# Patient Record
Sex: MALE | Race: BLACK OR AFRICAN AMERICAN | NOT HISPANIC OR LATINO | Employment: OTHER | URBAN - METROPOLITAN AREA
[De-identification: names, ages, dates, MRNs, and addresses within clinical notes are randomized per-mention and may not be internally consistent; named-entity substitution may affect disease eponyms.]

---

## 2017-06-07 RX ORDER — AMLODIPINE BESYLATE 10 MG/1
TABLET ORAL
Qty: 90 TABLET | Refills: 1 | Status: SHIPPED | OUTPATIENT
Start: 2017-06-07 | End: 2017-10-30 | Stop reason: SDUPTHER

## 2017-06-15 ENCOUNTER — OFFICE VISIT (OUTPATIENT)
Dept: INTERNAL MEDICINE | Facility: CLINIC | Age: 72
End: 2017-06-15
Payer: COMMERCIAL

## 2017-06-15 ENCOUNTER — LAB VISIT (OUTPATIENT)
Dept: LAB | Facility: OTHER | Age: 72
End: 2017-06-15
Attending: INTERNAL MEDICINE
Payer: COMMERCIAL

## 2017-06-15 VITALS
WEIGHT: 211.88 LBS | OXYGEN SATURATION: 97 % | HEART RATE: 76 BPM | BODY MASS INDEX: 28.08 KG/M2 | DIASTOLIC BLOOD PRESSURE: 82 MMHG | SYSTOLIC BLOOD PRESSURE: 154 MMHG | HEIGHT: 73 IN

## 2017-06-15 DIAGNOSIS — I10 ESSENTIAL HYPERTENSION: ICD-10-CM

## 2017-06-15 DIAGNOSIS — E11.40 CONTROLLED TYPE 2 DIABETES MELLITUS WITH DIABETIC NEUROPATHY, WITHOUT LONG-TERM CURRENT USE OF INSULIN: ICD-10-CM

## 2017-06-15 DIAGNOSIS — E11.40 CONTROLLED TYPE 2 DIABETES MELLITUS WITH DIABETIC NEUROPATHY, WITHOUT LONG-TERM CURRENT USE OF INSULIN: Primary | ICD-10-CM

## 2017-06-15 PROCEDURE — 99214 OFFICE O/P EST MOD 30 MIN: CPT | Mod: S$GLB,,, | Performed by: INTERNAL MEDICINE

## 2017-06-15 PROCEDURE — 3044F HG A1C LEVEL LT 7.0%: CPT | Mod: S$GLB,,, | Performed by: INTERNAL MEDICINE

## 2017-06-15 PROCEDURE — 99999 PR PBB SHADOW E&M-EST. PATIENT-LVL III: CPT | Mod: PBBFAC,,, | Performed by: INTERNAL MEDICINE

## 2017-06-15 PROCEDURE — 4010F ACE/ARB THERAPY RXD/TAKEN: CPT | Mod: S$GLB,,, | Performed by: INTERNAL MEDICINE

## 2017-06-15 PROCEDURE — 1126F AMNT PAIN NOTED NONE PRSNT: CPT | Mod: S$GLB,,, | Performed by: INTERNAL MEDICINE

## 2017-06-15 PROCEDURE — 1159F MED LIST DOCD IN RCRD: CPT | Mod: S$GLB,,, | Performed by: INTERNAL MEDICINE

## 2017-06-15 PROCEDURE — 36415 COLL VENOUS BLD VENIPUNCTURE: CPT

## 2017-06-15 PROCEDURE — 83036 HEMOGLOBIN GLYCOSYLATED A1C: CPT

## 2017-06-15 RX ORDER — LISINOPRIL 20 MG/1
20 TABLET ORAL DAILY
Qty: 90 TABLET | Refills: 1 | Status: SHIPPED | OUTPATIENT
Start: 2017-06-15 | End: 2017-06-29 | Stop reason: SDUPTHER

## 2017-06-15 RX ORDER — METFORMIN HYDROCHLORIDE 1000 MG/1
TABLET ORAL
Qty: 180 TABLET | Refills: 3 | Status: SHIPPED | OUTPATIENT
Start: 2017-06-15 | End: 2017-10-30 | Stop reason: SDUPTHER

## 2017-06-15 NOTE — PROGRESS NOTES
"Subjective:       Patient ID: Misha Eldridge Jr. is a 72 y.o. male.    Chief Complaint: Hypertension      Pt here for f/u HTN and other issues.    Feeling well.  No c/o.     He reports that he stopped taking gabapentin on his own several months ago b/c he felt it wasn't helping.  He also was feeling faint one day around that time and thought the gabapentin might be the cause.           Review of Systems   Constitutional: Negative.    HENT: Negative.    Eyes: Negative.    Respiratory: Negative.        Objective:       Vitals:    06/15/17 1142   BP: (!) 154/82   Pulse: 76   SpO2: 97%   Weight: 96.1 kg (211 lb 13.8 oz)   Height: 6' 1" (1.854 m)     Physical Exam   Constitutional: He is oriented to person, place, and time. He appears well-developed and well-nourished. No distress.   HENT:   Head: Normocephalic and atraumatic.   Right Ear: Tympanic membrane, external ear and ear canal normal.   Left Ear: Tympanic membrane, external ear and ear canal normal.   Mouth/Throat: Oropharynx is clear and moist and mucous membranes are normal. No oropharyngeal exudate or posterior oropharyngeal erythema.   Eyes: Conjunctivae and EOM are normal. Pupils are equal, round, and reactive to light.   Neck: Normal range of motion. Neck supple. No thyromegaly present.   Cardiovascular: Normal rate, regular rhythm and normal heart sounds.  Exam reveals no gallop and no friction rub.    No murmur heard.  Pulmonary/Chest: Effort normal and breath sounds normal. No respiratory distress. He has no wheezes. He has no rhonchi. He has no rales.   Lymphadenopathy:     He has no cervical adenopathy.   Neurological: He is alert and oriented to person, place, and time.   Skin: No rash noted. He is not diaphoretic.   Psychiatric: He has a normal mood and affect. His behavior is normal. Thought content normal.       Assessment:       1. Controlled type 2 diabetes mellitus with diabetic neuropathy, without long-term current use of insulin    2. Essential " hypertension        Plan:           DM2 - Has been controlled.  Cont current tx and check new A1c.    HTN - Uncontrolled.  Pt never started lisinopril.  Will start this now.  Cont amlodipine, carvedilol.  RTC 2-3 weeks for nurse BP check.

## 2017-06-16 LAB
ESTIMATED AVG GLUCOSE: 137 MG/DL
HBA1C MFR BLD HPLC: 6.4 %

## 2017-06-29 ENCOUNTER — CLINICAL SUPPORT (OUTPATIENT)
Dept: INTERNAL MEDICINE | Facility: CLINIC | Age: 72
End: 2017-06-29
Payer: COMMERCIAL

## 2017-06-29 ENCOUNTER — TELEPHONE (OUTPATIENT)
Dept: INTERNAL MEDICINE | Facility: CLINIC | Age: 72
End: 2017-06-29

## 2017-06-29 VITALS — DIASTOLIC BLOOD PRESSURE: 98 MMHG | HEART RATE: 78 BPM | OXYGEN SATURATION: 98 % | SYSTOLIC BLOOD PRESSURE: 150 MMHG

## 2017-06-29 PROCEDURE — 99999 PR PBB SHADOW E&M-EST. PATIENT-LVL III: CPT | Mod: PBBFAC,,,

## 2017-06-29 RX ORDER — LISINOPRIL 40 MG/1
40 TABLET ORAL DAILY
Qty: 90 TABLET | Refills: 1 | Status: SHIPPED | OUTPATIENT
Start: 2017-06-29 | End: 2017-10-30 | Stop reason: SDUPTHER

## 2017-06-29 NOTE — PROGRESS NOTES
Misha Eldridge Jr. 72 y.o. male is here today for Blood Pressure check.   History of HTN yes.    Review of patient's allergies indicates:   Allergen Reactions    Hay fever and allergy relief      Creatinine   Date Value Ref Range Status   12/14/2016 1.2 0.5 - 1.4 mg/dL Final     Sodium   Date Value Ref Range Status   12/14/2016 141 136 - 145 mmol/L Final     Potassium   Date Value Ref Range Status   12/14/2016 4.7 3.5 - 5.1 mmol/L Final   ]  Patient verifies taking blood pressure medications on a regular bases at the same time of the day.     Current Outpatient Prescriptions:     amlodipine (NORVASC) 10 MG tablet, take 1 tablet by mouth once daily, Disp: 90 tablet, Rfl: 1    atorvastatin (LIPITOR) 20 MG tablet, Take 1 tablet (20 mg total) by mouth once daily., Disp: 90 tablet, Rfl: 1    blood sugar diagnostic Strp, 50 strips by Misc.(Non-Drug; Combo Route) route 2 (two) times daily., Disp: 50 strip, Rfl: 2    carvedilol (COREG) 3.125 MG tablet, Take 1 tablet (3.125 mg total) by mouth 2 (two) times daily with meals., Disp: 180 tablet, Rfl: 1    gabapentin (NEURONTIN) 300 MG capsule, Take 1 capsule (300 mg total) by mouth 3 (three) times daily., Disp: 90 capsule, Rfl: 1    glipiZIDE (GLUCOTROL) 5 MG tablet, Take 1 tablet (5 mg total) by mouth 2 (two) times daily before meals., Disp: 180 tablet, Rfl: 1    ketorolac 0.5% (ACULAR) 0.5 % Drop, , Disp: , Rfl: 0    lisinopril (PRINIVIL,ZESTRIL) 20 MG tablet, Take 1 tablet (20 mg total) by mouth once daily., Disp: 90 tablet, Rfl: 1    metformin (GLUCOPHAGE) 1000 MG tablet, take 1 tablet by mouth twice a day with meals, Disp: 180 tablet, Rfl: 3    prednisoLONE acetate (PRED FORTE) 1 % DrpS, , Disp: , Rfl: 0    PREVNAR 13, PF, 0.5 mL Syrg, , Disp: , Rfl:     RESTASIS 0.05 % ophthalmic emulsion, , Disp: , Rfl: 0    VIGAMOX 0.5 % ophthalmic solution, , Disp: , Rfl: 0    ZOSTAVAX, PF, 19,400 unit/0.65 mL injection, , Disp: , Rfl:   Does patient have record of home  blood pressure readings no. Readings have been averaging n/a.   Last dose of blood pressure medication was taken at 9:30am 6/29.  Patient is asymptomatic.   No complaints.    BP: (!) 160/100 , Pulse: 73.    Blood pressure reading after 15 minutes was 150/98, Pulse 78.  Dr. Treviño notified.       Dr. Treviño has doubled pt's lisinopril from 20mg to 40mg. Pt advised to also take daily readings of bp and to call us in one week to report readings. Pt demonstrated verbal understanding of information and had no further questions or concerns at this time.

## 2017-06-29 NOTE — TELEPHONE ENCOUNTER
Pt came in for bp check today. Pt denied any abnormal symptoms such as chest pain, SOB, or dizziness. Pt verifies taking medication same time every day. Pt first bp reading was 160/100, pulse 73, O2% 98. Pt second reading after 15 min 150/98, pulse 78, O2% 98. Per Dr. Treviño pt rx for lisinopril has been increased from 20mg to 40mg daily. Pt also advised to take daily bp readings and to either give us the results either electronically or by phone to see if this change in medication improves bp. Pt verbalized understating and stated he would take at home bp and call results in in one week.

## 2017-06-30 RX ORDER — CARVEDILOL 3.12 MG/1
TABLET ORAL
Qty: 180 TABLET | Refills: 0 | Status: SHIPPED | OUTPATIENT
Start: 2017-06-30 | End: 2017-10-30 | Stop reason: SDUPTHER

## 2017-08-01 ENCOUNTER — OFFICE VISIT (OUTPATIENT)
Dept: INTERNAL MEDICINE | Facility: CLINIC | Age: 72
End: 2017-08-01
Payer: COMMERCIAL

## 2017-08-01 VITALS
WEIGHT: 208.75 LBS | DIASTOLIC BLOOD PRESSURE: 78 MMHG | HEART RATE: 65 BPM | BODY MASS INDEX: 27.67 KG/M2 | SYSTOLIC BLOOD PRESSURE: 148 MMHG | HEIGHT: 73 IN

## 2017-08-01 DIAGNOSIS — R53.83 FATIGUE, UNSPECIFIED TYPE: Primary | ICD-10-CM

## 2017-08-01 DIAGNOSIS — H53.8 BLURRY VISION: ICD-10-CM

## 2017-08-01 PROCEDURE — 99214 OFFICE O/P EST MOD 30 MIN: CPT | Mod: S$GLB,,, | Performed by: INTERNAL MEDICINE

## 2017-08-01 PROCEDURE — 99999 PR PBB SHADOW E&M-EST. PATIENT-LVL III: CPT | Mod: PBBFAC,,, | Performed by: INTERNAL MEDICINE

## 2017-08-01 PROCEDURE — 1159F MED LIST DOCD IN RCRD: CPT | Mod: S$GLB,,, | Performed by: INTERNAL MEDICINE

## 2017-08-01 PROCEDURE — 1126F AMNT PAIN NOTED NONE PRSNT: CPT | Mod: S$GLB,,, | Performed by: INTERNAL MEDICINE

## 2017-08-01 RX ORDER — IBUPROFEN 800 MG/1
TABLET ORAL
COMMUNITY
Start: 2017-07-12 | End: 2018-01-17 | Stop reason: SDUPTHER

## 2017-08-01 RX ORDER — HYDROCHLOROTHIAZIDE 12.5 MG/1
12.5 TABLET ORAL DAILY
Qty: 90 TABLET | Refills: 0 | Status: SHIPPED | OUTPATIENT
Start: 2017-08-01 | End: 2017-10-30 | Stop reason: SDUPTHER

## 2017-08-01 RX ORDER — HYDROCODONE BITARTRATE AND ACETAMINOPHEN 5; 325 MG/1; MG/1
TABLET ORAL
COMMUNITY
Start: 2017-07-12 | End: 2017-12-05

## 2017-08-01 NOTE — PROGRESS NOTES
"Subjective:       Patient ID: Misha Eldridge Jr. is a 72 y.o. male.    Chief Complaint: Blurred Vision; Medication Refill; and Fatigue      Pt c/o blurry peripheral vision for 1 week.  No change in acuity.  No eye pain or redness.  He says his eyes feel dry and are tearing some.      Also c/o fatigue over the past 2 weeks.  He notes he often doesn't sleep enough at night, often 4-6 hours.     No recent low glucoses.           Review of Systems   Constitutional: Negative.    HENT: Negative.    Eyes: Negative.    Respiratory: Negative.        Objective:       Vitals:    08/01/17 1522   BP: (!) 148/78   Pulse: 65   Weight: 94.7 kg (208 lb 12.4 oz)   Height: 6' 1" (1.854 m)     Physical Exam   Constitutional: He is oriented to person, place, and time. He appears well-developed and well-nourished. No distress.   HENT:   Head: Normocephalic and atraumatic.   Right Ear: Tympanic membrane, external ear and ear canal normal.   Left Ear: Tympanic membrane, external ear and ear canal normal.   Mouth/Throat: Oropharynx is clear and moist and mucous membranes are normal. No oropharyngeal exudate or posterior oropharyngeal erythema.   Eyes: Conjunctivae and EOM are normal. Pupils are equal, round, and reactive to light.   Neck: Normal range of motion. Neck supple. No thyromegaly present.   Cardiovascular: Normal rate, regular rhythm and normal heart sounds.  Exam reveals no gallop and no friction rub.    No murmur heard.  Pulmonary/Chest: Effort normal and breath sounds normal. No respiratory distress. He has no wheezes. He has no rhonchi. He has no rales.   Lymphadenopathy:     He has no cervical adenopathy.   Neurological: He is alert and oriented to person, place, and time.   Skin: He is not diaphoretic.       Assessment:       1. Fatigue, unspecified type    2. Blurry vision        Plan:           Fatigue - Seems to be 2/2 insufficient sleep.  Pt advised re sleep hygiene.     Blurry peripheral vision - Unclear etiology, " possibly dry eyes.  Pt advised to try lubricating eye drops and also to see his eye doctor.

## 2017-08-07 NOTE — TELEPHONE ENCOUNTER
----- Message from Mendel Rubi sent at 8/7/2017 11:55 AM CDT -----  Contact: Misha Eldridge  X_  1st Request  _  2nd Request  _  3rd Request        Who: Misha Eldridge    Why: Patient states that his diabetic testing strips and his machine have not been sent in. Please call back the patient.    What Number to Call Back: [phone]    When to Expect a call back: (Before the end of the day)   -- if the call is after 12:00, the call back will be tomorrow.

## 2017-08-11 ENCOUNTER — TELEPHONE (OUTPATIENT)
Dept: INTERNAL MEDICINE | Facility: CLINIC | Age: 72
End: 2017-08-11

## 2017-08-11 NOTE — TELEPHONE ENCOUNTER
----- Message from Magy Pfeiffer sent at 8/11/2017  2:51 PM CDT -----  Contact: Brady CAZARES_  1st Request  _  2nd Request  _  3rd Request    Who:Brady with [facility]    Why: Brady with [facility] states she would like to speak with the staff in regards to getting a prescription for a diabetic machine and lancets e-scribed ...... Please contact to further discuss and advise     What Number to Call Back: [phone]    When to Expect a call back: (Before the end of the day)   -- if call after 3:00 call back will be tomorrow.

## 2017-08-15 RX ORDER — INSULIN PUMP SYRINGE, 3 ML
EACH MISCELLANEOUS
Qty: 1 EACH | Refills: 0 | Status: SHIPPED | OUTPATIENT
Start: 2017-08-15 | End: 2019-03-04 | Stop reason: SDUPTHER

## 2017-08-15 NOTE — TELEPHONE ENCOUNTER
----- Message from Elicia Renner sent at 8/15/2017  2:38 PM CDT -----  Contact: MUSA SEGURA JR. [[id number]]  _x  1st Request  _  2nd Request  _  3rd Request        Who: MUSA SEGURA JR. [[id number]]    Why: patient states he received the testing strips but now he needs a new diabetic machine sent to [address]. Patient would like a call back to confirm.     What Number to Call Back: [phone]    When to Expect a call back: (Before the end of the day)   -- if call after 3:00 call back will be tomorrow.

## 2017-09-04 RX ORDER — ATORVASTATIN CALCIUM 20 MG/1
TABLET, FILM COATED ORAL
Qty: 90 TABLET | Refills: 1 | Status: SHIPPED | OUTPATIENT
Start: 2017-09-04 | End: 2017-10-30 | Stop reason: SDUPTHER

## 2017-09-13 ENCOUNTER — TELEPHONE (OUTPATIENT)
Dept: INTERNAL MEDICINE | Facility: CLINIC | Age: 72
End: 2017-09-13

## 2017-09-13 NOTE — TELEPHONE ENCOUNTER
Pt states he has a bootle of med but can not tell what it is due to bottle being old an name of med is not able to be seen. Pt states he thinks it is his Glipizide but he does not know and will go to the Pharmacy and see if they can help and call us back if he has any questions.

## 2017-09-13 NOTE — TELEPHONE ENCOUNTER
----- Message from Malka Maldonado sent at 9/13/2017 11:01 AM CDT -----  Contact: pt  _  1st Request  _  2nd Request  _  3rd Request        Who: pt    Why: pt needs to get his medications straight. He is requesting a list of medications he is supposed to take. Please call pt    What Number to Call Back:[phone]    When to Expect a call back: (With in 24 hours)

## 2017-10-27 ENCOUNTER — TELEPHONE (OUTPATIENT)
Dept: INTERNAL MEDICINE | Facility: CLINIC | Age: 72
End: 2017-10-27

## 2017-10-27 NOTE — TELEPHONE ENCOUNTER
Call pt and inform him that I did check around for a sooner NP appt but there was nothing sooner and we bunny keep him on wait list.Pt agrees and verbalize and has no further questions.

## 2017-10-27 NOTE — TELEPHONE ENCOUNTER
----- Message from Malka Maldonado sent at 10/27/2017 11:31 AM CDT -----  Contact: pt  _  1st Request  _  2nd Request  _  3rd Request        Who: pt    Why: Requesting a call back in regards to pt is former [facility] pt. Made appt with shana for him in December. Put him on wait list but he needs a much sooner appt than that. Please call pt     What Number to Call Back:[phone]    When to Expect a call back: (Within 24 hours)    Please return the call at earliest convenience. Thanks!

## 2017-10-30 RX ORDER — GABAPENTIN 300 MG/1
300 CAPSULE ORAL 3 TIMES DAILY
Qty: 270 CAPSULE | Refills: 0 | Status: SHIPPED | OUTPATIENT
Start: 2017-10-30 | End: 2018-03-02 | Stop reason: SDUPTHER

## 2017-10-30 RX ORDER — AMLODIPINE BESYLATE 10 MG/1
10 TABLET ORAL DAILY
Qty: 90 TABLET | Refills: 0 | Status: SHIPPED | OUTPATIENT
Start: 2017-10-30 | End: 2018-02-20 | Stop reason: SDUPTHER

## 2017-10-30 RX ORDER — GLIPIZIDE 5 MG/1
5 TABLET ORAL
Qty: 180 TABLET | Refills: 0 | Status: SHIPPED | OUTPATIENT
Start: 2017-10-30 | End: 2017-12-04 | Stop reason: SDUPTHER

## 2017-10-30 RX ORDER — METFORMIN HYDROCHLORIDE 1000 MG/1
1000 TABLET ORAL 2 TIMES DAILY WITH MEALS
Qty: 180 TABLET | Refills: 0 | Status: SHIPPED | OUTPATIENT
Start: 2017-10-30 | End: 2018-02-26 | Stop reason: SDUPTHER

## 2017-10-30 RX ORDER — ATORVASTATIN CALCIUM 20 MG/1
20 TABLET, FILM COATED ORAL DAILY
Qty: 90 TABLET | Refills: 0 | Status: SHIPPED | OUTPATIENT
Start: 2017-10-30 | End: 2018-02-20 | Stop reason: SDUPTHER

## 2017-10-30 RX ORDER — LISINOPRIL 40 MG/1
40 TABLET ORAL DAILY
Qty: 90 TABLET | Refills: 0 | Status: SHIPPED | OUTPATIENT
Start: 2017-10-30 | End: 2018-02-26 | Stop reason: SDUPTHER

## 2017-10-30 RX ORDER — CARVEDILOL 3.12 MG/1
3.12 TABLET ORAL 2 TIMES DAILY WITH MEALS
Qty: 180 TABLET | Refills: 0 | Status: SHIPPED | OUTPATIENT
Start: 2017-10-30 | End: 2018-02-23 | Stop reason: SDUPTHER

## 2017-10-30 RX ORDER — HYDROCHLOROTHIAZIDE 12.5 MG/1
12.5 TABLET ORAL DAILY
Qty: 90 TABLET | Refills: 0 | Status: SHIPPED | OUTPATIENT
Start: 2017-10-30 | End: 2018-03-02 | Stop reason: SDUPTHER

## 2017-11-21 ENCOUNTER — TELEPHONE (OUTPATIENT)
Dept: INTERNAL MEDICINE | Facility: CLINIC | Age: 72
End: 2017-11-21

## 2017-11-21 ENCOUNTER — PATIENT OUTREACH (OUTPATIENT)
Dept: INTERNAL MEDICINE | Facility: CLINIC | Age: 72
End: 2017-11-21

## 2017-11-21 DIAGNOSIS — E11.40 CONTROLLED TYPE 2 DIABETES MELLITUS WITH DIABETIC NEUROPATHY, WITHOUT LONG-TERM CURRENT USE OF INSULIN: Primary | ICD-10-CM

## 2017-11-21 NOTE — PROGRESS NOTES
[facility] is committed to your overall health.  To help you get the most out of each of your visits, we will review your information to make sure you are up to date on all of your recommended tests and/or procedures.       Your PCP   found that you may be due for:     Health Maintenance Due   Topic Date Due    TETANUS VACCINE  03/31/1963    Influenza Vaccine  08/01/2017    Eye Exam  10/11/2017    Foot Exam  12/14/2017     You will be scheduled for a eye cam (eye exam) retina scan on the same day of your scheduled visit with Dr. PATSY Treviño MD..  NO eye drop dilation will take place. You can drive after the scan.  This will take no longer than ten minutes. This will take place in the same office area as your visit.             If you have had any of the above done at another facility, please bring the records or information with you so that your record at [facility] will be complete.  If you would like to schedule any of these, please contact me.     If you are currently taking medication, please bring it with you to your appointment for review.     Also, if you have any type of Advanced Directives, please bring them with you to your office visit so we may scan them into your chart.     Thank you for Choosing [facility] for your healthcare needs.      Additional Information  If you have questions, you can email [email or web address] or call [phone]  to talk to our [facility] staff. Remember, [employer] is NOT to be used for urgent needs. For medical emergencies, dial 911.

## 2017-12-04 RX ORDER — GLIPIZIDE 5 MG/1
TABLET ORAL
Qty: 180 TABLET | Refills: 0 | Status: SHIPPED | OUTPATIENT
Start: 2017-12-04 | End: 2018-02-22 | Stop reason: SDUPTHER

## 2017-12-05 ENCOUNTER — LAB VISIT (OUTPATIENT)
Dept: LAB | Facility: OTHER | Age: 72
End: 2017-12-05
Attending: INTERNAL MEDICINE
Payer: COMMERCIAL

## 2017-12-05 ENCOUNTER — OFFICE VISIT (OUTPATIENT)
Dept: INTERNAL MEDICINE | Facility: CLINIC | Age: 72
End: 2017-12-05
Attending: INTERNAL MEDICINE
Payer: COMMERCIAL

## 2017-12-05 ENCOUNTER — DOCUMENTATION ONLY (OUTPATIENT)
Dept: INTERNAL MEDICINE | Facility: CLINIC | Age: 72
End: 2017-12-05

## 2017-12-05 VITALS
HEART RATE: 63 BPM | WEIGHT: 210.13 LBS | DIASTOLIC BLOOD PRESSURE: 74 MMHG | HEIGHT: 73 IN | SYSTOLIC BLOOD PRESSURE: 136 MMHG | BODY MASS INDEX: 27.85 KG/M2

## 2017-12-05 DIAGNOSIS — Z85.038 HISTORY OF COLON CANCER: ICD-10-CM

## 2017-12-05 DIAGNOSIS — E11.40 CONTROLLED TYPE 2 DIABETES MELLITUS WITH DIABETIC NEUROPATHY, WITHOUT LONG-TERM CURRENT USE OF INSULIN: ICD-10-CM

## 2017-12-05 DIAGNOSIS — Z85.46 HISTORY OF PROSTATE CANCER: ICD-10-CM

## 2017-12-05 DIAGNOSIS — M54.50 CHRONIC BILATERAL LOW BACK PAIN WITHOUT SCIATICA: Primary | Chronic | ICD-10-CM

## 2017-12-05 DIAGNOSIS — G89.29 CHRONIC BILATERAL LOW BACK PAIN WITHOUT SCIATICA: Primary | Chronic | ICD-10-CM

## 2017-12-05 DIAGNOSIS — I10 ESSENTIAL HYPERTENSION: ICD-10-CM

## 2017-12-05 LAB
ALBUMIN SERPL BCP-MCNC: 4.1 G/DL
ALP SERPL-CCNC: 59 U/L
ALT SERPL W/O P-5'-P-CCNC: 20 U/L
ANION GAP SERPL CALC-SCNC: 11 MMOL/L
AST SERPL-CCNC: 18 U/L
BILIRUB SERPL-MCNC: 0.4 MG/DL
BUN SERPL-MCNC: 11 MG/DL
CALCIUM SERPL-MCNC: 9.8 MG/DL
CHLORIDE SERPL-SCNC: 103 MMOL/L
CHOLEST SERPL-MCNC: 155 MG/DL
CHOLEST/HDLC SERPL: 3.1 {RATIO}
CO2 SERPL-SCNC: 25 MMOL/L
CREAT SERPL-MCNC: 1.2 MG/DL
EST. GFR  (AFRICAN AMERICAN): >60 ML/MIN/1.73 M^2
EST. GFR  (NON AFRICAN AMERICAN): >60 ML/MIN/1.73 M^2
ESTIMATED AVG GLUCOSE: 126 MG/DL
GLUCOSE SERPL-MCNC: 114 MG/DL
HBA1C MFR BLD HPLC: 6 %
HDLC SERPL-MCNC: 50 MG/DL
HDLC SERPL: 32.3 %
LDLC SERPL CALC-MCNC: 79.2 MG/DL
NONHDLC SERPL-MCNC: 105 MG/DL
POTASSIUM SERPL-SCNC: 4.3 MMOL/L
PROT SERPL-MCNC: 7.7 G/DL
SODIUM SERPL-SCNC: 139 MMOL/L
TRIGL SERPL-MCNC: 129 MG/DL
TSH SERPL DL<=0.005 MIU/L-ACNC: 1.71 UIU/ML

## 2017-12-05 PROCEDURE — 80061 LIPID PANEL: CPT

## 2017-12-05 PROCEDURE — 80053 COMPREHEN METABOLIC PANEL: CPT

## 2017-12-05 PROCEDURE — 99397 PER PM REEVAL EST PAT 65+ YR: CPT | Mod: S$GLB,,, | Performed by: INTERNAL MEDICINE

## 2017-12-05 PROCEDURE — 99999 PR PBB SHADOW E&M-EST. PATIENT-LVL IV: CPT | Mod: PBBFAC,,, | Performed by: INTERNAL MEDICINE

## 2017-12-05 PROCEDURE — 84153 ASSAY OF PSA TOTAL: CPT

## 2017-12-05 PROCEDURE — 36415 COLL VENOUS BLD VENIPUNCTURE: CPT

## 2017-12-05 PROCEDURE — 84443 ASSAY THYROID STIM HORMONE: CPT

## 2017-12-05 PROCEDURE — 83036 HEMOGLOBIN GLYCOSYLATED A1C: CPT

## 2017-12-05 NOTE — PROGRESS NOTES
Subjective:       Patient ID: Misha Eldridge Jr. is a 72 y.o. male.    Chief Complaint: Establish Care    Here to establish care    Was going to eye MD in [place] still having some trouble with vision. He denies a Hx of glaucoma or infection. Only diagnosis he is aware of is cataract. He is not taking any eye gt. pred fort on MAR    PMHx  DM-+ neuropathy of hands and feet on gabapentin 300mg TID.  Prostate CA-Dx approx 2010 at [facility] s/p resection. No chemo or radiation. He denies urinary frequency, urinary urgency, decreased force of stream, incomplete emptying of bladder, post void dribble, nocturia, or gross hematuria.   Colon CA- Dx approx 2011/2012. S/p colectomy and reversal. Overdue for colonoscopy.   Chronic low back pain-daily. Rare radiation down legs. Has never had any treatment. He does not take anything on regular basis for symptoms. Pain is non debilitating.           Review of Systems   Constitutional: Negative for appetite change, chills, fever and unexpected weight change.   HENT: Negative for hearing loss, sore throat and trouble swallowing.    Eyes: Negative for visual disturbance.   Respiratory: Negative for cough, chest tightness and shortness of breath.    Cardiovascular: Negative for chest pain and leg swelling.   Gastrointestinal: Negative for abdominal pain, blood in stool, constipation, diarrhea, nausea and vomiting.   Endocrine: Negative for polydipsia and polyuria.   Genitourinary: Negative for decreased urine volume, difficulty urinating, dysuria, frequency and urgency.   Musculoskeletal: Positive for back pain. Negative for gait problem.   Skin: Negative for rash.   Neurological: Negative for dizziness and numbness.   Psychiatric/Behavioral: The patient is not nervous/anxious.        Past Medical History:   Diagnosis Date    Colostomy in place     Diabetes mellitus type II     Hemorrhoid     History of colon cancer     History of prostate cancer     History of pulmonary embolism   "   Hypertension      Past Surgical History:   Procedure Laterality Date    HERNIA REPAIR      PROSTATECTOMY      SUBTOTAL COLECTOMY       Family History   Problem Relation Age of Onset    Arthritis Mother     Hypertension Mother     Alcohol abuse Father      Social History     Social History    Marital status: Single     Spouse name: N/A    Number of children: N/A    Years of education: N/A     Occupational History          mother lives w/pt     Social History Main Topics    Smoking status: Never Smoker    Smokeless tobacco: Never Used    Alcohol use No    Drug use: No    Sexual activity: Not on file     Other Topics Concern    Not on file     Social History Narrative    No narrative on file         Objective:      Vitals:    12/05/17 1141   BP: 136/74   Pulse: 63   Weight: 95.3 kg (210 lb 1.6 oz)   Height: 6' 1" (1.854 m)      Physical Exam   Constitutional: He is oriented to person, place, and time. He appears well-developed and well-nourished. No distress.   HENT:   Head: Normocephalic and atraumatic.   Mouth/Throat: Oropharynx is clear and moist. No oropharyngeal exudate.   Eyes: Conjunctivae and EOM are normal. Pupils are equal, round, and reactive to light. No scleral icterus.   Neck: No thyromegaly present.   Cardiovascular: Normal rate, regular rhythm and normal heart sounds.    No murmur heard.  Pulmonary/Chest: Effort normal and breath sounds normal. He has no wheezes. He has no rales.   Abdominal: Soft. He exhibits no distension. There is no tenderness.   Musculoskeletal: He exhibits no edema or tenderness.   Lymphadenopathy:     He has no cervical adenopathy.   Neurological: He is alert and oriented to person, place, and time.   Skin: Skin is warm and dry.   Psychiatric: He has a normal mood and affect. His behavior is normal.       Assessment:       1. Chronic bilateral low back pain without sciatica    2. Controlled type 2 diabetes mellitus with diabetic neuropathy, without long-term " current use of insulin    3. History of prostate cancer    4. Essential hypertension    5. History of colon cancer        Plan:       Misha was seen today for establish care.    Diagnoses and all orders for this visit:    Chronic bilateral low back pain without sciatica    Controlled type 2 diabetes mellitus with diabetic neuropathy, without long-term current use of insulin  -     Ambulatory referral to Optometry  -     Hemoglobin A1c; Future  -     Lipid panel; Future  -     TSH; Future  -     Comprehensive metabolic panel; Future    History of prostate cancer  -     PSA, Screening; Future    Essential hypertension  -discussed goal of 130/80 with Hx of DM. Low salt diet. Continue to monitor and if remains elevated will titrate as warranted.    History of colon cancer  -     Case request GI: COLONOSCOPY    RTC in 6 months or sooner prn             Notification of Lab Results: Letter  Side effects of medication(s) were discussed in detail and patient voiced understanding.  Patient will call back for any issues or complications.

## 2017-12-05 NOTE — PATIENT INSTRUCTIONS
Treatment of nasal congestion  1)Antihistamines(Allegra, Claritin, Xzyal, Zyrtec)  2)Nasal Steroids (Nasocort, Rhinocort, Flonase)  3)Distilled salt water sinus rinses via neti pots or products such as Brett Med Sinus Rinse or Sinugator. Must wash container or device and use bottled water to avoid introducing infection.   You can can use (as directed) any combination of these three things every day of your life if needed in order to treat or control your symptoms. Brand name use of medications is not necessary        Low-Salt Diet  This diet removes foods that are high in salt. It also limits the amount of salt you use when cooking. It is most often used for people with high blood pressure, edema (fluid retention), and kidney, liver, or heart disease.  Table salt contains the mineral sodium. Your body needs sodium to work normally. But too much sodium can make your health problems worse. Your healthcare provider is recommending a low-salt (also called low-sodium) diet for you. Your total daily allowance of salt is 1,500 to 2,300 milligrams (mg). It is less than 1 teaspoon of table salt. This means you can have only about 500 to 700 mg of sodium at each meal. People with certain health problems should limit salt intake to the lower end of the recommended range.    When you cook, dont add much salt. If you can cook without using salt, even better. Dont add salt to your food at the table.  When shopping, read food labels. Salt is often called sodium on the label. Choose foods that are salt-free, low salt, or very low salt. Note that foods with reduced salt may not lower your salt intake enough.    Beans, potatoes, and pasta  Ok: Dry beans, split peas, lentils, potatoes, rice, macaroni, pasta, spaghetti without added salt  Avoid: Potato chips, tortilla chips, and similar products  Breads and cereals  Ok: Low-sodium breads, rolls, cereals, and cakes; low-salt crackers, matzo crackers  Avoid: Salted crackers, pretzels,  popcorn, Indonesian toast, pancakes, muffins  Dairy  Ok: Milk, chocolate milk, hot chocolate mix, low-salt cheeses, and yogurt  Avoid: Processed cheese and cheese spreads; Roquefort, Camembert, and cottage cheese; buttermilk, instant breakfast drink  Desserts  Ok: Ice cream, frozen yogurt, juice bars, gelatin, cookies and pies, sugar, honey, jelly, hard candy  Avoid: Most pies, cakes and cookies prepared or processed with salt; instant pudding  Drinks  Ok: Tea, coffee, fizzy (carbonated) drinks, juices  Avoid: Flavored coffees, electrolyte replacement drinks, sports drinks  Meats  Ok: All fresh meat, fish, poultry, low-salt tuna, eggs, egg substitute  Avoid: Smoked, pickled, brine-cured, or salted meats and fish. This includes rothman, chipped beef, corned beef, hot dogs, deli meats, ham, kosher meats, salt pork, sausage, canned tuna, salted codfish, smoked salmon, herring, sardines, or anchovies.  Seasonings and spices  Ok: Most seasonings are okay. Good substitutes for salt include: fresh herb blends, hot sauce, lemon, garlic, cunningham, vinegar, dry mustard, parsley, cilantro, horseradish, tomato paste, regular margarine, mayonnaise, unsalted butter, cream cheese, vegetable oil, cream, low-salt salad dressing and gravy.  Avoid: Regular ketchup, relishes, pickles, soy sauce, teriyaki sauce, Worcestershire sauce, BBQ sauce, tartar sauce, meat tenderizer, chili sauce, regular gravy, regular salad dressing, salted butter  Soups  Ok: Low-salt soups and broths made with allowed foods  Avoid: Bouillon cubes, soups with smoked or salted meats, regular soup and broth  Vegetables  Ok: Most vegetables are okay; also low-salt tomato and vegetable juices  Avoid: Sauerkraut and other brine-soaked vegetables; pickles and other pickled vegetables; tomato juice, olives  Date Last Reviewed: 8/1/2016  © 5260-7388 [employer]. [address] [place] [address], [place] [address]. All rights reserved. This information is not intended as a  substitute for professional medical care. Always follow your healthcare professional's instructions.

## 2017-12-06 ENCOUNTER — OFFICE VISIT (OUTPATIENT)
Dept: OPTOMETRY | Facility: CLINIC | Age: 72
End: 2017-12-06
Attending: INTERNAL MEDICINE
Payer: COMMERCIAL

## 2017-12-06 DIAGNOSIS — Z96.1 PSEUDOPHAKIA OF BOTH EYES: ICD-10-CM

## 2017-12-06 DIAGNOSIS — E11.9 TYPE 2 DIABETES MELLITUS WITHOUT OPHTHALMIC MANIFESTATIONS: ICD-10-CM

## 2017-12-06 DIAGNOSIS — H26.493 AFTER-CATARACT OBSCURING VISION, BILATERAL: Primary | ICD-10-CM

## 2017-12-06 DIAGNOSIS — H35.30 MACULAR DEGENERATION OF BOTH EYES: ICD-10-CM

## 2017-12-06 LAB — COMPLEXED PSA SERPL-MCNC: 0.72 NG/ML

## 2017-12-06 PROCEDURE — 99999 PR PBB SHADOW E&M-EST. PATIENT-LVL II: CPT | Mod: PBBFAC,,, | Performed by: OPTOMETRIST

## 2017-12-06 PROCEDURE — 92004 COMPRE OPH EXAM NEW PT 1/>: CPT | Mod: S$GLB,,, | Performed by: OPTOMETRIST

## 2017-12-06 NOTE — PROGRESS NOTES
"HPI     Patient's last dilated exam was: a couple months ago  Pt here for diabetic eye exam. Seeing well for long distance, trouble   distinguishing facial features at intermediate distance. Feels his vision   gets blurry after looking at something for apprx 2 minutes."Vision comes   in and out".  Has been experiencing tunnel vision for the last couple   months. Patient denies flashes, floaters, pain and double vision. Not   using any gtts, stopped Restasis a couple months ago. C/o eyes tearing   excessively. Says his eyes feel heavy. Blood sugar was 117 yesterday     Started 4-5 a cloud in front of both eyes.  Denies pain, flashes, or   floaters.  Feels like looking through a tunnel.      Hemoglobin A1C       Date                     Value               Ref Range             Status                12/05/2017               6.0 (H)             4.0 - 5.6 %           Final                 06/15/2017               6.4 (H)             4.0 - 5.6 %           Final                 12/12/2016               6.5 (H)             4.5 - 6.2 %           Final                  Last edited by Hoa Shoemaker, OD on 12/6/2017  3:06 PM. (History)            Assessment /Plan     For exam results, see Encounter Report.    After-cataract obscuring vision, bilateral    Macular degeneration of both eyes    Type 2 diabetes mellitus without ophthalmic manifestations    Pseudophakia of both eyes            1.  L>R  Educated pt on PCO.  Refer for YAG OU.  2.  Drusen OU--early changes.  Monitor.  3.  No retinopathy--monitor yearly.  BS control.  4.  No rx given.                   "

## 2017-12-11 ENCOUNTER — OFFICE VISIT (OUTPATIENT)
Dept: OPHTHALMOLOGY | Facility: CLINIC | Age: 72
End: 2017-12-11
Payer: COMMERCIAL

## 2017-12-11 DIAGNOSIS — H35.3190 MACULAR DEGENERATION, DRY: ICD-10-CM

## 2017-12-11 DIAGNOSIS — H26.491 POSTERIOR CAPSULAR OPACIFICATION VISUALLY SIGNIFICANT, RIGHT EYE: Primary | ICD-10-CM

## 2017-12-11 DIAGNOSIS — H26.492 POSTERIOR CAPSULAR OPACIFICATION VISUALLY SIGNIFICANT, LEFT EYE: ICD-10-CM

## 2017-12-11 PROCEDURE — 66821 AFTER CATARACT LASER SURGERY: CPT | Mod: 50,S$GLB,, | Performed by: OPHTHALMOLOGY

## 2017-12-11 PROCEDURE — 99999 PR PBB SHADOW E&M-EST. PATIENT-LVL III: CPT | Mod: PBBFAC,,, | Performed by: OPHTHALMOLOGY

## 2017-12-11 PROCEDURE — 92014 COMPRE OPH EXAM EST PT 1/>: CPT | Mod: 57,S$GLB,, | Performed by: OPHTHALMOLOGY

## 2017-12-11 RX ORDER — PREDNISOLONE ACETATE 10 MG/ML
1 SUSPENSION/ DROPS OPHTHALMIC 4 TIMES DAILY
Qty: 5 ML | Refills: 0 | Status: SHIPPED | OUTPATIENT
Start: 2017-12-11 | End: 2017-12-15

## 2017-12-11 RX ORDER — TETANUS TOXOID, REDUCED DIPHTHERIA TOXOID AND ACELLULAR PERTUSSIS VACCINE, ADSORBED 5; 2.5; 8; 8; 2.5 [IU]/.5ML; [IU]/.5ML; UG/.5ML; UG/.5ML; UG/.5ML
SUSPENSION INTRAMUSCULAR
COMMUNITY
Start: 2017-12-05 | End: 2023-02-06

## 2017-12-11 NOTE — PROGRESS NOTES
HPI     Hoa Shoemaker, OD    Gtts: None    Patient reports film over OU for the last month. Pt denies any pain.   Denies any f/f.    Last edited by Emeka Guidry MA on 12/11/2017 10:11 AM. (History)            Assessment /Plan     For exam results, see Encounter Report.    Posterior capsular opacification visually significant, right eye  -     Yag Capsulotomy - OS - Left Eye  -     Yag Capsulotomy - OD - Right Eye    Posterior capsular opacification visually significant, left eye  -     Yag Capsulotomy - OS - Left Eye  -     Yag Capsulotomy - OD - Right Eye    Macular degeneration, dry    Other orders  -     prednisoLONE acetate (PRED FORTE) 1 % DrpS; Place 1 drop into both eyes 4 (four) times daily. 1 drop left eye four times a day for four days then stop.  Dispense: 5 mL; Refill: 0      Visually significant posterior capsular opacity present.  OU  - discussed risks, benefits, and alternatives to laser surgery - pt wishes to proceed with yag laser  - Informed consent obtained and correct eye(s) verified with patient.  - Intraocular Pressure to be taken 10- 30 minutes post procedure.   - PF QID x 4 days then d/c  - f/up as scheduled with dr. Shoemaker    DIAGNOSIS: Visually significant posterior capsular opacity    PROCEDURE: YAG Laser Capsulotomy     COMPLICATIONS: none     DESCRIPTION OF PROCEDURE IN DETAIL:  1 drop of topical Proparacaine and Iopidine instilled, and eye(s) dilated with 1% Tropicamide 2.5% Phenylephrine. YAG laser applied to posterior capsule in cruciate pattern.      DISPOSITION:  Patient tolerated procedure well.

## 2017-12-20 ENCOUNTER — TELEPHONE (OUTPATIENT)
Dept: INTERNAL MEDICINE | Facility: CLINIC | Age: 72
End: 2017-12-20

## 2017-12-20 DIAGNOSIS — Z12.11 SCREENING FOR MALIGNANT NEOPLASM OF COLON: Primary | ICD-10-CM

## 2017-12-21 NOTE — TELEPHONE ENCOUNTER
Pt inform that his right FitKit was orderd and he can pick it up at the office at any time.Pt agrees and verbalize.

## 2017-12-22 ENCOUNTER — DOCUMENTATION ONLY (OUTPATIENT)
Dept: INTERNAL MEDICINE | Facility: CLINIC | Age: 72
End: 2017-12-22

## 2017-12-26 ENCOUNTER — LAB VISIT (OUTPATIENT)
Dept: LAB | Facility: HOSPITAL | Age: 72
End: 2017-12-26
Attending: FAMILY MEDICINE
Payer: COMMERCIAL

## 2017-12-26 DIAGNOSIS — Z12.11 SCREENING FOR MALIGNANT NEOPLASM OF COLON: ICD-10-CM

## 2017-12-26 PROCEDURE — 82274 ASSAY TEST FOR BLOOD FECAL: CPT

## 2018-01-12 LAB — HEMOCCULT STL QL IA: NEGATIVE

## 2018-01-13 ENCOUNTER — TELEPHONE (OUTPATIENT)
Dept: INTERNAL MEDICINE | Facility: CLINIC | Age: 73
End: 2018-01-13

## 2018-01-17 ENCOUNTER — OFFICE VISIT (OUTPATIENT)
Dept: INTERNAL MEDICINE | Facility: CLINIC | Age: 73
End: 2018-01-17
Attending: INTERNAL MEDICINE
Payer: COMMERCIAL

## 2018-01-17 VITALS
HEIGHT: 73 IN | WEIGHT: 212.94 LBS | HEART RATE: 73 BPM | OXYGEN SATURATION: 98 % | DIASTOLIC BLOOD PRESSURE: 80 MMHG | BODY MASS INDEX: 28.22 KG/M2 | SYSTOLIC BLOOD PRESSURE: 150 MMHG

## 2018-01-17 DIAGNOSIS — T14.8XXA MUSCULOSKELETAL STRAIN: Primary | ICD-10-CM

## 2018-01-17 PROCEDURE — 99213 OFFICE O/P EST LOW 20 MIN: CPT | Mod: S$GLB,,, | Performed by: INTERNAL MEDICINE

## 2018-01-17 PROCEDURE — 99999 PR PBB SHADOW E&M-EST. PATIENT-LVL III: CPT | Mod: PBBFAC,,, | Performed by: INTERNAL MEDICINE

## 2018-01-17 RX ORDER — CYCLOBENZAPRINE HCL 5 MG
5 TABLET ORAL 3 TIMES DAILY PRN
Qty: 30 TABLET | Refills: 0 | Status: SHIPPED | OUTPATIENT
Start: 2018-01-17 | End: 2018-01-27

## 2018-01-17 RX ORDER — IBUPROFEN 800 MG/1
800 TABLET ORAL 3 TIMES DAILY
Qty: 45 TABLET | Refills: 0 | Status: SHIPPED | OUTPATIENT
Start: 2018-01-17 | End: 2018-04-09 | Stop reason: SDUPTHER

## 2018-01-17 NOTE — PROGRESS NOTES
"Subjective:       Patient ID: Misha Eldridge Jr. is a 72 y.o. male.    Chief Complaint: Neck Pain (right sic=de x 1 week) and Shoulder Pain    Here for urgent visit    1 week hx of right neck/shoulder pain that occurs with coughing and lifting right shoulder above horizontal plane. He denies preceding or current URI symptoms, SOB, CP at rest without provocation, F/C, dizziness, distal weakness, numbness/tingling of ext.         Review of Systems    Objective:      Vitals:    01/17/18 1106   BP: (!) 150/80   BP Location: Right arm   Patient Position: Sitting   BP Method: Large (Manual)   Pulse: 73   SpO2: 98%   Weight: 96.6 kg (212 lb 15.4 oz)   Height: 6' 1" (1.854 m)      Physical Exam   Constitutional: He is oriented to person, place, and time. He appears well-developed and well-nourished. He does not have a sickly appearance. No distress.   HENT:   Head: Normocephalic and atraumatic.   Eyes: Conjunctivae and EOM are normal. Right eye exhibits no discharge. Left eye exhibits no discharge. No scleral icterus.   Pulmonary/Chest: Effort normal. No respiratory distress.   Abdominal: Normal appearance. He exhibits no distension.   Musculoskeletal:        Right shoulder: He exhibits normal range of motion, no tenderness, no deformity, normal pulse and normal strength.        Back:    Neurological: He is alert and oriented to person, place, and time.   Skin: Skin is warm and dry. He is not diaphoretic.   Psychiatric: He has a normal mood and affect. His speech is normal.       Assessment:       1. Musculoskeletal strain        Plan:       Misha was seen today for neck pain and shoulder pain.    Diagnoses and all orders for this visit:    Musculoskeletal strain  No red flags. Exam reassuring. -heat and rest.  -     cyclobenzaprine (FLEXERIL) 5 MG tablet; Take 1 tablet (5 mg total) by mouth 3 (three) times daily as needed for Muscle spasms.  -     ibuprofen (ADVIL,MOTRIN) 800 MG tablet; Take 1 tablet (800 mg total) by mouth " 3 (three) times daily.       BP elevated today. f/u in 2-3 weeks for nurse visit for BP check          Side effects of medication(s) were discussed in detail and patient voiced understanding.  Patient will call back for any issues or complications.

## 2018-02-20 DIAGNOSIS — E78.9 ABNORMAL CHOLESTEROL TEST: ICD-10-CM

## 2018-02-20 DIAGNOSIS — I10 HYPERTENSION, UNSPECIFIED TYPE: Primary | ICD-10-CM

## 2018-02-20 RX ORDER — ATORVASTATIN CALCIUM 20 MG/1
20 TABLET, FILM COATED ORAL DAILY
Qty: 90 TABLET | Refills: 2 | Status: SHIPPED | OUTPATIENT
Start: 2018-02-20 | End: 2018-09-26 | Stop reason: SDUPTHER

## 2018-02-20 RX ORDER — AMLODIPINE BESYLATE 10 MG/1
10 TABLET ORAL DAILY
Qty: 90 TABLET | Refills: 2 | Status: SHIPPED | OUTPATIENT
Start: 2018-02-20 | End: 2018-09-26 | Stop reason: SDUPTHER

## 2018-02-20 NOTE — TELEPHONE ENCOUNTER
----- Message from Corina Huff sent at 2/20/2018 10:16 AM CST -----  Contact: pt   x 1st Request  _  2nd Request  _  3rd Request      Please refill the medication(s) listed below. Please call the patient when the prescription(s) is ready for  at the phone number [phone].    Medication #1amLODIPine (NORVASC) 10 MG tablet 90 tablet     Medication #2atorvastatin (LIPITOR) 20 MG tablet      Preferred Pharmacy:  [facility]

## 2018-02-22 DIAGNOSIS — E13.9 DIABETES 1.5, MANAGED AS TYPE 2: Primary | ICD-10-CM

## 2018-02-22 RX ORDER — GLIPIZIDE 5 MG/1
5 TABLET ORAL
Qty: 180 TABLET | Refills: 2 | Status: SHIPPED | OUTPATIENT
Start: 2018-02-22 | End: 2018-02-26 | Stop reason: SDUPTHER

## 2018-02-22 NOTE — TELEPHONE ENCOUNTER
----- Message from Elicia Renner sent at 2/22/2018  9:24 AM CST -----  Contact: Gisela with [facility]   x_  1st Request  _  2nd Request  _  3rd Request      Who: Gisela with [facility]     Why: She is calling to ask if the patient still takes Rx glipiZIDE (GLUCOTROL) 5 MG, and if so, the patient will need a [facility]ill [facility] [address]    What Number to Call Back: she states there is no need for a call back but just in case [phone]    When to Expect a call back: (Before the end of the day)   -- if call after 3:00 call back will be tomorrow.

## 2018-02-23 RX ORDER — CARVEDILOL 3.12 MG/1
TABLET ORAL
Qty: 180 TABLET | Refills: 2 | Status: SHIPPED | OUTPATIENT
Start: 2018-02-23 | End: 2018-02-26 | Stop reason: SDUPTHER

## 2018-02-26 DIAGNOSIS — Q24.9 HEART ABNORMALITY: Primary | ICD-10-CM

## 2018-02-26 DIAGNOSIS — E13.9 DIABETES 1.5, MANAGED AS TYPE 2: ICD-10-CM

## 2018-02-26 DIAGNOSIS — I10 HYPERTENSION, UNSPECIFIED TYPE: ICD-10-CM

## 2018-02-26 RX ORDER — METFORMIN HYDROCHLORIDE 1000 MG/1
1000 TABLET ORAL 2 TIMES DAILY WITH MEALS
Qty: 180 TABLET | Refills: 2 | Status: SHIPPED | OUTPATIENT
Start: 2018-02-26 | End: 2018-09-26 | Stop reason: SDUPTHER

## 2018-02-26 RX ORDER — CARVEDILOL 3.12 MG/1
3.12 TABLET ORAL 2 TIMES DAILY WITH MEALS
Qty: 180 TABLET | Refills: 2 | Status: SHIPPED | OUTPATIENT
Start: 2018-02-26 | End: 2019-01-03 | Stop reason: SDUPTHER

## 2018-02-26 RX ORDER — LISINOPRIL 40 MG/1
40 TABLET ORAL DAILY
Qty: 90 TABLET | Refills: 2 | Status: SHIPPED | OUTPATIENT
Start: 2018-02-26 | End: 2018-09-26 | Stop reason: SDUPTHER

## 2018-02-26 RX ORDER — GLIPIZIDE 5 MG/1
5 TABLET ORAL
Qty: 180 TABLET | Refills: 2 | Status: SHIPPED | OUTPATIENT
Start: 2018-02-26 | End: 2018-09-26 | Stop reason: SDUPTHER

## 2018-02-26 NOTE — TELEPHONE ENCOUNTER
----- Message from Chaparritakavon Alva sent at 2/26/2018 10:21 AM CST -----  Contact: pt  _  1st Request  _  2nd Request  x_  3rd Request    Please refill the medication listed below. Please call the patient when the prescription is ready for  at this phone number:     [phone]    Please call to further discuss pt states he needs other medications refilled but he does not know the names of them.    Medication #1-carvedilol (COREG) 3.125 MG tablet  Medication #2-lisinopril (PRINIVIL,ZESTRIL) 40 MG tablet  Medication #3-glipiZIDE (GLUCOTROL) 5 MG tablet  Medication #4-metFORMIN (GLUCOPHAGE) 1000 MG tablet    [facility] - [address]

## 2018-03-02 RX ORDER — GABAPENTIN 300 MG/1
CAPSULE ORAL
Qty: 270 CAPSULE | Refills: 3 | Status: SHIPPED | OUTPATIENT
Start: 2018-03-02 | End: 2019-01-03 | Stop reason: SDUPTHER

## 2018-03-02 RX ORDER — HYDROCHLOROTHIAZIDE 12.5 MG/1
TABLET ORAL
Qty: 90 TABLET | Refills: 3 | Status: SHIPPED | OUTPATIENT
Start: 2018-03-02 | End: 2018-07-11 | Stop reason: SDUPTHER

## 2018-04-09 DIAGNOSIS — R52 PAIN: Primary | ICD-10-CM

## 2018-04-09 RX ORDER — IBUPROFEN 800 MG/1
800 TABLET ORAL 3 TIMES DAILY
Qty: 45 TABLET | Refills: 0 | Status: SHIPPED | OUTPATIENT
Start: 2018-04-09 | End: 2018-05-07 | Stop reason: SDUPTHER

## 2018-04-09 NOTE — TELEPHONE ENCOUNTER
----- Message from Sienna Gatica sent at 4/9/2018 10:41 AM CDT -----  Contact: pt  Can the clinic reply in [facility]: no    Please refill the medication(s) listed below. Please call the patient when the prescription(s) is ready for  at this phone number     [phone]     Medication #1 ibuprofen (ADVIL,MOTRIN) 800 MG tablet  Medication #2    [facility]

## 2018-05-07 DIAGNOSIS — R52 PAIN: ICD-10-CM

## 2018-05-07 RX ORDER — IBUPROFEN 800 MG/1
800 TABLET ORAL 3 TIMES DAILY
Qty: 45 TABLET | Refills: 0 | Status: SHIPPED | OUTPATIENT
Start: 2018-05-07 | End: 2018-06-04 | Stop reason: SDUPTHER

## 2018-05-07 NOTE — TELEPHONE ENCOUNTER
----- Message from Norah Ballard sent at 5/7/2018 10:58 AM CDT -----  Contact: Patient himself      Can the clinic reply in [facility]: No      Please refill the medication(s) listed below. Please call the patient when the prescription(s) is ready for  at this phone number   [phone]        Medication #1    ibuprofen (ADVIL,MOTRIN) 800 MG tablet      Preferred Pharmacy: [address]     [phone] (Phone)  [phone] (Fax

## 2018-05-08 ENCOUNTER — TELEPHONE (OUTPATIENT)
Dept: INTERNAL MEDICINE | Facility: CLINIC | Age: 73
End: 2018-05-08

## 2018-05-08 NOTE — TELEPHONE ENCOUNTER
Spoke with pt regarding the note below . Pt ws advised that medication ws approved around 5pm yesterday. Pt stated that he will retrieve the medication today .

## 2018-05-08 NOTE — TELEPHONE ENCOUNTER
----- Message from Malka Maldonado sent at 5/8/2018 12:41 PM CDT -----  Contact: nikki            Name of Who is Calling: pt      What is the request in detail: pt is requesting pain medication and doesn't know the name of it. Please call pt      Can the clinic reply by [facility]: no      What Number to Call Back if not in [facility]: [phone]

## 2018-06-04 DIAGNOSIS — R52 PAIN: ICD-10-CM

## 2018-06-04 RX ORDER — IBUPROFEN 800 MG/1
800 TABLET ORAL 3 TIMES DAILY
Qty: 45 TABLET | Refills: 0 | Status: SHIPPED | OUTPATIENT
Start: 2018-06-04 | End: 2018-07-05 | Stop reason: SDUPTHER

## 2018-06-04 NOTE — TELEPHONE ENCOUNTER
----- Message from Tory Julian sent at 6/4/2018  3:15 PM CDT -----  Contact: MUSA SEGURA JR. [[id number]]  Can the clinic reply in [facility]: No      Please refill the medication(s) listed below. The patient can be reached at this phone number (_[phone] once it is called into the pharmacy.      Medication #1ibuprofen (ADVIL,MOTRIN) 800 MG tablet    Preferred Pharmacy: [address]

## 2018-07-05 DIAGNOSIS — R52 PAIN: ICD-10-CM

## 2018-07-05 RX ORDER — IBUPROFEN 800 MG/1
800 TABLET ORAL 3 TIMES DAILY
Qty: 45 TABLET | Refills: 0 | Status: SHIPPED | OUTPATIENT
Start: 2018-07-05 | End: 2018-08-13 | Stop reason: SDUPTHER

## 2018-07-05 NOTE — TELEPHONE ENCOUNTER
Pt has scheduled appt. Pt demonstrated verbal understanding of information and had no further questions or concerns at this time.

## 2018-07-05 NOTE — TELEPHONE ENCOUNTER
----- Message from Ambreen Baires sent at 7/5/2018 11:23 AM CDT -----  Contact: Misha Carter the clinic reply in [facility]: No      Please refill the medication(s) listed below. Please call the patient when the prescription(s) is ready for  at this phone number   [phone]      Medication #1 ibuprofen (ADVIL,MOTRIN) 800 MG tablet    Medication #2       Preferred Pharmacy: [address]

## 2018-07-06 DIAGNOSIS — E11.9 TYPE 2 DIABETES MELLITUS WITHOUT COMPLICATION: ICD-10-CM

## 2018-07-10 ENCOUNTER — PATIENT OUTREACH (OUTPATIENT)
Dept: ADMINISTRATIVE | Facility: HOSPITAL | Age: 73
End: 2018-07-10

## 2018-07-10 NOTE — PROGRESS NOTES
CONTACTED PATIENT AND INFORMED HIM THAT HIS A1C IS DUE AND HE HAS BEEN SCHEDULE AN APPOINTMENT TO HAVE IT DRAWN ON TOMORROW AFTER HIS SCHEDULE APPOINTMENT WITH DR LAMBERT UNDERSTANDING VERBALIZED, DID NOT HAVE ANY QUESTIONS AT THAT TIME.

## 2018-07-11 ENCOUNTER — OFFICE VISIT (OUTPATIENT)
Dept: INTERNAL MEDICINE | Facility: CLINIC | Age: 73
End: 2018-07-11
Attending: INTERNAL MEDICINE
Payer: COMMERCIAL

## 2018-07-11 VITALS
WEIGHT: 213.19 LBS | HEART RATE: 63 BPM | HEIGHT: 73 IN | DIASTOLIC BLOOD PRESSURE: 80 MMHG | BODY MASS INDEX: 28.25 KG/M2 | SYSTOLIC BLOOD PRESSURE: 142 MMHG

## 2018-07-11 DIAGNOSIS — Z85.038 HISTORY OF COLON CANCER: ICD-10-CM

## 2018-07-11 DIAGNOSIS — Z85.46 HISTORY OF PROSTATE CANCER: ICD-10-CM

## 2018-07-11 DIAGNOSIS — I10 ESSENTIAL HYPERTENSION: ICD-10-CM

## 2018-07-11 DIAGNOSIS — Z12.11 SCREENING FOR COLON CANCER: ICD-10-CM

## 2018-07-11 DIAGNOSIS — E11.9 ENCOUNTER FOR DIABETIC FOOT EXAM: Primary | ICD-10-CM

## 2018-07-11 DIAGNOSIS — E11.40 CONTROLLED TYPE 2 DIABETES MELLITUS WITH DIABETIC NEUROPATHY, WITHOUT LONG-TERM CURRENT USE OF INSULIN: ICD-10-CM

## 2018-07-11 PROCEDURE — 99214 OFFICE O/P EST MOD 30 MIN: CPT | Mod: S$GLB,,, | Performed by: INTERNAL MEDICINE

## 2018-07-11 PROCEDURE — 99999 PR PBB SHADOW E&M-EST. PATIENT-LVL IV: CPT | Mod: PBBFAC,,, | Performed by: INTERNAL MEDICINE

## 2018-07-11 RX ORDER — HYDROCHLOROTHIAZIDE 25 MG/1
12.5 TABLET ORAL DAILY
Qty: 90 TABLET | Refills: 2 | Status: SHIPPED | OUTPATIENT
Start: 2018-07-11 | End: 2018-09-26 | Stop reason: SDUPTHER

## 2018-07-11 NOTE — PROGRESS NOTES
Subjective:       Patient ID: Misha Eldridge Jr. is a 73 y.o. male.    Chief Complaint: Follow-up    Here for routine f/u    No concerns or complaints. He comically states the only thing he needs is more money. He is doing well.         PMHx  DM-+ neuropathy of hands and feet on gabapentin 300mg TID. States feet are slightly worsening but overall stable and controlled. A1c controlled on metformin 1g BID and glipizide 5 BID.  -Prostate CA-Dx approx 2010 at [facility] s/p partial resection. No XRT. Not sure is his chemo was for colon or prostate. He denies urinary frequency, urinary urgency, decreased force of stream, incomplete emptying of bladder, post void dribble, nocturia, or gross hematuria. Has not been back to see his urologist.    -Colon CA- Dx approx 2011/2012. S/p colectomy and reversal. Overdue for colonoscopy. Had FIT done b/c he was unable to get this done, which was negative as of 12/2017.  He is now amenable to getting the appropriate colonscopy  -Chronic low back pain-daily. Rare radiation down legs. Has never had any treatment. He does not take anything on regular basis for symptoms. Pain is non debilitating.           Review of Systems   Constitutional: Negative for appetite change, chills, fever and unexpected weight change.   HENT: Negative for hearing loss, sore throat and trouble swallowing.    Eyes: Negative for visual disturbance.   Respiratory: Negative for cough, chest tightness and shortness of breath.    Cardiovascular: Negative for chest pain and leg swelling.   Gastrointestinal: Negative for abdominal pain, blood in stool, constipation, diarrhea, nausea and vomiting.   Endocrine: Negative for polydipsia and polyuria.   Genitourinary: Negative for decreased urine volume, difficulty urinating, dysuria, frequency and urgency.   Musculoskeletal: Positive for back pain. Negative for gait problem.   Skin: Negative for rash.   Neurological: Negative for dizziness and numbness.  "  Psychiatric/Behavioral: The patient is not nervous/anxious.        Past Medical History:   Diagnosis Date    Cataract     Colostomy in place     Diabetes mellitus type II     Hemorrhoid     History of colon cancer     History of prostate cancer     History of pulmonary embolism     Hypertension      Past Surgical History:   Procedure Laterality Date    CATARACT EXTRACTION Bilateral     HERNIA REPAIR      PROSTATECTOMY      SUBTOTAL COLECTOMY       Family History   Problem Relation Age of Onset    Arthritis Mother     Hypertension Mother     Alcohol abuse Father      Social History     Social History    Marital status: Single     Spouse name: N/A    Number of children: N/A    Years of education: N/A     Occupational History          mother lives w/pt     Social History Main Topics    Smoking status: Never Smoker    Smokeless tobacco: Never Used    Alcohol use No    Drug use: No    Sexual activity: Not on file     Other Topics Concern    Not on file     Social History Narrative    No narrative on file         Objective:      Vitals:    07/11/18 1325   BP: (!) 142/80   Pulse: 63   Weight: 96.7 kg (213 lb 3 oz)   Height: 6' 1" (1.854 m)      Physical Exam   Constitutional: He is oriented to person, place, and time. He appears well-developed and well-nourished. No distress.   HENT:   Head: Normocephalic and atraumatic.   Mouth/Throat: Oropharynx is clear and moist. No oropharyngeal exudate.   Eyes: Conjunctivae and EOM are normal. Pupils are equal, round, and reactive to light. No scleral icterus.   Neck: No thyromegaly present.   Cardiovascular: Normal rate, regular rhythm and normal heart sounds.    No murmur heard.  Pulmonary/Chest: Effort normal and breath sounds normal. He has no wheezes. He has no rales.   Abdominal: Soft. He exhibits no distension. There is no tenderness.   Musculoskeletal: He exhibits no edema or tenderness.   Lymphadenopathy:     He has no cervical adenopathy. "   Neurological: He is alert and oriented to person, place, and time.   Skin: Skin is warm and dry.   Psychiatric: He has a normal mood and affect. His behavior is normal.     Protective Sensation (w/ 10 gram monofilament):  Right: Decreased  Left: Decreased    Visual Inspection:  Normal -  Bilateral    Pedal Pulses:   Right: Present  Left: Present          Assessment:       1. Encounter for diabetic foot exam    2. History of colon cancer    3. Screening for colon cancer    4. Controlled type 2 diabetes mellitus with diabetic neuropathy, without long-term current use of insulin    5. History of prostate cancer    6. Essential hypertension        Plan:       Misha was seen today for establish care.    Diagnoses and all orders for this visit:  Misha was seen today for follow-up.    Diagnoses and all orders for this visit:    Encounter for diabetic foot exam    History of colon cancer  -     Ambulatory referral to Gastroenterology    Screening for colon cancer  -     Ambulatory referral to Gastroenterology    Controlled type 2 diabetes mellitus with diabetic neuropathy, without long-term current use of insulin  -     Hemoglobin A1c; Future  -     Comprehensive metabolic panel; Future    History of prostate cancer  -     Ambulatory Referral to Urology    Essential hypertension  -     START hydroCHLOROthiazide (HYDRODIURIL) 25 MG tablet; Take 0.5 tablets (12.5 mg total) by mouth once daily.  f/u in 3-4 weeks for nurse visit for BP check              RTC in 6 months or sooner prn             Notification of Lab Results: Letter  Side effects of medication(s) were discussed in detail and patient voiced understanding.  Patient will call back for any issues or complications.

## 2018-08-13 DIAGNOSIS — R52 PAIN: ICD-10-CM

## 2018-08-13 RX ORDER — IBUPROFEN 800 MG/1
800 TABLET ORAL 3 TIMES DAILY
Qty: 45 TABLET | Refills: 0 | Status: SHIPPED | OUTPATIENT
Start: 2018-08-13 | End: 2018-09-10 | Stop reason: SDUPTHER

## 2018-08-13 NOTE — TELEPHONE ENCOUNTER
----- Message from Magy Pfeiffer sent at 8/13/2018 10:41 AM CDT -----  Contact: Pt  Name of Who is Calling: MUSA SEGURA JR. [[id number]]      What is the request in detail: Patient states she would like to get a prescription for 800 IB profen called into [facility] [address]Please contact to further discuss and advise     Can the clinic reply by [facility]: No      What Number to Call Back if not in [facility]: [phone]

## 2018-08-30 ENCOUNTER — TELEPHONE (OUTPATIENT)
Dept: UROLOGY | Facility: CLINIC | Age: 73
End: 2018-08-30

## 2018-08-30 NOTE — TELEPHONE ENCOUNTER
Spoke to patient regarding appointment on 8/31 with Dr Ravi.  Patient notified that he has [employer] insurance and that his insurance is not accepted by [facility] and he will be asked for some type of payment when he checks in for his appointment.  Patient states that he will not keep appointment.  Informed patient to call Dr Treviño's office and see where he would like to refer the patient out side of [facility].

## 2018-09-10 DIAGNOSIS — R52 PAIN: ICD-10-CM

## 2018-09-10 RX ORDER — IBUPROFEN 800 MG/1
800 TABLET ORAL 3 TIMES DAILY
Qty: 45 TABLET | Refills: 0 | Status: SHIPPED | OUTPATIENT
Start: 2018-09-10 | End: 2018-10-03 | Stop reason: SDUPTHER

## 2018-09-10 NOTE — TELEPHONE ENCOUNTER
----- Message from Tory Julian sent at 9/10/2018 10:35 AM CDT -----  Contact: MUSA SEGURA JR. [[id number]]  Can the clinic reply in [facility]: No      Please refill the medication(s) listed below. The patient can be reached at this phone number (_[phone]_) once it is called into the pharmacy.      Medication #1 ibuprofen (ADVIL,MOTRIN) 800 MG tablet      Preferred Pharmacy: [facility] [address] [place] [address] MAGAZINE  AT [employer] & [address] [place]

## 2018-09-17 ENCOUNTER — PATIENT OUTREACH (OUTPATIENT)
Dept: ADMINISTRATIVE | Facility: HOSPITAL | Age: 73
End: 2018-09-17

## 2018-09-17 NOTE — PROGRESS NOTES
SPOKE TO PATIENT , LAB APPOINTMENT SCHEDULED FOR OVERDUE A1C PATIENT INFORMED OF APPOINTMENT DETAILS, PATIENT INQUIRED ABOUT MEDICATION REFILLS, HE DOES HAVE ENOUGH MEDICATION TO SUSTAIN UNTIL SCHEDULED APPOINTMENT AND WILL SPEAK TO PHYSICIAN ABOUT REFILLS DURING UPCOMING VISIT. UNDERSTANDING VERBALIZED.

## 2018-09-24 ENCOUNTER — LAB VISIT (OUTPATIENT)
Dept: LAB | Facility: OTHER | Age: 73
End: 2018-09-24
Attending: INTERNAL MEDICINE
Payer: COMMERCIAL

## 2018-09-24 ENCOUNTER — OFFICE VISIT (OUTPATIENT)
Dept: INTERNAL MEDICINE | Facility: CLINIC | Age: 73
End: 2018-09-24
Attending: INTERNAL MEDICINE
Payer: COMMERCIAL

## 2018-09-24 DIAGNOSIS — E11.40 TYPE 2 DIABETES MELLITUS WITH DIABETIC NEUROPATHY, WITHOUT LONG-TERM CURRENT USE OF INSULIN: Primary | ICD-10-CM

## 2018-09-24 DIAGNOSIS — E11.9 TYPE 2 DIABETES MELLITUS WITHOUT COMPLICATION: ICD-10-CM

## 2018-09-24 LAB
ESTIMATED AVG GLUCOSE: 134 MG/DL
HBA1C MFR BLD HPLC: 6.3 %

## 2018-09-24 PROCEDURE — 99499 UNLISTED E&M SERVICE: CPT | Mod: S$PBB,,, | Performed by: INTERNAL MEDICINE

## 2018-09-24 PROCEDURE — 36415 COLL VENOUS BLD VENIPUNCTURE: CPT

## 2018-09-24 PROCEDURE — 83036 HEMOGLOBIN GLYCOSYLATED A1C: CPT | Mod: 91

## 2018-09-24 NOTE — PROGRESS NOTES
Pt told to come to clinic. He only needed updated labs. We will arrange this. No los today as no service provided

## 2018-09-25 ENCOUNTER — TELEPHONE (OUTPATIENT)
Dept: INTERNAL MEDICINE | Facility: CLINIC | Age: 73
End: 2018-09-25

## 2018-09-26 DIAGNOSIS — E13.9 DIABETES 1.5, MANAGED AS TYPE 2: ICD-10-CM

## 2018-09-26 DIAGNOSIS — I10 HYPERTENSION, UNSPECIFIED TYPE: ICD-10-CM

## 2018-09-26 DIAGNOSIS — E78.9 ABNORMAL CHOLESTEROL TEST: ICD-10-CM

## 2018-09-26 NOTE — TELEPHONE ENCOUNTER
Pt call states medications were to be sent to pharmacy. Medications pended please advise / authorize.  Milton MENDOZA

## 2018-09-26 NOTE — TELEPHONE ENCOUNTER
----- Message from Aracely Deluna sent at 9/26/2018  1:19 PM CDT -----            Name of Who is Calling: MUSA SEGURA JR. [[id number]]    What is the request in detail: pt was in Monday and states he had 7 RX that were to be called in for him. Please check into this matter thanks      Can the clinic reply by [facility]: no      What Number to Call Back if not [facility]: [phone]    [facility] [employer] [address] [place] [address] [employer] [address] [phone] (Phone)  [phone] (Fax)

## 2018-09-27 RX ORDER — GLIPIZIDE 5 MG/1
5 TABLET ORAL
Qty: 180 TABLET | Refills: 2 | Status: SHIPPED | OUTPATIENT
Start: 2018-09-27 | End: 2019-03-01 | Stop reason: SDUPTHER

## 2018-09-27 RX ORDER — METFORMIN HYDROCHLORIDE 1000 MG/1
1000 TABLET ORAL 2 TIMES DAILY WITH MEALS
Qty: 180 TABLET | Refills: 2 | Status: SHIPPED | OUTPATIENT
Start: 2018-09-27 | End: 2019-03-01 | Stop reason: SDUPTHER

## 2018-09-27 RX ORDER — AMLODIPINE BESYLATE 10 MG/1
10 TABLET ORAL DAILY
Qty: 90 TABLET | Refills: 2 | Status: SHIPPED | OUTPATIENT
Start: 2018-09-27 | End: 2018-12-18 | Stop reason: SDUPTHER

## 2018-09-27 RX ORDER — ATORVASTATIN CALCIUM 20 MG/1
20 TABLET, FILM COATED ORAL DAILY
Qty: 90 TABLET | Refills: 2 | Status: SHIPPED | OUTPATIENT
Start: 2018-09-27 | End: 2019-03-01 | Stop reason: SDUPTHER

## 2018-09-27 RX ORDER — HYDROCHLOROTHIAZIDE 25 MG/1
12.5 TABLET ORAL DAILY
Qty: 90 TABLET | Refills: 2 | Status: SHIPPED | OUTPATIENT
Start: 2018-09-27 | End: 2019-01-03 | Stop reason: SDUPTHER

## 2018-09-27 RX ORDER — LISINOPRIL 40 MG/1
40 TABLET ORAL DAILY
Qty: 90 TABLET | Refills: 2 | Status: SHIPPED | OUTPATIENT
Start: 2018-09-27 | End: 2019-03-01 | Stop reason: SDUPTHER

## 2018-10-03 DIAGNOSIS — R52 PAIN: ICD-10-CM

## 2018-10-03 RX ORDER — IBUPROFEN 800 MG/1
800 TABLET ORAL 3 TIMES DAILY
Qty: 45 TABLET | Refills: 0 | Status: SHIPPED | OUTPATIENT
Start: 2018-10-03 | End: 2018-12-28

## 2018-10-03 NOTE — TELEPHONE ENCOUNTER
----- Message from Elaine Jimenez sent at 10/3/2018 10:50 AM CDT -----  Contact: Pt   Can the clinic reply in [facility]:No       Please refill the medication(s) listed below. Please call the patient when the prescription(s) is ready for  at the phone number [phone]    Medication #1:ibuprofen (ADVIL,MOTRIN) 800 MG tablet    Medication #2:      Preferred Pharmacy: [facility] [address] [place] [address] MAGAZINE  AT [employer] & [facility]

## 2018-10-15 ENCOUNTER — OFFICE VISIT (OUTPATIENT)
Dept: OPHTHALMOLOGY | Facility: CLINIC | Age: 73
End: 2018-10-15
Payer: COMMERCIAL

## 2018-10-15 DIAGNOSIS — H52.13 MYOPIA OF BOTH EYES WITH ASTIGMATISM: ICD-10-CM

## 2018-10-15 DIAGNOSIS — H52.203 MYOPIA OF BOTH EYES WITH ASTIGMATISM: ICD-10-CM

## 2018-10-15 DIAGNOSIS — H35.3131 EARLY DRY STAGE NONEXUDATIVE AGE-RELATED MACULAR DEGENERATION OF BOTH EYES: ICD-10-CM

## 2018-10-15 DIAGNOSIS — H52.7 REFRACTIVE ERRORS: Primary | ICD-10-CM

## 2018-10-15 PROCEDURE — 92014 COMPRE OPH EXAM EST PT 1/>: CPT | Mod: S$PBB,,, | Performed by: OPHTHALMOLOGY

## 2018-10-15 PROCEDURE — 92015 DETERMINE REFRACTIVE STATE: CPT | Mod: ,,, | Performed by: OPHTHALMOLOGY

## 2018-10-15 PROCEDURE — 99999 PR PBB SHADOW E&M-EST. PATIENT-LVL III: CPT | Mod: PBBFAC,,, | Performed by: OPHTHALMOLOGY

## 2018-10-15 PROCEDURE — 99213 OFFICE O/P EST LOW 20 MIN: CPT | Mod: PBBFAC | Performed by: OPHTHALMOLOGY

## 2018-10-15 NOTE — PROGRESS NOTES
HPI     Hoa Shoemaker, OD    pciol ou s/p yag cap    Patient reports blurry vision Ou.denies flashes and floaters.    Last edited by Jennifer Jamil MD on 10/15/2018 11:31 AM. (History)            Assessment /Plan     For exam results, see Encounter Report.    Refractive errors    Myopia of both eyes with astigmatism    Early dry stage nonexudative age-related macular degeneration of both eyes      pciol ou s/p yag cap     RE: Mrx = Rx    AMD  - pepe, royce    F/up dr. Shoemaker 6 mo - DFE, re-establish care

## 2018-10-30 DIAGNOSIS — R52 PAIN: ICD-10-CM

## 2018-10-30 RX ORDER — IBUPROFEN 800 MG/1
800 TABLET ORAL 3 TIMES DAILY
Qty: 45 TABLET | Refills: 0 | Status: CANCELLED | OUTPATIENT
Start: 2018-10-30

## 2018-10-30 RX ORDER — DICLOFENAC SODIUM 75 MG/1
75 TABLET, DELAYED RELEASE ORAL 2 TIMES DAILY
Qty: 180 TABLET | Refills: 1 | Status: SHIPPED | OUTPATIENT
Start: 2018-10-30 | End: 2018-12-27 | Stop reason: SDUPTHER

## 2018-12-07 DIAGNOSIS — E11.9 TYPE 2 DIABETES MELLITUS WITHOUT COMPLICATION: ICD-10-CM

## 2018-12-18 DIAGNOSIS — I10 HYPERTENSION, UNSPECIFIED TYPE: ICD-10-CM

## 2018-12-18 DIAGNOSIS — R52 PAIN: ICD-10-CM

## 2018-12-18 RX ORDER — AMLODIPINE BESYLATE 10 MG/1
10 TABLET ORAL DAILY
Qty: 90 TABLET | Refills: 2 | Status: SHIPPED | OUTPATIENT
Start: 2018-12-18 | End: 2019-03-01 | Stop reason: SDUPTHER

## 2018-12-18 NOTE — TELEPHONE ENCOUNTER
----- Message from Dion Tracy sent at 12/18/2018 10:34 AM CST -----  Contact: MUSA SEGURA JR. [[id number]]      Can the clinic reply in [facility]: no      Please refill the medication(s) listed below. Please call the patient when the prescription(s) is ready for  at this phone number   [phone]      Medication #1 amLODIPine (NORVASC) 10 MG tablet    Preferred Pharmacy: [facility] [address] [place] [address] MAGAZINE  AT MAGAZINE & [address]

## 2018-12-19 RX ORDER — IBUPROFEN 800 MG/1
800 TABLET ORAL 3 TIMES DAILY
Qty: 45 TABLET | Refills: 0 | OUTPATIENT
Start: 2018-12-19

## 2018-12-19 NOTE — TELEPHONE ENCOUNTER
Pt. Notified of amlodipine refill that was sent to pharmacy.  Pt is requesting a refill on ibuprofen 800.  Seth

## 2018-12-27 DIAGNOSIS — R52 PAIN: ICD-10-CM

## 2018-12-27 DIAGNOSIS — M54.50 CHRONIC BILATERAL LOW BACK PAIN WITHOUT SCIATICA: Primary | Chronic | ICD-10-CM

## 2018-12-27 DIAGNOSIS — G89.29 CHRONIC BILATERAL LOW BACK PAIN WITHOUT SCIATICA: Primary | Chronic | ICD-10-CM

## 2018-12-27 RX ORDER — IBUPROFEN 800 MG/1
800 TABLET ORAL 3 TIMES DAILY
Qty: 45 TABLET | Refills: 0 | Status: CANCELLED | OUTPATIENT
Start: 2018-12-27

## 2018-12-27 NOTE — TELEPHONE ENCOUNTER
----- Message from Nyla Gracia sent at 12/27/2018 11:26 AM CST -----  Contact: PT  Can the clinic reply in [facility]: NO      Please refill the medication(s) listed below. The patient can be reached at this phone number  once it is called into the pharmacy.[phone]      Medication #1ibuprofen (ADVIL,MOTRIN) 800 MG tablet 45 tablet       Medication #2      Preferred Pharmacy:[facility] [address] [place] [address] MAGAZINE  AT [address] [employer] & [facility]

## 2018-12-27 NOTE — TELEPHONE ENCOUNTER
Notified patient. He reports he has about 4-5 pills left of diclofenac. Sent refill request for this.

## 2018-12-28 RX ORDER — DICLOFENAC SODIUM 75 MG/1
75 TABLET, DELAYED RELEASE ORAL 2 TIMES DAILY
Qty: 180 TABLET | Refills: 1 | Status: SHIPPED | OUTPATIENT
Start: 2018-12-28 | End: 2019-01-31 | Stop reason: SDUPTHER

## 2019-01-03 ENCOUNTER — OFFICE VISIT (OUTPATIENT)
Dept: INTERNAL MEDICINE | Facility: CLINIC | Age: 74
End: 2019-01-03
Attending: INTERNAL MEDICINE
Payer: MEDICARE

## 2019-01-03 VITALS
SYSTOLIC BLOOD PRESSURE: 142 MMHG | HEART RATE: 67 BPM | BODY MASS INDEX: 27.61 KG/M2 | OXYGEN SATURATION: 99 % | WEIGHT: 208.31 LBS | DIASTOLIC BLOOD PRESSURE: 90 MMHG | HEIGHT: 73 IN

## 2019-01-03 DIAGNOSIS — M54.50 CHRONIC BILATERAL LOW BACK PAIN WITHOUT SCIATICA: Chronic | ICD-10-CM

## 2019-01-03 DIAGNOSIS — G62.9 NEUROPATHY: Primary | ICD-10-CM

## 2019-01-03 DIAGNOSIS — I10 HYPERTENSION, UNSPECIFIED TYPE: ICD-10-CM

## 2019-01-03 DIAGNOSIS — G89.29 CHRONIC BILATERAL LOW BACK PAIN WITHOUT SCIATICA: Chronic | ICD-10-CM

## 2019-01-03 DIAGNOSIS — Q24.9 HEART ABNORMALITY: ICD-10-CM

## 2019-01-03 DIAGNOSIS — I10 ESSENTIAL HYPERTENSION: ICD-10-CM

## 2019-01-03 DIAGNOSIS — E11.40 CONTROLLED TYPE 2 DIABETES MELLITUS WITH DIABETIC NEUROPATHY, WITHOUT LONG-TERM CURRENT USE OF INSULIN: ICD-10-CM

## 2019-01-03 DIAGNOSIS — E08.42 DIABETES MELLITUS DUE TO UNDERLYING CONDITION WITH DIABETIC POLYNEUROPATHY, WITHOUT LONG-TERM CURRENT USE OF INSULIN: ICD-10-CM

## 2019-01-03 DIAGNOSIS — R20.9 DISTURBANCE OF SKIN SENSATION: ICD-10-CM

## 2019-01-03 DIAGNOSIS — Z87.891 FORMER SMOKER: ICD-10-CM

## 2019-01-03 DIAGNOSIS — R20.0 ANESTHESIA OF SKIN: ICD-10-CM

## 2019-01-03 DIAGNOSIS — Z12.11 COLON CANCER SCREENING: ICD-10-CM

## 2019-01-03 PROCEDURE — 3044F HG A1C LEVEL LT 7.0%: CPT | Mod: CPTII,HCNC,S$GLB, | Performed by: INTERNAL MEDICINE

## 2019-01-03 PROCEDURE — 1101F PT FALLS ASSESS-DOCD LE1/YR: CPT | Mod: CPTII,HCNC,S$GLB, | Performed by: INTERNAL MEDICINE

## 2019-01-03 PROCEDURE — 3077F SYST BP >= 140 MM HG: CPT | Mod: CPTII,HCNC,S$GLB, | Performed by: INTERNAL MEDICINE

## 2019-01-03 PROCEDURE — 99214 PR OFFICE/OUTPT VISIT, EST, LEVL IV, 30-39 MIN: ICD-10-PCS | Mod: HCNC,S$GLB,, | Performed by: INTERNAL MEDICINE

## 2019-01-03 PROCEDURE — 99214 OFFICE O/P EST MOD 30 MIN: CPT | Mod: HCNC,S$GLB,, | Performed by: INTERNAL MEDICINE

## 2019-01-03 PROCEDURE — 1101F PR PT FALLS ASSESS DOC 0-1 FALLS W/OUT INJ PAST YR: ICD-10-PCS | Mod: CPTII,HCNC,S$GLB, | Performed by: INTERNAL MEDICINE

## 2019-01-03 PROCEDURE — 3044F PR MOST RECENT HEMOGLOBIN A1C LEVEL <7.0%: ICD-10-PCS | Mod: CPTII,HCNC,S$GLB, | Performed by: INTERNAL MEDICINE

## 2019-01-03 PROCEDURE — 3077F PR MOST RECENT SYSTOLIC BLOOD PRESSURE >= 140 MM HG: ICD-10-PCS | Mod: CPTII,HCNC,S$GLB, | Performed by: INTERNAL MEDICINE

## 2019-01-03 PROCEDURE — 99999 PR PBB SHADOW E&M-EST. PATIENT-LVL IV: CPT | Mod: PBBFAC,HCNC,, | Performed by: INTERNAL MEDICINE

## 2019-01-03 PROCEDURE — 3080F DIAST BP >= 90 MM HG: CPT | Mod: CPTII,HCNC,S$GLB, | Performed by: INTERNAL MEDICINE

## 2019-01-03 PROCEDURE — 99999 PR PBB SHADOW E&M-EST. PATIENT-LVL IV: ICD-10-PCS | Mod: PBBFAC,HCNC,, | Performed by: INTERNAL MEDICINE

## 2019-01-03 PROCEDURE — 3080F PR MOST RECENT DIASTOLIC BLOOD PRESSURE >= 90 MM HG: ICD-10-PCS | Mod: CPTII,HCNC,S$GLB, | Performed by: INTERNAL MEDICINE

## 2019-01-03 RX ORDER — GABAPENTIN 300 MG/1
300 CAPSULE ORAL 3 TIMES DAILY
Qty: 270 CAPSULE | Refills: 3 | Status: SHIPPED | OUTPATIENT
Start: 2019-01-03 | End: 2020-02-07

## 2019-01-03 NOTE — PROGRESS NOTES
"Subjective:       Patient ID: Misha Eldridge Jr. is a 73 y.o. male.    Chief Complaint: Pain (feet  hand )    Here for urgent visit    Continued neuropathy pains. Long Hx of neuropathy attributed to DM, previously uncontrolled. He is not certain if he is taking gabapentin.         Review of Systems   Constitutional: Negative for appetite change, chills, fever and unexpected weight change.   HENT: Negative for hearing loss, sore throat and trouble swallowing.    Eyes: Negative for visual disturbance.   Respiratory: Negative for cough, chest tightness and shortness of breath.    Cardiovascular: Negative for chest pain and leg swelling.   Gastrointestinal: Negative for abdominal pain, blood in stool, constipation, diarrhea, nausea and vomiting.   Endocrine: Negative for polydipsia and polyuria.   Genitourinary: Negative for decreased urine volume, difficulty urinating, dysuria, frequency and urgency.   Musculoskeletal: Negative for gait problem.   Skin: Negative for rash.   Neurological: Negative for dizziness and numbness.   Psychiatric/Behavioral: The patient is not nervous/anxious.        Objective:      Vitals:    01/03/19 1139   BP: (!) 142/90   Pulse: 67   SpO2: 99%   Weight: 94.5 kg (208 lb 5.4 oz)   Height: 6' 1" (1.854 m)      Physical Exam   Constitutional: He is oriented to person, place, and time. He appears well-developed and well-nourished. He does not have a sickly appearance. No distress.   HENT:   Head: Normocephalic and atraumatic.   Mouth/Throat: Oropharynx is clear and moist. No oropharyngeal exudate.   Eyes: Conjunctivae and EOM are normal. Pupils are equal, round, and reactive to light. Right eye exhibits no discharge. Left eye exhibits no discharge. No scleral icterus.   Neck: No thyromegaly present.   Cardiovascular: Normal rate, regular rhythm and normal heart sounds.   No murmur heard.  Pulmonary/Chest: Effort normal and breath sounds normal. No respiratory distress. He has no wheezes. He has " no rales.   Abdominal: Soft. Normal appearance. He exhibits no distension. There is no tenderness.   Musculoskeletal: He exhibits no edema or tenderness.   Feet:   Right Foot:   Skin Integrity: Negative for skin breakdown, erythema or warmth.   Left Foot:   Skin Integrity: Negative for skin breakdown, erythema or warmth.   Lymphadenopathy:     He has no cervical adenopathy.   Neurological: He is alert and oriented to person, place, and time.   Skin: Skin is warm and dry. He is not diaphoretic.   Psychiatric: He has a normal mood and affect. His speech is normal and behavior is normal.       Assessment:       1. Neuropathy    2. Diabetes mellitus due to underlying condition with diabetic polyneuropathy, without long-term current use of insulin     3. Anesthesia of skin     4. Disturbance of skin sensation     5. Colon cancer screening    6. Former smoker    7. Essential hypertension    8. Controlled type 2 diabetes mellitus with diabetic neuropathy, without long-term current use of insulin    9. Chronic bilateral low back pain without sciatica        Plan:       Misha was seen today for pain.    Diagnoses and all orders for this visit:    Neuropathy  -     EMG W/ ULTRASOUND AND NERVE CONDUCTION TEST 4 Extremities; Future  -     Vitamin B12; Future  -     RPR; Future  -     Folate; Future  -     Magnesium; Future  -     Comprehensive metabolic panel; Future  -     TSH; Future    Diabetes mellitus due to underlying condition with diabetic polyneuropathy, without long-term current use of insulin   -     EMG W/ ULTRASOUND AND NERVE CONDUCTION TEST 4 Extremities; Future  -     Vitamin B12; Future  -     RPR; Future  -     Folate; Future  -     Magnesium; Future  -     Comprehensive metabolic panel; Future  -     TSH; Future  -     Lipid panel; Future    Anesthesia of skin   -     Vitamin B12; Future    Disturbance of skin sensation   -     Folate; Future    Colon cancer screening  -     Fecal Immunochemical Test (iFOBT);  Future    Former smoker  -     Abdominal Aorta Evaluation (Cupid Only); Future    Essential hypertension    Controlled type 2 diabetes mellitus with diabetic neuropathy, without long-term current use of insulin    Chronic bilateral low back pain without sciatica    Other orders  -     gabapentin (NEURONTIN) 300 MG capsule; Take 1 capsule (300 mg total) by mouth 3 (three) times daily.                 Side effects of medication(s) were discussed in detail and patient voiced understanding.  Patient will call back for any issues or complications.

## 2019-01-03 NOTE — TELEPHONE ENCOUNTER
----- Message from Antonietta Jacobson sent at 1/3/2019  1:49 PM CST -----  Contact: MUSA SEGURA JR. [[id number]]  Can the clinic reply in [facility]: no      Please refill the medication(s) listed below. The patient can be reached at this phone number  once it is called into the pharmacy.      Medication #1  hydroCHLOROthiazide (HYDRODIURIL) 25 MG tablet 90 tablet     Medication #2 carvedilol (COREG) 3.125 MG tablet 180 tablet       Preferred Pharmacy:  On file

## 2019-01-03 NOTE — TELEPHONE ENCOUNTER
LOV today 1/3/18 pt is requesting medication on Hydrochlorothiazide 25mg tab and Carvedilol 3.125mg tab refill please advise and authorize.    Thank you.  Felecia SHERMAN. MA

## 2019-01-04 ENCOUNTER — LAB VISIT (OUTPATIENT)
Dept: LAB | Facility: OTHER | Age: 74
End: 2019-01-04
Attending: INTERNAL MEDICINE
Payer: MEDICARE

## 2019-01-04 DIAGNOSIS — E08.42 DIABETES MELLITUS DUE TO UNDERLYING CONDITION WITH DIABETIC POLYNEUROPATHY, WITHOUT LONG-TERM CURRENT USE OF INSULIN: ICD-10-CM

## 2019-01-04 DIAGNOSIS — R20.0 ANESTHESIA OF SKIN: ICD-10-CM

## 2019-01-04 DIAGNOSIS — R20.9 DISTURBANCE OF SKIN SENSATION: ICD-10-CM

## 2019-01-04 DIAGNOSIS — G62.9 NEUROPATHY: ICD-10-CM

## 2019-01-04 LAB
ALBUMIN SERPL BCP-MCNC: 3.8 G/DL
ALP SERPL-CCNC: 61 U/L
ALT SERPL W/O P-5'-P-CCNC: 22 U/L
ANION GAP SERPL CALC-SCNC: 7 MMOL/L
AST SERPL-CCNC: 15 U/L
BILIRUB SERPL-MCNC: 0.3 MG/DL
BUN SERPL-MCNC: 12 MG/DL
CALCIUM SERPL-MCNC: 8.8 MG/DL
CHLORIDE SERPL-SCNC: 108 MMOL/L
CHOLEST SERPL-MCNC: 100 MG/DL
CHOLEST/HDLC SERPL: 2.2 {RATIO}
CO2 SERPL-SCNC: 26 MMOL/L
CREAT SERPL-MCNC: 1.3 MG/DL
EST. GFR  (AFRICAN AMERICAN): >60 ML/MIN/1.73 M^2
EST. GFR  (NON AFRICAN AMERICAN): 55 ML/MIN/1.73 M^2
FOLATE SERPL-MCNC: 8.2 NG/ML
GLUCOSE SERPL-MCNC: 158 MG/DL
HDLC SERPL-MCNC: 45 MG/DL
HDLC SERPL: 45 %
LDLC SERPL CALC-MCNC: 39.8 MG/DL
MAGNESIUM SERPL-MCNC: 2.1 MG/DL
NONHDLC SERPL-MCNC: 55 MG/DL
POTASSIUM SERPL-SCNC: 4.2 MMOL/L
PROT SERPL-MCNC: 6.6 G/DL
SODIUM SERPL-SCNC: 141 MMOL/L
TRIGL SERPL-MCNC: 76 MG/DL
TSH SERPL DL<=0.005 MIU/L-ACNC: 2.04 UIU/ML
VIT B12 SERPL-MCNC: 396 PG/ML

## 2019-01-04 PROCEDURE — 86592 SYPHILIS TEST NON-TREP QUAL: CPT | Mod: HCNC

## 2019-01-04 PROCEDURE — 36415 COLL VENOUS BLD VENIPUNCTURE: CPT | Mod: HCNC

## 2019-01-04 PROCEDURE — 83735 ASSAY OF MAGNESIUM: CPT | Mod: HCNC

## 2019-01-04 PROCEDURE — 80061 LIPID PANEL: CPT | Mod: HCNC

## 2019-01-04 PROCEDURE — 82746 ASSAY OF FOLIC ACID SERUM: CPT | Mod: HCNC

## 2019-01-04 PROCEDURE — 82607 VITAMIN B-12: CPT | Mod: HCNC

## 2019-01-04 PROCEDURE — 84443 ASSAY THYROID STIM HORMONE: CPT | Mod: HCNC

## 2019-01-04 PROCEDURE — 80053 COMPREHEN METABOLIC PANEL: CPT | Mod: HCNC

## 2019-01-04 RX ORDER — CARVEDILOL 3.12 MG/1
3.12 TABLET ORAL 2 TIMES DAILY WITH MEALS
Qty: 180 TABLET | Refills: 2 | Status: SHIPPED | OUTPATIENT
Start: 2019-01-04 | End: 2019-04-03 | Stop reason: SDUPTHER

## 2019-01-04 RX ORDER — HYDROCHLOROTHIAZIDE 25 MG/1
12.5 TABLET ORAL DAILY
Qty: 90 TABLET | Refills: 2 | Status: SHIPPED | OUTPATIENT
Start: 2019-01-04 | End: 2020-02-07

## 2019-01-07 LAB — RPR SER QL: NORMAL

## 2019-01-08 ENCOUNTER — TELEPHONE (OUTPATIENT)
Dept: INTERNAL MEDICINE | Facility: CLINIC | Age: 74
End: 2019-01-08

## 2019-01-08 ENCOUNTER — CLINICAL SUPPORT (OUTPATIENT)
Dept: CARDIOLOGY | Facility: CLINIC | Age: 74
End: 2019-01-08
Attending: INTERNAL MEDICINE
Payer: MEDICARE

## 2019-01-08 DIAGNOSIS — Z87.891 FORMER SMOKER: ICD-10-CM

## 2019-01-08 LAB
ABDOMINAL IMA AP: 1.53 CM
ABDOMINAL IMA PS VEL: 68 CM/S
ABDOMINAL IMA TRANS: 1.53 CM
ABDOMINAL INFRARENAL AORTA AP: 1.59 CM
ABDOMINAL INFRARENAL AORTA PS VEL: 71 CM/S
ABDOMINAL INFRARENAL AORTA TRANS: 1.53 CM
ABDOMINAL JUXTARENAL AORTA AP: 1.95 CM
ABDOMINAL JUXTARENAL AORTA PS VEL: 77 CM/S
ABDOMINAL JUXTARENAL AORTA TRANS: 1.6 CM
ABDOMINAL LT COM ILIAC AP: 0.73 CM
ABDOMINAL LT COM ILIAC TRANS: 0.71 CM
ABDOMINAL LT COM ILIAC VEL: 112 CM/S
ABDOMINAL RT COM ILIAC AP: 0.9 CM
ABDOMINAL RT COM ILIAC TRANS: 0.88 CM
ABDOMINAL RT COM ILIAC VEL: 109 CM/S
ABDOMINAL SUPRARENAL AORTA AP: 2.18 CM
ABDOMINAL SUPRARENAL AORTA PS VEL: 65 CM/S
ABDOMINAL SUPRARENAL AORTA TRANS: 2.18 CM

## 2019-01-08 PROCEDURE — 93978 VASCULAR STUDY: CPT | Mod: HCNC,S$GLB,, | Performed by: INTERNAL MEDICINE

## 2019-01-08 PROCEDURE — 93978 CV US ABDOMINAL AORTA EVALUATION (CUPID ONLY): ICD-10-PCS | Mod: HCNC,S$GLB,, | Performed by: INTERNAL MEDICINE

## 2019-01-08 NOTE — TELEPHONE ENCOUNTER
Please let patient know that his stool studies do contain microscopic amounts of blood. While there are plenty of benign causes of blood in our stool, such as hemorrhoids or diverticula, there is no way to be certain that this is not due to colon cancer or a precancerous polyp without getting a colonoscopy done. I have placed an order for you to have a colonoscopy done. This will be done at main campus. If there is any problem or concern about this plans, or any other concerns, please let us know.

## 2019-01-08 NOTE — TELEPHONE ENCOUNTER
Spoke to  Chente and informed him that his stool studies contained microscopic amounts of blood.  Instructed patient that an referral will

## 2019-01-31 DIAGNOSIS — M54.50 CHRONIC BILATERAL LOW BACK PAIN WITHOUT SCIATICA: Primary | Chronic | ICD-10-CM

## 2019-01-31 DIAGNOSIS — G89.29 CHRONIC BILATERAL LOW BACK PAIN WITHOUT SCIATICA: Primary | Chronic | ICD-10-CM

## 2019-01-31 RX ORDER — IBUPROFEN 800 MG/1
800 TABLET ORAL EVERY 6 HOURS PRN
Qty: 45 TABLET | Refills: 0 | OUTPATIENT
Start: 2019-01-31

## 2019-01-31 NOTE — TELEPHONE ENCOUNTER
----- Message from Antonietta Jacobson sent at 1/31/2019  3:07 PM CST -----  Contact: MUSA SEGURA JR. [[id number]]  Can the clinic reply in [facility]: no      Please refill the medication(s) listed below. The patient can be reached at this phone number  once it is called into the pharmacy.      Medication #1  Ibuprofen 800       Preferred Pharmacy:  On file

## 2019-02-01 RX ORDER — DICLOFENAC SODIUM 75 MG/1
75 TABLET, DELAYED RELEASE ORAL 2 TIMES DAILY
Qty: 180 TABLET | Refills: 1 | Status: SHIPPED | OUTPATIENT
Start: 2019-02-01 | End: 2019-10-20 | Stop reason: SDUPTHER

## 2019-03-01 DIAGNOSIS — E78.9 ABNORMAL CHOLESTEROL TEST: ICD-10-CM

## 2019-03-01 DIAGNOSIS — E13.9 DIABETES 1.5, MANAGED AS TYPE 2: ICD-10-CM

## 2019-03-01 DIAGNOSIS — I10 HYPERTENSION, UNSPECIFIED TYPE: ICD-10-CM

## 2019-03-01 RX ORDER — ATORVASTATIN CALCIUM 20 MG/1
20 TABLET, FILM COATED ORAL DAILY
Qty: 90 TABLET | Refills: 2 | Status: SHIPPED | OUTPATIENT
Start: 2019-03-01 | End: 2020-02-17 | Stop reason: SDUPTHER

## 2019-03-01 RX ORDER — AMLODIPINE BESYLATE 10 MG/1
10 TABLET ORAL DAILY
Qty: 90 TABLET | Refills: 2 | Status: SHIPPED | OUTPATIENT
Start: 2019-03-01 | End: 2020-04-13 | Stop reason: SDUPTHER

## 2019-03-01 RX ORDER — GLIPIZIDE 5 MG/1
5 TABLET ORAL
Qty: 180 TABLET | Refills: 2 | Status: SHIPPED | OUTPATIENT
Start: 2019-03-01 | End: 2019-04-25 | Stop reason: SDUPTHER

## 2019-03-01 RX ORDER — METFORMIN HYDROCHLORIDE 1000 MG/1
1000 TABLET ORAL 2 TIMES DAILY WITH MEALS
Qty: 180 TABLET | Refills: 2 | Status: SHIPPED | OUTPATIENT
Start: 2019-03-01 | End: 2020-09-15 | Stop reason: SDUPTHER

## 2019-03-01 RX ORDER — LISINOPRIL 40 MG/1
40 TABLET ORAL DAILY
Qty: 90 TABLET | Refills: 2 | Status: SHIPPED | OUTPATIENT
Start: 2019-03-01 | End: 2019-04-03

## 2019-03-01 NOTE — TELEPHONE ENCOUNTER
Pt. Is requesting a refill of all medications. I left a Vm asking pt. To call and confirm exactly which meds. I am pending orders for the medications that I see are currently due for a refill.

## 2019-03-04 RX ORDER — INSULIN PUMP SYRINGE, 3 ML
EACH MISCELLANEOUS
Qty: 1 EACH | Refills: 0 | Status: SHIPPED | OUTPATIENT
Start: 2019-03-04 | End: 2023-05-08 | Stop reason: SDUPTHER

## 2019-03-04 RX ORDER — LANCETS
1 EACH MISCELLANEOUS DAILY
Qty: 100 EACH | Refills: 3 | Status: SHIPPED | OUTPATIENT
Start: 2019-03-04 | End: 2020-04-13

## 2019-03-04 NOTE — TELEPHONE ENCOUNTER
----- Message from Dion Tracy sent at 3/4/2019 12:45 PM CST -----  Contact: Erm[facility] ([facility])  Name of Who is Calling: MUSA SEGURA JR. [[id number]]      What is the request in detail: Needs new prescription with directions for patient to have new diabetic supplies including meter, strips, and lancets. Please send to [id number] - [place] [address] [facility]      Can the clinic reply by [facility]: no      What [facility]: [phone]

## 2019-03-13 ENCOUNTER — PROCEDURE VISIT (OUTPATIENT)
Dept: NEUROLOGY | Facility: CLINIC | Age: 74
End: 2019-03-13
Attending: INTERNAL MEDICINE
Payer: MEDICARE

## 2019-03-13 VITALS
SYSTOLIC BLOOD PRESSURE: 177 MMHG | WEIGHT: 208.13 LBS | DIASTOLIC BLOOD PRESSURE: 82 MMHG | HEART RATE: 70 BPM | BODY MASS INDEX: 27.59 KG/M2 | HEIGHT: 73 IN

## 2019-03-13 DIAGNOSIS — G62.9 NEUROPATHY: ICD-10-CM

## 2019-03-13 DIAGNOSIS — E08.42 DIABETES MELLITUS DUE TO UNDERLYING CONDITION WITH DIABETIC POLYNEUROPATHY, WITHOUT LONG-TERM CURRENT USE OF INSULIN: ICD-10-CM

## 2019-03-13 PROCEDURE — 95886 PR EMG COMPLETE, W/ NERVE CONDUCTION STUDIES, 5+ MUSCLES: ICD-10-PCS | Mod: HCNC,S$GLB,, | Performed by: PSYCHIATRY & NEUROLOGY

## 2019-03-13 PROCEDURE — 95908 PR NERVE CONDUCTION STUDY; 3-4 STUDIES: ICD-10-PCS | Mod: HCNC,S$GLB,, | Performed by: PSYCHIATRY & NEUROLOGY

## 2019-03-13 PROCEDURE — 95886 MUSC TEST DONE W/N TEST COMP: CPT | Mod: HCNC,S$GLB,, | Performed by: PSYCHIATRY & NEUROLOGY

## 2019-03-13 PROCEDURE — 95908 NRV CNDJ TST 3-4 STUDIES: CPT | Mod: HCNC,S$GLB,, | Performed by: PSYCHIATRY & NEUROLOGY

## 2019-03-13 NOTE — PROCEDURES
EMG W/ ULTRASOUND AND NERVE CONDUCTION TEST 4 Extremities  Date/Time: 3/13/2019 1:10 PM  Performed by: Praful Grimes III, MD  Authorized by: Dipak Trevioñ MD     Sedation:  Patient sedated: no    Patient tolerance: Patient tolerated the procedure well with no immediate complications  Comments: Please see PDF in scanned media tab

## 2019-03-20 ENCOUNTER — PATIENT OUTREACH (OUTPATIENT)
Dept: ADMINISTRATIVE | Facility: HOSPITAL | Age: 74
End: 2019-03-20

## 2019-03-20 ENCOUNTER — DOCUMENTATION ONLY (OUTPATIENT)
Dept: ADMINISTRATIVE | Facility: HOSPITAL | Age: 74
End: 2019-03-20

## 2019-03-20 DIAGNOSIS — E11.40 CONTROLLED TYPE 2 DIABETES MELLITUS WITH DIABETIC NEUROPATHY, WITHOUT LONG-TERM CURRENT USE OF INSULIN: Primary | ICD-10-CM

## 2019-04-03 ENCOUNTER — LAB VISIT (OUTPATIENT)
Dept: LAB | Facility: OTHER | Age: 74
End: 2019-04-03
Attending: INTERNAL MEDICINE
Payer: MEDICARE

## 2019-04-03 ENCOUNTER — OFFICE VISIT (OUTPATIENT)
Dept: INTERNAL MEDICINE | Facility: CLINIC | Age: 74
End: 2019-04-03
Attending: INTERNAL MEDICINE
Payer: MEDICARE

## 2019-04-03 VITALS
OXYGEN SATURATION: 99 % | HEIGHT: 73 IN | BODY MASS INDEX: 27.96 KG/M2 | WEIGHT: 211 LBS | SYSTOLIC BLOOD PRESSURE: 138 MMHG | DIASTOLIC BLOOD PRESSURE: 90 MMHG | HEART RATE: 64 BPM

## 2019-04-03 DIAGNOSIS — Z12.11 COLON CANCER SCREENING: ICD-10-CM

## 2019-04-03 DIAGNOSIS — Q24.9 HEART ABNORMALITY: ICD-10-CM

## 2019-04-03 DIAGNOSIS — E11.40 CONTROLLED TYPE 2 DIABETES MELLITUS WITH DIABETIC NEUROPATHY, WITHOUT LONG-TERM CURRENT USE OF INSULIN: ICD-10-CM

## 2019-04-03 DIAGNOSIS — I10 ESSENTIAL HYPERTENSION: ICD-10-CM

## 2019-04-03 DIAGNOSIS — E11.40 CONTROLLED TYPE 2 DIABETES MELLITUS WITH DIABETIC NEUROPATHY, WITHOUT LONG-TERM CURRENT USE OF INSULIN: Primary | ICD-10-CM

## 2019-04-03 DIAGNOSIS — Z85.038 HISTORY OF COLON CANCER: ICD-10-CM

## 2019-04-03 LAB
ESTIMATED AVG GLUCOSE: 243 MG/DL (ref 68–131)
HBA1C MFR BLD HPLC: 10.1 % (ref 4–5.6)

## 2019-04-03 PROCEDURE — 99214 PR OFFICE/OUTPT VISIT, EST, LEVL IV, 30-39 MIN: ICD-10-PCS | Mod: S$GLB,,, | Performed by: INTERNAL MEDICINE

## 2019-04-03 PROCEDURE — 1101F PT FALLS ASSESS-DOCD LE1/YR: CPT | Mod: CPTII,S$GLB,, | Performed by: INTERNAL MEDICINE

## 2019-04-03 PROCEDURE — 3044F HG A1C LEVEL LT 7.0%: CPT | Mod: CPTII,S$GLB,, | Performed by: INTERNAL MEDICINE

## 2019-04-03 PROCEDURE — 99214 OFFICE O/P EST MOD 30 MIN: CPT | Mod: S$GLB,,, | Performed by: INTERNAL MEDICINE

## 2019-04-03 PROCEDURE — 99999 PR PBB SHADOW E&M-EST. PATIENT-LVL IV: CPT | Mod: PBBFAC,HCNC,, | Performed by: INTERNAL MEDICINE

## 2019-04-03 PROCEDURE — 3075F PR MOST RECENT SYSTOLIC BLOOD PRESS GE 130-139MM HG: ICD-10-PCS | Mod: CPTII,S$GLB,, | Performed by: INTERNAL MEDICINE

## 2019-04-03 PROCEDURE — 3075F SYST BP GE 130 - 139MM HG: CPT | Mod: CPTII,S$GLB,, | Performed by: INTERNAL MEDICINE

## 2019-04-03 PROCEDURE — 36415 COLL VENOUS BLD VENIPUNCTURE: CPT

## 2019-04-03 PROCEDURE — 3044F PR MOST RECENT HEMOGLOBIN A1C LEVEL <7.0%: ICD-10-PCS | Mod: CPTII,S$GLB,, | Performed by: INTERNAL MEDICINE

## 2019-04-03 PROCEDURE — 3080F PR MOST RECENT DIASTOLIC BLOOD PRESSURE >= 90 MM HG: ICD-10-PCS | Mod: CPTII,S$GLB,, | Performed by: INTERNAL MEDICINE

## 2019-04-03 PROCEDURE — 83036 HEMOGLOBIN GLYCOSYLATED A1C: CPT

## 2019-04-03 PROCEDURE — 3080F DIAST BP >= 90 MM HG: CPT | Mod: CPTII,S$GLB,, | Performed by: INTERNAL MEDICINE

## 2019-04-03 PROCEDURE — 1101F PR PT FALLS ASSESS DOC 0-1 FALLS W/OUT INJ PAST YR: ICD-10-PCS | Mod: CPTII,S$GLB,, | Performed by: INTERNAL MEDICINE

## 2019-04-03 PROCEDURE — 99999 PR PBB SHADOW E&M-EST. PATIENT-LVL IV: ICD-10-PCS | Mod: PBBFAC,HCNC,, | Performed by: INTERNAL MEDICINE

## 2019-04-03 RX ORDER — CARVEDILOL 3.12 MG/1
3.12 TABLET ORAL 2 TIMES DAILY WITH MEALS
Qty: 180 TABLET | Refills: 2 | Status: SHIPPED | OUTPATIENT
Start: 2019-04-03 | End: 2020-04-13

## 2019-04-03 RX ORDER — VALSARTAN 320 MG/1
320 TABLET ORAL DAILY
Qty: 90 TABLET | Refills: 3 | Status: SHIPPED | OUTPATIENT
Start: 2019-04-03 | End: 2020-02-17 | Stop reason: SDUPTHER

## 2019-04-03 NOTE — PROGRESS NOTES
"Subjective:       Patient ID: Misha Eldridge Jr. is a 74 y.o. male.    Chief Complaint: Follow-up    Here for f/u    DM typically 110s. During holidays "forgot I was a diabetic" and was eating as he pleased and was gettting BG in the 200s. This has since been corrected. Continued neuropathy. Reports trouble with sight at a distance.     BP above goal today. Adherent with meds. Denies frequent or current HA, intermittent blurry vision, dizziness, CP, or SOB. On ACEi. Will change to valsartan.     He has not followed with Gi regarding his + FIt kit. He reports straining and having BRBPR at that time. He has a Hx of colon CA treated at [facility].                  Review of Systems   Constitutional: Negative for appetite change, chills, fever and unexpected weight change.   HENT: Negative for hearing loss, sore throat and trouble swallowing.    Eyes: Negative for visual disturbance.   Respiratory: Negative for cough, chest tightness and shortness of breath.    Cardiovascular: Negative for chest pain and leg swelling.   Gastrointestinal: Positive for blood in stool. Negative for abdominal pain, constipation, diarrhea, nausea and vomiting.   Endocrine: Negative for polydipsia and polyuria.   Genitourinary: Negative for decreased urine volume, difficulty urinating, dysuria, frequency and urgency.   Skin: Negative for rash.   Neurological: Negative for dizziness and numbness.   Psychiatric/Behavioral: The patient is not nervous/anxious.        Objective:      Vitals:    04/03/19 1037   BP: (!) 138/90   Pulse: 64   SpO2: 99%   Weight: 95.7 kg (210 lb 15.7 oz)   Height: 6' 1" (1.854 m)      Physical Exam   Constitutional: He is oriented to person, place, and time. He appears well-developed and well-nourished. No distress.   HENT:   Head: Normocephalic and atraumatic.   Mouth/Throat: Oropharynx is clear and moist. No oropharyngeal exudate.   Eyes: Pupils are equal, round, and reactive to light. Conjunctivae and EOM are normal. No " scleral icterus.   Neck: No thyromegaly present.   Cardiovascular: Normal rate, regular rhythm and normal heart sounds.   No murmur heard.  Pulmonary/Chest: Effort normal and breath sounds normal. He has no wheezes. He has no rales.   Abdominal: Soft. He exhibits no distension. There is no tenderness.   Musculoskeletal: He exhibits no edema or tenderness.   Lymphadenopathy:     He has no cervical adenopathy.   Neurological: He is alert and oriented to person, place, and time.   Skin: Skin is warm and dry.   Psychiatric: He has a normal mood and affect. His behavior is normal.       Assessment:       1. Controlled type 2 diabetes mellitus with diabetic neuropathy, without long-term current use of insulin    2. History of colon cancer    3. Colon cancer screening    4. Heart abnormality    5. Essential hypertension        Plan:       Misha was seen today for follow-up.    Diagnoses and all orders for this visit:    Controlled type 2 diabetes mellitus with diabetic neuropathy, without long-term current use of insulin  -     Hemoglobin A1c; Future    History of colon cancer  -     Ambulatory referral to Gastroenterology    Colon cancer screening  -     Ambulatory referral to Gastroenterology    Essential hypertension  -     START valsartan (DIOVAN) 320 MG tablet; Take 1 tablet (320 mg total) by mouth once daily.   f/u in 3-4 weeks for nurse visit for BP check    Pt given paper Rx to take to our pharmacy down stairs for following vaccinations: Shingrix      RTC in 6 months or sooner prn                  Side effects of medication(s) were discussed in detail and patient voiced understanding.  Patient will call back for any issues or complications.

## 2019-04-04 ENCOUNTER — TELEPHONE (OUTPATIENT)
Dept: INTERNAL MEDICINE | Facility: CLINIC | Age: 74
End: 2019-04-04

## 2019-04-04 NOTE — TELEPHONE ENCOUNTER
Please let pt know his diabetes is very uncontrolled. Please check your sugar every morning, before each meal, and just before going to bed. Please write all these numbers down and bring these numbers with you to your next visit. If not already scheduled please schedule a follow up appointment to review your sugar log in 3-4 weeks.

## 2019-04-10 DIAGNOSIS — R52 PAIN: ICD-10-CM

## 2019-04-11 ENCOUNTER — PATIENT OUTREACH (OUTPATIENT)
Dept: ADMINISTRATIVE | Facility: HOSPITAL | Age: 74
End: 2019-04-11

## 2019-04-15 DIAGNOSIS — E11.40 CONTROLLED TYPE 2 DIABETES MELLITUS WITH DIABETIC NEUROPATHY, WITHOUT LONG-TERM CURRENT USE OF INSULIN: ICD-10-CM

## 2019-04-16 ENCOUNTER — TELEPHONE (OUTPATIENT)
Dept: INTERNAL MEDICINE | Facility: CLINIC | Age: 74
End: 2019-04-16

## 2019-04-16 NOTE — TELEPHONE ENCOUNTER
----- Message from Lucretia Hernandez PharmD sent at 4/16/2019 12:04 PM CDT -----  Good afternoon,    We have Mr Chente here trying to see if we can help him with dietician recommendation for his diabetic. He would like to see if someone from the office can help him in scheduling with a dietician appoitment for future. He would like to receive a phone call at [phone] if possible.    Thank you,  Ana Paula

## 2019-04-24 ENCOUNTER — CLINICAL SUPPORT (OUTPATIENT)
Dept: INTERNAL MEDICINE | Facility: CLINIC | Age: 74
End: 2019-04-24
Payer: MEDICARE

## 2019-04-24 VITALS — HEART RATE: 70 BPM | DIASTOLIC BLOOD PRESSURE: 74 MMHG | SYSTOLIC BLOOD PRESSURE: 132 MMHG

## 2019-04-24 PROCEDURE — 99999 PR PBB SHADOW E&M-EST. PATIENT-LVL II: CPT | Mod: PBBFAC,HCNC,,

## 2019-04-24 PROCEDURE — 99999 PR PBB SHADOW E&M-EST. PATIENT-LVL II: ICD-10-PCS | Mod: PBBFAC,HCNC,,

## 2019-04-24 RX ORDER — IBUPROFEN 800 MG/1
TABLET ORAL
Qty: 45 TABLET | Refills: 0 | OUTPATIENT
Start: 2019-04-24

## 2019-04-24 NOTE — PROGRESS NOTES
Misha Eldridge Jr. 74 y.o. male is here today for Blood Pressure check.   History of HTN yes.    Review of patient's allergies indicates:   Allergen Reactions    Hay fever and allergy relief      Creatinine   Date Value Ref Range Status   01/04/2019 1.3 0.5 - 1.4 mg/dL Final     Sodium   Date Value Ref Range Status   01/04/2019 141 136 - 145 mmol/L Final     Potassium   Date Value Ref Range Status   01/04/2019 4.2 3.5 - 5.1 mmol/L Final   ]  Patient verifies taking blood pressure medications on a regular bases at the same time of the day.     Current Outpatient Medications:     amLODIPine (NORVASC) 10 MG tablet, Take 1 tablet (10 mg total) by mouth once daily., Disp: 90 tablet, Rfl: 2    atorvastatin (LIPITOR) 20 MG tablet, Take 1 tablet (20 mg total) by mouth once daily., Disp: 90 tablet, Rfl: 2    blood sugar diagnostic Strp, 50 strips by Misc.(Non-Drug; Combo Route) route 2 (two) times daily., Disp: 50 strip, Rfl: 2    blood sugar diagnostic Strp, 1 each by Misc.(Non-Drug; Combo Route) route 2 (two) times daily., Disp: 1 each, Rfl: 0    blood sugar diagnostic Strp, 1 each by Misc.(Non-Drug; Combo Route) route once daily., Disp: 100 each, Rfl: 3    blood-glucose meter kit, Use as instructed, Disp: 1 each, Rfl: 0    BOOSTRIX TDAP 2.5-8-5 Lf-mcg-Lf/0.5mL Syrg injection, , Disp: , Rfl:     carvedilol (COREG) 3.125 MG tablet, Take 1 tablet (3.125 mg total) by mouth 2 (two) times daily with meals., Disp: 180 tablet, Rfl: 2    diclofenac (VOLTAREN) 75 MG EC tablet, Take 1 tablet (75 mg total) by mouth 2 (two) times daily., Disp: 180 tablet, Rfl: 1    gabapentin (NEURONTIN) 300 MG capsule, Take 1 capsule (300 mg total) by mouth 3 (three) times daily., Disp: 270 capsule, Rfl: 3    glipiZIDE (GLUCOTROL) 5 MG tablet, Take 1 tablet (5 mg total) by mouth 2 (two) times daily before meals., Disp: 180 tablet, Rfl: 2    hydroCHLOROthiazide (HYDRODIURIL) 25 MG tablet, Take 0.5 tablets (12.5 mg total) by mouth once  daily., Disp: 90 tablet, Rfl: 2    lancets (ACCU-CHEK SOFTCLIX LANCETS) Misc, 1 lancet by Misc.(Non-Drug; Combo Route) route once daily., Disp: 100 each, Rfl: 3    metFORMIN (GLUCOPHAGE) 1000 MG tablet, Take 1 tablet (1,000 mg total) by mouth 2 (two) times daily with meals., Disp: 180 tablet, Rfl: 2    valsartan (DIOVAN) 320 MG tablet, Take 1 tablet (320 mg total) by mouth once daily., Disp: 90 tablet, Rfl: 3  Does patient have record of home blood pressure readings no. Readings have been averaging Unknown.   Last dose of blood pressure medication was taken at 0900 today.  Patient is asymptomatic.       BP: (!) 142/78 , Pulse: 70.    Blood pressure reading after 15 minutes was 132/74, Pulse 70.  Dr. Treviño notified.

## 2019-04-24 NOTE — Clinical Note
Does patient have record of home blood pressure readings no. Readings have been averaging Unknown. Last dose of blood pressure medication was taken at 0900 today.Patient is asymptomatic. BP: (!) 142/78 , Pulse: 70.Blood pressure reading after 15 minutes was 132/74, Pulse 70.

## 2019-04-25 ENCOUNTER — OFFICE VISIT (OUTPATIENT)
Dept: INTERNAL MEDICINE | Facility: CLINIC | Age: 74
End: 2019-04-25
Attending: INTERNAL MEDICINE
Payer: MEDICARE

## 2019-04-25 VITALS
WEIGHT: 205.25 LBS | DIASTOLIC BLOOD PRESSURE: 80 MMHG | BODY MASS INDEX: 27.2 KG/M2 | SYSTOLIC BLOOD PRESSURE: 128 MMHG | OXYGEN SATURATION: 99 % | HEART RATE: 83 BPM | HEIGHT: 73 IN

## 2019-04-25 DIAGNOSIS — E13.9 DIABETES 1.5, MANAGED AS TYPE 2: ICD-10-CM

## 2019-04-25 DIAGNOSIS — I10 ESSENTIAL HYPERTENSION: ICD-10-CM

## 2019-04-25 DIAGNOSIS — Z87.19 HISTORY OF HERNIA REPAIR: Primary | ICD-10-CM

## 2019-04-25 DIAGNOSIS — Z85.038 HISTORY OF COLON CANCER: ICD-10-CM

## 2019-04-25 DIAGNOSIS — Z98.890 HISTORY OF HERNIA REPAIR: Primary | ICD-10-CM

## 2019-04-25 PROCEDURE — 3046F HEMOGLOBIN A1C LEVEL >9.0%: CPT | Mod: HCNC,CPTII,S$GLB, | Performed by: INTERNAL MEDICINE

## 2019-04-25 PROCEDURE — 99214 OFFICE O/P EST MOD 30 MIN: CPT | Mod: HCNC,S$GLB,, | Performed by: INTERNAL MEDICINE

## 2019-04-25 PROCEDURE — 3079F PR MOST RECENT DIASTOLIC BLOOD PRESSURE 80-89 MM HG: ICD-10-PCS | Mod: HCNC,CPTII,S$GLB, | Performed by: INTERNAL MEDICINE

## 2019-04-25 PROCEDURE — 99999 PR PBB SHADOW E&M-EST. PATIENT-LVL IV: ICD-10-PCS | Mod: PBBFAC,HCNC,, | Performed by: INTERNAL MEDICINE

## 2019-04-25 PROCEDURE — 3046F PR MOST RECENT HEMOGLOBIN A1C LEVEL > 9.0%: ICD-10-PCS | Mod: HCNC,CPTII,S$GLB, | Performed by: INTERNAL MEDICINE

## 2019-04-25 PROCEDURE — 3074F PR MOST RECENT SYSTOLIC BLOOD PRESSURE < 130 MM HG: ICD-10-PCS | Mod: HCNC,CPTII,S$GLB, | Performed by: INTERNAL MEDICINE

## 2019-04-25 PROCEDURE — 99999 PR PBB SHADOW E&M-EST. PATIENT-LVL IV: CPT | Mod: PBBFAC,HCNC,, | Performed by: INTERNAL MEDICINE

## 2019-04-25 PROCEDURE — 99214 PR OFFICE/OUTPT VISIT, EST, LEVL IV, 30-39 MIN: ICD-10-PCS | Mod: HCNC,S$GLB,, | Performed by: INTERNAL MEDICINE

## 2019-04-25 PROCEDURE — 3079F DIAST BP 80-89 MM HG: CPT | Mod: HCNC,CPTII,S$GLB, | Performed by: INTERNAL MEDICINE

## 2019-04-25 PROCEDURE — 1101F PT FALLS ASSESS-DOCD LE1/YR: CPT | Mod: HCNC,CPTII,S$GLB, | Performed by: INTERNAL MEDICINE

## 2019-04-25 PROCEDURE — 3074F SYST BP LT 130 MM HG: CPT | Mod: HCNC,CPTII,S$GLB, | Performed by: INTERNAL MEDICINE

## 2019-04-25 PROCEDURE — 1101F PR PT FALLS ASSESS DOC 0-1 FALLS W/OUT INJ PAST YR: ICD-10-PCS | Mod: HCNC,CPTII,S$GLB, | Performed by: INTERNAL MEDICINE

## 2019-04-25 RX ORDER — GLIPIZIDE 10 MG/1
10 TABLET ORAL
Qty: 180 TABLET | Refills: 1 | Status: SHIPPED | OUTPATIENT
Start: 2019-04-25 | End: 2019-10-19 | Stop reason: SDUPTHER

## 2019-04-25 NOTE — PROGRESS NOTES
"Subjective:       Patient ID: Misha Eldridge Jr. is a 74 y.o. male.    Chief Complaint: Follow-up    Here for routine f/u    Pt here for uncontrolled DM. Asked to bring in log so we could see the details of his BG. Did not bring log today. Random numbers of 328, 270. Not below 200. He was well controlled prior to holidays, went off his diet, and states he is having trouble getting back on his diet. Adherent with metformin 1g BID and glipizide 5mg BID.    He report whne he does a side straddle hop to exercise for his DM he will get a burning pain at the site of prior hernia repair. Does not happen daily. He is not sure is there is a bulge present.           Review of Systems   Constitutional: Negative for appetite change, chills, fever and unexpected weight change.   HENT: Negative for hearing loss, sore throat and trouble swallowing.    Eyes: Negative for visual disturbance.   Respiratory: Negative for cough, chest tightness and shortness of breath.    Cardiovascular: Negative for chest pain and leg swelling.   Gastrointestinal: Positive for abdominal pain. Negative for blood in stool, constipation, diarrhea, nausea and vomiting.   Endocrine: Negative for polydipsia and polyuria.   Genitourinary: Negative for decreased urine volume, difficulty urinating, dysuria, frequency and urgency.   Musculoskeletal: Negative for gait problem.   Skin: Negative for rash.   Neurological: Negative for dizziness and numbness.   Psychiatric/Behavioral: The patient is not nervous/anxious.        Objective:      Vitals:    04/25/19 1309   BP: 128/80   Pulse: 83   SpO2: 99%   Weight: 93.1 kg (205 lb 4 oz)   Height: 6' 1" (1.854 m)      Physical Exam   Constitutional: He is oriented to person, place, and time. He appears well-developed and well-nourished. No distress.   HENT:   Head: Normocephalic and atraumatic.   Mouth/Throat: Oropharynx is clear and moist. No oropharyngeal exudate.   Eyes: Pupils are equal, round, and reactive to " light. Conjunctivae and EOM are normal. No scleral icterus.   Neck: No thyromegaly present.   Cardiovascular: Normal rate, regular rhythm and normal heart sounds.   No murmur heard.  Pulmonary/Chest: Effort normal and breath sounds normal. He has no wheezes. He has no rales.   Abdominal: Soft. He exhibits no distension. There is no tenderness.   Musculoskeletal: He exhibits no edema or tenderness.   Lymphadenopathy:     He has no cervical adenopathy.   Neurological: He is alert and oriented to person, place, and time.   Skin: Skin is warm and dry.   Psychiatric: He has a normal mood and affect. His behavior is normal.       Assessment:       1. History of hernia repair    2. Diabetes 1.5, managed as type 2    3. History of colon cancer    4. Essential hypertension        Plan:       Misha was seen today for follow-up.    Diagnoses and all orders for this visit:    History of hernia repair  -     Ambulatory referral to General Surgery    Diabetes 1.5, managed as type 2  -     glipiZIDE (GLUCOTROL) 10 MG tablet; Take 1 tablet (10 mg total) by mouth 2 (two) times daily before meals.    History of colon cancer   + FOBT. He is not sure if he has GI f/u yet. Our staff called [facility] and apparently he has been dropped from their service due to 7 no show appts. Will discuss with pt at f/u again how important it is to f/u on this.     Essential hypertension   controlled. Continue current regimen                 Side effects of medication(s) were discussed in detail and patient voiced understanding.  Patient will call back for any issues or complications.

## 2019-05-02 ENCOUNTER — HOSPITAL ENCOUNTER (OUTPATIENT)
Dept: DIABETES | Facility: OTHER | Age: 74
Discharge: HOME | End: 2019-05-02
Attending: INTERNAL MEDICINE
Payer: MEDICARE

## 2019-05-02 VITALS — BODY MASS INDEX: 27.14 KG/M2 | WEIGHT: 205.69 LBS

## 2019-05-02 DIAGNOSIS — Z79.4 TYPE 2 DIABETES MELLITUS WITH COMPLICATION, WITH LONG-TERM CURRENT USE OF INSULIN: Primary | ICD-10-CM

## 2019-05-02 DIAGNOSIS — E11.8 TYPE 2 DIABETES MELLITUS WITH COMPLICATION, WITH LONG-TERM CURRENT USE OF INSULIN: Primary | ICD-10-CM

## 2019-05-02 DIAGNOSIS — E11.40 CONTROLLED TYPE 2 DIABETES MELLITUS WITH DIABETIC NEUROPATHY, WITHOUT LONG-TERM CURRENT USE OF INSULIN: ICD-10-CM

## 2019-05-02 PROCEDURE — G0108 DIAB MANAGE TRN  PER INDIV: HCPCS | Mod: HCNC

## 2019-05-02 RX ORDER — PEN NEEDLE, DIABETIC 30 GX3/16"
1 NEEDLE, DISPOSABLE MISCELLANEOUS NIGHTLY
Qty: 100 EACH | Refills: 3 | Status: SHIPPED | OUTPATIENT
Start: 2019-05-02 | End: 2020-05-21

## 2019-05-02 RX ORDER — PEN NEEDLE, DIABETIC 30 GX3/16"
1 NEEDLE, DISPOSABLE MISCELLANEOUS NIGHTLY
Qty: 100 EACH | Refills: 3 | Status: CANCELLED | OUTPATIENT
Start: 2019-05-02

## 2019-05-02 RX ORDER — INSULIN GLARGINE 100 [IU]/ML
15 INJECTION, SOLUTION SUBCUTANEOUS NIGHTLY
Qty: 13.5 ML | Refills: 3 | Status: SHIPPED | OUTPATIENT
Start: 2019-05-02 | End: 2019-12-27

## 2019-05-02 RX ORDER — INSULIN GLARGINE 100 [IU]/ML
15 INJECTION, SOLUTION SUBCUTANEOUS NIGHTLY
Qty: 13.5 ML | Refills: 3 | Status: CANCELLED | OUTPATIENT
Start: 2019-05-02 | End: 2020-05-01

## 2019-05-03 DIAGNOSIS — E11.40 CONTROLLED TYPE 2 DIABETES MELLITUS WITH DIABETIC NEUROPATHY, WITHOUT LONG-TERM CURRENT USE OF INSULIN: ICD-10-CM

## 2019-05-03 NOTE — PROGRESS NOTES
Diabetes Education  Author: Amanda Salguero RN  Date: 5/3/2019    Diabetes Care Management Summary  Diabetes Education Record Assessment/Progress: Initial         Diabetes Type  Diabetes Type : Type II    Diabetes History  Diabetes Diagnosis: 5-10 years  Current Treatment: Oral Medication  Reviewed Problem List with Patient: Yes    Health Maintenance was reviewed today with patient. Discussed with patient importance of routine eye exams, foot exams/foot care, blood work (i.e.: A1c, microalbumin, and lipid), dental visits, yearly flu vaccine, and pneumonia vaccine as indicated by PCP. Patient verbalized understanding.     Health Maintenance Topics with due status: Not Due       Topic Last Completion Date    Colonoscopy 07/15/2014    Influenza Vaccine 08/16/2017    TETANUS VACCINE 12/05/2017    Foot Exam 07/11/2018    Eye Exam 10/15/2018    Lipid Panel 01/04/2019    Fecal Occult Blood Test (FOBT)/[employer] 01/04/2019    Hemoglobin A1c 04/03/2019    Low Dose Statin 04/25/2019     There are no preventive care reminders to display for this patient.    Nutrition  Meal Plan 24 Hour Recall - Breakfast: grits, eggs, orange juice, milk   Meal Plan 24 Hour Recall - Lunch: fried chicken - 2 pieces, 1 pepper - water   Meal Plan 24 Hour Recall - Dinner: spaghetti and meatballs - water   Meal Plan 24 Hour Recall - Snack: no snacks     Monitoring   Self Monitoring : Before Breakfast: 287, 247, 276, 274, 417 2 hr PP Dinner: 248, 321, 256, 257, 244, 360  Blood Glucose Logs: Yes  Do you use a personal continuous glucose monitor?: No  In the last month, how often have you had a low blood sugar reaction?: never    Exercise   Frequency: Never    Current Diabetes Treatment   Current Treatment: Oral Medication    Social History  Preferred Learning Method: Face to Face  Primary Support: Family  Educational Level: High School(10th grade)  Occupation: retired  Smoking Status: Ex Smoker(quit 20-30 years ago)  Alcohol Use: Never                                 Barriers to Change  Barriers to Change: None  Learning Challenges : Literacy    Readiness to Learn   Readiness to Learn : Acceptance    Cultural Influences  Cultural Influences: No    Diabetes Education Assessment/Progress  Diabetes Disease Process (diabetes disease process and treatment options): Discussion, Requires Assistance Family/SO, Individual Session, No Knowledge(ed on importance of using medications as needed to help keep BG under better control)  Nutrition (Incorporating nutritional management into one's lifestyle): Discussion, Requires Assistance Family/SO, No Knowledge, Individual Session, Written Materials Provided(instructed to eliminate sweet drinks from diet (juice, lemonade, sweet tea, etc).)  Physical Activity (incorporating physical activity into one's lifestyle): Not Covered/Deferred  Medications (states correct name, dose, onset, peak, duration, side effects & timing of meds): Discussion, Demonstration, Return Demonstration, Requires Assistance Family/SO, Individual Session, Written Materials Provided(pt starting insulin per PCP - ed on insulin injection technique, site rotation, storage, needle disposal and safety considerations)  Monitoring (monitoring blood glucose/other parameters & using results): Discussion, Requires Assistance Family/SO, Individual Session, Written Materials Provided(ed on SMBG schedule, BG goals and importance of bringing logs to all future appointments)  Acute Complications (preventing, detecting, and treating acute complications): Discussion, Requires Assistance Family/SO, Individual Session(ed on s/s of low BG, increased risk d/t initiating insulin and how to prevent/treat)  Chronic Complications (preventing, detecting, and treating chronic complications): Discussion, Requires Assistance Family/SO, Individual Session(ed on current A1C, goal A1C and importance of keeping BG well controlled to prevent complications r/t DM)  Clinical (diabetes, other pertinent  medical history, and relevant comorbidities reviewed during visit): Not Covered/Deferred  Cognitive (knowledge of self-management skills, functional health literacy): Not Covered/Deferred  Psychosocial (emotional response to diabetes): Not Covered/Deferred  Diabetes Distress and Support Systems: Not Covered/Deferred  Behavioral (readiness for change, lifestyle practices, self-care behaviors): Discussion, Requires Assistance Family/SO, Individual Session(pt willing to start insulin and work to get BG under better control)    Goals  Patient has selected/evaluated goals during today's session: Yes, selected  Monitoring: Set(pt will SMBG BID and bring logs to all future appointments)  Start Date: 05/02/19  Medications: Set(pt will begin insulin injections as prescribed)  Start Date: 05/02/19         Diabetes Care Plan/Intervention  Education Plan/Intervention: Individual Follow-Up DSMT    Diabetes Meal Plan  Restrictions: Restricted Carbohydrate  Carbohydrate Per Meal: 30-45g    Today's Self-Management Care Plan was developed with the patient's input and is based on barriers identified during today's assessment.    The long and short-term goals in the care plan were written with the patient/caregiver's input. The patient has agreed to work toward these goals to improve his overall diabetes control.      The patient received a copy of today's self-management plan and verbalized understanding of the care plan, goals, and all of today's instructions.      The patient was encouraged to communicate with his physician and care team regarding his condition(s) and treatment.  I provided the patient with my contact information today and encouraged him to contact me via phone or patient portal as needed.     Education Units of Time   Time Spent: 60 min

## 2019-05-08 ENCOUNTER — OFFICE VISIT (OUTPATIENT)
Dept: OPTOMETRY | Facility: CLINIC | Age: 74
End: 2019-05-08
Payer: MEDICARE

## 2019-05-08 DIAGNOSIS — Z96.1 PSEUDOPHAKIA OF BOTH EYES: ICD-10-CM

## 2019-05-08 DIAGNOSIS — H35.3131 EARLY DRY STAGE NONEXUDATIVE AGE-RELATED MACULAR DEGENERATION OF BOTH EYES: ICD-10-CM

## 2019-05-08 DIAGNOSIS — E11.9 TYPE 2 DIABETES MELLITUS WITHOUT OPHTHALMIC MANIFESTATIONS: Primary | ICD-10-CM

## 2019-05-08 DIAGNOSIS — H02.834 DERMATOCHALASIS OF BOTH UPPER EYELIDS: ICD-10-CM

## 2019-05-08 DIAGNOSIS — H02.831 DERMATOCHALASIS OF BOTH UPPER EYELIDS: ICD-10-CM

## 2019-05-08 PROCEDURE — 92014 COMPRE OPH EXAM EST PT 1/>: CPT | Mod: HCNC,S$GLB,, | Performed by: OPTOMETRIST

## 2019-05-08 PROCEDURE — 99999 PR PBB SHADOW E&M-EST. PATIENT-LVL II: ICD-10-PCS | Mod: PBBFAC,HCNC,, | Performed by: OPTOMETRIST

## 2019-05-08 PROCEDURE — 99999 PR PBB SHADOW E&M-EST. PATIENT-LVL II: CPT | Mod: PBBFAC,HCNC,, | Performed by: OPTOMETRIST

## 2019-05-08 PROCEDURE — 92014 PR EYE EXAM, EST PATIENT,COMPREHESV: ICD-10-PCS | Mod: HCNC,S$GLB,, | Performed by: OPTOMETRIST

## 2019-05-08 NOTE — PROGRESS NOTES
HPI     Re-establish care DFE   DLS-12/06/2017 Dr. Shoemaker 10/15/2018 Dr. Jamil     Pt sts vision seems the same still blurry and feels like he is looking   through tunnel vision.   Has glasses for driving left them in the car today RX was from 6 mo ago   Denies pain     (-)Flashes (+)Floaters black net looking floater does not happen often   (-)Itch, (+)tear, (-)burn, (-)Dryness.  (-) OTC Drops  (-)Photophobia  (-)Glare        Last edited by Shavonne Arango on 5/8/2019  9:39 AM. (History)            Assessment /Plan     For exam results, see Encounter Report.    Type 2 diabetes mellitus without ophthalmic manifestations    Early dry stage nonexudative age-related macular degeneration of both eyes    Dermatochalasis of both upper eyelids    Pseudophakia of both eyes          1.  No retinopathy--monitor yearly.  BS control.  2.  Stable.  Continue with Amsler grid and Vitamin.  3.  Educated pt lids cause of tunnel vision.  Monitor for now.  Will refer to Dr. Lopez if needed.  4.  Educated pt to wear glasses more often.  Will help with blurred vision.  YAG definitely improved vision.

## 2019-05-14 ENCOUNTER — OUTPATIENT CASE MANAGEMENT (OUTPATIENT)
Dept: ADMINISTRATIVE | Facility: OTHER | Age: 74
End: 2019-05-14

## 2019-05-14 NOTE — PATIENT INSTRUCTIONS
Long-Term Complications of Diabetes    Diabetes can cause health problems over time. These are called complications. They are more likely to happen if your blood sugar is often too high. Over time, high blood sugar can damage blood vessels in your body. It is important to keep your blood sugar in your target range. This can help prevent or delay complications from diabetes.  Possible complications  Complications of diabetes include:  · Eye problems, including damage to the blood vessels in the eyes (retinopathy), pressure in the eye (glaucoma), and clouding of the eyes lens (a cataract). Eye problems can eventually lead to irreversible blindness.   · Tooth and gum problems (periodontal disease), causing loss of teeth and bone  · Blood vessel (vascular) disease leading to circulation problems, heart attack or stroke, or a need for amputation of a limb   · Problems with sexual function leading to erectile dysfunction in men and sexual discomfort in women   · Kidney disease (nephropathy) can eventually lead to kidney failure, which may require dialysis or kidney transplant   · Nerve problems (neuropathy), causing pain or loss of feeling in your feet and other parts of your body, potentially leading to an amputation of a limb   · High blood pressure (hypertension), putting strain on your heart and blood vessels  · Serious infections, possibly leading to loss of toes, feet, or limbs  How to avoid complications  The serious consequences of these complications may be avoidable for most people with diabetes by managing your blood glucose, blood pressure, and cholesterol levels. This can help you feel better and stay healthy. You can manage diabetes by tracking your blood sugar. You can also eat healthy and exercise to avoid gaining weight. And you should take medicine if directed by your healthcare provider.  Date Last Reviewed: 5/1/2016  © 5161-8462 [employer], "[employer]. [address] [place] [address], [place] [address].  All rights reserved. This information is not intended as a substitute for professional medical care. Always follow your healthcare professional's instructions.        Managing Diabetes: The A1C Test       Healthy red blood cells have some glucose stuck to them. A high A1C means that unhealthy amounts of glucose are stuck to the cells.   What is the A1C test?  Using your meter helps you track your blood sugar every day. But your glucose meter tells you the value at the time of testing only. You also need to know if your treatment plan is keeping you healthy over time. The hemoglobin A1C (or glycated hemoglobin) test can help. This test measures your average blood sugar level over a few months. A higher A1C result means that you have a higher risk of developing complications.  The A1C test  The A1C is a blood test done by your healthcare provider. You will likely have an A1C test every 3 to 6 months.  Your blood glucose goal  A1C has been shown as a percentage. But it can also be shown as a number representing the estimated Average Glucose (eAG). Unlike the A1C percentage, eAG is a number similar to the numbers listed on your daily glucose monitor. Both A1C and eAG measure the amount of glucose stuck to a protein called hemoglobin in red blood cells. Your healthcare provider will help you figure out what your ideal A1C or eAG should be. Your target number will depend on your age, general health, and other factors. If your current number is too high, your treatment plan may need changes, such as different medicines.  Sample results  Most people aim for an A1c lower than 7%. Thats an eAG less than 154 mg/dL. Or, your healthcare provider may want you to aim for an A1C of 6%. Thats an eAG of 126 mg/dL.     Glucose calculator  Visit [email or web address] for a chart that helps convert your A1C percentages into eAG numbers.   Date Last Reviewed: 6/1/2016  © 2570-6907 [employer],  [employer]. [address] [place] [address]. All rights reserved. This information is not intended as a substitute for professional medical care. Always follow your healthcare professional's instructions.        Diabetes: Inspecting Your Feet    Diabetes increases your chances of developing foot problems. So inspect your feet every day. This helps you find small skin irritations before they become serious ulcers or infections. If you have trouble seeing the bottoms of your feet, use a mirror or ask a family member or friend to help.  How to check your feet  Below are tips to help you look for foot problems. Try to check your feet at the same time each day, such as when you get out of bed in the morning:  · Check the top of each foot. The tops of toes, back of the heel, and outer edge of the foot can get a lot of rubbing from poor-fitting shoes.  · Check the bottom of each foot. Daily wear and tear often leads to problems at pressure spots.  · Check the toes and nails. Fungal infections often occur between toes. Toenail problems can also be a sign of fungal infections or lead to breaks in the skin.  · Check your shoes, too. Loose objects inside a shoe can injure the foot. Use your hand to feel inside your shoes for things like kecia, loose stitching, or rough areas that could irritate your skin.  Warning signs  Look for any color changes in the foot. Redness with streaks can signal a severe infection, which needs immediate medical attention. Tell your healthcare provider right away if you have any of these problems:  · Swelling, sometimes with color changes, may be a sign of poor blood flow or infection. Symptoms include tenderness and an increase in the size of your foot.  · Warm or hot areas on your feet may be signs of infection. A foot that is cold may not be getting enough blood.  · Sensations such as burning, tingling, or pins and needles can be signs of a problem. Also check for areas that may be  numb.  · Hot spots are caused by friction or pressure. Look for hot spots in areas that get a lot of rubbing. Hot spots can turn into blisters, calluses, or sores.  · Cracks and sores are caused by dry or irritated skin. They are a sign that the skin is breaking down, which can lead to infection.  · Toenail problems to watch for include nails growing into the skin (ingrown toenail) and causing redness or pain. Thick, yellow, or discolored nails can signal a fungal infection.  · Drainage and odor can develop from untreated sores and ulcers. Call your healthcare provider right away if you notice white or yellow drainage, bleeding, or unpleasant odor.   Date Last Reviewed: 6/1/2016 © 2000-2017 [employer]. [address] [place] [address]. All rights reserved. This information is not intended as a substitute for professional medical care. Always follow your healthcare professional's instructions.        Diabetes: Keeping Feet Healthy     Have your feet checked every time you see your healthcare provider, and at least once a year.     Diabetes can damage nerves in your feet and cause neuropathy. This condition makes it hard for you to feel injuries or sore spots. Diabetes can also change blood flow, making it harder for small problems, like a blister, to heal properly. In fact, minor injuries can quickly become serious infections that send you to the hospital. Practice self-care to protect your feet and keep them healthy.   Take special care  Here are tips for taking special care of your feet:  · Inspect your feet daily for problems such as redness, blisters, cracks, dry skin, or numbness. Use a mirror to see the bottoms of your feet. Or, ask for help.  · Manage your diabetes. Monitor and control your blood sugar. Take all your medicines as prescribed.  · Avoid walking barefoot, even indoors. Always wear socks inside your shoes.   · Wash your feet with warm water and mild soap. Dry well,  especially between toes.  · Dont treat corns or calluses yourself. Talk to your healthcare provider or podiatrist (a healthcare provider who specializes in foot care) if you need assistance trimming your toenails.  · Use moisturizing cream or lotion if you have dry skin, but dont use it between toes.  · Dont use heating pads on your feet. If you have neuropathy, you could get a burn and not feel it.  · Stop smoking. Smoking restricts blood flow and can make it harder for wounds to heal.  · Don't use sharp blades to trim your nails. Use a nail clipper and file instead.   Have regular checkups  Foot problems can develop quickly. So be sure to follow your healthcare teams schedule for regular checkups. During office visits, take off your shoes and socks as soon as you get in the exam room. Ask your healthcare provider to examine your feet for problems. This will make it easier to find and treat small skin irritations before they get worse. Regular checkups can also help keep track of the blood flow and feeling in your feet. If you have neuropathy, you may need to have checkups more often.  Wear proper footwear  Wearing proper footwear is very important. If areas of your feet have been damaged by too much pressure, your healthcare provider may recommend changing your footwear. In some cases, avoiding high heels or tight work boots may be all thats needed. Or, your healthcare provider may recommend special shoes or custom inserts. These help protect your feet and keep existing irritations from getting worse. If you need special footwear, ask your healthcare provider if you qualify for Medicare's custom-molded and extra-depth diabetic shoe and insert program.   Make sure shoes and socks fit  Any pair of shoes--new or old--should feel comfortable as soon as you put them on. There shouldnt be any rubbing when you walk. Wear the right shoe for any activity. For instance, a running shoe is designed to keep your feet  injury-free while jogging. Buy shoes at the end of the day, when your feet are larger. Make sure they provide support without feeling too loose. Make sure your socks fit, too. Wear soft, seamless, well-padded socks for activity. Cotton or microfiber socks are best to help to absorb sweat. To protect your feet, avoid shoes that are open-toed or open-heeled. If you have questions about what kinds of shoes and socks are best, talk to your healthcare team.  Get regular exercise  Regular exercise improves blood flow in your feet. It also increases foot strength and flexibility. Gentle exercises, like walking or riding a stationary bicycle, are best. You can also do special foot exercises. Just be sure to talk with your healthcare provider before starting any exercise program. Also mention if any exercise causes pain, redness, or other signs of foot problems.     Note: If you have any kind of break in the skin of your foot or ankle, keep the area clean. Then call your healthcare provider--especially if the area doesnt appear to be healing.   Date Last Reviewed: 6/1/2016  © 1600-4221 [employer]. [address] [place] [address]. All rights reserved. This information is not intended as a substitute for professional medical care. Always follow your healthcare professional's instructions.        Step-by-Step  Checking Your Blood Sugar    Date Last Reviewed: 5/1/2016  © 9478-7486 [employer]. [address] [place] [address]. All rights reserved. This information is not intended as a substitute for professional medical care. Always follow your healthcare professional's instructions.        Step-by-Step  Giving Yourself an Insulin Shot    Date Last Reviewed: 5/1/2016  © 2447-5474 [employer]. [address] [place] [address]. All rights reserved. This information is not intended as a substitute for professional medical care. Always follow your healthcare  professional's instructions.        Step-by-Step:  Inspecting Your Feet (Diabetes)    Date Last Reviewed: 10/1/2016  © 9627-5137 [employer]. [address] [place] [address], [place] [address]. All rights reserved. This information is not intended as a substitute for professional medical care. Always follow your healthcare professional's instructions.

## 2019-05-14 NOTE — LETTER
May 14, 2019    Misha Eldridge Jr.  [address]  [facility] [address]             [facility]  [address]  [facility] [address] Welcome to [facility] Diabetes Care Management Program!  My name is Alisa Grullon RN and I will be your diabetes care manager.  As we discussed today, I work in partnership with your primary care provider to support and offer you tools and resources to help you achieve your health goals with confidence.     [facility] has worked with your [employer] Plan to develop a program based on recommendations from the American Diabetes Association and the [employer]'s program called Together 2 Goal.     Some of the services we provide include:   ; Diabetes care tips and resources   ; Personal Diabetes Care Plan created with your primary care team   ; Nutrition counseling including meal planning and preparation tips   ; Personal goal setting and evaluation of goal progress   ; Access to diabetes support groups   ; Referral for diabetes retinal eye exam   ; Referral to a Diabetes Specialty Clinic (if needed)   ; 24/7 assistance from [facility]      All services provided by this program are coordinated and communicated to your primary care team.     You will be receiving more information about these services in your [facility] account, by phone or through the mail. You may also visit our website [email or web address].     If you do not wish to participate or receive information about these services, please contact our office at [phone] or contact me directly through the patient portal or directly at [phone].      I look forward to our partnership!  We will work as a team to build or enhance your skills for managing your diabetes.       Sincerely,    Alisa Grullon RN  Diabetes Care Management Team                
no

## 2019-05-14 NOTE — PROGRESS NOTES
Summary:  Talked to patient to perform initial assessment for OPCM.  Patient is a 73 y/o male with dx of DMII (last A1c from 4/3 was 10.1%), HTN, Hx of PE, Hx of Colon CA, Hx of Prostate CA, Low Back Pain.  Medication reconciliation performed and is taking all medications as prescribed.  PHQ2 performed and score of 2 obtained.  Discussed with patient about importance of receiving his dilated eye exam and patient stated that he did perform his exam in May of 2019.  Patient stated that he is testing his blood glucose twice a day.  Patient reports that within the past week, patient's blood glucose ranged from AM:  170'smg/dl and PM:  114-221mg/dl.  Educated patient about having an A1c goal of <7.0% in order to prevent long term complications from happening.  Educated patient about importance of checking feet daily and providing diabetic foot care daily.  Educated patient about s/s and treatment of having an impending hypoglycemia episode.  Patient stated that he did visit with a diabetic educator on 5/3 and is scheduled to visit with IVANNA Salguero RN, CDE on 5/21 at 1300.  Encouraged patient to call this RN if having any questions/concerns.   I will mail my contact information should any new problems/concerns arise in the future.  I will mail literature about [facility].  I will mail patient educational materials regarding:  Bernard Faustin, Diabetic Management Guide, A1c, Long Term Complications, 2019 [facility] Catalog, Foot Care and Step by Step [employer] materials.  Also reminded patient of appointment with Dr. Treviño on 6/6 at 1340.  Will admit to OPCM.  Will f/u with patient in one week and patient is aware.   Will continue to follow.     Interventions:  Mailed patient educational materials  PHQ2 performed  Med rec completed  Educated patient about s/s and treatment of having an impending hypoglycemia episode.  Verified that patient did receive yearly dilated eye exam  Educated patient about having an A1c goal of  <7.0% in order to prevent long term complications from happening.   Educated patient about importance of checking feet daily and providing diabetic foot care daily.  Reminded patient of all upcoming appointments.    Plan:  Continue to educate about DM. Review signs and symptoms of high and low blood sugar. Encourage patient to follow medication and treatment regimen. Encourage patient to maintain follow up with doctors.   F/U with patient in one week and verify that patient did receive all educational materials.     Todays OPCM Self-Management Care Plan was developed with the patients/caregivers input and was based on identified barriers from todays assessment.  Goals were written today with the patient/caregiver and the patient has agreed to work towards these goals to improve his/her overall well-being. Patient verbalized understanding of the care plan, goals, and all of today's instructions. Encouraged patient/caregiver to communicate with his/her physician and health care team about health conditions and the treatment plan.  Provided my contact information today and encouraged patient/caregiver to call me with any questions as needed.

## 2019-05-21 ENCOUNTER — HOSPITAL ENCOUNTER (OUTPATIENT)
Dept: DIABETES | Facility: OTHER | Age: 74
Discharge: HOME | End: 2019-05-21
Attending: INTERNAL MEDICINE
Payer: MEDICARE

## 2019-05-21 ENCOUNTER — OUTPATIENT CASE MANAGEMENT (OUTPATIENT)
Dept: ADMINISTRATIVE | Facility: OTHER | Age: 74
End: 2019-05-21

## 2019-05-21 VITALS — WEIGHT: 203.69 LBS | BODY MASS INDEX: 26.88 KG/M2

## 2019-05-21 DIAGNOSIS — E11.40 CONTROLLED TYPE 2 DIABETES MELLITUS WITH DIABETIC NEUROPATHY, WITHOUT LONG-TERM CURRENT USE OF INSULIN: Primary | ICD-10-CM

## 2019-05-21 PROCEDURE — G0108 DIAB MANAGE TRN  PER INDIV: HCPCS | Mod: HCNC

## 2019-05-21 NOTE — LETTER
May 28, 2019    Misha Eldridge Jr.  [address]  [place] [address]             [facility]  [address]  [facility] [address] Dear Misha,    I work with [facility] Outpatient Case Management Department. I have been unsuccessful at reaching you to follow-up to see how you have been doing. If you require any future assistance or if any new concerns or problems arise, please do not hesitate to call.     The Outpatient Case Management Department can be reached at [phone] from 8:00AM to 4:30 PM on Monday thru Friday. [facility] also has a program where a nurse is available 24/7 to answer questions or provide medical advice, their number is [phone].    Thanks,      Alisa Grullon, RN  Outpatient Case Management

## 2019-06-06 ENCOUNTER — OFFICE VISIT (OUTPATIENT)
Dept: INTERNAL MEDICINE | Facility: CLINIC | Age: 74
End: 2019-06-06
Attending: INTERNAL MEDICINE
Payer: MEDICARE

## 2019-06-06 VITALS
HEART RATE: 69 BPM | OXYGEN SATURATION: 97 % | SYSTOLIC BLOOD PRESSURE: 140 MMHG | HEIGHT: 73 IN | WEIGHT: 206.56 LBS | BODY MASS INDEX: 27.37 KG/M2 | DIASTOLIC BLOOD PRESSURE: 81 MMHG

## 2019-06-06 DIAGNOSIS — I10 ESSENTIAL HYPERTENSION: ICD-10-CM

## 2019-06-06 DIAGNOSIS — R21 RASH: ICD-10-CM

## 2019-06-06 PROCEDURE — 99999 PR PBB SHADOW E&M-EST. PATIENT-LVL IV: ICD-10-PCS | Mod: PBBFAC,HCNC,, | Performed by: INTERNAL MEDICINE

## 2019-06-06 PROCEDURE — 3079F DIAST BP 80-89 MM HG: CPT | Mod: HCNC,CPTII,S$GLB, | Performed by: INTERNAL MEDICINE

## 2019-06-06 PROCEDURE — 1101F PT FALLS ASSESS-DOCD LE1/YR: CPT | Mod: HCNC,CPTII,S$GLB, | Performed by: INTERNAL MEDICINE

## 2019-06-06 PROCEDURE — 3046F HEMOGLOBIN A1C LEVEL >9.0%: CPT | Mod: HCNC,CPTII,S$GLB, | Performed by: INTERNAL MEDICINE

## 2019-06-06 PROCEDURE — 3077F SYST BP >= 140 MM HG: CPT | Mod: HCNC,CPTII,S$GLB, | Performed by: INTERNAL MEDICINE

## 2019-06-06 PROCEDURE — 3077F PR MOST RECENT SYSTOLIC BLOOD PRESSURE >= 140 MM HG: ICD-10-PCS | Mod: HCNC,CPTII,S$GLB, | Performed by: INTERNAL MEDICINE

## 2019-06-06 PROCEDURE — 3046F PR MOST RECENT HEMOGLOBIN A1C LEVEL > 9.0%: ICD-10-PCS | Mod: HCNC,CPTII,S$GLB, | Performed by: INTERNAL MEDICINE

## 2019-06-06 PROCEDURE — 1101F PR PT FALLS ASSESS DOC 0-1 FALLS W/OUT INJ PAST YR: ICD-10-PCS | Mod: HCNC,CPTII,S$GLB, | Performed by: INTERNAL MEDICINE

## 2019-06-06 PROCEDURE — 99999 PR PBB SHADOW E&M-EST. PATIENT-LVL IV: CPT | Mod: PBBFAC,HCNC,, | Performed by: INTERNAL MEDICINE

## 2019-06-06 PROCEDURE — 99214 PR OFFICE/OUTPT VISIT, EST, LEVL IV, 30-39 MIN: ICD-10-PCS | Mod: HCNC,S$GLB,, | Performed by: INTERNAL MEDICINE

## 2019-06-06 PROCEDURE — 99214 OFFICE O/P EST MOD 30 MIN: CPT | Mod: HCNC,S$GLB,, | Performed by: INTERNAL MEDICINE

## 2019-06-06 PROCEDURE — 3079F PR MOST RECENT DIASTOLIC BLOOD PRESSURE 80-89 MM HG: ICD-10-PCS | Mod: HCNC,CPTII,S$GLB, | Performed by: INTERNAL MEDICINE

## 2019-06-06 RX ORDER — LISINOPRIL 40 MG/1
TABLET ORAL
Refills: 2 | COMMUNITY
Start: 2019-05-22 | End: 2020-02-17

## 2019-06-06 RX ORDER — TRIAMCINOLONE ACETONIDE 1 MG/G
CREAM TOPICAL 2 TIMES DAILY
Qty: 45 G | Refills: 0 | Status: SHIPPED | OUTPATIENT
Start: 2019-06-06

## 2019-06-06 NOTE — PROGRESS NOTES
"Subjective:       Patient ID: Misha Eldridge Jr. is a 74 y.o. male.    Chief Complaint: Recurrent Skin Infections    Here for f/u    Decreased glipizide to 1 tablet and at 23 units opf lantus nightly. Reported FBG and preprandial of 120s. NO longer seeing all BG >200. Denies s/s or readings <80.      -HTN-Denies frequent or current HA, intermittent blurry vision, dizziness, CP, or SOB.        Review of Systems   Constitutional: Negative for appetite change, chills, fever and unexpected weight change.   HENT: Negative for hearing loss, sore throat and trouble swallowing.    Eyes: Negative for visual disturbance.   Respiratory: Negative for cough, chest tightness and shortness of breath.    Cardiovascular: Negative for chest pain and leg swelling.   Gastrointestinal: Negative for abdominal pain, blood in stool, constipation, diarrhea, nausea and vomiting.   Endocrine: Negative for polydipsia and polyuria.   Genitourinary: Negative for decreased urine volume, difficulty urinating, dysuria, frequency and urgency.   Musculoskeletal: Negative for gait problem.   Skin: Positive for rash.   Neurological: Negative for dizziness and numbness.   Psychiatric/Behavioral: The patient is not nervous/anxious.        Objective:      Vitals:    06/06/19 1345   BP: (!) 140/81   Pulse: 69   SpO2: 97%   Weight: 93.7 kg (206 lb 9.1 oz)   Height: 6' 1" (1.854 m)      Physical Exam   Constitutional: He is oriented to person, place, and time. He appears well-developed and well-nourished. No distress.   HENT:   Head: Normocephalic and atraumatic.   Mouth/Throat: Oropharynx is clear and moist. No oropharyngeal exudate.   Eyes: Pupils are equal, round, and reactive to light. Conjunctivae and EOM are normal. No scleral icterus.   Neck: No thyromegaly present.   Cardiovascular: Normal rate, regular rhythm and normal heart sounds.   No murmur heard.  Pulmonary/Chest: Effort normal and breath sounds normal. He has no wheezes. He has no rales. "   Abdominal: Soft. He exhibits no distension. There is no tenderness.   Musculoskeletal: He exhibits no edema or tenderness.   Lymphadenopathy:     He has no cervical adenopathy.   Neurological: He is alert and oriented to person, place, and time.   Skin: Skin is warm and dry.   Psychiatric: He has a normal mood and affect. His behavior is normal.       Assessment:       1. Uncontrolled diabetes mellitus with diabetic neuropathy    2. Essential hypertension    3. Intertrigo        Plan:       Misha was seen today for recurrent skin infections.    Diagnoses and all orders for this visit:    Uncontrolled diabetes mellitus with diabetic neuropathy  -     Hemoglobin A1c; Future    Essential hypertension   Above goal today. Will monitor and adjust meds as warranted. Office and Emergency Department prompts discussed.    Rash  -     triamcinolone acetonide 0.1% (KENALOG) 0.1 % cream; Apply topically 2 (two) times daily.       RTC in 3 months or sooner lety Mcclendon MD  Internal Medicine-[facility]        Side effects of medication(s) were discussed in detail and patient voiced understanding.  Patient will call back for any issues or complications.

## 2019-06-06 NOTE — TELEPHONE ENCOUNTER
----- Message from Klaus Caruso sent at 6/6/2019 11:23 AM CDT -----  Contact: pt   Can the clinic reply in [facility]: No     Please refill the medication(s) listed below. The patient can be reached at this phone number once it is called into the pharmacy.    Medication #1blood sugar diagnostic Strp    Medication #2    Preferred Pharmacy:[facility] [address] [place] [address] MAGAZINE  AT [employer] [address] [employer] [place]

## 2019-06-14 NOTE — PROGRESS NOTES
Summary:  Talked to patient to update plan of care for OPCM.  Patient stated that he is taking all medications as prescribed.  Patient stated that he is testing his blood glucose twice a day.  Patient stated that within the past week, patient's blood glucose ranged from:  AM 80-122mg/dl and -180mg/dl.  Patient stated that he did receive all educational materials via mail and has reviewed all materials.  Discussed with patient about using [employer] in order to get fresh fruits and vegetables.  I will mail patient information regarding [employer].  Patient stated that he is checking his feet daily.  Educated patient importance of performing diabetic foot care daily.  Reminded patient of upcoming appointments:  6/27 at 1300 with DE and on 6/27 at 1415 for non fasting lab work.  Encouraged patient to call this RN if having any questions/concerns.  Will f/u with patient in two weeks and patient aware.  Will continue to follow.     Interventions:  Verified that patient did receive educational materials  Reminded patient of upcoming appointments  I will mail patient information regarding [employer].  Patient stated that he is checking his feet daily.  Educated patient importance of performing diabetic foot care daily.    Plan:  Continue to educate about DM. Review signs and symptoms of high and low blood sugar. Encourage patient to follow medication and treatment regimen. Encourage patient to maintain follow up with doctors.   F/U with patient in two weeks and verify that patient received [employer] information.

## 2019-06-25 ENCOUNTER — OFFICE VISIT (OUTPATIENT)
Dept: INTERNAL MEDICINE | Facility: CLINIC | Age: 74
End: 2019-06-25
Attending: INTERNAL MEDICINE
Payer: MEDICARE

## 2019-06-25 VITALS
WEIGHT: 207.44 LBS | HEIGHT: 73 IN | OXYGEN SATURATION: 97 % | BODY MASS INDEX: 27.49 KG/M2 | DIASTOLIC BLOOD PRESSURE: 70 MMHG | SYSTOLIC BLOOD PRESSURE: 140 MMHG | HEART RATE: 91 BPM

## 2019-06-25 DIAGNOSIS — E11.40 CONTROLLED TYPE 2 DIABETES MELLITUS WITH DIABETIC NEUROPATHY, WITHOUT LONG-TERM CURRENT USE OF INSULIN: ICD-10-CM

## 2019-06-25 DIAGNOSIS — L03.319 CELLULITIS AND ABSCESS OF TRUNK: Primary | ICD-10-CM

## 2019-06-25 DIAGNOSIS — L02.219 CELLULITIS AND ABSCESS OF TRUNK: Primary | ICD-10-CM

## 2019-06-25 PROCEDURE — 3077F PR MOST RECENT SYSTOLIC BLOOD PRESSURE >= 140 MM HG: ICD-10-PCS | Mod: HCNC,CPTII,S$GLB, | Performed by: INTERNAL MEDICINE

## 2019-06-25 PROCEDURE — 99214 OFFICE O/P EST MOD 30 MIN: CPT | Mod: HCNC,S$GLB,, | Performed by: INTERNAL MEDICINE

## 2019-06-25 PROCEDURE — 1100F PTFALLS ASSESS-DOCD GE2>/YR: CPT | Mod: HCNC,CPTII,S$GLB, | Performed by: INTERNAL MEDICINE

## 2019-06-25 PROCEDURE — 3078F DIAST BP <80 MM HG: CPT | Mod: HCNC,CPTII,S$GLB, | Performed by: INTERNAL MEDICINE

## 2019-06-25 PROCEDURE — 3046F HEMOGLOBIN A1C LEVEL >9.0%: CPT | Mod: HCNC,CPTII,S$GLB, | Performed by: INTERNAL MEDICINE

## 2019-06-25 PROCEDURE — 3288F PR FALLS RISK ASSESSMENT DOCUMENTED: ICD-10-PCS | Mod: HCNC,CPTII,S$GLB, | Performed by: INTERNAL MEDICINE

## 2019-06-25 PROCEDURE — 1100F PR PT FALLS ASSESS DOC 2+ FALLS/FALL W/INJURY/YR: ICD-10-PCS | Mod: HCNC,CPTII,S$GLB, | Performed by: INTERNAL MEDICINE

## 2019-06-25 PROCEDURE — 99999 PR PBB SHADOW E&M-EST. PATIENT-LVL III: CPT | Mod: PBBFAC,HCNC,, | Performed by: INTERNAL MEDICINE

## 2019-06-25 PROCEDURE — 3288F FALL RISK ASSESSMENT DOCD: CPT | Mod: HCNC,CPTII,S$GLB, | Performed by: INTERNAL MEDICINE

## 2019-06-25 PROCEDURE — 3078F PR MOST RECENT DIASTOLIC BLOOD PRESSURE < 80 MM HG: ICD-10-PCS | Mod: HCNC,CPTII,S$GLB, | Performed by: INTERNAL MEDICINE

## 2019-06-25 PROCEDURE — 99214 PR OFFICE/OUTPT VISIT, EST, LEVL IV, 30-39 MIN: ICD-10-PCS | Mod: HCNC,S$GLB,, | Performed by: INTERNAL MEDICINE

## 2019-06-25 PROCEDURE — 3046F PR MOST RECENT HEMOGLOBIN A1C LEVEL > 9.0%: ICD-10-PCS | Mod: HCNC,CPTII,S$GLB, | Performed by: INTERNAL MEDICINE

## 2019-06-25 PROCEDURE — 3077F SYST BP >= 140 MM HG: CPT | Mod: HCNC,CPTII,S$GLB, | Performed by: INTERNAL MEDICINE

## 2019-06-25 PROCEDURE — 99999 PR PBB SHADOW E&M-EST. PATIENT-LVL III: ICD-10-PCS | Mod: PBBFAC,HCNC,, | Performed by: INTERNAL MEDICINE

## 2019-06-25 RX ORDER — SULFAMETHOXAZOLE AND TRIMETHOPRIM 800; 160 MG/1; MG/1
1 TABLET ORAL 2 TIMES DAILY
Qty: 14 TABLET | Refills: 0 | Status: SHIPPED | OUTPATIENT
Start: 2019-06-25 | End: 2019-07-02

## 2019-06-25 RX ORDER — HYDROCODONE BITARTRATE AND ACETAMINOPHEN 5; 325 MG/1; MG/1
1 TABLET ORAL EVERY 8 HOURS PRN
Qty: 10 TABLET | Refills: 0 | Status: SHIPPED | OUTPATIENT
Start: 2019-06-25 | End: 2019-06-28 | Stop reason: SDUPTHER

## 2019-06-25 NOTE — PROGRESS NOTES
"NoSubjective:       Patient ID: Misha Eldridge Jr. is a 74 y.o. male.    Chief Complaint: Cyst (upper back)    Here for urgent visit    Pain and redness to back that is worsening over the past 3 days. No F/C, malaise, N/V, abd pain, dizziness. BG unchanged from baseline. No lows.       Review of Systems   Constitutional: Negative for appetite change, chills, fever and unexpected weight change.   HENT: Negative for hearing loss, sore throat and trouble swallowing.    Eyes: Negative for visual disturbance.   Respiratory: Negative for cough, chest tightness and shortness of breath.    Cardiovascular: Negative for chest pain and leg swelling.   Gastrointestinal: Negative for abdominal pain, blood in stool, constipation, diarrhea, nausea and vomiting.   Endocrine: Negative for polydipsia and polyuria.   Genitourinary: Negative for decreased urine volume, difficulty urinating, dysuria, frequency and urgency.   Musculoskeletal: Negative for gait problem.   Skin: Positive for rash.   Neurological: Negative for dizziness and numbness.   Psychiatric/Behavioral: The patient is not nervous/anxious.        Objective:      Vitals:    06/25/19 1124   BP: (!) 140/70   Pulse: 91   SpO2: 97%   Weight: 94.1 kg (207 lb 7.3 oz)   Height: 6' 1" (1.854 m)      Physical Exam   Constitutional: He is oriented to person, place, and time. He appears well-developed and well-nourished. He does not have a sickly appearance. No distress.   HENT:   Head: Normocephalic and atraumatic.   Eyes: Conjunctivae and EOM are normal. Right eye exhibits no discharge. Left eye exhibits no discharge. No scleral icterus.   Pulmonary/Chest: Effort normal. No respiratory distress.   Abdominal: Normal appearance. He exhibits no distension.   Neurological: He is alert and oriented to person, place, and time.   Skin: Skin is warm and dry. He is not diaphoretic.        Psychiatric: He has a normal mood and affect. His speech is normal.       Assessment:       1. " Cellulitis and abscess of trunk    2. Controlled type 2 diabetes mellitus with diabetic neuropathy, without long-term current use of insulin        Plan:       Misha was seen today for cyst.    Diagnoses and all orders for this visit:    Cellulitis and abscess of trunk  -     sulfamethoxazole-trimethoprim 800-160mg (BACTRIM DS) 800-160 mg Tab; Take 1 tablet by mouth 2 (two) times daily. for 7 days  -     HYDROcodone-acetaminophen (NORCO) 5-325 mg per tablet; Take 1 tablet by mouth every 8 (eight) hours as needed for Pain.    Controlled type 2 diabetes mellitus with diabetic neuropathy, without long-term current use of insulin               Dipak Mcclendon MD  Internal Medicine-[facility]        Side effects of medication(s) were discussed in detail and patient voiced understanding.  Patient will call back for any issues or complications.

## 2019-06-26 ENCOUNTER — OUTPATIENT CASE MANAGEMENT (OUTPATIENT)
Dept: ADMINISTRATIVE | Facility: OTHER | Age: 74
End: 2019-06-26

## 2019-06-26 NOTE — PROGRESS NOTES
6/26/19-Please note the following patient has been transferred to Nick Mcnamara RN in Outpatient Complex Care Management.

## 2019-06-27 ENCOUNTER — HOSPITAL ENCOUNTER (OUTPATIENT)
Dept: DIABETES | Facility: OTHER | Age: 74
Discharge: HOME | End: 2019-06-27
Attending: INTERNAL MEDICINE
Payer: MEDICARE

## 2019-06-27 VITALS — HEIGHT: 73 IN | BODY MASS INDEX: 27.41 KG/M2 | WEIGHT: 206.81 LBS

## 2019-06-27 DIAGNOSIS — E11.40 CONTROLLED TYPE 2 DIABETES MELLITUS WITH DIABETIC NEUROPATHY, WITHOUT LONG-TERM CURRENT USE OF INSULIN: Primary | ICD-10-CM

## 2019-06-27 PROCEDURE — G0108 DIAB MANAGE TRN  PER INDIV: HCPCS | Mod: HCNC

## 2019-06-28 ENCOUNTER — OFFICE VISIT (OUTPATIENT)
Dept: INTERNAL MEDICINE | Facility: CLINIC | Age: 74
End: 2019-06-28
Payer: MEDICARE

## 2019-06-28 VITALS
HEART RATE: 62 BPM | RESPIRATION RATE: 18 BRPM | WEIGHT: 205.25 LBS | HEIGHT: 73 IN | DIASTOLIC BLOOD PRESSURE: 68 MMHG | TEMPERATURE: 98 F | BODY MASS INDEX: 27.2 KG/M2 | SYSTOLIC BLOOD PRESSURE: 118 MMHG

## 2019-06-28 DIAGNOSIS — L02.212 ABSCESS OF BACK: Primary | ICD-10-CM

## 2019-06-28 PROCEDURE — 3288F PR FALLS RISK ASSESSMENT DOCUMENTED: ICD-10-PCS | Mod: HCNC,CPTII,S$GLB, | Performed by: INTERNAL MEDICINE

## 2019-06-28 PROCEDURE — 1100F PTFALLS ASSESS-DOCD GE2>/YR: CPT | Mod: HCNC,CPTII,S$GLB, | Performed by: INTERNAL MEDICINE

## 2019-06-28 PROCEDURE — 10061 I&D ABSCESS COMP/MULTIPLE: CPT | Mod: HCNC,S$GLB,, | Performed by: INTERNAL MEDICINE

## 2019-06-28 PROCEDURE — 1100F PR PT FALLS ASSESS DOC 2+ FALLS/FALL W/INJURY/YR: ICD-10-PCS | Mod: HCNC,CPTII,S$GLB, | Performed by: INTERNAL MEDICINE

## 2019-06-28 PROCEDURE — 99999 PR PBB SHADOW E&M-EST. PATIENT-LVL III: ICD-10-PCS | Mod: PBBFAC,HCNC,, | Performed by: INTERNAL MEDICINE

## 2019-06-28 PROCEDURE — 3078F PR MOST RECENT DIASTOLIC BLOOD PRESSURE < 80 MM HG: ICD-10-PCS | Mod: HCNC,CPTII,S$GLB, | Performed by: INTERNAL MEDICINE

## 2019-06-28 PROCEDURE — 99214 OFFICE O/P EST MOD 30 MIN: CPT | Mod: 25,HCNC,S$GLB, | Performed by: INTERNAL MEDICINE

## 2019-06-28 PROCEDURE — 3288F FALL RISK ASSESSMENT DOCD: CPT | Mod: HCNC,CPTII,S$GLB, | Performed by: INTERNAL MEDICINE

## 2019-06-28 PROCEDURE — 10061 PR DRAIN SKIN ABSCESS COMPLIC: ICD-10-PCS | Mod: HCNC,S$GLB,, | Performed by: INTERNAL MEDICINE

## 2019-06-28 PROCEDURE — 99214 PR OFFICE/OUTPT VISIT, EST, LEVL IV, 30-39 MIN: ICD-10-PCS | Mod: 25,HCNC,S$GLB, | Performed by: INTERNAL MEDICINE

## 2019-06-28 PROCEDURE — 3078F DIAST BP <80 MM HG: CPT | Mod: HCNC,CPTII,S$GLB, | Performed by: INTERNAL MEDICINE

## 2019-06-28 PROCEDURE — 99999 PR PBB SHADOW E&M-EST. PATIENT-LVL III: CPT | Mod: PBBFAC,HCNC,, | Performed by: INTERNAL MEDICINE

## 2019-06-28 PROCEDURE — 3074F SYST BP LT 130 MM HG: CPT | Mod: HCNC,CPTII,S$GLB, | Performed by: INTERNAL MEDICINE

## 2019-06-28 PROCEDURE — 3074F PR MOST RECENT SYSTOLIC BLOOD PRESSURE < 130 MM HG: ICD-10-PCS | Mod: HCNC,CPTII,S$GLB, | Performed by: INTERNAL MEDICINE

## 2019-06-28 RX ORDER — HYDROCODONE BITARTRATE AND ACETAMINOPHEN 5; 325 MG/1; MG/1
1 TABLET ORAL EVERY 8 HOURS PRN
Qty: 10 TABLET | Refills: 0 | Status: SHIPPED | OUTPATIENT
Start: 2019-06-28 | End: 2021-03-30

## 2019-06-28 NOTE — PROGRESS NOTES
Subjective:       Patient ID: Misha Eldridge Jr. is a 74 y.o. male.    Chief Complaint: Lump (center back)    HPI   Pt c/o 1 wk of worsening pain with swelling around an upper back abscess. He was seen by his PCP on 6/25/19 who started bactrim which has not helped much. No fevers/chills.   Review of Systems   Constitutional: Negative for activity change, appetite change, chills, diaphoresis, fatigue, fever and unexpected weight change.   HENT: Negative for postnasal drip, rhinorrhea, sinus pressure, sneezing, sore throat, trouble swallowing and voice change.    Respiratory: Negative for cough, shortness of breath and wheezing.    Cardiovascular: Negative for chest pain, palpitations and leg swelling.   Gastrointestinal: Negative for abdominal pain, blood in stool, constipation, diarrhea, nausea and vomiting.   Genitourinary: Negative for dysuria.   Musculoskeletal: Negative for arthralgias and myalgias.   Skin: Negative for rash and wound.   Allergic/Immunologic: Negative for environmental allergies and food allergies.   Hematological: Negative for adenopathy. Does not bruise/bleed easily.       Objective:      Physical Exam   Constitutional: He is oriented to person, place, and time. He appears well-developed and well-nourished. No distress.   HENT:   Head: Normocephalic and atraumatic.   Eyes: Pupils are equal, round, and reactive to light. Conjunctivae and EOM are normal. Right eye exhibits no discharge. Left eye exhibits no discharge. No scleral icterus.   Neck: Normal range of motion. Neck supple. No JVD present.   Cardiovascular: Normal rate, regular rhythm and normal heart sounds.   No murmur heard.  Pulmonary/Chest: Effort normal and breath sounds normal. No respiratory distress. He has no wheezes. He has no rales.   Musculoskeletal: He exhibits no edema.        Arms:  Lymphadenopathy:     He has no cervical adenopathy.   Neurological: He is alert and oriented to person, place, and time.   Skin: Skin is warm  and dry. No rash noted. He is not diaphoretic. No pallor.       Assessment:       1. Abscess of back        Plan:    1. S/p I and D       Complete bactrim as prescribed       Refill Norco 5-325 mg q6 PRN       Procedure note(Abscess):  Pt's upper back area was cleaned with alcohol.  Abscess was anesthetized with 1.5 cc of 1 % lidocaine with epi.  An incision with an 11 blade was made 0.5 cm in length.  Purulent material was expressed from the wound.  Once I was no longer able to express pus, I probed the wound with a hemostat.  A few loculations were found.  Wound was packed.  Instructed patient on wound care.

## 2019-06-28 NOTE — PROGRESS NOTES
Diabetes Education  Author: Amanda Salguero RN  Date: 6/28/2019    Diabetes Care Management Summary  Diabetes Education Record Assessment/Progress: Comprehensive/Group         Diabetes Type  Diabetes Type : Type II    Diabetes History  Current Treatment: Oral Medication, Insulin  Reviewed Problem List with Patient: Yes    Health Maintenance was reviewed today with patient. Discussed with patient importance of routine eye exams, foot exams/foot care, blood work (i.e.: A1c, microalbumin, and lipid), dental visits, yearly flu vaccine, and pneumonia vaccine as indicated by PCP. Patient verbalized understanding.     Health Maintenance Topics with due status: Not Due       Topic Last Completion Date    Colonoscopy 07/15/2014    Influenza Vaccine 08/16/2017    TETANUS VACCINE 12/05/2017    Fecal Occult Blood Test (FOBT)/[employer] 01/04/2019    Eye Exam 05/08/2019    Lipid Panel 06/27/2019    Hemoglobin A1c 06/27/2019    Low Dose Statin 06/28/2019     Health Maintenance Due   Topic Date Due    Foot Exam  07/11/2019       Nutrition  Meal Plan 24 Hour Recall - Breakfast: grits, 2 eggs, 1 indira sausage, 1 banana - coffee w/ milk   Meal Plan 24 Hour Recall - Lunch: hamburger, small portion french fries - water   Meal Plan 24 Hour Recall - Dinner: pork n beans, sausage w/ hamburger indira (no bun) - water   Meal Plan 24 Hour Recall - Snack: 2 slices salami     Monitoring   Self Monitoring : Before Breakfast: 112, 87, 112, 77, 98, 119, 109, 76, 79, 72, 143, 96, 137, 100, 69, 89 Before Dinner: 147, 112, 213, 154, 104, 98, 111, 84, 121, 110, 127, 104, 115, 210, 123  Blood Glucose Logs: Yes  Do you use a personal continuous glucose monitor?: No  In the last month, how often have you had a low blood sugar reaction?: once    Exercise   Frequency: Never    Current Diabetes Treatment   Current Treatment: Oral Medication, Insulin                                                    Diabetes Education Assessment/Progress  Diabetes Disease Process  (diabetes disease process and treatment options): Not Covered/Deferred  Nutrition (Incorporating nutritional management into one's lifestyle): Discussion, Requires Assistance Family/SO, Individual Session(pt continuing to work on portions and increasing veggie intake)  Physical Activity (incorporating physical activity into one's lifestyle): Discussion, Requires Assistance Family/SO, Individual Session(reviewed ADA rec for physical activity and safety considerations)  Medications (states correct name, dose, onset, peak, duration, side effects & timing of meds): Discussion, Comprehends Key Points, Individual Session(pt satisfied w/ routine and doing well w/ insulin)  Monitoring (monitoring blood glucose/other parameters & using results): Discussion, Comprehends Key Points, Individual Session(doing well w/ SMBG, reviewed BG goals)  Acute Complications (preventing, detecting, and treating acute complications): Discussion, Comprehends Key Points, Individual Session(reviewed how to prevent and treat low BG)  Chronic Complications (preventing, detecting, and treating chronic complications): Discussion, Requires Assistance Family/SO, Individual Session(reviewed when next A1C is due)  Clinical (diabetes, other pertinent medical history, and relevant comorbidities reviewed during visit): Not Covered/Deferred  Cognitive (knowledge of self-management skills, functional health literacy): Discussion, Requires Assistance Family/SO, Individual Session  Psychosocial (emotional response to diabetes): Discussion, Requires Assistance Family/SO, Individual Session  Diabetes Distress and Support Systems: Discussion, Requires Assistance Family/SO, Individual Session  Behavioral (readiness for change, lifestyle practices, self-care behaviors): Discussion, Requires Assistance Family/SO, Individual Session(pt doing well with new regime changes - motivated to keep BG better controlled)    Goals  Patient has selected/evaluated goals during  today's session: Yes, evaluated  Monitoring: In Progress  Medications: In Progress         Diabetes Care Plan/Intervention  Education Plan/Intervention: Individual Follow-Up DSMT    Diabetes Meal Plan  Restrictions: Restricted Carbohydrate  Carbohydrate Per Meal: 30-45g    Today's Self-Management Care Plan was developed with the patient's input and is based on barriers identified during today's assessment.    The long and short-term goals in the care plan were written with the patient/caregiver's input. The patient has agreed to work toward these goals to improve his overall diabetes control.      The patient received a copy of today's self-management plan and verbalized understanding of the care plan, goals, and all of today's instructions.      The patient was encouraged to communicate with his physician and care team regarding his condition(s) and treatment.  I provided the patient with my contact information today and encouraged him to contact me via phone or patient portal as needed.     Education Units of Time   Time Spent: 30 min

## 2019-07-02 ENCOUNTER — OUTPATIENT CASE MANAGEMENT (OUTPATIENT)
Dept: ADMINISTRATIVE | Facility: OTHER | Age: 74
End: 2019-07-02

## 2019-07-02 NOTE — PROGRESS NOTES
7/2/19  Summary:  Talked to patient to update plan of care for OPCM.  Patient stated that he is taking all medications as prescribed.  Patient stated that he is testing his blood glucose twice a day. We reviewed his most recent BG readings, which patient reported that his BG has been 90s-110s. Patient stated that he now feels better control of his BG with taking insulin. Patient stated that he is checking his feet daily.  Educated patient importance of performing diabetic foot care daily. We discussed long term complications that may result from uncontrolled diabetes. Patient verbalized understanding. Reminded patient of upcoming appointments, verbalized awareness. Discussed completing F/U call with patient, who is in agreement with call in 2 weeks.      Interventions:  Chart review completed  Reminded patient of upcoming appointments  Patient stated that he is checking his feet daily.  Educated patient importance of performing diabetic foot care daily.  Reviewed long term complications that may result from uncontrolled diabetes  Scheduled follow up call in 2 weeks  Encouraged compliance with medications and MD appts    Plan:  Continue to educate about DM. Review signs and symptoms of high and low blood sugar. Encourage patient to follow medication and treatment regimen. Encourage patient to maintain follow up with doctors.   F/U with patient in two weeks     Todays OPCM Self-Management Care Plan was developed with the patients/caregivers input and was based on identified barriers from todays assessment.  Goals were written today with the patient/caregiver and the patient has agreed to work towards these goals to improve his/her overall well-being. Patient verbalized understanding of the care plan, goals, and all of today's instructions. Encouraged patient/caregiver to communicate with his/her physician and health care team about health conditions and the treatment plan.  Provided my contact information today  and encouraged patient/caregiver to call me with any questions as needed.

## 2019-07-11 ENCOUNTER — OUTPATIENT CASE MANAGEMENT (OUTPATIENT)
Dept: ADMINISTRATIVE | Facility: OTHER | Age: 74
End: 2019-07-11

## 2019-07-11 NOTE — PROGRESS NOTES
[x] Called and reviewed disaster plan with patient/caregiver.  Provided emergency phone number for patient's [place].   [] Attempted to reach patient/caregiver to review disaster plan.  Left a voice message with the phone number for patient's [place] Office of Emergency Preparedness.     Reviewed with Patient/Caregiver:  [x] Patient to have a supply of water, non-perishable food items, flashlights and batteries  [x] Patient to bring all medications if evacuates:  at least a five day supply preferably in the medication bottles, in case displaced for longer than 5 days  [x] Patient to bring any DME needed (walkers, wheelchairs, shower chairs, nebulizer machine, etc.)   [x] If Diabetic, patient to bring glucometer and monitoring supplies.   [] If Hypertension, patient to bring blood pressure cuff  [] If CHF, patient to bring scale  [] If tube feeds, patient to bring tube feedings and feeding supplies.  [] If on oxygen,  patient to bring all oxygen supplies (Cannula, portable tanks, concentrator).  Patient will contact oxygen supply company to find out the nearest location of a supply company near evacuated location. ([employer]: [phone], [facility]: [phone], [facility]:[phone], [employer]: [phone]), [facility] -[phone].  [] If on hemodialysis, patient should already have a plan in place with dialysis center. If patient does not know where to evacuate to in order to be close to a dialysis center, patient/caregiver to reach out to regular dialysis center for further direction. ([facility]  service line: [phone], [facility]  service line [phone])  [] If receiving treatment at an infusion center, patient/caregiver to contact the infusion center to find out which infusion center they have a contract with where patient plans to evacuate.      Office of Emergency Preparedness Phone number:  [] [place]: [phone]  [x] [place]: [phone]  [] .  [facility]: [phone]  [] [place]: [phone]  [] [place]: [phone]  [] [place]: [phone]  [] [facility]: [phone]  [] [facility]: [phone]  [] [place] [facility]: [phone]  [] [place]: [phone]  [] [facility]: [phone]  [] [place]: [phone]  [] [facility]: [phone]    [] [facility]:  [phone]   [] [facility]:  [phone]   [] [facility]:  [phone]   [] [facility]:  [phone]   [] [facility]:  [phone]   [] [facility]: [phone]   [] [facility]:  [phone]   [] [facility]:  [phone]   [] [employer] [facility]:  [phone]   [] [facility]:  [phone]   [] [facility]:  [phone]   [] [facility]:  [phone]   [] [facility]:  [phone]   [] [facility]:  [phone]   [] [facility]:  [phone]   [] [facility]: [phone]   [] [facility]:  [phone]   [] [facility]: [phone]   [] [facility]: [phone]

## 2019-07-22 ENCOUNTER — OUTPATIENT CASE MANAGEMENT (OUTPATIENT)
Dept: ADMINISTRATIVE | Facility: OTHER | Age: 74
End: 2019-07-22

## 2019-07-22 NOTE — PROGRESS NOTES
7/19/19  Summary:  Talked to patient to update plan of care for OPCM.  Patient stated that he is taking all medications as prescribed.  Patient stated that he is testing his blood glucose twice a day. We reviewed his most recent BG readings, which patient reported that his BG has been 80s-110s. Stated that he has not had any hypoglycemic episodes.  Reported that he did have a 154 reading but believes this was related to eating ice cream. Patient had to end call as he was going to the grocery. Chart review completed and patient does not have any upcoming appts scheduled. Patient is due for 3month F/U with PCP in September. Offered to assist patient with scheduling appt, but requested to contact and schedule himself. Discussed completing F/U call with patient, who is in agreement with call in 2 weeks.      Interventions:  Chart review completed  Reviewed the importance of scheduling 3month F/U appt  Review BG readings  Scheduled follow up call in 2 weeks  Encouraged compliance with medications and MD appts    Plan:  Continue to educate about DM. Review signs and symptoms of high and low blood sugar. Encourage patient to follow medication and treatment regimen. Encourage patient to maintain follow up with doctors.   F/U with patient in two weeks     Todays OPCM Self-Management Care Plan was developed with the patients/caregivers input and was based on identified barriers from todays assessment.  Goals were written today with the patient/caregiver and the patient has agreed to work towards these goals to improve his/her overall well-being. Patient verbalized understanding of the care plan, goals, and all of today's instructions. Encouraged patient/caregiver to communicate with his/her physician and health care team about health conditions and the treatment plan.  Provided my contact information today and encouraged patient/caregiver to call me with any questions as needed.

## 2019-07-24 ENCOUNTER — OFFICE VISIT (OUTPATIENT)
Dept: INTERNAL MEDICINE | Facility: CLINIC | Age: 74
End: 2019-07-24
Attending: INTERNAL MEDICINE
Payer: MEDICARE

## 2019-07-24 VITALS
DIASTOLIC BLOOD PRESSURE: 70 MMHG | SYSTOLIC BLOOD PRESSURE: 128 MMHG | HEIGHT: 73 IN | WEIGHT: 207.25 LBS | BODY MASS INDEX: 27.47 KG/M2 | OXYGEN SATURATION: 97 % | HEART RATE: 65 BPM

## 2019-07-24 DIAGNOSIS — L02.91 ABSCESS: ICD-10-CM

## 2019-07-24 DIAGNOSIS — R19.5 POSITIVE FIT (FECAL IMMUNOCHEMICAL TEST): Primary | ICD-10-CM

## 2019-07-24 PROCEDURE — 99214 PR OFFICE/OUTPT VISIT, EST, LEVL IV, 30-39 MIN: ICD-10-PCS | Mod: HCNC,S$GLB,, | Performed by: INTERNAL MEDICINE

## 2019-07-24 PROCEDURE — 3074F SYST BP LT 130 MM HG: CPT | Mod: HCNC,CPTII,S$GLB, | Performed by: INTERNAL MEDICINE

## 2019-07-24 PROCEDURE — 1101F PR PT FALLS ASSESS DOC 0-1 FALLS W/OUT INJ PAST YR: ICD-10-PCS | Mod: HCNC,CPTII,S$GLB, | Performed by: INTERNAL MEDICINE

## 2019-07-24 PROCEDURE — 1101F PT FALLS ASSESS-DOCD LE1/YR: CPT | Mod: HCNC,CPTII,S$GLB, | Performed by: INTERNAL MEDICINE

## 2019-07-24 PROCEDURE — 99214 OFFICE O/P EST MOD 30 MIN: CPT | Mod: HCNC,S$GLB,, | Performed by: INTERNAL MEDICINE

## 2019-07-24 PROCEDURE — 99999 PR PBB SHADOW E&M-EST. PATIENT-LVL IV: ICD-10-PCS | Mod: PBBFAC,HCNC,, | Performed by: INTERNAL MEDICINE

## 2019-07-24 PROCEDURE — 3078F DIAST BP <80 MM HG: CPT | Mod: HCNC,CPTII,S$GLB, | Performed by: INTERNAL MEDICINE

## 2019-07-24 PROCEDURE — 3078F PR MOST RECENT DIASTOLIC BLOOD PRESSURE < 80 MM HG: ICD-10-PCS | Mod: HCNC,CPTII,S$GLB, | Performed by: INTERNAL MEDICINE

## 2019-07-24 PROCEDURE — 3074F PR MOST RECENT SYSTOLIC BLOOD PRESSURE < 130 MM HG: ICD-10-PCS | Mod: HCNC,CPTII,S$GLB, | Performed by: INTERNAL MEDICINE

## 2019-07-24 PROCEDURE — 99999 PR PBB SHADOW E&M-EST. PATIENT-LVL IV: CPT | Mod: PBBFAC,HCNC,, | Performed by: INTERNAL MEDICINE

## 2019-07-24 NOTE — PROGRESS NOTES
"Subjective:       Patient ID: Misha Eldridge Jr. is a 74 y.o. male.    Chief Complaint: Follow-up and Cyst    Here for f/u    Had I&D of abscess on back. This has resolved. No increased pain or swelling or F/C, diarrhea, fatigue, SOB.     + FIT kit. Will get colonoscopy done. Refer to [facility]      Review of Systems   Constitutional: Negative for appetite change, chills, fever and unexpected weight change.   HENT: Negative for hearing loss, sore throat and trouble swallowing.    Eyes: Negative for visual disturbance.   Respiratory: Negative for cough, chest tightness and shortness of breath.    Cardiovascular: Negative for chest pain and leg swelling.   Gastrointestinal: Negative for abdominal pain, blood in stool, constipation, diarrhea, nausea and vomiting.   Endocrine: Negative for polydipsia and polyuria.   Genitourinary: Negative for decreased urine volume, difficulty urinating, dysuria, frequency and urgency.   Musculoskeletal: Negative for gait problem.   Skin: Negative for rash.   Neurological: Negative for dizziness and numbness.   Psychiatric/Behavioral: The patient is not nervous/anxious.        Objective:      Vitals:    07/24/19 1401   BP: 128/70   Pulse: 65   SpO2: 97%   Weight: 94 kg (207 lb 3.7 oz)   Height: 6' 1" (1.854 m)      Physical Exam   Constitutional: He is oriented to person, place, and time. He appears well-developed and well-nourished. He does not have a sickly appearance. No distress.   HENT:   Head: Normocephalic and atraumatic.   Eyes: Conjunctivae and EOM are normal. Right eye exhibits no discharge. Left eye exhibits no discharge. No scleral icterus.   Pulmonary/Chest: Effort normal. No respiratory distress.   Abdominal: Normal appearance. He exhibits no distension.   Neurological: He is alert and oriented to person, place, and time.   Skin: Skin is warm and dry. He is not diaphoretic.        Psychiatric: He has a normal mood and affect. His speech is normal.       Assessment:       1. " Positive FIT (fecal immunochemical test)    2. Abscess        Plan:       Misha was seen today for follow-up and cyst.    Diagnoses and all orders for this visit:    Positive FIT (fecal immunochemical test)  -     Ambulatory referral to Gastroenterology    Abscess   resolved           Dipak Mcclendon MD  Internal Medicine-[facility]        Side effects of medication(s) were discussed in detail and patient voiced understanding.  Patient will call back for any issues or complications.

## 2019-07-26 DIAGNOSIS — L02.212 ABSCESS OF BACK: ICD-10-CM

## 2019-07-26 RX ORDER — HYDROCODONE BITARTRATE AND ACETAMINOPHEN 5; 325 MG/1; MG/1
1 TABLET ORAL EVERY 8 HOURS PRN
Qty: 10 TABLET | Refills: 0 | OUTPATIENT
Start: 2019-07-26

## 2019-08-05 ENCOUNTER — OUTPATIENT CASE MANAGEMENT (OUTPATIENT)
Dept: ADMINISTRATIVE | Facility: OTHER | Age: 74
End: 2019-08-05

## 2019-08-05 NOTE — PROGRESS NOTES
8/5/19  Summary:  Talked to patient to update plan of care for OPCM.  Patient stated that he is taking all medications as prescribed.  Patient continues to test his blood glucose twice a day. We reviewed his most recent BG readings, which patient reported that his BG has been 80s-110s. Stated that he has not had any hypoglycemic episodes.  Reported that he did have a 174 reading but believes this was related to diet. Patient had a HgbA1c drawn at the end of June.  We reviewed his most recent HgbA1C, which was 7.6, down from 10.1 in April. Educated patient on the purpose of the HgbA1C test and recommended HgbA1C of <7.0. Patient verbalized understanding. Patient without any further needs at this time.  We discussed case closure as patient needs have been met.  Patient in agreement. Encouraged patient to contact this RN CM in the event that needs develop. Verbalized understanding.  Will close case at this time.      Interventions:  Chart review completed  Review BG readings  Encouraged compliance with medications and MD appts  Educated on the importance of maintaining HgBA1c of <7.0  Encouraged patient to contact this RN CM with needs  Reviewed patient's most recent HgbA1C  Discussed case Closure    Plan:  Will close case as needs have been met    Todays OPCM Self-Management Care Plan was developed with the patients/caregivers input and was based on identified barriers from todays assessment.  Goals were written today with the patient/caregiver and the patient has agreed to work towards these goals to improve his/her overall well-being. Patient verbalized understanding of the care plan, goals, and all of today's instructions. Encouraged patient/caregiver to communicate with his/her physician and health care team about health conditions and the treatment plan.  Provided my contact information today and encouraged patient/caregiver to call me with any questions as needed.

## 2019-09-30 ENCOUNTER — OFFICE VISIT (OUTPATIENT)
Dept: INTERNAL MEDICINE | Facility: CLINIC | Age: 74
End: 2019-09-30
Attending: INTERNAL MEDICINE
Payer: MEDICARE

## 2019-09-30 VITALS
SYSTOLIC BLOOD PRESSURE: 130 MMHG | BODY MASS INDEX: 28.28 KG/M2 | DIASTOLIC BLOOD PRESSURE: 68 MMHG | OXYGEN SATURATION: 95 % | WEIGHT: 213.38 LBS | HEART RATE: 73 BPM | HEIGHT: 73 IN

## 2019-09-30 DIAGNOSIS — E11.40 CONTROLLED TYPE 2 DIABETES MELLITUS WITH DIABETIC NEUROPATHY, WITHOUT LONG-TERM CURRENT USE OF INSULIN: ICD-10-CM

## 2019-09-30 DIAGNOSIS — R19.5 POSITIVE FIT (FECAL IMMUNOCHEMICAL TEST): Primary | ICD-10-CM

## 2019-09-30 DIAGNOSIS — Z85.038 HISTORY OF COLON CANCER: ICD-10-CM

## 2019-09-30 DIAGNOSIS — R10.9 ABDOMINAL PAIN, UNSPECIFIED ABDOMINAL LOCATION: ICD-10-CM

## 2019-09-30 DIAGNOSIS — Z85.46 HISTORY OF PROSTATE CANCER: ICD-10-CM

## 2019-09-30 PROCEDURE — 3078F PR MOST RECENT DIASTOLIC BLOOD PRESSURE < 80 MM HG: ICD-10-PCS | Mod: HCNC,CPTII,S$GLB, | Performed by: INTERNAL MEDICINE

## 2019-09-30 PROCEDURE — 1101F PR PT FALLS ASSESS DOC 0-1 FALLS W/OUT INJ PAST YR: ICD-10-PCS | Mod: HCNC,CPTII,S$GLB, | Performed by: INTERNAL MEDICINE

## 2019-09-30 PROCEDURE — 3078F DIAST BP <80 MM HG: CPT | Mod: HCNC,CPTII,S$GLB, | Performed by: INTERNAL MEDICINE

## 2019-09-30 PROCEDURE — 99499 RISK ADDL DX/OHS AUDIT: ICD-10-PCS | Mod: HCNC,S$GLB,, | Performed by: INTERNAL MEDICINE

## 2019-09-30 PROCEDURE — 99214 OFFICE O/P EST MOD 30 MIN: CPT | Mod: HCNC,S$GLB,, | Performed by: INTERNAL MEDICINE

## 2019-09-30 PROCEDURE — 99499 UNLISTED E&M SERVICE: CPT | Mod: HCNC,S$GLB,, | Performed by: INTERNAL MEDICINE

## 2019-09-30 PROCEDURE — 99999 PR PBB SHADOW E&M-EST. PATIENT-LVL III: ICD-10-PCS | Mod: PBBFAC,HCNC,, | Performed by: INTERNAL MEDICINE

## 2019-09-30 PROCEDURE — 99999 PR PBB SHADOW E&M-EST. PATIENT-LVL III: CPT | Mod: PBBFAC,HCNC,, | Performed by: INTERNAL MEDICINE

## 2019-09-30 PROCEDURE — 99214 PR OFFICE/OUTPT VISIT, EST, LEVL IV, 30-39 MIN: ICD-10-PCS | Mod: HCNC,S$GLB,, | Performed by: INTERNAL MEDICINE

## 2019-09-30 PROCEDURE — 3075F SYST BP GE 130 - 139MM HG: CPT | Mod: HCNC,CPTII,S$GLB, | Performed by: INTERNAL MEDICINE

## 2019-09-30 PROCEDURE — 3075F PR MOST RECENT SYSTOLIC BLOOD PRESS GE 130-139MM HG: ICD-10-PCS | Mod: HCNC,CPTII,S$GLB, | Performed by: INTERNAL MEDICINE

## 2019-09-30 PROCEDURE — 1101F PT FALLS ASSESS-DOCD LE1/YR: CPT | Mod: HCNC,CPTII,S$GLB, | Performed by: INTERNAL MEDICINE

## 2019-09-30 NOTE — PROGRESS NOTES
"Subjective:       Patient ID: Misha Eldridge Jr. is a 74 y.o. male.    Chief Complaint: Follow-up    Here for f/u    He is concerned about his increasing weight. He has had some sensitivity surrounding his incision site since procedure. No change in this pain.     HTN-Denies frequent or current HA, intermittent blurry vision, dizziness, CP, or SOB.        Review of Systems   Constitutional: Negative for appetite change, chills, fever and unexpected weight change.   HENT: Negative for hearing loss, sore throat and trouble swallowing.    Eyes: Negative for visual disturbance.   Respiratory: Negative for cough, chest tightness and shortness of breath.    Cardiovascular: Negative for chest pain and leg swelling.   Gastrointestinal: Negative for abdominal pain, blood in stool, constipation, diarrhea, nausea and vomiting.   Endocrine: Negative for polydipsia and polyuria.   Genitourinary: Negative for decreased urine volume, difficulty urinating, dysuria, frequency and urgency.   Musculoskeletal: Negative for gait problem.   Skin: Negative for rash.   Neurological: Negative for dizziness and numbness.   Psychiatric/Behavioral: The patient is not nervous/anxious.        Objective:      Vitals:    09/30/19 0915   BP: 130/68   Pulse: 73   SpO2: 95%   Weight: 96.8 kg (213 lb 6.5 oz)   Height: 6' 1" (1.854 m)      Physical Exam   Constitutional: He is oriented to person, place, and time. He appears well-developed and well-nourished. No distress.   HENT:   Head: Normocephalic and atraumatic.   Mouth/Throat: Oropharynx is clear and moist. No oropharyngeal exudate.   Eyes: Pupils are equal, round, and reactive to light. Conjunctivae and EOM are normal. No scleral icterus.   Neck: No thyromegaly present.   Cardiovascular: Normal rate, regular rhythm and normal heart sounds.   No murmur heard.  Pulmonary/Chest: Effort normal and breath sounds normal. He has no wheezes. He has no rales.   Abdominal: Soft. He exhibits no distension. " There is no tenderness.   Musculoskeletal: He exhibits no edema or tenderness.   Lymphadenopathy:     He has no cervical adenopathy.   Neurological: He is alert and oriented to person, place, and time.   Skin: Skin is warm and dry.   Psychiatric: He has a normal mood and affect. His behavior is normal.     Protective Sensation (w/ 10 gram monofilament):  Right: decreased  Left: decreased    Visual Inspection:  Normal -  Bilateral    Pedal Pulses:   Right: Present  Left: Present        Assessment:       1. Positive FIT (fecal immunochemical test)    2. Controlled type 2 diabetes mellitus with diabetic neuropathy, without long-term current use of insulin    3. Abdominal pain, unspecified abdominal location    4. History of prostate cancer    5. History of colon cancer        Plan:       Misha was seen today for follow-up.    Diagnoses and all orders for this visit:    Positive FIT (fecal immunochemical test)    Controlled type 2 diabetes mellitus with diabetic neuropathy, without long-term current use of insulin  -     Comprehensive metabolic panel; Future  -     Hemoglobin A1c; Future  -     Microalbumin/creatinine urine ratio; Future    Abdominal pain, unspecified abdominal location  -     US Abdomen Complete; Future  -     CBC auto differential; Future    History of prostate cancer  Comments:  s/p Robot radical c/ B nerve sparing 04/27/2010    History of colon cancer  Comments:  AdenoCa IIIc LAR/small bowel resection 5/2/12, mod-poor diif with extensive necrosis. FOLFOX 07-11/2012 stopped 2 cycle short due to B PE. high risk reccurence.           Dipak Mcclendon MD  Internal Medicine-[facility]        Side effects of medication(s) were discussed in detail and patient voiced understanding.  Patient will call back for any issues or complications.

## 2019-10-01 ENCOUNTER — IMMUNIZATION (OUTPATIENT)
Dept: PHARMACY | Facility: CLINIC | Age: 74
End: 2019-10-01
Payer: MEDICARE

## 2019-10-01 ENCOUNTER — IMMUNIZATION (OUTPATIENT)
Dept: PHARMACY | Facility: CLINIC | Age: 74
End: 2019-10-01

## 2019-10-19 DIAGNOSIS — E13.9 DIABETES 1.5, MANAGED AS TYPE 2: ICD-10-CM

## 2019-10-20 DIAGNOSIS — G89.29 CHRONIC BILATERAL LOW BACK PAIN WITHOUT SCIATICA: Chronic | ICD-10-CM

## 2019-10-20 DIAGNOSIS — M54.50 CHRONIC BILATERAL LOW BACK PAIN WITHOUT SCIATICA: Chronic | ICD-10-CM

## 2019-10-21 RX ORDER — DICLOFENAC SODIUM 75 MG/1
TABLET, DELAYED RELEASE ORAL
Qty: 180 TABLET | Refills: 1 | Status: SHIPPED | OUTPATIENT
Start: 2019-10-21 | End: 2020-01-28

## 2019-10-21 RX ORDER — GLIPIZIDE 10 MG/1
TABLET ORAL
Qty: 180 TABLET | Refills: 2 | Status: SHIPPED | OUTPATIENT
Start: 2019-10-21 | End: 2020-04-13

## 2019-11-13 NOTE — PROGRESS NOTES
CARLOS for Shagufta Griffith to remind him that labs need to be completed prior to his FU appt w/ Dr Georgie Charles FitKit was given to patient on 12/5/2017 12:25 PM

## 2019-12-06 DIAGNOSIS — E13.9 DIABETES 1.5, MANAGED AS TYPE 2: ICD-10-CM

## 2019-12-06 RX ORDER — METFORMIN HYDROCHLORIDE 1000 MG/1
TABLET ORAL
Qty: 180 TABLET | Refills: 2 | Status: SHIPPED | OUTPATIENT
Start: 2019-12-06 | End: 2021-12-01

## 2019-12-27 DIAGNOSIS — E11.8 TYPE 2 DIABETES MELLITUS WITH COMPLICATION, WITH LONG-TERM CURRENT USE OF INSULIN: ICD-10-CM

## 2019-12-27 DIAGNOSIS — Z79.4 TYPE 2 DIABETES MELLITUS WITH COMPLICATION, WITH LONG-TERM CURRENT USE OF INSULIN: ICD-10-CM

## 2019-12-27 RX ORDER — INSULIN GLARGINE 100 [IU]/ML
INJECTION, SOLUTION SUBCUTANEOUS
Qty: 15 ML | Refills: 0 | Status: SHIPPED | OUTPATIENT
Start: 2019-12-27 | End: 2020-02-21

## 2020-01-27 DIAGNOSIS — M54.50 CHRONIC BILATERAL LOW BACK PAIN WITHOUT SCIATICA: Chronic | ICD-10-CM

## 2020-01-27 DIAGNOSIS — G89.29 CHRONIC BILATERAL LOW BACK PAIN WITHOUT SCIATICA: Chronic | ICD-10-CM

## 2020-01-28 DIAGNOSIS — G89.29 CHRONIC BILATERAL LOW BACK PAIN WITHOUT SCIATICA: Chronic | ICD-10-CM

## 2020-01-28 DIAGNOSIS — M54.50 CHRONIC BILATERAL LOW BACK PAIN WITHOUT SCIATICA: Chronic | ICD-10-CM

## 2020-01-28 RX ORDER — DICLOFENAC SODIUM 75 MG/1
TABLET, DELAYED RELEASE ORAL
Qty: 60 TABLET | Refills: 0 | Status: SHIPPED | OUTPATIENT
Start: 2020-01-28 | End: 2020-01-28

## 2020-01-28 NOTE — TELEPHONE ENCOUNTER
Please  Complete his prescription and specify the refill amount and schedule labs for further refills

## 2020-01-29 RX ORDER — DICLOFENAC SODIUM 75 MG/1
75 TABLET, DELAYED RELEASE ORAL 2 TIMES DAILY
Qty: 180 TABLET | Refills: 0 | Status: SHIPPED | OUTPATIENT
Start: 2020-01-29 | End: 2020-04-13

## 2020-01-30 ENCOUNTER — LAB VISIT (OUTPATIENT)
Dept: LAB | Facility: OTHER | Age: 75
End: 2020-01-30
Attending: INTERNAL MEDICINE
Payer: MEDICARE

## 2020-01-30 DIAGNOSIS — R10.9 ABDOMINAL PAIN, UNSPECIFIED ABDOMINAL LOCATION: ICD-10-CM

## 2020-01-30 DIAGNOSIS — E11.40 CONTROLLED TYPE 2 DIABETES MELLITUS WITH DIABETIC NEUROPATHY, WITHOUT LONG-TERM CURRENT USE OF INSULIN: ICD-10-CM

## 2020-01-30 LAB
ALBUMIN SERPL BCP-MCNC: 3.7 G/DL (ref 3.5–5.2)
ALP SERPL-CCNC: 61 U/L (ref 55–135)
ALT SERPL W/O P-5'-P-CCNC: 13 U/L (ref 10–44)
ANION GAP SERPL CALC-SCNC: 8 MMOL/L (ref 8–16)
AST SERPL-CCNC: 13 U/L (ref 10–40)
BASOPHILS # BLD AUTO: 0.03 K/UL (ref 0–0.2)
BASOPHILS NFR BLD: 0.7 % (ref 0–1.9)
BILIRUB SERPL-MCNC: 0.4 MG/DL (ref 0.1–1)
BUN SERPL-MCNC: 14 MG/DL (ref 8–23)
CALCIUM SERPL-MCNC: 8.6 MG/DL (ref 8.7–10.5)
CHLORIDE SERPL-SCNC: 109 MMOL/L (ref 95–110)
CO2 SERPL-SCNC: 24 MMOL/L (ref 23–29)
CREAT SERPL-MCNC: 1.3 MG/DL (ref 0.5–1.4)
DIFFERENTIAL METHOD: ABNORMAL
EOSINOPHIL # BLD AUTO: 0.3 K/UL (ref 0–0.5)
EOSINOPHIL NFR BLD: 8.1 % (ref 0–8)
ERYTHROCYTE [DISTWIDTH] IN BLOOD BY AUTOMATED COUNT: 14.5 % (ref 11.5–14.5)
EST. GFR  (AFRICAN AMERICAN): >60 ML/MIN/1.73 M^2
EST. GFR  (NON AFRICAN AMERICAN): 54 ML/MIN/1.73 M^2
ESTIMATED AVG GLUCOSE: 148 MG/DL (ref 68–131)
ESTIMATED AVG GLUCOSE: 148 MG/DL (ref 68–131)
GLUCOSE SERPL-MCNC: 180 MG/DL (ref 70–110)
HBA1C MFR BLD HPLC: 6.8 % (ref 4–5.6)
HBA1C MFR BLD HPLC: 6.8 % (ref 4–5.6)
HCT VFR BLD AUTO: 37.9 % (ref 40–54)
HGB BLD-MCNC: 12.3 G/DL (ref 14–18)
IMM GRANULOCYTES # BLD AUTO: 0.01 K/UL (ref 0–0.04)
IMM GRANULOCYTES NFR BLD AUTO: 0.2 % (ref 0–0.5)
LYMPHOCYTES # BLD AUTO: 1.5 K/UL (ref 1–4.8)
LYMPHOCYTES NFR BLD: 37.5 % (ref 18–48)
MCH RBC QN AUTO: 29.1 PG (ref 27–31)
MCHC RBC AUTO-ENTMCNC: 32.5 G/DL (ref 32–36)
MCV RBC AUTO: 90 FL (ref 82–98)
MONOCYTES # BLD AUTO: 0.3 K/UL (ref 0.3–1)
MONOCYTES NFR BLD: 8.3 % (ref 4–15)
NEUTROPHILS # BLD AUTO: 1.8 K/UL (ref 1.8–7.7)
NEUTROPHILS NFR BLD: 45.2 % (ref 38–73)
NRBC BLD-RTO: 0 /100 WBC
PLATELET # BLD AUTO: 170 K/UL (ref 150–350)
PMV BLD AUTO: 11.3 FL (ref 9.2–12.9)
POTASSIUM SERPL-SCNC: 4.4 MMOL/L (ref 3.5–5.1)
PROT SERPL-MCNC: 6.6 G/DL (ref 6–8.4)
RBC # BLD AUTO: 4.22 M/UL (ref 4.6–6.2)
SODIUM SERPL-SCNC: 141 MMOL/L (ref 136–145)
WBC # BLD AUTO: 4.08 K/UL (ref 3.9–12.7)

## 2020-01-30 PROCEDURE — 36415 COLL VENOUS BLD VENIPUNCTURE: CPT | Mod: HCNC

## 2020-01-30 PROCEDURE — 80053 COMPREHEN METABOLIC PANEL: CPT | Mod: HCNC

## 2020-01-30 PROCEDURE — 83036 HEMOGLOBIN GLYCOSYLATED A1C: CPT | Mod: HCNC

## 2020-01-30 PROCEDURE — 85025 COMPLETE CBC W/AUTO DIFF WBC: CPT | Mod: HCNC

## 2020-02-06 DIAGNOSIS — I10 HYPERTENSION, UNSPECIFIED TYPE: ICD-10-CM

## 2020-02-07 RX ORDER — HYDROCHLOROTHIAZIDE 25 MG/1
TABLET ORAL
Qty: 90 TABLET | Refills: 0 | Status: SHIPPED | OUTPATIENT
Start: 2020-02-07 | End: 2020-09-25

## 2020-02-07 RX ORDER — GABAPENTIN 300 MG/1
CAPSULE ORAL
Qty: 270 CAPSULE | Refills: 0 | Status: SHIPPED | OUTPATIENT
Start: 2020-02-07 | End: 2020-04-28

## 2020-02-21 DIAGNOSIS — Z79.4 TYPE 2 DIABETES MELLITUS WITH COMPLICATION, WITH LONG-TERM CURRENT USE OF INSULIN: ICD-10-CM

## 2020-02-21 DIAGNOSIS — E11.8 TYPE 2 DIABETES MELLITUS WITH COMPLICATION, WITH LONG-TERM CURRENT USE OF INSULIN: ICD-10-CM

## 2020-02-21 RX ORDER — INSULIN GLARGINE 100 [IU]/ML
INJECTION, SOLUTION SUBCUTANEOUS
Qty: 15 ML | Refills: 0 | Status: SHIPPED | OUTPATIENT
Start: 2020-02-21 | End: 2020-04-30

## 2020-04-12 DIAGNOSIS — M54.50 CHRONIC BILATERAL LOW BACK PAIN WITHOUT SCIATICA: Chronic | ICD-10-CM

## 2020-04-12 DIAGNOSIS — E13.9 DIABETES 1.5, MANAGED AS TYPE 2: ICD-10-CM

## 2020-04-12 DIAGNOSIS — I10 HYPERTENSION, UNSPECIFIED TYPE: ICD-10-CM

## 2020-04-12 DIAGNOSIS — G89.29 CHRONIC BILATERAL LOW BACK PAIN WITHOUT SCIATICA: Chronic | ICD-10-CM

## 2020-04-12 DIAGNOSIS — I10 ESSENTIAL HYPERTENSION: ICD-10-CM

## 2020-04-12 DIAGNOSIS — Q24.9 HEART ABNORMALITY: ICD-10-CM

## 2020-04-13 RX ORDER — GLIPIZIDE 10 MG/1
TABLET ORAL
Qty: 180 TABLET | Refills: 0 | Status: SHIPPED | OUTPATIENT
Start: 2020-04-13 | End: 2021-03-30 | Stop reason: SDUPTHER

## 2020-04-13 RX ORDER — AMLODIPINE BESYLATE 10 MG/1
10 TABLET ORAL DAILY
Qty: 90 TABLET | Refills: 0 | Status: SHIPPED | OUTPATIENT
Start: 2020-04-13 | End: 2020-07-27

## 2020-04-13 RX ORDER — DICLOFENAC SODIUM 75 MG/1
75 TABLET, DELAYED RELEASE ORAL 2 TIMES DAILY PRN
Qty: 180 TABLET | Refills: 0 | Status: SHIPPED | OUTPATIENT
Start: 2020-04-13 | End: 2020-07-31

## 2020-04-13 RX ORDER — LANCETS
EACH MISCELLANEOUS
Qty: 100 EACH | Refills: 0 | Status: SHIPPED | OUTPATIENT
Start: 2020-04-13

## 2020-04-13 RX ORDER — CARVEDILOL 3.12 MG/1
TABLET ORAL
Qty: 180 TABLET | Refills: 0 | Status: SHIPPED | OUTPATIENT
Start: 2020-04-13 | End: 2020-09-25

## 2020-04-13 RX ORDER — VALSARTAN 320 MG/1
TABLET ORAL
Qty: 90 TABLET | Refills: 0 | Status: SHIPPED | OUTPATIENT
Start: 2020-04-13 | End: 2021-03-31

## 2020-04-24 ENCOUNTER — PATIENT OUTREACH (OUTPATIENT)
Dept: ADMINISTRATIVE | Facility: HOSPITAL | Age: 75
End: 2020-04-24

## 2020-04-29 DIAGNOSIS — Z79.4 TYPE 2 DIABETES MELLITUS WITH COMPLICATION, WITH LONG-TERM CURRENT USE OF INSULIN: ICD-10-CM

## 2020-04-29 DIAGNOSIS — E11.8 TYPE 2 DIABETES MELLITUS WITH COMPLICATION, WITH LONG-TERM CURRENT USE OF INSULIN: ICD-10-CM

## 2020-04-30 RX ORDER — INSULIN GLARGINE 100 [IU]/ML
INJECTION, SOLUTION SUBCUTANEOUS
Qty: 15 ML | Refills: 11 | Status: SHIPPED | OUTPATIENT
Start: 2020-04-30 | End: 2020-08-19

## 2020-05-01 ENCOUNTER — OFFICE VISIT (OUTPATIENT)
Dept: INTERNAL MEDICINE | Facility: CLINIC | Age: 75
End: 2020-05-01
Attending: INTERNAL MEDICINE
Payer: MEDICARE

## 2020-05-01 DIAGNOSIS — E11.40 CONTROLLED TYPE 2 DIABETES MELLITUS WITH DIABETIC NEUROPATHY, WITHOUT LONG-TERM CURRENT USE OF INSULIN: Primary | ICD-10-CM

## 2020-05-01 DIAGNOSIS — I10 ESSENTIAL HYPERTENSION: ICD-10-CM

## 2020-05-01 PROCEDURE — 99441 PR PHYSICIAN TELEPHONE EVALUATION 5-10 MIN: CPT | Mod: 95,,, | Performed by: INTERNAL MEDICINE

## 2020-05-01 PROCEDURE — 99441 PR PHYSICIAN TELEPHONE EVALUATION 5-10 MIN: ICD-10-PCS | Mod: 95,,, | Performed by: INTERNAL MEDICINE

## 2020-05-01 NOTE — PROGRESS NOTES
Established Patient - Audio Only Telehealth Visit     The patient location is: [place], [place]  The chief complaint leading to consultation is: routine f/u  Visit type: Virtual visit with audio only (telephone)     The reason for the audio only service rather than synchronous audio and video virtual visit was related to technical difficulties or patient preference/necessity.     Each patient to whom I provide medical services by telemedicine is:  (1) informed of the relationship between the physician and patient and the respective role of any other health care provider with respect to management of the patient; and (2) notified that they may decline to receive medical services by telemedicine and may withdraw from such care at any time. Patient verbally consented to receive this service via voice-only telephone call.       HPI:   Patient presents today for routine evaluation, physical, and labs. Patient has no major concerns or complaints today.       DM-A1c as of 3 months prior is back to high 6s. This is where his A1c always lives except for 2019 when it jumped to >10 due to stopping medication. Medication adherence is fine. Mood is stable. No acute issues.     HLD-Pt is tolerating statin without frequent muscle cramps or diffuse myalgias or weakness.       Assessment and plan:   DM-controlled. Continue current regimen. Will get up-to-date on routine maintenance once COVID-19 settles  HLD-Tolerating statin. Continue this.                           This service was not originating from a related E/M service provided within the previous 7 days nor will  to an E/M service or procedure within the next 24 hours or my soonest available appointment.  Prevailing standard of care was able to be met in this audio-only visit.

## 2020-05-08 DIAGNOSIS — E78.9 ABNORMAL CHOLESTEROL TEST: ICD-10-CM

## 2020-05-08 RX ORDER — ATORVASTATIN CALCIUM 20 MG/1
TABLET, FILM COATED ORAL
Qty: 90 TABLET | Refills: 0 | Status: SHIPPED | OUTPATIENT
Start: 2020-05-08 | End: 2020-07-31

## 2020-05-15 DIAGNOSIS — E11.9 TYPE 2 DIABETES MELLITUS WITHOUT COMPLICATION: ICD-10-CM

## 2020-05-21 DIAGNOSIS — Z79.4 TYPE 2 DIABETES MELLITUS WITH COMPLICATION, WITH LONG-TERM CURRENT USE OF INSULIN: ICD-10-CM

## 2020-05-21 DIAGNOSIS — E11.8 TYPE 2 DIABETES MELLITUS WITH COMPLICATION, WITH LONG-TERM CURRENT USE OF INSULIN: ICD-10-CM

## 2020-05-21 RX ORDER — PEN NEEDLE, DIABETIC 31 GX5/16"
NEEDLE, DISPOSABLE MISCELLANEOUS
Qty: 200 EACH | Refills: 11 | Status: SHIPPED | OUTPATIENT
Start: 2020-05-21 | End: 2021-06-08 | Stop reason: SDUPTHER

## 2020-07-02 DIAGNOSIS — E11.9 TYPE 2 DIABETES MELLITUS WITHOUT COMPLICATION: ICD-10-CM

## 2020-07-28 DIAGNOSIS — I10 ESSENTIAL HYPERTENSION: Primary | ICD-10-CM

## 2020-07-28 RX ORDER — IRBESARTAN 300 MG/1
300 TABLET ORAL NIGHTLY
Qty: 90 TABLET | Refills: 2 | Status: SHIPPED | OUTPATIENT
Start: 2020-07-28 | End: 2021-06-04

## 2020-08-19 ENCOUNTER — OFFICE VISIT (OUTPATIENT)
Dept: INTERNAL MEDICINE | Facility: CLINIC | Age: 75
End: 2020-08-19
Attending: INTERNAL MEDICINE
Payer: MEDICARE

## 2020-08-19 ENCOUNTER — LAB VISIT (OUTPATIENT)
Dept: LAB | Facility: OTHER | Age: 75
End: 2020-08-19
Attending: INTERNAL MEDICINE
Payer: MEDICARE

## 2020-08-19 VITALS
WEIGHT: 210.75 LBS | HEIGHT: 73 IN | DIASTOLIC BLOOD PRESSURE: 76 MMHG | OXYGEN SATURATION: 98 % | HEART RATE: 66 BPM | SYSTOLIC BLOOD PRESSURE: 144 MMHG | BODY MASS INDEX: 27.93 KG/M2

## 2020-08-19 DIAGNOSIS — Z79.4 TYPE 2 DIABETES MELLITUS WITH COMPLICATION, WITH LONG-TERM CURRENT USE OF INSULIN: ICD-10-CM

## 2020-08-19 DIAGNOSIS — R19.5 POSITIVE FIT (FECAL IMMUNOCHEMICAL TEST): ICD-10-CM

## 2020-08-19 DIAGNOSIS — E11.8 TYPE 2 DIABETES MELLITUS WITH COMPLICATION, WITH LONG-TERM CURRENT USE OF INSULIN: ICD-10-CM

## 2020-08-19 DIAGNOSIS — I10 ESSENTIAL HYPERTENSION: Primary | ICD-10-CM

## 2020-08-19 LAB
ALBUMIN SERPL BCP-MCNC: 3.9 G/DL (ref 3.5–5.2)
ALP SERPL-CCNC: 60 U/L (ref 55–135)
ALT SERPL W/O P-5'-P-CCNC: 16 U/L (ref 10–44)
ANION GAP SERPL CALC-SCNC: 9 MMOL/L (ref 8–16)
AST SERPL-CCNC: 16 U/L (ref 10–40)
BILIRUB SERPL-MCNC: 0.5 MG/DL (ref 0.1–1)
BUN SERPL-MCNC: 14 MG/DL (ref 8–23)
CALCIUM SERPL-MCNC: 8.8 MG/DL (ref 8.7–10.5)
CHLORIDE SERPL-SCNC: 107 MMOL/L (ref 95–110)
CO2 SERPL-SCNC: 24 MMOL/L (ref 23–29)
CREAT SERPL-MCNC: 1.4 MG/DL (ref 0.5–1.4)
EST. GFR  (AFRICAN AMERICAN): 57 ML/MIN/1.73 M^2
EST. GFR  (NON AFRICAN AMERICAN): 49 ML/MIN/1.73 M^2
GLUCOSE SERPL-MCNC: 164 MG/DL (ref 70–110)
POTASSIUM SERPL-SCNC: 4.6 MMOL/L (ref 3.5–5.1)
PROT SERPL-MCNC: 6.8 G/DL (ref 6–8.4)
SODIUM SERPL-SCNC: 140 MMOL/L (ref 136–145)

## 2020-08-19 PROCEDURE — 3077F SYST BP >= 140 MM HG: CPT | Mod: HCNC,CPTII,S$GLB, | Performed by: INTERNAL MEDICINE

## 2020-08-19 PROCEDURE — 80061 LIPID PANEL: CPT | Mod: HCNC

## 2020-08-19 PROCEDURE — 1159F MED LIST DOCD IN RCRD: CPT | Mod: HCNC,S$GLB,, | Performed by: INTERNAL MEDICINE

## 2020-08-19 PROCEDURE — 3078F PR MOST RECENT DIASTOLIC BLOOD PRESSURE < 80 MM HG: ICD-10-PCS | Mod: HCNC,CPTII,S$GLB, | Performed by: INTERNAL MEDICINE

## 2020-08-19 PROCEDURE — 1101F PR PT FALLS ASSESS DOC 0-1 FALLS W/OUT INJ PAST YR: ICD-10-PCS | Mod: HCNC,CPTII,S$GLB, | Performed by: INTERNAL MEDICINE

## 2020-08-19 PROCEDURE — 3044F PR MOST RECENT HEMOGLOBIN A1C LEVEL <7.0%: ICD-10-PCS | Mod: HCNC,CPTII,S$GLB, | Performed by: INTERNAL MEDICINE

## 2020-08-19 PROCEDURE — 83036 HEMOGLOBIN GLYCOSYLATED A1C: CPT | Mod: HCNC

## 2020-08-19 PROCEDURE — 3008F PR BODY MASS INDEX (BMI) DOCUMENTED: ICD-10-PCS | Mod: HCNC,CPTII,S$GLB, | Performed by: INTERNAL MEDICINE

## 2020-08-19 PROCEDURE — 80053 COMPREHEN METABOLIC PANEL: CPT | Mod: HCNC

## 2020-08-19 PROCEDURE — 99214 OFFICE O/P EST MOD 30 MIN: CPT | Mod: HCNC,S$GLB,, | Performed by: INTERNAL MEDICINE

## 2020-08-19 PROCEDURE — 99499 RISK ADDL DX/OHS AUDIT: ICD-10-PCS | Mod: HCNC,S$GLB,, | Performed by: INTERNAL MEDICINE

## 2020-08-19 PROCEDURE — 3078F DIAST BP <80 MM HG: CPT | Mod: HCNC,CPTII,S$GLB, | Performed by: INTERNAL MEDICINE

## 2020-08-19 PROCEDURE — 99214 PR OFFICE/OUTPT VISIT, EST, LEVL IV, 30-39 MIN: ICD-10-PCS | Mod: HCNC,S$GLB,, | Performed by: INTERNAL MEDICINE

## 2020-08-19 PROCEDURE — 99999 PR PBB SHADOW E&M-EST. PATIENT-LVL V: ICD-10-PCS | Mod: PBBFAC,HCNC,, | Performed by: INTERNAL MEDICINE

## 2020-08-19 PROCEDURE — 1126F AMNT PAIN NOTED NONE PRSNT: CPT | Mod: HCNC,S$GLB,, | Performed by: INTERNAL MEDICINE

## 2020-08-19 PROCEDURE — 3077F PR MOST RECENT SYSTOLIC BLOOD PRESSURE >= 140 MM HG: ICD-10-PCS | Mod: HCNC,CPTII,S$GLB, | Performed by: INTERNAL MEDICINE

## 2020-08-19 PROCEDURE — 1159F PR MEDICATION LIST DOCUMENTED IN MEDICAL RECORD: ICD-10-PCS | Mod: HCNC,S$GLB,, | Performed by: INTERNAL MEDICINE

## 2020-08-19 PROCEDURE — 99499 UNLISTED E&M SERVICE: CPT | Mod: HCNC,S$GLB,, | Performed by: INTERNAL MEDICINE

## 2020-08-19 PROCEDURE — 1126F PR PAIN SEVERITY QUANTIFIED, NO PAIN PRESENT: ICD-10-PCS | Mod: HCNC,S$GLB,, | Performed by: INTERNAL MEDICINE

## 2020-08-19 PROCEDURE — 36415 COLL VENOUS BLD VENIPUNCTURE: CPT | Mod: HCNC

## 2020-08-19 PROCEDURE — 1101F PT FALLS ASSESS-DOCD LE1/YR: CPT | Mod: HCNC,CPTII,S$GLB, | Performed by: INTERNAL MEDICINE

## 2020-08-19 PROCEDURE — 99999 PR PBB SHADOW E&M-EST. PATIENT-LVL V: CPT | Mod: PBBFAC,HCNC,, | Performed by: INTERNAL MEDICINE

## 2020-08-19 PROCEDURE — 3008F BODY MASS INDEX DOCD: CPT | Mod: HCNC,CPTII,S$GLB, | Performed by: INTERNAL MEDICINE

## 2020-08-19 PROCEDURE — 3044F HG A1C LEVEL LT 7.0%: CPT | Mod: HCNC,CPTII,S$GLB, | Performed by: INTERNAL MEDICINE

## 2020-08-19 RX ORDER — INSULIN GLARGINE 100 [IU]/ML
22 INJECTION, SOLUTION SUBCUTANEOUS NIGHTLY
Qty: 15 ML | Refills: 11 | Status: SHIPPED | OUTPATIENT
Start: 2020-08-19 | End: 2022-02-14 | Stop reason: SDUPTHER

## 2020-08-19 RX ORDER — DOXAZOSIN 2 MG/1
2 TABLET ORAL NIGHTLY
Qty: 90 TABLET | Refills: 1 | Status: SHIPPED | OUTPATIENT
Start: 2020-08-19 | End: 2020-12-06

## 2020-08-19 NOTE — PROGRESS NOTES
"Subjective:       Patient ID: Misha Eldridge Jr. is a 75 y.o. male.    Chief Complaint: Follow-up    Here for f/u    DM  7,14, and 21 day average respectively from pt [id number], 112, 109. No lows. Tolerating metformin and basal 22 units. Due for eye exam.     BP elevated. Denies frequent or current HA, intermittent blurry vision, dizziness, CP, or SOB.        Review of Systems   Constitutional: Negative for appetite change, chills, fever and unexpected weight change.   HENT: Negative for hearing loss, sore throat and trouble swallowing.    Eyes: Negative for visual disturbance.   Respiratory: Negative for cough, chest tightness and shortness of breath.    Cardiovascular: Negative for chest pain and leg swelling.   Gastrointestinal: Negative for abdominal pain, blood in stool, constipation, diarrhea, nausea and vomiting.   Endocrine: Negative for polydipsia and polyuria.   Genitourinary: Negative for decreased urine volume, difficulty urinating, dysuria, frequency and urgency.   Musculoskeletal: Negative for gait problem.   Skin: Negative for rash.   Neurological: Negative for dizziness and numbness.   Psychiatric/Behavioral: The patient is not nervous/anxious.        Objective:      Vitals:    08/19/20 0955   BP: (!) 144/76   Pulse: 66   SpO2: 98%   Weight: 95.6 kg (210 lb 12.2 oz)   Height: 6' 1" (1.854 m)      Physical Exam  Constitutional:       General: He is not in acute distress.     Appearance: He is well-developed.   HENT:      Head: Normocephalic and atraumatic.      Mouth/Throat:      Pharynx: No oropharyngeal exudate.   Eyes:      General: No scleral icterus.     Conjunctiva/sclera: Conjunctivae normal.      Pupils: Pupils are equal, round, and reactive to light.   Neck:      Thyroid: No thyromegaly.   Cardiovascular:      Rate and Rhythm: Normal rate and regular rhythm.      Heart sounds: Normal heart sounds. No murmur.   Pulmonary:      Effort: Pulmonary effort is normal.      Breath sounds: Normal " breath sounds. No wheezing or rales.   Abdominal:      General: There is no distension.      Palpations: Abdomen is soft.      Tenderness: There is no abdominal tenderness.   Musculoskeletal:         General: No tenderness.   Lymphadenopathy:      Cervical: No cervical adenopathy.   Skin:     General: Skin is warm and dry.   Neurological:      Mental Status: He is alert and oriented to person, place, and time.   Psychiatric:         Behavior: Behavior normal.         Assessment:       1. Essential hypertension    2. Type 2 diabetes mellitus with complication, with long-term current use of insulin    3. Positive FIT (fecal immunochemical test)        Plan:       Misha was seen today for follow-up.    Diagnoses and all orders for this visit:    Essential hypertension   Above goal. f/u in 3-4 weeks for nurse visit for BP check  -    START doxazosin (CARDURA) 2 MG tablet; Take 1 tablet (2 mg total) by mouth every evening.    Type 2 diabetes mellitus with complication, with long-term current use of insulin  -     Hemoglobin A1C; Future  -     Comprehensive metabolic panel; Future  -     Lipid Panel; Future  -     insulin (LANTUS SOLOSTAR U-100 INSULIN) glargine 100 units/mL (3mL) SubQ pen; Inject 22 Units into the skin every evening.  -     Ambulatory referral/consult to Optometry; Future    Positive FIT (fecal immunochemical test)  -     Ambulatory referral/consult to Gastroenterology; Future    RTC in 6 months or sooner lety Mcclendon MD  Internal Medicine-[facility]        Side effects of medication(s) were discussed in detail and patient voiced understanding.  Patient will call back for any issues or complications.

## 2020-08-20 ENCOUNTER — TELEPHONE (OUTPATIENT)
Dept: INTERNAL MEDICINE | Facility: CLINIC | Age: 75
End: 2020-08-20

## 2020-08-20 LAB
CHOLEST SERPL-MCNC: 108 MG/DL (ref 120–199)
CHOLEST/HDLC SERPL: 2.5 {RATIO} (ref 2–5)
ESTIMATED AVG GLUCOSE: 128 MG/DL (ref 68–131)
HBA1C MFR BLD HPLC: 6.1 % (ref 4–5.6)
HDLC SERPL-MCNC: 44 MG/DL (ref 40–75)
HDLC SERPL: 40.7 % (ref 20–50)
LDLC SERPL CALC-MCNC: 46.8 MG/DL (ref 63–159)
NONHDLC SERPL-MCNC: 64 MG/DL
TRIGL SERPL-MCNC: 86 MG/DL (ref 30–150)

## 2020-08-20 NOTE — TELEPHONE ENCOUNTER
Please let patient know overall labs look good.  Diabetes has improved.  Well done keep added.  Kidney functions back down a little bit.  Not by much.  Please make sure to stay well hydrated, consistent daily basis and to avoid NSAIDs

## 2020-08-21 NOTE — TELEPHONE ENCOUNTER
Spoke to Mr. Gordonming and informed him that  overall labs look good.  Diabetes has improved.  Well done keep added.  Kidney functions back down a little bit.  Not by much.  Please make sure to stay well hydrated, consistent daily basis and to avoid NSAIDs.  Patient states understanding with no further questions.

## 2020-08-21 NOTE — TELEPHONE ENCOUNTER
Attempted to call Mr. Eldridge to inform him that  overall labs look good.  Diabetes has improved.  Well done keep added.  Kidney functions back down a little bit.  Not by much.  Please make sure to stay well hydrated, consistent daily basis and to avoid NSAIDs, But no answer.  LVM to call the office.

## 2020-09-15 DIAGNOSIS — E13.9 DIABETES 1.5, MANAGED AS TYPE 2: ICD-10-CM

## 2020-09-15 RX ORDER — METFORMIN HYDROCHLORIDE 1000 MG/1
1000 TABLET ORAL 2 TIMES DAILY WITH MEALS
Qty: 180 TABLET | Refills: 2 | Status: SHIPPED | OUTPATIENT
Start: 2020-09-15 | End: 2021-03-08

## 2020-10-01 ENCOUNTER — PATIENT OUTREACH (OUTPATIENT)
Dept: ADMINISTRATIVE | Facility: OTHER | Age: 75
End: 2020-10-01

## 2020-10-02 NOTE — PROGRESS NOTES
Health Maintenance Due   Topic Date Due    Shingles Vaccine (3 of 3) 11/25/2019    Eye Exam  05/08/2020    Influenza Vaccine (1) 08/01/2020    Foot Exam  09/30/2020     Updates were requested from care everywhere.  Chart was reviewed for overdue Proactive [facility] Encounters ([facility]) topics (CRS, Breast Cancer Screening, Eye exam)  Health Maintenance has been updated.  LINKS immunization registry triggered.  Immunizations were reconciled.

## 2020-10-05 ENCOUNTER — OFFICE VISIT (OUTPATIENT)
Dept: OPTOMETRY | Facility: CLINIC | Age: 75
End: 2020-10-05
Attending: INTERNAL MEDICINE
Payer: COMMERCIAL

## 2020-10-05 DIAGNOSIS — E11.8 TYPE 2 DIABETES MELLITUS WITH COMPLICATION, WITH LONG-TERM CURRENT USE OF INSULIN: ICD-10-CM

## 2020-10-05 DIAGNOSIS — H02.834 DERMATOCHALASIS OF BOTH UPPER EYELIDS: ICD-10-CM

## 2020-10-05 DIAGNOSIS — H53.9 VISUAL DISTURBANCES: ICD-10-CM

## 2020-10-05 DIAGNOSIS — H52.4 MYOPIA WITH ASTIGMATISM AND PRESBYOPIA, BILATERAL: Primary | ICD-10-CM

## 2020-10-05 DIAGNOSIS — H02.831 DERMATOCHALASIS OF BOTH UPPER EYELIDS: ICD-10-CM

## 2020-10-05 DIAGNOSIS — Z79.4 TYPE 2 DIABETES MELLITUS WITH COMPLICATION, WITH LONG-TERM CURRENT USE OF INSULIN: ICD-10-CM

## 2020-10-05 DIAGNOSIS — E11.9 TYPE 2 DIABETES MELLITUS WITHOUT OPHTHALMIC MANIFESTATIONS: ICD-10-CM

## 2020-10-05 DIAGNOSIS — H52.13 MYOPIA WITH ASTIGMATISM AND PRESBYOPIA, BILATERAL: Primary | ICD-10-CM

## 2020-10-05 DIAGNOSIS — H04.123 DRY EYE SYNDROME OF BOTH EYES: ICD-10-CM

## 2020-10-05 DIAGNOSIS — Z96.1 PSEUDOPHAKIA OF BOTH EYES: ICD-10-CM

## 2020-10-05 DIAGNOSIS — H52.203 MYOPIA WITH ASTIGMATISM AND PRESBYOPIA, BILATERAL: Primary | ICD-10-CM

## 2020-10-05 DIAGNOSIS — H35.3131 EARLY DRY STAGE NONEXUDATIVE AGE-RELATED MACULAR DEGENERATION OF BOTH EYES: ICD-10-CM

## 2020-10-05 PROCEDURE — 99999 PR PBB SHADOW E&M-EST. PATIENT-LVL III: ICD-10-PCS | Mod: PBBFAC,,, | Performed by: OPTOMETRIST

## 2020-10-05 PROCEDURE — 99499 UNLISTED E&M SERVICE: CPT | Mod: S$GLB,,, | Performed by: OPTOMETRIST

## 2020-10-05 PROCEDURE — 99499 RISK ADDL DX/OHS AUDIT: ICD-10-PCS | Mod: S$GLB,,, | Performed by: OPTOMETRIST

## 2020-10-05 PROCEDURE — 92015 DETERMINE REFRACTIVE STATE: CPT | Mod: S$GLB,,, | Performed by: OPTOMETRIST

## 2020-10-05 PROCEDURE — 92015 PR REFRACTION: ICD-10-PCS | Mod: S$GLB,,, | Performed by: OPTOMETRIST

## 2020-10-05 PROCEDURE — 99999 PR PBB SHADOW E&M-EST. PATIENT-LVL III: CPT | Mod: PBBFAC,,, | Performed by: OPTOMETRIST

## 2020-10-05 PROCEDURE — 92014 PR EYE EXAM, EST PATIENT,COMPREHESV: ICD-10-PCS | Mod: S$GLB,,, | Performed by: OPTOMETRIST

## 2020-10-05 PROCEDURE — 92014 COMPRE OPH EXAM EST PT 1/>: CPT | Mod: S$GLB,,, | Performed by: OPTOMETRIST

## 2020-10-05 NOTE — PROGRESS NOTES
HPI     JAVIER:05/2019  Glasses? no  Contacts? no  H/o eye surgery, injections or laser: Cataract sx   H/o eye injury: no  Known eye conditions? AMD   Family h/o eye conditions? Cataracts   Eye gtts?no    (-) Flashes (+) Floaters h/o  (-) Mucous   (+) Tearing constant (-) Itching (-) Burning   (-) Headaches (-) Eye Pain/discomfort (-) Irritation   (-) Redness (-) Double vision (+) Blurry vision OU peripheral ca seems   blocked- sometimes it feels as if vision is blocked on the sides.     Diabetic? (+)  A1c?  (Hemoglobin A1C       Date                     Value               Ref Range             Status                08/19/2020               6.1 (H)             4.0 - 5.6 %           Final              Comment:    ADA Screening Guidelines:  5.7-6.4%  Consistent with   prediabetes  >or=6.5%  Consistent with diabetes  High levels of fetal   hemoglobin interfere with the HbA1C  assay. Heterozygous hemoglobin   variants (HbS, HgC, etc)do  not significantly interfere with this assay.     However, presence of multiple variants may affect accuracy.         01/30/2020               6.8 (H)             4.0 - 5.6 %           Final              Comment:    ADA Screening Guidelines:  5.7-6.4%  Consistent with   prediabetes  >or=6.5%  Consistent with diabetes  High levels of fetal   hemoglobin interfere with the HbA1C  assay. Heterozygous hemoglobin   variants (HbS, HgC, etc)do  not significantly interfere with this assay.     However, presence of multiple variants may affect accuracy.         01/30/2020               6.8 (H)             4.0 - 5.6 %           Final              Comment:    ADA Screening Guidelines:  5.7-6.4%  Consistent with   prediabetes  >or=6.5%  Consistent with diabetes  High levels of fetal   hemoglobin interfere with the HbA1C  assay. Heterozygous hemoglobin   variants (HbS, HgC, etc)do  not significantly interfere with this assay.     However, presence of multiple variants may affect  accuracy.    ----------)        Last edited by Theo Alva, OD on 10/5/2020  3:36 PM. (History)            Assessment /Plan     For exam results, see Encounter Report.    Myopia with astigmatism and presbyopia, bilateral    Type 2 diabetes mellitus with complication, with long-term current use of insulin  -     Ambulatory referral/consult to Optometry    Type 2 diabetes mellitus without ophthalmic manifestations    Early dry stage nonexudative age-related macular degeneration of both eyes  -     Posterior Segment OCT Retina-Both eyes; Future    Dermatochalasis of both upper eyelids    Pseudophakia of both eyes    Dry eye syndrome of both eyes    Visual disturbances  -     Seay Visual Field - OU - Extended - Both Eyes; Future      1. SRx released to patient. Patient educated on lens options. Normal ocular health. RTC 1 year for routine exam.   2-3. BS control. No signs of diabetic retinopathy. Monitor with annual exam.  4. Rec home amsler and AREDS.   5. Stable. Monitor.   6. Good result.   7. Recommend Systane Ultra or Refresh Optive BID-TID OU to aid with symptoms of dry eyes.  8. Pt states he feels at time that his side vision is blocked. RTC 1 mo HVF. Will call w/results.

## 2020-10-05 NOTE — LETTER
October 5, 2020      Dipak Treviño MD  [address]  [address]  [facility] [address]           [facility]  [address]  [facility] [address]  Phone: [phone]  Fax: [phone]          Patient: Misha Eldridge Jr.   MR Number: [id number]   YOB: 1945   Date of Visit: 10/5/2020       Dear Dr. Dipak Treviño:    Thank you for referring Misha Eldridge to me for evaluation. Attached you will find relevant portions of my assessment and plan of care.    If you have questions, please do not hesitate to call me. I look forward to following Misha Eldridge along with you.    Sincerely,    Theo Alva, OD    Enclosure  CC:  No Recipients    If you would like to receive this communication electronically, please contact [email or web address] or [phone] to request more information on [employer] access.    For providers and/or their staff who would like to refer a patient to [facility], please contact us through our one-stop-shop provider referral line, [facility], at [phone].    If you feel you have received this communication in error or would no longer like to receive these types of communications, please e-mail [email or web address]

## 2020-10-06 ENCOUNTER — TELEPHONE (OUTPATIENT)
Dept: OPTOMETRY | Facility: CLINIC | Age: 75
End: 2020-10-06

## 2020-10-07 ENCOUNTER — CLINICAL SUPPORT (OUTPATIENT)
Dept: OPHTHALMOLOGY | Facility: CLINIC | Age: 75
End: 2020-10-07
Payer: MEDICARE

## 2020-10-07 DIAGNOSIS — H53.9 VISUAL DISTURBANCES: ICD-10-CM

## 2020-10-07 DIAGNOSIS — H35.3131 EARLY DRY STAGE NONEXUDATIVE AGE-RELATED MACULAR DEGENERATION OF BOTH EYES: ICD-10-CM

## 2020-10-08 ENCOUNTER — TELEPHONE (OUTPATIENT)
Dept: OPTOMETRY | Facility: CLINIC | Age: 75
End: 2020-10-08

## 2020-10-08 DIAGNOSIS — H53.452 OTHER LOCALIZED VISUAL FIELD DEFECT, LEFT EYE: ICD-10-CM

## 2020-10-08 DIAGNOSIS — H53.9 VISUAL DISTURBANCES: Primary | ICD-10-CM

## 2020-10-08 DIAGNOSIS — H53.40 VISUAL FIELD DEFECT OF LEFT EYE: ICD-10-CM

## 2020-10-08 NOTE — TELEPHONE ENCOUNTER
Let pt know the below,. Will call tomorrow to schejesusita pt after speaking with Dr. Alva concerning time frame and type of appt. Pt verbalized understanding by saying ok . ----- Message from Theo Alva, OD sent at 10/8/2020  9:55 AM CDT -----  Please reach out to this pt. I tried and couldn't get in touch with him.   Please let him know that his OCT revealed that him macular degeneration is dry. His visual field revealed that his left eye may have a defect or loss of peripheral vision. Since this may be his first time taking the test I would like to repeat the visual field and do an OCT of the optic nerve to r/o glaucoma or other abnormalities. If the visual field defect is repeatable then we will discuss further.     Schedule him for repeat OCT and HVF

## 2020-10-08 NOTE — PROGRESS NOTES
Assessment /Plan     For exam results, see Encounter Report.    Visual disturbances  -     Posterior Segment OCT Optic Nerve- Both eyes; Future  -     Seay Visual Field - OU - Extended - Both Eyes; Future    Visual field defect of left eye  -     Posterior Segment OCT Optic Nerve- Both eyes; Future  -     Seay Visual Field - OU - Extended - Both Eyes; Future    Other localized visual field defect, left eye   -     Posterior Segment OCT Optic Nerve- Both eyes; Future      Pt reports issues w/feeling like his peripheral vision is blocked. Pt's OS HVF shows ring like defect. Question of 1st time test taker issues vs actual defect vs lens ring for HVF obstructing. Will retest to see if defect is repeatable and run OCT to r/o glaucoma

## 2020-10-12 ENCOUNTER — TELEPHONE (OUTPATIENT)
Dept: OPTOMETRY | Facility: CLINIC | Age: 75
End: 2020-10-12

## 2020-10-15 DIAGNOSIS — G62.9 NEUROPATHY: ICD-10-CM

## 2020-10-15 DIAGNOSIS — E11.8 TYPE 2 DIABETES MELLITUS WITH COMPLICATION, WITH LONG-TERM CURRENT USE OF INSULIN: Primary | ICD-10-CM

## 2020-10-15 DIAGNOSIS — Z79.4 TYPE 2 DIABETES MELLITUS WITH COMPLICATION, WITH LONG-TERM CURRENT USE OF INSULIN: Primary | ICD-10-CM

## 2020-10-15 RX ORDER — LANCETS 26 GAUGE
1 EACH MISCELLANEOUS 2 TIMES DAILY
Qty: 200 EACH | Refills: 11 | Status: SHIPPED | OUTPATIENT
Start: 2020-10-15 | End: 2023-12-27

## 2020-10-16 RX ORDER — GABAPENTIN 300 MG/1
300 CAPSULE ORAL 3 TIMES DAILY
Qty: 270 CAPSULE | Refills: 1 | Status: SHIPPED | OUTPATIENT
Start: 2020-10-16 | End: 2021-03-08

## 2020-11-17 ENCOUNTER — PATIENT OUTREACH (OUTPATIENT)
Dept: ADMINISTRATIVE | Facility: HOSPITAL | Age: 75
End: 2020-11-17

## 2020-11-17 ENCOUNTER — TELEPHONE (OUTPATIENT)
Dept: INTERNAL MEDICINE | Facility: CLINIC | Age: 75
End: 2020-11-17

## 2020-11-30 ENCOUNTER — HOSPITAL ENCOUNTER (EMERGENCY)
Facility: OTHER | Age: 75
Discharge: HOME | End: 2020-11-30
Attending: EMERGENCY MEDICINE
Payer: MEDICARE

## 2020-11-30 VITALS
HEART RATE: 57 BPM | SYSTOLIC BLOOD PRESSURE: 143 MMHG | BODY MASS INDEX: 27.71 KG/M2 | TEMPERATURE: 99 F | OXYGEN SATURATION: 96 % | DIASTOLIC BLOOD PRESSURE: 73 MMHG | RESPIRATION RATE: 17 BRPM | WEIGHT: 210 LBS

## 2020-11-30 DIAGNOSIS — R07.9 CHEST PAIN: ICD-10-CM

## 2020-11-30 DIAGNOSIS — R07.89 OTHER CHEST PAIN: Primary | ICD-10-CM

## 2020-11-30 LAB
ALBUMIN SERPL BCP-MCNC: 4.1 G/DL (ref 3.5–5.2)
ALP SERPL-CCNC: 55 U/L (ref 55–135)
ALT SERPL W/O P-5'-P-CCNC: 18 U/L (ref 10–44)
ANION GAP SERPL CALC-SCNC: 11 MMOL/L (ref 8–16)
AST SERPL-CCNC: 16 U/L (ref 10–40)
BASOPHILS # BLD AUTO: 0.04 K/UL (ref 0–0.2)
BASOPHILS NFR BLD: 1 % (ref 0–1.9)
BILIRUB SERPL-MCNC: 0.4 MG/DL (ref 0.1–1)
BUN SERPL-MCNC: 17 MG/DL (ref 8–23)
CALCIUM SERPL-MCNC: 8.9 MG/DL (ref 8.7–10.5)
CHLORIDE SERPL-SCNC: 103 MMOL/L (ref 95–110)
CO2 SERPL-SCNC: 31 MMOL/L (ref 23–29)
CREAT SERPL-MCNC: 1.5 MG/DL (ref 0.5–1.4)
DIFFERENTIAL METHOD: ABNORMAL
EOSINOPHIL # BLD AUTO: 0.2 K/UL (ref 0–0.5)
EOSINOPHIL NFR BLD: 5.2 % (ref 0–8)
ERYTHROCYTE [DISTWIDTH] IN BLOOD BY AUTOMATED COUNT: 13.5 % (ref 11.5–14.5)
EST. GFR  (AFRICAN AMERICAN): 52 ML/MIN/1.73 M^2
EST. GFR  (NON AFRICAN AMERICAN): 45 ML/MIN/1.73 M^2
GLUCOSE SERPL-MCNC: 124 MG/DL (ref 70–110)
HCT VFR BLD AUTO: 39 % (ref 40–54)
HGB BLD-MCNC: 13.2 G/DL (ref 14–18)
IMM GRANULOCYTES # BLD AUTO: 0.01 K/UL (ref 0–0.04)
IMM GRANULOCYTES NFR BLD AUTO: 0.2 % (ref 0–0.5)
LYMPHOCYTES # BLD AUTO: 1.9 K/UL (ref 1–4.8)
LYMPHOCYTES NFR BLD: 44.2 % (ref 18–48)
MCH RBC QN AUTO: 29.5 PG (ref 27–31)
MCHC RBC AUTO-ENTMCNC: 33.8 G/DL (ref 32–36)
MCV RBC AUTO: 87 FL (ref 82–98)
MONOCYTES # BLD AUTO: 0.4 K/UL (ref 0.3–1)
MONOCYTES NFR BLD: 8.6 % (ref 4–15)
NEUTROPHILS # BLD AUTO: 1.7 K/UL (ref 1.8–7.7)
NEUTROPHILS NFR BLD: 40.8 % (ref 38–73)
NRBC BLD-RTO: 0 /100 WBC
PLATELET # BLD AUTO: 153 K/UL (ref 150–350)
PMV BLD AUTO: 10.5 FL (ref 9.2–12.9)
POTASSIUM SERPL-SCNC: 3.7 MMOL/L (ref 3.5–5.1)
PROT SERPL-MCNC: 6.8 G/DL (ref 6–8.4)
RBC # BLD AUTO: 4.47 M/UL (ref 4.6–6.2)
SODIUM SERPL-SCNC: 145 MMOL/L (ref 136–145)
TROPONIN I SERPL DL<=0.01 NG/ML-MCNC: <0.006 NG/ML (ref 0–0.03)
TROPONIN I SERPL DL<=0.01 NG/ML-MCNC: <0.006 NG/ML (ref 0–0.03)
WBC # BLD AUTO: 4.21 K/UL (ref 3.9–12.7)

## 2020-11-30 PROCEDURE — 84484 ASSAY OF TROPONIN QUANT: CPT | Mod: 91

## 2020-11-30 PROCEDURE — 25000003 PHARM REV CODE 250: Performed by: EMERGENCY MEDICINE

## 2020-11-30 PROCEDURE — 93005 ELECTROCARDIOGRAM TRACING: CPT

## 2020-11-30 PROCEDURE — 93010 ELECTROCARDIOGRAM REPORT: CPT | Mod: ,,, | Performed by: INTERNAL MEDICINE

## 2020-11-30 PROCEDURE — 93010 EKG 12-LEAD: ICD-10-PCS | Mod: ,,, | Performed by: INTERNAL MEDICINE

## 2020-11-30 PROCEDURE — 85025 COMPLETE CBC W/AUTO DIFF WBC: CPT

## 2020-11-30 PROCEDURE — 80053 COMPREHEN METABOLIC PANEL: CPT

## 2020-11-30 PROCEDURE — 99285 EMERGENCY DEPT VISIT HI MDM: CPT | Mod: 25

## 2020-11-30 PROCEDURE — 25000242 PHARM REV CODE 250 ALT 637 W/ HCPCS: Performed by: EMERGENCY MEDICINE

## 2020-11-30 RX ORDER — NITROGLYCERIN 0.4 MG/1
0.4 TABLET SUBLINGUAL
Status: COMPLETED | OUTPATIENT
Start: 2020-11-30 | End: 2020-11-30

## 2020-11-30 RX ORDER — ASPIRIN 325 MG
325 TABLET, DELAYED RELEASE (ENTERIC COATED) ORAL
Status: COMPLETED | OUTPATIENT
Start: 2020-11-30 | End: 2020-11-30

## 2020-11-30 RX ADMIN — ASPIRIN 325 MG: 325 TABLET, COATED ORAL at 07:11

## 2020-11-30 RX ADMIN — NITROGLYCERIN 0.4 MG: 0.4 TABLET, ORALLY DISINTEGRATING SUBLINGUAL at 07:11

## 2020-11-30 NOTE — ED PROVIDER NOTES
Encounter Date: 11/30/2020       History     Chief Complaint   Patient presents with    Chest Pain     left sided CP that started at 630 am today. pt denies hx of MI      76 yo M with htn, PE, history of colon cancer prostate cancer, and IDDM here with complaint of chest pain that began at 6:30 a.m. in the morning.  Patient reports awakening with left-sided sharp pain, no diaphoresis or shortness of breath.  Patient denies history of prior MI.  Denies vomiting, and currently pain has improved without intervention.  Patient denies recent illness, no known COVID exposures, no cough, does have history of remote PE, not currently on anticoagulation.        Review of patient's allergies indicates:   Allergen Reactions    Hay fever and allergy relief      Past Medical History:   Diagnosis Date    Cataract     Colostomy in place     Diabetes mellitus type II     Hemorrhoid     History of colon cancer     History of prostate cancer     History of pulmonary embolism     Hypertension      Past Surgical History:   Procedure Laterality Date    CATARACT EXTRACTION Bilateral     HERNIA REPAIR      PROSTATECTOMY      SUBTOTAL COLECTOMY       Family History   Problem Relation Age of Onset    Arthritis Mother     Hypertension Mother     Alcohol abuse Father      Social History     Tobacco Use    Smoking status: Never Smoker    Smokeless tobacco: Never Used   Substance Use Topics    Alcohol use: No    Drug use: No     Review of Systems   Constitutional: Negative for activity change, appetite change, chills, diaphoresis and fever.   HENT: Negative for congestion, sore throat and trouble swallowing.    Eyes: Negative for photophobia and visual disturbance.   Respiratory: Negative for cough, chest tightness and shortness of breath.    Cardiovascular: Positive for chest pain. Negative for leg swelling.   Gastrointestinal: Negative for abdominal pain, nausea and vomiting.   Endocrine: Negative for polydipsia,  polyphagia and polyuria.   Genitourinary: Negative for difficulty urinating and flank pain.   Musculoskeletal: Negative for back pain and neck pain.   Skin: Negative for pallor.   Neurological: Negative for weakness and headaches.   Psychiatric/Behavioral: Negative for confusion.       Physical Exam     Initial Vitals [11/30/20 0738]   BP Pulse Resp Temp SpO2   (!) 151/72 69 18 98.7 °F (37.1 °C) 97 %      MAP       --         Physical Exam    Nursing note and vitals reviewed.  Constitutional: He appears well-developed and well-nourished. No distress.   HENT:   Head: Normocephalic and atraumatic.   Eyes: Conjunctivae and EOM are normal.   Neck: Normal range of motion.   Cardiovascular: Normal rate.   Pulmonary/Chest: Effort normal. No respiratory distress. He exhibits no tenderness.   Abdominal: Normal appearance. There is no guarding.   Musculoskeletal: Normal range of motion.   Neurological: He is alert. No sensory deficit.   Skin: No rash noted.   Psychiatric: He has a normal mood and affect. His behavior is normal. Thought content normal.         ED Course   Procedures  Labs Reviewed   CBC W/ AUTO DIFFERENTIAL - Abnormal; Notable for the following components:       Result Value    RBC 4.47 (*)     Hemoglobin 13.2 (*)     Hematocrit 39.0 (*)     Gran # (ANC) 1.7 (*)     All other components within normal limits   COMPREHENSIVE METABOLIC PANEL - Abnormal; Notable for the following components:    CO2 31 (*)     Glucose 124 (*)     Creatinine 1.5 (*)     eGFR if  52 (*)     eGFR if non  45 (*)     All other components within normal limits   TROPONIN I   TROPONIN I     EKG Readings: (Independently Interpreted)     EKG normal sinus rhythm with first-degree AV block, heart rate 65, narrow QRS no STEMI, unchanged from 2014          Imaging Results          X-Ray Chest 1 View (Final result)  Result time 11/30/20 08:13:29    Final result by Alberto Forman MD (11/30/20 08:13:29)            "      Impression:      No acute cardiopulmonary finding identified on this single view.      Electronically signed by: Alberto Forman MD  Date:    11/30/2020  Time:    08:13             Narrative:    EXAMINATION:  XR CHEST 1 VIEW    CLINICAL HISTORY:  Provided history is "  Chest pain, unspecified".    TECHNIQUE:  One view of the chest.    COMPARISON:  None.    FINDINGS:  Cardiac wires overlie the chest.  Cardiomediastinal silhouette is not enlarged.  There are coarsened interstitial lung markings but no large focal area of consolidation.  Punctate calcification overlies the right lower lung zone.  No sizable pleural effusion.  No pneumothorax.                              X-Rays:   Independently Interpreted Readings:   Chest X-Ray: Normal heart size.  No infiltrates.  No acute abnormalities.     Medical Decision Making:   Initial Assessment:   76 yo M here after episode of chest pain @ 630 am, now improved and without sob/diaphoresis. Ddx includes not limited to acs, aortic dissection, gerd, pericarditis, pna. Will obtain labs, imaging and reassess.   Pt is not on daily asa.       Differential Diagnosis:   Serial trop neg x 2 and pt without active chest pain at discharge home, w strict follow up.   Clinical Tests:   Lab Tests: Ordered and Reviewed  Radiological Study: Ordered and Reviewed  Medical Tests: Ordered and Reviewed                             Clinical Impression:       ICD-10-CM ICD-9-CM   1. Other chest pain  R07.89 786.59   2. Chest pain  R07.9 786.50                      Disposition:   Disposition: Discharged  Condition: Fair     ED Disposition Condition    Discharge Stable        ED Prescriptions     None        Follow-up Information     Follow up With Specialties Details Why Contact Info    Dipak Treviño MD Internal Medicine Schedule an appointment as soon as possible for a visit   [address]  [address]  [facility] [address]  [phone]                                         Kelly " MD Shay  11/30/20 1037       Kelly Day MD  11/30/20 1654

## 2020-11-30 NOTE — ED TRIAGE NOTES
Pt presents to the ED for c/o L anterior chest pain that radiates straight through to the back. Pt states it is burning in nature. Pt reports ingesting some baking soda to help which they said did. Pt denies NV, lightheadedness. Mild distress noted. AAOx4.

## 2020-12-09 ENCOUNTER — PES CALL (OUTPATIENT)
Dept: ADMINISTRATIVE | Facility: CLINIC | Age: 75
End: 2020-12-09

## 2021-01-19 ENCOUNTER — OFFICE VISIT (OUTPATIENT)
Dept: INTERNAL MEDICINE | Facility: CLINIC | Age: 76
End: 2021-01-19
Payer: MEDICARE

## 2021-01-19 ENCOUNTER — PATIENT OUTREACH (OUTPATIENT)
Dept: ADMINISTRATIVE | Facility: OTHER | Age: 76
End: 2021-01-19

## 2021-01-19 VITALS
WEIGHT: 217.63 LBS | OXYGEN SATURATION: 98 % | BODY MASS INDEX: 29.48 KG/M2 | DIASTOLIC BLOOD PRESSURE: 80 MMHG | HEIGHT: 72 IN | SYSTOLIC BLOOD PRESSURE: 160 MMHG | HEART RATE: 73 BPM

## 2021-01-19 DIAGNOSIS — E11.40 CONTROLLED TYPE 2 DIABETES MELLITUS WITH DIABETIC NEUROPATHY, WITHOUT LONG-TERM CURRENT USE OF INSULIN: ICD-10-CM

## 2021-01-19 DIAGNOSIS — I10 ESSENTIAL HYPERTENSION: ICD-10-CM

## 2021-01-19 DIAGNOSIS — Z86.711 HISTORY OF PULMONARY EMBOLISM: ICD-10-CM

## 2021-01-19 DIAGNOSIS — N18.30 CKD STAGE 3 SECONDARY TO DIABETES: ICD-10-CM

## 2021-01-19 DIAGNOSIS — Z85.038 HISTORY OF COLON CANCER: ICD-10-CM

## 2021-01-19 DIAGNOSIS — H93.13 TINNITUS OF BOTH EARS: Chronic | ICD-10-CM

## 2021-01-19 DIAGNOSIS — Z85.46 HISTORY OF PROSTATE CANCER: ICD-10-CM

## 2021-01-19 DIAGNOSIS — M54.2 CERVICAL PAIN (NECK): Chronic | ICD-10-CM

## 2021-01-19 DIAGNOSIS — G89.29 CHRONIC BILATERAL LOW BACK PAIN WITHOUT SCIATICA: Chronic | ICD-10-CM

## 2021-01-19 DIAGNOSIS — M54.50 CHRONIC BILATERAL LOW BACK PAIN WITHOUT SCIATICA: Chronic | ICD-10-CM

## 2021-01-19 DIAGNOSIS — Z00.00 ENCOUNTER FOR PREVENTIVE HEALTH EXAMINATION: Primary | ICD-10-CM

## 2021-01-19 DIAGNOSIS — H91.90 HEARING LOSS, UNSPECIFIED HEARING LOSS TYPE, UNSPECIFIED LATERALITY: ICD-10-CM

## 2021-01-19 DIAGNOSIS — E11.22 CKD STAGE 3 SECONDARY TO DIABETES: ICD-10-CM

## 2021-01-19 PROCEDURE — 1101F PR PT FALLS ASSESS DOC 0-1 FALLS W/OUT INJ PAST YR: ICD-10-PCS | Mod: CPTII,S$GLB,, | Performed by: NURSE PRACTITIONER

## 2021-01-19 PROCEDURE — 3044F PR MOST RECENT HEMOGLOBIN A1C LEVEL <7.0%: ICD-10-PCS | Mod: CPTII,S$GLB,, | Performed by: NURSE PRACTITIONER

## 2021-01-19 PROCEDURE — G9919 PR SCREENING AND POSITIVE: ICD-10-PCS | Mod: CPTII,S$GLB,, | Performed by: NURSE PRACTITIONER

## 2021-01-19 PROCEDURE — 1125F AMNT PAIN NOTED PAIN PRSNT: CPT | Mod: S$GLB,,, | Performed by: NURSE PRACTITIONER

## 2021-01-19 PROCEDURE — 3008F BODY MASS INDEX DOCD: CPT | Mod: CPTII,S$GLB,, | Performed by: NURSE PRACTITIONER

## 2021-01-19 PROCEDURE — 3072F LOW RISK FOR RETINOPATHY: CPT | Mod: S$GLB,,, | Performed by: NURSE PRACTITIONER

## 2021-01-19 PROCEDURE — 3079F DIAST BP 80-89 MM HG: CPT | Mod: CPTII,S$GLB,, | Performed by: NURSE PRACTITIONER

## 2021-01-19 PROCEDURE — 1125F PR PAIN SEVERITY QUANTIFIED, PAIN PRESENT: ICD-10-PCS | Mod: S$GLB,,, | Performed by: NURSE PRACTITIONER

## 2021-01-19 PROCEDURE — 3008F PR BODY MASS INDEX (BMI) DOCUMENTED: ICD-10-PCS | Mod: CPTII,S$GLB,, | Performed by: NURSE PRACTITIONER

## 2021-01-19 PROCEDURE — 3288F PR FALLS RISK ASSESSMENT DOCUMENTED: ICD-10-PCS | Mod: CPTII,S$GLB,, | Performed by: NURSE PRACTITIONER

## 2021-01-19 PROCEDURE — 3288F FALL RISK ASSESSMENT DOCD: CPT | Mod: CPTII,S$GLB,, | Performed by: NURSE PRACTITIONER

## 2021-01-19 PROCEDURE — 3072F PR LOW RISK FOR RETINOPATHY: ICD-10-PCS | Mod: S$GLB,,, | Performed by: NURSE PRACTITIONER

## 2021-01-19 PROCEDURE — 3044F HG A1C LEVEL LT 7.0%: CPT | Mod: CPTII,S$GLB,, | Performed by: NURSE PRACTITIONER

## 2021-01-19 PROCEDURE — 1101F PT FALLS ASSESS-DOCD LE1/YR: CPT | Mod: CPTII,S$GLB,, | Performed by: NURSE PRACTITIONER

## 2021-01-19 PROCEDURE — G0439 PPPS, SUBSEQ VISIT: HCPCS | Mod: S$GLB,,, | Performed by: NURSE PRACTITIONER

## 2021-01-19 PROCEDURE — 99999 PR PBB SHADOW E&M-EST. PATIENT-LVL IV: CPT | Mod: PBBFAC,,, | Performed by: NURSE PRACTITIONER

## 2021-01-19 PROCEDURE — 1158F ADVNC CARE PLAN TLK DOCD: CPT | Mod: S$GLB,,, | Performed by: NURSE PRACTITIONER

## 2021-01-19 PROCEDURE — 99999 PR PBB SHADOW E&M-EST. PATIENT-LVL IV: ICD-10-PCS | Mod: PBBFAC,,, | Performed by: NURSE PRACTITIONER

## 2021-01-19 PROCEDURE — G0439 PR MEDICARE ANNUAL WELLNESS SUBSEQUENT VISIT: ICD-10-PCS | Mod: S$GLB,,, | Performed by: NURSE PRACTITIONER

## 2021-01-19 PROCEDURE — 1158F PR ADVANCE CARE PLANNING DISCUSS DOCUMENTED IN MEDICAL RECORD: ICD-10-PCS | Mod: S$GLB,,, | Performed by: NURSE PRACTITIONER

## 2021-01-19 PROCEDURE — 3079F PR MOST RECENT DIASTOLIC BLOOD PRESSURE 80-89 MM HG: ICD-10-PCS | Mod: CPTII,S$GLB,, | Performed by: NURSE PRACTITIONER

## 2021-01-19 PROCEDURE — 3077F PR MOST RECENT SYSTOLIC BLOOD PRESSURE >= 140 MM HG: ICD-10-PCS | Mod: CPTII,S$GLB,, | Performed by: NURSE PRACTITIONER

## 2021-01-19 PROCEDURE — G9919 SCRN ND POS ND PROV OF REC: HCPCS | Mod: CPTII,S$GLB,, | Performed by: NURSE PRACTITIONER

## 2021-01-19 PROCEDURE — 3077F SYST BP >= 140 MM HG: CPT | Mod: CPTII,S$GLB,, | Performed by: NURSE PRACTITIONER

## 2021-01-25 ENCOUNTER — OFFICE VISIT (OUTPATIENT)
Dept: PODIATRY | Facility: CLINIC | Age: 76
End: 2021-01-25
Payer: MEDICARE

## 2021-01-25 ENCOUNTER — PATIENT OUTREACH (OUTPATIENT)
Dept: ADMINISTRATIVE | Facility: HOSPITAL | Age: 76
End: 2021-01-25

## 2021-01-25 VITALS
SYSTOLIC BLOOD PRESSURE: 138 MMHG | BODY MASS INDEX: 29.48 KG/M2 | DIASTOLIC BLOOD PRESSURE: 63 MMHG | WEIGHT: 217.63 LBS | HEIGHT: 72 IN | HEART RATE: 62 BPM

## 2021-01-25 DIAGNOSIS — E11.9 ENCOUNTER FOR DIABETIC FOOT EXAM: Primary | ICD-10-CM

## 2021-01-25 DIAGNOSIS — E11.40 CONTROLLED TYPE 2 DIABETES MELLITUS WITH DIABETIC NEUROPATHY, WITHOUT LONG-TERM CURRENT USE OF INSULIN: ICD-10-CM

## 2021-01-25 PROCEDURE — 3044F HG A1C LEVEL LT 7.0%: CPT | Mod: CPTII,S$GLB,, | Performed by: PODIATRIST

## 2021-01-25 PROCEDURE — 3008F BODY MASS INDEX DOCD: CPT | Mod: CPTII,S$GLB,, | Performed by: PODIATRIST

## 2021-01-25 PROCEDURE — 3075F SYST BP GE 130 - 139MM HG: CPT | Mod: CPTII,S$GLB,, | Performed by: PODIATRIST

## 2021-01-25 PROCEDURE — 99999 PR PBB SHADOW E&M-EST. PATIENT-LVL V: ICD-10-PCS | Mod: PBBFAC,,, | Performed by: PODIATRIST

## 2021-01-25 PROCEDURE — 1101F PR PT FALLS ASSESS DOC 0-1 FALLS W/OUT INJ PAST YR: ICD-10-PCS | Mod: CPTII,S$GLB,, | Performed by: PODIATRIST

## 2021-01-25 PROCEDURE — 3288F FALL RISK ASSESSMENT DOCD: CPT | Mod: CPTII,S$GLB,, | Performed by: PODIATRIST

## 2021-01-25 PROCEDURE — 99203 OFFICE O/P NEW LOW 30 MIN: CPT | Mod: S$GLB,,, | Performed by: PODIATRIST

## 2021-01-25 PROCEDURE — 1159F MED LIST DOCD IN RCRD: CPT | Mod: S$GLB,,, | Performed by: PODIATRIST

## 2021-01-25 PROCEDURE — 3044F PR MOST RECENT HEMOGLOBIN A1C LEVEL <7.0%: ICD-10-PCS | Mod: CPTII,S$GLB,, | Performed by: PODIATRIST

## 2021-01-25 PROCEDURE — 3072F PR LOW RISK FOR RETINOPATHY: ICD-10-PCS | Mod: S$GLB,,, | Performed by: PODIATRIST

## 2021-01-25 PROCEDURE — 3075F PR MOST RECENT SYSTOLIC BLOOD PRESS GE 130-139MM HG: ICD-10-PCS | Mod: CPTII,S$GLB,, | Performed by: PODIATRIST

## 2021-01-25 PROCEDURE — 1126F AMNT PAIN NOTED NONE PRSNT: CPT | Mod: S$GLB,,, | Performed by: PODIATRIST

## 2021-01-25 PROCEDURE — 1159F PR MEDICATION LIST DOCUMENTED IN MEDICAL RECORD: ICD-10-PCS | Mod: S$GLB,,, | Performed by: PODIATRIST

## 2021-01-25 PROCEDURE — 1101F PT FALLS ASSESS-DOCD LE1/YR: CPT | Mod: CPTII,S$GLB,, | Performed by: PODIATRIST

## 2021-01-25 PROCEDURE — 3072F LOW RISK FOR RETINOPATHY: CPT | Mod: S$GLB,,, | Performed by: PODIATRIST

## 2021-01-25 PROCEDURE — 99203 PR OFFICE/OUTPT VISIT, NEW, LEVL III, 30-44 MIN: ICD-10-PCS | Mod: S$GLB,,, | Performed by: PODIATRIST

## 2021-01-25 PROCEDURE — 3078F PR MOST RECENT DIASTOLIC BLOOD PRESSURE < 80 MM HG: ICD-10-PCS | Mod: CPTII,S$GLB,, | Performed by: PODIATRIST

## 2021-01-25 PROCEDURE — 3008F PR BODY MASS INDEX (BMI) DOCUMENTED: ICD-10-PCS | Mod: CPTII,S$GLB,, | Performed by: PODIATRIST

## 2021-01-25 PROCEDURE — 3288F PR FALLS RISK ASSESSMENT DOCUMENTED: ICD-10-PCS | Mod: CPTII,S$GLB,, | Performed by: PODIATRIST

## 2021-01-25 PROCEDURE — 3078F DIAST BP <80 MM HG: CPT | Mod: CPTII,S$GLB,, | Performed by: PODIATRIST

## 2021-01-25 PROCEDURE — 1126F PR PAIN SEVERITY QUANTIFIED, NO PAIN PRESENT: ICD-10-PCS | Mod: S$GLB,,, | Performed by: PODIATRIST

## 2021-01-25 PROCEDURE — 99999 PR PBB SHADOW E&M-EST. PATIENT-LVL V: CPT | Mod: PBBFAC,,, | Performed by: PODIATRIST

## 2021-01-27 DIAGNOSIS — I10 HYPERTENSION, UNSPECIFIED TYPE: ICD-10-CM

## 2021-01-27 DIAGNOSIS — E78.9 ABNORMAL CHOLESTEROL TEST: ICD-10-CM

## 2021-01-27 RX ORDER — HYDROCHLOROTHIAZIDE 25 MG/1
TABLET ORAL
Qty: 90 TABLET | Refills: 2 | Status: SHIPPED | OUTPATIENT
Start: 2021-01-27 | End: 2021-12-22 | Stop reason: SDUPTHER

## 2021-01-27 RX ORDER — ATORVASTATIN CALCIUM 20 MG/1
20 TABLET, FILM COATED ORAL DAILY
Qty: 90 TABLET | Refills: 2 | Status: SHIPPED | OUTPATIENT
Start: 2021-01-27 | End: 2021-10-11 | Stop reason: SDUPTHER

## 2021-02-08 ENCOUNTER — CLINICAL SUPPORT (OUTPATIENT)
Dept: OTOLARYNGOLOGY | Facility: CLINIC | Age: 76
End: 2021-02-08
Payer: MEDICARE

## 2021-02-08 ENCOUNTER — OFFICE VISIT (OUTPATIENT)
Dept: OTOLARYNGOLOGY | Facility: CLINIC | Age: 76
End: 2021-02-08
Payer: MEDICARE

## 2021-02-08 VITALS
HEART RATE: 61 BPM | WEIGHT: 216.81 LBS | SYSTOLIC BLOOD PRESSURE: 136 MMHG | DIASTOLIC BLOOD PRESSURE: 71 MMHG | BODY MASS INDEX: 29.36 KG/M2 | TEMPERATURE: 98 F | HEIGHT: 72 IN

## 2021-02-08 DIAGNOSIS — H93.13 BILATERAL TINNITUS: ICD-10-CM

## 2021-02-08 DIAGNOSIS — H93.13 TINNITUS, BILATERAL: ICD-10-CM

## 2021-02-08 DIAGNOSIS — Z77.122 HISTORY OF EXPOSURE TO NOISE: ICD-10-CM

## 2021-02-08 DIAGNOSIS — H90.3 BILATERAL SENSORINEURAL HEARING LOSS: Primary | ICD-10-CM

## 2021-02-08 DIAGNOSIS — H61.23 BILATERAL IMPACTED CERUMEN: ICD-10-CM

## 2021-02-08 DIAGNOSIS — R29.2 ABNORMAL ACOUSTIC REFLEX: ICD-10-CM

## 2021-02-08 DIAGNOSIS — H91.90 HEARING LOSS, UNSPECIFIED HEARING LOSS TYPE, UNSPECIFIED LATERALITY: ICD-10-CM

## 2021-02-08 DIAGNOSIS — H90.3 BILATERAL SENSORINEURAL HEARING LOSS: ICD-10-CM

## 2021-02-08 PROCEDURE — 1159F MED LIST DOCD IN RCRD: CPT | Mod: S$GLB,,, | Performed by: OTOLARYNGOLOGY

## 2021-02-08 PROCEDURE — 99203 OFFICE O/P NEW LOW 30 MIN: CPT | Mod: 25,S$GLB,, | Performed by: OTOLARYNGOLOGY

## 2021-02-08 PROCEDURE — 3072F LOW RISK FOR RETINOPATHY: CPT | Mod: S$GLB,,, | Performed by: OTOLARYNGOLOGY

## 2021-02-08 PROCEDURE — 92550 TYMPANOMETRY & REFLEX THRESH: CPT | Mod: S$GLB,,, | Performed by: AUDIOLOGIST-HEARING AID FITTER

## 2021-02-08 PROCEDURE — 99203 PR OFFICE/OUTPT VISIT, NEW, LEVL III, 30-44 MIN: ICD-10-PCS | Mod: 25,S$GLB,, | Performed by: OTOLARYNGOLOGY

## 2021-02-08 PROCEDURE — 3008F BODY MASS INDEX DOCD: CPT | Mod: CPTII,S$GLB,, | Performed by: OTOLARYNGOLOGY

## 2021-02-08 PROCEDURE — 3008F PR BODY MASS INDEX (BMI) DOCUMENTED: ICD-10-PCS | Mod: CPTII,S$GLB,, | Performed by: OTOLARYNGOLOGY

## 2021-02-08 PROCEDURE — 92557 PR COMPREHENSIVE HEARING TEST: ICD-10-PCS | Mod: S$GLB,,, | Performed by: AUDIOLOGIST-HEARING AID FITTER

## 2021-02-08 PROCEDURE — 3078F PR MOST RECENT DIASTOLIC BLOOD PRESSURE < 80 MM HG: ICD-10-PCS | Mod: CPTII,S$GLB,, | Performed by: OTOLARYNGOLOGY

## 2021-02-08 PROCEDURE — 3072F PR LOW RISK FOR RETINOPATHY: ICD-10-PCS | Mod: S$GLB,,, | Performed by: OTOLARYNGOLOGY

## 2021-02-08 PROCEDURE — 3078F DIAST BP <80 MM HG: CPT | Mod: CPTII,S$GLB,, | Performed by: OTOLARYNGOLOGY

## 2021-02-08 PROCEDURE — 3075F SYST BP GE 130 - 139MM HG: CPT | Mod: CPTII,S$GLB,, | Performed by: OTOLARYNGOLOGY

## 2021-02-08 PROCEDURE — 92550 PR TYMPANOMETRY AND REFLEX THRESHOLD MEASUREMENTS: ICD-10-PCS | Mod: S$GLB,,, | Performed by: AUDIOLOGIST-HEARING AID FITTER

## 2021-02-08 PROCEDURE — 92557 COMPREHENSIVE HEARING TEST: CPT | Mod: S$GLB,,, | Performed by: AUDIOLOGIST-HEARING AID FITTER

## 2021-02-08 PROCEDURE — 3075F PR MOST RECENT SYSTOLIC BLOOD PRESS GE 130-139MM HG: ICD-10-PCS | Mod: CPTII,S$GLB,, | Performed by: OTOLARYNGOLOGY

## 2021-02-08 PROCEDURE — G0268 REMOVAL OF IMPACTED WAX MD: HCPCS | Mod: S$GLB,,, | Performed by: OTOLARYNGOLOGY

## 2021-02-08 PROCEDURE — 1159F PR MEDICATION LIST DOCUMENTED IN MEDICAL RECORD: ICD-10-PCS | Mod: S$GLB,,, | Performed by: OTOLARYNGOLOGY

## 2021-02-08 PROCEDURE — G0268 EAR CERUMEN REMOVAL: ICD-10-PCS | Mod: S$GLB,,, | Performed by: OTOLARYNGOLOGY

## 2021-03-24 ENCOUNTER — OFFICE VISIT (OUTPATIENT)
Dept: INTERNAL MEDICINE | Facility: CLINIC | Age: 76
End: 2021-03-24
Attending: INTERNAL MEDICINE
Payer: MEDICARE

## 2021-03-24 VITALS
HEART RATE: 99 BPM | WEIGHT: 214.94 LBS | OXYGEN SATURATION: 96 % | SYSTOLIC BLOOD PRESSURE: 144 MMHG | BODY MASS INDEX: 28.49 KG/M2 | DIASTOLIC BLOOD PRESSURE: 72 MMHG | HEIGHT: 73 IN

## 2021-03-24 DIAGNOSIS — I10 ESSENTIAL HYPERTENSION: ICD-10-CM

## 2021-03-24 DIAGNOSIS — M79.651 PAIN OF RIGHT THIGH: ICD-10-CM

## 2021-03-24 DIAGNOSIS — E11.22 CKD STAGE 3 SECONDARY TO DIABETES: ICD-10-CM

## 2021-03-24 DIAGNOSIS — E11.40 CONTROLLED TYPE 2 DIABETES MELLITUS WITH DIABETIC NEUROPATHY, WITHOUT LONG-TERM CURRENT USE OF INSULIN: Primary | ICD-10-CM

## 2021-03-24 DIAGNOSIS — Z85.038 HISTORY OF COLON CANCER: ICD-10-CM

## 2021-03-24 DIAGNOSIS — M79.674 PAIN IN TOES OF BOTH FEET: ICD-10-CM

## 2021-03-24 DIAGNOSIS — N18.30 CKD STAGE 3 SECONDARY TO DIABETES: ICD-10-CM

## 2021-03-24 DIAGNOSIS — M79.675 PAIN IN TOES OF BOTH FEET: ICD-10-CM

## 2021-03-24 PROCEDURE — 3078F DIAST BP <80 MM HG: CPT | Mod: CPTII,S$GLB,, | Performed by: INTERNAL MEDICINE

## 2021-03-24 PROCEDURE — 1125F AMNT PAIN NOTED PAIN PRSNT: CPT | Mod: S$GLB,,, | Performed by: INTERNAL MEDICINE

## 2021-03-24 PROCEDURE — 99214 OFFICE O/P EST MOD 30 MIN: CPT | Mod: S$GLB,,, | Performed by: INTERNAL MEDICINE

## 2021-03-24 PROCEDURE — 1159F PR MEDICATION LIST DOCUMENTED IN MEDICAL RECORD: ICD-10-PCS | Mod: S$GLB,,, | Performed by: INTERNAL MEDICINE

## 2021-03-24 PROCEDURE — 3008F PR BODY MASS INDEX (BMI) DOCUMENTED: ICD-10-PCS | Mod: CPTII,S$GLB,, | Performed by: INTERNAL MEDICINE

## 2021-03-24 PROCEDURE — 1125F PR PAIN SEVERITY QUANTIFIED, PAIN PRESENT: ICD-10-PCS | Mod: S$GLB,,, | Performed by: INTERNAL MEDICINE

## 2021-03-24 PROCEDURE — 3077F SYST BP >= 140 MM HG: CPT | Mod: CPTII,S$GLB,, | Performed by: INTERNAL MEDICINE

## 2021-03-24 PROCEDURE — 3044F PR MOST RECENT HEMOGLOBIN A1C LEVEL <7.0%: ICD-10-PCS | Mod: CPTII,S$GLB,, | Performed by: INTERNAL MEDICINE

## 2021-03-24 PROCEDURE — 3072F PR LOW RISK FOR RETINOPATHY: ICD-10-PCS | Mod: S$GLB,,, | Performed by: INTERNAL MEDICINE

## 2021-03-24 PROCEDURE — 3078F PR MOST RECENT DIASTOLIC BLOOD PRESSURE < 80 MM HG: ICD-10-PCS | Mod: CPTII,S$GLB,, | Performed by: INTERNAL MEDICINE

## 2021-03-24 PROCEDURE — 3044F HG A1C LEVEL LT 7.0%: CPT | Mod: CPTII,S$GLB,, | Performed by: INTERNAL MEDICINE

## 2021-03-24 PROCEDURE — 99214 PR OFFICE/OUTPT VISIT, EST, LEVL IV, 30-39 MIN: ICD-10-PCS | Mod: S$GLB,,, | Performed by: INTERNAL MEDICINE

## 2021-03-24 PROCEDURE — 99999 PR PBB SHADOW E&M-EST. PATIENT-LVL IV: CPT | Mod: PBBFAC,,, | Performed by: INTERNAL MEDICINE

## 2021-03-24 PROCEDURE — 3077F PR MOST RECENT SYSTOLIC BLOOD PRESSURE >= 140 MM HG: ICD-10-PCS | Mod: CPTII,S$GLB,, | Performed by: INTERNAL MEDICINE

## 2021-03-24 PROCEDURE — 3008F BODY MASS INDEX DOCD: CPT | Mod: CPTII,S$GLB,, | Performed by: INTERNAL MEDICINE

## 2021-03-24 PROCEDURE — 3072F LOW RISK FOR RETINOPATHY: CPT | Mod: S$GLB,,, | Performed by: INTERNAL MEDICINE

## 2021-03-24 PROCEDURE — 99999 PR PBB SHADOW E&M-EST. PATIENT-LVL IV: ICD-10-PCS | Mod: PBBFAC,,, | Performed by: INTERNAL MEDICINE

## 2021-03-24 PROCEDURE — 1159F MED LIST DOCD IN RCRD: CPT | Mod: S$GLB,,, | Performed by: INTERNAL MEDICINE

## 2021-03-24 RX ORDER — DOXAZOSIN 4 MG/1
4 TABLET ORAL NIGHTLY
Qty: 90 TABLET | Refills: 2 | Status: SHIPPED | OUTPATIENT
Start: 2021-03-24 | End: 2021-12-23

## 2021-03-28 ENCOUNTER — PATIENT OUTREACH (OUTPATIENT)
Dept: ADMINISTRATIVE | Facility: OTHER | Age: 76
End: 2021-03-28

## 2021-03-29 ENCOUNTER — TELEPHONE (OUTPATIENT)
Dept: SPINE | Facility: CLINIC | Age: 76
End: 2021-03-29

## 2021-03-29 ENCOUNTER — OFFICE VISIT (OUTPATIENT)
Dept: PODIATRY | Facility: CLINIC | Age: 76
End: 2021-03-29
Payer: MEDICARE

## 2021-03-29 VITALS
HEIGHT: 73 IN | DIASTOLIC BLOOD PRESSURE: 74 MMHG | SYSTOLIC BLOOD PRESSURE: 164 MMHG | WEIGHT: 214 LBS | BODY MASS INDEX: 28.36 KG/M2 | HEART RATE: 59 BPM

## 2021-03-29 DIAGNOSIS — E11.40 CONTROLLED TYPE 2 DIABETES MELLITUS WITH DIABETIC NEUROPATHY, WITHOUT LONG-TERM CURRENT USE OF INSULIN: Primary | ICD-10-CM

## 2021-03-29 DIAGNOSIS — E11.22 CKD STAGE 3 SECONDARY TO DIABETES: ICD-10-CM

## 2021-03-29 DIAGNOSIS — L60.0 ONYCHOMYCOSIS WITH INGROWN TOENAIL: ICD-10-CM

## 2021-03-29 DIAGNOSIS — N18.30 CKD STAGE 3 SECONDARY TO DIABETES: ICD-10-CM

## 2021-03-29 DIAGNOSIS — B35.1 ONYCHOMYCOSIS WITH INGROWN TOENAIL: ICD-10-CM

## 2021-03-29 PROCEDURE — 11721 DEBRIDE NAIL 6 OR MORE: CPT | Mod: Q9,S$GLB,, | Performed by: PODIATRIST

## 2021-03-29 PROCEDURE — 11721 ROUTINE FOOT CARE: ICD-10-PCS | Mod: Q9,S$GLB,, | Performed by: PODIATRIST

## 2021-03-29 PROCEDURE — 1101F PR PT FALLS ASSESS DOC 0-1 FALLS W/OUT INJ PAST YR: ICD-10-PCS | Mod: CPTII,S$GLB,, | Performed by: PODIATRIST

## 2021-03-29 PROCEDURE — 99499 RISK ADDL DX/OHS AUDIT: ICD-10-PCS | Mod: S$GLB,,, | Performed by: PODIATRIST

## 2021-03-29 PROCEDURE — 3072F PR LOW RISK FOR RETINOPATHY: ICD-10-PCS | Mod: S$GLB,,, | Performed by: PODIATRIST

## 2021-03-29 PROCEDURE — 3072F LOW RISK FOR RETINOPATHY: CPT | Mod: S$GLB,,, | Performed by: PODIATRIST

## 2021-03-29 PROCEDURE — 99999 PR PBB SHADOW E&M-EST. PATIENT-LVL IV: CPT | Mod: PBBFAC,,, | Performed by: PODIATRIST

## 2021-03-29 PROCEDURE — 3288F PR FALLS RISK ASSESSMENT DOCUMENTED: ICD-10-PCS | Mod: CPTII,S$GLB,, | Performed by: PODIATRIST

## 2021-03-29 PROCEDURE — 99499 UNLISTED E&M SERVICE: CPT | Mod: S$GLB,,, | Performed by: PODIATRIST

## 2021-03-29 PROCEDURE — 1101F PT FALLS ASSESS-DOCD LE1/YR: CPT | Mod: CPTII,S$GLB,, | Performed by: PODIATRIST

## 2021-03-29 PROCEDURE — 3008F PR BODY MASS INDEX (BMI) DOCUMENTED: ICD-10-PCS | Mod: CPTII,S$GLB,, | Performed by: PODIATRIST

## 2021-03-29 PROCEDURE — 99999 PR PBB SHADOW E&M-EST. PATIENT-LVL IV: ICD-10-PCS | Mod: PBBFAC,,, | Performed by: PODIATRIST

## 2021-03-29 PROCEDURE — 1125F AMNT PAIN NOTED PAIN PRSNT: CPT | Mod: S$GLB,,, | Performed by: PODIATRIST

## 2021-03-29 PROCEDURE — 1125F PR PAIN SEVERITY QUANTIFIED, PAIN PRESENT: ICD-10-PCS | Mod: S$GLB,,, | Performed by: PODIATRIST

## 2021-03-29 PROCEDURE — 3288F FALL RISK ASSESSMENT DOCD: CPT | Mod: CPTII,S$GLB,, | Performed by: PODIATRIST

## 2021-03-29 PROCEDURE — 3008F BODY MASS INDEX DOCD: CPT | Mod: CPTII,S$GLB,, | Performed by: PODIATRIST

## 2021-03-30 ENCOUNTER — HOSPITAL ENCOUNTER (OUTPATIENT)
Dept: RADIOLOGY | Facility: OTHER | Age: 76
Discharge: HOME | End: 2021-03-30
Attending: NURSE PRACTITIONER
Payer: MEDICARE

## 2021-03-30 ENCOUNTER — OFFICE VISIT (OUTPATIENT)
Dept: SPINE | Facility: CLINIC | Age: 76
End: 2021-03-30
Payer: MEDICARE

## 2021-03-30 VITALS
WEIGHT: 214.06 LBS | BODY MASS INDEX: 28.37 KG/M2 | HEIGHT: 73 IN | HEART RATE: 59 BPM | SYSTOLIC BLOOD PRESSURE: 141 MMHG | DIASTOLIC BLOOD PRESSURE: 78 MMHG

## 2021-03-30 DIAGNOSIS — M47.817 LUMBOSACRAL SPONDYLOSIS WITHOUT MYELOPATHY: Primary | ICD-10-CM

## 2021-03-30 DIAGNOSIS — M54.9 DORSALGIA, UNSPECIFIED: ICD-10-CM

## 2021-03-30 DIAGNOSIS — L02.212 ABSCESS OF BACK: ICD-10-CM

## 2021-03-30 DIAGNOSIS — E13.9 DIABETES 1.5, MANAGED AS TYPE 2: ICD-10-CM

## 2021-03-30 PROCEDURE — 1101F PR PT FALLS ASSESS DOC 0-1 FALLS W/OUT INJ PAST YR: ICD-10-PCS | Mod: CPTII,S$GLB,, | Performed by: NURSE PRACTITIONER

## 2021-03-30 PROCEDURE — 99999 PR PBB SHADOW E&M-EST. PATIENT-LVL V: ICD-10-PCS | Mod: PBBFAC,,, | Performed by: NURSE PRACTITIONER

## 2021-03-30 PROCEDURE — 3078F DIAST BP <80 MM HG: CPT | Mod: CPTII,S$GLB,, | Performed by: NURSE PRACTITIONER

## 2021-03-30 PROCEDURE — 1159F PR MEDICATION LIST DOCUMENTED IN MEDICAL RECORD: ICD-10-PCS | Mod: S$GLB,,, | Performed by: NURSE PRACTITIONER

## 2021-03-30 PROCEDURE — 1125F PR PAIN SEVERITY QUANTIFIED, PAIN PRESENT: ICD-10-PCS | Mod: S$GLB,,, | Performed by: NURSE PRACTITIONER

## 2021-03-30 PROCEDURE — 99204 PR OFFICE/OUTPT VISIT, NEW, LEVL IV, 45-59 MIN: ICD-10-PCS | Mod: S$GLB,,, | Performed by: NURSE PRACTITIONER

## 2021-03-30 PROCEDURE — 72120 X-RAY BEND ONLY L-S SPINE: CPT | Mod: TC,FY

## 2021-03-30 PROCEDURE — 3077F SYST BP >= 140 MM HG: CPT | Mod: CPTII,S$GLB,, | Performed by: NURSE PRACTITIONER

## 2021-03-30 PROCEDURE — 1101F PT FALLS ASSESS-DOCD LE1/YR: CPT | Mod: CPTII,S$GLB,, | Performed by: NURSE PRACTITIONER

## 2021-03-30 PROCEDURE — 1125F AMNT PAIN NOTED PAIN PRSNT: CPT | Mod: S$GLB,,, | Performed by: NURSE PRACTITIONER

## 2021-03-30 PROCEDURE — 1159F MED LIST DOCD IN RCRD: CPT | Mod: S$GLB,,, | Performed by: NURSE PRACTITIONER

## 2021-03-30 PROCEDURE — 3008F BODY MASS INDEX DOCD: CPT | Mod: CPTII,S$GLB,, | Performed by: NURSE PRACTITIONER

## 2021-03-30 PROCEDURE — 3077F PR MOST RECENT SYSTOLIC BLOOD PRESSURE >= 140 MM HG: ICD-10-PCS | Mod: CPTII,S$GLB,, | Performed by: NURSE PRACTITIONER

## 2021-03-30 PROCEDURE — 99999 PR PBB SHADOW E&M-EST. PATIENT-LVL V: CPT | Mod: PBBFAC,,, | Performed by: NURSE PRACTITIONER

## 2021-03-30 PROCEDURE — 3288F FALL RISK ASSESSMENT DOCD: CPT | Mod: CPTII,S$GLB,, | Performed by: NURSE PRACTITIONER

## 2021-03-30 PROCEDURE — 3078F PR MOST RECENT DIASTOLIC BLOOD PRESSURE < 80 MM HG: ICD-10-PCS | Mod: CPTII,S$GLB,, | Performed by: NURSE PRACTITIONER

## 2021-03-30 PROCEDURE — 72120 XR LUMBAR SPINE FLEXION AND EXTENSION ONLY: ICD-10-PCS | Mod: 26,,, | Performed by: RADIOLOGY

## 2021-03-30 PROCEDURE — 99204 OFFICE O/P NEW MOD 45 MIN: CPT | Mod: S$GLB,,, | Performed by: NURSE PRACTITIONER

## 2021-03-30 PROCEDURE — 3008F PR BODY MASS INDEX (BMI) DOCUMENTED: ICD-10-PCS | Mod: CPTII,S$GLB,, | Performed by: NURSE PRACTITIONER

## 2021-03-30 PROCEDURE — 3288F PR FALLS RISK ASSESSMENT DOCUMENTED: ICD-10-PCS | Mod: CPTII,S$GLB,, | Performed by: NURSE PRACTITIONER

## 2021-03-30 PROCEDURE — 72120 X-RAY BEND ONLY L-S SPINE: CPT | Mod: 26,,, | Performed by: RADIOLOGY

## 2021-03-30 PROCEDURE — 3072F LOW RISK FOR RETINOPATHY: CPT | Mod: S$GLB,,, | Performed by: NURSE PRACTITIONER

## 2021-03-30 PROCEDURE — 3072F PR LOW RISK FOR RETINOPATHY: ICD-10-PCS | Mod: S$GLB,,, | Performed by: NURSE PRACTITIONER

## 2021-03-30 RX ORDER — GLIPIZIDE 10 MG/1
10 TABLET ORAL
Qty: 180 TABLET | Refills: 1 | Status: SHIPPED | OUTPATIENT
Start: 2021-03-30 | End: 2021-10-02

## 2021-03-31 ENCOUNTER — TELEPHONE (OUTPATIENT)
Dept: INTERNAL MEDICINE | Facility: CLINIC | Age: 76
End: 2021-03-31

## 2021-03-31 ENCOUNTER — CLINICAL SUPPORT (OUTPATIENT)
Dept: INTERNAL MEDICINE | Facility: CLINIC | Age: 76
End: 2021-03-31
Payer: MEDICARE

## 2021-03-31 VITALS — DIASTOLIC BLOOD PRESSURE: 70 MMHG | OXYGEN SATURATION: 96 % | HEART RATE: 70 BPM | SYSTOLIC BLOOD PRESSURE: 130 MMHG

## 2021-03-31 PROCEDURE — 99999 PR PBB SHADOW E&M-EST. PATIENT-LVL III: ICD-10-PCS | Mod: PBBFAC,,,

## 2021-03-31 PROCEDURE — 99999 PR PBB SHADOW E&M-EST. PATIENT-LVL III: CPT | Mod: PBBFAC,,,

## 2021-05-06 DIAGNOSIS — E11.9 TYPE 2 DIABETES MELLITUS WITHOUT COMPLICATION: ICD-10-CM

## 2021-05-11 ENCOUNTER — OFFICE VISIT (OUTPATIENT)
Dept: SPINE | Facility: CLINIC | Age: 76
End: 2021-05-11
Payer: MEDICARE

## 2021-05-11 ENCOUNTER — LAB VISIT (OUTPATIENT)
Dept: LAB | Facility: OTHER | Age: 76
End: 2021-05-11
Attending: NURSE PRACTITIONER
Payer: MEDICAID

## 2021-05-11 ENCOUNTER — PATIENT OUTREACH (OUTPATIENT)
Dept: ADMINISTRATIVE | Facility: OTHER | Age: 76
End: 2021-05-11

## 2021-05-11 VITALS
WEIGHT: 214.06 LBS | HEIGHT: 73 IN | BODY MASS INDEX: 28.37 KG/M2 | SYSTOLIC BLOOD PRESSURE: 187 MMHG | HEART RATE: 55 BPM | DIASTOLIC BLOOD PRESSURE: 86 MMHG

## 2021-05-11 DIAGNOSIS — M51.36 DDD (DEGENERATIVE DISC DISEASE), LUMBAR: ICD-10-CM

## 2021-05-11 DIAGNOSIS — E11.22 CKD STAGE 3 SECONDARY TO DIABETES: Primary | ICD-10-CM

## 2021-05-11 DIAGNOSIS — N18.30 CKD STAGE 3 SECONDARY TO DIABETES: ICD-10-CM

## 2021-05-11 DIAGNOSIS — N18.30 CKD STAGE 3 SECONDARY TO DIABETES: Primary | ICD-10-CM

## 2021-05-11 DIAGNOSIS — E11.22 CKD STAGE 3 SECONDARY TO DIABETES: ICD-10-CM

## 2021-05-11 DIAGNOSIS — M48.062 SPINAL STENOSIS OF LUMBAR REGION WITH NEUROGENIC CLAUDICATION: ICD-10-CM

## 2021-05-11 LAB
ALBUMIN SERPL BCP-MCNC: 3.6 G/DL (ref 3.5–5.2)
ALP SERPL-CCNC: 55 U/L (ref 55–135)
ALT SERPL W/O P-5'-P-CCNC: 16 U/L (ref 10–44)
ANION GAP SERPL CALC-SCNC: 7 MMOL/L (ref 8–16)
AST SERPL-CCNC: 14 U/L (ref 10–40)
BILIRUB SERPL-MCNC: 0.4 MG/DL (ref 0.1–1)
BUN SERPL-MCNC: 15 MG/DL (ref 8–23)
CALCIUM SERPL-MCNC: 9 MG/DL (ref 8.7–10.5)
CHLORIDE SERPL-SCNC: 109 MMOL/L (ref 95–110)
CO2 SERPL-SCNC: 27 MMOL/L (ref 23–29)
CREAT SERPL-MCNC: 1.3 MG/DL (ref 0.5–1.4)
EST. GFR  (AFRICAN AMERICAN): >60 ML/MIN/1.73 M^2
EST. GFR  (NON AFRICAN AMERICAN): 53 ML/MIN/1.73 M^2
GLUCOSE SERPL-MCNC: 152 MG/DL (ref 70–110)
POTASSIUM SERPL-SCNC: 4.4 MMOL/L (ref 3.5–5.1)
PROT SERPL-MCNC: 6.4 G/DL (ref 6–8.4)
SODIUM SERPL-SCNC: 143 MMOL/L (ref 136–145)

## 2021-05-11 PROCEDURE — 36415 COLL VENOUS BLD VENIPUNCTURE: CPT | Performed by: NURSE PRACTITIONER

## 2021-05-11 PROCEDURE — 3079F DIAST BP 80-89 MM HG: CPT | Mod: CPTII,S$GLB,, | Performed by: NURSE PRACTITIONER

## 2021-05-11 PROCEDURE — 80053 COMPREHEN METABOLIC PANEL: CPT | Performed by: NURSE PRACTITIONER

## 2021-05-11 PROCEDURE — 3072F LOW RISK FOR RETINOPATHY: CPT | Mod: S$GLB,,, | Performed by: NURSE PRACTITIONER

## 2021-05-11 PROCEDURE — 99214 PR OFFICE/OUTPT VISIT, EST, LEVL IV, 30-39 MIN: ICD-10-PCS | Mod: S$GLB,,, | Performed by: NURSE PRACTITIONER

## 2021-05-11 PROCEDURE — 3072F PR LOW RISK FOR RETINOPATHY: ICD-10-PCS | Mod: S$GLB,,, | Performed by: NURSE PRACTITIONER

## 2021-05-11 PROCEDURE — 3288F PR FALLS RISK ASSESSMENT DOCUMENTED: ICD-10-PCS | Mod: CPTII,S$GLB,, | Performed by: NURSE PRACTITIONER

## 2021-05-11 PROCEDURE — 99499 UNLISTED E&M SERVICE: CPT | Mod: S$GLB,,, | Performed by: NURSE PRACTITIONER

## 2021-05-11 PROCEDURE — 99214 OFFICE O/P EST MOD 30 MIN: CPT | Mod: S$GLB,,, | Performed by: NURSE PRACTITIONER

## 2021-05-11 PROCEDURE — 99999 PR PBB SHADOW E&M-EST. PATIENT-LVL IV: ICD-10-PCS | Mod: PBBFAC,,, | Performed by: NURSE PRACTITIONER

## 2021-05-11 PROCEDURE — 3077F SYST BP >= 140 MM HG: CPT | Mod: CPTII,S$GLB,, | Performed by: NURSE PRACTITIONER

## 2021-05-11 PROCEDURE — 99499 RISK ADDL DX/OHS AUDIT: ICD-10-PCS | Mod: S$GLB,,, | Performed by: NURSE PRACTITIONER

## 2021-05-11 PROCEDURE — 1101F PT FALLS ASSESS-DOCD LE1/YR: CPT | Mod: CPTII,S$GLB,, | Performed by: NURSE PRACTITIONER

## 2021-05-11 PROCEDURE — 3079F PR MOST RECENT DIASTOLIC BLOOD PRESSURE 80-89 MM HG: ICD-10-PCS | Mod: CPTII,S$GLB,, | Performed by: NURSE PRACTITIONER

## 2021-05-11 PROCEDURE — 1126F PR PAIN SEVERITY QUANTIFIED, NO PAIN PRESENT: ICD-10-PCS | Mod: S$GLB,,, | Performed by: NURSE PRACTITIONER

## 2021-05-11 PROCEDURE — 1159F PR MEDICATION LIST DOCUMENTED IN MEDICAL RECORD: ICD-10-PCS | Mod: S$GLB,,, | Performed by: NURSE PRACTITIONER

## 2021-05-11 PROCEDURE — 1101F PR PT FALLS ASSESS DOC 0-1 FALLS W/OUT INJ PAST YR: ICD-10-PCS | Mod: CPTII,S$GLB,, | Performed by: NURSE PRACTITIONER

## 2021-05-11 PROCEDURE — 3077F PR MOST RECENT SYSTOLIC BLOOD PRESSURE >= 140 MM HG: ICD-10-PCS | Mod: CPTII,S$GLB,, | Performed by: NURSE PRACTITIONER

## 2021-05-11 PROCEDURE — 99999 PR PBB SHADOW E&M-EST. PATIENT-LVL IV: CPT | Mod: PBBFAC,,, | Performed by: NURSE PRACTITIONER

## 2021-05-11 PROCEDURE — 1126F AMNT PAIN NOTED NONE PRSNT: CPT | Mod: S$GLB,,, | Performed by: NURSE PRACTITIONER

## 2021-05-11 PROCEDURE — 3288F FALL RISK ASSESSMENT DOCD: CPT | Mod: CPTII,S$GLB,, | Performed by: NURSE PRACTITIONER

## 2021-05-11 PROCEDURE — 1159F MED LIST DOCD IN RCRD: CPT | Mod: S$GLB,,, | Performed by: NURSE PRACTITIONER

## 2021-06-08 DIAGNOSIS — Z79.4 TYPE 2 DIABETES MELLITUS WITH COMPLICATION, WITH LONG-TERM CURRENT USE OF INSULIN: ICD-10-CM

## 2021-06-08 DIAGNOSIS — E11.8 TYPE 2 DIABETES MELLITUS WITH COMPLICATION, WITH LONG-TERM CURRENT USE OF INSULIN: ICD-10-CM

## 2021-06-08 RX ORDER — PEN NEEDLE, DIABETIC 30 GX3/16"
NEEDLE, DISPOSABLE MISCELLANEOUS
Qty: 200 EACH | Refills: 11 | Status: SHIPPED | OUTPATIENT
Start: 2021-06-08 | End: 2022-12-09

## 2021-06-14 ENCOUNTER — TELEPHONE (OUTPATIENT)
Dept: INTERNAL MEDICINE | Facility: CLINIC | Age: 76
End: 2021-06-14

## 2021-07-14 DIAGNOSIS — E11.9 TYPE 2 DIABETES MELLITUS WITHOUT COMPLICATION: ICD-10-CM

## 2021-07-18 ENCOUNTER — HOSPITAL ENCOUNTER (EMERGENCY)
Facility: OTHER | Age: 76
Discharge: HOME | End: 2021-07-18
Attending: EMERGENCY MEDICINE
Payer: MEDICARE

## 2021-07-18 VITALS
TEMPERATURE: 98 F | SYSTOLIC BLOOD PRESSURE: 190 MMHG | BODY MASS INDEX: 28.36 KG/M2 | OXYGEN SATURATION: 98 % | RESPIRATION RATE: 18 BRPM | DIASTOLIC BLOOD PRESSURE: 88 MMHG | HEIGHT: 73 IN | HEART RATE: 66 BPM | WEIGHT: 214 LBS

## 2021-07-18 DIAGNOSIS — Z20.822 LAB TEST NEGATIVE FOR COVID-19 VIRUS: ICD-10-CM

## 2021-07-18 DIAGNOSIS — Z20.822 EXPOSURE TO COVID-19 VIRUS: Primary | ICD-10-CM

## 2021-07-18 LAB
CTP QC/QA: YES
SARS-COV-2 RDRP RESP QL NAA+PROBE: NEGATIVE

## 2021-07-18 PROCEDURE — 99282 EMERGENCY DEPT VISIT SF MDM: CPT

## 2021-07-18 PROCEDURE — U0002 COVID-19 LAB TEST NON-CDC: HCPCS | Performed by: EMERGENCY MEDICINE

## 2021-10-04 ENCOUNTER — TELEPHONE (OUTPATIENT)
Dept: INTERNAL MEDICINE | Facility: CLINIC | Age: 76
End: 2021-10-04

## 2021-10-08 ENCOUNTER — LAB VISIT (OUTPATIENT)
Dept: LAB | Facility: OTHER | Age: 76
End: 2021-10-08
Payer: MEDICARE

## 2021-10-08 DIAGNOSIS — E11.9 TYPE 2 DIABETES MELLITUS WITHOUT COMPLICATION: ICD-10-CM

## 2021-10-08 LAB
ESTIMATED AVG GLUCOSE: 163 MG/DL (ref 68–131)
HBA1C MFR BLD: 7.3 % (ref 4–5.6)

## 2021-10-08 PROCEDURE — 36415 COLL VENOUS BLD VENIPUNCTURE: CPT | Mod: HCNC | Performed by: INTERNAL MEDICINE

## 2021-10-08 PROCEDURE — 83036 HEMOGLOBIN GLYCOSYLATED A1C: CPT | Mod: HCNC | Performed by: INTERNAL MEDICINE

## 2021-10-11 ENCOUNTER — OFFICE VISIT (OUTPATIENT)
Dept: INTERNAL MEDICINE | Facility: CLINIC | Age: 76
End: 2021-10-11
Attending: INTERNAL MEDICINE
Payer: MEDICARE

## 2021-10-11 VITALS
BODY MASS INDEX: 28.14 KG/M2 | OXYGEN SATURATION: 98 % | WEIGHT: 212.31 LBS | HEIGHT: 73 IN | HEART RATE: 67 BPM | DIASTOLIC BLOOD PRESSURE: 66 MMHG | SYSTOLIC BLOOD PRESSURE: 130 MMHG

## 2021-10-11 DIAGNOSIS — E11.40 CONTROLLED TYPE 2 DIABETES MELLITUS WITH DIABETIC NEUROPATHY, WITHOUT LONG-TERM CURRENT USE OF INSULIN: Primary | ICD-10-CM

## 2021-10-11 DIAGNOSIS — I10 ESSENTIAL HYPERTENSION: ICD-10-CM

## 2021-10-11 DIAGNOSIS — Z86.711 HISTORY OF PULMONARY EMBOLISM: ICD-10-CM

## 2021-10-11 DIAGNOSIS — Z12.11 COLON CANCER SCREENING: ICD-10-CM

## 2021-10-11 DIAGNOSIS — Z85.46 HISTORY OF PROSTATE CANCER: ICD-10-CM

## 2021-10-11 DIAGNOSIS — N18.30 CKD STAGE 3 SECONDARY TO DIABETES: ICD-10-CM

## 2021-10-11 DIAGNOSIS — E11.22 CKD STAGE 3 SECONDARY TO DIABETES: ICD-10-CM

## 2021-10-11 DIAGNOSIS — E78.9 ABNORMAL CHOLESTEROL TEST: ICD-10-CM

## 2021-10-11 PROCEDURE — 1125F AMNT PAIN NOTED PAIN PRSNT: CPT | Mod: HCNC,CPTII,S$GLB, | Performed by: INTERNAL MEDICINE

## 2021-10-11 PROCEDURE — 1159F PR MEDICATION LIST DOCUMENTED IN MEDICAL RECORD: ICD-10-PCS | Mod: HCNC,CPTII,S$GLB, | Performed by: INTERNAL MEDICINE

## 2021-10-11 PROCEDURE — 1101F PR PT FALLS ASSESS DOC 0-1 FALLS W/OUT INJ PAST YR: ICD-10-PCS | Mod: HCNC,CPTII,S$GLB, | Performed by: INTERNAL MEDICINE

## 2021-10-11 PROCEDURE — 99499 RISK ADDL DX/OHS AUDIT: ICD-10-PCS | Mod: S$GLB,,, | Performed by: INTERNAL MEDICINE

## 2021-10-11 PROCEDURE — 99214 PR OFFICE/OUTPT VISIT, EST, LEVL IV, 30-39 MIN: ICD-10-PCS | Mod: HCNC,S$GLB,, | Performed by: INTERNAL MEDICINE

## 2021-10-11 PROCEDURE — 3072F LOW RISK FOR RETINOPATHY: CPT | Mod: HCNC,CPTII,S$GLB, | Performed by: INTERNAL MEDICINE

## 2021-10-11 PROCEDURE — 3288F FALL RISK ASSESSMENT DOCD: CPT | Mod: HCNC,CPTII,S$GLB, | Performed by: INTERNAL MEDICINE

## 2021-10-11 PROCEDURE — 99499 UNLISTED E&M SERVICE: CPT | Mod: S$GLB,,, | Performed by: INTERNAL MEDICINE

## 2021-10-11 PROCEDURE — 3075F PR MOST RECENT SYSTOLIC BLOOD PRESS GE 130-139MM HG: ICD-10-PCS | Mod: HCNC,CPTII,S$GLB, | Performed by: INTERNAL MEDICINE

## 2021-10-11 PROCEDURE — 4010F PR ACE/ARB THEARPY RXD/TAKEN: ICD-10-PCS | Mod: HCNC,CPTII,S$GLB, | Performed by: INTERNAL MEDICINE

## 2021-10-11 PROCEDURE — 3051F HG A1C>EQUAL 7.0%<8.0%: CPT | Mod: HCNC,CPTII,S$GLB, | Performed by: INTERNAL MEDICINE

## 2021-10-11 PROCEDURE — 3051F PR MOST RECENT HEMOGLOBIN A1C LEVEL 7.0 - < 8.0%: ICD-10-PCS | Mod: HCNC,CPTII,S$GLB, | Performed by: INTERNAL MEDICINE

## 2021-10-11 PROCEDURE — 99214 OFFICE O/P EST MOD 30 MIN: CPT | Mod: HCNC,S$GLB,, | Performed by: INTERNAL MEDICINE

## 2021-10-11 PROCEDURE — 1101F PT FALLS ASSESS-DOCD LE1/YR: CPT | Mod: HCNC,CPTII,S$GLB, | Performed by: INTERNAL MEDICINE

## 2021-10-11 PROCEDURE — 3078F PR MOST RECENT DIASTOLIC BLOOD PRESSURE < 80 MM HG: ICD-10-PCS | Mod: HCNC,CPTII,S$GLB, | Performed by: INTERNAL MEDICINE

## 2021-10-11 PROCEDURE — 4010F ACE/ARB THERAPY RXD/TAKEN: CPT | Mod: HCNC,CPTII,S$GLB, | Performed by: INTERNAL MEDICINE

## 2021-10-11 PROCEDURE — 3078F DIAST BP <80 MM HG: CPT | Mod: HCNC,CPTII,S$GLB, | Performed by: INTERNAL MEDICINE

## 2021-10-11 PROCEDURE — 3288F PR FALLS RISK ASSESSMENT DOCUMENTED: ICD-10-PCS | Mod: HCNC,CPTII,S$GLB, | Performed by: INTERNAL MEDICINE

## 2021-10-11 PROCEDURE — 99999 PR PBB SHADOW E&M-EST. PATIENT-LVL IV: ICD-10-PCS | Mod: PBBFAC,HCNC,, | Performed by: INTERNAL MEDICINE

## 2021-10-11 PROCEDURE — 1125F PR PAIN SEVERITY QUANTIFIED, PAIN PRESENT: ICD-10-PCS | Mod: HCNC,CPTII,S$GLB, | Performed by: INTERNAL MEDICINE

## 2021-10-11 PROCEDURE — 1160F PR REVIEW ALL MEDS BY PRESCRIBER/CLIN PHARMACIST DOCUMENTED: ICD-10-PCS | Mod: HCNC,CPTII,S$GLB, | Performed by: INTERNAL MEDICINE

## 2021-10-11 PROCEDURE — 1159F MED LIST DOCD IN RCRD: CPT | Mod: HCNC,CPTII,S$GLB, | Performed by: INTERNAL MEDICINE

## 2021-10-11 PROCEDURE — 99999 PR PBB SHADOW E&M-EST. PATIENT-LVL IV: CPT | Mod: PBBFAC,HCNC,, | Performed by: INTERNAL MEDICINE

## 2021-10-11 PROCEDURE — 1160F RVW MEDS BY RX/DR IN RCRD: CPT | Mod: HCNC,CPTII,S$GLB, | Performed by: INTERNAL MEDICINE

## 2021-10-11 PROCEDURE — 3075F SYST BP GE 130 - 139MM HG: CPT | Mod: HCNC,CPTII,S$GLB, | Performed by: INTERNAL MEDICINE

## 2021-10-11 PROCEDURE — 3072F PR LOW RISK FOR RETINOPATHY: ICD-10-PCS | Mod: HCNC,CPTII,S$GLB, | Performed by: INTERNAL MEDICINE

## 2021-10-11 RX ORDER — ATORVASTATIN CALCIUM 20 MG/1
20 TABLET, FILM COATED ORAL DAILY
Qty: 90 TABLET | Refills: 2 | Status: SHIPPED | OUTPATIENT
Start: 2021-10-11 | End: 2021-12-22 | Stop reason: SDUPTHER

## 2021-10-15 ENCOUNTER — TELEPHONE (OUTPATIENT)
Dept: OPTOMETRY | Facility: CLINIC | Age: 76
End: 2021-10-15

## 2021-11-10 ENCOUNTER — OFFICE VISIT (OUTPATIENT)
Dept: OPTOMETRY | Facility: CLINIC | Age: 76
End: 2021-11-10
Payer: MEDICARE

## 2021-11-10 DIAGNOSIS — H04.123 DRY EYE SYNDROME OF BOTH EYES: ICD-10-CM

## 2021-11-10 DIAGNOSIS — E11.40 CONTROLLED TYPE 2 DIABETES MELLITUS WITH DIABETIC NEUROPATHY, WITHOUT LONG-TERM CURRENT USE OF INSULIN: Primary | ICD-10-CM

## 2021-11-10 DIAGNOSIS — H02.831 DERMATOCHALASIS OF BOTH UPPER EYELIDS: ICD-10-CM

## 2021-11-10 DIAGNOSIS — E11.9 TYPE 2 DIABETES MELLITUS WITHOUT OPHTHALMIC MANIFESTATIONS: ICD-10-CM

## 2021-11-10 DIAGNOSIS — H52.203 MYOPIA WITH ASTIGMATISM AND PRESBYOPIA, BILATERAL: ICD-10-CM

## 2021-11-10 DIAGNOSIS — H52.13 MYOPIA WITH ASTIGMATISM AND PRESBYOPIA, BILATERAL: ICD-10-CM

## 2021-11-10 DIAGNOSIS — H02.834 DERMATOCHALASIS OF BOTH UPPER EYELIDS: ICD-10-CM

## 2021-11-10 DIAGNOSIS — H52.4 MYOPIA WITH ASTIGMATISM AND PRESBYOPIA, BILATERAL: ICD-10-CM

## 2021-11-10 DIAGNOSIS — H35.3131 EARLY DRY STAGE NONEXUDATIVE AGE-RELATED MACULAR DEGENERATION OF BOTH EYES: ICD-10-CM

## 2021-11-10 PROCEDURE — 92015 PR REFRACTION: ICD-10-PCS | Mod: HCNC,S$GLB,, | Performed by: OPTOMETRIST

## 2021-11-10 PROCEDURE — 92014 PR EYE EXAM, EST PATIENT,COMPREHESV: ICD-10-PCS | Mod: HCNC,S$GLB,, | Performed by: OPTOMETRIST

## 2021-11-10 PROCEDURE — 99999 PR PBB SHADOW E&M-EST. PATIENT-LVL III: ICD-10-PCS | Mod: PBBFAC,HCNC,, | Performed by: OPTOMETRIST

## 2021-11-10 PROCEDURE — 99999 PR PBB SHADOW E&M-EST. PATIENT-LVL III: CPT | Mod: PBBFAC,HCNC,, | Performed by: OPTOMETRIST

## 2021-11-10 PROCEDURE — 92015 DETERMINE REFRACTIVE STATE: CPT | Mod: HCNC,S$GLB,, | Performed by: OPTOMETRIST

## 2021-11-10 PROCEDURE — 92014 COMPRE OPH EXAM EST PT 1/>: CPT | Mod: HCNC,S$GLB,, | Performed by: OPTOMETRIST

## 2021-11-10 PROCEDURE — 99213 OFFICE O/P EST LOW 20 MIN: CPT | Mod: PBBFAC | Performed by: OPTOMETRIST

## 2021-11-24 ENCOUNTER — OFFICE VISIT (OUTPATIENT)
Dept: PODIATRY | Facility: CLINIC | Age: 76
End: 2021-11-24
Payer: MEDICARE

## 2021-11-24 VITALS
HEIGHT: 73 IN | WEIGHT: 212 LBS | DIASTOLIC BLOOD PRESSURE: 67 MMHG | BODY MASS INDEX: 28.1 KG/M2 | SYSTOLIC BLOOD PRESSURE: 152 MMHG | HEART RATE: 67 BPM

## 2021-11-24 DIAGNOSIS — L03.031 PARONYCHIA OF TOE OF RIGHT FOOT: ICD-10-CM

## 2021-11-24 DIAGNOSIS — M79.674 TOE PAIN, RIGHT: ICD-10-CM

## 2021-11-24 DIAGNOSIS — L60.0 INGROWN NAIL: ICD-10-CM

## 2021-11-24 DIAGNOSIS — R23.4 FISSURED SKIN: Primary | ICD-10-CM

## 2021-11-24 PROCEDURE — 99999 PR PBB SHADOW E&M-EST. PATIENT-LVL IV: ICD-10-PCS | Mod: PBBFAC,HCNC,, | Performed by: PODIATRIST

## 2021-11-24 PROCEDURE — 3072F LOW RISK FOR RETINOPATHY: CPT | Mod: HCNC,CPTII,S$GLB, | Performed by: PODIATRIST

## 2021-11-24 PROCEDURE — 99214 PR OFFICE/OUTPT VISIT, EST, LEVL IV, 30-39 MIN: ICD-10-PCS | Mod: HCNC,S$GLB,, | Performed by: PODIATRIST

## 2021-11-24 PROCEDURE — 3072F PR LOW RISK FOR RETINOPATHY: ICD-10-PCS | Mod: HCNC,CPTII,S$GLB, | Performed by: PODIATRIST

## 2021-11-24 PROCEDURE — 4010F PR ACE/ARB THEARPY RXD/TAKEN: ICD-10-PCS | Mod: HCNC,CPTII,S$GLB, | Performed by: PODIATRIST

## 2021-11-24 PROCEDURE — 99214 OFFICE O/P EST MOD 30 MIN: CPT | Mod: HCNC,S$GLB,, | Performed by: PODIATRIST

## 2021-11-24 PROCEDURE — 4010F ACE/ARB THERAPY RXD/TAKEN: CPT | Mod: HCNC,CPTII,S$GLB, | Performed by: PODIATRIST

## 2021-11-24 PROCEDURE — 99999 PR PBB SHADOW E&M-EST. PATIENT-LVL IV: CPT | Mod: PBBFAC,HCNC,, | Performed by: PODIATRIST

## 2021-12-01 ENCOUNTER — PATIENT OUTREACH (OUTPATIENT)
Dept: ADMINISTRATIVE | Facility: OTHER | Age: 76
End: 2021-12-01
Payer: MEDICARE

## 2021-12-07 ENCOUNTER — OFFICE VISIT (OUTPATIENT)
Dept: PODIATRY | Facility: CLINIC | Age: 76
End: 2021-12-07
Payer: MEDICARE

## 2021-12-07 VITALS
BODY MASS INDEX: 28.1 KG/M2 | DIASTOLIC BLOOD PRESSURE: 67 MMHG | WEIGHT: 212 LBS | SYSTOLIC BLOOD PRESSURE: 143 MMHG | HEIGHT: 73 IN | HEART RATE: 66 BPM

## 2021-12-07 DIAGNOSIS — L03.031 PARONYCHIA OF TOE OF RIGHT FOOT: ICD-10-CM

## 2021-12-07 DIAGNOSIS — M79.674 TOE PAIN, RIGHT: ICD-10-CM

## 2021-12-07 DIAGNOSIS — L60.0 INGROWN NAIL: ICD-10-CM

## 2021-12-07 DIAGNOSIS — R23.4 FISSURED SKIN: Primary | ICD-10-CM

## 2021-12-07 PROCEDURE — 4010F ACE/ARB THERAPY RXD/TAKEN: CPT | Mod: HCNC,CPTII,S$GLB, | Performed by: PODIATRIST

## 2021-12-07 PROCEDURE — 3072F LOW RISK FOR RETINOPATHY: CPT | Mod: HCNC,CPTII,S$GLB, | Performed by: PODIATRIST

## 2021-12-07 PROCEDURE — 3072F PR LOW RISK FOR RETINOPATHY: ICD-10-PCS | Mod: HCNC,CPTII,S$GLB, | Performed by: PODIATRIST

## 2021-12-07 PROCEDURE — 4010F PR ACE/ARB THEARPY RXD/TAKEN: ICD-10-PCS | Mod: HCNC,CPTII,S$GLB, | Performed by: PODIATRIST

## 2021-12-07 PROCEDURE — 99999 PR PBB SHADOW E&M-EST. PATIENT-LVL IV: CPT | Mod: PBBFAC,HCNC,, | Performed by: PODIATRIST

## 2021-12-07 PROCEDURE — 99213 PR OFFICE/OUTPT VISIT, EST, LEVL III, 20-29 MIN: ICD-10-PCS | Mod: HCNC,S$GLB,, | Performed by: PODIATRIST

## 2021-12-07 PROCEDURE — 99213 OFFICE O/P EST LOW 20 MIN: CPT | Mod: HCNC,S$GLB,, | Performed by: PODIATRIST

## 2021-12-07 PROCEDURE — 99999 PR PBB SHADOW E&M-EST. PATIENT-LVL IV: ICD-10-PCS | Mod: PBBFAC,HCNC,, | Performed by: PODIATRIST

## 2021-12-07 RX ORDER — TOBRAMYCIN 3 MG/ML
SOLUTION/ DROPS OPHTHALMIC
Qty: 5 ML | Refills: 3 | Status: SHIPPED | OUTPATIENT
Start: 2021-12-07 | End: 2023-02-06

## 2021-12-14 ENCOUNTER — OFFICE VISIT (OUTPATIENT)
Dept: PODIATRY | Facility: CLINIC | Age: 76
End: 2021-12-14
Payer: MEDICARE

## 2021-12-14 VITALS
HEART RATE: 59 BPM | SYSTOLIC BLOOD PRESSURE: 158 MMHG | WEIGHT: 212 LBS | BODY MASS INDEX: 28.1 KG/M2 | HEIGHT: 73 IN | DIASTOLIC BLOOD PRESSURE: 74 MMHG

## 2021-12-14 DIAGNOSIS — L60.0 INGROWN NAIL: ICD-10-CM

## 2021-12-14 DIAGNOSIS — L03.031 PARONYCHIA, TOE, RIGHT: ICD-10-CM

## 2021-12-14 DIAGNOSIS — M79.674 TOE PAIN, RIGHT: ICD-10-CM

## 2021-12-14 DIAGNOSIS — Z01.818 PRE-OP TESTING: ICD-10-CM

## 2021-12-14 DIAGNOSIS — R23.4 FISSURED SKIN: Primary | ICD-10-CM

## 2021-12-14 PROCEDURE — 99999 PR PBB SHADOW E&M-EST. PATIENT-LVL IV: CPT | Mod: PBBFAC,HCNC,, | Performed by: PODIATRIST

## 2021-12-14 PROCEDURE — 3072F LOW RISK FOR RETINOPATHY: CPT | Mod: HCNC,CPTII,S$GLB, | Performed by: PODIATRIST

## 2021-12-14 PROCEDURE — 4010F PR ACE/ARB THEARPY RXD/TAKEN: ICD-10-PCS | Mod: HCNC,CPTII,S$GLB, | Performed by: PODIATRIST

## 2021-12-14 PROCEDURE — 99999 PR PBB SHADOW E&M-EST. PATIENT-LVL IV: ICD-10-PCS | Mod: PBBFAC,HCNC,, | Performed by: PODIATRIST

## 2021-12-14 PROCEDURE — 99213 OFFICE O/P EST LOW 20 MIN: CPT | Mod: HCNC,S$GLB,, | Performed by: PODIATRIST

## 2021-12-14 PROCEDURE — 3072F PR LOW RISK FOR RETINOPATHY: ICD-10-PCS | Mod: HCNC,CPTII,S$GLB, | Performed by: PODIATRIST

## 2021-12-14 PROCEDURE — 4010F ACE/ARB THERAPY RXD/TAKEN: CPT | Mod: HCNC,CPTII,S$GLB, | Performed by: PODIATRIST

## 2021-12-14 PROCEDURE — 99213 PR OFFICE/OUTPT VISIT, EST, LEVL III, 20-29 MIN: ICD-10-PCS | Mod: HCNC,S$GLB,, | Performed by: PODIATRIST

## 2021-12-15 ENCOUNTER — TELEPHONE (OUTPATIENT)
Dept: INTERNAL MEDICINE | Facility: CLINIC | Age: 76
End: 2021-12-15
Payer: MEDICARE

## 2021-12-20 ENCOUNTER — HOSPITAL ENCOUNTER (OUTPATIENT)
Dept: RADIOLOGY | Facility: OTHER | Age: 76
Discharge: HOME | End: 2021-12-20
Attending: PODIATRIST
Payer: MEDICARE

## 2021-12-20 DIAGNOSIS — M79.674 TOE PAIN, RIGHT: ICD-10-CM

## 2021-12-20 DIAGNOSIS — R23.4 FISSURED SKIN: ICD-10-CM

## 2021-12-20 DIAGNOSIS — Z01.818 PRE-OP TESTING: ICD-10-CM

## 2021-12-20 DIAGNOSIS — L60.0 INGROWN NAIL: ICD-10-CM

## 2021-12-20 DIAGNOSIS — L03.031 PARONYCHIA, TOE, RIGHT: ICD-10-CM

## 2021-12-20 PROCEDURE — 93922 UPR/L XTREMITY ART 2 LEVELS: CPT | Mod: 26,HCNC,, | Performed by: RADIOLOGY

## 2021-12-20 PROCEDURE — 93922 US ARTERIAL LOWER EXTREMITY BILAT WITH ABI (XPD): ICD-10-PCS | Mod: 26,HCNC,, | Performed by: RADIOLOGY

## 2021-12-20 PROCEDURE — 93922 UPR/L XTREMITY ART 2 LEVELS: CPT | Mod: TC,HCNC

## 2021-12-20 PROCEDURE — 93925 US ARTERIAL LOWER EXTREMITY BILAT WITH ABI (XPD): ICD-10-PCS | Mod: 26,HCNC,, | Performed by: RADIOLOGY

## 2021-12-20 PROCEDURE — 93925 LOWER EXTREMITY STUDY: CPT | Mod: 26,HCNC,, | Performed by: RADIOLOGY

## 2021-12-22 DIAGNOSIS — Z85.46 HISTORY OF PROSTATE CANCER: Primary | ICD-10-CM

## 2021-12-22 DIAGNOSIS — E78.9 ABNORMAL CHOLESTEROL TEST: ICD-10-CM

## 2021-12-22 DIAGNOSIS — I10 HYPERTENSION, UNSPECIFIED TYPE: ICD-10-CM

## 2021-12-22 DIAGNOSIS — Q24.9 HEART ABNORMALITY: ICD-10-CM

## 2021-12-22 DIAGNOSIS — E13.9 DIABETES 1.5, MANAGED AS TYPE 2: ICD-10-CM

## 2021-12-22 DIAGNOSIS — G62.9 NEUROPATHY: ICD-10-CM

## 2021-12-23 RX ORDER — GLIPIZIDE 10 MG/1
10 TABLET ORAL
Qty: 180 TABLET | Refills: 1 | Status: SHIPPED | OUTPATIENT
Start: 2021-12-23 | End: 2022-07-27

## 2021-12-23 RX ORDER — GABAPENTIN 300 MG/1
300 CAPSULE ORAL 3 TIMES DAILY
Qty: 270 CAPSULE | Refills: 1 | Status: SHIPPED | OUTPATIENT
Start: 2021-12-23 | End: 2022-05-30

## 2021-12-23 RX ORDER — ATORVASTATIN CALCIUM 20 MG/1
20 TABLET, FILM COATED ORAL DAILY
Qty: 90 TABLET | Refills: 1 | Status: SHIPPED | OUTPATIENT
Start: 2021-12-23 | End: 2022-05-20 | Stop reason: SDUPTHER

## 2021-12-23 RX ORDER — HYDROCHLOROTHIAZIDE 25 MG/1
TABLET ORAL
Qty: 90 TABLET | Refills: 1 | Status: SHIPPED | OUTPATIENT
Start: 2021-12-23 | End: 2022-10-10 | Stop reason: SDUPTHER

## 2021-12-23 RX ORDER — DOXAZOSIN 4 MG/1
4 TABLET ORAL NIGHTLY
Qty: 90 TABLET | Refills: 1 | Status: SHIPPED | OUTPATIENT
Start: 2021-12-23 | End: 2022-07-06

## 2021-12-23 RX ORDER — AMLODIPINE BESYLATE 10 MG/1
10 TABLET ORAL DAILY
Qty: 90 TABLET | Refills: 1 | Status: SHIPPED | OUTPATIENT
Start: 2021-12-23 | End: 2022-07-27

## 2021-12-23 RX ORDER — CARVEDILOL 3.12 MG/1
TABLET ORAL
Qty: 180 TABLET | Refills: 1 | Status: SHIPPED | OUTPATIENT
Start: 2021-12-23 | End: 2022-04-27 | Stop reason: SDUPTHER

## 2021-12-23 RX ORDER — METFORMIN HYDROCHLORIDE 1000 MG/1
1000 TABLET ORAL 2 TIMES DAILY WITH MEALS
Qty: 180 TABLET | Refills: 1 | Status: SHIPPED | OUTPATIENT
Start: 2021-12-23 | End: 2022-05-20 | Stop reason: SDUPTHER

## 2021-12-28 ENCOUNTER — OFFICE VISIT (OUTPATIENT)
Dept: PODIATRY | Facility: CLINIC | Age: 76
End: 2021-12-28
Payer: MEDICARE

## 2021-12-28 VITALS
BODY MASS INDEX: 28.1 KG/M2 | SYSTOLIC BLOOD PRESSURE: 145 MMHG | WEIGHT: 212 LBS | DIASTOLIC BLOOD PRESSURE: 67 MMHG | HEIGHT: 73 IN | HEART RATE: 61 BPM

## 2021-12-28 DIAGNOSIS — L60.0 INGROWN NAIL: Primary | ICD-10-CM

## 2021-12-28 DIAGNOSIS — M79.674 TOE PAIN, RIGHT: ICD-10-CM

## 2021-12-28 DIAGNOSIS — L03.031 PARONYCHIA, TOE, RIGHT: ICD-10-CM

## 2021-12-28 PROCEDURE — 99999 PR PBB SHADOW E&M-EST. PATIENT-LVL III: ICD-10-PCS | Mod: PBBFAC,HCNC,, | Performed by: PODIATRIST

## 2021-12-28 PROCEDURE — 11750 EXCISION NAIL&NAIL MATRIX: CPT | Mod: T5,HCNC,S$GLB, | Performed by: PODIATRIST

## 2021-12-28 PROCEDURE — 3078F PR MOST RECENT DIASTOLIC BLOOD PRESSURE < 80 MM HG: ICD-10-PCS | Mod: HCNC,CPTII,S$GLB, | Performed by: PODIATRIST

## 2021-12-28 PROCEDURE — 3072F PR LOW RISK FOR RETINOPATHY: ICD-10-PCS | Mod: HCNC,CPTII,S$GLB, | Performed by: PODIATRIST

## 2021-12-28 PROCEDURE — 1125F PR PAIN SEVERITY QUANTIFIED, PAIN PRESENT: ICD-10-PCS | Mod: HCNC,CPTII,S$GLB, | Performed by: PODIATRIST

## 2021-12-28 PROCEDURE — 1159F PR MEDICATION LIST DOCUMENTED IN MEDICAL RECORD: ICD-10-PCS | Mod: HCNC,CPTII,S$GLB, | Performed by: PODIATRIST

## 2021-12-28 PROCEDURE — 99999 PR PBB SHADOW E&M-EST. PATIENT-LVL III: CPT | Mod: PBBFAC,HCNC,, | Performed by: PODIATRIST

## 2021-12-28 PROCEDURE — 1159F MED LIST DOCD IN RCRD: CPT | Mod: HCNC,CPTII,S$GLB, | Performed by: PODIATRIST

## 2021-12-28 PROCEDURE — 1125F AMNT PAIN NOTED PAIN PRSNT: CPT | Mod: HCNC,CPTII,S$GLB, | Performed by: PODIATRIST

## 2021-12-28 PROCEDURE — 99499 UNLISTED E&M SERVICE: CPT | Mod: HCNC,S$GLB,, | Performed by: PODIATRIST

## 2021-12-28 PROCEDURE — 1101F PR PT FALLS ASSESS DOC 0-1 FALLS W/OUT INJ PAST YR: ICD-10-PCS | Mod: HCNC,CPTII,S$GLB, | Performed by: PODIATRIST

## 2021-12-28 PROCEDURE — 11750 NAIL REMOVAL: ICD-10-PCS | Mod: T5,HCNC,S$GLB, | Performed by: PODIATRIST

## 2021-12-28 PROCEDURE — 3051F PR MOST RECENT HEMOGLOBIN A1C LEVEL 7.0 - < 8.0%: ICD-10-PCS | Mod: HCNC,CPTII,S$GLB, | Performed by: PODIATRIST

## 2021-12-28 PROCEDURE — 4010F PR ACE/ARB THEARPY RXD/TAKEN: ICD-10-PCS | Mod: HCNC,CPTII,S$GLB, | Performed by: PODIATRIST

## 2021-12-28 PROCEDURE — 3288F FALL RISK ASSESSMENT DOCD: CPT | Mod: HCNC,CPTII,S$GLB, | Performed by: PODIATRIST

## 2021-12-28 PROCEDURE — 3077F SYST BP >= 140 MM HG: CPT | Mod: HCNC,CPTII,S$GLB, | Performed by: PODIATRIST

## 2021-12-28 PROCEDURE — 3077F PR MOST RECENT SYSTOLIC BLOOD PRESSURE >= 140 MM HG: ICD-10-PCS | Mod: HCNC,CPTII,S$GLB, | Performed by: PODIATRIST

## 2021-12-28 PROCEDURE — 4010F ACE/ARB THERAPY RXD/TAKEN: CPT | Mod: HCNC,CPTII,S$GLB, | Performed by: PODIATRIST

## 2021-12-28 PROCEDURE — 3072F LOW RISK FOR RETINOPATHY: CPT | Mod: HCNC,CPTII,S$GLB, | Performed by: PODIATRIST

## 2021-12-28 PROCEDURE — 3078F DIAST BP <80 MM HG: CPT | Mod: HCNC,CPTII,S$GLB, | Performed by: PODIATRIST

## 2021-12-28 PROCEDURE — 1101F PT FALLS ASSESS-DOCD LE1/YR: CPT | Mod: HCNC,CPTII,S$GLB, | Performed by: PODIATRIST

## 2021-12-28 PROCEDURE — 3051F HG A1C>EQUAL 7.0%<8.0%: CPT | Mod: HCNC,CPTII,S$GLB, | Performed by: PODIATRIST

## 2021-12-28 PROCEDURE — 3288F PR FALLS RISK ASSESSMENT DOCUMENTED: ICD-10-PCS | Mod: HCNC,CPTII,S$GLB, | Performed by: PODIATRIST

## 2021-12-28 PROCEDURE — 99499 NO LOS: ICD-10-PCS | Mod: HCNC,S$GLB,, | Performed by: PODIATRIST

## 2021-12-28 RX ORDER — TOBRAMYCIN 3 MG/ML
SOLUTION/ DROPS OPHTHALMIC
Qty: 5 ML | Refills: 3 | Status: SHIPPED | OUTPATIENT
Start: 2021-12-28 | End: 2023-02-06

## 2022-01-10 ENCOUNTER — PATIENT OUTREACH (OUTPATIENT)
Dept: ADMINISTRATIVE | Facility: OTHER | Age: 77
End: 2022-01-10
Payer: MEDICARE

## 2022-01-10 DIAGNOSIS — E11.9 TYPE 2 DIABETES MELLITUS WITHOUT COMPLICATION, UNSPECIFIED WHETHER LONG TERM INSULIN USE: Primary | ICD-10-CM

## 2022-02-10 ENCOUNTER — TELEPHONE (OUTPATIENT)
Dept: INTERNAL MEDICINE | Facility: CLINIC | Age: 77
End: 2022-02-10
Payer: MEDICARE

## 2022-02-10 NOTE — TELEPHONE ENCOUNTER
----- Message from Angella Gary MA sent at 2/10/2022 11:59 AM CST -----  Regarding: pt requesting a call back  Name of Who is Calling:            What is the request in detail:  pt requesting a call back needs to refill medication            Can the clinic reply by [facility]: no            What Number [facility] Back [facility]: [phone]

## 2022-02-11 NOTE — TELEPHONE ENCOUNTER
Patient was very rude states that he needs a refill for all medication. Patient was informed that on some of his medications he already has refills. Patient states that he will be at the office tomorrow for this matter at 11:00 and hung up in my face.

## 2022-02-14 DIAGNOSIS — M54.2 CERVICAL PAIN (NECK): Primary | ICD-10-CM

## 2022-02-14 DIAGNOSIS — Z79.4 TYPE 2 DIABETES MELLITUS WITH COMPLICATION, WITH LONG-TERM CURRENT USE OF INSULIN: ICD-10-CM

## 2022-02-14 DIAGNOSIS — E11.8 TYPE 2 DIABETES MELLITUS WITH COMPLICATION, WITH LONG-TERM CURRENT USE OF INSULIN: ICD-10-CM

## 2022-02-14 NOTE — TELEPHONE ENCOUNTER
Care Due:                  Date            Visit Type   Department     Provider  --------------------------------------------------------------------------------                                EP -                              PRIMARY      [facility] INTERNAL  Last Visit: 10-      CARE ([facility])   MEDICINE       Dipak Treviño                              EP -                              PRIMARY      [facility] INTERNAL  Next Visit: 04-      CARE ([facility])   MEDICINE       Dipak Treviño                                                            Last  Test          Frequency    Reason                     Performed    Due Date  --------------------------------------------------------------------------------    CMP.........  12 months..  atorvastatin, glipiZIDE,   05- 05-                             hydroCHLOROthiazide,                             insulin, irbesartan,                             metFORMIN................    HBA1C.......  6 months...  glipiZIDE, insulin,        10-   04-                             metFORMIN................    Lipid Panel.  12 months..  atorvastatin.............  08-   08-    Powered by [employer]. Reference number: [id number].   2/14/2022 12:58:07 PM CST

## 2022-02-14 NOTE — TELEPHONE ENCOUNTER
Patient cam into the office for medication reconciliation. He brought his medication with him. Patient was confused regarding what he needs refills of. Spoke to Tamika at [facility] on [address]. Atorvastatin 20 mg and gabapentin 300 mg were on hold.     He needs a refill of his Lantus, pended this medication for him.      He also has a few pills of Diclofenac 75 mg that is not on his medication list but he has been taking. He would like a refill of this mediation

## 2022-02-15 RX ORDER — DICLOFENAC SODIUM 75 MG/1
75 TABLET, DELAYED RELEASE ORAL 2 TIMES DAILY
Qty: 90 TABLET | Refills: 0 | Status: SHIPPED | OUTPATIENT
Start: 2022-02-15 | End: 2022-05-20 | Stop reason: SDUPTHER

## 2022-02-15 RX ORDER — INSULIN GLARGINE 100 [IU]/ML
22 INJECTION, SOLUTION SUBCUTANEOUS NIGHTLY
Qty: 15 ML | Refills: 11 | Status: SHIPPED | OUTPATIENT
Start: 2022-02-15 | End: 2022-12-28

## 2022-03-25 ENCOUNTER — PATIENT OUTREACH (OUTPATIENT)
Dept: ADMINISTRATIVE | Facility: HOSPITAL | Age: 77
End: 2022-03-25
Payer: MEDICARE

## 2022-03-25 DIAGNOSIS — Z13.220 ENCOUNTER FOR LIPID SCREENING FOR CARDIOVASCULAR DISEASE: Primary | ICD-10-CM

## 2022-03-25 DIAGNOSIS — Z13.6 ENCOUNTER FOR LIPID SCREENING FOR CARDIOVASCULAR DISEASE: Primary | ICD-10-CM

## 2022-03-25 DIAGNOSIS — E11.9 DIABETES MELLITUS WITHOUT COMPLICATION: ICD-10-CM

## 2022-04-12 ENCOUNTER — LAB VISIT (OUTPATIENT)
Dept: LAB | Facility: OTHER | Age: 77
End: 2022-04-12
Attending: INTERNAL MEDICINE
Payer: MEDICARE

## 2022-04-12 ENCOUNTER — OFFICE VISIT (OUTPATIENT)
Dept: INTERNAL MEDICINE | Facility: CLINIC | Age: 77
End: 2022-04-12
Attending: INTERNAL MEDICINE
Payer: MEDICARE

## 2022-04-12 VITALS
SYSTOLIC BLOOD PRESSURE: 150 MMHG | OXYGEN SATURATION: 98 % | DIASTOLIC BLOOD PRESSURE: 76 MMHG | HEIGHT: 73 IN | BODY MASS INDEX: 28.28 KG/M2 | HEART RATE: 59 BPM | WEIGHT: 213.38 LBS

## 2022-04-12 DIAGNOSIS — Z85.46 HISTORY OF PROSTATE CANCER: ICD-10-CM

## 2022-04-12 DIAGNOSIS — E11.40 CONTROLLED TYPE 2 DIABETES MELLITUS WITH DIABETIC NEUROPATHY, WITHOUT LONG-TERM CURRENT USE OF INSULIN: ICD-10-CM

## 2022-04-12 DIAGNOSIS — N18.30 CKD STAGE 3 SECONDARY TO DIABETES: ICD-10-CM

## 2022-04-12 DIAGNOSIS — E11.22 CKD STAGE 3 SECONDARY TO DIABETES: ICD-10-CM

## 2022-04-12 DIAGNOSIS — Z13.220 ENCOUNTER FOR LIPID SCREENING FOR CARDIOVASCULAR DISEASE: ICD-10-CM

## 2022-04-12 DIAGNOSIS — Z13.6 ENCOUNTER FOR LIPID SCREENING FOR CARDIOVASCULAR DISEASE: ICD-10-CM

## 2022-04-12 DIAGNOSIS — Z12.5 ENCOUNTER FOR SCREENING FOR MALIGNANT NEOPLASM OF PROSTATE: ICD-10-CM

## 2022-04-12 DIAGNOSIS — I10 ESSENTIAL HYPERTENSION: ICD-10-CM

## 2022-04-12 DIAGNOSIS — R19.5 POSITIVE FIT (FECAL IMMUNOCHEMICAL TEST): ICD-10-CM

## 2022-04-12 DIAGNOSIS — E11.40 CONTROLLED TYPE 2 DIABETES MELLITUS WITH DIABETIC NEUROPATHY, WITHOUT LONG-TERM CURRENT USE OF INSULIN: Primary | ICD-10-CM

## 2022-04-12 DIAGNOSIS — Z12.11 COLON CANCER SCREENING: ICD-10-CM

## 2022-04-12 DIAGNOSIS — E11.9 TYPE 2 DIABETES MELLITUS WITHOUT COMPLICATION, UNSPECIFIED WHETHER LONG TERM INSULIN USE: ICD-10-CM

## 2022-04-12 LAB
ALBUMIN SERPL BCP-MCNC: 4.2 G/DL (ref 3.5–5.2)
ALP SERPL-CCNC: 46 U/L (ref 55–135)
ALT SERPL W/O P-5'-P-CCNC: 20 U/L (ref 10–44)
ANION GAP SERPL CALC-SCNC: 13 MMOL/L (ref 8–16)
AST SERPL-CCNC: 16 U/L (ref 10–40)
BASOPHILS # BLD AUTO: 0.03 K/UL (ref 0–0.2)
BASOPHILS NFR BLD: 0.8 % (ref 0–1.9)
BILIRUB SERPL-MCNC: 0.4 MG/DL (ref 0.1–1)
BUN SERPL-MCNC: 15 MG/DL (ref 8–23)
CALCIUM SERPL-MCNC: 9.6 MG/DL (ref 8.7–10.5)
CHLORIDE SERPL-SCNC: 104 MMOL/L (ref 95–110)
CHOLEST SERPL-MCNC: 103 MG/DL (ref 120–199)
CHOLEST SERPL-MCNC: 103 MG/DL (ref 120–199)
CHOLEST/HDLC SERPL: 2.5 {RATIO} (ref 2–5)
CHOLEST/HDLC SERPL: 2.5 {RATIO} (ref 2–5)
CO2 SERPL-SCNC: 27 MMOL/L (ref 23–29)
COMPLEXED PSA SERPL-MCNC: 7.1 NG/ML (ref 0–4)
CREAT SERPL-MCNC: 1.3 MG/DL (ref 0.5–1.4)
DIFFERENTIAL METHOD: ABNORMAL
EOSINOPHIL # BLD AUTO: 0.2 K/UL (ref 0–0.5)
EOSINOPHIL NFR BLD: 4.3 % (ref 0–8)
ERYTHROCYTE [DISTWIDTH] IN BLOOD BY AUTOMATED COUNT: 14 % (ref 11.5–14.5)
EST. GFR  (AFRICAN AMERICAN): >60 ML/MIN/1.73 M^2
EST. GFR  (NON AFRICAN AMERICAN): 53 ML/MIN/1.73 M^2
ESTIMATED AVG GLUCOSE: 163 MG/DL (ref 68–131)
ESTIMATED AVG GLUCOSE: 163 MG/DL (ref 68–131)
GLUCOSE SERPL-MCNC: 99 MG/DL (ref 70–110)
HBA1C MFR BLD: 7.3 % (ref 4–5.6)
HBA1C MFR BLD: 7.3 % (ref 4–5.6)
HCT VFR BLD AUTO: 36.6 % (ref 40–54)
HDLC SERPL-MCNC: 42 MG/DL (ref 40–75)
HDLC SERPL-MCNC: 42 MG/DL (ref 40–75)
HDLC SERPL: 40.8 % (ref 20–50)
HDLC SERPL: 40.8 % (ref 20–50)
HGB BLD-MCNC: 13 G/DL (ref 14–18)
IMM GRANULOCYTES # BLD AUTO: 0.01 K/UL (ref 0–0.04)
IMM GRANULOCYTES NFR BLD AUTO: 0.3 % (ref 0–0.5)
LDLC SERPL CALC-MCNC: 24.4 MG/DL (ref 63–159)
LDLC SERPL CALC-MCNC: 24.4 MG/DL (ref 63–159)
LYMPHOCYTES # BLD AUTO: 1.6 K/UL (ref 1–4.8)
LYMPHOCYTES NFR BLD: 39.9 % (ref 18–48)
MCH RBC QN AUTO: 32.1 PG (ref 27–31)
MCHC RBC AUTO-ENTMCNC: 35.5 G/DL (ref 32–36)
MCV RBC AUTO: 90 FL (ref 82–98)
MONOCYTES # BLD AUTO: 0.3 K/UL (ref 0.3–1)
MONOCYTES NFR BLD: 8.7 % (ref 4–15)
NEUTROPHILS # BLD AUTO: 1.8 K/UL (ref 1.8–7.7)
NEUTROPHILS NFR BLD: 46 % (ref 38–73)
NONHDLC SERPL-MCNC: 61 MG/DL
NONHDLC SERPL-MCNC: 61 MG/DL
NRBC BLD-RTO: 0 /100 WBC
PLATELET # BLD AUTO: 142 K/UL (ref 150–450)
PMV BLD AUTO: 10.8 FL (ref 9.2–12.9)
POTASSIUM SERPL-SCNC: 3.8 MMOL/L (ref 3.5–5.1)
PROT SERPL-MCNC: 6.9 G/DL (ref 6–8.4)
RBC # BLD AUTO: 4.05 M/UL (ref 4.6–6.2)
SODIUM SERPL-SCNC: 144 MMOL/L (ref 136–145)
TRIGL SERPL-MCNC: 183 MG/DL (ref 30–150)
TRIGL SERPL-MCNC: 183 MG/DL (ref 30–150)
TSH SERPL DL<=0.005 MIU/L-ACNC: 1.55 UIU/ML (ref 0.4–4)
WBC # BLD AUTO: 3.93 K/UL (ref 3.9–12.7)

## 2022-04-12 PROCEDURE — 99499 RISK ADDL DX/OHS AUDIT: ICD-10-PCS | Mod: S$GLB,,, | Performed by: INTERNAL MEDICINE

## 2022-04-12 PROCEDURE — 1126F PR PAIN SEVERITY QUANTIFIED, NO PAIN PRESENT: ICD-10-PCS | Mod: CPTII,S$GLB,, | Performed by: INTERNAL MEDICINE

## 2022-04-12 PROCEDURE — 85025 COMPLETE CBC W/AUTO DIFF WBC: CPT | Performed by: INTERNAL MEDICINE

## 2022-04-12 PROCEDURE — 99999 PR PBB SHADOW E&M-EST. PATIENT-LVL IV: CPT | Mod: PBBFAC,,, | Performed by: INTERNAL MEDICINE

## 2022-04-12 PROCEDURE — 1160F PR REVIEW ALL MEDS BY PRESCRIBER/CLIN PHARMACIST DOCUMENTED: ICD-10-PCS | Mod: CPTII,S$GLB,, | Performed by: INTERNAL MEDICINE

## 2022-04-12 PROCEDURE — 1126F AMNT PAIN NOTED NONE PRSNT: CPT | Mod: CPTII,S$GLB,, | Performed by: INTERNAL MEDICINE

## 2022-04-12 PROCEDURE — 99499 UNLISTED E&M SERVICE: CPT | Mod: S$GLB,,, | Performed by: INTERNAL MEDICINE

## 2022-04-12 PROCEDURE — 1160F RVW MEDS BY RX/DR IN RCRD: CPT | Mod: CPTII,S$GLB,, | Performed by: INTERNAL MEDICINE

## 2022-04-12 PROCEDURE — 84443 ASSAY THYROID STIM HORMONE: CPT | Performed by: INTERNAL MEDICINE

## 2022-04-12 PROCEDURE — 80061 LIPID PANEL: CPT | Performed by: INTERNAL MEDICINE

## 2022-04-12 PROCEDURE — 1101F PR PT FALLS ASSESS DOC 0-1 FALLS W/OUT INJ PAST YR: ICD-10-PCS | Mod: CPTII,S$GLB,, | Performed by: INTERNAL MEDICINE

## 2022-04-12 PROCEDURE — 1159F MED LIST DOCD IN RCRD: CPT | Mod: CPTII,S$GLB,, | Performed by: INTERNAL MEDICINE

## 2022-04-12 PROCEDURE — 3072F PR LOW RISK FOR RETINOPATHY: ICD-10-PCS | Mod: CPTII,S$GLB,, | Performed by: INTERNAL MEDICINE

## 2022-04-12 PROCEDURE — 83036 HEMOGLOBIN GLYCOSYLATED A1C: CPT | Performed by: INTERNAL MEDICINE

## 2022-04-12 PROCEDURE — 99999 PR PBB SHADOW E&M-EST. PATIENT-LVL IV: ICD-10-PCS | Mod: PBBFAC,,, | Performed by: INTERNAL MEDICINE

## 2022-04-12 PROCEDURE — 3077F SYST BP >= 140 MM HG: CPT | Mod: CPTII,S$GLB,, | Performed by: INTERNAL MEDICINE

## 2022-04-12 PROCEDURE — 3288F FALL RISK ASSESSMENT DOCD: CPT | Mod: CPTII,S$GLB,, | Performed by: INTERNAL MEDICINE

## 2022-04-12 PROCEDURE — 99214 OFFICE O/P EST MOD 30 MIN: CPT | Mod: S$GLB,,, | Performed by: INTERNAL MEDICINE

## 2022-04-12 PROCEDURE — 3078F PR MOST RECENT DIASTOLIC BLOOD PRESSURE < 80 MM HG: ICD-10-PCS | Mod: CPTII,S$GLB,, | Performed by: INTERNAL MEDICINE

## 2022-04-12 PROCEDURE — 36415 COLL VENOUS BLD VENIPUNCTURE: CPT | Performed by: INTERNAL MEDICINE

## 2022-04-12 PROCEDURE — 80053 COMPREHEN METABOLIC PANEL: CPT | Performed by: INTERNAL MEDICINE

## 2022-04-12 PROCEDURE — 3051F HG A1C>EQUAL 7.0%<8.0%: CPT | Mod: CPTII,S$GLB,, | Performed by: INTERNAL MEDICINE

## 2022-04-12 PROCEDURE — 3072F LOW RISK FOR RETINOPATHY: CPT | Mod: CPTII,S$GLB,, | Performed by: INTERNAL MEDICINE

## 2022-04-12 PROCEDURE — 3077F PR MOST RECENT SYSTOLIC BLOOD PRESSURE >= 140 MM HG: ICD-10-PCS | Mod: CPTII,S$GLB,, | Performed by: INTERNAL MEDICINE

## 2022-04-12 PROCEDURE — 99214 PR OFFICE/OUTPT VISIT, EST, LEVL IV, 30-39 MIN: ICD-10-PCS | Mod: S$GLB,,, | Performed by: INTERNAL MEDICINE

## 2022-04-12 PROCEDURE — 1101F PT FALLS ASSESS-DOCD LE1/YR: CPT | Mod: CPTII,S$GLB,, | Performed by: INTERNAL MEDICINE

## 2022-04-12 PROCEDURE — 3288F PR FALLS RISK ASSESSMENT DOCUMENTED: ICD-10-PCS | Mod: CPTII,S$GLB,, | Performed by: INTERNAL MEDICINE

## 2022-04-12 PROCEDURE — 84153 ASSAY OF PSA TOTAL: CPT | Performed by: INTERNAL MEDICINE

## 2022-04-12 PROCEDURE — 3051F PR MOST RECENT HEMOGLOBIN A1C LEVEL 7.0 - < 8.0%: ICD-10-PCS | Mod: CPTII,S$GLB,, | Performed by: INTERNAL MEDICINE

## 2022-04-12 PROCEDURE — 3078F DIAST BP <80 MM HG: CPT | Mod: CPTII,S$GLB,, | Performed by: INTERNAL MEDICINE

## 2022-04-12 PROCEDURE — 1159F PR MEDICATION LIST DOCUMENTED IN MEDICAL RECORD: ICD-10-PCS | Mod: CPTII,S$GLB,, | Performed by: INTERNAL MEDICINE

## 2022-04-12 NOTE — PROGRESS NOTES
Subjective:       Patient ID: Misha Eldridge Jr. is a 77 y.o. male.    Chief Complaint: Hypertension (6mth f/u)    Here for routine f/u    Patient presents today for routine evaluation, physical, and labs. Patient has no major concerns or complaints today.       ----DM----  + neuropathy of hands and feet on gabapentin 300mg TID. States feet are slightly worsening but overall stable and controlled. A1c controlled on metformin 1g BID and glipizide 5 BID.      ### Prostate CA ###  Dx approx 2010 at [facility] s/p partial resection. No XRT. Not sure is his chemo was for colon or prostate. He denies urinary frequency, urinary urgency, decreased force of stream, incomplete emptying of bladder, post void dribble, nocturia, or gross hematuria. Has not been back to see his urologist.    4/12/22 Still has not re-established urology care      ### Colon CA ####   Dx approx 2011/2012. S/p colectomy and reversal. Overdue for colonoscopy. Had FIT done b/c he was unable to get this done, which was negative as of 12/2017.  He is now amenable to getting the appropriate colonscopy      ---Chronic low back pain---  -daily. Rare radiation down legs. Has never had any treatment. He does not take anything on regular basis for symptoms. Pain is non debilitating.   Right groin pain, described as burning and tingling, only occurs when he touches area. Back continues to bother him. This has been occurring for several years.       Review of Systems   Constitutional: Negative for appetite change, chills, fever and unexpected weight change.   HENT: Negative for hearing loss, sore throat and trouble swallowing.    Eyes: Negative for visual disturbance.   Respiratory: Negative for cough, chest tightness and shortness of breath.    Cardiovascular: Negative for chest pain and leg swelling.   Gastrointestinal: Negative for abdominal pain, blood in stool, constipation, diarrhea, nausea and vomiting.   Endocrine: Negative for polydipsia and polyuria.  "  Genitourinary: Negative for decreased urine volume, difficulty urinating, dysuria, frequency and urgency.   Musculoskeletal: Negative for gait problem.   Skin: Negative for rash.   Neurological: Negative for dizziness and numbness.   Psychiatric/Behavioral: The patient is not nervous/anxious.        Objective:      Vitals:    04/12/22 1351   BP: (!) 150/76   Pulse: (!) 59   SpO2: 98%   Weight: 96.8 kg (213 lb 6.5 oz)   Height: 6' 1" (1.854 m)      Physical Exam  Constitutional:       General: He is not in acute distress.     Appearance: He is well-developed.   HENT:      Head: Normocephalic and atraumatic.      Mouth/Throat:      Pharynx: No oropharyngeal exudate.   Eyes:      General: No scleral icterus.     Conjunctiva/sclera: Conjunctivae normal.      Pupils: Pupils are equal, round, and reactive to light.   Neck:      Thyroid: No thyromegaly.   Cardiovascular:      Rate and Rhythm: Normal rate and regular rhythm.      Heart sounds: Normal heart sounds. No murmur heard.  Pulmonary:      Effort: Pulmonary effort is normal.      Breath sounds: Normal breath sounds. No wheezing or rales.   Abdominal:      General: There is no distension.      Palpations: Abdomen is soft.      Tenderness: There is no abdominal tenderness.   Musculoskeletal:         General: No tenderness.   Lymphadenopathy:      Cervical: No cervical adenopathy.   Skin:     General: Skin is warm and dry.   Neurological:      Mental Status: He is alert and oriented to person, place, and time.   Psychiatric:         Behavior: Behavior normal.         Assessment:       1. Controlled type 2 diabetes mellitus with diabetic neuropathy, without long-term current use of insulin    2. CKD stage 3 secondary to diabetes    3. Essential hypertension    4. Positive FIT (fecal immunochemical test)    5. Colon cancer screening    6. History of prostate cancer    7. Encounter for screening for malignant neoplasm of prostate         Plan:       Misha was seen today " for hypertension.    Diagnoses and all orders for this visit:    Controlled type 2 diabetes mellitus with diabetic neuropathy, without long-term current use of insulin  -     Microalbumin/Creatinine Ratio, Urine; Future  -     Comprehensive Metabolic Panel; Future  -     TSH; Future  -     CBC Auto Differential; Future  -     Hemoglobin A1C; Future    CKD stage 3 secondary to diabetes  -     Comprehensive Metabolic Panel; Future    Essential hypertension  Per pharmacy refill data pt is not taking irbesartan and he can not confirm nor dent. Will check when he gets home. f/u in 7-10 days for nurse visit for BP check  -     Comprehensive Metabolic Panel; Future    Positive FIT (fecal immunochemical test)  -     CBC Auto Differential; Future    Colon cancer screening  -     Case Request Endoscopy: COLONOSCOPY    History of prostate cancer  -     PSA, SCREENING; Future  -     Ambulatory referral/consult to Urology; Future    Encounter for screening for malignant neoplasm of prostate   -     PSA, SCREENING; Future       RTC in 6 months or sooner lety Mcclendon MD  Internal Medicine-[facility]        Side effects of medication(s) were discussed in detail and patient voiced understanding.  Patient will call back for any issues or complications.

## 2022-04-14 DIAGNOSIS — Z12.11 SPECIAL SCREENING FOR MALIGNANT NEOPLASMS, COLON: Primary | ICD-10-CM

## 2022-04-14 RX ORDER — POLYETHYLENE GLYCOL 3350, SODIUM SULFATE ANHYDROUS, SODIUM BICARBONATE, SODIUM CHLORIDE, POTASSIUM CHLORIDE 236; 22.74; 6.74; 5.86; 2.97 G/4L; G/4L; G/4L; G/4L; G/4L
4 POWDER, FOR SOLUTION ORAL ONCE
Qty: 4000 ML | Refills: 0 | Status: SHIPPED | OUTPATIENT
Start: 2022-04-14 | End: 2022-04-14

## 2022-04-18 ENCOUNTER — PATIENT OUTREACH (OUTPATIENT)
Dept: ADMINISTRATIVE | Facility: HOSPITAL | Age: 77
End: 2022-04-18
Payer: MEDICARE

## 2022-04-19 ENCOUNTER — TELEPHONE (OUTPATIENT)
Dept: INTERNAL MEDICINE | Facility: CLINIC | Age: 77
End: 2022-04-19

## 2022-04-19 ENCOUNTER — CLINICAL SUPPORT (OUTPATIENT)
Dept: INTERNAL MEDICINE | Facility: CLINIC | Age: 77
End: 2022-04-19
Payer: MEDICARE

## 2022-04-19 VITALS — DIASTOLIC BLOOD PRESSURE: 80 MMHG | HEART RATE: 71 BPM | SYSTOLIC BLOOD PRESSURE: 130 MMHG | OXYGEN SATURATION: 98 %

## 2022-04-19 PROCEDURE — 99999 PR PBB SHADOW E&M-EST. PATIENT-LVL III: CPT | Mod: PBBFAC,,,

## 2022-04-19 PROCEDURE — 99999 PR PBB SHADOW E&M-EST. PATIENT-LVL III: ICD-10-PCS | Mod: PBBFAC,,,

## 2022-04-19 NOTE — Clinical Note
Misha Eldridge . 77 y.o. male is here for Blood Pressure check. in person   Manual Blood pressure reading was  144/80, Pulse 66. (Checked at the end of the visit)  If high, was it repeated after 15 minutes? yes , 130/80, 71 Diagnosed with Hypertension yes.  Patient took blood pressure medication today yes.  Last dose of blood pressure medication was taken at 10:30am. Patient took Amlodipine 10 mg, Carvedilol 3.125 MG, HCTZ 25 MG. Cardura and Irbesartan patient takes at night  All Medications and OTC medication updated yes  Does patient have record of home blood pressure readings / Blood Pressure Log no.   Does the pt have any complaints today in regards to their blood pressure medication? no. Complains of NA. Patient is asymptomatic.   Were you sitting still for 5-10 minutes prior to taking your Blood pressure? yes   Has your blood pressure monitor ever been checked? no When was last time we checked your blood pressure monitor? NA   Updated vitals yes  Follow up date is No.

## 2022-04-19 NOTE — PROGRESS NOTES
Misha Eldridge . 77 y.o. male is here for Blood Pressure check. in person     Manual Blood pressure reading was  144/80, Pulse 66. (Checked at the end of the visit)    If high, was it repeated after 15 minutes? yes , 130/80, 71  Diagnosed with Hypertension yes.    Patient took blood pressure medication today yes.  Last dose of blood pressure medication was taken at 10:30am. Patient took Amlodipine 10 mg, Carvedilol 3.125 MG, HCTZ 25 MG. Cardura and Irbesartan patient takes at night    All Medications and OTC medication updated yes    Does patient have record of home blood pressure readings / Blood Pressure Log no.     Does the pt have any complaints today in regards to their blood pressure medication? no. Complains of NA. Patient is asymptomatic.     Were you sitting still for 5-10 minutes prior to taking your Blood pressure? yes     Has your blood pressure monitor ever been checked? no When was last time we checked your blood pressure monitor? NA     Updated vitals yes    Follow up date is No.     Dr. Treviño notified.     Creatinine   Date Value Ref Range Status   04/12/2022 1.3 0.5 - 1.4 mg/dL Final     Sodium   Date Value Ref Range Status   04/12/2022 144 136 - 145 mmol/L Final     Potassium   Date Value Ref Range Status   04/12/2022 3.8 3.5 - 5.1 mmol/L Final

## 2022-04-27 DIAGNOSIS — Q24.9 HEART ABNORMALITY: ICD-10-CM

## 2022-04-27 RX ORDER — CARVEDILOL 3.12 MG/1
TABLET ORAL
Qty: 180 TABLET | Refills: 1 | Status: SHIPPED | OUTPATIENT
Start: 2022-04-27 | End: 2022-08-30

## 2022-04-27 NOTE — TELEPHONE ENCOUNTER
No new care gaps identified.  Powered by [employer]. Reference number: [id number].   4/27/2022 1:32:46 PM CDT

## 2022-04-27 NOTE — TELEPHONE ENCOUNTER
----- Message from Maggi Mcpherson sent at 4/27/2022 12:27 PM CDT -----  Regarding: refill  Type:  RX Refill Request    Who Called: Misha  Refill or New Rx:refill  RX Name and Strength:carvediloL (COREG) 3.125 MG tablet  How is the patient currently taking it? (ex. 1XDay):  Is this a 30 day or 90 day RX:90  Preferred Pharmacy with phone number:[id number] [place] [address] AT [address] [place]  Local or Mail Order:local  Ordering Provider:  Would the patient rather a call back or a response via [facility]? call  Best Call Back Number:[phone]  Additional Information:

## 2022-05-19 ENCOUNTER — PATIENT OUTREACH (OUTPATIENT)
Dept: ADMINISTRATIVE | Facility: OTHER | Age: 77
End: 2022-05-19
Payer: MEDICARE

## 2022-05-20 ENCOUNTER — OFFICE VISIT (OUTPATIENT)
Dept: UROLOGY | Facility: CLINIC | Age: 77
End: 2022-05-20
Payer: MEDICARE

## 2022-05-20 VITALS
HEIGHT: 73 IN | SYSTOLIC BLOOD PRESSURE: 154 MMHG | WEIGHT: 213 LBS | DIASTOLIC BLOOD PRESSURE: 75 MMHG | BODY MASS INDEX: 28.23 KG/M2 | HEART RATE: 59 BPM

## 2022-05-20 DIAGNOSIS — E78.9 ABNORMAL CHOLESTEROL TEST: ICD-10-CM

## 2022-05-20 DIAGNOSIS — I10 ESSENTIAL HYPERTENSION: ICD-10-CM

## 2022-05-20 DIAGNOSIS — E13.9 DIABETES 1.5, MANAGED AS TYPE 2: ICD-10-CM

## 2022-05-20 DIAGNOSIS — Z85.46 HISTORY OF PROSTATE CANCER: Primary | ICD-10-CM

## 2022-05-20 DIAGNOSIS — M54.2 CERVICAL PAIN (NECK): ICD-10-CM

## 2022-05-20 LAB
BILIRUB UR QL STRIP: NEGATIVE
GLUCOSE UR QL STRIP: NEGATIVE
KETONES UR QL STRIP: NEGATIVE
LEUKOCYTE ESTERASE UR QL STRIP: NEGATIVE
PH, POC UA: 7
POC BLOOD, URINE: NEGATIVE
POC NITRATES, URINE: NEGATIVE
PROT UR QL STRIP: POSITIVE
SP GR UR STRIP: 1 (ref 1–1.03)
UROBILINOGEN UR STRIP-ACNC: 2 (ref 0.3–2.2)

## 2022-05-20 PROCEDURE — 1101F PR PT FALLS ASSESS DOC 0-1 FALLS W/OUT INJ PAST YR: ICD-10-PCS | Mod: CPTII,S$GLB,, | Performed by: UROLOGY

## 2022-05-20 PROCEDURE — 99204 OFFICE O/P NEW MOD 45 MIN: CPT | Mod: S$GLB,,, | Performed by: UROLOGY

## 2022-05-20 PROCEDURE — 3288F FALL RISK ASSESSMENT DOCD: CPT | Mod: CPTII,S$GLB,, | Performed by: UROLOGY

## 2022-05-20 PROCEDURE — 3072F LOW RISK FOR RETINOPATHY: CPT | Mod: CPTII,S$GLB,, | Performed by: UROLOGY

## 2022-05-20 PROCEDURE — 1125F AMNT PAIN NOTED PAIN PRSNT: CPT | Mod: CPTII,S$GLB,, | Performed by: UROLOGY

## 2022-05-20 PROCEDURE — 1159F PR MEDICATION LIST DOCUMENTED IN MEDICAL RECORD: ICD-10-PCS | Mod: CPTII,S$GLB,, | Performed by: UROLOGY

## 2022-05-20 PROCEDURE — 3077F PR MOST RECENT SYSTOLIC BLOOD PRESSURE >= 140 MM HG: ICD-10-PCS | Mod: CPTII,S$GLB,, | Performed by: UROLOGY

## 2022-05-20 PROCEDURE — 3078F DIAST BP <80 MM HG: CPT | Mod: CPTII,S$GLB,, | Performed by: UROLOGY

## 2022-05-20 PROCEDURE — 3078F PR MOST RECENT DIASTOLIC BLOOD PRESSURE < 80 MM HG: ICD-10-PCS | Mod: CPTII,S$GLB,, | Performed by: UROLOGY

## 2022-05-20 PROCEDURE — 3077F SYST BP >= 140 MM HG: CPT | Mod: CPTII,S$GLB,, | Performed by: UROLOGY

## 2022-05-20 PROCEDURE — 1101F PT FALLS ASSESS-DOCD LE1/YR: CPT | Mod: CPTII,S$GLB,, | Performed by: UROLOGY

## 2022-05-20 PROCEDURE — 1125F PR PAIN SEVERITY QUANTIFIED, PAIN PRESENT: ICD-10-PCS | Mod: CPTII,S$GLB,, | Performed by: UROLOGY

## 2022-05-20 PROCEDURE — 3072F PR LOW RISK FOR RETINOPATHY: ICD-10-PCS | Mod: CPTII,S$GLB,, | Performed by: UROLOGY

## 2022-05-20 PROCEDURE — 99204 PR OFFICE/OUTPT VISIT, NEW, LEVL IV, 45-59 MIN: ICD-10-PCS | Mod: S$GLB,,, | Performed by: UROLOGY

## 2022-05-20 PROCEDURE — 3288F PR FALLS RISK ASSESSMENT DOCUMENTED: ICD-10-PCS | Mod: CPTII,S$GLB,, | Performed by: UROLOGY

## 2022-05-20 PROCEDURE — 1159F MED LIST DOCD IN RCRD: CPT | Mod: CPTII,S$GLB,, | Performed by: UROLOGY

## 2022-05-20 RX ORDER — ATORVASTATIN CALCIUM 20 MG/1
20 TABLET, FILM COATED ORAL DAILY
Qty: 90 TABLET | Refills: 1 | Status: SHIPPED | OUTPATIENT
Start: 2022-05-20 | End: 2022-07-27

## 2022-05-20 RX ORDER — METFORMIN HYDROCHLORIDE 1000 MG/1
1000 TABLET ORAL 2 TIMES DAILY WITH MEALS
Qty: 180 TABLET | Refills: 1 | Status: SHIPPED | OUTPATIENT
Start: 2022-05-20 | End: 2022-11-30 | Stop reason: SDUPTHER

## 2022-05-20 RX ORDER — IRBESARTAN 300 MG/1
300 TABLET ORAL NIGHTLY
Qty: 90 TABLET | Refills: 2 | Status: SHIPPED | OUTPATIENT
Start: 2022-05-20 | End: 2022-11-30 | Stop reason: SDUPTHER

## 2022-05-20 RX ORDER — DICLOFENAC SODIUM 75 MG/1
75 TABLET, DELAYED RELEASE ORAL 2 TIMES DAILY
Qty: 90 TABLET | Refills: 0 | Status: SHIPPED | OUTPATIENT
Start: 2022-05-20 | End: 2023-12-21

## 2022-05-20 NOTE — TELEPHONE ENCOUNTER
Refill Routing Note   Medication(s) are not appropriate for processing by [facility] for the following reason(s):      - Outside of protocol  - Medication not previously prescribed by PCP    [facility] action(s):  Defer          Medication reconciliation completed: No     Appointments  past 12m or future 3m with PCP    Date Provider   Last Visit   4/12/2022 Dipak Treviño MD   Next Visit   Visit date not found Dipak Treviño MD   ED visits in past 90 days: 0        Note composed:3:53 PM 05/20/2022

## 2022-05-20 NOTE — TELEPHONE ENCOUNTER
----- Message from Shelliemadelaine ArnettZaira sent at 5/20/2022 10:58 AM CDT -----  Who Called: MUSA SEGURA JR.     Refill or New Rx: Refill    RX Name and Strength: diclofenac (VOLTAREN) 75 MG EC tablet, atorvastatin (LIPITOR) 20 MG tablet, irbesartan (AVAPRO) 300 MG tablet, and metFORMIN (GLUCOPHAGE) 1000 MG tablet    How is the patient currently taking it? (ex. 1XDay): As needed, 1xday    Is this a 30 day or 90 day RX: 90 day    Preferred Pharmacy with phone number: [id number] [address] AT [address]    Local or Mail Order Local    Ordering Provider: Dipak Treviño MD    Best Call Back Number: [phone]    Additional Information:

## 2022-05-20 NOTE — Clinical Note
CBC, CMP, and Axumin PET scan then see me, Dr Michelle and Dr Betancourt.  If medicaid will not cover Axumin PET, we will get bone scan and CT without contrast.  thanks

## 2022-05-20 NOTE — TELEPHONE ENCOUNTER
No new care gaps identified.  [facility] Embedded Care Gaps. Reference number: [id number]. 5/20/2022   11:32:00 AM CDT

## 2022-05-20 NOTE — PROGRESS NOTES
Subjective:      Misha Eldridge Jr. is a 77 y.o. male who presents today regarding his prostate ca follow up  New to me    Referred by Dr Treviño  Per his note:    ### Prostate CA ###  Dx approx 2010 at [facility] s/p partial resection. No XRT. Not sure is his chemo was for colon or prostate. He denies urinary frequency, urinary urgency, decreased force of stream, incomplete emptying of bladder, post void dribble, nocturia, or gross hematuria. Has not been back to see his urologist.    4/12/22 Still has not re-established urology care.    No weight loss  Good appetite    Denies bone pain    The following portions of the patient's history were reviewed and updated as appropriate: allergies, current medications, past family history, past medical history, past social history, past surgical history and problem list.    Review of Systems  Pertinent items are noted in HPI.  A comprehensive multipoint review of systems was negative except as otherwise stated in the HPI.    Past Medical History:   Diagnosis Date    Cataract     Colostomy in place     Diabetes mellitus type II     Hemorrhoid     History of colon cancer     History of prostate cancer     History of pulmonary embolism     Hypertension      Past Surgical History:   Procedure Laterality Date    CATARACT EXTRACTION Bilateral     EYE SURGERY Bilateral     cataract extraction    HERNIA REPAIR      PROSTATECTOMY      SUBTOTAL COLECTOMY       Colon Ca  Stage III sp LAR [facility] 2012  Records in media    Diabetes Medications             glipiZIDE (GLUCOTROL) 10 MG tablet Take 1 tablet (10 mg total) by mouth 2 (two) times daily before meals.    insulin (LANTUS SOLOSTAR U-100 INSULIN) glargine 100 units/mL (3mL) SubQ pen Inject 22 Units into the skin every evening.    metFORMIN (GLUCOPHAGE) 1000 MG tablet Take 1 tablet (1,000 mg total) by mouth 2 (two) times daily with meals.        Review of patient's allergies indicates:   Allergen Reactions    Hay fever and  allergy relief           Objective:   Vitals: There were no vitals taken for this visit.    Physical Exam   General: alert and oriented, no acute distress  Respiratory: Symmetric expansion, non-labored breathing  Cardiovascular: no peripheral edema  Abdomen: soft, non distended  Skin: normal coloration and turgor, no rashes, no suspicious skin lesions noted  Neuro: no gross deficits  Psych: normal judgment and insight, normal mood/affect and non-anxious  No bony tenderness    Physical Exam    Lab Review   Urinalysis demonstrates no specimen    Lab Results   Component Value Date    WBC 3.93 04/12/2022    HGB 13.0 (L) 04/12/2022    HCT 36.6 (L) 04/12/2022    MCV 90 04/12/2022     (L) 04/12/2022     Lab Results   Component Value Date    CREATININE 1.3 04/12/2022    BUN 15 04/12/2022     Lab Results   Component Value Date    PSA 7.1 (H) 04/12/2022    PSA 0.72 12/05/2017    PSA 0.38 12/14/2016         Imaging  -    Assessment and Plan:   History of prostate cancer; biochemical recurrence sp RALP Megan 2010    PSMA scan not available  CT not available due to contrast shortage    Will get Axumin PET  If medicaid will not cover PET, we will get bone scan and CT ab and pelvis without contrast    RTC to see me, rad onc and heme onc at [facility] after above

## 2022-05-26 ENCOUNTER — OFFICE VISIT (OUTPATIENT)
Dept: RADIATION ONCOLOGY | Facility: CLINIC | Age: 77
End: 2022-05-26
Payer: MEDICARE

## 2022-05-26 VITALS
HEIGHT: 73 IN | BODY MASS INDEX: 28.6 KG/M2 | HEART RATE: 60 BPM | SYSTOLIC BLOOD PRESSURE: 160 MMHG | RESPIRATION RATE: 18 BRPM | WEIGHT: 215.81 LBS | DIASTOLIC BLOOD PRESSURE: 76 MMHG

## 2022-05-26 DIAGNOSIS — Z85.46 HISTORY OF PROSTATE CANCER: ICD-10-CM

## 2022-05-26 PROCEDURE — 1101F PR PT FALLS ASSESS DOC 0-1 FALLS W/OUT INJ PAST YR: ICD-10-PCS | Mod: CPTII,S$GLB,, | Performed by: STUDENT IN AN ORGANIZED HEALTH CARE EDUCATION/TRAINING PROGRAM

## 2022-05-26 PROCEDURE — 99205 PR OFFICE/OUTPT VISIT, NEW, LEVL V, 60-74 MIN: ICD-10-PCS | Mod: S$GLB,,, | Performed by: STUDENT IN AN ORGANIZED HEALTH CARE EDUCATION/TRAINING PROGRAM

## 2022-05-26 PROCEDURE — 3077F PR MOST RECENT SYSTOLIC BLOOD PRESSURE >= 140 MM HG: ICD-10-PCS | Mod: CPTII,S$GLB,, | Performed by: STUDENT IN AN ORGANIZED HEALTH CARE EDUCATION/TRAINING PROGRAM

## 2022-05-26 PROCEDURE — 1101F PT FALLS ASSESS-DOCD LE1/YR: CPT | Mod: CPTII,S$GLB,, | Performed by: STUDENT IN AN ORGANIZED HEALTH CARE EDUCATION/TRAINING PROGRAM

## 2022-05-26 PROCEDURE — 3078F DIAST BP <80 MM HG: CPT | Mod: CPTII,S$GLB,, | Performed by: STUDENT IN AN ORGANIZED HEALTH CARE EDUCATION/TRAINING PROGRAM

## 2022-05-26 PROCEDURE — 99999 PR PBB SHADOW E&M-EST. PATIENT-LVL V: ICD-10-PCS | Mod: PBBFAC,,, | Performed by: STUDENT IN AN ORGANIZED HEALTH CARE EDUCATION/TRAINING PROGRAM

## 2022-05-26 PROCEDURE — 99215 OFFICE O/P EST HI 40 MIN: CPT | Mod: PBBFAC | Performed by: STUDENT IN AN ORGANIZED HEALTH CARE EDUCATION/TRAINING PROGRAM

## 2022-05-26 PROCEDURE — 3288F FALL RISK ASSESSMENT DOCD: CPT | Mod: CPTII,S$GLB,, | Performed by: STUDENT IN AN ORGANIZED HEALTH CARE EDUCATION/TRAINING PROGRAM

## 2022-05-26 PROCEDURE — 3078F PR MOST RECENT DIASTOLIC BLOOD PRESSURE < 80 MM HG: ICD-10-PCS | Mod: CPTII,S$GLB,, | Performed by: STUDENT IN AN ORGANIZED HEALTH CARE EDUCATION/TRAINING PROGRAM

## 2022-05-26 PROCEDURE — 3072F LOW RISK FOR RETINOPATHY: CPT | Mod: CPTII,S$GLB,, | Performed by: STUDENT IN AN ORGANIZED HEALTH CARE EDUCATION/TRAINING PROGRAM

## 2022-05-26 PROCEDURE — 1125F AMNT PAIN NOTED PAIN PRSNT: CPT | Mod: CPTII,S$GLB,, | Performed by: STUDENT IN AN ORGANIZED HEALTH CARE EDUCATION/TRAINING PROGRAM

## 2022-05-26 PROCEDURE — 1160F PR REVIEW ALL MEDS BY PRESCRIBER/CLIN PHARMACIST DOCUMENTED: ICD-10-PCS | Mod: CPTII,S$GLB,, | Performed by: STUDENT IN AN ORGANIZED HEALTH CARE EDUCATION/TRAINING PROGRAM

## 2022-05-26 PROCEDURE — 3077F SYST BP >= 140 MM HG: CPT | Mod: CPTII,S$GLB,, | Performed by: STUDENT IN AN ORGANIZED HEALTH CARE EDUCATION/TRAINING PROGRAM

## 2022-05-26 PROCEDURE — 1160F RVW MEDS BY RX/DR IN RCRD: CPT | Mod: CPTII,S$GLB,, | Performed by: STUDENT IN AN ORGANIZED HEALTH CARE EDUCATION/TRAINING PROGRAM

## 2022-05-26 PROCEDURE — 3288F PR FALLS RISK ASSESSMENT DOCUMENTED: ICD-10-PCS | Mod: CPTII,S$GLB,, | Performed by: STUDENT IN AN ORGANIZED HEALTH CARE EDUCATION/TRAINING PROGRAM

## 2022-05-26 PROCEDURE — 1159F PR MEDICATION LIST DOCUMENTED IN MEDICAL RECORD: ICD-10-PCS | Mod: CPTII,S$GLB,, | Performed by: STUDENT IN AN ORGANIZED HEALTH CARE EDUCATION/TRAINING PROGRAM

## 2022-05-26 PROCEDURE — 1159F MED LIST DOCD IN RCRD: CPT | Mod: CPTII,S$GLB,, | Performed by: STUDENT IN AN ORGANIZED HEALTH CARE EDUCATION/TRAINING PROGRAM

## 2022-05-26 PROCEDURE — 99205 OFFICE O/P NEW HI 60 MIN: CPT | Mod: S$GLB,,, | Performed by: STUDENT IN AN ORGANIZED HEALTH CARE EDUCATION/TRAINING PROGRAM

## 2022-05-26 PROCEDURE — 99999 PR PBB SHADOW E&M-EST. PATIENT-LVL V: CPT | Mod: PBBFAC,,, | Performed by: STUDENT IN AN ORGANIZED HEALTH CARE EDUCATION/TRAINING PROGRAM

## 2022-05-26 PROCEDURE — 1125F PR PAIN SEVERITY QUANTIFIED, PAIN PRESENT: ICD-10-PCS | Mod: CPTII,S$GLB,, | Performed by: STUDENT IN AN ORGANIZED HEALTH CARE EDUCATION/TRAINING PROGRAM

## 2022-05-26 PROCEDURE — 3072F PR LOW RISK FOR RETINOPATHY: ICD-10-PCS | Mod: CPTII,S$GLB,, | Performed by: STUDENT IN AN ORGANIZED HEALTH CARE EDUCATION/TRAINING PROGRAM

## 2022-05-26 NOTE — PROGRESS NOTES
Radiation Oncology Consult Note        Date of Service: 05/25/2022    Chief Complaint: prostate cancer     Reason for visit: consideration for radiation to the pelvis     Referring Physician: Dr Ravi (urology)     Implantable devices: denies    Therapy to Date:  No radiation       Diagnosis/Assessment:   Misha Eldridge Jr. is a 77 y.o. man with prostate adenocarcinoma s/p RA-RP with b/l nerve sparing (04/27/2010, at [facility]) with biochemical recurrence, latest PSA 7.1, pending staging with PET axumin    PMH of Stage IIIC2 ([id number], G2-3) colon adenocarcinoma, s/p LAR/small bowel resection/colosctomy 5/2/2012, adjuvant mFOLFOX6 x 10c (7/11/2012-11/14/2012, at [facility])    ECOG 1    Plan   We discussed treatment options per NCCN guidelines v2022:  - salvage EBRT + short term ADT provided PET axumin does not show signs of regional or distant metastases      EBRT would consist of daily radiation treatments M-F, 38-40 fractions to the prostate bed + regional pelvic lymph nodes.      Risks, benefits, and side effects of radiotherapy were discussed.   Side effects include but are not limited to fatigue, erythema, hyperpigmentation, desquamation within the radiation field, more frequent urination, both during the day and at night, urgency with urination, hematuria, dysuria, and a sensation of incomplete emptying.   In addition, the patient will be at risk for increased frequency and/or loose bowel movements.   All of these side effects will go away in the short term.   In the long term, the patient is at risk for radiation cystitis, radiation proctitis, and erectile dysfunction.     The patient indicated preference for RT to treat prostate cancer.         - 6/9 PET axumin  - 6/21 referred to Dr Betancourt  - 6/21 fu Dr Ravi  - [facility]  - patient signed release to get prostate cancer surgery records from [facility]  - no RT consent signed  - Epic- 26 survey not filled      HPI:   Misha  Chente Peralta is a 77 y.o. man with prostate adenocarcinoma s/p RA-RP with b/l nerve sparing and PSA recurrence    Oncologic history  04/27/2010 Prostate adenocarcinoma s/p RA-RP with b/l nerve sparing     Colon adenocarcinoma Stage IIIC2 (G2-G3, +1/17LN, [id number] 2012), s/p LAR/small bowel resection/colosctomy 5/2/2012, adjuvant mFOLFOX6 x 10c (7/11/2012-11/14/2012) (Dr Juvencio Enciso)    12/14/2016 PSA 0.38    12/5/2017 PSA 0.72    4/12/2022 PSA 7.1    5/19/2022 initial visit with urology (Dr Ravi)      Subjective:   In clinic the patient is alone    The patient reports feeling very well.  The patient denies major urinary or bowel function issues. He is not sexually active but has a partner of 25 years  The patient denies other major complaints    AUA score 1 (0,0,0,0,0,0,1) delighted  BIRD score 2 (2,0,0,0,0) severe ED     The patient denies any history of radiation therapy, implantable cardiac devices, or connective tissue disease.    Social history  Lives with mother who is 94.  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 90 tablet 1    atorvastatin (LIPITOR) 20 MG tablet Take 1 tablet (20 mg total) by mouth once daily. 90 tablet 1    blood sugar diagnostic Strp 1 each by Misc.(Non-Drug; Combo Route) route 2 (two) times daily. 1 each 0    blood sugar diagnostic Strp 1 each by Misc.(Non-Drug; Combo Route) route once daily. 100 each 3    blood sugar diagnostic Strp Pt to check up to  "6 times daily 200 strip 11    BOOSTRIX TDAP 2.5-8-5 Lf-mcg-Lf/0.5mL Syrg injection       carvediloL (COREG) 3.125 MG tablet TAKE 1 TABLET(3.125 MG) BY MOUTH TWICE DAILY WITH MEALS 180 tablet 1    diclofenac (VOLTAREN) 75 MG EC tablet Take 1 tablet (75 mg total) by mouth 2 (two) times daily. 90 tablet 0    doxazosin (CARDURA) 4 MG tablet Take 1 tablet (4 mg total) by mouth every evening. 90 tablet 1    gabapentin (NEURONTIN) 300 MG capsule Take 1 capsule (300 mg total) by mouth 3 (three) times daily. 270 capsule 1    glipiZIDE (GLUCOTROL) 10 MG tablet Take 1 tablet (10 mg total) by mouth 2 (two) times daily before meals. 180 tablet 1    hydroCHLOROthiazide (HYDRODIURIL) 25 MG tablet TAKE ONE-HALF TABLET BY MOUTH ONCE DAILY 90 tablet 1    insulin (LANTUS SOLOSTAR U-100 INSULIN) glargine 100 units/mL (3mL) SubQ pen Inject 22 Units into the skin every evening. 15 mL 11    irbesartan (AVAPRO) 300 MG tablet Take 1 tablet (300 mg total) by mouth every evening. 90 tablet 2    metFORMIN (GLUCOPHAGE) 1000 MG tablet Take 1 tablet (1,000 mg total) by mouth 2 (two) times daily with meals. 180 tablet 1    pen needle, diabetic (BD ULTRA-FINE KELECHI PEN NEEDLE) 32 gauge x 5/32" Ndle VIA DEVICE USE EVERY MORNING 200 each 11    tobramycin sulfate 0.3% (TOBREX) 0.3 % ophthalmic solution 1-2 drops topically twice daily to affected toe(s). 5 mL 3    tobramycin sulfate 0.3% (TOBREX) 0.3 % ophthalmic solution 1-2 drops topically twice daily to affected toe(s). 5 mL 3    triamcinolone acetonide 0.1% (KENALOG) 0.1 % cream Apply topically 2 (two) times daily. 45 g 0    blood-glucose meter kit Use as instructed 1 each 0    lancing device with lancets Kit 1 each by Misc.(Non-Drug; Combo Route) route 2 (two) times daily. 200 each 11     No current facility-administered medications on file prior to visit.         [facility]          Past Surgical History:   Procedure Laterality Date    CATARACT EXTRACTION Bilateral     EYE SURGERY " Bilateral     cataract extraction    HERNIA REPAIR      PROSTATECTOMY      SUBTOTAL COLECTOMY         Past Medical History:   Diagnosis Date    Cataract     Colostomy in place     Diabetes mellitus type II     Hemorrhoid     History of colon cancer     History of prostate cancer     History of pulmonary embolism     Hypertension            Review of Systems   Constitutional: Positive for fatigue. Negative for fever and unexpected weight change.   HENT: Positive for sinus pressure/congestion and tinnitus. Negative for nasal congestion.    Eyes: Negative for visual disturbance.   Respiratory: Negative for cough and shortness of breath.    Cardiovascular: Negative for chest pain, palpitations and leg swelling.   Gastrointestinal: Negative for abdominal distention, blood in stool, constipation, diarrhea, nausea, vomiting and reflux.   Genitourinary: Negative for dysuria, frequency, hematuria and urgency.   Musculoskeletal: Positive for back pain and neck pain. Negative for arthralgias.   Integumentary:  Negative for rash.        itching   Neurological: Positive for weakness (focal). Negative for seizures, speech difficulty, light-headedness, numbness, headaches and memory loss.   Psychiatric/Behavioral: Negative for dysphoric mood. The patient is not nervous/anxious.          Objective:      Physical Exam  Vitals reviewed.   Constitutional:       Appearance: Normal appearance.   HENT:      Head: Normocephalic and atraumatic.      Mouth/Throat:      Mouth: Mucous membranes are moist.   Eyes:      Conjunctiva/sclera: Conjunctivae normal.   Cardiovascular:      Comments: extremities well perfused  Pulmonary:      Effort: Pulmonary effort is normal.   Abdominal:      General: There is no distension.   Genitourinary:     Comments: JANAY deferred, s/p RP  Musculoskeletal:         General: Normal range of motion.      Cervical back: Normal range of motion.   Skin:     General: Skin is warm and dry.   Neurological:       General: No focal deficit present.      Mental Status: He is alert and oriented to person, place, and time.   Psychiatric:         Mood and Affect: Mood normal.         Behavior: Behavior normal.             Laboratory: I have personally reviewed the patient's available laboratory values and summarized pertinent findings above in HPI.      I have personally reviewed the patient's available scanned outside clinical documentation which contains PMH of cancers        I spent approximately 60 minutes reviewing the available records and evaluating the patient, out of which over 50% of the time was spent face to face with the patient in counseling and coordinating this patient's care.     Thank you for the opportunity to care for this patient. Please do not hesitate to contact me with any questions.     Kenneth Azevedo MD/PhD

## 2022-05-27 ENCOUNTER — TELEPHONE (OUTPATIENT)
Dept: UROLOGY | Facility: CLINIC | Age: 77
End: 2022-05-27
Payer: MEDICARE

## 2022-05-27 NOTE — TELEPHONE ENCOUNTER
Detailed voicemail left for patient to return call to oncology nurse navigator for review of referrals and upcoming appointments.

## 2022-05-30 DIAGNOSIS — G62.9 NEUROPATHY: ICD-10-CM

## 2022-05-30 RX ORDER — GABAPENTIN 300 MG/1
CAPSULE ORAL
Qty: 270 CAPSULE | Refills: 1 | Status: SHIPPED | OUTPATIENT
Start: 2022-05-30 | End: 2022-10-10 | Stop reason: SDUPTHER

## 2022-05-30 NOTE — TELEPHONE ENCOUNTER
No new care gaps identified.  [facility] Embedded Care Gaps. Reference number: [id number]. 5/30/2022   5:41:21 AM CDT

## 2022-06-02 ENCOUNTER — ANESTHESIA (OUTPATIENT)
Dept: ENDOSCOPY | Facility: HOSPITAL | Age: 77
End: 2022-06-02
Payer: MEDICARE

## 2022-06-02 ENCOUNTER — ANESTHESIA EVENT (OUTPATIENT)
Dept: ENDOSCOPY | Facility: HOSPITAL | Age: 77
End: 2022-06-02
Payer: MEDICARE

## 2022-06-02 ENCOUNTER — HOSPITAL ENCOUNTER (OUTPATIENT)
Facility: HOSPITAL | Age: 77
Discharge: HOME | End: 2022-06-02
Attending: STUDENT IN AN ORGANIZED HEALTH CARE EDUCATION/TRAINING PROGRAM | Admitting: STUDENT IN AN ORGANIZED HEALTH CARE EDUCATION/TRAINING PROGRAM
Payer: MEDICARE

## 2022-06-02 VITALS
HEART RATE: 54 BPM | DIASTOLIC BLOOD PRESSURE: 74 MMHG | BODY MASS INDEX: 28.49 KG/M2 | WEIGHT: 215 LBS | OXYGEN SATURATION: 98 % | TEMPERATURE: 98 F | SYSTOLIC BLOOD PRESSURE: 178 MMHG | RESPIRATION RATE: 18 BRPM | HEIGHT: 73 IN

## 2022-06-02 DIAGNOSIS — Z12.11 ENCOUNTER FOR SCREENING COLONOSCOPY: ICD-10-CM

## 2022-06-02 DIAGNOSIS — Z85.038 HISTORY OF COLON CANCER: Primary | ICD-10-CM

## 2022-06-02 LAB — POCT GLUCOSE: 279 MG/DL (ref 70–110)

## 2022-06-02 PROCEDURE — 63600175 PHARM REV CODE 636 W HCPCS: Performed by: NURSE ANESTHETIST, CERTIFIED REGISTERED

## 2022-06-02 PROCEDURE — G0105 COLORECTAL SCRN; HI RISK IND: ICD-10-PCS | Mod: ,,, | Performed by: STUDENT IN AN ORGANIZED HEALTH CARE EDUCATION/TRAINING PROGRAM

## 2022-06-02 PROCEDURE — G0105 COLORECTAL SCRN; HI RISK IND: HCPCS | Mod: ,,, | Performed by: STUDENT IN AN ORGANIZED HEALTH CARE EDUCATION/TRAINING PROGRAM

## 2022-06-02 PROCEDURE — 37000008 HC ANESTHESIA 1ST 15 MINUTES: Performed by: STUDENT IN AN ORGANIZED HEALTH CARE EDUCATION/TRAINING PROGRAM

## 2022-06-02 PROCEDURE — 37000009 HC ANESTHESIA EA ADD 15 MINS: Performed by: STUDENT IN AN ORGANIZED HEALTH CARE EDUCATION/TRAINING PROGRAM

## 2022-06-02 PROCEDURE — 25000003 PHARM REV CODE 250: Performed by: NURSE ANESTHETIST, CERTIFIED REGISTERED

## 2022-06-02 PROCEDURE — 25000003 PHARM REV CODE 250: Performed by: STUDENT IN AN ORGANIZED HEALTH CARE EDUCATION/TRAINING PROGRAM

## 2022-06-02 PROCEDURE — G0105 COLORECTAL SCRN; HI RISK IND: HCPCS | Performed by: STUDENT IN AN ORGANIZED HEALTH CARE EDUCATION/TRAINING PROGRAM

## 2022-06-02 RX ORDER — PROPOFOL 10 MG/ML
VIAL (ML) INTRAVENOUS
Status: DISCONTINUED | OUTPATIENT
Start: 2022-06-02 | End: 2022-06-02

## 2022-06-02 RX ORDER — SODIUM CHLORIDE 9 MG/ML
INJECTION, SOLUTION INTRAVENOUS CONTINUOUS
Status: DISCONTINUED | OUTPATIENT
Start: 2022-06-02 | End: 2022-06-02 | Stop reason: HOSPADM

## 2022-06-02 RX ORDER — PROPOFOL 10 MG/ML
VIAL (ML) INTRAVENOUS CONTINUOUS PRN
Status: DISCONTINUED | OUTPATIENT
Start: 2022-06-02 | End: 2022-06-02

## 2022-06-02 RX ORDER — LIDOCAINE HYDROCHLORIDE 20 MG/ML
INJECTION INTRAVENOUS
Status: DISCONTINUED | OUTPATIENT
Start: 2022-06-02 | End: 2022-06-02

## 2022-06-02 RX ADMIN — SODIUM CHLORIDE: 0.9 INJECTION, SOLUTION INTRAVENOUS at 10:06

## 2022-06-02 RX ADMIN — LIDOCAINE HYDROCHLORIDE 50 MG: 20 INJECTION, SOLUTION INTRAVENOUS at 12:06

## 2022-06-02 RX ADMIN — PROPOFOL 100 MG: 10 INJECTION, EMULSION INTRAVENOUS at 12:06

## 2022-06-02 RX ADMIN — Medication 150 MCG/KG/MIN: at 12:06

## 2022-06-02 NOTE — PROVATION PATIENT INSTRUCTIONS
Discharge Summary/Instructions after an Endoscopic Procedure  Patient Name: Misha Eldridge  Patient MRN: [id number]  Patient YOB: 1945  Thursday, June 2, 2022  Jose Dangelo MD  Dear patient,  As a result of recent federal legislation (The Federal Cures Act), you may   receive lab or pathology results from your procedure in your [facility]   account before your physician is able to contact you. Your physician or   their representative will relay the results to you with their   recommendations at their soonest availability.  Thank you,  RESTRICTIONS:  During your procedure today, you received medications for sedation.  These   medications may affect your judgment, balance and coordination.  Therefore,   for 24 hours, you have the following restrictions:   - DO NOT drive a car, operate machinery, make legal/financial decisions,   sign important papers or drink alcohol.    ACTIVITY:  Today: no heavy lifting, straining or running due to procedural   sedation/anesthesia.  The following day: return to full activity including work.  DIET:  Eat and drink normally unless instructed otherwise.     TREATMENT FOR COMMON SIDE EFFECTS:  - Mild abdominal pain, nausea, belching, bloating or excessive gas:  rest,   eat lightly and use a heating pad.  - Sore Throat: treat with throat lozenges and/or gargle with warm salt   water.  - Because air was used during the procedure, expelling large amounts of air   from your rectum or belching is normal.  - If a bowel prep was taken, you may not have a bowel movement for 1-3 days.    This is normal.  SYMPTOMS TO WATCH FOR AND REPORT TO YOUR PHYSICIAN:  1. Abdominal pain or bloating, other than gas cramps.  2. Chest pain.  3. Back pain.  4. Signs of infection such as: chills or fever occurring within 24 hours   after the procedure.  5. Rectal bleeding, which would show as bright red, maroon, or black stools.   (A tablespoon of blood from the rectum is not serious, especially  if   hemorrhoids are present.)  6. Vomiting.  7. Weakness or dizziness.  GO DIRECTLY TO THE NEAREST EMERGENCY ROOM IF YOU HAVE ANY OF THE FOLLOWING:      Difficulty breathing              Chills and/or fever over 101 F   Persistent vomiting and/or vomiting blood   Severe abdominal pain   Severe chest pain   Black, tarry stools   Bleeding- more than one tablespoon   Any other symptom or condition that you feel may need urgent attention  Your doctor recommends these additional instructions:  If any biopsies were taken, your doctors clinic will contact you in 1 to 2   weeks with any results.  - Discharge patient to home.   - Resume previous diet.   - Continue present medications.   - Repeat colonoscopy in 1 year because the bowel preparation was suboptimal.     - Return to referring physician as previously scheduled.  For questions, problems or results please call your physician - Jose Dangelo MD at Work:  [phone].  [facility] EMERGENCY ROOM PHONE NUMBER: [phone]  IF A COMPLICATION OR EMERGENCY SITUATION ARISES AND YOU ARE UNABLE TO REACH   YOUR PHYSICIAN - GO DIRECTLY TO THE EMERGENCY ROOM.  MD Jose Dos Santos MD  6/2/2022 1:10:00 PM  This report has been verified and signed electronically.  Dear patient,  As a result of recent federal legislation (The Federal Cures Act), you may   receive lab or pathology results from your procedure in your [facility]   account before your physician is able to contact you. Your physician or   their representative will relay the results to you with their   recommendations at their soonest availability.  Thank you,  PROVATION

## 2022-06-02 NOTE — ANESTHESIA PREPROCEDURE EVALUATION
06/02/2022  Misha Eldridge Jr. is a 77 y.o., male.  Past Medical History:   Diagnosis Date    Cataract     Colostomy in place     Diabetes mellitus type II     Hemorrhoid     History of colon cancer     History of prostate cancer     History of pulmonary embolism     Hypertension      Past Surgical History:   Procedure Laterality Date    CATARACT EXTRACTION Bilateral     EYE SURGERY Bilateral     cataract extraction    HERNIA REPAIR      PROSTATECTOMY      SUBTOTAL COLECTOMY           Pre-op Assessment    I have reviewed the Patient Summary Reports.     I have reviewed the Nursing Notes. I have reviewed the NPO Status.   I have reviewed the Medications.     Review of Systems  Anesthesia Hx:  No problems with previous Anesthesia    Social:  Non-Smoker, No Alcohol Use    Cardiovascular:   Hypertension    Renal/:   Chronic Renal Disease    Endocrine:   Diabetes        Physical Exam  General: Well nourished, Cooperative, Alert and Oriented    Airway:  Mallampati: I   Mouth Opening: Normal  TM Distance: Normal  Tongue: Normal  Neck ROM: Normal ROM    Dental:  Intact        Anesthesia Plan  Type of Anesthesia, risks & benefits discussed:    Anesthesia Type: Gen Natural Airway  Intra-op Monitoring Plan: Standard ASA Monitors  Post Op Pain Control Plan: multimodal analgesia  Induction:  IV  Informed Consent: Informed consent signed with the Patient and all parties understand the risks and agree with anesthesia plan.  All questions answered.   ASA Score: 3  Day of Surgery Review of History & Physical: H&P Update referred to the surgeon/provider.    Ready For Surgery From Anesthesia Perspective.     .

## 2022-06-02 NOTE — TRANSFER OF CARE
"Anesthesia Transfer of Care Note    Patient: Misha Eldridge Jr.    Procedure(s) Performed: Procedure(s) (LRB):  COLONOSCOPY (N/A)    Patient location: PACU    Anesthesia Type: general    Transport from OR: Transported from OR on room air with adequate spontaneous ventilation    Post pain: adequate analgesia    Post assessment: no apparent anesthetic complications    Post vital signs: stable    Level of consciousness: awake    Nausea/Vomiting: no nausea/vomiting    Complications: none    Transfer of care protocol was followed      Last vitals:   Visit Vitals  /78 (BP Location: Left arm, Patient Position: Lying)   Pulse 68   Temp 36.7 °C (98.1 °F) (Skin)   Resp 14   Ht 6' 1" (1.854 m)   Wt 97.5 kg (215 lb)   SpO2 98%   BMI 28.37 kg/m²     "

## 2022-06-02 NOTE — H&P
[facility]  Colon and Rectal Surgery    [address] [place], [place]  Tel: [phone]  Fax: [phone]  [email or web address]   MD aDvid Thompson MD Brian Kann, MD W. Forrest Johnston, MD Matthew Giglia, MD Jennifer Paruch, MD William Kethman, MD Danielle Kay, MD     Patient name: Misha Eldridge Jr.   YOB: 1945   MRN: [id number]  Date of procedure: 06/02/2022    Procedure: Colonoscopy  Indications: Previous colon cancer    The patient was informed of the availability of a certified  without charge. A certified  was not necessary for this visit.    Sedation plan: MAC  ASA: ASA 2 - Patient with mild systemic disease with no functional limitations    Review of Systems  See above    Past Medical History:   Diagnosis Date    Cataract     Colostomy in place     Diabetes mellitus type II     Hemorrhoid     History of colon cancer     History of prostate cancer     History of pulmonary embolism     Hypertension      Past Surgical History:   Procedure Laterality Date    CATARACT EXTRACTION Bilateral     EYE SURGERY Bilateral     cataract extraction    HERNIA REPAIR      PROSTATECTOMY      SUBTOTAL COLECTOMY       Family History   Problem Relation Age of Onset    Arthritis Mother     Hypertension Mother     Cancer Mother     Alcohol abuse Father     No Known Problems Daughter      Social History     Tobacco Use    Smoking status: Never Smoker    Smokeless tobacco: Never Used   Substance Use Topics    Alcohol use: No    Drug use: No     Review of patient's allergies indicates:   Allergen Reactions    Hay fever and allergy relief        Prior to Admission medications    Medication Sig Start Date End Date Taking? Authorizing Provider   amLODIPine (NORVASC) 10 MG tablet Take 1 tablet (10 mg total) by mouth once daily. 12/23/21  Yes Shirley Oliver MD   atorvastatin (LIPITOR) 20 MG tablet Take 1 tablet  (20 mg total) by mouth once daily. 5/20/22 8/18/22 Yes Dipak Treviño MD   carvediloL (COREG) 3.125 MG tablet TAKE 1 TABLET(3.125 MG) BY MOUTH TWICE DAILY WITH MEALS 4/27/22  Yes Dipak Treviño MD   diclofenac (VOLTAREN) 75 MG EC tablet Take 1 tablet (75 mg total) by mouth 2 (two) times daily. 5/20/22  Yes Dipak Treviño MD   doxazosin (CARDURA) 4 MG tablet Take 1 tablet (4 mg total) by mouth every evening. 12/23/21  Yes Shirley Oliver MD   gabapentin (NEURONTIN) 300 MG capsule TAKE 1 CAPSULE(300 MG) BY MOUTH THREE TIMES DAILY 5/30/22  Yes Dipak Treviño MD   glipiZIDE (GLUCOTROL) 10 MG tablet Take 1 tablet (10 mg total) by mouth 2 (two) times daily before meals. 12/23/21  Yes Dipak Treviño MD   hydroCHLOROthiazide (HYDRODIURIL) 25 MG tablet TAKE ONE-HALF TABLET BY MOUTH ONCE DAILY 12/23/21  Yes Shirley Oliver MD   insulin (LANTUS SOLOSTAR U-100 INSULIN) glargine 100 units/mL (3mL) SubQ pen Inject 22 Units into the skin every evening. 2/15/22  Yes Dipak Treviño MD   irbesartan (AVAPRO) 300 MG tablet Take 1 tablet (300 mg total) by mouth every evening. 5/20/22  Yes Dipak Treviño MD   metFORMIN (GLUCOPHAGE) 1000 MG tablet Take 1 tablet (1,000 mg total) by mouth 2 (two) times daily with meals. 5/20/22  Yes Dipak Treviño MD   tobramycin sulfate 0.3% (TOBREX) 0.3 % ophthalmic solution 1-2 drops topically twice daily to affected toe(s). 12/7/21  Yes Mk Doyle DPM   tobramycin sulfate 0.3% (TOBREX) 0.3 % ophthalmic solution 1-2 drops topically twice daily to affected toe(s). 12/28/21  Yes Mk Doyle DPM   ACCU-CHEK SOFTCLIX LANCETS Misc TEST BLOOD SUGAR ONCE A DAY 4/13/20   Zurdo Best MD   blood sugar diagnostic Strp 1 each by Misc.(Non-Drug; Combo Route) route 2 (two) times daily. 10/30/17   Dipak Treviño MD   blood sugar diagnostic Strp 1 each by Misc.(Non-Drug; Combo Route) route once daily. 3/4/19   Kezia Dennis MD   blood sugar diagnostic  "Strp Pt to check up to 6 times daily 6/6/19   Dipak Treviño MD   blood-glucose meter kit Use as instructed 3/4/19 4/12/22  Kezia Dennis MD   BOOSTRIX TDAP 2.5-8-5 Lf-mcg-Lf/0.5mL Syrg injection  12/5/17   Historical Provider   lancing device with lancets Kit 1 each by Misc.(Non-Drug; Combo Route) route 2 (two) times daily. 10/15/20 10/15/21  Dipak Treviño MD   pen needle, diabetic (BD ULTRA-FINE KELECHI PEN NEEDLE) 32 gauge x 5/32" Ndle VIA DEVICE USE EVERY MORNING 6/8/21   Dipak Treviño MD   triamcinolone acetonide 0.1% (KENALOG) 0.1 % cream Apply topically 2 (two) times daily. 6/6/19   Dipak Treviño MD       Physical Examination  BP (!) 187/86 (BP Location: Left arm, Patient Position: Lying)   Pulse 68   Temp 98.1 °F (36.7 °C) (Skin)   Resp 14   Ht 6' 1" (1.854 m)   Wt 97.5 kg (215 lb)   SpO2 98%   BMI 28.37 kg/m²      Constitutional: well developed, no cough, no dyspnea, alert and no acute distress    Head: Normocephalic, no lesions, without obvious abnormality  Eye: Normal external eye, conjunctiva, and lids, PERRL  Cardiovascular: regular rate and regular rhythm  Respiratory: normal air entry  Gastrointestinal: soft, non-tender  Neurologic: alert, oriented, normal speech, no focal findings or movement disorder noted  Psychiatric: appropriate, normal mood    Plan of Care    It was a pleasure meeting Mr. Eldridge today - we will plan to perform a colonoscopy with monitored anesthesia care. The details of the procedure, the possible need for biopsy or polypectomy and the potential risks including bleeding, perforation, missed polyps were discussed in detail and they consented to undergo the procedure.      Jose Dangelo MD - Staff Surgeon  Department of Colon & Rectal Surgery  [facility]    "

## 2022-06-03 NOTE — ANESTHESIA POSTPROCEDURE EVALUATION
Anesthesia Post Evaluation    Patient: Misha Eldridge Jr.    Procedure(s) Performed: Procedure(s) (LRB):  COLONOSCOPY (N/A)    Final Anesthesia Type: general      Patient location during evaluation: GI PACU  Patient participation: Yes- Able to Participate  Level of consciousness: awake  Post-procedure vital signs: reviewed and stable  Pain management: adequate  Airway patency: patent    PONV status at discharge: No PONV  Anesthetic complications: no      Cardiovascular status: blood pressure returned to baseline  Respiratory status: unassisted  Hydration status: euvolemic  Follow-up not needed.          Vitals Value Taken Time   /74 06/02/22 1339   Temp 36.8 °C (98.2 °F) 06/02/22 1314   Pulse 54 06/02/22 1339   Resp 18 06/02/22 1339   SpO2 98 % 06/02/22 1339         No case tracking events are documented in the log.      Pain/Marlon Score: Marlon Score: 10 (6/2/2022  1:15 PM)

## 2022-06-09 ENCOUNTER — HOSPITAL ENCOUNTER (OUTPATIENT)
Dept: RADIOLOGY | Facility: HOSPITAL | Age: 77
Discharge: HOME | End: 2022-06-09
Attending: UROLOGY
Payer: MEDICARE

## 2022-06-09 DIAGNOSIS — Z85.46 HISTORY OF PROSTATE CANCER: ICD-10-CM

## 2022-06-09 PROCEDURE — 78815 PET IMAGE W/CT SKULL-THIGH: CPT | Mod: 26,PS,, | Performed by: RADIOLOGY

## 2022-06-09 PROCEDURE — 78815 NM PET CT FLUCICLOVINE F18(PROSTATE CANCER RECURRENCE): ICD-10-PCS | Mod: 26,PS,, | Performed by: RADIOLOGY

## 2022-06-09 PROCEDURE — 78815 PET IMAGE W/CT SKULL-THIGH: CPT | Mod: TC,PS

## 2022-06-17 ENCOUNTER — TELEPHONE (OUTPATIENT)
Dept: HEMATOLOGY/ONCOLOGY | Facility: CLINIC | Age: 77
End: 2022-06-17
Payer: MEDICARE

## 2022-06-17 NOTE — TELEPHONE ENCOUNTER
Oncology nurse navigator spoke to patient and confirmed all appointments on 6/21 for multi-disciplinary clinic with urology team. Registration link for [employer] signup resent. Patient states he has not received appointment reminders in the mail. Encouraged to sign up for [facility] portal to have access to appointments as it has been quite challenging to get in touch with patient by phone and denies receiving voicemails.

## 2022-06-20 NOTE — PROGRESS NOTES
Multidisciplinary Uro Oncology Clinic        Subjective:      Misha Eldridge Jr. is a 77 y.o. male who returns today regarding his     pca biochemical recurrence    Not symptomatic.    The following portions of the patient's history were reviewed and updated as appropriate: allergies, current medications, past family history, past medical history, past social history, past surgical history and problem list.    Review of Systems  Pertinent items are noted in HPI.  A comprehensive multipoint review of systems was negative except as otherwise stated in the HPI.    Past Medical History:   Diagnosis Date    Cataract     Colostomy in place     Diabetes mellitus type II     Hemorrhoid     History of colon cancer     History of prostate cancer     History of pulmonary embolism     Hypertension      Past Surgical History:   Procedure Laterality Date    CATARACT EXTRACTION Bilateral     COLONOSCOPY N/A 6/2/2022    Procedure: COLONOSCOPY;  Surgeon: Jose Dangelo MD;  Location: [address];  Service: Endoscopy;  Laterality: N/A;  fully vaccinated    EYE SURGERY Bilateral     cataract extraction    HERNIA REPAIR      PROSTATECTOMY      SUBTOTAL COLECTOMY       Diabetes Medications             glipiZIDE (GLUCOTROL) 10 MG tablet Take 1 tablet (10 mg total) by mouth 2 (two) times daily before meals.    insulin (LANTUS SOLOSTAR U-100 INSULIN) glargine 100 units/mL (3mL) SubQ pen Inject 22 Units into the skin every evening.    metFORMIN (GLUCOPHAGE) 1000 MG tablet Take 1 tablet (1,000 mg total) by mouth 2 (two) times daily with meals.        Review of patient's allergies indicates:   Allergen Reactions    Hay fever and allergy relief           Objective:   Vitals: There were no vitals taken for this visit.    Physical Exam   General: alert and oriented, no acute distress  Respiratory: Symmetric expansion, non-labored breathing  Cardiovascular: no peripheral edema  Abdomen: multiple scars and soft, non  distended  Skin: normal coloration and turgor, no rashes, no suspicious skin lesions noted  Neuro: no gross deficits  Psych: normal judgment and insight, normal mood/affect and non-anxious  JANAY NPR      Physical Exam    Lab Review   Urinalysis demonstrates no specimen    Lab Results   Component Value Date    WBC 3.93 04/12/2022    HGB 13.0 (L) 04/12/2022    HCT 36.6 (L) 04/12/2022    MCV 90 04/12/2022     (L) 04/12/2022     Lab Results   Component Value Date    CREATININE 1.3 04/12/2022    BUN 15 04/12/2022     Lab Results   Component Value Date    PSA 7.1 (H) 04/12/2022    PSA 0.72 12/05/2017    PSA 0.38 12/14/2016         Imaging  NM PET CT FLUCICLOVINE F18(PROSTATE CANCER RECURRENCE)     CLINICAL HISTORY:  prostate cancer; psa 7.6 sp prostatectomy and post op XRT;  Personal history of malignant neoplasm of prostate     TECHNIQUE:  Approximately 3-5 minutes following the intravenous injection of 8.73 mCi F-18 Axumin, PET images were acquired from the proximal thighs to the skull base.  CT images were also acquired for attenuation correction and anatomic localization and performed without oral or IV contrast.     COMPARISON:  Lumbar radiographs 03/30/2021     FINDINGS:  Internal reference regions are as follows:     Abdominal aorta SUV (Mean): 1.6     L3 vertebral body SUV (Mean): 2.8     Urinary bladder lumen SUV (Mean): 0.57     In the head and neck, there are no tracer avid lesions suspicious for malignancy.     In the chest, there are no tracer avid lesions suspicious for malignancy.  There are scattered subcentimeter pulmonary nodules too small to characterize on PET (e.g.  Images 90 and 116).  Calcified pleural plaques bilaterally.     In the abdomen, there is no definite evidence of local recurrence at the vesicoureteral anastomosis status post prostatectomy.  There is physiologic uptake in the liver and pancreas, and there are no pathologically enlarged or tracer avid pelvic or retroperitoneal lymph  nodes.     There is nonspecific uptake in the inguinal lymph nodes.     In the bones, there is physiologic uptake in the bone marrow, and there are no tracer avid lesions suspicious for malignancy.  There is focal non tracer avid sclerosis in the left trochanteric femur on image 241.     Additional CT findings: Left lateral chest wall muscular lipoma.     Impression:     No definite evidence of local recurrence or metastatic disease.     Non tracer avid sclerotic focus in the left femur.  Differential considerations include benign bone island versus solitary osseous metastasis.     Subcentimeter pulmonary nodules are too small to characterize by PET and for percutaneous biopsy.  Attention on follow-up.     Other findings as above.     I, Galindo Serrano MD, attest that I reviewed and interpreted the images.     Electronically signed by resident: Anali Burnett  Date:                                            06/09/2022  Time:                                           13:42     Electronically signed by: Galindo Serrano  Date:                                            06/09/2022  Time:                                           16:05    Reviewed with Dr Azevedo  There is local recurrence  Dr Serrano will update report      Assessment and Plan:   History of prostate cancer  Local recurrence sp RALP    Discussed was with Dr Betancourt and Dr Azevedo    See heme onc, Dr Betancourt, and rad onc, Dr Azevedo, to discuss options    Dr Betancourt plans ADT.  Dr Azevedo plans XRT after ADT  RTC prn

## 2022-06-21 ENCOUNTER — OFFICE VISIT (OUTPATIENT)
Dept: HEMATOLOGY/ONCOLOGY | Facility: CLINIC | Age: 77
End: 2022-06-21
Payer: MEDICARE

## 2022-06-21 ENCOUNTER — OFFICE VISIT (OUTPATIENT)
Dept: RADIATION ONCOLOGY | Facility: CLINIC | Age: 77
End: 2022-06-21
Payer: MEDICARE

## 2022-06-21 ENCOUNTER — OFFICE VISIT (OUTPATIENT)
Dept: UROLOGY | Facility: CLINIC | Age: 77
End: 2022-06-21
Payer: MEDICARE

## 2022-06-21 ENCOUNTER — SPECIALTY PHARMACY (OUTPATIENT)
Dept: PHARMACY | Facility: CLINIC | Age: 77
End: 2022-06-21
Payer: MEDICARE

## 2022-06-21 VITALS
SYSTOLIC BLOOD PRESSURE: 158 MMHG | WEIGHT: 214.94 LBS | HEART RATE: 75 BPM | RESPIRATION RATE: 18 BRPM | BODY MASS INDEX: 28.49 KG/M2 | HEIGHT: 73 IN | DIASTOLIC BLOOD PRESSURE: 94 MMHG | HEIGHT: 73 IN | TEMPERATURE: 98 F | DIASTOLIC BLOOD PRESSURE: 94 MMHG | WEIGHT: 214.94 LBS | SYSTOLIC BLOOD PRESSURE: 158 MMHG | HEART RATE: 75 BPM | BODY MASS INDEX: 28.49 KG/M2 | OXYGEN SATURATION: 98 %

## 2022-06-21 VITALS
DIASTOLIC BLOOD PRESSURE: 94 MMHG | HEART RATE: 75 BPM | BODY MASS INDEX: 28.49 KG/M2 | HEIGHT: 73 IN | OXYGEN SATURATION: 97 % | WEIGHT: 214.94 LBS | RESPIRATION RATE: 18 BRPM | SYSTOLIC BLOOD PRESSURE: 158 MMHG | TEMPERATURE: 98 F

## 2022-06-21 DIAGNOSIS — Z85.46 HISTORY OF PROSTATE CANCER: Primary | ICD-10-CM

## 2022-06-21 DIAGNOSIS — Z85.46 HISTORY OF PROSTATE CANCER: ICD-10-CM

## 2022-06-21 DIAGNOSIS — Z85.038 HISTORY OF COLON CANCER: ICD-10-CM

## 2022-06-21 DIAGNOSIS — Z85.038 HISTORY OF COLON CANCER: Primary | ICD-10-CM

## 2022-06-21 PROCEDURE — 1159F PR MEDICATION LIST DOCUMENTED IN MEDICAL RECORD: ICD-10-PCS | Mod: CPTII,S$GLB,, | Performed by: INTERNAL MEDICINE

## 2022-06-21 PROCEDURE — 99999 PR PBB SHADOW E&M-EST. PATIENT-LVL IV: CPT | Mod: PBBFAC,,, | Performed by: UROLOGY

## 2022-06-21 PROCEDURE — 3080F DIAST BP >= 90 MM HG: CPT | Mod: CPTII,S$GLB,, | Performed by: UROLOGY

## 2022-06-21 PROCEDURE — 3072F PR LOW RISK FOR RETINOPATHY: ICD-10-PCS | Mod: CPTII,S$GLB,, | Performed by: INTERNAL MEDICINE

## 2022-06-21 PROCEDURE — 3288F FALL RISK ASSESSMENT DOCD: CPT | Mod: CPTII,S$GLB,, | Performed by: UROLOGY

## 2022-06-21 PROCEDURE — 3080F PR MOST RECENT DIASTOLIC BLOOD PRESSURE >= 90 MM HG: ICD-10-PCS | Mod: CPTII,S$GLB,, | Performed by: STUDENT IN AN ORGANIZED HEALTH CARE EDUCATION/TRAINING PROGRAM

## 2022-06-21 PROCEDURE — 1159F MED LIST DOCD IN RCRD: CPT | Mod: CPTII,S$GLB,, | Performed by: STUDENT IN AN ORGANIZED HEALTH CARE EDUCATION/TRAINING PROGRAM

## 2022-06-21 PROCEDURE — 1101F PR PT FALLS ASSESS DOC 0-1 FALLS W/OUT INJ PAST YR: ICD-10-PCS | Mod: CPTII,S$GLB,, | Performed by: INTERNAL MEDICINE

## 2022-06-21 PROCEDURE — 99214 OFFICE O/P EST MOD 30 MIN: CPT | Mod: S$GLB,,, | Performed by: STUDENT IN AN ORGANIZED HEALTH CARE EDUCATION/TRAINING PROGRAM

## 2022-06-21 PROCEDURE — 3288F PR FALLS RISK ASSESSMENT DOCUMENTED: ICD-10-PCS | Mod: CPTII,S$GLB,, | Performed by: INTERNAL MEDICINE

## 2022-06-21 PROCEDURE — 1126F PR PAIN SEVERITY QUANTIFIED, NO PAIN PRESENT: ICD-10-PCS | Mod: CPTII,S$GLB,, | Performed by: UROLOGY

## 2022-06-21 PROCEDURE — 3077F SYST BP >= 140 MM HG: CPT | Mod: CPTII,S$GLB,, | Performed by: INTERNAL MEDICINE

## 2022-06-21 PROCEDURE — 1159F MED LIST DOCD IN RCRD: CPT | Mod: CPTII,S$GLB,, | Performed by: UROLOGY

## 2022-06-21 PROCEDURE — 1125F AMNT PAIN NOTED PAIN PRSNT: CPT | Mod: CPTII,S$GLB,, | Performed by: INTERNAL MEDICINE

## 2022-06-21 PROCEDURE — 1125F PR PAIN SEVERITY QUANTIFIED, PAIN PRESENT: ICD-10-PCS | Mod: CPTII,S$GLB,, | Performed by: INTERNAL MEDICINE

## 2022-06-21 PROCEDURE — 1160F PR REVIEW ALL MEDS BY PRESCRIBER/CLIN PHARMACIST DOCUMENTED: ICD-10-PCS | Mod: CPTII,S$GLB,, | Performed by: STUDENT IN AN ORGANIZED HEALTH CARE EDUCATION/TRAINING PROGRAM

## 2022-06-21 PROCEDURE — 3072F LOW RISK FOR RETINOPATHY: CPT | Mod: CPTII,S$GLB,, | Performed by: UROLOGY

## 2022-06-21 PROCEDURE — 3288F PR FALLS RISK ASSESSMENT DOCUMENTED: ICD-10-PCS | Mod: CPTII,S$GLB,, | Performed by: UROLOGY

## 2022-06-21 PROCEDURE — 1101F PT FALLS ASSESS-DOCD LE1/YR: CPT | Mod: CPTII,S$GLB,, | Performed by: UROLOGY

## 2022-06-21 PROCEDURE — 1101F PT FALLS ASSESS-DOCD LE1/YR: CPT | Mod: CPTII,S$GLB,, | Performed by: INTERNAL MEDICINE

## 2022-06-21 PROCEDURE — 3080F PR MOST RECENT DIASTOLIC BLOOD PRESSURE >= 90 MM HG: ICD-10-PCS | Mod: CPTII,S$GLB,, | Performed by: INTERNAL MEDICINE

## 2022-06-21 PROCEDURE — 1126F AMNT PAIN NOTED NONE PRSNT: CPT | Mod: CPTII,S$GLB,, | Performed by: STUDENT IN AN ORGANIZED HEALTH CARE EDUCATION/TRAINING PROGRAM

## 2022-06-21 PROCEDURE — 1101F PR PT FALLS ASSESS DOC 0-1 FALLS W/OUT INJ PAST YR: ICD-10-PCS | Mod: CPTII,S$GLB,, | Performed by: UROLOGY

## 2022-06-21 PROCEDURE — 3072F LOW RISK FOR RETINOPATHY: CPT | Mod: CPTII,S$GLB,, | Performed by: STUDENT IN AN ORGANIZED HEALTH CARE EDUCATION/TRAINING PROGRAM

## 2022-06-21 PROCEDURE — 99999 PR PBB SHADOW E&M-EST. PATIENT-LVL V: CPT | Mod: PBBFAC,,, | Performed by: INTERNAL MEDICINE

## 2022-06-21 PROCEDURE — 3077F PR MOST RECENT SYSTOLIC BLOOD PRESSURE >= 140 MM HG: ICD-10-PCS | Mod: CPTII,S$GLB,, | Performed by: UROLOGY

## 2022-06-21 PROCEDURE — 1160F RVW MEDS BY RX/DR IN RCRD: CPT | Mod: CPTII,S$GLB,, | Performed by: STUDENT IN AN ORGANIZED HEALTH CARE EDUCATION/TRAINING PROGRAM

## 2022-06-21 PROCEDURE — 3080F DIAST BP >= 90 MM HG: CPT | Mod: CPTII,S$GLB,, | Performed by: STUDENT IN AN ORGANIZED HEALTH CARE EDUCATION/TRAINING PROGRAM

## 2022-06-21 PROCEDURE — 1159F PR MEDICATION LIST DOCUMENTED IN MEDICAL RECORD: ICD-10-PCS | Mod: CPTII,S$GLB,, | Performed by: STUDENT IN AN ORGANIZED HEALTH CARE EDUCATION/TRAINING PROGRAM

## 2022-06-21 PROCEDURE — 99205 PR OFFICE/OUTPT VISIT, NEW, LEVL V, 60-74 MIN: ICD-10-PCS | Mod: S$GLB,,, | Performed by: INTERNAL MEDICINE

## 2022-06-21 PROCEDURE — 3072F PR LOW RISK FOR RETINOPATHY: ICD-10-PCS | Mod: CPTII,S$GLB,, | Performed by: STUDENT IN AN ORGANIZED HEALTH CARE EDUCATION/TRAINING PROGRAM

## 2022-06-21 PROCEDURE — 1101F PR PT FALLS ASSESS DOC 0-1 FALLS W/OUT INJ PAST YR: ICD-10-PCS | Mod: CPTII,S$GLB,, | Performed by: STUDENT IN AN ORGANIZED HEALTH CARE EDUCATION/TRAINING PROGRAM

## 2022-06-21 PROCEDURE — 99999 PR PBB SHADOW E&M-EST. PATIENT-LVL V: ICD-10-PCS | Mod: PBBFAC,,, | Performed by: INTERNAL MEDICINE

## 2022-06-21 PROCEDURE — 3072F LOW RISK FOR RETINOPATHY: CPT | Mod: CPTII,S$GLB,, | Performed by: INTERNAL MEDICINE

## 2022-06-21 PROCEDURE — 99205 OFFICE O/P NEW HI 60 MIN: CPT | Mod: S$GLB,,, | Performed by: INTERNAL MEDICINE

## 2022-06-21 PROCEDURE — 99214 OFFICE O/P EST MOD 30 MIN: CPT | Mod: S$GLB,,, | Performed by: UROLOGY

## 2022-06-21 PROCEDURE — 1159F MED LIST DOCD IN RCRD: CPT | Mod: CPTII,S$GLB,, | Performed by: INTERNAL MEDICINE

## 2022-06-21 PROCEDURE — 3077F PR MOST RECENT SYSTOLIC BLOOD PRESSURE >= 140 MM HG: ICD-10-PCS | Mod: CPTII,S$GLB,, | Performed by: INTERNAL MEDICINE

## 2022-06-21 PROCEDURE — 3288F FALL RISK ASSESSMENT DOCD: CPT | Mod: CPTII,S$GLB,, | Performed by: INTERNAL MEDICINE

## 2022-06-21 PROCEDURE — 99999 PR PBB SHADOW E&M-EST. PATIENT-LVL V: CPT | Mod: PBBFAC,,, | Performed by: STUDENT IN AN ORGANIZED HEALTH CARE EDUCATION/TRAINING PROGRAM

## 2022-06-21 PROCEDURE — 99999 PR PBB SHADOW E&M-EST. PATIENT-LVL V: ICD-10-PCS | Mod: PBBFAC,,, | Performed by: STUDENT IN AN ORGANIZED HEALTH CARE EDUCATION/TRAINING PROGRAM

## 2022-06-21 PROCEDURE — 3077F SYST BP >= 140 MM HG: CPT | Mod: CPTII,S$GLB,, | Performed by: STUDENT IN AN ORGANIZED HEALTH CARE EDUCATION/TRAINING PROGRAM

## 2022-06-21 PROCEDURE — 99499 RISK ADDL DX/OHS AUDIT: ICD-10-PCS | Mod: S$GLB,,, | Performed by: INTERNAL MEDICINE

## 2022-06-21 PROCEDURE — 99214 PR OFFICE/OUTPT VISIT, EST, LEVL IV, 30-39 MIN: ICD-10-PCS | Mod: S$GLB,,, | Performed by: UROLOGY

## 2022-06-21 PROCEDURE — 99214 PR OFFICE/OUTPT VISIT, EST, LEVL IV, 30-39 MIN: ICD-10-PCS | Mod: S$GLB,,, | Performed by: STUDENT IN AN ORGANIZED HEALTH CARE EDUCATION/TRAINING PROGRAM

## 2022-06-21 PROCEDURE — 1126F PR PAIN SEVERITY QUANTIFIED, NO PAIN PRESENT: ICD-10-PCS | Mod: CPTII,S$GLB,, | Performed by: STUDENT IN AN ORGANIZED HEALTH CARE EDUCATION/TRAINING PROGRAM

## 2022-06-21 PROCEDURE — 1159F PR MEDICATION LIST DOCUMENTED IN MEDICAL RECORD: ICD-10-PCS | Mod: CPTII,S$GLB,, | Performed by: UROLOGY

## 2022-06-21 PROCEDURE — 1160F RVW MEDS BY RX/DR IN RCRD: CPT | Mod: CPTII,S$GLB,, | Performed by: INTERNAL MEDICINE

## 2022-06-21 PROCEDURE — 99215 OFFICE O/P EST HI 40 MIN: CPT | Mod: PBBFAC | Performed by: INTERNAL MEDICINE

## 2022-06-21 PROCEDURE — 3072F PR LOW RISK FOR RETINOPATHY: ICD-10-PCS | Mod: CPTII,S$GLB,, | Performed by: UROLOGY

## 2022-06-21 PROCEDURE — 99499 UNLISTED E&M SERVICE: CPT | Mod: S$GLB,,, | Performed by: INTERNAL MEDICINE

## 2022-06-21 PROCEDURE — 3288F PR FALLS RISK ASSESSMENT DOCUMENTED: ICD-10-PCS | Mod: CPTII,S$GLB,, | Performed by: STUDENT IN AN ORGANIZED HEALTH CARE EDUCATION/TRAINING PROGRAM

## 2022-06-21 PROCEDURE — 3077F SYST BP >= 140 MM HG: CPT | Mod: CPTII,S$GLB,, | Performed by: UROLOGY

## 2022-06-21 PROCEDURE — 99999 PR PBB SHADOW E&M-EST. PATIENT-LVL IV: ICD-10-PCS | Mod: PBBFAC,,, | Performed by: UROLOGY

## 2022-06-21 PROCEDURE — 3077F PR MOST RECENT SYSTOLIC BLOOD PRESSURE >= 140 MM HG: ICD-10-PCS | Mod: CPTII,S$GLB,, | Performed by: STUDENT IN AN ORGANIZED HEALTH CARE EDUCATION/TRAINING PROGRAM

## 2022-06-21 PROCEDURE — 3288F FALL RISK ASSESSMENT DOCD: CPT | Mod: CPTII,S$GLB,, | Performed by: STUDENT IN AN ORGANIZED HEALTH CARE EDUCATION/TRAINING PROGRAM

## 2022-06-21 PROCEDURE — 1101F PT FALLS ASSESS-DOCD LE1/YR: CPT | Mod: CPTII,S$GLB,, | Performed by: STUDENT IN AN ORGANIZED HEALTH CARE EDUCATION/TRAINING PROGRAM

## 2022-06-21 PROCEDURE — 3080F PR MOST RECENT DIASTOLIC BLOOD PRESSURE >= 90 MM HG: ICD-10-PCS | Mod: CPTII,S$GLB,, | Performed by: UROLOGY

## 2022-06-21 PROCEDURE — 1126F AMNT PAIN NOTED NONE PRSNT: CPT | Mod: CPTII,S$GLB,, | Performed by: UROLOGY

## 2022-06-21 PROCEDURE — 1160F PR REVIEW ALL MEDS BY PRESCRIBER/CLIN PHARMACIST DOCUMENTED: ICD-10-PCS | Mod: CPTII,S$GLB,, | Performed by: INTERNAL MEDICINE

## 2022-06-21 PROCEDURE — 3080F DIAST BP >= 90 MM HG: CPT | Mod: CPTII,S$GLB,, | Performed by: INTERNAL MEDICINE

## 2022-06-21 RX ORDER — ABIRATERONE 500 MG/1
1000 TABLET ORAL DAILY
Qty: 60 TABLET | Refills: 1 | OUTPATIENT
Start: 2022-06-21 | End: 2022-06-21

## 2022-06-21 RX ORDER — POLYETHYLENE GLYCOL 3350, SODIUM SULFATE ANHYDROUS, SODIUM BICARBONATE, SODIUM CHLORIDE, POTASSIUM CHLORIDE 236; 22.74; 6.74; 5.86; 2.97 G/4L; G/4L; G/4L; G/4L; G/4L
4000 POWDER, FOR SOLUTION ORAL ONCE
Status: ON HOLD | COMMUNITY
Start: 2022-04-14 | End: 2023-07-02 | Stop reason: HOSPADM

## 2022-06-21 RX ORDER — PREDNISONE 5 MG/1
5 TABLET ORAL 2 TIMES DAILY
Qty: 60 TABLET | Refills: 1 | Status: SHIPPED | OUTPATIENT
Start: 2022-06-21 | End: 2022-08-18 | Stop reason: SDUPTHER

## 2022-06-21 RX ORDER — ABIRATERONE ACETATE 250 MG/1
1000 TABLET ORAL DAILY
Qty: 120 TABLET | Refills: 1 | Status: SHIPPED | OUTPATIENT
Start: 2022-06-21 | End: 2022-08-18 | Stop reason: SDUPTHER

## 2022-06-21 NOTE — TELEPHONE ENCOUNTER
RX received for prednisone and Zytiga 500 mg. Test claim ran for both. Prednisone copay is $1.35. Zytiga 500 mg was not on formulary, but 250 mg was and needed a PA. Send message to [facility] to see about changing the strength. PA submitted as urgent for 250 mg incase of change approved. Key: [id number]. Will continue to follow up.
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
## 2022-06-21 NOTE — PROGRESS NOTES
Radiation Oncology Follow-up Note        Date of Service: 06/21/2022     Chief Complaint: prostate cancer     Reason for visit: consideration for radiation to the pelvis, follow-up x 1     Referring Physician: Dr Ravi (urology)     Implantable devices: denies     Therapy to Date:  No radiation      Diagnosis/Assessment:   Misha Eldridge Jr. is a 77 y.o. man with prostate adenocarcinoma s/p RA-RP with b/l nerve sparing (04/27/2010, at [facility]) with biochemical recurrence, latest PSA 7.1, with gross PET avid recurrence along the anterior rectal wall on PET Axumin, no regional or distant mets.     PMH of Stage IIIC2 ([id number], G2-3) colon adenocarcinoma, s/p LAR/small bowel resection/colosctomy 5/2/2012, adjuvant mFOLFOX6 x 10c (7/11/2012-11/14/2012, at [facility])     ECOG 1    I recommended an MRI of the prostate bed to help delineate the  gross recurrence, but patient is claustrophobic and declined any MRI.  Due to history of colon cancer I also recommended a CEA.    Plan   We discussed treatment options per NCCN guidelines v2022:  - salvage EBRT + short term ADT , with radiation boost to gross recurrence anterior to the anterior rectal wall.        EBRT would consist of daily radiation treatments M-F, 38-40 fractions to the prostate bed + regional pelvic lymph nodes.      Risks, benefits, and side effects of radiotherapy were discussed.   Side effects include but are not limited to fatigue, erythema, hyperpigmentation, desquamation within the radiation field, more frequent urination, both during the day and at night, urgency with urination, hematuria, dysuria, and a sensation of incomplete emptying.   In addition, the patient will be at risk for increased frequency and/or loose bowel movements.   All of these side effects will go away in the short term.   In the long term, the patient is at risk for radiation cystitis, radiation proctitis, and erectile dysfunction.     The patient indicated preference for RT to treat  prostate cancer.     - CT SIM 8/5/2022  - Dr Betancourt to see patient and discuss ADT  - pt declined MRI prostate bed (claustrophobic)  - ordered CEA for today  - RT consent signed  - Epic- 26 survey  filled  - received some prostate cancer surgery records from [facility]    Interval history:     [facility], refer to Dr Betancourt, meet on 6/21 with radonc in [facility] clinic   AUA score 1 (0,0,0,0,0,0,1) delighted   BIRD score 2 (2,0,0,0,0) severe ED    6/9/2022 PET axumin: I contacted Dr Serrano => no kirill or distant recurrence, but avidity anterior to rectum          6/20/2022 received rest of prostate cancer surgery records from [facility]    Surgery 5/2/2012  Dr Lyle, [facility]      Subjective:   In clinic the patient is alone and reports feeling very well.  He denies major urinary or bowel function issues. He is not sexually active but has had a partner for 25 years  He denies other major complaints      He denies any history of radiation therapy, implantable cardiac devices, or connective tissue disease.     Social history  Lives with mother who is 94.  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, he is from [place]  Denies tobacco use or a,cohol use  He drove himself to the clinic     Family history  No cancers    Current Outpatient Medications on File Prior to Visit   Medication Sig Dispense Refill    ACCU-CHEK SOFTCLIX LANCETS Misc TEST BLOOD SUGAR ONCE A  each 0    amLODIPine (NORVASC) 10 MG tablet Take 1 tablet (10 mg total) by mouth once daily. 90 tablet 1    atorvastatin (LIPITOR) 20 MG tablet Take 1 tablet (20 mg total) by mouth once daily. 90 tablet 1    blood sugar diagnostic Strp 1 each by Misc.(Non-Drug; Combo Route) route 2 (two) times daily. 1 each 0    blood sugar diagnostic Strp 1 each by Misc.(Non-Drug; Combo Route) route once daily. 100 each 3    blood sugar diagnostic Strp Pt to check up to 6 times  "daily 200 strip 11    BOOSTRIX TDAP 2.5-8-5 Lf-mcg-Lf/0.5mL Syrg injection       carvediloL (COREG) 3.125 MG tablet TAKE 1 TABLET(3.125 MG) BY MOUTH TWICE DAILY WITH MEALS 180 tablet 1    diclofenac (VOLTAREN) 75 MG EC tablet Take 1 tablet (75 mg total) by mouth 2 (two) times daily. 90 tablet 0    doxazosin (CARDURA) 4 MG tablet Take 1 tablet (4 mg total) by mouth every evening. 90 tablet 1    gabapentin (NEURONTIN) 300 MG capsule TAKE 1 CAPSULE(300 MG) BY MOUTH THREE TIMES DAILY 270 capsule 1    glipiZIDE (GLUCOTROL) 10 MG tablet Take 1 tablet (10 mg total) by mouth 2 (two) times daily before meals. 180 tablet 1    hydroCHLOROthiazide (HYDRODIURIL) 25 MG tablet TAKE ONE-HALF TABLET BY MOUTH ONCE DAILY 90 tablet 1    insulin (LANTUS SOLOSTAR U-100 INSULIN) glargine 100 units/mL (3mL) SubQ pen Inject 22 Units into the skin every evening. 15 mL 11    irbesartan (AVAPRO) 300 MG tablet Take 1 tablet (300 mg total) by mouth every evening. 90 tablet 2    metFORMIN (GLUCOPHAGE) 1000 MG tablet Take 1 tablet (1,000 mg total) by mouth 2 (two) times daily with meals. 180 tablet 1    pen needle, diabetic (BD ULTRA-FINE KELECHI PEN NEEDLE) 32 gauge x 5/32" Ndle VIA DEVICE USE EVERY MORNING 200 each 11    polyethylene glycol (GOLYTELY) 236-22.74-6.74 -5.86 gram suspension Take 4,000 mLs by mouth once.      tobramycin sulfate 0.3% (TOBREX) 0.3 % ophthalmic solution 1-2 drops topically twice daily to affected toe(s). 5 mL 3    tobramycin sulfate 0.3% (TOBREX) 0.3 % ophthalmic solution 1-2 drops topically twice daily to affected toe(s). 5 mL 3    triamcinolone acetonide 0.1% (KENALOG) 0.1 % cream Apply topically 2 (two) times daily. 45 g 0    blood-glucose meter kit Use as instructed 1 each 0    lancing device with lancets Kit 1 each by Misc.(Non-Drug; Combo Route) route 2 (two) times daily. 200 each 11     No current facility-administered medications on file prior to visit.       ED Orders (720h ago, onward)    None "            Review of Systems   Negative unless as stated above    HPI:   Misha Eldridge Jr. is a 77 y.o. man with prostate adenocarcinoma s/p RA-RP with b/l nerve sparing and PSA recurrence     Oncologic history  04/27/2010 Prostate adenocarcinoma s/p RA-RP with b/l nerve sparing           Colon adenocarcinoma Stage IIIC2 (G2-G3, +1/17LN, [id number] 2012), s/p LAR/small bowel resection/colosctomy 5/2/2012, adjuvant mFOLFOX6 x 10c (7/11/2012-11/14/2012) (Dr Juvencio Enciso)     12/14/2016 PSA 0.38     12/5/2017 PSA 0.72     4/12/2022 PSA 7.1     5/19/2022 initial visit with urology (Dr Ravi)           Objective:   Physical Exam  Vitals reviewed.   Constitutional:       Appearance: Normal appearance.   HENT:      Head: Normocephalic and atraumatic.      Mouth/Throat:      Mouth: Mucous membranes are moist.   Eyes:      Conjunctiva/sclera: Conjunctivae normal.   Cardiovascular:      Comments: extremities well perfused  Pulmonary:      Effort: Pulmonary effort is normal.   Abdominal:      General: There is no distension.   Genitourinary:     Comments: JANAY deferred, s/p RP  Musculoskeletal:         General: Normal range of motion.      Cervical back: Normal range of motion.   Skin:     General: Skin is warm and dry.   Neurological:      General: No focal deficit present.      Mental Status: He is alert and oriented to person, place, and time.   Psychiatric:         Mood and Affect: Mood normal.         Behavior: Behavior normal.              Imaging: I have personally reviewed the patient's available images and reports and summarized pertinent findings above in HPI.       I spent approximately 30 minutes reviewing the available records and evaluating the patient, out of which over 50% of the time was spent face to face with the patient in counseling and coordinating this patient's care.     Thank you for the opportunity to care for this patient. Please do not hesitate to contact me with any questions.     Kenneth Azevedo  MD/PhD

## 2022-06-21 NOTE — TELEPHONE ENCOUNTER
PA for 250 mg approved. PA# [id number]. Approved until 12/18/22. Will continue to follow up on if we can change the strength.

## 2022-06-22 NOTE — TELEPHONE ENCOUNTER
BI: COMPLETE     MEDICARE PLAN: [employer]   Estimated copay: $1.35 [zytiga 250 mg]  Benefits Stage: intial [1st fill will put patient into coverage gap]  In Network: yes  PA approval on file: 6/21/22-12/18/22  LIS level: 2    Pending for initial consult.

## 2022-06-24 ENCOUNTER — SPECIALTY PHARMACY (OUTPATIENT)
Dept: PHARMACY | Facility: CLINIC | Age: 77
End: 2022-06-24
Payer: MEDICARE

## 2022-06-24 NOTE — TELEPHONE ENCOUNTER
Specialty Pharmacy - Initial Clinical Assessment    Specialty Medication Orders Linked to Encounter    Flowsheet Row Most Recent Value   Medication #1 predniSONE (DELTASONE) 5 MG tablet (Order#[id number], Rx#[id number])   Medication #2 abiraterone (ZYTIGA) 250 mg Tab (Order#[id number], Rx#[id number])        Patient Diagnosis   Z85.46 - History of prostate cancer    Subjective    Misha Eldridge Jr. is a 77 y.o. male, who is followed by the specialty pharmacy service for management and education.    Recent Encounters     Date Type Provider Description    06/24/2022 Specialty Pharmacy Tammy Staley PharmD Initial Clinical Assessment    06/21/2022 Specialty Pharmacy Tammy Staley PharmD Referral Authorization        Clinical call attempts since last clinical assessment   6/22/2022  2:54 PM - Specialty Pharmacy - Clinical Assessment by Tammy Staley PharmD  6/24/2022  2:20 PM - Specialty Pharmacy - Clinical Assessment by Tammy Staley PharmD     Current Outpatient Medications   Medication Sig    abiraterone (ZYTIGA) 250 mg Tab Take 4 tablets (1,000 mg total) by mouth once daily.    ACCU-CHEK SOFTCLIX LANCETS Misc TEST BLOOD SUGAR ONCE A DAY    amLODIPine (NORVASC) 10 MG tablet Take 1 tablet (10 mg total) by mouth once daily.    atorvastatin (LIPITOR) 20 MG tablet Take 1 tablet (20 mg total) by mouth once daily.    blood sugar diagnostic Strp 1 each by Misc.(Non-Drug; Combo Route) route 2 (two) times daily.    blood sugar diagnostic Strp 1 each by Misc.(Non-Drug; Combo Route) route once daily.    blood sugar diagnostic Strp Pt to check up to 6 times daily    blood-glucose meter kit Use as instructed    BOOSTRIX TDAP 2.5-8-5 Lf-mcg-Lf/0.5mL Syrg injection     carvediloL (COREG) 3.125 MG tablet TAKE 1 TABLET(3.125 MG) BY MOUTH TWICE DAILY WITH MEALS    diclofenac (VOLTAREN) 75 MG EC tablet Take 1 tablet (75 mg total) by mouth 2 (two) times daily.    doxazosin (CARDURA) 4 MG tablet Take 1 tablet (4 mg  "total) by mouth every evening.    gabapentin (NEURONTIN) 300 MG capsule TAKE 1 CAPSULE(300 MG) BY MOUTH THREE TIMES DAILY    glipiZIDE (GLUCOTROL) 10 MG tablet Take 1 tablet (10 mg total) by mouth 2 (two) times daily before meals.    hydroCHLOROthiazide (HYDRODIURIL) 25 MG tablet TAKE ONE-HALF TABLET BY MOUTH ONCE DAILY    insulin (LANTUS SOLOSTAR U-100 INSULIN) glargine 100 units/mL (3mL) SubQ pen Inject 22 Units into the skin every evening.    irbesartan (AVAPRO) 300 MG tablet Take 1 tablet (300 mg total) by mouth every evening.    lancing device with lancets Kit 1 each by Misc.(Non-Drug; Combo Route) route 2 (two) times daily.    metFORMIN (GLUCOPHAGE) 1000 MG tablet Take 1 tablet (1,000 mg total) by mouth 2 (two) times daily with meals.    pen needle, diabetic (BD ULTRA-FINE KELECHI PEN NEEDLE) 32 gauge x 5/32" Ndle VIA DEVICE USE EVERY MORNING    polyethylene glycol (GOLYTELY) 236-22.74-6.74 -5.86 gram suspension Take 4,000 mLs by mouth once.    predniSONE (DELTASONE) 5 MG tablet Take 1 tablet (5 mg total) by mouth 2 (two) times daily.    tobramycin sulfate 0.3% (TOBREX) 0.3 % ophthalmic solution 1-2 drops topically twice daily to affected toe(s).    tobramycin sulfate 0.3% (TOBREX) 0.3 % ophthalmic solution 1-2 drops topically twice daily to affected toe(s).    triamcinolone acetonide 0.1% (KENALOG) 0.1 % cream Apply topically 2 (two) times daily.   Last reviewed on 6/21/2022 11:54 AM by Kenneth Azevedo MD    Review of patient's allergies indicates:   Allergen Reactions    Hay fever and allergy relief    Last reviewed on  6/21/2022 11:54 AM by Kenneth Azevedo    Drug Interactions    Drug interactions evaluated: yes  Clinically relevant drug interactions identified: yes   Interactions list: Carvedilol and Zytiga -- CAT C   Drug management plan: Monitor pts BP    Provided the patient with educational material regarding drug interactions: yes   Educational material comments: Counseled pt to " monitor BP and counseled on signs of high blood pressure.           Adverse Effects    *All other systems reviewed and are negative       Assessment Questions - Documented Responses    Flowsheet Row Most Recent Value   Assessment    Medication Reconciliation completed for patient Yes   During the past 4 weeks, has patient missed any activities due to condition or medication? No   During the past 4 weeks, did patient have any of the following urgent care visits? None   Goals of Therapy Status Discussed (new start)   Status of the patients ability to self-administer: Is Able   All education points have been covered with patient? Yes, supplemental printed education provided   Welcome packet contents reviewed and discussed with patient? Yes   Assesment completed? Yes   Plan Therapy being initiated   Do you need to open a clinical intervention (i-vent)? No   Do you want to schedule first shipment? Yes   Medication #1 Assessment Info    Patient status New medication, New to [facility]   Is this medication appropriate for the patient? Yes   Is this medication effective? Not yet started   Medication #2 Assessment Info    Patient status New medication, New to [facility]   Is this medication appropriate for the patient? Yes   Is this medication effective? Not yet started        Refill Questions - Documented Responses    Flowsheet Row Most Recent Value   Patient Availability and HIPAA Verification    Does patient want to proceed with activity? Yes   HIPAA/medical authority confirmed? Yes   Relationship to patient of person spoken to? Self   Refill Screening Questions    When does the patient need to receive the medication? 06/27/22   Refill Delivery Questions    How will the patient receive the medication? Pickup   When does the patient need to receive the medication? 06/27/22   Shipping Address Home   Address in [facility] confirmed and updated if neccessary? Yes   Expected Copay ($) 2.7   Is the patient able to afford the  "medication copay? Yes   Payment Method at pickup   Days supply of Refill 30   Supplies needed? No supplies needed   Refill activity completed? Yes   Refill activity plan Refill scheduled   Shipment/Pickup Date: 06/27/22          Objective    He has a past medical history of Cataract, Colostomy in place, Diabetes mellitus type II, Hemorrhoid, History of colon cancer, History of prostate cancer, History of pulmonary embolism, and Hypertension.    Tried/failed medications: taking with Lupron    BP Readings from Last 4 Encounters:   06/21/22 (!) 158/94   06/21/22 (!) 158/94   06/21/22 (!) 158/94   06/02/22 (!) 178/74     Ht Readings from Last 4 Encounters:   06/21/22 6' 1" (1.854 m)   06/21/22 6' 1" (1.854 m)   06/21/22 6' 1" (1.854 m)   06/02/22 6' 1" (1.854 m)     Wt Readings from Last 4 Encounters:   06/21/22 97.5 kg (214 lb 15.2 oz)   06/21/22 97.5 kg (214 lb 15.2 oz)   06/21/22 97.5 kg (214 lb 15.2 oz)   06/02/22 97.5 kg (215 lb)     Recent Labs   Lab Result Units 06/21/22  1046 04/12/22  1438   RBC M/uL 4.22 L 4.05 L   Hemoglobin g/dL 13.0 L 13.0 L   Hematocrit % 37.6 L 36.6 L   WBC K/uL 4.32 3.93   Gran # (ANC) K/uL 2.0 1.8   Gran % % 45.2 46.0   Platelets K/uL 180 142 L   Sodium mmol/L 140 144   Potassium mmol/L 4.5 3.8   Chloride mmol/L 107 104   Glucose mg/dL 202 H 99   BUN mg/dL 13 15   Creatinine mg/dL 1.3 1.3   Calcium mg/dL 9.6 9.6   Total Protein g/dL 7.1 6.9   Albumin g/dL 4.0 4.2   Total Bilirubin mg/dL 0.4 0.4   Alkaline Phosphatase U/L 63 46 L   AST U/L 18 16   ALT U/L 23 20     The goals of cancer treatment include:  · Achieving remission of cancer, if possible  · Reducing tumor size and spread of cancer, if remission is not possible  · Minimizing pain and symptoms of the cancer  · Preventing infection and other complications of treatment  · Promoting adequate nutrition  · Encouraging proper hydration  · Improving or maintaining quality of life  · Maintaining optimal therapy adherence  · Minimizing " and managing side effects    Goals of Therapy Status: Discussed (new start)    Assessment/Plan  Patient plans to start therapy on 06/27/22      Indication, dosage, appropriateness, effectiveness, safety and convenience of his specialty medication(s) were reviewed today.     Patient Education   Patient received education on the following:    Expectations and possible outcomes of therapy   Proper use, timely administration, and missed dose management   Duration of therapy   Side effects, including prevention, minimization, and management   Contraindications and safety precautions   New or changed medications, including prescribe and over the counter medications and supplements   Reviews recommended vaccinations, as appropriate   Storage, safe handling, and disposal    PSA on 6/21 was 7.2     Tasks added this encounter   7/20/2022 - Refill Call (Auto Added)  6/28/2022 - Pickup Reminder  12/14/2022 - Clinical - Follow Up Assesement (180 day)   Tasks due within next 3 months   No tasks due.     Tammy Staley, PharmD  [facility]  [address] [place] [id number]  Phone: [phone]  Fax: [phone]

## 2022-07-08 ENCOUNTER — TELEPHONE (OUTPATIENT)
Dept: HEMATOLOGY/ONCOLOGY | Facility: CLINIC | Age: 77
End: 2022-07-08
Payer: MEDICARE

## 2022-07-26 ENCOUNTER — SPECIALTY PHARMACY (OUTPATIENT)
Dept: PHARMACY | Facility: CLINIC | Age: 77
End: 2022-07-26
Payer: MEDICARE

## 2022-07-26 DIAGNOSIS — E78.9 ABNORMAL CHOLESTEROL TEST: ICD-10-CM

## 2022-07-26 DIAGNOSIS — I10 HYPERTENSION, UNSPECIFIED TYPE: ICD-10-CM

## 2022-07-26 NOTE — TELEPHONE ENCOUNTER
Specialty Pharmacy - Refill Coordination    Specialty Medication Orders Linked to Encounter    Flowsheet Row Most Recent Value   Medication #1 predniSONE (DELTASONE) 5 MG tablet (Order#[id number], Rx#[id number])   Medication #2 abiraterone (ZYTIGA) 250 mg Tab (Order#[id number], Rx#[id number])          Refill Questions - Documented Responses    Flowsheet Row Most Recent Value   Patient Availability and HIPAA Verification    Does patient want to proceed with activity? Yes   HIPAA/medical authority confirmed? Yes   Relationship to patient of person spoken to? Self   Refill Screening Questions    Changes to allergies? No   Changes to medications? No   New conditions since last clinic visit? No   Unplanned office visit, urgent care, ED, or hospital admission in the last 4 weeks? No   How does patient/caregiver feel medication is working? Good   Financial problems or insurance changes? No   How many doses of your specialty medications were missed in the last 4 weeks? 0   Would patient like to speak to a pharmacist? No   When does the patient need to receive the medication? 07/26/22   Refill Delivery Questions    How will the patient receive the medication? Pickup   When does the patient need to receive the medication? 07/26/22   Expected Copay ($) 2.7   Is the patient able to afford the medication copay? Yes   Payment Method at pickup   Days supply of Refill 30   Supplies needed? No supplies needed   Refill activity completed? Yes   Refill activity plan Refill scheduled   Shipment/Pickup Date: 07/26/22          Current Outpatient Medications   Medication Sig    abiraterone (ZYTIGA) 250 mg Tab Take 4 tablets (1,000 mg total) by mouth once daily.    ACCU-CHEK SOFTCLIX LANCETS Misc TEST BLOOD SUGAR ONCE A DAY    amLODIPine (NORVASC) 10 MG tablet Take 1 tablet (10 mg total) by mouth once daily.    atorvastatin (LIPITOR) 20 MG tablet Take 1 tablet (20 mg total) by mouth once daily.    blood sugar diagnostic Strp 1 each by  "Misc.(Non-Drug; Combo Route) route 2 (two) times daily.    blood sugar diagnostic Strp 1 each by Misc.(Non-Drug; Combo Route) route once daily.    blood sugar diagnostic Strp Pt to check up to 6 times daily    blood-glucose meter kit Use as instructed    BOOSTRIX TDAP 2.5-8-5 Lf-mcg-Lf/0.5mL Syrg injection     carvediloL (COREG) 3.125 MG tablet TAKE 1 TABLET(3.125 MG) BY MOUTH TWICE DAILY WITH MEALS    diclofenac (VOLTAREN) 75 MG EC tablet Take 1 tablet (75 mg total) by mouth 2 (two) times daily.    doxazosin (CARDURA) 4 MG tablet TAKE 1 TABLET(4 MG) BY MOUTH EVERY EVENING    gabapentin (NEURONTIN) 300 MG capsule TAKE 1 CAPSULE(300 MG) BY MOUTH THREE TIMES DAILY    glipiZIDE (GLUCOTROL) 10 MG tablet Take 1 tablet (10 mg total) by mouth 2 (two) times daily before meals.    hydroCHLOROthiazide (HYDRODIURIL) 25 MG tablet TAKE ONE-HALF TABLET BY MOUTH ONCE DAILY    insulin (LANTUS SOLOSTAR U-100 INSULIN) glargine 100 units/mL (3mL) SubQ pen Inject 22 Units into the skin every evening.    irbesartan (AVAPRO) 300 MG tablet Take 1 tablet (300 mg total) by mouth every evening.    lancing device with lancets Kit 1 each by Misc.(Non-Drug; Combo Route) route 2 (two) times daily.    metFORMIN (GLUCOPHAGE) 1000 MG tablet Take 1 tablet (1,000 mg total) by mouth 2 (two) times daily with meals.    pen needle, diabetic (BD ULTRA-FINE KELECHI PEN NEEDLE) 32 gauge x 5/32" Ndle VIA DEVICE USE EVERY MORNING    polyethylene glycol (GOLYTELY) 236-22.74-6.74 -5.86 gram suspension Take 4,000 mLs by mouth once.    predniSONE (DELTASONE) 5 MG tablet Take 1 tablet (5 mg total) by mouth 2 (two) times daily.    tobramycin sulfate 0.3% (TOBREX) 0.3 % ophthalmic solution 1-2 drops topically twice daily to affected toe(s).    tobramycin sulfate 0.3% (TOBREX) 0.3 % ophthalmic solution 1-2 drops topically twice daily to affected toe(s).    triamcinolone acetonide 0.1% (KENALOG) 0.1 % cream Apply topically 2 (two) times daily.   Last " reviewed on 6/21/2022 11:54 AM by Kenneth Azevedo MD    Review of patient's allergies indicates:   Allergen Reactions    Hay fever and allergy relief     Last reviewed on  6/21/2022 11:54 AM by Kenneth Azevedo      Tasks added this encounter   8/18/2022 - Refill Call (Auto Added)  7/27/2022 - Pickup Reminder   Tasks due within next 3 months   No tasks due.     Adry Daniels, PharmD  [facility]  [address] [place], [place] [address]  [facility] [address]  Phone: [phone]  Fax: [phone]

## 2022-07-26 NOTE — TELEPHONE ENCOUNTER
No new care gaps identified.  [facility] Embedded Care Gaps. Reference number: [id number]. 7/26/2022   12:23:21 PM CDT

## 2022-07-27 RX ORDER — ATORVASTATIN CALCIUM 20 MG/1
TABLET, FILM COATED ORAL
Qty: 90 TABLET | Refills: 3 | Status: SHIPPED | OUTPATIENT
Start: 2022-07-27 | End: 2022-10-10 | Stop reason: SDUPTHER

## 2022-07-27 RX ORDER — AMLODIPINE BESYLATE 10 MG/1
TABLET ORAL
Qty: 90 TABLET | Refills: 3 | Status: SHIPPED | OUTPATIENT
Start: 2022-07-27 | End: 2022-10-10 | Stop reason: SDUPTHER

## 2022-07-27 NOTE — TELEPHONE ENCOUNTER
Refill Routing Note   Medication(s) are not appropriate for processing by [facility] for the following reason(s):      - Required vitals are abnormal    [facility] action(s):  Approve  Defer          Medication reconciliation completed: No     Appointments  past 12m or future 3m with PCP    Date Provider   Last Visit   Visit date not found Shirley Oliver MD   Next Visit   Visit date not found Shirley Oliver MD   ED visits in past 90 days: 0        Note composed:12:19 PM 07/27/2022

## 2022-08-04 ENCOUNTER — IMMUNIZATION (OUTPATIENT)
Dept: PHARMACY | Facility: CLINIC | Age: 77
End: 2022-08-04
Payer: MEDICARE

## 2022-08-04 DIAGNOSIS — Z23 NEED FOR VACCINATION: Primary | ICD-10-CM

## 2022-08-05 ENCOUNTER — HOSPITAL ENCOUNTER (OUTPATIENT)
Dept: RADIATION THERAPY | Facility: HOSPITAL | Age: 77
Discharge: HOME | End: 2022-08-05
Attending: STUDENT IN AN ORGANIZED HEALTH CARE EDUCATION/TRAINING PROGRAM
Payer: MEDICARE

## 2022-08-05 PROCEDURE — 77334 RADIATION TREATMENT AID(S): CPT | Mod: 26,,, | Performed by: STUDENT IN AN ORGANIZED HEALTH CARE EDUCATION/TRAINING PROGRAM

## 2022-08-05 PROCEDURE — 77334 RADIATION TREATMENT AID(S): CPT | Mod: TC | Performed by: STUDENT IN AN ORGANIZED HEALTH CARE EDUCATION/TRAINING PROGRAM

## 2022-08-05 PROCEDURE — 77014 PR  CT GUIDANCE PLACEMENT RAD THERAPY FIELDS: CPT | Mod: 26,,, | Performed by: STUDENT IN AN ORGANIZED HEALTH CARE EDUCATION/TRAINING PROGRAM

## 2022-08-05 PROCEDURE — 77263 PR  RADIATION THERAPY PLAN COMPLEX: ICD-10-PCS | Mod: ,,, | Performed by: STUDENT IN AN ORGANIZED HEALTH CARE EDUCATION/TRAINING PROGRAM

## 2022-08-05 PROCEDURE — 77014 PR  CT GUIDANCE PLACEMENT RAD THERAPY FIELDS: ICD-10-PCS | Mod: 26,,, | Performed by: STUDENT IN AN ORGANIZED HEALTH CARE EDUCATION/TRAINING PROGRAM

## 2022-08-05 PROCEDURE — 77263 THER RADIOLOGY TX PLNG CPLX: CPT | Mod: ,,, | Performed by: STUDENT IN AN ORGANIZED HEALTH CARE EDUCATION/TRAINING PROGRAM

## 2022-08-05 PROCEDURE — 77014 HC CT GUIDANCE RADIATION THERAPY FLDS PLACEMENT: CPT | Mod: TC | Performed by: STUDENT IN AN ORGANIZED HEALTH CARE EDUCATION/TRAINING PROGRAM

## 2022-08-05 PROCEDURE — 77334 PR  RADN TREATMENT AID(S) COMPLX: ICD-10-PCS | Mod: 26,,, | Performed by: STUDENT IN AN ORGANIZED HEALTH CARE EDUCATION/TRAINING PROGRAM

## 2022-08-10 ENCOUNTER — TELEPHONE (OUTPATIENT)
Dept: HEMATOLOGY/ONCOLOGY | Facility: CLINIC | Age: 77
End: 2022-08-10
Payer: MEDICARE

## 2022-08-12 ENCOUNTER — TELEPHONE (OUTPATIENT)
Dept: HEMATOLOGY/ONCOLOGY | Facility: CLINIC | Age: 77
End: 2022-08-12
Payer: MEDICARE

## 2022-08-12 NOTE — TELEPHONE ENCOUNTER
----- Message from Emani Barnes sent at 8/12/2022 11:16 AM CDT -----  Regarding: Pharmacy clarification  Name of caller: Courtney    Pharmacy name and phone number: [facility] [phone]     What do they need to clarify: inquiring about change in dosage on Cytiga

## 2022-08-12 NOTE — TELEPHONE ENCOUNTER
Spoke with Courtney with [facility] to clarify that Zytiga dosage prescribed is 1,000 mg daily.

## 2022-08-16 ENCOUNTER — TELEPHONE (OUTPATIENT)
Dept: RADIATION ONCOLOGY | Facility: CLINIC | Age: 77
End: 2022-08-16
Payer: MEDICARE

## 2022-08-16 ENCOUNTER — OFFICE VISIT (OUTPATIENT)
Dept: HEMATOLOGY/ONCOLOGY | Facility: CLINIC | Age: 77
End: 2022-08-16
Payer: MEDICARE

## 2022-08-16 ENCOUNTER — INFUSION (OUTPATIENT)
Dept: INFUSION THERAPY | Facility: HOSPITAL | Age: 77
End: 2022-08-16
Attending: INTERNAL MEDICINE
Payer: MEDICARE

## 2022-08-16 VITALS
BODY MASS INDEX: 27.29 KG/M2 | HEART RATE: 65 BPM | TEMPERATURE: 98 F | DIASTOLIC BLOOD PRESSURE: 69 MMHG | RESPIRATION RATE: 18 BRPM | SYSTOLIC BLOOD PRESSURE: 130 MMHG | OXYGEN SATURATION: 97 % | HEIGHT: 73 IN | WEIGHT: 205.94 LBS

## 2022-08-16 DIAGNOSIS — I10 PRIMARY HYPERTENSION: ICD-10-CM

## 2022-08-16 DIAGNOSIS — E11.22 CKD STAGE 3 SECONDARY TO DIABETES: ICD-10-CM

## 2022-08-16 DIAGNOSIS — Z85.46 HISTORY OF PROSTATE CANCER: Primary | ICD-10-CM

## 2022-08-16 DIAGNOSIS — E11.40 CONTROLLED TYPE 2 DIABETES MELLITUS WITH DIABETIC NEUROPATHY, WITHOUT LONG-TERM CURRENT USE OF INSULIN: ICD-10-CM

## 2022-08-16 DIAGNOSIS — Z85.038 HISTORY OF COLON CANCER: ICD-10-CM

## 2022-08-16 DIAGNOSIS — N18.30 CKD STAGE 3 SECONDARY TO DIABETES: ICD-10-CM

## 2022-08-16 PROCEDURE — 99999 PR PBB SHADOW E&M-EST. PATIENT-LVL V: CPT | Mod: PBBFAC,,, | Performed by: REGISTERED NURSE

## 2022-08-16 PROCEDURE — 1101F PR PT FALLS ASSESS DOC 0-1 FALLS W/OUT INJ PAST YR: ICD-10-PCS | Mod: CPTII,S$GLB,, | Performed by: REGISTERED NURSE

## 2022-08-16 PROCEDURE — 99999 PR PBB SHADOW E&M-EST. PATIENT-LVL V: ICD-10-PCS | Mod: PBBFAC,,, | Performed by: REGISTERED NURSE

## 2022-08-16 PROCEDURE — 1125F PR PAIN SEVERITY QUANTIFIED, PAIN PRESENT: ICD-10-PCS | Mod: CPTII,S$GLB,, | Performed by: REGISTERED NURSE

## 2022-08-16 PROCEDURE — 1160F RVW MEDS BY RX/DR IN RCRD: CPT | Mod: CPTII,S$GLB,, | Performed by: REGISTERED NURSE

## 2022-08-16 PROCEDURE — 3072F PR LOW RISK FOR RETINOPATHY: ICD-10-PCS | Mod: CPTII,S$GLB,, | Performed by: REGISTERED NURSE

## 2022-08-16 PROCEDURE — 3078F DIAST BP <80 MM HG: CPT | Mod: CPTII,S$GLB,, | Performed by: REGISTERED NURSE

## 2022-08-16 PROCEDURE — 1125F AMNT PAIN NOTED PAIN PRSNT: CPT | Mod: CPTII,S$GLB,, | Performed by: REGISTERED NURSE

## 2022-08-16 PROCEDURE — 3078F PR MOST RECENT DIASTOLIC BLOOD PRESSURE < 80 MM HG: ICD-10-PCS | Mod: CPTII,S$GLB,, | Performed by: REGISTERED NURSE

## 2022-08-16 PROCEDURE — 3075F SYST BP GE 130 - 139MM HG: CPT | Mod: CPTII,S$GLB,, | Performed by: REGISTERED NURSE

## 2022-08-16 PROCEDURE — 99215 OFFICE O/P EST HI 40 MIN: CPT | Mod: S$GLB,,, | Performed by: REGISTERED NURSE

## 2022-08-16 PROCEDURE — 3075F PR MOST RECENT SYSTOLIC BLOOD PRESS GE 130-139MM HG: ICD-10-PCS | Mod: CPTII,S$GLB,, | Performed by: REGISTERED NURSE

## 2022-08-16 PROCEDURE — 1101F PT FALLS ASSESS-DOCD LE1/YR: CPT | Mod: CPTII,S$GLB,, | Performed by: REGISTERED NURSE

## 2022-08-16 PROCEDURE — 1160F PR REVIEW ALL MEDS BY PRESCRIBER/CLIN PHARMACIST DOCUMENTED: ICD-10-PCS | Mod: CPTII,S$GLB,, | Performed by: REGISTERED NURSE

## 2022-08-16 PROCEDURE — 1159F MED LIST DOCD IN RCRD: CPT | Mod: CPTII,S$GLB,, | Performed by: REGISTERED NURSE

## 2022-08-16 PROCEDURE — 63600175 PHARM REV CODE 636 W HCPCS: Mod: JG | Performed by: INTERNAL MEDICINE

## 2022-08-16 PROCEDURE — 3288F PR FALLS RISK ASSESSMENT DOCUMENTED: ICD-10-PCS | Mod: CPTII,S$GLB,, | Performed by: REGISTERED NURSE

## 2022-08-16 PROCEDURE — 99215 PR OFFICE/OUTPT VISIT, EST, LEVL V, 40-54 MIN: ICD-10-PCS | Mod: S$GLB,,, | Performed by: REGISTERED NURSE

## 2022-08-16 PROCEDURE — 1159F PR MEDICATION LIST DOCUMENTED IN MEDICAL RECORD: ICD-10-PCS | Mod: CPTII,S$GLB,, | Performed by: REGISTERED NURSE

## 2022-08-16 PROCEDURE — 96402 CHEMO HORMON ANTINEOPL SQ/IM: CPT

## 2022-08-16 PROCEDURE — 3288F FALL RISK ASSESSMENT DOCD: CPT | Mod: CPTII,S$GLB,, | Performed by: REGISTERED NURSE

## 2022-08-16 PROCEDURE — 3072F LOW RISK FOR RETINOPATHY: CPT | Mod: CPTII,S$GLB,, | Performed by: REGISTERED NURSE

## 2022-08-16 RX ADMIN — LEUPROLIDE ACETATE 45 MG: KIT at 12:08

## 2022-08-16 NOTE — TELEPHONE ENCOUNTER
I called the patient and explained we are having difficulty planning radiation based on PET scan alone which shows recurrence between rectum and bladder caudally.  He is refusing MRI again because he is claustrophobic, despite my new attempts.    I ran the case with Dr Michelle and will ask Dr Ravi if he can characterize the recurrence using rectal U/S and biopsy it.    Thanks  Kenneth Azevedo MD/PhD  Radiation Oncology
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
## 2022-08-18 ENCOUNTER — SPECIALTY PHARMACY (OUTPATIENT)
Dept: PHARMACY | Facility: CLINIC | Age: 77
End: 2022-08-18
Payer: MEDICARE

## 2022-08-18 DIAGNOSIS — Z85.46 HISTORY OF PROSTATE CANCER: ICD-10-CM

## 2022-08-18 RX ORDER — PREDNISONE 5 MG/1
5 TABLET ORAL 2 TIMES DAILY
Qty: 60 TABLET | Refills: 1 | Status: SHIPPED | OUTPATIENT
Start: 2022-08-18 | End: 2022-10-17 | Stop reason: SDUPTHER

## 2022-08-18 RX ORDER — ABIRATERONE ACETATE 250 MG/1
1000 TABLET ORAL DAILY
Qty: 120 TABLET | Refills: 1 | Status: SHIPPED | OUTPATIENT
Start: 2022-08-18 | End: 2022-10-17 | Stop reason: SDUPTHER

## 2022-08-18 NOTE — TELEPHONE ENCOUNTER
Contacted patient. He stated he has 8 days left on hand (Due 8/26). Sent refill request to provider. Will process prescription once received.

## 2022-08-19 NOTE — TELEPHONE ENCOUNTER
Specialty Pharmacy - Refill Coordination    Specialty Medication Orders Linked to Encounter    Flowsheet Row Most Recent Value   Medication #1 abiraterone (ZYTIGA) 250 mg Tab (Order#[id number], Rx#[id number])   Medication #2 predniSONE (DELTASONE) 5 MG tablet (Order#[id number], Rx#[id number])          Refill Questions - Documented Responses    Flowsheet Row Most Recent Value   Patient Availability and HIPAA Verification    Does patient want to proceed with activity? Yes   HIPAA/medical authority confirmed? Yes   Relationship to patient of person spoken to? Self   Refill Screening Questions    Changes to allergies? No   Changes to medications? No   New conditions since last clinic visit? No   Unplanned office visit, urgent care, ED, or hospital admission in the last 4 weeks? No   How does patient/caregiver feel medication is working? Fair   Financial problems or insurance changes? No   How many doses of your specialty medications were missed in the last 4 weeks? 0   Would patient like to speak to a pharmacist? No   When does the patient need to receive the medication? 08/26/22   Refill Delivery Questions    How will the patient receive the medication? Pickup   When does the patient need to receive the medication? 08/26/22   Shipping Address Home   Address in [facility] confirmed and updated if neccessary? Yes   Expected Copay ($) 1.35   Is the patient able to afford the medication copay? Yes   Payment Method at pickup   Days supply of Refill 30   Supplies needed? No supplies needed   Refill activity completed? Yes   Refill activity plan Refill scheduled   Shipment/Pickup Date: 08/19/22          Current Outpatient Medications   Medication Sig    abiraterone (ZYTIGA) 250 mg Tab Take 4 tablets (1,000 mg total) by mouth once daily.    ACCU-CHEK SOFTCLIX LANCETS Misc TEST BLOOD SUGAR ONCE A DAY    amLODIPine (NORVASC) 10 MG tablet TAKE 1 TABLET(10 MG) BY MOUTH EVERY DAY    atorvastatin (LIPITOR) 20 MG tablet  "TAKE 1 TABLET(20 MG) BY MOUTH EVERY DAY    blood sugar diagnostic Strp 1 each by Misc.(Non-Drug; Combo Route) route 2 (two) times daily.    blood sugar diagnostic Strp 1 each by Misc.(Non-Drug; Combo Route) route once daily.    blood sugar diagnostic Strp Pt to check up to 6 times daily    blood-glucose meter kit Use as instructed    BOOSTRIX TDAP 2.5-8-5 Lf-mcg-Lf/0.5mL Syrg injection     carvediloL (COREG) 3.125 MG tablet TAKE 1 TABLET(3.125 MG) BY MOUTH TWICE DAILY WITH MEALS    diclofenac (VOLTAREN) 75 MG EC tablet Take 1 tablet (75 mg total) by mouth 2 (two) times daily.    doxazosin (CARDURA) 4 MG tablet TAKE 1 TABLET(4 MG) BY MOUTH EVERY EVENING    gabapentin (NEURONTIN) 300 MG capsule TAKE 1 CAPSULE(300 MG) BY MOUTH THREE TIMES DAILY    glipiZIDE (GLUCOTROL) 10 MG tablet TAKE 1 TABLET(10 MG) BY MOUTH TWICE DAILY BEFORE MEALS    hydroCHLOROthiazide (HYDRODIURIL) 25 MG tablet TAKE ONE-HALF TABLET BY MOUTH ONCE DAILY    insulin (LANTUS SOLOSTAR U-100 INSULIN) glargine 100 units/mL (3mL) SubQ pen Inject 22 Units into the skin every evening.    irbesartan (AVAPRO) 300 MG tablet Take 1 tablet (300 mg total) by mouth every evening.    lancing device with lancets Kit 1 each by Misc.(Non-Drug; Combo Route) route 2 (two) times daily.    metFORMIN (GLUCOPHAGE) 1000 MG tablet Take 1 tablet (1,000 mg total) by mouth 2 (two) times daily with meals.    pen needle, diabetic (BD ULTRA-FINE KELECHI PEN NEEDLE) 32 gauge x 5/32" Ndle VIA DEVICE USE EVERY MORNING    polyethylene glycol (GOLYTELY) 236-22.74-6.74 -5.86 gram suspension Take 4,000 mLs by mouth once.    predniSONE (DELTASONE) 5 MG tablet Take 1 tablet (5 mg total) by mouth 2 (two) times daily.    tobramycin sulfate 0.3% (TOBREX) 0.3 % ophthalmic solution 1-2 drops topically twice daily to affected toe(s).    tobramycin sulfate 0.3% (TOBREX) 0.3 % ophthalmic solution 1-2 drops topically twice daily to affected toe(s).    triamcinolone acetonide 0.1% " (KENALOG) 0.1 % cream Apply topically 2 (two) times daily.   Last reviewed on 8/16/2022  1:20 PM by Irene Thompson, CNS    Review of patient's allergies indicates:   Allergen Reactions    Hay fever and allergy relief     Last reviewed on  8/16/2022 1:20 PM by Irene Thompson      Tasks added this encounter   9/18/2022 - Refill Call (Auto Added)  8/20/2022 - Pickup Reminder   Tasks due within next 3 months   No tasks due.     Ryder Krishnan, PharmD  [facility]  [address] [facility] [address]  Phone: [phone]  Fax: [phone]

## 2022-08-26 ENCOUNTER — OFFICE VISIT (OUTPATIENT)
Dept: UROLOGY | Facility: CLINIC | Age: 77
End: 2022-08-26
Payer: MEDICARE

## 2022-08-26 VITALS
WEIGHT: 205.94 LBS | HEIGHT: 73 IN | BODY MASS INDEX: 27.29 KG/M2 | HEART RATE: 74 BPM | SYSTOLIC BLOOD PRESSURE: 144 MMHG | DIASTOLIC BLOOD PRESSURE: 62 MMHG

## 2022-08-26 DIAGNOSIS — C61 PROSTATE CANCER: Primary | ICD-10-CM

## 2022-08-26 DIAGNOSIS — R39.15 URINARY URGENCY: ICD-10-CM

## 2022-08-26 PROCEDURE — 1159F MED LIST DOCD IN RCRD: CPT | Mod: CPTII,S$GLB,, | Performed by: NURSE PRACTITIONER

## 2022-08-26 PROCEDURE — 1101F PT FALLS ASSESS-DOCD LE1/YR: CPT | Mod: CPTII,S$GLB,, | Performed by: NURSE PRACTITIONER

## 2022-08-26 PROCEDURE — 3077F PR MOST RECENT SYSTOLIC BLOOD PRESSURE >= 140 MM HG: ICD-10-PCS | Mod: CPTII,S$GLB,, | Performed by: NURSE PRACTITIONER

## 2022-08-26 PROCEDURE — 1101F PR PT FALLS ASSESS DOC 0-1 FALLS W/OUT INJ PAST YR: ICD-10-PCS | Mod: CPTII,S$GLB,, | Performed by: NURSE PRACTITIONER

## 2022-08-26 PROCEDURE — 3072F LOW RISK FOR RETINOPATHY: CPT | Mod: CPTII,S$GLB,, | Performed by: NURSE PRACTITIONER

## 2022-08-26 PROCEDURE — 1126F PR PAIN SEVERITY QUANTIFIED, NO PAIN PRESENT: ICD-10-PCS | Mod: CPTII,S$GLB,, | Performed by: NURSE PRACTITIONER

## 2022-08-26 PROCEDURE — 99214 PR OFFICE/OUTPT VISIT, EST, LEVL IV, 30-39 MIN: ICD-10-PCS | Mod: S$GLB,,, | Performed by: NURSE PRACTITIONER

## 2022-08-26 PROCEDURE — 3288F PR FALLS RISK ASSESSMENT DOCUMENTED: ICD-10-PCS | Mod: CPTII,S$GLB,, | Performed by: NURSE PRACTITIONER

## 2022-08-26 PROCEDURE — 87086 URINE CULTURE/COLONY COUNT: CPT | Performed by: NURSE PRACTITIONER

## 2022-08-26 PROCEDURE — 3288F FALL RISK ASSESSMENT DOCD: CPT | Mod: CPTII,S$GLB,, | Performed by: NURSE PRACTITIONER

## 2022-08-26 PROCEDURE — 1126F AMNT PAIN NOTED NONE PRSNT: CPT | Mod: CPTII,S$GLB,, | Performed by: NURSE PRACTITIONER

## 2022-08-26 PROCEDURE — 1159F PR MEDICATION LIST DOCUMENTED IN MEDICAL RECORD: ICD-10-PCS | Mod: CPTII,S$GLB,, | Performed by: NURSE PRACTITIONER

## 2022-08-26 PROCEDURE — 1160F PR REVIEW ALL MEDS BY PRESCRIBER/CLIN PHARMACIST DOCUMENTED: ICD-10-PCS | Mod: CPTII,S$GLB,, | Performed by: NURSE PRACTITIONER

## 2022-08-26 PROCEDURE — 99214 OFFICE O/P EST MOD 30 MIN: CPT | Mod: S$GLB,,, | Performed by: NURSE PRACTITIONER

## 2022-08-26 PROCEDURE — 1160F RVW MEDS BY RX/DR IN RCRD: CPT | Mod: CPTII,S$GLB,, | Performed by: NURSE PRACTITIONER

## 2022-08-26 PROCEDURE — 3078F PR MOST RECENT DIASTOLIC BLOOD PRESSURE < 80 MM HG: ICD-10-PCS | Mod: CPTII,S$GLB,, | Performed by: NURSE PRACTITIONER

## 2022-08-26 PROCEDURE — 3078F DIAST BP <80 MM HG: CPT | Mod: CPTII,S$GLB,, | Performed by: NURSE PRACTITIONER

## 2022-08-26 PROCEDURE — 3077F SYST BP >= 140 MM HG: CPT | Mod: CPTII,S$GLB,, | Performed by: NURSE PRACTITIONER

## 2022-08-26 PROCEDURE — 3072F PR LOW RISK FOR RETINOPATHY: ICD-10-PCS | Mod: CPTII,S$GLB,, | Performed by: NURSE PRACTITIONER

## 2022-08-26 RX ORDER — CIPROFLOXACIN 500 MG/1
500 TABLET ORAL 2 TIMES DAILY
Qty: 4 TABLET | Refills: 0 | Status: SHIPPED | OUTPATIENT
Start: 2022-08-26 | End: 2022-08-28

## 2022-08-26 NOTE — PROGRESS NOTES
"Subjective:      Misha Eldridge Jr. is a 77 y.o. male who returns today regarding his prostate cancer. He is an established patient of Dr. Ravi' and is new to me today.     Prostate Cancer:  - reports PSA screen was abnormal in 2010   - 4/27/2010 RARP with bilateral nerve sparing - prostate adenocarcinoma- surgery at [facility]  - 4/12/2022 PSA 7.1 ng/ml with concern for biochemical recurrence    He underwent radiation simulation on 8/5/2022. He has been taking his zytiga + prednisone daily. Received eligard 45 mg on 8/16/22.     He was to begin XRT per Dr. Tejada's; however there has been "difficulty planning radiation based on PET scan alone which shows recurrence between rectum and bladder caudally. He is refusing MRI again because he is claustrophobic."     He presents today to discuss/schedule TRUS/bx of prostatic fossa. Denies bothersome urinary symptoms. Denies dysuria and gross hematuria.     The following portions of the patient's history were reviewed and updated as appropriate: allergies, current medications, past family history, past medical history, past social history, past surgical history and problem list.    Review of Systems  Constitutional: no fever or chills  ENT: no nasal congestion or sore throat  Respiratory: no cough or shortness of breath  Cardiovascular: no chest pain or palpitations  Gastrointestinal: no nausea or vomiting, tolerating diet  Genitourinary: as per HPI  Hematologic/Lymphatic: no easy bruising or lymphadenopathy  Musculoskeletal: no arthralgias or myalgias  Neurological: no seizures or tremors  Behavioral/Psych: no auditory or visual hallucinations     Objective:   Vitals: BP (!) 144/62   Pulse 74   Ht 6' 1" (1.854 m)   Wt 93.4 kg (205 lb 14.6 oz)   BMI 27.17 kg/m²     Physical Exam   General: alert and oriented, no acute distress  Head: normocephalic, atraumatic  Neck: supple, normal ROM  Respiratory: Symmetric expansion, non-labored breathing  Cardiovascular: regular rate " and rhythm  Abdomen: soft, non tender, non distended  Genitourinary: deferred  Skin: normal coloration and turgor, no rashes, no suspicious skin lesions noted  Neuro: alert and oriented x3, no gross deficits  Psych: normal judgment and insight, normal mood/affect and non-anxious    Lab Review   Urinalysis demonstrates negative for all components  Lab Results   Component Value Date    WBC 4.47 08/16/2022    HGB 12.4 (L) 08/16/2022    HCT 36.6 (L) 08/16/2022    MCV 92 08/16/2022     (L) 08/16/2022     Lab Results   Component Value Date    CREATININE 1.3 08/16/2022    BUN 21 08/16/2022     Lab Results   Component Value Date    PSA 7.1 (H) 04/12/2022     Imaging   None    Assessment:     1. Prostate cancer    2. Urinary urgency      Plan:   Misha was seen today for follow-up.    Diagnoses and all orders for this visit:    Prostate cancer Local recurrence sp RALP  -     ciprofloxacin HCl (CIPRO) 500 MG tablet; Take 1 tablet (500 mg total) by mouth 2 (two) times daily. for 4 doses  -     Transrectal Ultrasound w/ Biopsy; Future    Urinary urgency  -     Urine culture    Plan: Discussed with Dr. Ravi  --Will proceed with TRUS/bx of prostatic fossa (reviewed bx handout with the pt) with Dr. Ravi asap   --ADT per heme/onc   --Urine culture today

## 2022-08-27 LAB — BACTERIA UR CULT: NO GROWTH

## 2022-09-09 ENCOUNTER — PROCEDURE VISIT (OUTPATIENT)
Dept: UROLOGY | Facility: CLINIC | Age: 77
End: 2022-09-09
Payer: MEDICARE

## 2022-09-09 VITALS
RESPIRATION RATE: 18 BRPM | BODY MASS INDEX: 27.17 KG/M2 | WEIGHT: 205 LBS | SYSTOLIC BLOOD PRESSURE: 144 MMHG | HEIGHT: 73 IN | DIASTOLIC BLOOD PRESSURE: 71 MMHG | HEART RATE: 59 BPM

## 2022-09-09 DIAGNOSIS — C61 PROSTATE CANCER: ICD-10-CM

## 2022-09-09 PROCEDURE — 76942 TRANSRECTAL ULTRASOUND W/ BIOPSY: ICD-10-PCS | Mod: S$GLB,,, | Performed by: UROLOGY

## 2022-09-09 PROCEDURE — 88341 PR IHC OR ICC EACH ADD'L SINGLE ANTIBODY  STAINPR: ICD-10-PCS | Mod: 26,,, | Performed by: PATHOLOGY

## 2022-09-09 PROCEDURE — 88341 IMHCHEM/IMCYTCHM EA ADD ANTB: CPT | Mod: 26,,, | Performed by: PATHOLOGY

## 2022-09-09 PROCEDURE — 88342 IMHCHEM/IMCYTCHM 1ST ANTB: CPT | Mod: 26,,, | Performed by: PATHOLOGY

## 2022-09-09 PROCEDURE — 88342 CHG IMMUNOCYTOCHEMISTRY: ICD-10-PCS | Mod: 26,,, | Performed by: PATHOLOGY

## 2022-09-09 PROCEDURE — 88342 IMHCHEM/IMCYTCHM 1ST ANTB: CPT | Performed by: PATHOLOGY

## 2022-09-09 PROCEDURE — 88305 TISSUE EXAM BY PATHOLOGIST: CPT | Performed by: PATHOLOGY

## 2022-09-09 PROCEDURE — 55700 TRANSRECTAL ULTRASOUND W/ BIOPSY: CPT | Mod: S$GLB,,, | Performed by: UROLOGY

## 2022-09-09 PROCEDURE — 88341 IMHCHEM/IMCYTCHM EA ADD ANTB: CPT | Mod: 59 | Performed by: PATHOLOGY

## 2022-09-09 PROCEDURE — 88305 TISSUE EXAM BY PATHOLOGIST: CPT | Mod: 26,,, | Performed by: PATHOLOGY

## 2022-09-09 PROCEDURE — 88305 TISSUE EXAM BY PATHOLOGIST: ICD-10-PCS | Mod: 26,,, | Performed by: PATHOLOGY

## 2022-09-09 PROCEDURE — 55700 TRANSRECTAL ULTRASOUND W/ BIOPSY: ICD-10-PCS | Mod: S$GLB,,, | Performed by: UROLOGY

## 2022-09-09 PROCEDURE — 76942 ECHO GUIDE FOR BIOPSY: CPT | Mod: S$GLB,,, | Performed by: UROLOGY

## 2022-09-09 RX ORDER — LIDOCAINE HYDROCHLORIDE 20 MG/ML
10 INJECTION, SOLUTION INFILTRATION; PERINEURAL
Status: DISCONTINUED | OUTPATIENT
Start: 2022-09-09 | End: 2023-07-02 | Stop reason: HOSPADM

## 2022-09-09 RX ORDER — LIDOCAINE HYDROCHLORIDE 20 MG/ML
JELLY TOPICAL
Status: COMPLETED | OUTPATIENT
Start: 2022-09-09 | End: 2022-09-09

## 2022-09-09 RX ADMIN — LIDOCAINE HYDROCHLORIDE 1 ML: 20 JELLY TOPICAL at 04:09

## 2022-09-09 NOTE — PROCEDURES
"Transrectal Ultrasound w/ Biopsy    Date/Time: 9/9/2022 3:46 PM  Performed by: Duarte Ravi MD  Authorized by: Cathy Voss NP     Consent Done?:  Yes (Written)  Time out: Immediately prior to procedure a "time out" was called to verify the correct patient, procedure, equipment, support staff and site/side marked as required.    Indications: Prostate Cancer and Elevated PSA    Preparation: Patient was prepped and draped in usual sterile fashion    Position:  Left lateral  Anesthesia:  Lidocaine jelly  Patient sedated: No    Lesions:: Yes    Total Biopsies:  0     1cm hypoechoic lesion just to the right of midline on the anterior rectal wall posterior to the trigone.  Best visualized on transverse images.    2 cores taken from this area    Prostate ca rule out local recurrence    Follow up with Dr Azevedo, rad onc.  Please note pt was already started on ADT which may effect the histology; we made note of this on the path slip to inform the pathologist  " Yes

## 2022-09-19 LAB
FINAL PATHOLOGIC DIAGNOSIS: NORMAL
GROSS: NORMAL
Lab: NORMAL

## 2022-09-20 ENCOUNTER — OFFICE VISIT (OUTPATIENT)
Dept: UROLOGY | Facility: CLINIC | Age: 77
End: 2022-09-20
Payer: MEDICARE

## 2022-09-20 ENCOUNTER — OFFICE VISIT (OUTPATIENT)
Dept: RADIATION ONCOLOGY | Facility: CLINIC | Age: 77
End: 2022-09-20
Payer: MEDICARE

## 2022-09-20 VITALS
HEIGHT: 73 IN | BODY MASS INDEX: 27.34 KG/M2 | DIASTOLIC BLOOD PRESSURE: 88 MMHG | WEIGHT: 206.31 LBS | RESPIRATION RATE: 16 BRPM | RESPIRATION RATE: 16 BRPM | HEART RATE: 81 BPM | SYSTOLIC BLOOD PRESSURE: 142 MMHG | HEIGHT: 73 IN | DIASTOLIC BLOOD PRESSURE: 88 MMHG | WEIGHT: 206.31 LBS | SYSTOLIC BLOOD PRESSURE: 142 MMHG | BODY MASS INDEX: 27.34 KG/M2 | OXYGEN SATURATION: 95 % | TEMPERATURE: 98 F | HEART RATE: 81 BPM | OXYGEN SATURATION: 95 %

## 2022-09-20 DIAGNOSIS — C61 PROSTATE CANCER: Primary | ICD-10-CM

## 2022-09-20 DIAGNOSIS — Z85.46 HISTORY OF PROSTATE CANCER: Primary | ICD-10-CM

## 2022-09-20 DIAGNOSIS — R39.15 URINARY URGENCY: ICD-10-CM

## 2022-09-20 LAB
BACTERIA #/AREA URNS AUTO: ABNORMAL /HPF
BILIRUB UR QL STRIP: NEGATIVE
CLARITY UR REFRACT.AUTO: CLEAR
COLOR UR AUTO: YELLOW
GLUCOSE UR QL STRIP: ABNORMAL
HGB UR QL STRIP: NEGATIVE
HYALINE CASTS UR QL AUTO: 2 /LPF
KETONES UR QL STRIP: NEGATIVE
LEUKOCYTE ESTERASE UR QL STRIP: NEGATIVE
MICROSCOPIC COMMENT: ABNORMAL
NITRITE UR QL STRIP: NEGATIVE
PH UR STRIP: 6 [PH] (ref 5–8)
PROT UR QL STRIP: NEGATIVE
RBC #/AREA URNS AUTO: 0 /HPF (ref 0–4)
SP GR UR STRIP: 1.01 (ref 1–1.03)
URN SPEC COLLECT METH UR: ABNORMAL
WBC #/AREA URNS AUTO: 1 /HPF (ref 0–5)
YEAST UR QL AUTO: ABNORMAL

## 2022-09-20 PROCEDURE — 3288F PR FALLS RISK ASSESSMENT DOCUMENTED: ICD-10-PCS | Mod: CPTII,S$GLB,, | Performed by: UROLOGY

## 2022-09-20 PROCEDURE — 1101F PR PT FALLS ASSESS DOC 0-1 FALLS W/OUT INJ PAST YR: ICD-10-PCS | Mod: CPTII,S$GLB,, | Performed by: UROLOGY

## 2022-09-20 PROCEDURE — 3072F PR LOW RISK FOR RETINOPATHY: ICD-10-PCS | Mod: CPTII,S$GLB,, | Performed by: UROLOGY

## 2022-09-20 PROCEDURE — 3079F PR MOST RECENT DIASTOLIC BLOOD PRESSURE 80-89 MM HG: ICD-10-PCS | Mod: CPTII,S$GLB,, | Performed by: UROLOGY

## 2022-09-20 PROCEDURE — 1126F AMNT PAIN NOTED NONE PRSNT: CPT | Mod: CPTII,S$GLB,, | Performed by: UROLOGY

## 2022-09-20 PROCEDURE — 99999 PR PBB SHADOW E&M-EST. PATIENT-LVL III: CPT | Mod: PBBFAC,,, | Performed by: STUDENT IN AN ORGANIZED HEALTH CARE EDUCATION/TRAINING PROGRAM

## 2022-09-20 PROCEDURE — 99214 PR OFFICE/OUTPT VISIT, EST, LEVL IV, 30-39 MIN: ICD-10-PCS | Mod: S$GLB,,, | Performed by: UROLOGY

## 2022-09-20 PROCEDURE — 1101F PT FALLS ASSESS-DOCD LE1/YR: CPT | Mod: CPTII,S$GLB,, | Performed by: UROLOGY

## 2022-09-20 PROCEDURE — 81001 URINALYSIS AUTO W/SCOPE: CPT | Performed by: UROLOGY

## 2022-09-20 PROCEDURE — 1126F PR PAIN SEVERITY QUANTIFIED, NO PAIN PRESENT: ICD-10-PCS | Mod: CPTII,S$GLB,, | Performed by: STUDENT IN AN ORGANIZED HEALTH CARE EDUCATION/TRAINING PROGRAM

## 2022-09-20 PROCEDURE — 3288F FALL RISK ASSESSMENT DOCD: CPT | Mod: CPTII,S$GLB,, | Performed by: UROLOGY

## 2022-09-20 PROCEDURE — 99999 PR PBB SHADOW E&M-EST. PATIENT-LVL V: CPT | Mod: PBBFAC,,, | Performed by: UROLOGY

## 2022-09-20 PROCEDURE — 1101F PT FALLS ASSESS-DOCD LE1/YR: CPT | Mod: CPTII,S$GLB,, | Performed by: STUDENT IN AN ORGANIZED HEALTH CARE EDUCATION/TRAINING PROGRAM

## 2022-09-20 PROCEDURE — 3077F PR MOST RECENT SYSTOLIC BLOOD PRESSURE >= 140 MM HG: ICD-10-PCS | Mod: CPTII,S$GLB,, | Performed by: STUDENT IN AN ORGANIZED HEALTH CARE EDUCATION/TRAINING PROGRAM

## 2022-09-20 PROCEDURE — 3072F LOW RISK FOR RETINOPATHY: CPT | Mod: CPTII,S$GLB,, | Performed by: STUDENT IN AN ORGANIZED HEALTH CARE EDUCATION/TRAINING PROGRAM

## 2022-09-20 PROCEDURE — 3072F LOW RISK FOR RETINOPATHY: CPT | Mod: CPTII,S$GLB,, | Performed by: UROLOGY

## 2022-09-20 PROCEDURE — 3077F SYST BP >= 140 MM HG: CPT | Mod: CPTII,S$GLB,, | Performed by: STUDENT IN AN ORGANIZED HEALTH CARE EDUCATION/TRAINING PROGRAM

## 2022-09-20 PROCEDURE — 3288F PR FALLS RISK ASSESSMENT DOCUMENTED: ICD-10-PCS | Mod: CPTII,S$GLB,, | Performed by: STUDENT IN AN ORGANIZED HEALTH CARE EDUCATION/TRAINING PROGRAM

## 2022-09-20 PROCEDURE — 99214 OFFICE O/P EST MOD 30 MIN: CPT | Mod: S$GLB,,, | Performed by: STUDENT IN AN ORGANIZED HEALTH CARE EDUCATION/TRAINING PROGRAM

## 2022-09-20 PROCEDURE — 3077F PR MOST RECENT SYSTOLIC BLOOD PRESSURE >= 140 MM HG: ICD-10-PCS | Mod: CPTII,S$GLB,, | Performed by: UROLOGY

## 2022-09-20 PROCEDURE — 99214 OFFICE O/P EST MOD 30 MIN: CPT | Mod: S$GLB,,, | Performed by: UROLOGY

## 2022-09-20 PROCEDURE — 1126F AMNT PAIN NOTED NONE PRSNT: CPT | Mod: CPTII,S$GLB,, | Performed by: STUDENT IN AN ORGANIZED HEALTH CARE EDUCATION/TRAINING PROGRAM

## 2022-09-20 PROCEDURE — 99999 PR PBB SHADOW E&M-EST. PATIENT-LVL V: ICD-10-PCS | Mod: PBBFAC,,, | Performed by: UROLOGY

## 2022-09-20 PROCEDURE — 3072F PR LOW RISK FOR RETINOPATHY: ICD-10-PCS | Mod: CPTII,S$GLB,, | Performed by: STUDENT IN AN ORGANIZED HEALTH CARE EDUCATION/TRAINING PROGRAM

## 2022-09-20 PROCEDURE — 3079F DIAST BP 80-89 MM HG: CPT | Mod: CPTII,S$GLB,, | Performed by: UROLOGY

## 2022-09-20 PROCEDURE — 3288F FALL RISK ASSESSMENT DOCD: CPT | Mod: CPTII,S$GLB,, | Performed by: STUDENT IN AN ORGANIZED HEALTH CARE EDUCATION/TRAINING PROGRAM

## 2022-09-20 PROCEDURE — 1126F PR PAIN SEVERITY QUANTIFIED, NO PAIN PRESENT: ICD-10-PCS | Mod: CPTII,S$GLB,, | Performed by: UROLOGY

## 2022-09-20 PROCEDURE — 1159F MED LIST DOCD IN RCRD: CPT | Mod: CPTII,S$GLB,, | Performed by: UROLOGY

## 2022-09-20 PROCEDURE — 99999 PR PBB SHADOW E&M-EST. PATIENT-LVL III: ICD-10-PCS | Mod: PBBFAC,,, | Performed by: STUDENT IN AN ORGANIZED HEALTH CARE EDUCATION/TRAINING PROGRAM

## 2022-09-20 PROCEDURE — 3077F SYST BP >= 140 MM HG: CPT | Mod: CPTII,S$GLB,, | Performed by: UROLOGY

## 2022-09-20 PROCEDURE — 1101F PR PT FALLS ASSESS DOC 0-1 FALLS W/OUT INJ PAST YR: ICD-10-PCS | Mod: CPTII,S$GLB,, | Performed by: STUDENT IN AN ORGANIZED HEALTH CARE EDUCATION/TRAINING PROGRAM

## 2022-09-20 PROCEDURE — 99214 PR OFFICE/OUTPT VISIT, EST, LEVL IV, 30-39 MIN: ICD-10-PCS | Mod: S$GLB,,, | Performed by: STUDENT IN AN ORGANIZED HEALTH CARE EDUCATION/TRAINING PROGRAM

## 2022-09-20 PROCEDURE — 1159F PR MEDICATION LIST DOCUMENTED IN MEDICAL RECORD: ICD-10-PCS | Mod: CPTII,S$GLB,, | Performed by: UROLOGY

## 2022-09-20 PROCEDURE — 3079F PR MOST RECENT DIASTOLIC BLOOD PRESSURE 80-89 MM HG: ICD-10-PCS | Mod: CPTII,S$GLB,, | Performed by: STUDENT IN AN ORGANIZED HEALTH CARE EDUCATION/TRAINING PROGRAM

## 2022-09-20 PROCEDURE — 3079F DIAST BP 80-89 MM HG: CPT | Mod: CPTII,S$GLB,, | Performed by: STUDENT IN AN ORGANIZED HEALTH CARE EDUCATION/TRAINING PROGRAM

## 2022-09-20 RX ORDER — DIAZEPAM 5 MG/1
5 TABLET ORAL
Qty: 2 TABLET | Refills: 0 | Status: SHIPPED | OUTPATIENT
Start: 2022-09-20 | End: 2023-02-06

## 2022-09-20 NOTE — PROGRESS NOTES
Radiation Oncology Follow-up Note        Date of Service: 09/20/2022     Chief Complaint: prostate cancer     Reason for visit: consideration for radiation to the pelvis, follow-up x 2     Referring Physician: Dr Ravi (urology)     Implantable devices: denies     Therapy to Date:  No radiation      Diagnosis/Assessment:   Misha Eldridge Jr. is a 77 y.o. man with prostate adenocarcinoma s/p RA-RP with b/l nerve sparing (04/27/2010, 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)     ECOG 1     Plan   We discussed treatment options per NCCN guidelines v2022:  - salvage EBRT + short term ADT , with radiation boost to gross recurrence anterior to the anterior rectal wall.        EBRT would consist of daily radiation treatments M-F, 38-40 fractions to the prostate bed + regional pelvic lymph nodes.      Risks, benefits, and side effects of radiotherapy were discussed.   Side effects include but are not limited to fatigue, erythema, hyperpigmentation, desquamation within the radiation field, more frequent urination, both during the day and at night, urgency with urination, hematuria, dysuria, and a sensation of incomplete emptying.   In addition, the patient will be at risk for increased frequency and/or loose bowel movements.   All of these side effects will go away in the short term.   In the long term, the patient is at risk for radiation cystitis, radiation proctitis, and erectile dysfunction.        - after a lot of convincing pt finally agreeing to MRI prostate fossa: 10/18/2022  - I will Rx valium for  claustrophobia  - repeat CT sim after MRI  - Dr Betancourt to continue  ADT  - RT consent signed already  - Epic- 26 survey  filled       Interval history:      [facility] refer to Dr Betancourt, meet on 6/21 with radonc in [facility] clinic              AUA score 1 (0,0,0,0,0,0,1) delighted              BIRD score 2 (2,0,0,0,0) severe ED     6/9/2022 PET axumin: I contacted Dr Serrano => no kirill or distant recurrence, but avidity anterior to rectum           6/20/2022 received rest of prostate cancer surgery records from [facility]    6/21/2022 started zytiga    8/16/2022 PSA 0.33    8/16/2222 Lupron Dr Betancourt    9/9/2022 Prostate nodule fossa biopsy (Dr Ravi):    1cm hypoechoic lesion just to the right of midline on the anterior rectal wall posterior to the trigone.  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  Denies major urinary or bowel function issues. Not sexually active but has had a partner for 25 years.  Sometimes constipated.  Denies other major complaints      Denies any history of radiation therapy, implantable cardiac devices, or connective tissue disease.     Social history  Lives with mother who is 94.  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 120 tablet 1    ACCU-CHEK SOFTCLIX LANCETS Misc TEST BLOOD SUGAR ONCE A  each 0    amLODIPine (NORVASC) 10 MG tablet TAKE 1 TABLET(10 MG) BY MOUTH EVERY DAY 90 tablet 3  "   atorvastatin (LIPITOR) 20 MG tablet TAKE 1 TABLET(20 MG) BY MOUTH EVERY DAY 90 tablet 3    blood sugar diagnostic Strp 1 each by Misc.(Non-Drug; Combo Route) route 2 (two) times daily. 1 each 0    blood sugar diagnostic Strp 1 each by Misc.(Non-Drug; Combo Route) route once daily. 100 each 3    blood sugar diagnostic Strp Pt to check up to 6 times daily 200 strip 11    BOOSTRIX TDAP 2.5-8-5 Lf-mcg-Lf/0.5mL Syrg injection       carvediloL (COREG) 3.125 MG tablet TAKE 1 TABLET(3.125 MG) BY MOUTH TWICE DAILY WITH MEALS 180 tablet 1    diclofenac (VOLTAREN) 75 MG EC tablet Take 1 tablet (75 mg total) by mouth 2 (two) times daily. 90 tablet 0    doxazosin (CARDURA) 4 MG tablet TAKE 1 TABLET(4 MG) BY MOUTH EVERY EVENING 90 tablet 1    gabapentin (NEURONTIN) 300 MG capsule TAKE 1 CAPSULE(300 MG) BY MOUTH THREE TIMES DAILY 270 capsule 1    glipiZIDE (GLUCOTROL) 10 MG tablet TAKE 1 TABLET(10 MG) BY MOUTH TWICE DAILY BEFORE MEALS 180 tablet 0    hydroCHLOROthiazide (HYDRODIURIL) 25 MG tablet TAKE ONE-HALF TABLET BY MOUTH ONCE DAILY 90 tablet 1    insulin (LANTUS SOLOSTAR U-100 INSULIN) glargine 100 units/mL (3mL) SubQ pen Inject 22 Units into the skin every evening. 15 mL 11    irbesartan (AVAPRO) 300 MG tablet Take 1 tablet (300 mg total) by mouth every evening. 90 tablet 2    metFORMIN (GLUCOPHAGE) 1000 MG tablet Take 1 tablet (1,000 mg total) by mouth 2 (two) times daily with meals. 180 tablet 1    pen needle, diabetic (BD ULTRA-FINE KELECHI PEN NEEDLE) 32 gauge x 5/32" Ndle VIA DEVICE USE EVERY MORNING 200 each 11    polyethylene glycol (GOLYTELY) 236-22.74-6.74 -5.86 gram suspension Take 4,000 mLs by mouth once.      predniSONE (DELTASONE) 5 MG tablet Take 1 tablet (5 mg total) by mouth 2 (two) times daily. 60 tablet 1    tobramycin sulfate 0.3% (TOBREX) 0.3 % ophthalmic solution 1-2 drops topically twice daily to affected toe(s). 5 mL 3    tobramycin sulfate 0.3% (TOBREX) 0.3 % ophthalmic solution 1-2 drops topically " twice daily to affected toe(s). 5 mL 3    triamcinolone acetonide 0.1% (KENALOG) 0.1 % cream Apply topically 2 (two) times daily. 45 g 0    blood-glucose meter kit Use as instructed 1 each 0    lancing device with lancets Kit 1 each by Misc.(Non-Drug; Combo Route) route 2 (two) times daily. 200 each 11     Current Facility-Administered Medications on File Prior to Visit   Medication Dose Route Frequency Provider Last Rate Last Admin    LIDOcaine HCL 20 mg/ml (2%) injection 10 mL  10 mL Other 1 time in Clinic/HOD Duarte Ravi MD                 Review of Systems   Negative unless as stated above     HPI:   Misha Eldridge Jr. is a 77 y.o. man with prostate adenocarcinoma s/p RA-RP with b/l nerve sparing and PSA recurrence     Oncologic history  04/27/2010 Prostate adenocarcinoma s/p RA-RP with b/l nerve sparing         Colon adenocarcinoma Stage IIIC2 (G2-G3, +1/17LN, [id number] 2012), s/p LAR/small bowel resection/colosctomy 5/2/2012, adjuvant mFOLFOX6 x 10c (7/11/2012-11/14/2012) (Dr Juvencio Enciso)     12/14/2016 PSA 0.38     12/5/2017 PSA 0.72     4/12/2022 PSA 7.1     5/19/2022 initial visit with urology (Dr Ravi)         Objective:   Physical Exam  Vitals reviewed.   Constitutional:       Appearance: Normal appearance.   HENT:      Head: Normocephalic and atraumatic.      Mouth/Throat:      Mouth: Mucous membranes are moist.   Eyes:      Conjunctiva/sclera: Conjunctivae normal.   Cardiovascular:      Comments: extremities well perfused  Pulmonary:      Effort: Pulmonary effort is normal.   Abdominal:      General: There is no distension.   Genitourinary:     Comments: JANAY deferred, s/p RP  Musculoskeletal:         General: Normal range of motion.      Cervical back: Normal range of motion.   Skin:     General: Skin is warm and dry.   Neurological:      General: No focal deficit present.      Mental Status: He is alert and oriented to person, place, and time.   Psychiatric:         Mood and Affect: Mood normal.          Behavior: Behavior normal.              Imaging: I have personally reviewed the patient's available images and reports and summarized pertinent findings above in HPI.         I spent approximately 30 minutes reviewing the available records and evaluating the patient, out of which over 50% of the time was spent face to face with the patient in counseling and coordinating this patient's care.     Thank you for the opportunity to care for this patient. Please do not hesitate to contact me with any questions.     Kenneth Azevedo MD/PhD

## 2022-09-21 ENCOUNTER — SPECIALTY PHARMACY (OUTPATIENT)
Dept: PHARMACY | Facility: CLINIC | Age: 77
End: 2022-09-21
Payer: MEDICARE

## 2022-09-21 NOTE — TELEPHONE ENCOUNTER
Specialty Pharmacy - Refill Coordination    Specialty Medication Orders Linked to Encounter      Flowsheet Row Most Recent Value   Medication #1 abiraterone (ZYTIGA) 250 mg Tab (Order#[id number], Rx#[id number])   Medication #2 predniSONE (DELTASONE) 5 MG tablet (Order#[id number], Rx#[id number])            Refill Questions - Documented Responses      Flowsheet Row Most Recent Value   Patient Availability and HIPAA Verification    Does patient want to proceed with activity? Yes   HIPAA/medical authority confirmed? Yes   Relationship to patient of person spoken to? Self   Refill Screening Questions    Changes to allergies? No   Changes to medications? No   New conditions since last clinic visit? No   Unplanned office visit, urgent care, ED, or hospital admission in the last 4 weeks? No   How does patient/caregiver feel medication is working? Good   Financial problems or insurance changes? No   How many doses of your specialty medications were missed in the last 4 weeks? 0   Would patient like to speak to a pharmacist? No   When does the patient need to receive the medication? 09/23/22   Refill Delivery Questions    How will the patient receive the medication? Delivery Rianna   When does the patient need to receive the medication? 09/23/22   Shipping Address Home   Address in [facility] confirmed and updated if neccessary? Yes   Expected Copay ($) 0   Is the patient able to afford the medication copay? Yes   Payment Method zero copay   Days supply of Refill 30   Supplies needed? No supplies needed   Refill activity completed? Yes   Refill activity plan Refill scheduled   Shipment/Pickup Date: 09/22/22            Current Outpatient Medications   Medication Sig    abiraterone (ZYTIGA) 250 mg Tab Take 4 tablets (1,000 mg total) by mouth once daily.    ACCU-CHEK SOFTCLIX LANCETS Misc TEST BLOOD SUGAR ONCE A DAY    amLODIPine (NORVASC) 10 MG tablet TAKE 1 TABLET(10 MG) BY MOUTH EVERY DAY    atorvastatin (LIPITOR) 20 MG  "tablet TAKE 1 TABLET(20 MG) BY MOUTH EVERY DAY    blood sugar diagnostic Strp 1 each by Misc.(Non-Drug; Combo Route) route 2 (two) times daily.    blood sugar diagnostic Strp 1 each by Misc.(Non-Drug; Combo Route) route once daily.    blood sugar diagnostic Strp Pt to check up to 6 times daily    blood-glucose meter kit Use as instructed    BOOSTRIX TDAP 2.5-8-5 Lf-mcg-Lf/0.5mL Syrg injection     carvediloL (COREG) 3.125 MG tablet TAKE 1 TABLET(3.125 MG) BY MOUTH TWICE DAILY WITH MEALS    diazePAM (VALIUM) 5 MG tablet Take 1 tablet (5 mg total) by mouth as needed for Anxiety (take 1 tablet 30 minute prior to procedure).    diclofenac (VOLTAREN) 75 MG EC tablet Take 1 tablet (75 mg total) by mouth 2 (two) times daily.    doxazosin (CARDURA) 4 MG tablet TAKE 1 TABLET(4 MG) BY MOUTH EVERY EVENING    gabapentin (NEURONTIN) 300 MG capsule TAKE 1 CAPSULE(300 MG) BY MOUTH THREE TIMES DAILY    glipiZIDE (GLUCOTROL) 10 MG tablet TAKE 1 TABLET(10 MG) BY MOUTH TWICE DAILY BEFORE MEALS    hydroCHLOROthiazide (HYDRODIURIL) 25 MG tablet TAKE ONE-HALF TABLET BY MOUTH ONCE DAILY    insulin (LANTUS SOLOSTAR U-100 INSULIN) glargine 100 units/mL (3mL) SubQ pen Inject 22 Units into the skin every evening.    irbesartan (AVAPRO) 300 MG tablet Take 1 tablet (300 mg total) by mouth every evening.    lancing device with lancets Kit 1 each by Misc.(Non-Drug; Combo Route) route 2 (two) times daily.    metFORMIN (GLUCOPHAGE) 1000 MG tablet Take 1 tablet (1,000 mg total) by mouth 2 (two) times daily with meals.    pen needle, diabetic (BD ULTRA-FINE KELECHI PEN NEEDLE) 32 gauge x 5/32" Ndle VIA DEVICE USE EVERY MORNING    polyethylene glycol (GOLYTELY) 236-22.74-6.74 -5.86 gram suspension Take 4,000 mLs by mouth once.    predniSONE (DELTASONE) 5 MG tablet Take 1 tablet (5 mg total) by mouth 2 (two) times daily.    tobramycin sulfate 0.3% (TOBREX) 0.3 % ophthalmic solution 1-2 drops topically twice daily to affected toe(s).    tobramycin sulfate " 0.3% (TOBREX) 0.3 % ophthalmic solution 1-2 drops topically twice daily to affected toe(s).    triamcinolone acetonide 0.1% (KENALOG) 0.1 % cream Apply topically 2 (two) times daily.   Last reviewed on 9/20/2022 11:06 AM by Elicia José MA    Review of patient's allergies indicates:   Allergen Reactions    Hay fever and allergy relief     Last reviewed on  9/20/2022 11:15 AM by Ciara Valencia      Tasks added this encounter   10/16/2022 - Refill Call (Auto Added)   Tasks due within next 3 months   12/14/2022 - Clinical - Follow Up Assesement (180 day)     Tyra Colbert, PharmD  [facility]  [address] [place] [id number]  Phone: [phone]  Fax: [phone]

## 2022-10-03 ENCOUNTER — HOSPITAL ENCOUNTER (OUTPATIENT)
Dept: RADIATION THERAPY | Facility: HOSPITAL | Age: 77
Discharge: HOME | End: 2022-10-03
Attending: STUDENT IN AN ORGANIZED HEALTH CARE EDUCATION/TRAINING PROGRAM
Payer: MEDICARE

## 2022-10-10 DIAGNOSIS — E13.9 DIABETES 1.5, MANAGED AS TYPE 2: ICD-10-CM

## 2022-10-10 DIAGNOSIS — E78.9 ABNORMAL CHOLESTEROL TEST: ICD-10-CM

## 2022-10-10 DIAGNOSIS — I10 HYPERTENSION, UNSPECIFIED TYPE: ICD-10-CM

## 2022-10-10 DIAGNOSIS — Q24.9 HEART ABNORMALITY: ICD-10-CM

## 2022-10-10 DIAGNOSIS — G62.9 NEUROPATHY: ICD-10-CM

## 2022-10-10 NOTE — TELEPHONE ENCOUNTER
Called pt to clarify some of his refills, pt said he is low on everything he sent in.     LOV 4-12-22  Allergies reviewed

## 2022-10-10 NOTE — TELEPHONE ENCOUNTER
Care Due:                  Date            Visit Type   Department     Provider  --------------------------------------------------------------------------------                                EP -                              PRIMARY      [facility] INTERNAL  Last Visit: 04-      CARE ([facility])   MEDICINE       Dipak Treviño  Next Visit: None Scheduled  None         None Found                                                            Last  Test          Frequency    Reason                     Performed    Due Date  --------------------------------------------------------------------------------    HBA1C.......  6 months...  glipiZIDE, insulin,        04-   10-                             metFORMIN................    [employer] Embedded Care Gaps. Reference number: [id number]. 10/10/2022   1:56:38 PM CDT

## 2022-10-10 NOTE — TELEPHONE ENCOUNTER
----- Message from Elyse Manning sent at 10/10/2022 11:34 AM CDT -----  .Type: RX Refill Request    Who Called: self    Have you contacted your pharmacy:no    Refill or New Rx:refill    RX Name and Strength:  carvediloL (COREG) 3.125 MG tablet  glipiZIDE (GLUCOTROL) 10 MG tablet  gabapentin (NEURONTIN) 300 MG capsule  atorvastatin (LIPITOR) 20 MG tablet  amLODIPine (NORVASC) 10 MG tablet  hydroCHLOROthiazide (HYDRODIURIL) 25 MG tablet      Preferred Pharmacy with phone number:  [id number] [place] [address] [facility] [address] [place] [address] [facility] [address]  Phone: [phone] Fax: [phone]      Local or Mail Order:local    Would the patient rather a call back or a response via [facility]? call    Best Call Back Number:.[phone] (home)

## 2022-10-11 RX ORDER — ATORVASTATIN CALCIUM 20 MG/1
20 TABLET, FILM COATED ORAL DAILY
Qty: 90 TABLET | Refills: 1 | Status: SHIPPED | OUTPATIENT
Start: 2022-10-11 | End: 2023-02-06 | Stop reason: SDUPTHER

## 2022-10-11 RX ORDER — HYDROCHLOROTHIAZIDE 25 MG/1
TABLET ORAL
Qty: 90 TABLET | Refills: 1 | Status: SHIPPED | OUTPATIENT
Start: 2022-10-11 | End: 2023-02-06 | Stop reason: SDUPTHER

## 2022-10-11 RX ORDER — GLIPIZIDE 10 MG/1
10 TABLET ORAL
Qty: 180 TABLET | Refills: 0 | Status: SHIPPED | OUTPATIENT
Start: 2022-10-11 | End: 2023-01-18

## 2022-10-11 RX ORDER — CARVEDILOL 3.12 MG/1
3.12 TABLET ORAL 2 TIMES DAILY
Qty: 180 TABLET | Refills: 1 | Status: SHIPPED | OUTPATIENT
Start: 2022-10-11 | End: 2023-02-06 | Stop reason: SDUPTHER

## 2022-10-11 RX ORDER — AMLODIPINE BESYLATE 10 MG/1
10 TABLET ORAL DAILY
Qty: 90 TABLET | Refills: 1 | Status: SHIPPED | OUTPATIENT
Start: 2022-10-11 | End: 2022-11-30 | Stop reason: SDUPTHER

## 2022-10-11 RX ORDER — GABAPENTIN 300 MG/1
300 CAPSULE ORAL 3 TIMES DAILY
Qty: 270 CAPSULE | Refills: 1 | Status: SHIPPED | OUTPATIENT
Start: 2022-10-11 | End: 2023-04-26 | Stop reason: SDUPTHER

## 2022-10-11 NOTE — TELEPHONE ENCOUNTER
Refill Routing Note   Medication(s) are not appropriate for processing by [facility] for the following reason(s):      - Outside of protocol  - Required laboratory values are outdated  - Required vitals are abnormal    [facility] action(s):  Defer  Route Medication-related problems identified: Requires labs        Medication reconciliation completed: No     Appointments  past 12m or future 3m with PCP    Date Provider   Last Visit   4/12/2022 Dipak Treviño MD   Next Visit   Visit date not found Dipak Treviño MD   ED visits in past 90 days: 0        Note composed:7:50 AM 10/11/2022

## 2022-10-17 DIAGNOSIS — Z85.46 HISTORY OF PROSTATE CANCER: ICD-10-CM

## 2022-10-17 RX ORDER — PREDNISONE 5 MG/1
5 TABLET ORAL 2 TIMES DAILY
Qty: 60 TABLET | Refills: 1 | Status: SHIPPED | OUTPATIENT
Start: 2022-10-17 | End: 2022-11-08 | Stop reason: SDUPTHER

## 2022-10-17 RX ORDER — ABIRATERONE ACETATE 250 MG/1
1000 TABLET ORAL DAILY
Qty: 120 TABLET | Refills: 1 | Status: SHIPPED | OUTPATIENT
Start: 2022-10-17 | End: 2022-12-05 | Stop reason: SDUPTHER

## 2022-10-18 ENCOUNTER — HOSPITAL ENCOUNTER (OUTPATIENT)
Dept: RADIOLOGY | Facility: HOSPITAL | Age: 77
Discharge: HOME | End: 2022-10-18
Attending: STUDENT IN AN ORGANIZED HEALTH CARE EDUCATION/TRAINING PROGRAM
Payer: MEDICARE

## 2022-10-18 ENCOUNTER — SPECIALTY PHARMACY (OUTPATIENT)
Dept: PHARMACY | Facility: CLINIC | Age: 77
End: 2022-10-18
Payer: MEDICARE

## 2022-10-18 DIAGNOSIS — Z85.46 HISTORY OF PROSTATE CANCER: ICD-10-CM

## 2022-10-18 PROCEDURE — 72197 MRI PELVIS W/O & W/DYE: CPT | Mod: 26,,, | Performed by: STUDENT IN AN ORGANIZED HEALTH CARE EDUCATION/TRAINING PROGRAM

## 2022-10-18 PROCEDURE — 72197 MRI PELVIS W/O & W/DYE: CPT | Mod: TC

## 2022-10-18 PROCEDURE — A9585 GADOBUTROL INJECTION: HCPCS | Performed by: STUDENT IN AN ORGANIZED HEALTH CARE EDUCATION/TRAINING PROGRAM

## 2022-10-18 PROCEDURE — 25500020 PHARM REV CODE 255: Performed by: STUDENT IN AN ORGANIZED HEALTH CARE EDUCATION/TRAINING PROGRAM

## 2022-10-18 PROCEDURE — 72197 MRI PROSTATECTOMY W W/O CONTRAST: ICD-10-PCS | Mod: 26,,, | Performed by: STUDENT IN AN ORGANIZED HEALTH CARE EDUCATION/TRAINING PROGRAM

## 2022-10-18 RX ORDER — GADOBUTROL 604.72 MG/ML
10 INJECTION INTRAVENOUS
Status: COMPLETED | OUTPATIENT
Start: 2022-10-18 | End: 2022-10-18

## 2022-10-18 RX ADMIN — GADOBUTROL 10 ML: 604.72 INJECTION INTRAVENOUS at 07:10

## 2022-10-18 NOTE — TELEPHONE ENCOUNTER
Specialty Pharmacy - Refill Coordination    Specialty Medication Orders Linked to Encounter      Flowsheet Row Most Recent Value   Medication #1 abiraterone (ZYTIGA) 250 mg Tab (Order#[id number], Rx#[id number])   Medication #2 predniSONE (DELTASONE) 5 MG tablet (Order#[id number], Rx#[id number])            Refill Questions - Documented Responses      Flowsheet Row Most Recent Value   Patient Availability and HIPAA Verification    Does patient want to proceed with activity? Yes   HIPAA/medical authority confirmed? Yes   Relationship to patient of person spoken to? Self   Refill Screening Questions    Changes to allergies? No   Changes to medications? No   New conditions since last clinic visit? No   Unplanned office visit, urgent care, ED, or hospital admission in the last 4 weeks? No   How does patient/caregiver feel medication is working? Excellent   Financial problems or insurance changes? No   How many doses of your specialty medications were missed in the last 4 weeks? 0   Would patient like to speak to a pharmacist? No   When does the patient need to receive the medication? 10/23/22   Refill Delivery Questions    How will the patient receive the medication? MEDRx   When does the patient need to receive the medication? 10/23/22   Shipping Address Home   Address in [facility] confirmed and updated if neccessary? Yes   Expected Copay ($) 0   Is the patient able to afford the medication copay? Yes   Payment Method zero copay   Days supply of Refill 30   Supplies needed? No supplies needed   Refill activity completed? Yes   Refill activity plan Refill scheduled   Shipment/Pickup Date: 10/20/22            Current Outpatient Medications   Medication Sig    abiraterone (ZYTIGA) 250 mg Tab Take 4 tablets (1,000 mg total) by mouth once daily.    ACCU-CHEK SOFTCLIX LANCETS Misc TEST BLOOD SUGAR ONCE A DAY    amLODIPine (NORVASC) 10 MG tablet Take 1 tablet (10 mg total) by mouth once daily.    atorvastatin (LIPITOR)  "20 MG tablet Take 1 tablet (20 mg total) by mouth once daily.    blood sugar diagnostic Strp 1 each by Misc.(Non-Drug; Combo Route) route 2 (two) times daily.    blood sugar diagnostic Strp 1 each by Misc.(Non-Drug; Combo Route) route once daily.    blood sugar diagnostic Strp Pt to check up to 6 times daily    blood-glucose meter kit Use as instructed    BOOSTRIX TDAP 2.5-8-5 Lf-mcg-Lf/0.5mL Syrg injection     carvediloL (COREG) 3.125 MG tablet Take 1 tablet (3.125 mg total) by mouth 2 (two) times daily.    diazePAM (VALIUM) 5 MG tablet Take 1 tablet (5 mg total) by mouth as needed for Anxiety (take 1 tablet 30 minute prior to procedure).    diclofenac (VOLTAREN) 75 MG EC tablet Take 1 tablet (75 mg total) by mouth 2 (two) times daily.    doxazosin (CARDURA) 4 MG tablet TAKE 1 TABLET(4 MG) BY MOUTH EVERY EVENING    gabapentin (NEURONTIN) 300 MG capsule Take 1 capsule (300 mg total) by mouth 3 (three) times daily.    glipiZIDE (GLUCOTROL) 10 MG tablet Take 1 tablet (10 mg total) by mouth 2 (two) times daily before meals.    hydroCHLOROthiazide (HYDRODIURIL) 25 MG tablet TAKE ONE-HALF TABLET BY MOUTH ONCE DAILY    insulin (LANTUS SOLOSTAR U-100 INSULIN) glargine 100 units/mL (3mL) SubQ pen Inject 22 Units into the skin every evening.    irbesartan (AVAPRO) 300 MG tablet Take 1 tablet (300 mg total) by mouth every evening.    lancing device with lancets Kit 1 each by Misc.(Non-Drug; Combo Route) route 2 (two) times daily.    metFORMIN (GLUCOPHAGE) 1000 MG tablet Take 1 tablet (1,000 mg total) by mouth 2 (two) times daily with meals.    pen needle, diabetic (BD ULTRA-FINE KELECHI PEN NEEDLE) 32 gauge x 5/32" Ndle VIA DEVICE USE EVERY MORNING    polyethylene glycol (GOLYTELY) 236-22.74-6.74 -5.86 gram suspension Take 4,000 mLs by mouth once.    predniSONE (DELTASONE) 5 MG tablet Take 1 tablet (5 mg total) by mouth 2 (two) times daily.    tobramycin sulfate 0.3% (TOBREX) 0.3 % ophthalmic solution 1-2 drops topically twice " daily to affected toe(s).    tobramycin sulfate 0.3% (TOBREX) 0.3 % ophthalmic solution 1-2 drops topically twice daily to affected toe(s).    triamcinolone acetonide 0.1% (KENALOG) 0.1 % cream Apply topically 2 (two) times daily.   Last reviewed on 9/20/2022 11:06 AM by Elicia José MA    Review of patient's allergies indicates:   Allergen Reactions    Hay fever and allergy relief     Last reviewed on  10/18/2022 7:18 AM by Mateus Velasquez      Tasks added this encounter   11/15/2022 - Refill Call (Auto Added)   Tasks due within next 3 months   12/14/2022 - Clinical - Follow Up Assesement (180 day)     Chapin Lyle, PharmD  [facility]  [address] [place] [id number]  Phone: [phone]  Fax: [phone]

## 2022-10-21 PROCEDURE — 77290 THER RAD SIMULAJ FIELD CPLX: CPT | Mod: 26,,, | Performed by: STUDENT IN AN ORGANIZED HEALTH CARE EDUCATION/TRAINING PROGRAM

## 2022-10-21 PROCEDURE — 77014 HC CT GUIDANCE RADIATION THERAPY FLDS PLACEMENT: CPT | Mod: TC | Performed by: STUDENT IN AN ORGANIZED HEALTH CARE EDUCATION/TRAINING PROGRAM

## 2022-10-21 PROCEDURE — 77290 PR  SET RADN THERAPY FIELD COMPLEX: ICD-10-PCS | Mod: 26,,, | Performed by: STUDENT IN AN ORGANIZED HEALTH CARE EDUCATION/TRAINING PROGRAM

## 2022-10-21 PROCEDURE — 77334 RADIATION TREATMENT AID(S): CPT | Mod: 26,,, | Performed by: STUDENT IN AN ORGANIZED HEALTH CARE EDUCATION/TRAINING PROGRAM

## 2022-10-21 PROCEDURE — 77334 PR  RADN TREATMENT AID(S) COMPLX: ICD-10-PCS | Mod: 26,,, | Performed by: STUDENT IN AN ORGANIZED HEALTH CARE EDUCATION/TRAINING PROGRAM

## 2022-10-21 PROCEDURE — 77334 RADIATION TREATMENT AID(S): CPT | Mod: TC | Performed by: STUDENT IN AN ORGANIZED HEALTH CARE EDUCATION/TRAINING PROGRAM

## 2022-10-21 PROCEDURE — 77290 THER RAD SIMULAJ FIELD CPLX: CPT | Mod: TC | Performed by: STUDENT IN AN ORGANIZED HEALTH CARE EDUCATION/TRAINING PROGRAM

## 2022-10-24 ENCOUNTER — LAB VISIT (OUTPATIENT)
Dept: LAB | Facility: HOSPITAL | Age: 77
End: 2022-10-24
Payer: MEDICARE

## 2022-10-24 ENCOUNTER — TELEPHONE (OUTPATIENT)
Dept: HEMATOLOGY/ONCOLOGY | Facility: CLINIC | Age: 77
End: 2022-10-24
Payer: MEDICARE

## 2022-10-24 DIAGNOSIS — Z85.46 HISTORY OF PROSTATE CANCER: ICD-10-CM

## 2022-10-24 LAB
ALBUMIN SERPL BCP-MCNC: 3.6 G/DL (ref 3.5–5.2)
ALP SERPL-CCNC: 52 U/L (ref 55–135)
ALT SERPL W/O P-5'-P-CCNC: 20 U/L (ref 10–44)
ANION GAP SERPL CALC-SCNC: 9 MMOL/L (ref 8–16)
AST SERPL-CCNC: 15 U/L (ref 10–40)
BILIRUB SERPL-MCNC: 0.8 MG/DL (ref 0.1–1)
BUN SERPL-MCNC: 16 MG/DL (ref 8–23)
CALCIUM SERPL-MCNC: 9.3 MG/DL (ref 8.7–10.5)
CHLORIDE SERPL-SCNC: 104 MMOL/L (ref 95–110)
CO2 SERPL-SCNC: 25 MMOL/L (ref 23–29)
COMPLEXED PSA SERPL-MCNC: <0.01 NG/ML (ref 0–4)
CREAT SERPL-MCNC: 1.3 MG/DL (ref 0.5–1.4)
ERYTHROCYTE [DISTWIDTH] IN BLOOD BY AUTOMATED COUNT: 14.2 % (ref 11.5–14.5)
EST. GFR  (NO RACE VARIABLE): 56.9 ML/MIN/1.73 M^2
GLUCOSE SERPL-MCNC: 391 MG/DL (ref 70–110)
HCT VFR BLD AUTO: 37.8 % (ref 40–54)
HGB BLD-MCNC: 12.8 G/DL (ref 14–18)
IMM GRANULOCYTES # BLD AUTO: 0.02 K/UL (ref 0–0.04)
MCH RBC QN AUTO: 32 PG (ref 27–31)
MCHC RBC AUTO-ENTMCNC: 33.9 G/DL (ref 32–36)
MCV RBC AUTO: 95 FL (ref 82–98)
NEUTROPHILS # BLD AUTO: 1.7 K/UL (ref 1.8–7.7)
PLATELET # BLD AUTO: 121 K/UL (ref 150–450)
PMV BLD AUTO: 11.3 FL (ref 9.2–12.9)
POTASSIUM SERPL-SCNC: 4.5 MMOL/L (ref 3.5–5.1)
PROT SERPL-MCNC: 6.2 G/DL (ref 6–8.4)
RBC # BLD AUTO: 4 M/UL (ref 4.6–6.2)
SODIUM SERPL-SCNC: 138 MMOL/L (ref 136–145)
WBC # BLD AUTO: 3.51 K/UL (ref 3.9–12.7)

## 2022-10-24 PROCEDURE — 85027 COMPLETE CBC AUTOMATED: CPT | Performed by: REGISTERED NURSE

## 2022-10-24 PROCEDURE — 84153 ASSAY OF PSA TOTAL: CPT | Performed by: REGISTERED NURSE

## 2022-10-24 PROCEDURE — 80053 COMPREHEN METABOLIC PANEL: CPT | Performed by: REGISTERED NURSE

## 2022-10-24 PROCEDURE — 36415 COLL VENOUS BLD VENIPUNCTURE: CPT | Performed by: REGISTERED NURSE

## 2022-10-25 ENCOUNTER — OFFICE VISIT (OUTPATIENT)
Dept: HEMATOLOGY/ONCOLOGY | Facility: CLINIC | Age: 77
End: 2022-10-25
Payer: MEDICARE

## 2022-10-25 VITALS
SYSTOLIC BLOOD PRESSURE: 148 MMHG | WEIGHT: 205.56 LBS | BODY MASS INDEX: 27.24 KG/M2 | OXYGEN SATURATION: 96 % | HEIGHT: 73 IN | RESPIRATION RATE: 19 BRPM | DIASTOLIC BLOOD PRESSURE: 72 MMHG | HEART RATE: 70 BPM | TEMPERATURE: 98 F

## 2022-10-25 DIAGNOSIS — C61 PROSTATE CANCER: ICD-10-CM

## 2022-10-25 DIAGNOSIS — Z86.711 HISTORY OF PULMONARY EMBOLISM: ICD-10-CM

## 2022-10-25 DIAGNOSIS — E11.40 CONTROLLED TYPE 2 DIABETES MELLITUS WITH DIABETIC NEUROPATHY, WITHOUT LONG-TERM CURRENT USE OF INSULIN: ICD-10-CM

## 2022-10-25 DIAGNOSIS — E11.22 CKD STAGE 3 SECONDARY TO DIABETES: ICD-10-CM

## 2022-10-25 DIAGNOSIS — N18.30 CKD STAGE 3 SECONDARY TO DIABETES: ICD-10-CM

## 2022-10-25 DIAGNOSIS — Z85.038 HISTORY OF COLON CANCER: Primary | ICD-10-CM

## 2022-10-25 DIAGNOSIS — I10 HYPERTENSION, UNSPECIFIED TYPE: ICD-10-CM

## 2022-10-25 PROCEDURE — 1159F MED LIST DOCD IN RCRD: CPT | Mod: CPTII,S$GLB,, | Performed by: INTERNAL MEDICINE

## 2022-10-25 PROCEDURE — 99499 RISK ADDL DX/OHS AUDIT: ICD-10-PCS | Mod: HCNC,S$GLB,, | Performed by: INTERNAL MEDICINE

## 2022-10-25 PROCEDURE — 3078F DIAST BP <80 MM HG: CPT | Mod: CPTII,S$GLB,, | Performed by: INTERNAL MEDICINE

## 2022-10-25 PROCEDURE — 99215 PR OFFICE/OUTPT VISIT, EST, LEVL V, 40-54 MIN: ICD-10-PCS | Mod: S$GLB,,, | Performed by: INTERNAL MEDICINE

## 2022-10-25 PROCEDURE — 1160F PR REVIEW ALL MEDS BY PRESCRIBER/CLIN PHARMACIST DOCUMENTED: ICD-10-PCS | Mod: CPTII,S$GLB,, | Performed by: INTERNAL MEDICINE

## 2022-10-25 PROCEDURE — 1101F PR PT FALLS ASSESS DOC 0-1 FALLS W/OUT INJ PAST YR: ICD-10-PCS | Mod: CPTII,S$GLB,, | Performed by: INTERNAL MEDICINE

## 2022-10-25 PROCEDURE — 1159F PR MEDICATION LIST DOCUMENTED IN MEDICAL RECORD: ICD-10-PCS | Mod: CPTII,S$GLB,, | Performed by: INTERNAL MEDICINE

## 2022-10-25 PROCEDURE — 3072F LOW RISK FOR RETINOPATHY: CPT | Mod: CPTII,S$GLB,, | Performed by: INTERNAL MEDICINE

## 2022-10-25 PROCEDURE — 3288F PR FALLS RISK ASSESSMENT DOCUMENTED: ICD-10-PCS | Mod: CPTII,S$GLB,, | Performed by: INTERNAL MEDICINE

## 2022-10-25 PROCEDURE — 99999 PR PBB SHADOW E&M-EST. PATIENT-LVL V: ICD-10-PCS | Mod: PBBFAC,,, | Performed by: INTERNAL MEDICINE

## 2022-10-25 PROCEDURE — 1160F RVW MEDS BY RX/DR IN RCRD: CPT | Mod: CPTII,S$GLB,, | Performed by: INTERNAL MEDICINE

## 2022-10-25 PROCEDURE — 1126F PR PAIN SEVERITY QUANTIFIED, NO PAIN PRESENT: ICD-10-PCS | Mod: CPTII,S$GLB,, | Performed by: INTERNAL MEDICINE

## 2022-10-25 PROCEDURE — 3077F SYST BP >= 140 MM HG: CPT | Mod: CPTII,S$GLB,, | Performed by: INTERNAL MEDICINE

## 2022-10-25 PROCEDURE — 3072F PR LOW RISK FOR RETINOPATHY: ICD-10-PCS | Mod: CPTII,S$GLB,, | Performed by: INTERNAL MEDICINE

## 2022-10-25 PROCEDURE — 3288F FALL RISK ASSESSMENT DOCD: CPT | Mod: CPTII,S$GLB,, | Performed by: INTERNAL MEDICINE

## 2022-10-25 PROCEDURE — 99215 OFFICE O/P EST HI 40 MIN: CPT | Mod: S$GLB,,, | Performed by: INTERNAL MEDICINE

## 2022-10-25 PROCEDURE — 3078F PR MOST RECENT DIASTOLIC BLOOD PRESSURE < 80 MM HG: ICD-10-PCS | Mod: CPTII,S$GLB,, | Performed by: INTERNAL MEDICINE

## 2022-10-25 PROCEDURE — 99999 PR PBB SHADOW E&M-EST. PATIENT-LVL V: CPT | Mod: PBBFAC,,, | Performed by: INTERNAL MEDICINE

## 2022-10-25 PROCEDURE — 99499 UNLISTED E&M SERVICE: CPT | Mod: HCNC,S$GLB,, | Performed by: INTERNAL MEDICINE

## 2022-10-25 PROCEDURE — 1101F PT FALLS ASSESS-DOCD LE1/YR: CPT | Mod: CPTII,S$GLB,, | Performed by: INTERNAL MEDICINE

## 2022-10-25 PROCEDURE — 1126F AMNT PAIN NOTED NONE PRSNT: CPT | Mod: CPTII,S$GLB,, | Performed by: INTERNAL MEDICINE

## 2022-10-25 PROCEDURE — 3077F PR MOST RECENT SYSTOLIC BLOOD PRESSURE >= 140 MM HG: ICD-10-PCS | Mod: CPTII,S$GLB,, | Performed by: INTERNAL MEDICINE
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
## 2022-10-27 ENCOUNTER — TELEPHONE (OUTPATIENT)
Dept: HEMATOLOGY/ONCOLOGY | Facility: CLINIC | Age: 77
End: 2022-10-27
Payer: MEDICARE

## 2022-11-01 ENCOUNTER — HOSPITAL ENCOUNTER (OUTPATIENT)
Dept: RADIATION THERAPY | Facility: HOSPITAL | Age: 77
Discharge: HOME | End: 2022-11-01
Attending: STUDENT IN AN ORGANIZED HEALTH CARE EDUCATION/TRAINING PROGRAM
Payer: MEDICARE

## 2022-11-08 DIAGNOSIS — Z85.46 HISTORY OF PROSTATE CANCER: ICD-10-CM

## 2022-11-08 RX ORDER — PREDNISONE 5 MG/1
5 TABLET ORAL 2 TIMES DAILY
Qty: 60 TABLET | Refills: 1 | Status: SHIPPED | OUTPATIENT
Start: 2022-11-08 | End: 2022-11-30 | Stop reason: SDUPTHER

## 2022-11-08 RX ORDER — PREDNISONE 5 MG/1
5 TABLET ORAL 2 TIMES DAILY
Qty: 60 TABLET | Refills: 1 | Status: SHIPPED | OUTPATIENT
Start: 2022-11-08 | End: 2022-11-08 | Stop reason: SDUPTHER

## 2022-11-08 NOTE — TELEPHONE ENCOUNTER
----- Message from Elyse Manning sent at 11/8/2022  9:46 AM CST -----  .Type: RX Refill Request    Who Called: self    Have you contacted your pharmacy:    Refill or New Rx:refill    RX Name and Strength:predniSONE (DELTASONE) 5 MG tablet    Preferred Pharmacy with phone number:.  [id number] - [place] [address] [address] [facility] [address] [place] [address] [address] [address]  Phone: [phone] Fax: [phone]          Local or Mail Order:local    Would the patient rather a call back or a response via [facility]? call    Best Call Back Number:.[phone] (home)

## 2022-11-09 ENCOUNTER — OFFICE VISIT (OUTPATIENT)
Dept: INTERNAL MEDICINE | Facility: CLINIC | Age: 77
End: 2022-11-09
Attending: INTERNAL MEDICINE
Payer: MEDICARE

## 2022-11-09 VITALS
BODY MASS INDEX: 27.34 KG/M2 | OXYGEN SATURATION: 98 % | SYSTOLIC BLOOD PRESSURE: 142 MMHG | HEART RATE: 83 BPM | WEIGHT: 207.25 LBS | DIASTOLIC BLOOD PRESSURE: 90 MMHG

## 2022-11-09 DIAGNOSIS — L89.312 PRESSURE INJURY OF RIGHT BUTTOCK, STAGE 2: ICD-10-CM

## 2022-11-09 DIAGNOSIS — I10 ESSENTIAL HYPERTENSION: Primary | ICD-10-CM

## 2022-11-09 PROCEDURE — 1160F RVW MEDS BY RX/DR IN RCRD: CPT | Mod: CPTII,S$GLB,, | Performed by: INTERNAL MEDICINE

## 2022-11-09 PROCEDURE — 1125F AMNT PAIN NOTED PAIN PRSNT: CPT | Mod: CPTII,S$GLB,, | Performed by: INTERNAL MEDICINE

## 2022-11-09 PROCEDURE — 1101F PT FALLS ASSESS-DOCD LE1/YR: CPT | Mod: CPTII,S$GLB,, | Performed by: INTERNAL MEDICINE

## 2022-11-09 PROCEDURE — 3077F PR MOST RECENT SYSTOLIC BLOOD PRESSURE >= 140 MM HG: ICD-10-PCS | Mod: CPTII,S$GLB,, | Performed by: INTERNAL MEDICINE

## 2022-11-09 PROCEDURE — 3077F SYST BP >= 140 MM HG: CPT | Mod: CPTII,S$GLB,, | Performed by: INTERNAL MEDICINE

## 2022-11-09 PROCEDURE — 99499 UNLISTED E&M SERVICE: CPT | Mod: HCNC,S$GLB,, | Performed by: INTERNAL MEDICINE

## 2022-11-09 PROCEDURE — 99999 PR PBB SHADOW E&M-EST. PATIENT-LVL V: CPT | Mod: PBBFAC,,, | Performed by: INTERNAL MEDICINE

## 2022-11-09 PROCEDURE — 1101F PR PT FALLS ASSESS DOC 0-1 FALLS W/OUT INJ PAST YR: ICD-10-PCS | Mod: CPTII,S$GLB,, | Performed by: INTERNAL MEDICINE

## 2022-11-09 PROCEDURE — 1159F PR MEDICATION LIST DOCUMENTED IN MEDICAL RECORD: ICD-10-PCS | Mod: CPTII,S$GLB,, | Performed by: INTERNAL MEDICINE

## 2022-11-09 PROCEDURE — 3080F PR MOST RECENT DIASTOLIC BLOOD PRESSURE >= 90 MM HG: ICD-10-PCS | Mod: CPTII,S$GLB,, | Performed by: INTERNAL MEDICINE

## 2022-11-09 PROCEDURE — 1159F MED LIST DOCD IN RCRD: CPT | Mod: CPTII,S$GLB,, | Performed by: INTERNAL MEDICINE

## 2022-11-09 PROCEDURE — 1125F PR PAIN SEVERITY QUANTIFIED, PAIN PRESENT: ICD-10-PCS | Mod: CPTII,S$GLB,, | Performed by: INTERNAL MEDICINE

## 2022-11-09 PROCEDURE — 3080F DIAST BP >= 90 MM HG: CPT | Mod: CPTII,S$GLB,, | Performed by: INTERNAL MEDICINE

## 2022-11-09 PROCEDURE — 3288F PR FALLS RISK ASSESSMENT DOCUMENTED: ICD-10-PCS | Mod: CPTII,S$GLB,, | Performed by: INTERNAL MEDICINE

## 2022-11-09 PROCEDURE — 3072F LOW RISK FOR RETINOPATHY: CPT | Mod: CPTII,S$GLB,, | Performed by: INTERNAL MEDICINE

## 2022-11-09 PROCEDURE — 99214 PR OFFICE/OUTPT VISIT, EST, LEVL IV, 30-39 MIN: ICD-10-PCS | Mod: S$GLB,,, | Performed by: INTERNAL MEDICINE

## 2022-11-09 PROCEDURE — 1160F PR REVIEW ALL MEDS BY PRESCRIBER/CLIN PHARMACIST DOCUMENTED: ICD-10-PCS | Mod: CPTII,S$GLB,, | Performed by: INTERNAL MEDICINE

## 2022-11-09 PROCEDURE — 3072F PR LOW RISK FOR RETINOPATHY: ICD-10-PCS | Mod: CPTII,S$GLB,, | Performed by: INTERNAL MEDICINE

## 2022-11-09 PROCEDURE — 3288F FALL RISK ASSESSMENT DOCD: CPT | Mod: CPTII,S$GLB,, | Performed by: INTERNAL MEDICINE

## 2022-11-09 PROCEDURE — 99999 PR PBB SHADOW E&M-EST. PATIENT-LVL V: ICD-10-PCS | Mod: PBBFAC,,, | Performed by: INTERNAL MEDICINE

## 2022-11-09 PROCEDURE — 99214 OFFICE O/P EST MOD 30 MIN: CPT | Mod: S$GLB,,, | Performed by: INTERNAL MEDICINE

## 2022-11-09 NOTE — PROGRESS NOTES
Subjective:   Patient ID: Misha Eldridge Jr. is a 77 y.o. male  Chief complaint:   Chief Complaint   Patient presents with    Lesion     On buttock; noticed on Wednesday       HPI  Here for  appt for skin sore on right buttocks     Area at top - right buttocks  No Drainage   Started 1 week ago   No trauma or rash that he recalls   Spends a lot of time sitting/watching TV  No surrounding Redness that he can tell  No Inc warmth   First episode   No fevers or chills     HTN:  Due for meds      Review of Systems    Objective:  Vitals:    11/09/22 1325   BP: (!) 142/90   BP Location: Left arm   Patient Position: Sitting   Pulse: 83   SpO2: 98%   Weight: 94 kg (207 lb 3.7 oz)     Body mass index is 27.34 kg/m².    Physical Exam  Constitutional:       General: He is not in acute distress.     Appearance: He is well-developed. He is not diaphoretic.   Eyes:      General:         Right eye: No discharge.         Left eye: No discharge.      Conjunctiva/sclera: Conjunctivae normal.   Pulmonary:      Effort: Pulmonary effort is normal. No respiratory distress.   Skin:     General: Skin is warm.      Findings: No erythema.      Comments: Clean based ulcer at right superior buttocks with no surrounding erythema or fluctuance or drainage    Neurological:      Mental Status: He is alert and oriented to person, place, and time.   Psychiatric:         Behavior: Behavior normal.         Thought Content: Thought content normal.         Judgment: Judgment normal.       Assessment:  1. Essential hypertension    2. Pressure injury of right buttock, stage 2        Plan:  Misha was seen today for lesion.    Diagnoses and all orders for this visit:    Essential hypertension  Above goal   Due for meds   F/u with pcp in a few weeks for check are skin and bp follow up   Take meds before appt and bring bottles to appt for review     Pressure injury of right buttock, stage 2  Keep area clean and dry   Avoid long periods of sitting   Reviewed  topicals otc   No si/sx of infection - Counseled on si/sx of this and to f/u with me or pcp asap if worsens or occurs     Health Maintenance   Topic Date Due    Hemoglobin A1c  10/12/2022    Eye Exam  11/10/2022    Lipid Panel  04/12/2023    TETANUS VACCINE  12/05/2027    Colonoscopy  06/02/2032    Hepatitis C Screening  Completed

## 2022-11-11 PROCEDURE — 77301 RADIOTHERAPY DOSE PLAN IMRT: CPT | Mod: 26,,, | Performed by: STUDENT IN AN ORGANIZED HEALTH CARE EDUCATION/TRAINING PROGRAM

## 2022-11-11 PROCEDURE — 77301 RADIOTHERAPY DOSE PLAN IMRT: CPT | Mod: TC | Performed by: STUDENT IN AN ORGANIZED HEALTH CARE EDUCATION/TRAINING PROGRAM

## 2022-11-11 PROCEDURE — 77301 PR  INTEN MOD RADIOTHER PLAN W/DOSE VOL HIST: ICD-10-PCS | Mod: 26,,, | Performed by: STUDENT IN AN ORGANIZED HEALTH CARE EDUCATION/TRAINING PROGRAM

## 2022-11-12 PROCEDURE — 77300 RADIATION THERAPY DOSE PLAN: CPT | Mod: TC | Performed by: STUDENT IN AN ORGANIZED HEALTH CARE EDUCATION/TRAINING PROGRAM

## 2022-11-12 PROCEDURE — 77300 RADIATION THERAPY DOSE PLAN: CPT | Mod: 26,,, | Performed by: STUDENT IN AN ORGANIZED HEALTH CARE EDUCATION/TRAINING PROGRAM

## 2022-11-12 PROCEDURE — 77338 DESIGN MLC DEVICE FOR IMRT: CPT | Mod: TC | Performed by: STUDENT IN AN ORGANIZED HEALTH CARE EDUCATION/TRAINING PROGRAM

## 2022-11-12 PROCEDURE — 77300 PR RADIATION THERAPY,DOSIMETRY PLAN: ICD-10-PCS | Mod: 26,,, | Performed by: STUDENT IN AN ORGANIZED HEALTH CARE EDUCATION/TRAINING PROGRAM

## 2022-11-12 PROCEDURE — 77338 PR  MLC IMRT DESIGN & CONSTRUCTION PER IMRT PLAN: ICD-10-PCS | Mod: 26,,, | Performed by: STUDENT IN AN ORGANIZED HEALTH CARE EDUCATION/TRAINING PROGRAM

## 2022-11-12 PROCEDURE — 77338 DESIGN MLC DEVICE FOR IMRT: CPT | Mod: 26,,, | Performed by: STUDENT IN AN ORGANIZED HEALTH CARE EDUCATION/TRAINING PROGRAM

## 2022-11-14 ENCOUNTER — PATIENT OUTREACH (OUTPATIENT)
Dept: ADMINISTRATIVE | Facility: HOSPITAL | Age: 77
End: 2022-11-14
Payer: MEDICARE

## 2022-11-14 DIAGNOSIS — E11.9 DIABETES MELLITUS WITHOUT COMPLICATION: Primary | ICD-10-CM

## 2022-11-16 ENCOUNTER — CLINICAL SUPPORT (OUTPATIENT)
Dept: REHABILITATION | Facility: OTHER | Age: 77
End: 2022-11-16
Attending: UROLOGY
Payer: MEDICARE

## 2022-11-16 DIAGNOSIS — R39.15 URINARY URGENCY: ICD-10-CM

## 2022-11-16 DIAGNOSIS — C61 PROSTATE CANCER: ICD-10-CM

## 2022-11-16 DIAGNOSIS — M62.89 WEAKNESS OF PELVIC FLOOR IN MALE: ICD-10-CM

## 2022-11-16 PROCEDURE — 97112 NEUROMUSCULAR REEDUCATION: CPT

## 2022-11-16 PROCEDURE — 97162 PT EVAL MOD COMPLEX 30 MIN: CPT

## 2022-11-16 NOTE — PATIENT INSTRUCTIONS
Pelvic Floor Muscle Exercise:    Squeeze your pelvic floor muscles like you are lifting the penis or stopping the flow of urine. Relax completely after each squeeze. Do 3 sets of 10/day.  Do the exercises in standing. If you can, for the first few days observe yourself in the mirror and you will be able to see some elevation of the penis if you are doing correctly.

## 2022-11-18 DIAGNOSIS — Z85.46 HISTORY OF PROSTATE CANCER: ICD-10-CM

## 2022-11-18 PROBLEM — M62.89 WEAKNESS OF PELVIC FLOOR IN MALE: Status: ACTIVE | Noted: 2022-11-18

## 2022-11-18 RX ORDER — PREDNISONE 5 MG/1
5 TABLET ORAL 2 TIMES DAILY
Qty: 60 TABLET | Refills: 1 | Status: SHIPPED | OUTPATIENT
Start: 2022-11-18 | End: 2023-02-13 | Stop reason: SDUPTHER

## 2022-11-18 NOTE — PLAN OF CARE
[facility]  Pelvic Health Physical Therapy Initial Evaluation    Date: 11/16/2022   Name: Misha Eldridge Jr.  Clinic Number: [id number]  Therapy Diagnosis: No diagnosis found.  Physician: Duarte Ravi MD    Physician Orders: PT Eval and Treat  Medical Diagnosis from Referral:   C61 (ICD-10-CM) - Prostate cancer   R39.15 (ICD-10-CM) - Urinary urgency   Evaluation Date: 11/16/2022  Authorization Period Expiration: 1 visit  Plan of Care Expiration: 2/14/23  Visit # / Visits authorized: 1/ 1    Time In: 112 (pt arrived late)  Time Out: 200  Total Appointment Time (timed & untimed codes): 48 minutes    Precautions: universal    Subjective     Date of onset: 10 yrs ago, recent exacerbation with return of CA    History of current condition - Misha reports: Has no idea why he is here, was only told it was for rehab. Had prostate removed 10 years ago and it is now recurring. They are doing radiation and sent him here. Has minor leakage, 2x/week, that started since taking the pills for radiation. Urinary frequency is normal, denies nocturia or urgency. Bowel function constipation dominant. Not sexually active.    Surgical History: prostatectomy 10 years ago  Sexually active currently?  No  Sexually active prior to surgery?  No    Bladder/Bowel History:   Frequency of urination:   Daytime: can wait 3-5 hours in between voids           Nighttime: 0  Difficulty initiating urine stream: No  Urine stream: strong  Complete emptying: Yes  Bladder leakage: Yes  Frequency of incidents/Type of incontinence: 2x/week, usually happens when sleeping  Amount leaked (urine): large squirt  Urinary Urgency: No  Frequency of bowel movements: once every 1-2 days  Difficulty initiating BM: Yes    Pain:  Location: all over his body, severe, limits function     Medical History: Misha  has a past medical history of Cataract, Colostomy in place, Diabetes mellitus type II, Hemorrhoid, History of colon cancer, History of prostate  cancer, History of pulmonary embolism, and Hypertension.     Surgical History: Misha Eldridge Jr.  has a past surgical history that includes Subtotal colectomy; Prostatectomy; Hernia repair; Cataract extraction (Bilateral); Eye surgery (Bilateral); and Colonoscopy (N/A, 6/2/2022).    Medications: Misha has a current medication list which includes the following prescription(s): abiraterone, accu-chek softclix lancets, amlodipine, atorvastatin, blood sugar diagnostic, blood sugar diagnostic, blood sugar diagnostic, blood-glucose meter, boostrix tdap, carvedilol, diazepam, diclofenac, doxazosin, gabapentin, glipizide, hydrochlorothiazide, lantus solostar u-100 insulin, irbesartan, lancing device with lancets, metformin, pen needle, diabetic, polyethylene glycol, prednisone, tobramycin sulfate 0.3%, tobramycin sulfate 0.3%, and triamcinolone acetonide 0.1%, and the following Facility-Administered Medications: lidocaine hcl 20 mg/ml (2%).    Allergies:   Review of patient's allergies indicates:   Allergen Reactions    Hay fever and allergy relief       Prior Therapy/Previous treatment included: none  Social History:  lives alone, has 14 stairs  Current exercise:  none  Occupation: retired  Prior Level of Function: fully continent  Current Level of Function: increased incontinence causing risk of skin breakdown    Types of fluid intake: water and coffee  Diet: TBA     Abuse/Neglect: No     Pts goals: decreased leakage    OBJECTIVE     See EMR under MEDIA for written consent provided 11/16/2022  Chaperone: Declined    ORTHO SCREEN  Posture in sitting: slouched   Posture in standing: decreased lordosis  Pelvic alignment: no sign of deviations noted in supine     BREATHING MECHANICS ASSESSMENT   Thorax Assessment During Quiet Respiration: Decreased excursion of abdominal wall  and Decreased excursion bilaterally of lateral ribs   Thorax Assessment During Deep Respiration: Decreased excursion of abdominal wall  and Decreased  "excursion bilaterally of lateral ribs     RECTAL PELVIC FLOOR EXAM    EXTERNAL ASSESSMENT  Performed with TPUS, able to perform contraction at all 3 layers pelvic floor mm with best cue being lift the base of the penis. Also observed contractions in supine to confirm correct contraction.    TREATMENT     Treatment Time In: 145  Treatment Time Out: 200  Total Treatment time (time-based codes) separate from Evaluation: 15 minutes    Neuromuscular Re-education to develop Coordination, Control, and Proprioception for 15 minutes including:   rehabilitative ultrasound imaging to help visualize pelvic floor muscle contraction and relaxation with kegels, transperineal assessment - use of variety of cues to facilitate contraction at 3 layers pelvic floor mm with best cue "lift the penis"    Patient Education provided:   general anatomy/physiology of urinary/ bowel  system and benefits of treatment were discussed with the pt. Additionally, anatomy/physiology of pelvic floor and kegels were reviewed.     Home Exercises provided:  Written Home Exercises provided: Yes  Exercises were reviewed and Misha was able to demonstrate them prior to the end of the session.    Misha demonstrated fair  understanding of the education provided.     See EMR under Patient Instructions for exercises provided 11/16/2022.    Assessment     Misha is a 77 y.o. male referred to outpatient Physical Therapy with a medical diagnosis of urinary urgency, prostate cancer. Pt presents with altered posture, poor knowledge of body mechanics and posture, poor trunk stability, decreased pelvic muscle strength, and decreased endurance of the pelvic muscles. Pelvic exam completed externally with transperineal ultrasound and visual observation. Instructed pt in pelvic floor mm exercise and confirmed correct performance with contraction of bulbospongiosus, CHRISTINA and levator ani. Prescribed home exercise and will follow up in one month to ensure progress and correct " performance. His urinary complaints are minor so believe he will do well with greater independence and decreased need for follow up. Will determine ongoing POC based on progress at next session. He does report a lot of pain, notably back pain, which limit function and mobility. Will discuss referral to orthopedic therapy for back pain with his PCP particularly as this could be impacting his ability to make it to bathroom in time.     Pt prognosis is Excellent  Pt will benefit from skilled outpatient Physical Therapy to address the deficits stated above and in the chart below, provide pt/family education, and to maximize pt's level of independence.     Plan of care discussed with patient: Yes  Pt's spiritual, cultural and educational needs considered and patient is agreeable to the plan of care and goals as stated below:     Anticipated Barriers for therapy: None    Medical Necessity is demonstrated by the following:    History  Co-morbidities and personal factors that may impact the plan of care Co-morbidities   advanced age, chronic constipation, and history of cancer    Personal Factors  no deficits     high   Examination  Body structures and functions, activity limitations and participation restrictions that may impact the plan of care Body Regions/Systems/Functions:  altered posture, poor knowledge of body mechanics and posture, poor trunk stability, decreased pelvic muscle strength, and decreased endurance of the pelvic muscles    Activity Limitations:  incontinence with ADLs    Participation Restrictions:  all ADLs/iADLs uninterrupted by urinary incontinence/urgency/frequency    Activity limitations:   Learning and applying knowledge  No deficits    General Tasks and Commands  No deficits    Communication  No deficits    Mobility  No deficits    Self care  No deficits    Domestic Life  No deficits    Interactions/Relationships  No deficits    Life Areas  No deficits    Community and Social Life  No deficits        high   Clinical Presentation evolving clinical presentation with changing clinical characteristics moderate   Decision Making/ Complexity Score: moderate       Goals:  Short Term Goals: 4 weeks   Pt indep in HEP  Pt able to wait at least 2 hours between trips to the bathroom for improved participation in ADLs  Pt demo pelvic floor mm strength at least 3/5 for improved continence and support of pelvic organs    Long Term Goals: 8 weeks   Pt indep in progressive HEP  Pt reports 0 episodes of leakage in at least 2 weeks for improved ADL participation  Pt able to wait at least 2.5-4 hours between trips to the bathroom   Nocturia no more than 1x/night for improved quality of sleep  Pt demo pelvic floor mm strength at least 4/5 for improved continence and support of pelvic organs      Plan     Plan of care Certification: 11/16/2022 to 2/14/23.    Outpatient Physical Therapy 1 times weekly for 12 weeks to include the following interventions: therapeutic exercises, therapeutic activity, neuromuscular re-education, manual therapy, patient/family education and self care/home management    Geneva Tellez, PT

## 2022-11-21 ENCOUNTER — LAB VISIT (OUTPATIENT)
Dept: LAB | Facility: HOSPITAL | Age: 77
End: 2022-11-21
Attending: INTERNAL MEDICINE
Payer: MEDICARE

## 2022-11-21 ENCOUNTER — TELEPHONE (OUTPATIENT)
Dept: INTERNAL MEDICINE | Facility: CLINIC | Age: 77
End: 2022-11-21
Payer: MEDICARE

## 2022-11-21 DIAGNOSIS — E11.9 DIABETES MELLITUS WITHOUT COMPLICATION: ICD-10-CM

## 2022-11-21 DIAGNOSIS — C61 PROSTATE CANCER: ICD-10-CM

## 2022-11-21 LAB
ALBUMIN SERPL BCP-MCNC: 3.5 G/DL (ref 3.5–5.2)
ALP SERPL-CCNC: 51 U/L (ref 55–135)
ALT SERPL W/O P-5'-P-CCNC: 14 U/L (ref 10–44)
ANION GAP SERPL CALC-SCNC: 7 MMOL/L (ref 8–16)
AST SERPL-CCNC: 11 U/L (ref 10–40)
BASOPHILS # BLD AUTO: 0.02 K/UL (ref 0–0.2)
BASOPHILS NFR BLD: 0.5 % (ref 0–1.9)
BILIRUB SERPL-MCNC: 0.5 MG/DL (ref 0.1–1)
BUN SERPL-MCNC: 16 MG/DL (ref 8–23)
CALCIUM SERPL-MCNC: 9.2 MG/DL (ref 8.7–10.5)
CHLORIDE SERPL-SCNC: 106 MMOL/L (ref 95–110)
CO2 SERPL-SCNC: 26 MMOL/L (ref 23–29)
COMPLEXED PSA SERPL-MCNC: <0.01 NG/ML (ref 0–4)
CREAT SERPL-MCNC: 1.3 MG/DL (ref 0.5–1.4)
DIFFERENTIAL METHOD: ABNORMAL
EOSINOPHIL # BLD AUTO: 0.1 K/UL (ref 0–0.5)
EOSINOPHIL NFR BLD: 1.9 % (ref 0–8)
ERYTHROCYTE [DISTWIDTH] IN BLOOD BY AUTOMATED COUNT: 13.9 % (ref 11.5–14.5)
EST. GFR  (NO RACE VARIABLE): 56.9 ML/MIN/1.73 M^2
ESTIMATED AVG GLUCOSE: 335 MG/DL (ref 68–131)
GLUCOSE SERPL-MCNC: 345 MG/DL (ref 70–110)
HBA1C MFR BLD: 13.3 % (ref 4–5.6)
HCT VFR BLD AUTO: 38.7 % (ref 40–54)
HGB BLD-MCNC: 12.9 G/DL (ref 14–18)
IMM GRANULOCYTES # BLD AUTO: 0.03 K/UL (ref 0–0.04)
IMM GRANULOCYTES NFR BLD AUTO: 0.7 % (ref 0–0.5)
LYMPHOCYTES # BLD AUTO: 1.7 K/UL (ref 1–4.8)
LYMPHOCYTES NFR BLD: 42 % (ref 18–48)
MCH RBC QN AUTO: 32 PG (ref 27–31)
MCHC RBC AUTO-ENTMCNC: 33.3 G/DL (ref 32–36)
MCV RBC AUTO: 96 FL (ref 82–98)
MONOCYTES # BLD AUTO: 0.2 K/UL (ref 0.3–1)
MONOCYTES NFR BLD: 5.3 % (ref 4–15)
NEUTROPHILS # BLD AUTO: 2 K/UL (ref 1.8–7.7)
NEUTROPHILS NFR BLD: 49.6 % (ref 38–73)
NRBC BLD-RTO: 0 /100 WBC
PLATELET # BLD AUTO: 115 K/UL (ref 150–450)
PMV BLD AUTO: 11.8 FL (ref 9.2–12.9)
POTASSIUM SERPL-SCNC: 4.2 MMOL/L (ref 3.5–5.1)
PROT SERPL-MCNC: 6.1 G/DL (ref 6–8.4)
RBC # BLD AUTO: 4.03 M/UL (ref 4.6–6.2)
SODIUM SERPL-SCNC: 139 MMOL/L (ref 136–145)
WBC # BLD AUTO: 4.12 K/UL (ref 3.9–12.7)

## 2022-11-21 PROCEDURE — 84153 ASSAY OF PSA TOTAL: CPT | Performed by: INTERNAL MEDICINE

## 2022-11-21 PROCEDURE — 85025 COMPLETE CBC W/AUTO DIFF WBC: CPT | Performed by: INTERNAL MEDICINE

## 2022-11-21 PROCEDURE — 83036 HEMOGLOBIN GLYCOSYLATED A1C: CPT | Performed by: INTERNAL MEDICINE

## 2022-11-21 PROCEDURE — 80053 COMPREHEN METABOLIC PANEL: CPT | Performed by: INTERNAL MEDICINE

## 2022-11-21 PROCEDURE — 36415 COLL VENOUS BLD VENIPUNCTURE: CPT | Performed by: INTERNAL MEDICINE

## 2022-11-21 NOTE — TELEPHONE ENCOUNTER
----- Message from Stephania Umana sent at 11/21/2022  2:37 PM CST -----  .Type: Patient Call Back    Who called: Self    What is the request in detail: Patient stated that the wound on his buttocks isn't improving    Can the clinic reply by [facility]? No    Would the patient rather a call back or a response via [facility]? Call    Best call back number: .[phone] (home)       Additional Information:

## 2022-11-22 ENCOUNTER — SPECIALTY PHARMACY (OUTPATIENT)
Dept: PHARMACY | Facility: CLINIC | Age: 77
End: 2022-11-22
Payer: MEDICARE

## 2022-11-22 ENCOUNTER — TELEPHONE (OUTPATIENT)
Dept: ADMINISTRATIVE | Facility: OTHER | Age: 77
End: 2022-11-22
Payer: MEDICARE

## 2022-11-22 ENCOUNTER — OFFICE VISIT (OUTPATIENT)
Dept: HEMATOLOGY/ONCOLOGY | Facility: CLINIC | Age: 77
End: 2022-11-22
Payer: MEDICARE

## 2022-11-22 ENCOUNTER — TELEPHONE (OUTPATIENT)
Dept: WOUND CARE | Facility: CLINIC | Age: 77
End: 2022-11-22
Payer: MEDICARE

## 2022-11-22 ENCOUNTER — OFFICE VISIT (OUTPATIENT)
Dept: INTERNAL MEDICINE | Facility: CLINIC | Age: 77
End: 2022-11-22
Payer: MEDICARE

## 2022-11-22 VITALS
HEIGHT: 73 IN | DIASTOLIC BLOOD PRESSURE: 72 MMHG | SYSTOLIC BLOOD PRESSURE: 130 MMHG | BODY MASS INDEX: 27.43 KG/M2 | WEIGHT: 207 LBS | OXYGEN SATURATION: 98 % | HEART RATE: 64 BPM

## 2022-11-22 VITALS
HEIGHT: 73 IN | BODY MASS INDEX: 27.35 KG/M2 | DIASTOLIC BLOOD PRESSURE: 82 MMHG | SYSTOLIC BLOOD PRESSURE: 156 MMHG | OXYGEN SATURATION: 96 % | HEART RATE: 68 BPM | RESPIRATION RATE: 18 BRPM | WEIGHT: 206.38 LBS

## 2022-11-22 DIAGNOSIS — C61 PROSTATE CANCER: Primary | ICD-10-CM

## 2022-11-22 DIAGNOSIS — E11.22 CKD STAGE 3 SECONDARY TO DIABETES: ICD-10-CM

## 2022-11-22 DIAGNOSIS — Z86.711 HISTORY OF PULMONARY EMBOLISM: ICD-10-CM

## 2022-11-22 DIAGNOSIS — Z85.038 HISTORY OF COLON CANCER: ICD-10-CM

## 2022-11-22 DIAGNOSIS — L89.312 PRESSURE INJURY OF RIGHT BUTTOCK, STAGE 2: Primary | ICD-10-CM

## 2022-11-22 DIAGNOSIS — E11.40 CONTROLLED TYPE 2 DIABETES MELLITUS WITH DIABETIC NEUROPATHY, WITHOUT LONG-TERM CURRENT USE OF INSULIN: ICD-10-CM

## 2022-11-22 DIAGNOSIS — N18.30 CKD STAGE 3 SECONDARY TO DIABETES: ICD-10-CM

## 2022-11-22 DIAGNOSIS — I10 PRIMARY HYPERTENSION: ICD-10-CM

## 2022-11-22 PROCEDURE — 1160F PR REVIEW ALL MEDS BY PRESCRIBER/CLIN PHARMACIST DOCUMENTED: ICD-10-PCS | Mod: CPTII,S$GLB,, | Performed by: PHYSICIAN ASSISTANT

## 2022-11-22 PROCEDURE — 1101F PT FALLS ASSESS-DOCD LE1/YR: CPT | Mod: CPTII,S$GLB,, | Performed by: PHYSICIAN ASSISTANT

## 2022-11-22 PROCEDURE — 1101F PR PT FALLS ASSESS DOC 0-1 FALLS W/OUT INJ PAST YR: ICD-10-PCS | Mod: CPTII,S$GLB,, | Performed by: PHYSICIAN ASSISTANT

## 2022-11-22 PROCEDURE — 3288F PR FALLS RISK ASSESSMENT DOCUMENTED: ICD-10-PCS | Mod: CPTII,S$GLB,, | Performed by: REGISTERED NURSE

## 2022-11-22 PROCEDURE — 1160F RVW MEDS BY RX/DR IN RCRD: CPT | Mod: CPTII,S$GLB,, | Performed by: PHYSICIAN ASSISTANT

## 2022-11-22 PROCEDURE — 1159F PR MEDICATION LIST DOCUMENTED IN MEDICAL RECORD: ICD-10-PCS | Mod: CPTII,S$GLB,, | Performed by: REGISTERED NURSE

## 2022-11-22 PROCEDURE — 99499 UNLISTED E&M SERVICE: CPT | Mod: HCNC,S$GLB,, | Performed by: PHYSICIAN ASSISTANT

## 2022-11-22 PROCEDURE — 3288F FALL RISK ASSESSMENT DOCD: CPT | Mod: CPTII,S$GLB,, | Performed by: PHYSICIAN ASSISTANT

## 2022-11-22 PROCEDURE — 99213 PR OFFICE/OUTPT VISIT, EST, LEVL III, 20-29 MIN: ICD-10-PCS | Mod: S$GLB,,, | Performed by: PHYSICIAN ASSISTANT

## 2022-11-22 PROCEDURE — 99999 PR PBB SHADOW E&M-EST. PATIENT-LVL III: CPT | Mod: PBBFAC,,, | Performed by: PHYSICIAN ASSISTANT

## 2022-11-22 PROCEDURE — 99213 OFFICE O/P EST LOW 20 MIN: CPT | Mod: S$GLB,,, | Performed by: PHYSICIAN ASSISTANT

## 2022-11-22 PROCEDURE — 1160F PR REVIEW ALL MEDS BY PRESCRIBER/CLIN PHARMACIST DOCUMENTED: ICD-10-PCS | Mod: CPTII,S$GLB,, | Performed by: REGISTERED NURSE

## 2022-11-22 PROCEDURE — 3072F LOW RISK FOR RETINOPATHY: CPT | Mod: CPTII,S$GLB,, | Performed by: REGISTERED NURSE

## 2022-11-22 PROCEDURE — 3072F LOW RISK FOR RETINOPATHY: CPT | Mod: CPTII,S$GLB,, | Performed by: PHYSICIAN ASSISTANT

## 2022-11-22 PROCEDURE — 99999 PR PBB SHADOW E&M-EST. PATIENT-LVL III: CPT | Mod: PBBFAC,,, | Performed by: REGISTERED NURSE

## 2022-11-22 PROCEDURE — 1125F AMNT PAIN NOTED PAIN PRSNT: CPT | Mod: CPTII,S$GLB,, | Performed by: REGISTERED NURSE

## 2022-11-22 PROCEDURE — 3075F SYST BP GE 130 - 139MM HG: CPT | Mod: CPTII,S$GLB,, | Performed by: PHYSICIAN ASSISTANT

## 2022-11-22 PROCEDURE — 99215 PR OFFICE/OUTPT VISIT, EST, LEVL V, 40-54 MIN: ICD-10-PCS | Mod: S$GLB,,, | Performed by: REGISTERED NURSE

## 2022-11-22 PROCEDURE — 3079F PR MOST RECENT DIASTOLIC BLOOD PRESSURE 80-89 MM HG: ICD-10-PCS | Mod: CPTII,S$GLB,, | Performed by: REGISTERED NURSE

## 2022-11-22 PROCEDURE — 99215 OFFICE O/P EST HI 40 MIN: CPT | Mod: S$GLB,,, | Performed by: REGISTERED NURSE

## 2022-11-22 PROCEDURE — 1101F PT FALLS ASSESS-DOCD LE1/YR: CPT | Mod: CPTII,S$GLB,, | Performed by: REGISTERED NURSE

## 2022-11-22 PROCEDURE — 1125F PR PAIN SEVERITY QUANTIFIED, PAIN PRESENT: ICD-10-PCS | Mod: CPTII,S$GLB,, | Performed by: PHYSICIAN ASSISTANT

## 2022-11-22 PROCEDURE — 3072F PR LOW RISK FOR RETINOPATHY: ICD-10-PCS | Mod: CPTII,S$GLB,, | Performed by: REGISTERED NURSE

## 2022-11-22 PROCEDURE — 3075F PR MOST RECENT SYSTOLIC BLOOD PRESS GE 130-139MM HG: ICD-10-PCS | Mod: CPTII,S$GLB,, | Performed by: PHYSICIAN ASSISTANT

## 2022-11-22 PROCEDURE — 3072F PR LOW RISK FOR RETINOPATHY: ICD-10-PCS | Mod: CPTII,S$GLB,, | Performed by: PHYSICIAN ASSISTANT

## 2022-11-22 PROCEDURE — 1125F AMNT PAIN NOTED PAIN PRSNT: CPT | Mod: CPTII,S$GLB,, | Performed by: PHYSICIAN ASSISTANT

## 2022-11-22 PROCEDURE — 3288F PR FALLS RISK ASSESSMENT DOCUMENTED: ICD-10-PCS | Mod: CPTII,S$GLB,, | Performed by: PHYSICIAN ASSISTANT

## 2022-11-22 PROCEDURE — 3079F DIAST BP 80-89 MM HG: CPT | Mod: CPTII,S$GLB,, | Performed by: REGISTERED NURSE

## 2022-11-22 PROCEDURE — 1159F MED LIST DOCD IN RCRD: CPT | Mod: CPTII,S$GLB,, | Performed by: REGISTERED NURSE

## 2022-11-22 PROCEDURE — 1159F PR MEDICATION LIST DOCUMENTED IN MEDICAL RECORD: ICD-10-PCS | Mod: CPTII,S$GLB,, | Performed by: PHYSICIAN ASSISTANT

## 2022-11-22 PROCEDURE — 3078F DIAST BP <80 MM HG: CPT | Mod: CPTII,S$GLB,, | Performed by: PHYSICIAN ASSISTANT

## 2022-11-22 PROCEDURE — 3077F PR MOST RECENT SYSTOLIC BLOOD PRESSURE >= 140 MM HG: ICD-10-PCS | Mod: CPTII,S$GLB,, | Performed by: REGISTERED NURSE

## 2022-11-22 PROCEDURE — 3078F PR MOST RECENT DIASTOLIC BLOOD PRESSURE < 80 MM HG: ICD-10-PCS | Mod: CPTII,S$GLB,, | Performed by: PHYSICIAN ASSISTANT

## 2022-11-22 PROCEDURE — 3077F SYST BP >= 140 MM HG: CPT | Mod: CPTII,S$GLB,, | Performed by: REGISTERED NURSE

## 2022-11-22 PROCEDURE — 1160F RVW MEDS BY RX/DR IN RCRD: CPT | Mod: CPTII,S$GLB,, | Performed by: REGISTERED NURSE

## 2022-11-22 PROCEDURE — 1159F MED LIST DOCD IN RCRD: CPT | Mod: CPTII,S$GLB,, | Performed by: PHYSICIAN ASSISTANT

## 2022-11-22 PROCEDURE — 99999 PR PBB SHADOW E&M-EST. PATIENT-LVL III: ICD-10-PCS | Mod: PBBFAC,,, | Performed by: PHYSICIAN ASSISTANT

## 2022-11-22 PROCEDURE — 3288F FALL RISK ASSESSMENT DOCD: CPT | Mod: CPTII,S$GLB,, | Performed by: REGISTERED NURSE

## 2022-11-22 PROCEDURE — 1125F PR PAIN SEVERITY QUANTIFIED, PAIN PRESENT: ICD-10-PCS | Mod: CPTII,S$GLB,, | Performed by: REGISTERED NURSE

## 2022-11-22 PROCEDURE — 99999 PR PBB SHADOW E&M-EST. PATIENT-LVL III: ICD-10-PCS | Mod: PBBFAC,,, | Performed by: REGISTERED NURSE

## 2022-11-22 PROCEDURE — 1101F PR PT FALLS ASSESS DOC 0-1 FALLS W/OUT INJ PAST YR: ICD-10-PCS | Mod: CPTII,S$GLB,, | Performed by: REGISTERED NURSE

## 2022-11-22 NOTE — PROGRESS NOTES
Please let pt know his DM is through the roof at 13.3%.       HGBA1C                   13.3 (H)            11/21/2022 09:12 AM        HGBA1C                   7.3 (H)             04/12/2022 02:38 PM   See if pt taking meds outo f any and move appt have pt keep BG log to bring to upcoming appt.

## 2022-11-22 NOTE — PROGRESS NOTES
INTERNAL MEDICINE PROGRESS NOTE    CHIEF COMPLAINT     Chief Complaint   Patient presents with    Rectal Pain     Buttocks wound       HPI     Misha Eldridge Jr. is a 77 y.o. male who presents for a follow-up visit today.    PCP is Dipak Treviño MD, patient is new to me.     Patient presents for wound follow-up. He was seen in this clinic by a colleague for this same wound. He was diagnosed at that time with stage 2 pressure injury. No abx felt warranted at this time -no clinic signs of infection. He was provided barrier cream and instruction to stay off of the area as often as possible.     He reports that he has been compliant with recommendations but he is still having pain and reports that the wound is still present. He denies fever, chills, nausea, vomiting, chills, bleeding or discharge from the wound.     Past Medical History:  Past Medical History:   Diagnosis Date    Cataract     Colostomy in place     Diabetes mellitus type II     Hemorrhoid     History of colon cancer     History of prostate cancer     History of pulmonary embolism     Hypertension        Home Medications:  Prior to Admission medications    Medication Sig Start Date End Date Taking? Authorizing Provider   abiraterone (ZYTIGA) 250 mg Tab Take 4 tablets (1,000 mg total) by mouth once daily. 10/17/22 10/17/23 Yes Lindsey Betancourt MD   ACCU-CHEK SOFTCLIX LANCETS Misc TEST BLOOD SUGAR ONCE A DAY 4/13/20  Yes Zurdo Best MD   amLODIPine (NORVASC) 10 MG tablet Take 1 tablet (10 mg total) by mouth once daily. 10/11/22  Yes Dipak Treviño MD   atorvastatin (LIPITOR) 20 MG tablet Take 1 tablet (20 mg total) by mouth once daily. 10/11/22  Yes Dipak Treviño MD   blood sugar diagnostic Strp 1 each by Misc.(Non-Drug; Combo Route) route 2 (two) times daily. 10/30/17  Yes Dipak Treviño MD   blood sugar diagnostic Strp 1 each by Misc.(Non-Drug; Combo Route) route once daily. 3/4/19  Yes Kezia Dennis MD   blood sugar  "diagnostic Strp Pt to check up to 6 times daily 6/6/19  Yes Dipak Treviño MD   BOOSTRIX TDAP 2.5-8-5 Lf-mcg-Lf/0.5mL Syrg injection  12/5/17  Yes Historical Provider   carvediloL (COREG) 3.125 MG tablet Take 1 tablet (3.125 mg total) by mouth 2 (two) times daily. 10/11/22  Yes Dipak Treviño MD   diazePAM (VALIUM) 5 MG tablet Take 1 tablet (5 mg total) by mouth as needed for Anxiety (take 1 tablet 30 minute prior to procedure). 9/20/22  Yes Kenneth Azevedo MD   diclofenac (VOLTAREN) 75 MG EC tablet Take 1 tablet (75 mg total) by mouth 2 (two) times daily. 5/20/22  Yes Dipak Treviño MD   doxazosin (CARDURA) 4 MG tablet TAKE 1 TABLET(4 MG) BY MOUTH EVERY EVENING 7/6/22  Yes Dipak Treviño MD   gabapentin (NEURONTIN) 300 MG capsule Take 1 capsule (300 mg total) by mouth 3 (three) times daily. 10/11/22  Yes Dipak Treviño MD   glipiZIDE (GLUCOTROL) 10 MG tablet Take 1 tablet (10 mg total) by mouth 2 (two) times daily before meals. 10/11/22  Yes Dipak Treviño MD   hydroCHLOROthiazide (HYDRODIURIL) 25 MG tablet TAKE ONE-HALF TABLET BY MOUTH ONCE DAILY 10/11/22  Yes Dipak Treviño MD   insulin (LANTUS SOLOSTAR U-100 INSULIN) glargine 100 units/mL (3mL) SubQ pen Inject 22 Units into the skin every evening. 2/15/22  Yes Dipak Treviño MD   irbesartan (AVAPRO) 300 MG tablet Take 1 tablet (300 mg total) by mouth every evening. 5/20/22  Yes Dipak Treviño MD   metFORMIN (GLUCOPHAGE) 1000 MG tablet Take 1 tablet (1,000 mg total) by mouth 2 (two) times daily with meals. 5/20/22  Yes Dipak Treviño MD   pen needle, diabetic (BD ULTRA-FINE KELECHI PEN NEEDLE) 32 gauge x 5/32" Ndle VIA DEVICE USE EVERY MORNING 6/8/21  Yes Dipak Treviño MD   polyethylene glycol (GOLYTELY) 236-22.74-6.74 -5.86 gram suspension Take 4,000 mLs by mouth once. 4/14/22  Yes Historical Provider   predniSONE (DELTASONE) 5 MG tablet Take 1 tablet (5 mg total) by mouth 2 (two) times daily. 11/8/22  Yes " Lindsey Betancourt MD   predniSONE (DELTASONE) 5 MG tablet Take 1 tablet (5 mg total) by mouth 2 (two) times daily. 11/18/22  Yes Lindsey Betancourt MD   tobramycin sulfate 0.3% (TOBREX) 0.3 % ophthalmic solution 1-2 drops topically twice daily to affected toe(s). 12/7/21  Yes Mk Doyle DPM   tobramycin sulfate 0.3% (TOBREX) 0.3 % ophthalmic solution 1-2 drops topically twice daily to affected toe(s). 12/28/21  Yes Mk Doyle DPM   triamcinolone acetonide 0.1% (KENALOG) 0.1 % cream Apply topically 2 (two) times daily. 6/6/19  Yes Dipak Treviño MD   blood-glucose meter kit Use as instructed 3/4/19 9/9/22  Kezia Dennis MD   lancing device with lancets Kit 1 each by Misc.(Non-Drug; Combo Route) route 2 (two) times daily. 10/15/20 9/9/22  Dipak Treviño MD       Review of Systems:  Review of Systems   Constitutional:  Negative for chills and fever.   HENT:  Negative for sore throat and trouble swallowing.    Eyes:  Negative for visual disturbance.   Respiratory:  Negative for cough and shortness of breath.    Cardiovascular:  Negative for chest pain.   Gastrointestinal:  Negative for abdominal pain, constipation, diarrhea, nausea and vomiting.   Genitourinary:  Negative for dysuria and flank pain.   Musculoskeletal:  Negative for back pain, neck pain and neck stiffness.   Skin:  Positive for wound. Negative for rash.   Neurological:  Negative for dizziness, syncope, weakness and headaches.   Psychiatric/Behavioral:  Negative for confusion.      Health Maintainence:   Immunizations:  Health Maintenance         Date Due Completion Date    Shingles Vaccine (2 of 2) 11/25/2019 9/30/2019    Influenza Vaccine (1) 09/01/2022 10/1/2020    Override on 12/14/2016: Declined    COVID-19 Vaccine (5 - Booster for Moderna series) 09/29/2022 8/4/2022    Eye Exam 11/10/2022 11/10/2021    Override on 10/11/2016: Done    Override on 8/6/2014: Done    Hemoglobin A1c 02/21/2023 11/21/2022    Diabetes Urine Screening  "04/12/2023 4/12/2022    Lipid Panel 04/12/2023 4/12/2022    TETANUS VACCINE 12/05/2027 12/5/2017    Colonoscopy 06/02/2032 6/2/2022    Override on 7/15/2014: Done             PHYSICAL EXAM     /72 (BP Location: Right arm, Patient Position: Sitting, BP Method: Large (Manual))   Pulse 64   Ht 6' 1" (1.854 m)   Wt 93.9 kg (207 lb 0.2 oz)   SpO2 98%   BMI 27.31 kg/m²     Physical Exam  Vitals and nursing note reviewed.   Constitutional:       Appearance: Normal appearance.      Comments: Elderly male in NAD or apparent pain. He makes good eye contact, speaks in clear full sentences and is cooperative. He ambulates with ease.    HENT:      Head: Normocephalic and atraumatic.      Nose: Nose normal.      Mouth/Throat:      Pharynx: Oropharynx is clear.   Eyes:      Conjunctiva/sclera: Conjunctivae normal.   Cardiovascular:      Rate and Rhythm: Normal rate and regular rhythm.      Pulses: Normal pulses.   Pulmonary:      Effort: No respiratory distress.   Abdominal:      Tenderness: There is no abdominal tenderness.   Genitourinary:     Comments: Visual inspection of wound reveals 0.75 oval shaped wound to the left fold of gluteal cleft. There is brawny erythema with no induration, purulence, bleeding or fluctuance. No other wounds.   Musculoskeletal:         General: Normal range of motion.      Cervical back: No rigidity.   Skin:     General: Skin is warm and dry.      Capillary Refill: Capillary refill takes less than 2 seconds.      Findings: No rash.   Neurological:      General: No focal deficit present.      Mental Status: He is alert.      Gait: Gait normal.   Psychiatric:         Mood and Affect: Mood normal.       LABS     Lab Results   Component Value Date    HGBA1C 13.3 (H) 11/21/2022     CMP  Sodium   Date Value Ref Range Status   11/21/2022 139 136 - 145 mmol/L Final     Potassium   Date Value Ref Range Status   11/21/2022 4.2 3.5 - 5.1 mmol/L Final     Chloride   Date Value Ref Range Status "   11/21/2022 106 95 - 110 mmol/L Final     CO2   Date Value Ref Range Status   11/21/2022 26 23 - 29 mmol/L Final     Glucose   Date Value Ref Range Status   11/21/2022 345 (H) 70 - 110 mg/dL Final     BUN   Date Value Ref Range Status   11/21/2022 16 8 - 23 mg/dL Final     Creatinine   Date Value Ref Range Status   11/21/2022 1.3 0.5 - 1.4 mg/dL Final     Calcium   Date Value Ref Range Status   11/21/2022 9.2 8.7 - 10.5 mg/dL Final     Total Protein   Date Value Ref Range Status   11/21/2022 6.1 6.0 - 8.4 g/dL Final     Albumin   Date Value Ref Range Status   11/21/2022 3.5 3.5 - 5.2 g/dL Final     Total Bilirubin   Date Value Ref Range Status   11/21/2022 0.5 0.1 - 1.0 mg/dL Final     Comment:     For infants and newborns, interpretation of results should be based  on gestational age, weight and in agreement with clinical  observations.    Premature Infant recommended reference ranges:  Up to 24 hours.............<8.0 mg/dL  Up to 48 hours............<12.0 mg/dL  3-5 days..................<15.0 mg/dL  6-29 days.................<15.0 mg/dL       Alkaline Phosphatase   Date Value Ref Range Status   11/21/2022 51 (L) 55 - 135 U/L Final     AST   Date Value Ref Range Status   11/21/2022 11 10 - 40 U/L Final     ALT   Date Value Ref Range Status   11/21/2022 14 10 - 44 U/L Final     Anion Gap   Date Value Ref Range Status   11/21/2022 7 (L) 8 - 16 mmol/L Final     eGFR if    Date Value Ref Range Status   06/21/2022 >60.0 >60 mL/min/1.73 m^2 Final     eGFR if non    Date Value Ref Range Status   06/21/2022 53.0 (A) >60 mL/min/1.73 m^2 Final     Comment:     Calculation used to obtain the estimated glomerular filtration  rate (eGFR) is the CKD-EPI equation.        Lab Results   Component Value Date    WBC 4.12 11/21/2022    HGB 12.9 (L) 11/21/2022    HCT 38.7 (L) 11/21/2022    MCV 96 11/21/2022     (L) 11/21/2022     Lab Results   Component Value Date    CHOL 103 (L) 04/12/2022     CHOL 103 (L) 04/12/2022    CHOL 108 (L) 08/19/2020     Lab Results   Component Value Date    HDL 42 04/12/2022    HDL 42 04/12/2022    HDL 44 08/19/2020     Lab Results   Component Value Date    LDLCALC 24.4 (L) 04/12/2022    LDLCALC 24.4 (L) 04/12/2022    LDLCALC 46.8 (L) 08/19/2020     Lab Results   Component Value Date    TRIG 183 (H) 04/12/2022    TRIG 183 (H) 04/12/2022    TRIG 86 08/19/2020     Lab Results   Component Value Date    CHOLHDL 40.8 04/12/2022    CHOLHDL 40.8 04/12/2022    CHOLHDL 40.7 08/19/2020     Lab Results   Component Value Date    TSH 1.549 04/12/2022       ASSESSMENT/PLAN     Misha Eldridge Jr. is a 77 y.o. male     Misha was seen today for pressure injury of gluteal fold. There is no clinical signs of infection however there does not appear to be much improvement either. Will encourage patient to sit on donut pillow and to follow-up with wound clinic -referral placed. He is aware of and amenable to plan.     Diagnoses and all orders for this visit:    Pressure injury of right buttock, stage 2  -     Ambulatory referral/consult to Wound Clinic; Future        Demi Bear PA-C   Department of Internal Medicine - [facility]   2:48 PM        UPDATE:   A1C is significantly elevated to 13.3, previously it was in much better control at 7.3. His DM regimen was not discussed in the office today during this visit. I called the patient later in the day to discuss his DM medication regimen further, he did not answer. He is supposed to be taking glipizide, metformin and long acting insulin 22 units nightly.

## 2022-11-22 NOTE — TELEPHONE ENCOUNTER
Wound Care Appointment  Received: Today  Magy Pate RN sent to Leilani Palafox MA  Hi, I just tried calling this patient about 5 minutes before receiving this message from you. He did not answer his phone. I was able to leave a voicemail.
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
## 2022-11-22 NOTE — TELEPHONE ENCOUNTER
Left message asking patient to give me a call back at [phone] today by 5 p.m to schedule his appointment for 12/01/2022, if not he can call me back at 8:30 a.m tomorrow morning to set up appointment.   dog bite
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
## 2022-11-23 DIAGNOSIS — E11.40 CONTROLLED TYPE 2 DIABETES MELLITUS WITH DIABETIC NEUROPATHY, WITHOUT LONG-TERM CURRENT USE OF INSULIN: Primary | ICD-10-CM

## 2022-11-23 NOTE — TELEPHONE ENCOUNTER
Called and spoke to Mr. Eldridge. The A1C levels were given to him. States he is not out of any medications, but he has not been exercising lately. Insists on keeping appointment for December 19. Told to bring a log of glucose checks

## 2022-11-23 NOTE — TELEPHONE ENCOUNTER
----- Message from Dipak Treviño MD sent at 11/22/2022  2:47 PM CST -----  Please let pt know his DM is through the roof at 13.3%.       HGBA1C                   13.3 (H)            11/21/2022 09:12 AM        HGBA1C                   7.3 (H)             04/12/2022 02:38 PM   See if pt taking meds outo f any and move appt have pt keep BG log to bring to upcoming appt.

## 2022-11-25 NOTE — TELEPHONE ENCOUNTER
----- Message from Giselle Burr sent at 11/25/2022  1:37 PM CST -----  Who Called: MUSA SEGURA JR. [[id number]]    Can the clinic reply in [facility]: No    Please refill the medication(s) listed below. Please call the patient when the prescription(s) is ready for  at this phone number  [phone]       Medication #1 strips to test blood sugar    Medication #2      Preferred Pharmacy: [facility] #[id number] - [place] [address] AT [address] [facility]

## 2022-11-25 NOTE — TELEPHONE ENCOUNTER
No new care gaps identified.  [facility] Embedded Care Gaps. Reference number: [id number]. 11/25/2022   1:50:15 PM CST

## 2022-11-25 NOTE — TELEPHONE ENCOUNTER
Requested medication has been pended and routed to Dr. Treviño for approval. LOV 11/22/22 upcoming visits 12/19/22 allergies have been updated. Thanks.

## 2022-11-30 DIAGNOSIS — I10 ESSENTIAL HYPERTENSION: ICD-10-CM

## 2022-11-30 DIAGNOSIS — Z85.46 HISTORY OF PROSTATE CANCER: ICD-10-CM

## 2022-11-30 DIAGNOSIS — I10 HYPERTENSION, UNSPECIFIED TYPE: ICD-10-CM

## 2022-11-30 DIAGNOSIS — E13.9 DIABETES 1.5, MANAGED AS TYPE 2: ICD-10-CM

## 2022-11-30 RX ORDER — PREDNISONE 5 MG/1
5 TABLET ORAL 2 TIMES DAILY
Qty: 60 TABLET | Refills: 1 | Status: ON HOLD | OUTPATIENT
Start: 2022-11-30 | End: 2023-07-02 | Stop reason: HOSPADM

## 2022-11-30 NOTE — TELEPHONE ENCOUNTER
----- Message from Giselle Burr sent at 11/30/2022 12:41 PM CST -----  Who Called: MUSA SEGURA JR. [[id number]]    Can the clinic reply in [facility]: No    Please refill the medication(s) listed below. Please call the patient when the prescription(s) is ready for  at this phone number   [phone]      Medication #1 irbesartan (AVAPRO) 300 MG tablet    Medication #2 amLODIPine (NORVASC) 10 MG tablet    Medication #3 metFORMIN (GLUCOPHAGE) 1000 MG tablet    Medication #4 predniSONE (DELTASONE) 5 MG tablet      Preferred Pharmacy: [facility] #[id number] [place] [email or web address]

## 2022-11-30 NOTE — TELEPHONE ENCOUNTER
No new care gaps identified.  [facility] Embedded Care Gaps. Reference number: [id number]. 11/30/2022   1:39:52 PM CST

## 2022-11-30 NOTE — TELEPHONE ENCOUNTER
Requested medication has been pended and routed to Dr. Treviño for approval. LOV 11/22/22 upcoming visits 12/19/22 allergies have been verified. Thanks.

## 2022-11-30 NOTE — TELEPHONE ENCOUNTER
----- Message from Giselle Burr sent at 11/30/2022 12:45 PM CST -----  Who Called: MUSA SEGURA JR. [[id number]]    Can the clinic reply in [facility]: No    Please refill the medication(s) listed below. Please call the patient when the prescription(s) is ready for  at this phone number   [phone]      Medication #1 predniSONE (DELTASONE) 5 MG tablet    Medication #2      Preferred Pharmacy: [facility] #[id number] - [facility] [address] AT [email or web address]

## 2022-12-01 ENCOUNTER — APPOINTMENT (OUTPATIENT)
Dept: RADIATION THERAPY | Facility: OTHER | Age: 77
End: 2022-12-01
Attending: STUDENT IN AN ORGANIZED HEALTH CARE EDUCATION/TRAINING PROGRAM
Payer: MEDICARE

## 2022-12-01 PROCEDURE — 77385 HC IMRT, SIMPLE: CPT | Performed by: STUDENT IN AN ORGANIZED HEALTH CARE EDUCATION/TRAINING PROGRAM

## 2022-12-01 PROCEDURE — 77014 PR  CT GUIDANCE PLACEMENT RAD THERAPY FIELDS: ICD-10-PCS | Mod: 26,,, | Performed by: STUDENT IN AN ORGANIZED HEALTH CARE EDUCATION/TRAINING PROGRAM

## 2022-12-01 PROCEDURE — 77014 HC CT GUIDANCE RADIATION THERAPY FLDS PLACEMENT: CPT | Mod: TC | Performed by: STUDENT IN AN ORGANIZED HEALTH CARE EDUCATION/TRAINING PROGRAM

## 2022-12-01 PROCEDURE — 77014 PR  CT GUIDANCE PLACEMENT RAD THERAPY FIELDS: CPT | Mod: 26,,, | Performed by: STUDENT IN AN ORGANIZED HEALTH CARE EDUCATION/TRAINING PROGRAM

## 2022-12-01 RX ORDER — METFORMIN HYDROCHLORIDE 1000 MG/1
1000 TABLET ORAL 2 TIMES DAILY WITH MEALS
Qty: 180 TABLET | Refills: 1 | Status: SHIPPED | OUTPATIENT
Start: 2022-12-01 | End: 2023-04-26 | Stop reason: SDUPTHER

## 2022-12-01 RX ORDER — AMLODIPINE BESYLATE 10 MG/1
10 TABLET ORAL DAILY
Qty: 90 TABLET | Refills: 1 | Status: SHIPPED | OUTPATIENT
Start: 2022-12-01 | End: 2023-02-06 | Stop reason: SDUPTHER

## 2022-12-01 RX ORDER — IRBESARTAN 300 MG/1
300 TABLET ORAL NIGHTLY
Qty: 90 TABLET | Refills: 2 | Status: SHIPPED | OUTPATIENT
Start: 2022-12-01 | End: 2023-02-06 | Stop reason: SDUPTHER

## 2022-12-01 RX ORDER — PREDNISONE 5 MG/1
5 TABLET ORAL 2 TIMES DAILY
Qty: 60 TABLET | Refills: 1 | OUTPATIENT
Start: 2022-12-01

## 2022-12-02 ENCOUNTER — TELEPHONE (OUTPATIENT)
Dept: INTERNAL MEDICINE | Facility: CLINIC | Age: 77
End: 2022-12-02
Payer: MEDICARE

## 2022-12-02 PROCEDURE — 77385 HC IMRT, SIMPLE: CPT | Performed by: STUDENT IN AN ORGANIZED HEALTH CARE EDUCATION/TRAINING PROGRAM

## 2022-12-02 PROCEDURE — 77014 PR  CT GUIDANCE PLACEMENT RAD THERAPY FIELDS: ICD-10-PCS | Mod: 26,,, | Performed by: STUDENT IN AN ORGANIZED HEALTH CARE EDUCATION/TRAINING PROGRAM

## 2022-12-02 PROCEDURE — 77014 PR  CT GUIDANCE PLACEMENT RAD THERAPY FIELDS: CPT | Mod: 26,,, | Performed by: STUDENT IN AN ORGANIZED HEALTH CARE EDUCATION/TRAINING PROGRAM

## 2022-12-02 PROCEDURE — 77014 HC CT GUIDANCE RADIATION THERAPY FLDS PLACEMENT: CPT | Mod: TC | Performed by: STUDENT IN AN ORGANIZED HEALTH CARE EDUCATION/TRAINING PROGRAM

## 2022-12-02 NOTE — TELEPHONE ENCOUNTER
2 attempts:  LVM to reschedule 12/19/2022 visit with pt by phone as Dr. Treviño being out that day    Appt cancelled by me

## 2022-12-05 ENCOUNTER — SPECIALTY PHARMACY (OUTPATIENT)
Dept: PHARMACY | Facility: CLINIC | Age: 77
End: 2022-12-05
Payer: MEDICARE

## 2022-12-05 DIAGNOSIS — Z85.46 HISTORY OF PROSTATE CANCER: ICD-10-CM

## 2022-12-05 PROCEDURE — 77385 HC IMRT, SIMPLE: CPT | Performed by: STUDENT IN AN ORGANIZED HEALTH CARE EDUCATION/TRAINING PROGRAM

## 2022-12-05 PROCEDURE — 77014 HC CT GUIDANCE RADIATION THERAPY FLDS PLACEMENT: CPT | Mod: TC | Performed by: STUDENT IN AN ORGANIZED HEALTH CARE EDUCATION/TRAINING PROGRAM

## 2022-12-05 PROCEDURE — 77336 RADIATION PHYSICS CONSULT: CPT | Performed by: STUDENT IN AN ORGANIZED HEALTH CARE EDUCATION/TRAINING PROGRAM

## 2022-12-05 PROCEDURE — 77014 PR  CT GUIDANCE PLACEMENT RAD THERAPY FIELDS: CPT | Mod: 26,,, | Performed by: STUDENT IN AN ORGANIZED HEALTH CARE EDUCATION/TRAINING PROGRAM

## 2022-12-05 PROCEDURE — 77014 PR  CT GUIDANCE PLACEMENT RAD THERAPY FIELDS: ICD-10-PCS | Mod: 26,,, | Performed by: STUDENT IN AN ORGANIZED HEALTH CARE EDUCATION/TRAINING PROGRAM

## 2022-12-05 RX ORDER — ABIRATERONE ACETATE 250 MG/1
1000 TABLET ORAL DAILY
Qty: 120 TABLET | Refills: 1 | Status: SHIPPED | OUTPATIENT
Start: 2022-12-05 | End: 2023-01-23 | Stop reason: SDUPTHER

## 2022-12-05 NOTE — TELEPHONE ENCOUNTER
Incoming call from pt stating he has 18 tabs on hand of prednisone. Pended refill call accordingly.

## 2022-12-05 NOTE — TELEPHONE ENCOUNTER
Incoming call from pt returning [facility] phone call for prednisone refill. Pt said he was completley out of Prendnisone. Tried to run through insurance and it rejected RTS, filled 12/3. Pt thinks it may have been filled at [facility]. Will check and call [facility] back.

## 2022-12-06 PROCEDURE — 77014 HC CT GUIDANCE RADIATION THERAPY FLDS PLACEMENT: CPT | Mod: TC | Performed by: STUDENT IN AN ORGANIZED HEALTH CARE EDUCATION/TRAINING PROGRAM

## 2022-12-06 PROCEDURE — 77385 HC IMRT, SIMPLE: CPT | Performed by: STUDENT IN AN ORGANIZED HEALTH CARE EDUCATION/TRAINING PROGRAM

## 2022-12-06 PROCEDURE — 77014 PR  CT GUIDANCE PLACEMENT RAD THERAPY FIELDS: ICD-10-PCS | Mod: 26,,, | Performed by: STUDENT IN AN ORGANIZED HEALTH CARE EDUCATION/TRAINING PROGRAM

## 2022-12-06 PROCEDURE — 77014 PR  CT GUIDANCE PLACEMENT RAD THERAPY FIELDS: CPT | Mod: 26,,, | Performed by: STUDENT IN AN ORGANIZED HEALTH CARE EDUCATION/TRAINING PROGRAM

## 2022-12-07 PROCEDURE — 77014 PR  CT GUIDANCE PLACEMENT RAD THERAPY FIELDS: ICD-10-PCS | Mod: 26,,, | Performed by: STUDENT IN AN ORGANIZED HEALTH CARE EDUCATION/TRAINING PROGRAM

## 2022-12-07 PROCEDURE — 77385 HC IMRT, SIMPLE: CPT | Performed by: STUDENT IN AN ORGANIZED HEALTH CARE EDUCATION/TRAINING PROGRAM

## 2022-12-07 PROCEDURE — 77014 HC CT GUIDANCE RADIATION THERAPY FLDS PLACEMENT: CPT | Mod: TC | Performed by: STUDENT IN AN ORGANIZED HEALTH CARE EDUCATION/TRAINING PROGRAM

## 2022-12-07 PROCEDURE — 77014 PR  CT GUIDANCE PLACEMENT RAD THERAPY FIELDS: CPT | Mod: 26,,, | Performed by: STUDENT IN AN ORGANIZED HEALTH CARE EDUCATION/TRAINING PROGRAM

## 2022-12-08 PROCEDURE — 77385 HC IMRT, SIMPLE: CPT | Performed by: STUDENT IN AN ORGANIZED HEALTH CARE EDUCATION/TRAINING PROGRAM

## 2022-12-08 PROCEDURE — 77014 PR  CT GUIDANCE PLACEMENT RAD THERAPY FIELDS: ICD-10-PCS | Mod: 26,,, | Performed by: STUDENT IN AN ORGANIZED HEALTH CARE EDUCATION/TRAINING PROGRAM

## 2022-12-08 PROCEDURE — 77014 PR  CT GUIDANCE PLACEMENT RAD THERAPY FIELDS: CPT | Mod: 26,,, | Performed by: STUDENT IN AN ORGANIZED HEALTH CARE EDUCATION/TRAINING PROGRAM

## 2022-12-08 PROCEDURE — 77014 HC CT GUIDANCE RADIATION THERAPY FLDS PLACEMENT: CPT | Mod: TC | Performed by: STUDENT IN AN ORGANIZED HEALTH CARE EDUCATION/TRAINING PROGRAM

## 2022-12-09 ENCOUNTER — CLINICAL SUPPORT (OUTPATIENT)
Dept: DIABETES | Facility: CLINIC | Age: 77
End: 2022-12-09
Attending: FAMILY MEDICINE
Payer: MEDICARE

## 2022-12-09 ENCOUNTER — TELEPHONE (OUTPATIENT)
Dept: INTERNAL MEDICINE | Facility: CLINIC | Age: 77
End: 2022-12-09
Payer: MEDICARE

## 2022-12-09 VITALS — BODY MASS INDEX: 26.91 KG/M2 | WEIGHT: 203.06 LBS | HEIGHT: 73 IN

## 2022-12-09 DIAGNOSIS — E11.40 CONTROLLED TYPE 2 DIABETES MELLITUS WITH DIABETIC NEUROPATHY, WITHOUT LONG-TERM CURRENT USE OF INSULIN: Primary | ICD-10-CM

## 2022-12-09 DIAGNOSIS — Z79.4 TYPE 2 DIABETES MELLITUS WITH HYPERGLYCEMIA, WITH LONG-TERM CURRENT USE OF INSULIN: Primary | ICD-10-CM

## 2022-12-09 DIAGNOSIS — E11.65 TYPE 2 DIABETES MELLITUS WITH HYPERGLYCEMIA, WITH LONG-TERM CURRENT USE OF INSULIN: Primary | ICD-10-CM

## 2022-12-09 DIAGNOSIS — Z79.4 TYPE 2 DIABETES MELLITUS WITH HYPERGLYCEMIA, WITH LONG-TERM CURRENT USE OF INSULIN: ICD-10-CM

## 2022-12-09 DIAGNOSIS — E11.65 TYPE 2 DIABETES MELLITUS WITH HYPERGLYCEMIA, WITH LONG-TERM CURRENT USE OF INSULIN: ICD-10-CM

## 2022-12-09 PROCEDURE — 77014 PR  CT GUIDANCE PLACEMENT RAD THERAPY FIELDS: CPT | Mod: 26,,, | Performed by: STUDENT IN AN ORGANIZED HEALTH CARE EDUCATION/TRAINING PROGRAM

## 2022-12-09 PROCEDURE — 77014 PR  CT GUIDANCE PLACEMENT RAD THERAPY FIELDS: ICD-10-PCS | Mod: 26,,, | Performed by: STUDENT IN AN ORGANIZED HEALTH CARE EDUCATION/TRAINING PROGRAM

## 2022-12-09 PROCEDURE — 77014 HC CT GUIDANCE RADIATION THERAPY FLDS PLACEMENT: CPT | Mod: TC | Performed by: STUDENT IN AN ORGANIZED HEALTH CARE EDUCATION/TRAINING PROGRAM

## 2022-12-09 PROCEDURE — 99999 PR PBB SHADOW E&M-EST. PATIENT-LVL II: CPT | Mod: PBBFAC,,, | Performed by: DIETITIAN, REGISTERED

## 2022-12-09 PROCEDURE — 77385 HC IMRT, SIMPLE: CPT | Performed by: STUDENT IN AN ORGANIZED HEALTH CARE EDUCATION/TRAINING PROGRAM

## 2022-12-09 PROCEDURE — 99999 PR PBB SHADOW E&M-EST. PATIENT-LVL II: ICD-10-PCS | Mod: PBBFAC,,, | Performed by: DIETITIAN, REGISTERED

## 2022-12-09 PROCEDURE — G0108 PR DIAB MANAGE TRN  PER INDIV: ICD-10-PCS | Mod: S$GLB,,, | Performed by: DIETITIAN, REGISTERED

## 2022-12-09 PROCEDURE — G0108 DIAB MANAGE TRN  PER INDIV: HCPCS | Mod: S$GLB,,, | Performed by: DIETITIAN, REGISTERED

## 2022-12-09 RX ORDER — INSULIN ASPART 100 [IU]/ML
5 INJECTION, SOLUTION INTRAVENOUS; SUBCUTANEOUS
Qty: 4.5 ML | Refills: 11 | Status: SHIPPED | OUTPATIENT
Start: 2022-12-09 | End: 2022-12-16

## 2022-12-09 NOTE — TELEPHONE ENCOUNTER
----- Message from Magy Herzog sent at 12/9/2022  7:47 AM CST -----  Contact: MUSA SEGURA JR. [[id number]]  Type: Call Back      Who called: MUSA SEGURA JR. [[id number]]      What is the request in detail: Patient is requesting a call back. Pt would like to know speak with the lady that helps with his diabetes. He states that it is high this morning.   Please advise.     Can the clinic reply by [facility]? No      Would the patient rather a call back or a response via [facility]? Call back       Best call back number: [phone] (home)       Additional Information:

## 2022-12-09 NOTE — TELEPHONE ENCOUNTER
9:03 AM discussed with Jossie Vega RD about  pt's BS  - will initiate prandial insulin -Novolog 5 units tid started. MICHAEL Vega will help with educating pt on how to administer and monitor prandial insulin. I will see him in clinic for follow-up in about 3 weeks. -DIANNE KING

## 2022-12-09 NOTE — PROGRESS NOTES
Diabetes Care Specialist Progress Note  Author: Jossie Vega RD  Date: 12/9/2022    Program Intake  Reason for Diabetes Program Visit:: Initial Diabetes Assessment  Current diabetes risk level:: high  In the last 12 months, have you:: been admitted to a hospital  Was the ER or hospital admission related to diabetes?: No    Lab Results   Component Value Date    HGBA1C 13.3 (H) 11/21/2022       Clinical    Problem Review  Reviewed Problem List with Patient: yes  Active comorbidities affecting diabetes self-care.: yes  Comorbidities: Cancer, Other (comment), Hypertension (currently on prednisone with cancer treatment (45 days of radiation))  Reviewed health maintenance: yes    Clinical Assessment  Current Diabetes Treatment: Insulin, Oral Medication  Have you ever experienced hypoglycemia (low blood sugar)?: yes  In the last month, how often have you experienced low blood sugar?: other (see comments) (once years ago - was in 80s)  Are you able to tell when your blood sugar is low?: Yes  What symptoms do you experience?: Shaky, Sweaty, Dizzy/Light-headed  Have you ever been hospitalized because your blood sugar was too low?: no  How do you treat hypoglycemia (low blood sugar)?: 5-6 pieces of hard candy  Have you ever experienced hyperglycemia (high blood sugar)?: yes  In the last month, how often have you experienced high blood sugar?: more than once a day  Are you able to tell when your blood sugar is high?: Yes  What are your symptoms?: blurry vision, thirst, fatigue  Have you ever been hospitalized because your blood sugar was high?: no    Medication Information  How do you obtain your medications?: Patient drives  How many days a week do you miss your medications?: Never  Medication adherence impacting ability to self-manage diabetes?: No    Labs  Do you have regular lab work to monitor your medications?: Yes  Type of Regular Lab Work: A1c, Cholesterol, Microalbumin, CBC, BMP  Where do you get your labs drawn?:  [facility]  Lab Compliance Barriers: No    Nutritional Status  Diet: Regular  Meal Plan 24 Hour Recall: Breakfast, Dinner, Lunch, Snack  Meal Plan 24 Hour Recall - Breakfast: grits and egg, coffee w/milk  Meal Plan 24 Hour Recall - Lunch: salad  Meal Plan 24 Hour Recall - Dinner: varies - beans and rice, sometimes just corn on the cob  Meal Plan 24 Hour Recall - Snack: mashed potatoes  Change in appetite?: No (denies any change in appetite or N/V/D with cancer treatment)    Additional Social History    Cognitive/Behavioral Health  Alert and Oriented: Yes  Difficulty Thinking: No  Requires Prompting: No  Requires assistance for routine expression?: No  Cognitive or behavioral barriers impacting ability to self-manage diabetes?: No    Culture/Rastafari  Culture or Mandaen beliefs that may impact ability to access healthcare: No    Communication  Language preference: English  Hearing Problems: No  Vision Problems: Yes  Vision problem type:: Other (blurry vision)    Health Literacy  Preferred Learning Method: Face to Face, Reading Materials  How often do you need to have someone help you read instructions, pamphlets, or written material from your doctor or pharmacy?: Rarely  Health literacy needs impacting ability to self-manage diabetes?: No      Diabetes Self-Management Skills Assessment    Diabetes Disease Process/Treatment Options  Diabetes Disease Process/Treatment Options: Skills Assessment Completed: No  Deferred due to:: Time    Nutrition/Healthy Eating  Challenges to healthy eating:: portion control, snacking between meals and at night  Method of carbohydrate measurement:: no method  Patient can identify foods that impact blood sugar.: yes  Patient-identified foods:: sweets, soda, starches (bread, pasta, rice, cereal)  Nutrition/Healthy Eating Skills Assessment Completed:: Yes  Assessment indicates:: Knowledge deficit, Instruction Needed  Area of need?: Yes    Physical Activity/Exercise  Physical  "Activity/Exercise Skills Assessment Completed: : No  Deffered due to:: Time    Medications  Patient is able to describe current diabetes management routine.: yes  Diabetes management routine:: insulin, oral medications (Glipizide 10mg BID, Metformin 1000mg BID, Lantus 22units HS)  Patient is able to identify current diabetes medications, dosages, and appropriate timing of medications.: yes  Patient understands the purpose of the medications taken for diabetes.: yes  Patient reports problems or concerns with current medication regimen.: yes  Medication regimen problems/concerns:: does not feel like regimen is working (pt reports BG was okay until starting "new pills" for cancer treatment - pt is taking prednisone 5mg BID)  Medication Skills Assessment Completed:: Yes  Assessment indicates:: Knowledge deficit, Instruction Needed  Area of need?: Yes    Home Blood Glucose Monitoring  Patient states that blood sugar is checked at home daily.: yes  Monitoring Method:: home glucometer  How often do you check your blood sugar?: Once a day  When do you check your blood sugar?: Before breakfast  When you check what is your typical blood sugar range? : 289, 320, 402, reports had gotten it down to 220 with eating salads and walking about 30 blocks  Blood glucose logs:: no, encouraged to keep logs, encouraged to bring logs to provider visits  Blood glucose logs reviewed today?: no  Home Blood Glucose Monitoring Skills Assessment Completed: : Yes  Assessment indicates:: Knowledge deficit, Instruction Needed  Area of need?: Yes    Acute Complications  Patient is able to identify types of acute complications: Yes  Patient Identified:: Hypoglycemia  Patient is able to state the basic meaning of hypoglycemia?: Yes  Able to state the blood sugar range for hypoglycemia?: no (see comments)  Patient can identify general symptoms of hypoglycemia: yes  Patient identified:: shakiness, dizziness, sweating  Able to state proper treatment of " hypoglycemia?: yes  Patient identified:: 5-6 pieces of hard candy  Acute Complications Skills Assessment Completed: : Yes  Assessment indicates:: Adequate understanding  Area of need?: No    Chronic Complications  Chronic Complications Skills Assessment Completed: : No  Deferred due to:: Time    Psychosocial/Coping  Psychosocial/Coping Skills Assessment Completed: : No  Deffered due to:: Time      Assessment Summary and Plan    Based on today's diabetes care assessment, the following areas of need were identified:      Social 12/9/2022   Cognitive/Behavioral Health No   Culture/Congregational No   Health Literacy No        Clinical 12/9/2022   Medication Adherence No   Lab Compliance No        Diabetes Self-Management Skills 12/9/2022   Nutrition/Healthy Eating Yes - pt had some questions about foods he can eat. Provided Planning Healthy Meals guide and briefly discussed plate method of meal planning. Emphasized portion control is key and encouraged limiting starches/carbs to fist size portion at each meal.   Medication Yes - see care planning   Home Blood Glucose Monitoring Yes - reviewed goal BG ranges, keeping log and bringing log to future appts.   Acute Complications No          Today's interventions were provided through individual discussion, instruction, and written materials were provided.      Patient verbalized understanding of instruction and written materials.  Pt was able to return back demonstration of instructions today. Patient understood key points, needs reinforcement and further instruction.     Diabetes Self-Management Care Plan:    Today's Diabetes Self-Management Care Plan was developed with Misha's input. Misha has agreed to work toward the following goal(s) to improve his/her overall diabetes control.      Care Plan: Diabetes Management   Updates made since 11/9/2022 12:00 AM        Problem: Medications         Goal: Pt will start taking Novolog 5 units before each meal.    Start Date: 12/9/2022  "  Expected End Date: 12/16/2022   Priority: High   Barriers: No Barriers Identified   Note:    12/9/22 - Pt initially did not have an appt today but came to clinic c/o BG staying high. Was able to get pt with me. Pt reported has had significantly elevated BG (300s and 400s) since last month when started on 4 new pills for cancer treatment. Pt was started on prednisone to take along with his radiation treatment. Going through 45 days of radiation. States he was able to get it down as far as 210 or 220 a couple weeks ago after eating only salads and doing a lot of walking ("about 30 blocks"). Has been more tired past week and unable to walk much. Explained prednisone is causing high BG, and while activity and controlling carb intake will help, it will not be enough to reach goal BGs. Discussed care with STAR Mendosa (Dr. Treviño is not in office today and pt has seen Demi recently). Pt started on Novolog 5 units before meals. Pt already on long-acting insulin. Educated pt on rapid-acting insulin, only take before meals, must take right before eating and no more than 15 min before, skip shot if skipping meal, and importance of right shot right time. Encouraged SMBG AC/HS, provided logs, and encouraged bringing log of BGs to future appts.        Task: Reviewed with patient all current diabetes medications and provided basic review of the purpose, dosage, frequency, side effects, and storage of both oral and injectable diabetes medications. Completed 12/9/2022        Task: Discussed guidelines for preventing, detecting and treating hypoglycemia and hyperglycemia and reviewed the importance of meal and medication timing with diabetes mediations for prevention of hypoglycemia and maximum drug benefit. Completed 12/9/2022          Follow Up Plan     Follow up in about 1 week (around 12/16/2022) for diabetes education follow up and log review.    Today's care plan and follow up schedule was discussed with patient.  " Misha verbalized understanding of the care plan, goals, and agrees to follow up plan.        The patient was encouraged to communicate with his/her health care provider/physician and care team regarding his/her condition(s) and treatment.  I provided the patient with my contact information today and encouraged to contact me via phone or [facility] Portal as needed.     Length of Visit   Total Time: 30 Minutes

## 2022-12-12 PROCEDURE — 77385 HC IMRT, SIMPLE: CPT | Performed by: STUDENT IN AN ORGANIZED HEALTH CARE EDUCATION/TRAINING PROGRAM

## 2022-12-12 PROCEDURE — 77014 PR  CT GUIDANCE PLACEMENT RAD THERAPY FIELDS: ICD-10-PCS | Mod: 26,,, | Performed by: STUDENT IN AN ORGANIZED HEALTH CARE EDUCATION/TRAINING PROGRAM

## 2022-12-12 PROCEDURE — 77014 PR  CT GUIDANCE PLACEMENT RAD THERAPY FIELDS: CPT | Mod: 26,,, | Performed by: STUDENT IN AN ORGANIZED HEALTH CARE EDUCATION/TRAINING PROGRAM

## 2022-12-12 PROCEDURE — 77014 HC CT GUIDANCE RADIATION THERAPY FLDS PLACEMENT: CPT | Mod: TC | Performed by: STUDENT IN AN ORGANIZED HEALTH CARE EDUCATION/TRAINING PROGRAM

## 2022-12-12 PROCEDURE — 77336 RADIATION PHYSICS CONSULT: CPT | Performed by: STUDENT IN AN ORGANIZED HEALTH CARE EDUCATION/TRAINING PROGRAM

## 2022-12-13 PROCEDURE — 77014 PR  CT GUIDANCE PLACEMENT RAD THERAPY FIELDS: CPT | Mod: 26,,, | Performed by: STUDENT IN AN ORGANIZED HEALTH CARE EDUCATION/TRAINING PROGRAM

## 2022-12-13 PROCEDURE — 77014 PR  CT GUIDANCE PLACEMENT RAD THERAPY FIELDS: ICD-10-PCS | Mod: 26,,, | Performed by: STUDENT IN AN ORGANIZED HEALTH CARE EDUCATION/TRAINING PROGRAM

## 2022-12-13 PROCEDURE — 77385 HC IMRT, SIMPLE: CPT | Performed by: STUDENT IN AN ORGANIZED HEALTH CARE EDUCATION/TRAINING PROGRAM

## 2022-12-13 PROCEDURE — 77014 HC CT GUIDANCE RADIATION THERAPY FLDS PLACEMENT: CPT | Mod: TC | Performed by: STUDENT IN AN ORGANIZED HEALTH CARE EDUCATION/TRAINING PROGRAM

## 2022-12-14 ENCOUNTER — TELEPHONE (OUTPATIENT)
Dept: INTERNAL MEDICINE | Facility: CLINIC | Age: 77
End: 2022-12-14
Payer: MEDICARE

## 2022-12-14 PROCEDURE — 77014 PR  CT GUIDANCE PLACEMENT RAD THERAPY FIELDS: CPT | Mod: 26,,, | Performed by: STUDENT IN AN ORGANIZED HEALTH CARE EDUCATION/TRAINING PROGRAM

## 2022-12-14 PROCEDURE — 77014 PR  CT GUIDANCE PLACEMENT RAD THERAPY FIELDS: ICD-10-PCS | Mod: 26,,, | Performed by: STUDENT IN AN ORGANIZED HEALTH CARE EDUCATION/TRAINING PROGRAM

## 2022-12-14 PROCEDURE — 77385 HC IMRT, SIMPLE: CPT | Performed by: STUDENT IN AN ORGANIZED HEALTH CARE EDUCATION/TRAINING PROGRAM

## 2022-12-14 PROCEDURE — 77014 HC CT GUIDANCE RADIATION THERAPY FLDS PLACEMENT: CPT | Mod: TC | Performed by: STUDENT IN AN ORGANIZED HEALTH CARE EDUCATION/TRAINING PROGRAM

## 2022-12-14 NOTE — TELEPHONE ENCOUNTER
Returned [employer]'s call. After an extended call, questions to verify pt identity (extremely thoroughly), and hold time, Amanda wants to know if a generic (very specific) insulin is ok to switch to. Informed that this needs to be reviewed by provider and to please fax to office. They are faxing clinical questions today.

## 2022-12-14 NOTE — TELEPHONE ENCOUNTER
----- Message from Dion Tracy sent at 12/14/2022  3:13 PM CST -----  Regarding: Pharm Auth  Contact: Tony Viveros)  Type:  Pharmacy Calling to Clarify an RX    Name of Caller:Tony Viveros)    Pharmacy Name: ([employer])    Prescription Name:  insulin aspart U-100 (NOVOLOG U-100 INSULIN ASPART) 100 unit/mL injection    What do they need to clarify?: clinical questions that need completion. Please advise    Best Call Back Number: [phone]   ref [phone]    Additional Information:

## 2022-12-15 ENCOUNTER — DOCUMENTATION ONLY (OUTPATIENT)
Dept: RADIATION ONCOLOGY | Facility: CLINIC | Age: 77
End: 2022-12-15
Payer: MEDICARE

## 2022-12-15 PROCEDURE — 77014 PR  CT GUIDANCE PLACEMENT RAD THERAPY FIELDS: CPT | Mod: 26,,, | Performed by: STUDENT IN AN ORGANIZED HEALTH CARE EDUCATION/TRAINING PROGRAM

## 2022-12-15 PROCEDURE — 77385 HC IMRT, SIMPLE: CPT | Performed by: STUDENT IN AN ORGANIZED HEALTH CARE EDUCATION/TRAINING PROGRAM

## 2022-12-15 PROCEDURE — 77014 HC CT GUIDANCE RADIATION THERAPY FLDS PLACEMENT: CPT | Mod: TC | Performed by: STUDENT IN AN ORGANIZED HEALTH CARE EDUCATION/TRAINING PROGRAM

## 2022-12-15 PROCEDURE — 77014 PR  CT GUIDANCE PLACEMENT RAD THERAPY FIELDS: ICD-10-PCS | Mod: 26,,, | Performed by: STUDENT IN AN ORGANIZED HEALTH CARE EDUCATION/TRAINING PROGRAM

## 2022-12-15 NOTE — TELEPHONE ENCOUNTER
Specialty Pharmacy - Refill Coordination    Specialty Medication Orders Linked to Encounter      Flowsheet Row Most Recent Value   Medication #1 abiraterone (ZYTIGA) 250 mg Tab (Order#[id number], Rx#[id number])            Refill Questions - Documented Responses      Flowsheet Row Most Recent Value   Patient Availability and HIPAA Verification    Does patient want to proceed with activity? Yes   HIPAA/medical authority confirmed? Yes   Relationship to patient of person spoken to? Self   Refill Screening Questions    Changes to allergies? No   Changes to medications? No   New conditions since last clinic visit? No   Unplanned office visit, urgent care, ED, or hospital admission in the last 4 weeks? No   How does patient/caregiver feel medication is working? Good   Financial problems or insurance changes? No   How many doses of your specialty medications were missed in the last 4 weeks? 0   Would patient like to speak to a pharmacist? No   When does the patient need to receive the medication? 12/22/22   Refill Delivery Questions    How will the patient receive the medication? MEDRx   When does the patient need to receive the medication? 12/22/22   Shipping Address Home   Address in [facility] confirmed and updated if neccessary? Yes   Expected Copay ($) 0   Is the patient able to afford the medication copay? Yes   Payment Method zero copay   Days supply of Refill 30   Supplies needed? No supplies needed   Refill activity completed? Yes   Refill activity plan Refill scheduled   Shipment/Pickup Date: 12/20/22            Current Outpatient Medications   Medication Sig    abiraterone (ZYTIGA) 250 mg Tab Take 4 tablets (1,000 mg total) by mouth once daily.    ACCU-CHEK SOFTCLIX LANCETS Misc TEST BLOOD SUGAR ONCE A DAY    amLODIPine (NORVASC) 10 MG tablet Take 1 tablet (10 mg total) by mouth once daily.    atorvastatin (LIPITOR) 20 MG tablet Take 1 tablet (20 mg total) by mouth once daily.    blood sugar diagnostic  "Strp 1 each by Misc.(Non-Drug; Combo Route) route once daily.    blood sugar diagnostic Strp Pt to check up to 6 times daily    blood sugar diagnostic Strp Use to check blood glucose 1-2 times daily as directed.    blood-glucose meter kit Use as instructed    BOOSTRIX TDAP 2.5-8-5 Lf-mcg-Lf/0.5mL Syrg injection     carvediloL (COREG) 3.125 MG tablet Take 1 tablet (3.125 mg total) by mouth 2 (two) times daily.    diazePAM (VALIUM) 5 MG tablet Take 1 tablet (5 mg total) by mouth as needed for Anxiety (take 1 tablet 30 minute prior to procedure).    diclofenac (VOLTAREN) 75 MG EC tablet Take 1 tablet (75 mg total) by mouth 2 (two) times daily.    doxazosin (CARDURA) 4 MG tablet TAKE 1 TABLET(4 MG) BY MOUTH EVERY EVENING    gabapentin (NEURONTIN) 300 MG capsule Take 1 capsule (300 mg total) by mouth 3 (three) times daily.    glipiZIDE (GLUCOTROL) 10 MG tablet Take 1 tablet (10 mg total) by mouth 2 (two) times daily before meals.    hydroCHLOROthiazide (HYDRODIURIL) 25 MG tablet TAKE ONE-HALF TABLET BY MOUTH ONCE DAILY    insulin (LANTUS SOLOSTAR U-100 INSULIN) glargine 100 units/mL (3mL) SubQ pen Inject 22 Units into the skin every evening.    insulin aspart U-100 (NOVOLOG U-100 INSULIN ASPART) 100 unit/mL injection Inject 5 Units into the skin 3 (three) times daily before meals.    irbesartan (AVAPRO) 300 MG tablet Take 1 tablet (300 mg total) by mouth every evening.    lancing device with lancets Kit 1 each by Misc.(Non-Drug; Combo Route) route 2 (two) times daily.    metFORMIN (GLUCOPHAGE) 1000 MG tablet Take 1 tablet (1,000 mg total) by mouth 2 (two) times daily with meals.    pen needle, diabetic (BD ULTRA-FINE KELECHI PEN NEEDLE) 32 gauge x 5/32" Ndle To use with insulin pens at meals and evening shot.    polyethylene glycol (GOLYTELY) 236-22.74-6.74 -5.86 gram suspension Take 4,000 mLs by mouth once.    predniSONE (DELTASONE) 5 MG tablet Take 1 tablet (5 mg total) by mouth 2 (two) times daily.    predniSONE " (DELTASONE) 5 MG tablet Take 1 tablet (5 mg total) by mouth 2 (two) times daily.    tobramycin sulfate 0.3% (TOBREX) 0.3 % ophthalmic solution 1-2 drops topically twice daily to affected toe(s).    tobramycin sulfate 0.3% (TOBREX) 0.3 % ophthalmic solution 1-2 drops topically twice daily to affected toe(s).    triamcinolone acetonide 0.1% (KENALOG) 0.1 % cream Apply topically 2 (two) times daily.   Last reviewed on 12/9/2022 12:32 PM by Jossie Vega RD    Review of patient's allergies indicates:   Allergen Reactions    Hay fever and allergy relief     Last reviewed on  12/9/2022 9:02 AM by Demi Bear      Tasks added this encounter   No tasks added.   Tasks due within next 3 months   12/14/2022 - Clinical - Follow Up Assesement (180 day)  1/14/2023 - Refill Call (Auto Added)  12/26/2022 - Refill Call (Auto Added)     Tammy Alfred, PharmD  [facility]  [address] [place] [id number]  Phone: [phone]  Fax: [phone]

## 2022-12-15 NOTE — TELEPHONE ENCOUNTER
Specialty Pharmacy - Refill Coordination  Specialty Pharmacy - Clinical Reassessment    Specialty Medication Orders Linked to Encounter      Flowsheet Row Most Recent Value   Medication #1 abiraterone (ZYTIGA) 250 mg Tab (Order#[id number], Rx#[id number])   Medication #2 predniSONE (DELTASONE) 5 MG tablet (Order#[id number], Rx#[id number])          Patient Diagnosis   Z85.46 - History of prostate cancer    Misha Eldridge Jr. is a 77 y.o. male, who is followed by the specialty pharmacy service for management and education of his Zytiga.  He has been on therapy with Zytiga for 6 months.  I have reviewed his electronic medical record and current medication list and determined that specialty medication adjustment Is not needed at this time.    Patient has not experienced adverse events.  He Is adherent reporting 0 missed doses since last review.  Adherence has been encouraged with the following mechanism(s): proactive refill calls.  He is meeting goals of therapy and will continue treatment.        12/15/2022 11/22/2022 10/18/2022 9/21/2022 8/19/2022 7/26/2022 6/24/2022   Follow Up Review   # of missed doses 0 0 0 0 0 0    New Medications? No No No No No No    New Conditions? No No No No No No    New Allergies? No No No No No No    Med Effective? Good Good Excellent Good Fair Good    Missed activities?       No   Urgent Care? No No No No No No    Requested Pharmacist? No No No No No No        Multiple values from one day are sorted in reverse-chronological order         Therapy is appropriate to continue.    Therapy is effective: Yes  On scale of 1 to 10, how does patient rank quality of life? (10 - Best): 6  Recommendations: none at this time.  Review Method: Patient Contact    Tasks added this encounter   No tasks added.   Tasks due within next 3 months   1/14/2023 - Refill Call (Auto Added)  12/26/2022 - Refill Call (Auto Added)     Tammy Alfred, PharmD  [facility]  [address] [facility]  [address]  [place] [address]  Phone: [phone]  Fax: [phone]

## 2022-12-16 ENCOUNTER — TELEPHONE (OUTPATIENT)
Dept: INTERNAL MEDICINE | Facility: CLINIC | Age: 77
End: 2022-12-16
Payer: MEDICARE

## 2022-12-16 ENCOUNTER — CLINICAL SUPPORT (OUTPATIENT)
Dept: DIABETES | Facility: CLINIC | Age: 77
End: 2022-12-16
Payer: MEDICARE

## 2022-12-16 VITALS — BODY MASS INDEX: 26.82 KG/M2 | HEIGHT: 73 IN | WEIGHT: 202.38 LBS

## 2022-12-16 DIAGNOSIS — Z79.4 TYPE 2 DIABETES MELLITUS WITH HYPERGLYCEMIA, WITH LONG-TERM CURRENT USE OF INSULIN: Primary | ICD-10-CM

## 2022-12-16 DIAGNOSIS — E11.65 TYPE 2 DIABETES MELLITUS WITH HYPERGLYCEMIA, WITH LONG-TERM CURRENT USE OF INSULIN: Primary | ICD-10-CM

## 2022-12-16 PROCEDURE — 77014 PR  CT GUIDANCE PLACEMENT RAD THERAPY FIELDS: CPT | Mod: 26,,, | Performed by: STUDENT IN AN ORGANIZED HEALTH CARE EDUCATION/TRAINING PROGRAM

## 2022-12-16 PROCEDURE — 99999 PR PBB SHADOW E&M-EST. PATIENT-LVL I: CPT | Mod: PBBFAC,,, | Performed by: DIETITIAN, REGISTERED

## 2022-12-16 PROCEDURE — 77014 HC CT GUIDANCE RADIATION THERAPY FLDS PLACEMENT: CPT | Mod: TC | Performed by: STUDENT IN AN ORGANIZED HEALTH CARE EDUCATION/TRAINING PROGRAM

## 2022-12-16 PROCEDURE — G0108 PR DIAB MANAGE TRN  PER INDIV: ICD-10-PCS | Mod: S$GLB,,, | Performed by: DIETITIAN, REGISTERED

## 2022-12-16 PROCEDURE — 77014 PR  CT GUIDANCE PLACEMENT RAD THERAPY FIELDS: ICD-10-PCS | Mod: 26,,, | Performed by: STUDENT IN AN ORGANIZED HEALTH CARE EDUCATION/TRAINING PROGRAM

## 2022-12-16 PROCEDURE — 99999 PR PBB SHADOW E&M-EST. PATIENT-LVL I: ICD-10-PCS | Mod: PBBFAC,,, | Performed by: DIETITIAN, REGISTERED

## 2022-12-16 PROCEDURE — G0108 DIAB MANAGE TRN  PER INDIV: HCPCS | Mod: S$GLB,,, | Performed by: DIETITIAN, REGISTERED

## 2022-12-16 PROCEDURE — 77385 HC IMRT, SIMPLE: CPT | Performed by: STUDENT IN AN ORGANIZED HEALTH CARE EDUCATION/TRAINING PROGRAM

## 2022-12-16 NOTE — PROGRESS NOTES
Diabetes Care Specialist Progress Note  Author: Jossie Vega RD  Date: 12/16/2022    Program Intake  Reason for Diabetes Program Visit:: Intervention  Type of Intervention:: Individual  Individual: Education  Education: Pattern Management, Self-Management Skill Review  Current diabetes risk level:: high  In the last 12 months, have you:: been admitted to a hospital  Was the ER or hospital admission related to diabetes?: No    Lab Results   Component Value Date    HGBA1C 13.3 (H) 11/21/2022       Clinical    Nutritional Status  Diet: Regular  Meal Plan 24 Hour Recall: Breakfast, Lunch, Dinner  Meal Plan 24 Hour Recall - Breakfast: 1 pkt grits, egg, milk, coffee  Meal Plan 24 Hour Recall - Lunch: garlic weiner  Meal Plan 24 Hour Recall - Dinner: field peas, cornbread, pig tails, water      Diabetes Self-Management Skills Assessment    Diabetes Disease Process/Treatment Options  Patient/caregiver able to state what happens when someone has diabetes.: yes  Patient/caregiver knows what type of diabetes they have.: yes  Diabetes Type : Type II  Patient/caregiver able to identify at least three signs and symptoms of diabetes.: yes  Identified signs and symptoms:: blurred vision, increased thirst  Diabetes Disease Process/Treatment Options: Skills Assessment Completed: Yes  Assessment indicates:: Adequate understanding  Area of need?: No    Nutrition/Healthy Eating  Method of carbohydrate measurement:: eyeballing/guessing  Patient can identify foods that impact blood sugar.: yes  Patient-identified foods:: sweets, soda, starches (bread, pasta, rice, cereal), starchy vegetables (corn, peas, beans), fruit/fruit juice, milk  Nutrition/Healthy Eating Skills Assessment Completed:: Yes  Assessment indicates:: Adequate understanding  Area of need?: No    Physical Activity/Exercise  Patient's daily activity level:: sedentary  Patient formally exercises outside of work.: no  Reasons for not exercising:: physically unable to  exercise currently (currently undergoing radiation daily - feeling fatigued/tired most days)  Patient can identify forms of physical activity.: yes  Stated forms of physical activity:: any movement performed by muscles that uses energy  Patient can identify reasons why exercise/physical activity is important in diabetes management.: yes  Identified reasons:: lowers blood glucose, blood pressure, and cholesterol  Physical Activity/Exercise Skills Assessment Completed: : Yes  Assessment indicates:: Other (comment) (unable to do additional activity at this time d/t fatigue with cancer treatment)  Area of need?: No    Medications  Patient is able to describe current diabetes management routine.: yes  Diabetes management routine:: oral medications, insulin  Patient is able to identify current diabetes medications, dosages, and appropriate timing of medications.: yes  Patient understands the purpose of the medications taken for diabetes.: yes  Patient reports problems or concerns with current medication regimen.: yes  Medication regimen problems/concerns:: does not feel like regimen is working (BGs remaining high with currently being on prednisone needed with cancer treatment)  Medication Skills Assessment Completed:: Yes  Assessment indicates:: Knowledge deficit, Instruction Needed  Area of need?: Yes    Home Blood Glucose Monitoring  Patient states that blood sugar is checked at home daily.: yes  Monitoring Method:: home glucometer  How often do you check your blood sugar?: Twice a day  When do you check your blood sugar?: Before breakfast, Before dinner  When you check what is your typical blood sugar range? : brought meter today - majority of readings 250s-320s. Highest reading 426, lowest 161  Blood glucose logs:: encouraged to keep logs, encouraged to bring logs to provider visits, yes  Blood glucose logs reviewed today?: yes  Home Blood Glucose Monitoring Skills Assessment Completed: : Yes  Assessment indicates::  Knowledge deficit, Instruction Needed  Area of need?: Yes    Acute Complications  Acute Complications Skills Assessment Completed: : No  Deffered due to:: Time    Chronic Complications  Chronic Complications Skills Assessment Completed: : No  Deferred due to:: Time    Psychosocial/Coping  Psychosocial/Coping Skills Assessment Completed: : No  Deffered due to:: Time      Assessment Summary and Plan    Based on today's diabetes care assessment, the following areas of need were identified:      Social 12/9/2022   Cognitive/Behavioral Health No   Culture/Synagogue No   Health Literacy No        Clinical 12/9/2022   Medication Adherence No   Lab Compliance No        Diabetes Self-Management Skills 12/16/2022   Diabetes Disease Process/Treatment Options No   Nutrition/Healthy Eating No   Physical Activity/Exercise No   Medication Yes - see care planning   Home Blood Glucose Monitoring Yes - reviewed goal BG ranges, timing and bringing log to appts.   Acute Complications -          Today's interventions were provided through individual discussion, instruction, and written materials were provided.      Patient verbalized understanding of instruction and written materials.  Pt was able to return back demonstration of instructions today. Patient understood key points, needs reinforcement and further instruction.     Diabetes Self-Management Care Plan:    Today's Diabetes Self-Management Care Plan was developed with Misha's input. Misha has agreed to work toward the following goal(s) to improve his/her overall diabetes control.      Care Plan: Diabetes Management   Updates made since 11/16/2022 12:00 AM        Problem: Medications         Goal: Pt will start taking Novolog 5 units before each meal.    Start Date: 12/9/2022   Expected End Date: 12/16/2022   This Visit's Progress: No change   Priority: High   Barriers: No Barriers Identified   Note:    12/9/22 - Pt initially did not have an appt today but came to clinic c/o BG  "staying high. Was able to get pt with me. Pt reported has had significantly elevated BG (300s and 400s) since last month when started on 4 new pills for cancer treatment. Pt was started on prednisone to take along with his radiation treatment. Going through 45 days of radiation. States he was able to get it down as far as 210 or 220 a couple weeks ago after eating only salads and doing a lot of walking ("about 30 blocks"). Has been more tired past week and unable to walk much. Explained prednisone is causing high BG, and while activity and controlling carb intake will help, it will not be enough to reach goal BGs. Discussed care with STAR Mendosa (Dr. Treviño is not in office today and pt has seen Demi recently). Pt started on Novolog 5 units before meals. Pt already on long-acting insulin. Educated pt on rapid-acting insulin, only take before meals, must take right before eating and no more than 15 min before, skip shot if skipping meal, and importance of right shot right time. Encouraged SMBG AC/HS, provided logs, and encouraged bringing log of BGs to future appts.     12/16/22 - Pt went to  Novolog from pharmacy but was vial insulin instead of pens. He left vial at pharmacy but did not call about getting Rx changed. Continues taking Lantus but has not yet started Novolog. Discussed with current readings and requiring prednisone, he will need to start Novolog. Communicated with prescriber regarding changing Rx to Novolog pen. Reviewed again with pt MDI instructions. Novolog 5 units before each meal, 5-15 min before, skip shot if skipping meal, and continue Lantus HS.        Task: Reviewed with patient all current diabetes medications and provided basic review of the purpose, dosage, frequency, side effects, and storage of both oral and injectable diabetes medications. Completed 12/9/2022        Task: Discussed guidelines for preventing, detecting and treating hypoglycemia and hyperglycemia and " reviewed the importance of meal and medication timing with diabetes mediations for prevention of hypoglycemia and maximum drug benefit. Completed 12/9/2022          Follow Up Plan     Follow up in about 6 days (around 12/22/2022) for diabetes education/log reveiw.    Today's care plan and follow up schedule was discussed with patient.  Misha verbalized understanding of the care plan, goals, and agrees to follow up plan.        The patient was encouraged to communicate with his/her health care provider/physician and care team regarding his/her condition(s) and treatment.  I provided the patient with my contact information today and encouraged to contact me via phone or [facility] Patient Portal as needed.     Length of Visit   Total Time: 30 Minutes

## 2022-12-16 NOTE — TELEPHONE ENCOUNTER
There was no paperwork in the fax machine to have one of our providers fill out for Mr. Eldridge.     PA for Novolog was denied. Denial reason:  Medication isn't covered under medicare part D.  Preferred Novolog or Novolin insulin and do not require a prior auth.    Medicare will cover Insulin Aspart if they receive:  Medical reason  Letter from Provider stating why pt needs Insulin Aspart and why Novolog or Novolin is dangerous for the patient to take.

## 2022-12-16 NOTE — TELEPHONE ENCOUNTER
There was no paperwork in the fax machine to have one of our providers fill out for Mr. Eldridge.      PA for Novolog was denied. Denial reason:  Medication isn't covered under medicare part D.  Preferred Novolog or Novolin insulin and do not require a prior auth.     Medicare will cover Insulin Aspart if they receive:  Medical reason  Letter from Provider stating why pt needs Insulin Aspart and why Novolog or Novolin is dangerous for the patient to take.   Msg has been forwarded to Dr. Treviño for review.

## 2022-12-18 PROCEDURE — 77338 DESIGN MLC DEVICE FOR IMRT: CPT | Mod: 26,,, | Performed by: STUDENT IN AN ORGANIZED HEALTH CARE EDUCATION/TRAINING PROGRAM

## 2022-12-18 PROCEDURE — 77338 DESIGN MLC DEVICE FOR IMRT: CPT | Mod: TC | Performed by: STUDENT IN AN ORGANIZED HEALTH CARE EDUCATION/TRAINING PROGRAM

## 2022-12-18 PROCEDURE — 77300 PR RADIATION THERAPY,DOSIMETRY PLAN: ICD-10-PCS | Mod: 26,,, | Performed by: STUDENT IN AN ORGANIZED HEALTH CARE EDUCATION/TRAINING PROGRAM

## 2022-12-18 PROCEDURE — 77338 PR  MLC IMRT DESIGN & CONSTRUCTION PER IMRT PLAN: ICD-10-PCS | Mod: 26,,, | Performed by: STUDENT IN AN ORGANIZED HEALTH CARE EDUCATION/TRAINING PROGRAM

## 2022-12-18 PROCEDURE — 77300 RADIATION THERAPY DOSE PLAN: CPT | Mod: 26,,, | Performed by: STUDENT IN AN ORGANIZED HEALTH CARE EDUCATION/TRAINING PROGRAM

## 2022-12-18 PROCEDURE — 77300 RADIATION THERAPY DOSE PLAN: CPT | Mod: TC | Performed by: STUDENT IN AN ORGANIZED HEALTH CARE EDUCATION/TRAINING PROGRAM

## 2022-12-19 ENCOUNTER — TELEPHONE (OUTPATIENT)
Dept: INTERNAL MEDICINE | Facility: CLINIC | Age: 77
End: 2022-12-19
Payer: MEDICARE

## 2022-12-19 PROCEDURE — 77014 PR  CT GUIDANCE PLACEMENT RAD THERAPY FIELDS: CPT | Mod: 26,,, | Performed by: STUDENT IN AN ORGANIZED HEALTH CARE EDUCATION/TRAINING PROGRAM

## 2022-12-19 PROCEDURE — 77385 HC IMRT, SIMPLE: CPT | Performed by: STUDENT IN AN ORGANIZED HEALTH CARE EDUCATION/TRAINING PROGRAM

## 2022-12-19 PROCEDURE — 77014 HC CT GUIDANCE RADIATION THERAPY FLDS PLACEMENT: CPT | Mod: TC | Performed by: STUDENT IN AN ORGANIZED HEALTH CARE EDUCATION/TRAINING PROGRAM

## 2022-12-19 PROCEDURE — 77014 PR  CT GUIDANCE PLACEMENT RAD THERAPY FIELDS: ICD-10-PCS | Mod: 26,,, | Performed by: STUDENT IN AN ORGANIZED HEALTH CARE EDUCATION/TRAINING PROGRAM

## 2022-12-19 PROCEDURE — 77336 RADIATION PHYSICS CONSULT: CPT | Performed by: STUDENT IN AN ORGANIZED HEALTH CARE EDUCATION/TRAINING PROGRAM

## 2022-12-19 NOTE — TELEPHONE ENCOUNTER
See prev notes, under orders - unable to make encounter there, so copied here:     MD Chema Nava [place] Staff 2 hours ago (12:40 PM)     LM  Novolog pens sent, please let pt know, thanks!         Note           HERSON Guzman MD 3 days ago     LC  This was sent to Demi, but she is out and no one covering for her listed on pegboard. Are you able to send this in? Vials were accidentally sent. Should be flexpen as pended by diabetic education nurse.       KARRIE Thorpe PA-C 3 days ago     BS  Good morning!   I have Mr. Cabral with me. He went to  the Novolog but it was vial instead of pens. He just left it at the pharmacy and didn't start yet. I've queued up the pen Rx if you are okay with it. Please advise.   Many thanks,   Jossie

## 2022-12-19 NOTE — TELEPHONE ENCOUNTER
Called pt to inform of approval. Pt states they are having trouble with getting it authorized.    Notifying MICHAEL Vega in case she has suggestions.

## 2022-12-20 PROCEDURE — 77385 HC IMRT, SIMPLE: CPT | Performed by: STUDENT IN AN ORGANIZED HEALTH CARE EDUCATION/TRAINING PROGRAM

## 2022-12-20 PROCEDURE — 77014 HC CT GUIDANCE RADIATION THERAPY FLDS PLACEMENT: CPT | Mod: TC | Performed by: STUDENT IN AN ORGANIZED HEALTH CARE EDUCATION/TRAINING PROGRAM

## 2022-12-20 PROCEDURE — 77014 PR  CT GUIDANCE PLACEMENT RAD THERAPY FIELDS: CPT | Mod: 26,,, | Performed by: STUDENT IN AN ORGANIZED HEALTH CARE EDUCATION/TRAINING PROGRAM

## 2022-12-20 PROCEDURE — 77014 PR  CT GUIDANCE PLACEMENT RAD THERAPY FIELDS: ICD-10-PCS | Mod: 26,,, | Performed by: STUDENT IN AN ORGANIZED HEALTH CARE EDUCATION/TRAINING PROGRAM

## 2022-12-20 NOTE — TELEPHONE ENCOUNTER
Novolog PA Ref# [id number]    Because of the dosage for the Novolog, another PA was required. Will receive an answer via fax within 24-72 hours.

## 2022-12-21 PROCEDURE — 77014 PR  CT GUIDANCE PLACEMENT RAD THERAPY FIELDS: ICD-10-PCS | Mod: 26,,, | Performed by: STUDENT IN AN ORGANIZED HEALTH CARE EDUCATION/TRAINING PROGRAM

## 2022-12-21 PROCEDURE — 77385 HC IMRT, SIMPLE: CPT | Performed by: STUDENT IN AN ORGANIZED HEALTH CARE EDUCATION/TRAINING PROGRAM

## 2022-12-21 PROCEDURE — 77014 PR  CT GUIDANCE PLACEMENT RAD THERAPY FIELDS: CPT | Mod: 26,,, | Performed by: STUDENT IN AN ORGANIZED HEALTH CARE EDUCATION/TRAINING PROGRAM

## 2022-12-21 PROCEDURE — 77014 HC CT GUIDANCE RADIATION THERAPY FLDS PLACEMENT: CPT | Mod: TC | Performed by: STUDENT IN AN ORGANIZED HEALTH CARE EDUCATION/TRAINING PROGRAM

## 2022-12-22 ENCOUNTER — CLINICAL SUPPORT (OUTPATIENT)
Dept: DIABETES | Facility: CLINIC | Age: 77
End: 2022-12-22
Payer: MEDICARE

## 2022-12-22 ENCOUNTER — DOCUMENTATION ONLY (OUTPATIENT)
Dept: RADIATION ONCOLOGY | Facility: CLINIC | Age: 77
End: 2022-12-22
Payer: MEDICARE

## 2022-12-22 DIAGNOSIS — Z79.4 TYPE 2 DIABETES MELLITUS WITH HYPERGLYCEMIA, WITH LONG-TERM CURRENT USE OF INSULIN: Primary | ICD-10-CM

## 2022-12-22 DIAGNOSIS — E11.65 TYPE 2 DIABETES MELLITUS WITH HYPERGLYCEMIA, WITH LONG-TERM CURRENT USE OF INSULIN: Primary | ICD-10-CM

## 2022-12-22 PROCEDURE — 77014 PR  CT GUIDANCE PLACEMENT RAD THERAPY FIELDS: ICD-10-PCS | Mod: 26,,, | Performed by: STUDENT IN AN ORGANIZED HEALTH CARE EDUCATION/TRAINING PROGRAM

## 2022-12-22 PROCEDURE — 77014 PR  CT GUIDANCE PLACEMENT RAD THERAPY FIELDS: CPT | Mod: 26,,, | Performed by: STUDENT IN AN ORGANIZED HEALTH CARE EDUCATION/TRAINING PROGRAM

## 2022-12-22 PROCEDURE — 77014 HC CT GUIDANCE RADIATION THERAPY FLDS PLACEMENT: CPT | Mod: TC | Performed by: STUDENT IN AN ORGANIZED HEALTH CARE EDUCATION/TRAINING PROGRAM

## 2022-12-22 PROCEDURE — 77385 HC IMRT, SIMPLE: CPT | Performed by: STUDENT IN AN ORGANIZED HEALTH CARE EDUCATION/TRAINING PROGRAM

## 2022-12-22 NOTE — PROGRESS NOTES
Diabetes Care Specialist Progress Note  Author: Jossie Vega RD  Date: 12/22/2022    Program Intake  Reason for Diabetes Program Visit:: Intervention  Type of Intervention:: Individual  Individual: Education  Education: Self-Management Skill Review  Current diabetes risk level:: high  In the last 12 months, have you:: been admitted to a hospital  Was the ER or hospital admission related to diabetes?: No    Lab Results   Component Value Date    HGBA1C 13.3 (H) 11/21/2022       Diabetes Self-Management Skills Assessment    Diabetes Disease Process/Treatment Options  Diabetes Disease Process/Treatment Options: Skills Assessment Completed: No  Deferred due to:: Time    Nutrition/Healthy Eating  Nutrition/Healthy Eating Skills Assessment Completed:: No  Deffered due to:: Time    Physical Activity/Exercise  Physical Activity/Exercise Skills Assessment Completed: : No  Deffered due to:: Time    Medications  Patient is able to describe current diabetes management routine.: yes  Diabetes management routine:: insulin, oral medications  Patient is able to identify current diabetes medications, dosages, and appropriate timing of medications.: yes  Patient understands the purpose of the medications taken for diabetes.: yes  Patient reports problems or concerns with current medication regimen.: yes  Medication regimen problems/concerns:: does not feel like regimen is working, other (see comments) (prednisone causing severe hyperglycemia. Pt has not yet started Novolog.)  Medication Skills Assessment Completed:: Yes  Assessment indicates:: Instruction Needed  Area of need?: Yes    Home Blood Glucose Monitoring  Patient states that blood sugar is checked at home daily.: yes  Monitoring Method:: home glucometer  How often do you check your blood sugar?: Twice a day  When do you check your blood sugar?: Before breakfast, Before dinner  When you check what is your typical blood sugar range? : did not bring meter today  Blood  glucose logs:: no, encouraged to keep logs, encouraged to bring logs to provider visits  Blood glucose logs reviewed today?: no  Home Blood Glucose Monitoring Skills Assessment Completed: : Yes  Assessment indicates:: Instruction Needed  Area of need?: Yes    Acute Complications  Acute Complications Skills Assessment Completed: : No  Deffered due to:: Time    Chronic Complications  Chronic Complications Skills Assessment Completed: : No  Deferred due to:: Time    Psychosocial/Coping  Psychosocial/Coping Skills Assessment Completed: : No  Deffered due to:: Time      Assessment Summary and Plan    Based on today's diabetes care assessment, the following areas of need were identified:      Social 12/9/2022   Cognitive/Behavioral Health No   Culture/Buddhism No   Health Literacy No        Clinical 12/9/2022   Medication Adherence No   Lab Compliance No        Diabetes Self-Management Skills 12/22/2022   Diabetes Disease Process/Treatment Options -   Nutrition/Healthy Eating -   Physical Activity/Exercise -   Medication Yes - see care planning   Home Blood Glucose Monitoring Yes - reviewed and encouraged keeping BG log and bringing to appts. Explained importance of readings in effective medication adjustment.   Acute Complications -          Today's interventions were provided through individual discussion, instruction, and written materials were provided.      Patient verbalized understanding of instruction and written materials.  Pt was able to return back demonstration of instructions today. Patient understood key points, needs reinforcement and further instruction.     Diabetes Self-Management Care Plan:    Today's Diabetes Self-Management Care Plan was developed with Misha's input. Misha has agreed to work toward the following goal(s) to improve his/her overall diabetes control.      Care Plan: Diabetes Management   Updates made since 11/22/2022 12:00 AM        Problem: Medications         Goal: Pt will start taking  "Novolog 5 units before each meal.    Start Date: 12/9/2022   Expected End Date: 12/16/2022   This Visit's Progress: No change   Recent Progress: No change   Priority: High   Barriers: No Barriers Identified   Note:    12/9/22 - Pt initially did not have an appt today but came to clinic c/o BG staying high. Was able to get pt with me. Pt reported has had significantly elevated BG (300s and 400s) since last month when started on 4 new pills for cancer treatment. Pt was started on prednisone to take along with his radiation treatment. Going through 45 days of radiation. States he was able to get it down as far as 210 or 220 a couple weeks ago after eating only salads and doing a lot of walking ("about 30 blocks"). Has been more tired past week and unable to walk much. Explained prednisone is causing high BG, and while activity and controlling carb intake will help, it will not be enough to reach goal BGs. Discussed care with STAR Mendosa (Dr. Treviño is not in office today and pt has seen Demi recently). Pt started on Novolog 5 units before meals. Pt already on long-acting insulin. Educated pt on rapid-acting insulin, only take before meals, must take right before eating and no more than 15 min before, skip shot if skipping meal, and importance of right shot right time. Encouraged SMBG AC/HS, provided logs, and encouraged bringing log of BGs to future appts.     12/16/22 - Pt went to  Novolog from pharmacy but was vial insulin instead of pens. He left vial at pharmacy but did not call about getting Rx changed. Continues taking Lantus but has not yet started Novolog. Discussed with current readings and requiring prednisone, he will need to start Novolog. Communicated with prescriber regarding changing Rx to Novolog pen. Reviewed again with pt MDI instructions. Novolog 5 units before each meal, 5-15 min before, skip shot if skipping meal, and continue Lantus HS.     12/22/22 - Pt still has not started " Novolog. Contacted Megan earlier this week and Rx has been ready for . Discussed with pt it has been ready for him. Called Megan on speaker phone with pt in the office. Confirmed he can  Rx now. Reviewed timing, dosage and only skip if skipping a meal and do not take with snack between meals. He often eats a lot of fruit for snacks. Encouraged limiting to 1 piece of fruit. Also reviewed continue to take Lantus every night.        Task: Reviewed with patient all current diabetes medications and provided basic review of the purpose, dosage, frequency, side effects, and storage of both oral and injectable diabetes medications. Completed 12/9/2022        Task: Discussed guidelines for preventing, detecting and treating hypoglycemia and hyperglycemia and reviewed the importance of meal and medication timing with diabetes mediations for prevention of hypoglycemia and maximum drug benefit. Completed 12/9/2022          Follow Up Plan     Follow up in about 8 days (around 12/30/2022) for follow-up/BG review.    Today's care plan and follow up schedule was discussed with patient.  Nelson verbalized understanding of the care plan, goals, and agrees to follow up plan.        The patient was encouraged to communicate with his/her health care provider/physician and care team regarding his/her condition(s) and treatment.  I provided the patient with my contact information today and encouraged to contact me via phone or [facility] Patient Portal as needed.     Length of Visit   Total Time: 10 Minutes   Pt arrived 53 min late today d/t delays at radiation treatment this morning. Visit abbreviated to only 10 min and no charges entered.

## 2022-12-23 PROCEDURE — 77385 HC IMRT, SIMPLE: CPT | Performed by: STUDENT IN AN ORGANIZED HEALTH CARE EDUCATION/TRAINING PROGRAM

## 2022-12-23 PROCEDURE — 77014 HC CT GUIDANCE RADIATION THERAPY FLDS PLACEMENT: CPT | Mod: TC | Performed by: STUDENT IN AN ORGANIZED HEALTH CARE EDUCATION/TRAINING PROGRAM

## 2022-12-23 PROCEDURE — 77014 PR  CT GUIDANCE PLACEMENT RAD THERAPY FIELDS: CPT | Mod: 26,,, | Performed by: STUDENT IN AN ORGANIZED HEALTH CARE EDUCATION/TRAINING PROGRAM

## 2022-12-23 PROCEDURE — 77014 PR  CT GUIDANCE PLACEMENT RAD THERAPY FIELDS: ICD-10-PCS | Mod: 26,,, | Performed by: STUDENT IN AN ORGANIZED HEALTH CARE EDUCATION/TRAINING PROGRAM

## 2022-12-27 ENCOUNTER — SPECIALTY PHARMACY (OUTPATIENT)
Dept: PHARMACY | Facility: CLINIC | Age: 77
End: 2022-12-27
Payer: MEDICARE

## 2022-12-27 PROCEDURE — 77385 HC IMRT, SIMPLE: CPT | Performed by: STUDENT IN AN ORGANIZED HEALTH CARE EDUCATION/TRAINING PROGRAM

## 2022-12-27 PROCEDURE — 77014 PR  CT GUIDANCE PLACEMENT RAD THERAPY FIELDS: ICD-10-PCS | Mod: 26,,, | Performed by: STUDENT IN AN ORGANIZED HEALTH CARE EDUCATION/TRAINING PROGRAM

## 2022-12-27 PROCEDURE — 77336 RADIATION PHYSICS CONSULT: CPT | Performed by: STUDENT IN AN ORGANIZED HEALTH CARE EDUCATION/TRAINING PROGRAM

## 2022-12-27 PROCEDURE — 77014 PR  CT GUIDANCE PLACEMENT RAD THERAPY FIELDS: CPT | Mod: 26,,, | Performed by: STUDENT IN AN ORGANIZED HEALTH CARE EDUCATION/TRAINING PROGRAM

## 2022-12-27 PROCEDURE — 77014 HC CT GUIDANCE RADIATION THERAPY FLDS PLACEMENT: CPT | Mod: TC | Performed by: STUDENT IN AN ORGANIZED HEALTH CARE EDUCATION/TRAINING PROGRAM

## 2022-12-28 ENCOUNTER — OFFICE VISIT (OUTPATIENT)
Dept: INTERNAL MEDICINE | Facility: CLINIC | Age: 77
End: 2022-12-28
Payer: MEDICARE

## 2022-12-28 VITALS
HEIGHT: 73 IN | SYSTOLIC BLOOD PRESSURE: 126 MMHG | BODY MASS INDEX: 26.97 KG/M2 | DIASTOLIC BLOOD PRESSURE: 80 MMHG | OXYGEN SATURATION: 98 % | WEIGHT: 203.5 LBS | HEART RATE: 88 BPM

## 2022-12-28 DIAGNOSIS — E11.65 TYPE 2 DIABETES MELLITUS WITH HYPERGLYCEMIA, WITH LONG-TERM CURRENT USE OF INSULIN: Primary | ICD-10-CM

## 2022-12-28 DIAGNOSIS — Z79.4 TYPE 2 DIABETES MELLITUS WITH HYPERGLYCEMIA, WITH LONG-TERM CURRENT USE OF INSULIN: Primary | ICD-10-CM

## 2022-12-28 PROCEDURE — 99214 OFFICE O/P EST MOD 30 MIN: CPT | Mod: S$GLB,,, | Performed by: PHYSICIAN ASSISTANT

## 2022-12-28 PROCEDURE — 99214 PR OFFICE/OUTPT VISIT, EST, LEVL IV, 30-39 MIN: ICD-10-PCS | Mod: S$GLB,,, | Performed by: PHYSICIAN ASSISTANT

## 2022-12-28 PROCEDURE — 77014 PR  CT GUIDANCE PLACEMENT RAD THERAPY FIELDS: CPT | Mod: 26,,, | Performed by: STUDENT IN AN ORGANIZED HEALTH CARE EDUCATION/TRAINING PROGRAM

## 2022-12-28 PROCEDURE — 3074F PR MOST RECENT SYSTOLIC BLOOD PRESSURE < 130 MM HG: ICD-10-PCS | Mod: CPTII,S$GLB,, | Performed by: PHYSICIAN ASSISTANT

## 2022-12-28 PROCEDURE — 77014 HC CT GUIDANCE RADIATION THERAPY FLDS PLACEMENT: CPT | Mod: TC | Performed by: STUDENT IN AN ORGANIZED HEALTH CARE EDUCATION/TRAINING PROGRAM

## 2022-12-28 PROCEDURE — 1125F AMNT PAIN NOTED PAIN PRSNT: CPT | Mod: CPTII,S$GLB,, | Performed by: PHYSICIAN ASSISTANT

## 2022-12-28 PROCEDURE — 1101F PR PT FALLS ASSESS DOC 0-1 FALLS W/OUT INJ PAST YR: ICD-10-PCS | Mod: CPTII,S$GLB,, | Performed by: PHYSICIAN ASSISTANT

## 2022-12-28 PROCEDURE — 77385 HC IMRT, SIMPLE: CPT | Performed by: STUDENT IN AN ORGANIZED HEALTH CARE EDUCATION/TRAINING PROGRAM

## 2022-12-28 PROCEDURE — 3072F LOW RISK FOR RETINOPATHY: CPT | Mod: CPTII,S$GLB,, | Performed by: PHYSICIAN ASSISTANT

## 2022-12-28 PROCEDURE — 3079F DIAST BP 80-89 MM HG: CPT | Mod: CPTII,S$GLB,, | Performed by: PHYSICIAN ASSISTANT

## 2022-12-28 PROCEDURE — 3079F PR MOST RECENT DIASTOLIC BLOOD PRESSURE 80-89 MM HG: ICD-10-PCS | Mod: CPTII,S$GLB,, | Performed by: PHYSICIAN ASSISTANT

## 2022-12-28 PROCEDURE — 3072F PR LOW RISK FOR RETINOPATHY: ICD-10-PCS | Mod: CPTII,S$GLB,, | Performed by: PHYSICIAN ASSISTANT

## 2022-12-28 PROCEDURE — 3288F PR FALLS RISK ASSESSMENT DOCUMENTED: ICD-10-PCS | Mod: CPTII,S$GLB,, | Performed by: PHYSICIAN ASSISTANT

## 2022-12-28 PROCEDURE — 99999 PR PBB SHADOW E&M-EST. PATIENT-LVL III: CPT | Mod: PBBFAC,,, | Performed by: PHYSICIAN ASSISTANT

## 2022-12-28 PROCEDURE — 1125F PR PAIN SEVERITY QUANTIFIED, PAIN PRESENT: ICD-10-PCS | Mod: CPTII,S$GLB,, | Performed by: PHYSICIAN ASSISTANT

## 2022-12-28 PROCEDURE — 3074F SYST BP LT 130 MM HG: CPT | Mod: CPTII,S$GLB,, | Performed by: PHYSICIAN ASSISTANT

## 2022-12-28 PROCEDURE — 77014 PR  CT GUIDANCE PLACEMENT RAD THERAPY FIELDS: ICD-10-PCS | Mod: 26,,, | Performed by: STUDENT IN AN ORGANIZED HEALTH CARE EDUCATION/TRAINING PROGRAM

## 2022-12-28 PROCEDURE — 99999 PR PBB SHADOW E&M-EST. PATIENT-LVL III: ICD-10-PCS | Mod: PBBFAC,,, | Performed by: PHYSICIAN ASSISTANT

## 2022-12-28 PROCEDURE — 3288F FALL RISK ASSESSMENT DOCD: CPT | Mod: CPTII,S$GLB,, | Performed by: PHYSICIAN ASSISTANT

## 2022-12-28 PROCEDURE — 1101F PT FALLS ASSESS-DOCD LE1/YR: CPT | Mod: CPTII,S$GLB,, | Performed by: PHYSICIAN ASSISTANT

## 2022-12-28 RX ORDER — INSULIN ASPART 100 [IU]/ML
8 INJECTION, SOLUTION INTRAVENOUS; SUBCUTANEOUS
Qty: 21.6 ML | Refills: 0 | Status: SHIPPED | OUTPATIENT
Start: 2022-12-28 | End: 2023-05-08 | Stop reason: SDUPTHER

## 2022-12-28 RX ORDER — INSULIN GLARGINE 100 [IU]/ML
28 INJECTION, SOLUTION SUBCUTANEOUS NIGHTLY
Qty: 15 ML | Refills: 11 | Status: SHIPPED | OUTPATIENT
Start: 2022-12-28 | End: 2023-01-10

## 2022-12-28 NOTE — PROGRESS NOTES
INTERNAL MEDICINE PROGRESS NOTE    CHIEF COMPLAINT     Chief Complaint   Patient presents with    Hypertension       HPI     Misha Eldridge Jr. is a 77 y.o. male who presents for a follow-up visit today.    PCP is Dipak Treviño MD, patient is known to me.     Very poor medically literacy -poor historian  Pt with prostate adenocarcinoma, followed by Dr. Betancourt and s/p RadTx with Dr. Michelle                 Patient presents for DM follow-up. He is currently taking prednisone bid as part of prostate CA treatment. He has DM and was only on basal insulin nightly. His A1C increased significantly and prandial insulin was added to his regimen. Today he is here for check on DM mgmt/control.     He is checking his blood sugar every morning -it is always in the 300 range. He reports that he has no symptoms  He reports that he is compliant with his insulin regimen. 22 units nightly and 5 units before meals tid - he rarely skips meals, he denies strict dietary compliance with diabetic diet.     Will increase basal insulin to 28 units nightly  Will increase prandial insulin to 8 units TID - he will be meeting with diabetic ED on Friday of this week.     Past Medical History:  Past Medical History:   Diagnosis Date    Cataract     Colostomy in place     Diabetes mellitus type II     Hemorrhoid     History of colon cancer     History of prostate cancer     History of pulmonary embolism     Hypertension        Home Medications:  Prior to Admission medications    Medication Sig Start Date End Date Taking? Authorizing Provider   abiraterone (ZYTIGA) 250 mg Tab Take 4 tablets (1,000 mg total) by mouth once daily. 12/5/22 12/5/23 Yes Lindsey Betancourt MD   ACCU-CHEK SOFTCLIX LANCETS Misc TEST BLOOD SUGAR ONCE A DAY 4/13/20  Yes Zurdo Best MD   amLODIPine (NORVASC) 10 MG tablet Take 1 tablet (10 mg total) by mouth once daily. 12/1/22  Yes Dipak Treviño MD   atorvastatin (LIPITOR) 20 MG tablet Take 1 tablet (20 mg  total) by mouth once daily. 10/11/22  Yes Dipak Treviño MD   blood sugar diagnostic Strp 1 each by Misc.(Non-Drug; Combo Route) route once daily. 3/4/19  Yes Kezia Dennis MD   blood sugar diagnostic Strp Pt to check up to 6 times daily 6/6/19  Yes Dipak Treviño MD   blood sugar diagnostic Strp Use to check blood glucose 1-2 times daily as directed. 11/25/22  Yes Juliette Ryan MD   carvediloL (COREG) 3.125 MG tablet Take 1 tablet (3.125 mg total) by mouth 2 (two) times daily. 10/11/22  Yes Dipak Treviño MD   diazePAM (VALIUM) 5 MG tablet Take 1 tablet (5 mg total) by mouth as needed for Anxiety (take 1 tablet 30 minute prior to procedure). 9/20/22  Yes Kenneth Azevedo MD   diclofenac (VOLTAREN) 75 MG EC tablet Take 1 tablet (75 mg total) by mouth 2 (two) times daily. 5/20/22  Yes Dipak Treviño MD   doxazosin (CARDURA) 4 MG tablet TAKE 1 TABLET(4 MG) BY MOUTH EVERY EVENING 7/6/22  Yes Dipak Treviño MD   gabapentin (NEURONTIN) 300 MG capsule Take 1 capsule (300 mg total) by mouth 3 (three) times daily. 10/11/22  Yes Dipak Treviño MD   glipiZIDE (GLUCOTROL) 10 MG tablet Take 1 tablet (10 mg total) by mouth 2 (two) times daily before meals. 10/11/22  Yes Dipak Treviño MD   hydroCHLOROthiazide (HYDRODIURIL) 25 MG tablet TAKE ONE-HALF TABLET BY MOUTH ONCE DAILY 10/11/22  Yes Dipak Treviño MD   insulin (LANTUS SOLOSTAR U-100 INSULIN) glargine 100 units/mL (3mL) SubQ pen Inject 22 Units into the skin every evening. 2/15/22  Yes Dipak Treviño MD   insulin aspart U-100 (NOVOLOG FLEXPEN U-100 INSULIN) 100 unit/mL (3 mL) InPn pen Inject 5 Units into the skin 3 (three) times daily with meals. 12/19/22 3/19/23 Yes Juliette Ryan MD   irbesartan (AVAPRO) 300 MG tablet Take 1 tablet (300 mg total) by mouth every evening. 12/1/22  Yes Dipak Treviño MD   metFORMIN (GLUCOPHAGE) 1000 MG tablet Take 1 tablet (1,000 mg total) by mouth 2 (two) times daily with  "meals. 12/1/22  Yes Dipak Treviño MD   pen needle, diabetic (BD ULTRA-FINE KELECHI PEN NEEDLE) 32 gauge x 5/32" Ndle To use with insulin pens at meals and evening shot. 12/9/22  Yes Demi Bear PA-C   polyethylene glycol (GOLYTELY) 236-22.74-6.74 -5.86 gram suspension Take 4,000 mLs by mouth once. 4/14/22  Yes Historical Provider   predniSONE (DELTASONE) 5 MG tablet Take 1 tablet (5 mg total) by mouth 2 (two) times daily. 11/18/22  Yes Lindsey Betancourt MD   triamcinolone acetonide 0.1% (KENALOG) 0.1 % cream Apply topically 2 (two) times daily. 6/6/19  Yes Dipak Treviño MD   blood-glucose meter kit Use as instructed 3/4/19 9/9/22  Kezia Dennis MD   BOOSTRIX TDAP 2.5-8-5 Lf-mcg-Lf/0.5mL Syrg injection  12/5/17   Historical Provider   lancing device with lancets Kit 1 each by Misc.(Non-Drug; Combo Route) route 2 (two) times daily. 10/15/20 9/9/22  Dipak Treviño MD   predniSONE (DELTASONE) 5 MG tablet Take 1 tablet (5 mg total) by mouth 2 (two) times daily.  Patient not taking: Reported on 12/28/2022 11/30/22   Irene Thompson, ABBEY   tobramycin sulfate 0.3% (TOBREX) 0.3 % ophthalmic solution 1-2 drops topically twice daily to affected toe(s).  Patient not taking: Reported on 12/28/2022 12/7/21   Mk Doyle DPM   tobramycin sulfate 0.3% (TOBREX) 0.3 % ophthalmic solution 1-2 drops topically twice daily to affected toe(s).  Patient not taking: Reported on 12/28/2022 12/28/21   Mk Doyle DPM       Review of Systems:  Review of Systems   Constitutional:  Negative for chills and fever.   HENT:  Negative for sore throat and trouble swallowing.    Eyes:  Negative for visual disturbance.   Respiratory:  Negative for cough and shortness of breath.    Cardiovascular:  Negative for chest pain.   Gastrointestinal:  Negative for abdominal pain, constipation, diarrhea, nausea and vomiting.   Genitourinary:  Negative for dysuria and flank pain.   Musculoskeletal:  Negative for back pain, neck " "pain and neck stiffness.   Skin:  Negative for rash.   Neurological:  Negative for dizziness, syncope, weakness and headaches.   Psychiatric/Behavioral:  Negative for confusion.      Health Maintainence:   Immunizations:  Health Maintenance         Date Due Completion Date    Shingles Vaccine (2 of 2) 11/25/2019 9/30/2019    Influenza Vaccine (1) 09/01/2022 10/1/2020    Override on 12/14/2016: Declined    COVID-19 Vaccine (5 - Booster for Moderna series) 09/29/2022 8/4/2022    Eye Exam 11/10/2022 11/10/2021    Override on 10/11/2016: Done    Override on 8/6/2014: Done    Hemoglobin A1c 02/21/2023 11/21/2022    Diabetes Urine Screening 04/12/2023 4/12/2022    Lipid Panel 04/12/2023 4/12/2022    TETANUS VACCINE 12/05/2027 12/5/2017    Colonoscopy 06/02/2032 6/2/2022    Override on 7/15/2014: Done             PHYSICAL EXAM     /80 (BP Location: Right arm, Patient Position: Sitting, BP Method: Small (Automatic))   Pulse 88   Ht 6' 1" (1.854 m)   Wt 92.3 kg (203 lb 7.8 oz)   SpO2 98%   BMI 26.85 kg/m²     Physical Exam  Vitals and nursing note reviewed.   Constitutional:       Appearance: Normal appearance.      Comments: Elderly male in NAD or apparent pain. He makes good eye contact, speaks in clear full sentences and ambulates with ease.    HENT:      Head: Normocephalic and atraumatic.      Nose: Nose normal.      Mouth/Throat:      Pharynx: Oropharynx is clear.   Eyes:      Conjunctiva/sclera: Conjunctivae normal.   Cardiovascular:      Rate and Rhythm: Normal rate and regular rhythm.      Pulses: Normal pulses.   Pulmonary:      Effort: No respiratory distress.   Abdominal:      Tenderness: There is no abdominal tenderness.   Musculoskeletal:         General: Normal range of motion.      Cervical back: No rigidity.   Skin:     General: Skin is warm and dry.      Capillary Refill: Capillary refill takes less than 2 seconds.      Findings: No rash.      Comments: He reports that wound to gluteal fold is " completely resolved.    Neurological:      General: No focal deficit present.      Mental Status: He is alert.      Gait: Gait normal.   Psychiatric:         Mood and Affect: Mood normal.       LABS     Lab Results   Component Value Date    HGBA1C 13.3 (H) 11/21/2022     CMP  Sodium   Date Value Ref Range Status   11/21/2022 139 136 - 145 mmol/L Final     Potassium   Date Value Ref Range Status   11/21/2022 4.2 3.5 - 5.1 mmol/L Final     Chloride   Date Value Ref Range Status   11/21/2022 106 95 - 110 mmol/L Final     CO2   Date Value Ref Range Status   11/21/2022 26 23 - 29 mmol/L Final     Glucose   Date Value Ref Range Status   11/21/2022 345 (H) 70 - 110 mg/dL Final     BUN   Date Value Ref Range Status   11/21/2022 16 8 - 23 mg/dL Final     Creatinine   Date Value Ref Range Status   11/21/2022 1.3 0.5 - 1.4 mg/dL Final     Calcium   Date Value Ref Range Status   11/21/2022 9.2 8.7 - 10.5 mg/dL Final     Total Protein   Date Value Ref Range Status   11/21/2022 6.1 6.0 - 8.4 g/dL Final     Albumin   Date Value Ref Range Status   11/21/2022 3.5 3.5 - 5.2 g/dL Final     Total Bilirubin   Date Value Ref Range Status   11/21/2022 0.5 0.1 - 1.0 mg/dL Final     Comment:     For infants and newborns, interpretation of results should be based  on gestational age, weight and in agreement with clinical  observations.    Premature Infant recommended reference ranges:  Up to 24 hours.............<8.0 mg/dL  Up to 48 hours............<12.0 mg/dL  3-5 days..................<15.0 mg/dL  6-29 days.................<15.0 mg/dL       Alkaline Phosphatase   Date Value Ref Range Status   11/21/2022 51 (L) 55 - 135 U/L Final     AST   Date Value Ref Range Status   11/21/2022 11 10 - 40 U/L Final     ALT   Date Value Ref Range Status   11/21/2022 14 10 - 44 U/L Final     Anion Gap   Date Value Ref Range Status   11/21/2022 7 (L) 8 - 16 mmol/L Final     eGFR if    Date Value Ref Range Status   06/21/2022 >60.0 >60  mL/min/1.73 m^2 Final     eGFR if non    Date Value Ref Range Status   06/21/2022 53.0 (A) >60 mL/min/1.73 m^2 Final     Comment:     Calculation used to obtain the estimated glomerular filtration  rate (eGFR) is the CKD-EPI equation.        Lab Results   Component Value Date    WBC 4.12 11/21/2022    HGB 12.9 (L) 11/21/2022    HCT 38.7 (L) 11/21/2022    MCV 96 11/21/2022     (L) 11/21/2022     Lab Results   Component Value Date    CHOL 103 (L) 04/12/2022    CHOL 103 (L) 04/12/2022    CHOL 108 (L) 08/19/2020     Lab Results   Component Value Date    HDL 42 04/12/2022    HDL 42 04/12/2022    HDL 44 08/19/2020     Lab Results   Component Value Date    LDLCALC 24.4 (L) 04/12/2022    LDLCALC 24.4 (L) 04/12/2022    LDLCALC 46.8 (L) 08/19/2020     Lab Results   Component Value Date    TRIG 183 (H) 04/12/2022    TRIG 183 (H) 04/12/2022    TRIG 86 08/19/2020     Lab Results   Component Value Date    CHOLHDL 40.8 04/12/2022    CHOLHDL 40.8 04/12/2022    CHOLHDL 40.7 08/19/2020     Lab Results   Component Value Date    TSH 1.549 04/12/2022       ASSESSMENT/PLAN     Misha Eldridge JrTyrese is a 77 y.o. male     Misha was seen today for DM follow-up. Insulin regimen increased. He will see Diabetic Ed on Friday and needs to follow-up with PCP in 4 weeks. He is aware of ED prompts.     Diagnoses and all orders for this visit:    Type 2 diabetes mellitus with hyperglycemia, with long-term current use of insulin  -     insulin (LANTUS SOLOSTAR U-100 INSULIN) glargine 100 units/mL SubQ pen; Inject 28 Units into the skin every evening.  -     insulin aspart U-100 (NOVOLOG FLEXPEN U-100 INSULIN) 100 unit/mL (3 mL) InPn pen; Inject 8 Units into the skin 3 (three) times daily with meals.      Demi Bear PA-C   Department of Internal Medicine - [facility]   8:47 AM

## 2022-12-29 PROCEDURE — 77385 HC IMRT, SIMPLE: CPT | Performed by: STUDENT IN AN ORGANIZED HEALTH CARE EDUCATION/TRAINING PROGRAM

## 2022-12-29 PROCEDURE — 77014 HC CT GUIDANCE RADIATION THERAPY FLDS PLACEMENT: CPT | Mod: TC | Performed by: STUDENT IN AN ORGANIZED HEALTH CARE EDUCATION/TRAINING PROGRAM

## 2022-12-29 PROCEDURE — 77014 PR  CT GUIDANCE PLACEMENT RAD THERAPY FIELDS: CPT | Mod: 26,,, | Performed by: STUDENT IN AN ORGANIZED HEALTH CARE EDUCATION/TRAINING PROGRAM

## 2022-12-29 PROCEDURE — 77014 PR  CT GUIDANCE PLACEMENT RAD THERAPY FIELDS: ICD-10-PCS | Mod: 26,,, | Performed by: STUDENT IN AN ORGANIZED HEALTH CARE EDUCATION/TRAINING PROGRAM

## 2022-12-30 ENCOUNTER — CLINICAL SUPPORT (OUTPATIENT)
Dept: DIABETES | Facility: CLINIC | Age: 77
End: 2022-12-30
Payer: MEDICARE

## 2022-12-30 VITALS — WEIGHT: 203.5 LBS | HEIGHT: 73 IN | BODY MASS INDEX: 26.97 KG/M2

## 2022-12-30 DIAGNOSIS — E11.65 TYPE 2 DIABETES MELLITUS WITH HYPERGLYCEMIA, WITH LONG-TERM CURRENT USE OF INSULIN: Primary | ICD-10-CM

## 2022-12-30 DIAGNOSIS — Z79.4 TYPE 2 DIABETES MELLITUS WITH HYPERGLYCEMIA, WITH LONG-TERM CURRENT USE OF INSULIN: Primary | ICD-10-CM

## 2022-12-30 PROCEDURE — 77014 PR  CT GUIDANCE PLACEMENT RAD THERAPY FIELDS: CPT | Mod: 26,,, | Performed by: STUDENT IN AN ORGANIZED HEALTH CARE EDUCATION/TRAINING PROGRAM

## 2022-12-30 PROCEDURE — G0108 PR DIAB MANAGE TRN  PER INDIV: ICD-10-PCS | Mod: HCNC,S$GLB,, | Performed by: DIETITIAN, REGISTERED

## 2022-12-30 PROCEDURE — 77385 HC IMRT, SIMPLE: CPT | Performed by: STUDENT IN AN ORGANIZED HEALTH CARE EDUCATION/TRAINING PROGRAM

## 2022-12-30 PROCEDURE — G0108 DIAB MANAGE TRN  PER INDIV: HCPCS | Mod: HCNC,S$GLB,, | Performed by: DIETITIAN, REGISTERED

## 2022-12-30 PROCEDURE — 77014 PR  CT GUIDANCE PLACEMENT RAD THERAPY FIELDS: ICD-10-PCS | Mod: 26,,, | Performed by: STUDENT IN AN ORGANIZED HEALTH CARE EDUCATION/TRAINING PROGRAM

## 2022-12-30 PROCEDURE — 77014 HC CT GUIDANCE RADIATION THERAPY FLDS PLACEMENT: CPT | Mod: TC | Performed by: STUDENT IN AN ORGANIZED HEALTH CARE EDUCATION/TRAINING PROGRAM

## 2022-12-30 NOTE — PROGRESS NOTES
Diabetes Care Specialist Progress Note  Author: Jossie Vega RD  Date: 12/30/2022    Program Intake  Reason for Diabetes Program Visit:: Intervention  Type of Intervention:: Individual  Individual: Education  Education: Self-Management Skill Review, Pattern Management  Current diabetes risk level:: high  In the last 12 months, have you:: been admitted to a hospital (r/t cancer)  Was the ER or hospital admission related to diabetes?: No    Lab Results   Component Value Date    HGBA1C 13.3 (H) 11/21/2022         Diabetes Self-Management Skills Assessment    Diabetes Disease Process/Treatment Options  Diabetes Disease Process/Treatment Options: Skills Assessment Completed: No  Deferred due to:: Time    Nutrition/Healthy Eating  Nutrition/Healthy Eating Skills Assessment Completed:: No  Deffered due to:: Time    Physical Activity/Exercise  Physical Activity/Exercise Skills Assessment Completed: : No  Deffered due to:: Time    Medications  Patient is able to describe current diabetes management routine.: yes  Diabetes management routine:: oral medications, insulin  Patient is able to identify current diabetes medications, dosages, and appropriate timing of medications.: yes  Patient understands the purpose of the medications taken for diabetes.: yes  Patient reports problems or concerns with current medication regimen.: yes  Medication regimen problems/concerns:: does not feel like regimen is working (Prednisone causing very high BG. Pt started Novolog 1 week ago. Titrated up per STAR Andersen 2 days ago. BGs still high.)  Medication Skills Assessment Completed:: Yes  Assessment indicates:: Instruction Needed  Area of need?: Yes    Home Blood Glucose Monitoring  Patient states that blood sugar is checked at home daily.: yes  Monitoring Method:: home glucometer  How often do you check your blood sugar?: Once a day  When do you check your blood sugar?: Before breakfast, Before dinner  When you check what is your  "typical blood sugar range? : 372, 279, 313, 309, 243, 304, 277, 389, 461  Blood glucose logs:: yes, encouraged to keep logs, encouraged to bring logs to provider visits  Blood glucose logs reviewed today?: yes  Home Blood Glucose Monitoring Skills Assessment Completed: : Yes  Assessment indicates:: Instruction Needed  Area of need?: Yes    Acute Complications  Acute Complications Skills Assessment Completed: : No  Deffered due to:: Time    Chronic Complications  Patient can identify major chronic complications of diabetes.: yes  Stated chronic complications:: neuropathy/nerve damage, retinopathy  Patient can identify ways to prevent or delay diabetes complications.: yes  Stated ways to prevent complications:: controlling blood sugar, checking feet daily and having routine comprehensive foot exams, controlling cholesterol and triglycerides  Chronic Complications Skills Assessment Completed: : Yes  Assessment indicates:: Adequate understanding (Pt aware of vision changes and due for eye exam. When asked about neuropathy pt stated "it's worse but it's not." Not ready to schedule appts right now. Will revisit.)  Area of need?: No    Psychosocial/Coping  Patient can identify ways of coping with chronic disease.: yes  Psychosocial/Coping Skills Assessment Completed: : No  Deffered due to:: Time      Assessment Summary and Plan    Based on today's diabetes care assessment, the following areas of need were identified:      Social 12/9/2022   Cognitive/Behavioral Health No   Culture/[facility] No   Health Literacy No        Clinical 12/9/2022   Medication Adherence No   Lab Compliance No        Diabetes Self-Management Skills 12/30/2022   Diabetes Disease Process/Treatment Options -   Nutrition/Healthy Eating -   Physical Activity/Exercise -   Medication Yes - see care planning   Home Blood Glucose Monitoring Yes - see care planning   Acute Complications -   Chronic Complications No          Today's interventions were provided " "through individual discussion, instruction, and written materials were provided.      Patient verbalized understanding of instruction and written materials.  Pt was able to return back demonstration of instructions today. Patient understood key points, needs reinforcement and further instruction.     Diabetes Self-Management Care Plan:    Today's Diabetes Self-Management Care Plan was developed with Misha's input. Misha has agreed to work toward the following goal(s) to improve his/her overall diabetes control.      Care Plan: Diabetes Management   Updates made since 11/30/2022 12:00 AM        Problem: Medications         Goal: Pt will start taking Novolog before each meal.    Start Date: 12/9/2022   Expected End Date: 12/16/2022   This Visit's Progress: Met   Recent Progress: No change   Priority: High   Barriers: No Barriers Identified   Note:    12/9/22 - Pt initially did not have an appt today but came to clinic c/o BG staying high. Was able to get pt with me. Pt reported has had significantly elevated BG (300s and 400s) since last month when started on 4 new pills for cancer treatment. Pt was started on prednisone to take along with his radiation treatment. Going through 45 days of radiation. States he was able to get it down as far as 210 or 220 a couple weeks ago after eating only salads and doing a lot of walking ("about 30 blocks"). Has been more tired past week and unable to walk much. Explained prednisone is causing high BG, and while activity and controlling carb intake will help, it will not be enough to reach goal BGs. Discussed care with STAR Mendosa (Dr. Treviño is not in office today and pt has seen Demi recently). Pt started on Novolog 5 units before meals. Pt already on long-acting insulin. Educated pt on rapid-acting insulin, only take before meals, must take right before eating and no more than 15 min before, skip shot if skipping meal, and importance of right shot right time. " Encouraged SMBG AC/HS, provided logs, and encouraged bringing log of BGs to future appts.     12/16/22 - Pt went to  Novolog from pharmacy but was vial insulin instead of pens. He left vial at pharmacy but did not call about getting Rx changed. Continues taking Lantus but has not yet started Novolog. Discussed with current readings and requiring prednisone, he will need to start Novolog. Communicated with prescriber regarding changing Rx to Novolog pen. Reviewed again with pt MDI instructions. Novolog 5 units before each meal, 5-15 min before, skip shot if skipping meal, and continue Lantus HS.     12/22/22 - Pt still has not started Novolog. Contacted Megan earlier this week and Rx has been ready for . Discussed with pt it has been ready for him. Called Megan on speaker phone with pt in the office. Confirmed he can  Rx now. Reviewed timing, dosage and only skip if skipping a meal and do not take with snack between meals. He often eats a lot of fruit for snacks. Encouraged limiting to 1 piece of fruit. Also reviewed continue to take Lantus every night.     12/30/22 - Pt has been on Novolog for about a week. Lantus and Novolog increased per Demi Bear PA-C 2 days ago. Glucose readings remaining high with increase in dosages. Communicated with Demi and Lantus titrated up to 30 units. Also educated pt on adding correction to Novolog before each meal.       Task: Reviewed with patient all current diabetes medications and provided basic review of the purpose, dosage, frequency, side effects, and storage of both oral and injectable diabetes medications. Completed 12/9/2022        Task: Discussed guidelines for preventing, detecting and treating hypoglycemia and hyperglycemia and reviewed the importance of meal and medication timing with diabetes mediations for prevention of hypoglycemia and maximum drug benefit. Completed 12/9/2022        Goal: Pt will add correction scale to Novolog  before each meal and continue Lantus nightly.    Start Date: 12/30/2022   Expected End Date: 1/5/2023   Priority: High   Barriers: No Barriers Identified   Note:    12/30/22 - Pt doing well with taking Novolog before each meal. Pt was overwhelmed when first starting Novolog and was not ready to add correction. Discussed again today and pt willing to add on correction scale. Provided education on correction scale 150-200 + 1 units, etc. Pt showed good understanding and acceptance. Reviewed to only use scale with Novolog, not with Lantus and only take before meals.        Task: Reviewed with patient all current diabetes medications and provided basic review of the purpose, dosage, frequency, side effects, and storage of both oral and injectable diabetes medications. Completed 12/30/2022        Task: Discussed guidelines for preventing, detecting and treating hypoglycemia and hyperglycemia and reviewed the importance of meal and medication timing with diabetes mediations for prevention of hypoglycemia and maximum drug benefit. Completed 12/30/2022        Problem: Blood Glucose Self-Monitoring         Goal: Patient will check and record blood sugars before each meal.    Start Date: 12/30/2022   Expected End Date: 1/5/2023   Priority: High   Barriers: No Barriers Identified   Note:    12/30/22 - Pt currently only SMBG once daily. Discussed importance of testing more often to aid in effective medication adjustment. Advised to test before each meal and use reading for correction with Novolog.        Task: Reviewed the importance of self-monitoring blood glucose and keeping logs. Completed 12/30/2022        Task: Provided patient with blood glucose logs, reviewed appropriate timing and frequency to SMBG, education on parameters on when to notify provider and advised patient to bring logs to all appts with PCP/Endocrinologist/Diabetes Care Specialist. Completed 12/30/2022        Task: Discussed ways to minimize pain when  monitoring blood glucose. Completed 12/30/2022          Follow Up Plan     Follow up in about 6 days (around 1/5/2023) for log review/follow-up.    Today's care plan and follow up schedule was discussed with patient.  [place] verbalized understanding of the care plan, goals, and agrees to follow up plan.        The patient was encouraged to communicate with his/her health care provider/physician and care team regarding his/her condition(s) and treatment.  I provided the patient with my contact information today and encouraged to contact me via phone or [facility] Patient Portal as needed.     Length of Visit   Total Time: 30 Minutes

## 2023-01-03 ENCOUNTER — APPOINTMENT (OUTPATIENT)
Dept: RADIATION THERAPY | Facility: OTHER | Age: 78
End: 2023-01-03
Attending: STUDENT IN AN ORGANIZED HEALTH CARE EDUCATION/TRAINING PROGRAM
Payer: MEDICARE

## 2023-01-03 ENCOUNTER — TELEPHONE (OUTPATIENT)
Dept: INTERNAL MEDICINE | Facility: CLINIC | Age: 78
End: 2023-01-03
Payer: MEDICARE

## 2023-01-03 PROCEDURE — 77014 PR  CT GUIDANCE PLACEMENT RAD THERAPY FIELDS: CPT | Mod: 26,HCNC,, | Performed by: STUDENT IN AN ORGANIZED HEALTH CARE EDUCATION/TRAINING PROGRAM

## 2023-01-03 PROCEDURE — 77385 HC IMRT, SIMPLE: CPT | Mod: HCNC | Performed by: STUDENT IN AN ORGANIZED HEALTH CARE EDUCATION/TRAINING PROGRAM

## 2023-01-03 PROCEDURE — 77014 PR  CT GUIDANCE PLACEMENT RAD THERAPY FIELDS: ICD-10-PCS | Mod: 26,HCNC,, | Performed by: STUDENT IN AN ORGANIZED HEALTH CARE EDUCATION/TRAINING PROGRAM

## 2023-01-03 PROCEDURE — 77014 HC CT GUIDANCE RADIATION THERAPY FLDS PLACEMENT: CPT | Mod: TC,HCNC | Performed by: STUDENT IN AN ORGANIZED HEALTH CARE EDUCATION/TRAINING PROGRAM

## 2023-01-03 NOTE — TELEPHONE ENCOUNTER
----- Message from Natalya Diaz sent at 1/3/2023  8:23 AM CST -----  Type: Patient Call Back    Who called: self    What is the request in detail: pt requesting to have a call back to help with medication. Please call    Can the clinic reply by [facility]? no    Would the patient rather a call back or a response via [facility]? call    Best call back number.[phone] (home)

## 2023-01-03 NOTE — TELEPHONE ENCOUNTER
Returned pt's call.  Pt states he is running low on some meds. Upon review, he states he thought he was low on Prednisone, but has enough. However, he is getting low on carvedilol and gabapentin. Informed that he had a refill listed on each of these. Pt asked if I could call them as he has a hard time getting through.     I agreed to do so and was told by pharm they do have on file and will process for pt.

## 2023-01-04 PROCEDURE — 77014 PR  CT GUIDANCE PLACEMENT RAD THERAPY FIELDS: ICD-10-PCS | Mod: 26,HCNC,, | Performed by: STUDENT IN AN ORGANIZED HEALTH CARE EDUCATION/TRAINING PROGRAM

## 2023-01-04 PROCEDURE — 77014 HC CT GUIDANCE RADIATION THERAPY FLDS PLACEMENT: CPT | Mod: TC,HCNC | Performed by: STUDENT IN AN ORGANIZED HEALTH CARE EDUCATION/TRAINING PROGRAM

## 2023-01-04 PROCEDURE — 77014 PR  CT GUIDANCE PLACEMENT RAD THERAPY FIELDS: CPT | Mod: 26,HCNC,, | Performed by: STUDENT IN AN ORGANIZED HEALTH CARE EDUCATION/TRAINING PROGRAM

## 2023-01-04 PROCEDURE — 77385 HC IMRT, SIMPLE: CPT | Mod: HCNC | Performed by: STUDENT IN AN ORGANIZED HEALTH CARE EDUCATION/TRAINING PROGRAM

## 2023-01-04 PROCEDURE — 77336 RADIATION PHYSICS CONSULT: CPT | Mod: HCNC | Performed by: STUDENT IN AN ORGANIZED HEALTH CARE EDUCATION/TRAINING PROGRAM

## 2023-01-05 ENCOUNTER — DOCUMENTATION ONLY (OUTPATIENT)
Dept: RADIATION ONCOLOGY | Facility: CLINIC | Age: 78
End: 2023-01-05
Payer: MEDICARE

## 2023-01-05 ENCOUNTER — CLINICAL SUPPORT (OUTPATIENT)
Dept: DIABETES | Facility: CLINIC | Age: 78
End: 2023-01-05
Payer: MEDICARE

## 2023-01-05 DIAGNOSIS — E11.65 TYPE 2 DIABETES MELLITUS WITH HYPERGLYCEMIA, WITH LONG-TERM CURRENT USE OF INSULIN: Primary | ICD-10-CM

## 2023-01-05 DIAGNOSIS — Z79.4 TYPE 2 DIABETES MELLITUS WITH HYPERGLYCEMIA, WITH LONG-TERM CURRENT USE OF INSULIN: Primary | ICD-10-CM

## 2023-01-05 PROCEDURE — 77014 HC CT GUIDANCE RADIATION THERAPY FLDS PLACEMENT: CPT | Mod: TC,HCNC | Performed by: STUDENT IN AN ORGANIZED HEALTH CARE EDUCATION/TRAINING PROGRAM

## 2023-01-05 PROCEDURE — G0108 DIAB MANAGE TRN  PER INDIV: HCPCS | Mod: HCNC,S$GLB,, | Performed by: DIETITIAN, REGISTERED

## 2023-01-05 PROCEDURE — G0108 PR DIAB MANAGE TRN  PER INDIV: ICD-10-PCS | Mod: HCNC,S$GLB,, | Performed by: DIETITIAN, REGISTERED

## 2023-01-05 PROCEDURE — 77014 PR  CT GUIDANCE PLACEMENT RAD THERAPY FIELDS: ICD-10-PCS | Mod: 26,HCNC,, | Performed by: STUDENT IN AN ORGANIZED HEALTH CARE EDUCATION/TRAINING PROGRAM

## 2023-01-05 PROCEDURE — 77014 PR  CT GUIDANCE PLACEMENT RAD THERAPY FIELDS: CPT | Mod: 26,HCNC,, | Performed by: STUDENT IN AN ORGANIZED HEALTH CARE EDUCATION/TRAINING PROGRAM

## 2023-01-05 PROCEDURE — 77385 HC IMRT, SIMPLE: CPT | Mod: HCNC | Performed by: STUDENT IN AN ORGANIZED HEALTH CARE EDUCATION/TRAINING PROGRAM

## 2023-01-05 NOTE — PROGRESS NOTES
Diabetes Care Specialist Progress Note  Author: Jossie Vega RD  Date: 1/5/2023    Program Intake  Reason for Diabetes Program Visit:: Intervention  Type of Intervention:: Individual  Individual: Education  Education: Pattern Management, Self-Management Skill Review  Current diabetes risk level:: high  In the last 12 months, have you:: been admitted to a hospital  Was the ER or hospital admission related to diabetes?: No    Lab Results   Component Value Date    HGBA1C 13.3 (H) 11/21/2022       Diabetes Self-Management Skills Assessment    Diabetes Disease Process/Treatment Options  Diabetes Disease Process/Treatment Options: Skills Assessment Completed: No  Deferred due to:: Time    Nutrition/Healthy Eating  Nutrition/Healthy Eating Skills Assessment Completed:: No  Deffered due to:: Time    Physical Activity/Exercise  Physical Activity/Exercise Skills Assessment Completed: : No  Deffered due to:: Time    Medications  Patient is able to describe current diabetes management routine.: yes  Diabetes management routine:: oral medications, insulin  Patient is able to identify current diabetes medications, dosages, and appropriate timing of medications.: yes  Patient understands the purpose of the medications taken for diabetes.: yes  Patient reports problems or concerns with current medication regimen.: yes  Medication regimen problems/concerns:: other (see comments), unsure of doses (Seeing improvement in BG but still above goal. Pt forgot about adding correction scale.)  Medication Skills Assessment Completed:: Yes  Assessment indicates:: Instruction Needed  Area of need?: Yes    Home Blood Glucose Monitoring  Patient states that blood sugar is checked at home daily.: yes  Monitoring Method:: home glucometer  How often do you check your blood sugar?: 3 times a day  When do you check your blood sugar?: Before breakfast, Before dinner, Before lunch  When you check what is your typical blood sugar range? : (recorded  "from meter) today - 160, 195, 1/4 - 226, 160, 1/3 - 266, 1/2 - 350, 205, 1/1 - 288, 340, 178, 12/31 - 102, 174, 12/30 - 372, 264, 12/29 - 279  Blood glucose logs:: yes, encouraged to keep logs, encouraged to bring logs to provider visits  Blood glucose logs reviewed today?: yes  Home Blood Glucose Monitoring Skills Assessment Completed: : Yes  Assessment indicates:: Instruction Needed  Area of need?: Yes    Acute Complications  Acute Complications Skills Assessment Completed: : No  Deffered due to:: Time  Area of need?: No    Chronic Complications  Chronic Complications Skills Assessment Completed: : No  Deferred due to:: Time    Psychosocial/Coping  Patient can identify ways of coping with chronic disease.: yes  Patient-stated ways of coping with chronic disease:: support from loved ones (pt reports he has no choice but to cope with cancer dx as well as DM, CKD3, and other comorbids - states "I'm going to live until I die")  Psychosocial/Coping Skills Assessment Completed: : Yes  Assessment indicates:: Adequate understanding  Area of need?: No      Assessment Summary and Plan    Based on today's diabetes care assessment, the following areas of need were identified:      Social 12/9/2022   Cognitive/Behavioral Health No   Culture/[facility] No   Health Literacy No        Clinical 12/9/2022   Medication Adherence No   Lab Compliance No        Diabetes Self-Management Skills 1/5/2023   Diabetes Disease Process/Treatment Options -   Nutrition/Healthy Eating -   Physical Activity/Exercise -   Medication Yes - see care planning   Home Blood Glucose Monitoring Yes - see care planning   Acute Complications No   Chronic Complications -   Psychosocial/Coping No          Today's interventions were provided through individual discussion, instruction, and written materials were provided.      Patient verbalized understanding of instruction and written materials.  Pt was able to return back demonstration of instructions today. " "Patient understood key points, needs reinforcement and further instruction.     Diabetes Self-Management Care Plan:    Today's Diabetes Self-Management Care Plan was developed with Misha's input. Misha has agreed to work toward the following goal(s) to improve his/her overall diabetes control.      Care Plan: Diabetes Management   Updates made since 12/6/2022 12:00 AM        Problem: Medications         Goal: Pt will start taking Novolog before each meal. Completed 1/5/2023   Start Date: 12/9/2022   Expected End Date: 12/16/2022   Recent Progress: Met   Priority: High   Barriers: No Barriers Identified   Note:    12/9/22 - Pt initially did not have an appt today but came to clinic c/o BG staying high. Was able to get pt with me. Pt reported has had significantly elevated BG (300s and 400s) since last month when started on 4 new pills for cancer treatment. Pt was started on prednisone to take along with his radiation treatment. Going through 45 days of radiation. States he was able to get it down as far as 210 or 220 a couple weeks ago after eating only salads and doing a lot of walking ("about 30 blocks"). Has been more tired past week and unable to walk much. Explained prednisone is causing high BG, and while activity and controlling carb intake will help, it will not be enough to reach goal BGs. Discussed care with [facility]osa (Dr. Treviño is not in office today and pt has seen Demi recently). Pt started on Novolog 5 units before meals. Pt already on long-acting insulin. Educated pt on rapid-acting insulin, only take before meals, must take right before eating and no more than 15 min before, skip shot if skipping meal, and importance of right shot right time. Encouraged SMBG AC/HS, provided logs, and encouraged bringing log of BGs to future appts.     12/16/22 - Pt went to  Novolog from pharmacy but was vial insulin instead of pens. He left vial at pharmacy but did not call about getting Rx " changed. Continues taking Lantus but has not yet started Novolog. Discussed with current readings and requiring prednisone, he will need to start Novolog. Communicated with prescriber regarding changing Rx to Novolog pen. Reviewed again with pt MDI instructions. Novolog 5 units before each meal, 5-15 min before, skip shot if skipping meal, and continue Lantus HS.     12/22/22 - Pt still has not started Novolog. Contacted Megan earlier this week and Rx has been ready for . Discussed with pt it has been ready for him. Called Megan on speaker phone with pt in the office. Confirmed he can  Rx now. Reviewed timing, dosage and only skip if skipping a meal and do not take with snack between meals. He often eats a lot of fruit for snacks. Encouraged limiting to 1 piece of fruit. Also reviewed continue to take Lantus every night.     12/30/22 - Pt has been on Novolog for about a week. Lantus and Novolog increased per Demi Bear PA-C 2 days ago. Glucose readings remaining high with increase in dosages. Communicated with Demi and Lantus titrated up to 30 units. Also educated pt on adding correction to Novolog before each meal.       Task: Reviewed with patient all current diabetes medications and provided basic review of the purpose, dosage, frequency, side effects, and storage of both oral and injectable diabetes medications. Completed 12/9/2022        Task: Discussed guidelines for preventing, detecting and treating hypoglycemia and hyperglycemia and reviewed the importance of meal and medication timing with diabetes mediations for prevention of hypoglycemia and maximum drug benefit. Completed 12/9/2022        Goal: Pt will add correction scale to Novolog before each meal and continue Lantus nightly.    Start Date: 12/30/2022   Expected End Date: 1/5/2023   This Visit's Progress: No change   Priority: High   Barriers: No Barriers Identified   Note:    12/30/22 - Pt doing well with taking Novolog  before each meal. Pt was overwhelmed when first starting Novolog and was not ready to add correction. Discussed again today and pt willing to add on correction scale. Provided education on correction scale 150-200 + 1 units, etc. Pt showed good understanding and acceptance. Reviewed to only use scale with Novolog, not with Lantus and only take before meals.     1/5/23 - Some good improvement in BG readings with taking Novolog 10 before meals and Lantus 30 units HS. However, pt forgot about using correction scale with Novolog. Reviewed testing BG before each meal and adding units of Novolog to 10 units base dose according to scale. Reviewed examples and pt demonstrated understanding.        Task: Reviewed with patient all current diabetes medications and provided basic review of the purpose, dosage, frequency, side effects, and storage of both oral and injectable diabetes medications. Completed 12/30/2022        Task: Discussed guidelines for preventing, detecting and treating hypoglycemia and hyperglycemia and reviewed the importance of meal and medication timing with diabetes mediations for prevention of hypoglycemia and maximum drug benefit. Completed 12/30/2022        Problem: Blood Glucose Self-Monitoring         Goal: Patient will check and record blood sugars before each meal.    Start Date: 12/30/2022   Expected End Date: 1/5/2023   This Visit's Progress: On track   Priority: High   Barriers: No Barriers Identified   Note:    12/30/22 - Pt currently only SMBG once daily. Discussed importance of testing more often to aid in effective medication adjustment. Advised to test before each meal and use reading for correction with Novolog.     1/5/23 - Pt has been testing more but often only 2x/day. Discussed importance of testing before each meal in order to use correction and get ahead of high BGs while on prednisone. Pt is nearing the end of radiation treatment later this month. Discussed and reviewed will need  further adjustment (decrease) in insulin when prednisone is discontinued and testing BG will allow for effective insulin adjustment. Reinforced also increased risk of low with prednisone decrease or d/c and importance of testing in that regard as well.        Task: Reviewed the importance of self-monitoring blood glucose and keeping logs. Completed 12/30/2022        Task: Provided patient with blood glucose logs, reviewed appropriate timing and frequency to SMBG, education on parameters on when to notify provider and advised patient to bring logs to all appts with PCP/Endocrinologist/Diabetes Care Specialist. Completed 12/30/2022        Task: Discussed ways to minimize pain when monitoring blood glucose. Completed 12/30/2022          Follow Up Plan     Follow up in about 15 days (around 1/20/2023) for log review and follow-up.    Today's care plan and follow up schedule was discussed with patient.  Misha verbalized understanding of the care plan, goals, and agrees to follow up plan.        The patient was encouraged to communicate with his/her health care provider/physician and care team regarding his/her condition(s) and treatment.  I provided the patient with my contact information today and encouraged to contact me via phone or [facility] Patient Portal as needed.     Length of Visit   Total Time: 30 Minutes

## 2023-01-06 PROCEDURE — 77014 HC CT GUIDANCE RADIATION THERAPY FLDS PLACEMENT: CPT | Mod: TC,HCNC | Performed by: STUDENT IN AN ORGANIZED HEALTH CARE EDUCATION/TRAINING PROGRAM

## 2023-01-06 PROCEDURE — 77014 PR  CT GUIDANCE PLACEMENT RAD THERAPY FIELDS: CPT | Mod: 26,HCNC,, | Performed by: STUDENT IN AN ORGANIZED HEALTH CARE EDUCATION/TRAINING PROGRAM

## 2023-01-06 PROCEDURE — 77385 HC IMRT, SIMPLE: CPT | Mod: HCNC | Performed by: STUDENT IN AN ORGANIZED HEALTH CARE EDUCATION/TRAINING PROGRAM

## 2023-01-06 PROCEDURE — 77014 PR  CT GUIDANCE PLACEMENT RAD THERAPY FIELDS: ICD-10-PCS | Mod: 26,HCNC,, | Performed by: STUDENT IN AN ORGANIZED HEALTH CARE EDUCATION/TRAINING PROGRAM

## 2023-01-09 DIAGNOSIS — E11.40 CONTROLLED TYPE 2 DIABETES MELLITUS WITH DIABETIC NEUROPATHY, WITHOUT LONG-TERM CURRENT USE OF INSULIN: Primary | ICD-10-CM

## 2023-01-09 PROCEDURE — 77014 HC CT GUIDANCE RADIATION THERAPY FLDS PLACEMENT: CPT | Mod: TC,HCNC | Performed by: STUDENT IN AN ORGANIZED HEALTH CARE EDUCATION/TRAINING PROGRAM

## 2023-01-09 PROCEDURE — 77014 PR  CT GUIDANCE PLACEMENT RAD THERAPY FIELDS: ICD-10-PCS | Mod: 26,HCNC,, | Performed by: STUDENT IN AN ORGANIZED HEALTH CARE EDUCATION/TRAINING PROGRAM

## 2023-01-09 PROCEDURE — 77385 HC IMRT, SIMPLE: CPT | Mod: HCNC | Performed by: STUDENT IN AN ORGANIZED HEALTH CARE EDUCATION/TRAINING PROGRAM

## 2023-01-09 PROCEDURE — 77014 PR  CT GUIDANCE PLACEMENT RAD THERAPY FIELDS: CPT | Mod: 26,HCNC,, | Performed by: STUDENT IN AN ORGANIZED HEALTH CARE EDUCATION/TRAINING PROGRAM

## 2023-01-09 NOTE — TELEPHONE ENCOUNTER
PA for Insulin Glargine Solostar U100 is denied because the drug isn't listed in pt's preferred drug list. The preferred drug (s) are Levemir, FlexTouch U100 insulin, Toujeo Max U-300 Solostar Sub Q insulin pen, Tresiba FlexTouch U-100.

## 2023-01-10 PROCEDURE — 77014 HC CT GUIDANCE RADIATION THERAPY FLDS PLACEMENT: CPT | Mod: TC,HCNC | Performed by: STUDENT IN AN ORGANIZED HEALTH CARE EDUCATION/TRAINING PROGRAM

## 2023-01-10 PROCEDURE — 77014 PR  CT GUIDANCE PLACEMENT RAD THERAPY FIELDS: ICD-10-PCS | Mod: 26,HCNC,, | Performed by: STUDENT IN AN ORGANIZED HEALTH CARE EDUCATION/TRAINING PROGRAM

## 2023-01-10 PROCEDURE — 77385 HC IMRT, SIMPLE: CPT | Mod: HCNC | Performed by: STUDENT IN AN ORGANIZED HEALTH CARE EDUCATION/TRAINING PROGRAM

## 2023-01-10 PROCEDURE — 77014 PR  CT GUIDANCE PLACEMENT RAD THERAPY FIELDS: CPT | Mod: 26,HCNC,, | Performed by: STUDENT IN AN ORGANIZED HEALTH CARE EDUCATION/TRAINING PROGRAM

## 2023-01-10 RX ORDER — INSULIN GLARGINE 300 U/ML
28 INJECTION, SOLUTION SUBCUTANEOUS DAILY
Qty: 15 PEN | Refills: 11 | Status: SHIPPED | OUTPATIENT
Start: 2023-01-10 | End: 2023-12-21

## 2023-01-11 ENCOUNTER — PATIENT OUTREACH (OUTPATIENT)
Dept: ADMINISTRATIVE | Facility: HOSPITAL | Age: 78
End: 2023-01-11
Payer: MEDICARE

## 2023-01-11 DIAGNOSIS — E11.9 DIABETES MELLITUS WITHOUT COMPLICATION: Primary | ICD-10-CM

## 2023-01-11 PROCEDURE — 77385 HC IMRT, SIMPLE: CPT | Mod: HCNC | Performed by: STUDENT IN AN ORGANIZED HEALTH CARE EDUCATION/TRAINING PROGRAM

## 2023-01-11 PROCEDURE — 77014 PR  CT GUIDANCE PLACEMENT RAD THERAPY FIELDS: ICD-10-PCS | Mod: 26,HCNC,, | Performed by: STUDENT IN AN ORGANIZED HEALTH CARE EDUCATION/TRAINING PROGRAM

## 2023-01-11 PROCEDURE — 77336 RADIATION PHYSICS CONSULT: CPT | Mod: HCNC | Performed by: STUDENT IN AN ORGANIZED HEALTH CARE EDUCATION/TRAINING PROGRAM

## 2023-01-11 PROCEDURE — 77014 HC CT GUIDANCE RADIATION THERAPY FLDS PLACEMENT: CPT | Mod: TC,HCNC | Performed by: STUDENT IN AN ORGANIZED HEALTH CARE EDUCATION/TRAINING PROGRAM

## 2023-01-11 PROCEDURE — 77014 PR  CT GUIDANCE PLACEMENT RAD THERAPY FIELDS: CPT | Mod: 26,HCNC,, | Performed by: STUDENT IN AN ORGANIZED HEALTH CARE EDUCATION/TRAINING PROGRAM

## 2023-01-12 ENCOUNTER — DOCUMENTATION ONLY (OUTPATIENT)
Dept: RADIATION ONCOLOGY | Facility: CLINIC | Age: 78
End: 2023-01-12
Payer: MEDICARE

## 2023-01-12 PROCEDURE — 77385 HC IMRT, SIMPLE: CPT | Mod: HCNC | Performed by: STUDENT IN AN ORGANIZED HEALTH CARE EDUCATION/TRAINING PROGRAM

## 2023-01-12 PROCEDURE — 77014 PR  CT GUIDANCE PLACEMENT RAD THERAPY FIELDS: CPT | Mod: 26,HCNC,, | Performed by: STUDENT IN AN ORGANIZED HEALTH CARE EDUCATION/TRAINING PROGRAM

## 2023-01-12 PROCEDURE — 77014 HC CT GUIDANCE RADIATION THERAPY FLDS PLACEMENT: CPT | Mod: TC,HCNC | Performed by: STUDENT IN AN ORGANIZED HEALTH CARE EDUCATION/TRAINING PROGRAM

## 2023-01-12 PROCEDURE — 77014 PR  CT GUIDANCE PLACEMENT RAD THERAPY FIELDS: ICD-10-PCS | Mod: 26,HCNC,, | Performed by: STUDENT IN AN ORGANIZED HEALTH CARE EDUCATION/TRAINING PROGRAM

## 2023-01-13 PROCEDURE — 77014 PR  CT GUIDANCE PLACEMENT RAD THERAPY FIELDS: CPT | Mod: 26,HCNC,, | Performed by: STUDENT IN AN ORGANIZED HEALTH CARE EDUCATION/TRAINING PROGRAM

## 2023-01-13 PROCEDURE — 77014 PR  CT GUIDANCE PLACEMENT RAD THERAPY FIELDS: ICD-10-PCS | Mod: 26,HCNC,, | Performed by: STUDENT IN AN ORGANIZED HEALTH CARE EDUCATION/TRAINING PROGRAM

## 2023-01-13 PROCEDURE — 77014 HC CT GUIDANCE RADIATION THERAPY FLDS PLACEMENT: CPT | Mod: TC,HCNC | Performed by: STUDENT IN AN ORGANIZED HEALTH CARE EDUCATION/TRAINING PROGRAM

## 2023-01-13 PROCEDURE — 77385 HC IMRT, SIMPLE: CPT | Mod: HCNC | Performed by: STUDENT IN AN ORGANIZED HEALTH CARE EDUCATION/TRAINING PROGRAM

## 2023-01-17 ENCOUNTER — TELEPHONE (OUTPATIENT)
Dept: WOUND CARE | Facility: CLINIC | Age: 78
End: 2023-01-17
Payer: MEDICARE

## 2023-01-17 ENCOUNTER — OFFICE VISIT (OUTPATIENT)
Dept: INTERNAL MEDICINE | Facility: CLINIC | Age: 78
End: 2023-01-17
Payer: MEDICARE

## 2023-01-17 ENCOUNTER — TELEPHONE (OUTPATIENT)
Dept: WOUND CARE | Facility: HOSPITAL | Age: 78
End: 2023-01-17
Payer: MEDICARE

## 2023-01-17 ENCOUNTER — SPECIALTY PHARMACY (OUTPATIENT)
Dept: PHARMACY | Facility: CLINIC | Age: 78
End: 2023-01-17
Payer: MEDICARE

## 2023-01-17 VITALS
HEIGHT: 73 IN | BODY MASS INDEX: 27.49 KG/M2 | HEART RATE: 86 BPM | WEIGHT: 207.44 LBS | DIASTOLIC BLOOD PRESSURE: 70 MMHG | OXYGEN SATURATION: 98 % | SYSTOLIC BLOOD PRESSURE: 124 MMHG

## 2023-01-17 DIAGNOSIS — Z79.4 TYPE 2 DIABETES MELLITUS WITH HYPERGLYCEMIA, WITH LONG-TERM CURRENT USE OF INSULIN: Primary | ICD-10-CM

## 2023-01-17 DIAGNOSIS — E11.65 TYPE 2 DIABETES MELLITUS WITH HYPERGLYCEMIA, WITH LONG-TERM CURRENT USE OF INSULIN: Primary | ICD-10-CM

## 2023-01-17 DIAGNOSIS — S31.109A OPEN WOUND OF ANTERIOR ABDOMINAL WALL, INITIAL ENCOUNTER: Primary | ICD-10-CM

## 2023-01-17 PROCEDURE — 77014 PR  CT GUIDANCE PLACEMENT RAD THERAPY FIELDS: ICD-10-PCS | Mod: 26,HCNC,, | Performed by: STUDENT IN AN ORGANIZED HEALTH CARE EDUCATION/TRAINING PROGRAM

## 2023-01-17 PROCEDURE — 3078F PR MOST RECENT DIASTOLIC BLOOD PRESSURE < 80 MM HG: ICD-10-PCS | Mod: HCNC,CPTII,S$GLB, | Performed by: PHYSICIAN ASSISTANT

## 2023-01-17 PROCEDURE — 99999 PR PBB SHADOW E&M-EST. PATIENT-LVL III: ICD-10-PCS | Mod: PBBFAC,HCNC,, | Performed by: PHYSICIAN ASSISTANT

## 2023-01-17 PROCEDURE — 1101F PT FALLS ASSESS-DOCD LE1/YR: CPT | Mod: HCNC,CPTII,S$GLB, | Performed by: PHYSICIAN ASSISTANT

## 2023-01-17 PROCEDURE — 77338 DESIGN MLC DEVICE FOR IMRT: CPT | Mod: 26,HCNC,, | Performed by: STUDENT IN AN ORGANIZED HEALTH CARE EDUCATION/TRAINING PROGRAM

## 2023-01-17 PROCEDURE — 99999 PR PBB SHADOW E&M-EST. PATIENT-LVL III: CPT | Mod: PBBFAC,HCNC,, | Performed by: PHYSICIAN ASSISTANT

## 2023-01-17 PROCEDURE — 99499 RISK ADDL DX/OHS AUDIT: ICD-10-PCS | Mod: HCNC,S$GLB,, | Performed by: PHYSICIAN ASSISTANT

## 2023-01-17 PROCEDURE — 99499 UNLISTED E&M SERVICE: CPT | Mod: HCNC,S$GLB,, | Performed by: PHYSICIAN ASSISTANT

## 2023-01-17 PROCEDURE — 77385 HC IMRT, SIMPLE: CPT | Mod: HCNC | Performed by: STUDENT IN AN ORGANIZED HEALTH CARE EDUCATION/TRAINING PROGRAM

## 2023-01-17 PROCEDURE — 1125F AMNT PAIN NOTED PAIN PRSNT: CPT | Mod: HCNC,CPTII,S$GLB, | Performed by: PHYSICIAN ASSISTANT

## 2023-01-17 PROCEDURE — 77014 PR  CT GUIDANCE PLACEMENT RAD THERAPY FIELDS: CPT | Mod: 26,HCNC,, | Performed by: STUDENT IN AN ORGANIZED HEALTH CARE EDUCATION/TRAINING PROGRAM

## 2023-01-17 PROCEDURE — 99213 PR OFFICE/OUTPT VISIT, EST, LEVL III, 20-29 MIN: ICD-10-PCS | Mod: HCNC,S$GLB,, | Performed by: PHYSICIAN ASSISTANT

## 2023-01-17 PROCEDURE — 3074F SYST BP LT 130 MM HG: CPT | Mod: HCNC,CPTII,S$GLB, | Performed by: PHYSICIAN ASSISTANT

## 2023-01-17 PROCEDURE — 77014 HC CT GUIDANCE RADIATION THERAPY FLDS PLACEMENT: CPT | Mod: TC,HCNC | Performed by: STUDENT IN AN ORGANIZED HEALTH CARE EDUCATION/TRAINING PROGRAM

## 2023-01-17 PROCEDURE — 1125F PR PAIN SEVERITY QUANTIFIED, PAIN PRESENT: ICD-10-PCS | Mod: HCNC,CPTII,S$GLB, | Performed by: PHYSICIAN ASSISTANT

## 2023-01-17 PROCEDURE — 3074F PR MOST RECENT SYSTOLIC BLOOD PRESSURE < 130 MM HG: ICD-10-PCS | Mod: HCNC,CPTII,S$GLB, | Performed by: PHYSICIAN ASSISTANT

## 2023-01-17 PROCEDURE — 1101F PR PT FALLS ASSESS DOC 0-1 FALLS W/OUT INJ PAST YR: ICD-10-PCS | Mod: HCNC,CPTII,S$GLB, | Performed by: PHYSICIAN ASSISTANT

## 2023-01-17 PROCEDURE — 77338 DESIGN MLC DEVICE FOR IMRT: CPT | Mod: TC,HCNC | Performed by: STUDENT IN AN ORGANIZED HEALTH CARE EDUCATION/TRAINING PROGRAM

## 2023-01-17 PROCEDURE — 3288F PR FALLS RISK ASSESSMENT DOCUMENTED: ICD-10-PCS | Mod: HCNC,CPTII,S$GLB, | Performed by: PHYSICIAN ASSISTANT

## 2023-01-17 PROCEDURE — 77300 RADIATION THERAPY DOSE PLAN: CPT | Mod: 26,HCNC,, | Performed by: STUDENT IN AN ORGANIZED HEALTH CARE EDUCATION/TRAINING PROGRAM

## 2023-01-17 PROCEDURE — 3288F FALL RISK ASSESSMENT DOCD: CPT | Mod: HCNC,CPTII,S$GLB, | Performed by: PHYSICIAN ASSISTANT

## 2023-01-17 PROCEDURE — 3078F DIAST BP <80 MM HG: CPT | Mod: HCNC,CPTII,S$GLB, | Performed by: PHYSICIAN ASSISTANT

## 2023-01-17 PROCEDURE — 77300 RADIATION THERAPY DOSE PLAN: CPT | Mod: TC,HCNC | Performed by: STUDENT IN AN ORGANIZED HEALTH CARE EDUCATION/TRAINING PROGRAM

## 2023-01-17 PROCEDURE — 99213 OFFICE O/P EST LOW 20 MIN: CPT | Mod: HCNC,S$GLB,, | Performed by: PHYSICIAN ASSISTANT

## 2023-01-17 PROCEDURE — 77300 PR RADIATION THERAPY,DOSIMETRY PLAN: ICD-10-PCS | Mod: 26,HCNC,, | Performed by: STUDENT IN AN ORGANIZED HEALTH CARE EDUCATION/TRAINING PROGRAM

## 2023-01-17 PROCEDURE — 77338 PR  MLC IMRT DESIGN & CONSTRUCTION PER IMRT PLAN: ICD-10-PCS | Mod: 26,HCNC,, | Performed by: STUDENT IN AN ORGANIZED HEALTH CARE EDUCATION/TRAINING PROGRAM

## 2023-01-17 NOTE — TELEPHONE ENCOUNTER
Referral received from Internal Medicine. Attempted to call patient. No answer. Left voicemail to return call to [facility] at # [phone] option 1

## 2023-01-17 NOTE — PROGRESS NOTES
INTERNAL MEDICINE PROGRESS NOTE    CHIEF COMPLAINT     Chief Complaint   Patient presents with    Wound Check       HPI     Misha Eldridge Jr. is a 77 y.o. male who presents for a follow-up visit today.    PCP is Dipak Treviño MD, patient is known to me.     Very poor medically literacy -poor historian  Pt with prostate adenocarcinoma, followed by Dr. Betancourt and s/p RadTx with Dr. Michelle                 Patient presents with complaints of pain to the lower abdomen.  He was previously taking prednisone bid as part of prostate CA treatment. He has DM and has had difficult to control hyperglycemia -he has been working with Diabetic Ed. . His A1C increased significantly and prandial insulin was added to his regimen in Nov of 2022. Today he is here for wound on his lower abdomen.     He has a well healed midline abdominal scar with a moderate abdominal lower panus that experiences a lot of friction with underwear and pants at the waistline. He reports for the past week he has had some discomfort but admits he cannot see this area well on his own. He has been unable to assess the cause of this pain. He denies discharge or bleeding. He denies fever, chills, nausea, vomiting. He is unaccompanied in the office.     He does report that he has been compliant with his current insulin regimen.    He has a recent history of similar wound to the buttocks that self resolved.       Past Medical History:  Past Medical History:   Diagnosis Date    Cataract     Colostomy in place     Diabetes mellitus type II     Hemorrhoid     History of colon cancer     History of prostate cancer     History of pulmonary embolism     Hypertension        Home Medications:  Prior to Admission medications    Medication Sig Start Date End Date Taking? Authorizing Provider   abiraterone (ZYTIGA) 250 mg Tab Take 4 tablets (1,000 mg total) by mouth once daily. 12/5/22 12/5/23  Lindsey Betancourt MD   ACCU-CHEK SOFTCLIX LANCETS Misc TEST BLOOD SUGAR ONCE  A DAY 4/13/20   Zurdo Best MD   amLODIPine (NORVASC) 10 MG tablet Take 1 tablet (10 mg total) by mouth once daily. 12/1/22   Dipak Treviño MD   atorvastatin (LIPITOR) 20 MG tablet Take 1 tablet (20 mg total) by mouth once daily. 10/11/22   Dipak Treviño MD   blood sugar diagnostic Strp 1 each by Misc.(Non-Drug; Combo Route) route once daily. 3/4/19   Kezia Dennis MD   blood sugar diagnostic Strp Pt to check up to 6 times daily 6/6/19   Dipak Treviño MD   blood sugar diagnostic Strp Use to check blood glucose 1-2 times daily as directed. 11/25/22   Juliette Ryan MD   blood-glucose meter kit Use as instructed 3/4/19 9/9/22  Kezia Dennis MD   BOOSTRIX TDAP 2.5-8-5 Lf-mcg-Lf/0.5mL Syrg injection  12/5/17   Historical Provider   carvediloL (COREG) 3.125 MG tablet Take 1 tablet (3.125 mg total) by mouth 2 (two) times daily. 10/11/22   Dipak Treviño MD   diazePAM (VALIUM) 5 MG tablet Take 1 tablet (5 mg total) by mouth as needed for Anxiety (take 1 tablet 30 minute prior to procedure). 9/20/22   Kenneth Azevedo MD   diclofenac (VOLTAREN) 75 MG EC tablet Take 1 tablet (75 mg total) by mouth 2 (two) times daily. 5/20/22   Dipak Treviño MD   doxazosin (CARDURA) 4 MG tablet TAKE 1 TABLET(4 MG) BY MOUTH EVERY EVENING 7/6/22   Dipak Treviño MD   gabapentin (NEURONTIN) 300 MG capsule Take 1 capsule (300 mg total) by mouth 3 (three) times daily. 10/11/22   Dipak Treviño MD   glipiZIDE (GLUCOTROL) 10 MG tablet Take 1 tablet (10 mg total) by mouth 2 (two) times daily before meals. 10/11/22   Dipak Treviño MD   hydroCHLOROthiazide (HYDRODIURIL) 25 MG tablet TAKE ONE-HALF TABLET BY MOUTH ONCE DAILY 10/11/22   Dipak Treviño MD   insulin aspart U-100 (NOVOLOG FLEXPEN U-100 INSULIN) 100 unit/mL (3 mL) InPn pen Inject 8 Units into the skin 3 (three) times daily with meals. 12/28/22 3/28/23  Demi Bear PA-C   insulin glargine U-300 conc (TOUJEO  "MAX U-300 SOLOSTAR) 300 unit/mL (3 mL) insulin pen Inject 28 Units into the skin once daily. 1/10/23 1/10/24  Dipak Treviño MD   irbesartan (AVAPRO) 300 MG tablet Take 1 tablet (300 mg total) by mouth every evening. 12/1/22   Dipak Treviño MD   lancing device with lancets Kit 1 each by Misc.(Non-Drug; Combo Route) route 2 (two) times daily. 10/15/20 9/9/22  Dipak Treviño MD   metFORMIN (GLUCOPHAGE) 1000 MG tablet Take 1 tablet (1,000 mg total) by mouth 2 (two) times daily with meals. 12/1/22   Dipak Treviño MD   pen needle, diabetic (BD ULTRA-FINE KELECHI PEN NEEDLE) 32 gauge x 5/32" Ndle To use with insulin pens at meals and evening shot. 12/9/22   Demi Bear PA-C   polyethylene glycol (GOLYTELY) 236-22.74-6.74 -5.86 gram suspension Take 4,000 mLs by mouth once. 4/14/22   Historical Provider   predniSONE (DELTASONE) 5 MG tablet Take 1 tablet (5 mg total) by mouth 2 (two) times daily. 11/18/22   Lindsey Betancourt MD   predniSONE (DELTASONE) 5 MG tablet Take 1 tablet (5 mg total) by mouth 2 (two) times daily.  Patient not taking: Reported on 12/28/2022 11/30/22   Irene Thompson, ABBEY   tobramycin sulfate 0.3% (TOBREX) 0.3 % ophthalmic solution 1-2 drops topically twice daily to affected toe(s).  Patient not taking: Reported on 12/28/2022 12/7/21   Mk Doyle DPM   tobramycin sulfate 0.3% (TOBREX) 0.3 % ophthalmic solution 1-2 drops topically twice daily to affected toe(s).  Patient not taking: Reported on 12/28/2022 12/28/21   Mk Doyle DPM   triamcinolone acetonide 0.1% (KENALOG) 0.1 % cream Apply topically 2 (two) times daily. 6/6/19   Dipak Treviño MD       Review of Systems:  Review of Systems   Constitutional:  Negative for chills and fever.   HENT:  Negative for sore throat and trouble swallowing.    Eyes:  Negative for visual disturbance.   Respiratory:  Negative for cough and shortness of breath.    Cardiovascular:  Negative for chest pain.   Gastrointestinal:  " "Negative for abdominal pain, constipation, diarrhea, nausea and vomiting.   Genitourinary:  Negative for dysuria and flank pain.   Musculoskeletal:  Negative for back pain, neck pain and neck stiffness.   Skin:  Positive for wound. Negative for rash.   Neurological:  Negative for dizziness, syncope, weakness and headaches.   Psychiatric/Behavioral:  Negative for confusion.      Health Maintainence:   Immunizations:  Health Maintenance         Date Due Completion Date    Shingles Vaccine (2 of 2) 11/25/2019 9/30/2019    Influenza Vaccine (1) 09/01/2022 10/1/2020    Override on 12/14/2016: Declined    COVID-19 Vaccine (5 - Booster for Moderna series) 09/29/2022 8/4/2022    Eye Exam 11/10/2022 11/10/2021    Override on 10/11/2016: Done    Override on 8/6/2014: Done    Hemoglobin A1c 02/21/2023 11/21/2022    Diabetes Urine Screening 04/12/2023 4/12/2022    Lipid Panel 04/12/2023 4/12/2022    TETANUS VACCINE 12/05/2027 12/5/2017    Colonoscopy 06/02/2032 6/2/2022    Override on 7/15/2014: Done             PHYSICAL EXAM     /70 (BP Location: Right arm)   Pulse 86   Ht 6' 1" (1.854 m)   Wt 94.1 kg (207 lb 7.3 oz)   SpO2 98%   BMI 27.37 kg/m²     Physical Exam  Vitals and nursing note reviewed.   Constitutional:       Appearance: Normal appearance.      Comments: Healthy appearing male in NAD or apparent pain. He makes good eye contact, speaks in clear full sentences and ambulates with ease.        HENT:      Head: Normocephalic and atraumatic.      Nose: Nose normal.      Mouth/Throat:      Pharynx: Oropharynx is clear.   Eyes:      Conjunctiva/sclera: Conjunctivae normal.   Cardiovascular:      Rate and Rhythm: Normal rate and regular rhythm.      Pulses: Normal pulses.   Pulmonary:      Effort: No respiratory distress.   Abdominal:      Tenderness: There is no abdominal tenderness.   Musculoskeletal:         General: Normal range of motion.      Cervical back: No rigidity.   Skin:     General: Skin is warm and " dry.      Capillary Refill: Capillary refill takes less than 2 seconds.      Findings: No rash.      Comments: There is a centralized small ulceration to the suprapubic region just under the abdominal panus. No erythema, bleeding, purulence, fluctuance. There is some TTP.     Wound at the top of the gluteal cleft is completely resolved with no evidence of remaining scar tissue.      Neurological:      General: No focal deficit present.      Mental Status: He is alert.      Gait: Gait normal.   Psychiatric:         Mood and Affect: Mood normal.       LABS     Lab Results   Component Value Date    HGBA1C 13.3 (H) 11/21/2022     CMP  Sodium   Date Value Ref Range Status   11/21/2022 139 136 - 145 mmol/L Final     Potassium   Date Value Ref Range Status   11/21/2022 4.2 3.5 - 5.1 mmol/L Final     Chloride   Date Value Ref Range Status   11/21/2022 106 95 - 110 mmol/L Final     CO2   Date Value Ref Range Status   11/21/2022 26 23 - 29 mmol/L Final     Glucose   Date Value Ref Range Status   11/21/2022 345 (H) 70 - 110 mg/dL Final     BUN   Date Value Ref Range Status   11/21/2022 16 8 - 23 mg/dL Final     Creatinine   Date Value Ref Range Status   11/21/2022 1.3 0.5 - 1.4 mg/dL Final     Calcium   Date Value Ref Range Status   11/21/2022 9.2 8.7 - 10.5 mg/dL Final     Total Protein   Date Value Ref Range Status   11/21/2022 6.1 6.0 - 8.4 g/dL Final     Albumin   Date Value Ref Range Status   11/21/2022 3.5 3.5 - 5.2 g/dL Final     Total Bilirubin   Date Value Ref Range Status   11/21/2022 0.5 0.1 - 1.0 mg/dL Final     Comment:     For infants and newborns, interpretation of results should be based  on gestational age, weight and in agreement with clinical  observations.    Premature Infant recommended reference ranges:  Up to 24 hours.............<8.0 mg/dL  Up to 48 hours............<12.0 mg/dL  3-5 days..................<15.0 mg/dL  6-29 days.................<15.0 mg/dL       Alkaline Phosphatase   Date Value Ref Range  Status   11/21/2022 51 (L) 55 - 135 U/L Final     AST   Date Value Ref Range Status   11/21/2022 11 10 - 40 U/L Final     ALT   Date Value Ref Range Status   11/21/2022 14 10 - 44 U/L Final     Anion Gap   Date Value Ref Range Status   11/21/2022 7 (L) 8 - 16 mmol/L Final     eGFR if    Date Value Ref Range Status   06/21/2022 >60.0 >60 mL/min/1.73 m^2 Final     eGFR if non    Date Value Ref Range Status   06/21/2022 53.0 (A) >60 mL/min/1.73 m^2 Final     Comment:     Calculation used to obtain the estimated glomerular filtration  rate (eGFR) is the CKD-EPI equation.        Lab Results   Component Value Date    WBC 4.12 11/21/2022    HGB 12.9 (L) 11/21/2022    HCT 38.7 (L) 11/21/2022    MCV 96 11/21/2022     (L) 11/21/2022     Lab Results   Component Value Date    CHOL 103 (L) 04/12/2022    CHOL 103 (L) 04/12/2022    CHOL 108 (L) 08/19/2020     Lab Results   Component Value Date    HDL 42 04/12/2022    HDL 42 04/12/2022    HDL 44 08/19/2020     Lab Results   Component Value Date    LDLCALC 24.4 (L) 04/12/2022    LDLCALC 24.4 (L) 04/12/2022    LDLCALC 46.8 (L) 08/19/2020     Lab Results   Component Value Date    TRIG 183 (H) 04/12/2022    TRIG 183 (H) 04/12/2022    TRIG 86 08/19/2020     Lab Results   Component Value Date    CHOLHDL 40.8 04/12/2022    CHOLHDL 40.8 04/12/2022    CHOLHDL 40.7 08/19/2020     Lab Results   Component Value Date    TSH 1.549 04/12/2022       ASSESSMENT/PLAN     Misha Eldridge  is a 77 y.o. male     Misha was seen today for wound eval. There is small ulceration below the abdominal panus from friction produced by tight clothing around the waistline. No signs of infection. He is encouraged to clean the wound carefully, apply a bulky dressing to prevent friction and further irritation. Referral is placed to wound clinic. He is aware of importance of DM control in wound healing. He is aware of ED prompts.     Diagnoses and all orders for this  visit:    Open wound of anterior abdominal wall, initial encounter  -     Ambulatory referral/consult to Wound Clinic; Future          Demi Bear PA-C   Department of Internal Medicine - [facility]   9:25 AM

## 2023-01-17 NOTE — TELEPHONE ENCOUNTER
Called no answer, left voice message asking patient to return call to [phone] to discuss appointment date/time regarding referral to wound care.  Will call again.

## 2023-01-17 NOTE — TELEPHONE ENCOUNTER
----- Message from Ivett Ferraro MA sent at 1/17/2023 10:44 AM CST -----  Pt needs appt for wound care. Please contact pt to schedule.     Thank you  Ivett BAINS

## 2023-01-17 NOTE — TELEPHONE ENCOUNTER
Specialty Pharmacy - Refill Coordination    Specialty Medication Orders Linked to Encounter      Flowsheet Row Most Recent Value   Medication #1 abiraterone (ZYTIGA) 250 mg Tab (Order#[id number], Rx#[id number])            Refill Questions - Documented Responses      Flowsheet Row Most Recent Value   Patient Availability and HIPAA Verification    Does patient want to proceed with activity? Yes   HIPAA/medical authority confirmed? Yes   Relationship to patient of person spoken to? Self   Refill Screening Questions    Changes to allergies? No   Changes to medications? No   New conditions since last clinic visit? No   Unplanned office visit, urgent care, ED, or hospital admission in the last 4 weeks? No   How does patient/caregiver feel medication is working? Very good   Financial problems or insurance changes? No   How many doses of your specialty medications were missed in the last 4 weeks? 0   Would patient like to speak to a pharmacist? No   When does the patient need to receive the medication? 01/21/23   Refill Delivery Questions    How will the patient receive the medication? [facility]   When does the patient need to receive the medication? 01/21/23   Shipping Address Home   Address in [facility] confirmed and updated if neccessary? Yes   Expected Copay ($) 0   Is the patient able to afford the medication copay? Yes   Payment Method zero copay   Days supply of Refill 30   Supplies needed? No supplies needed   Refill activity completed? Yes   Refill activity plan Refill scheduled   Shipment/Pickup Date: 01/19/23            Current Outpatient Medications   Medication Sig    abiraterone (ZYTIGA) 250 mg Tab Take 4 tablets (1,000 mg total) by mouth once daily.    ACCU-CHEK SOFTCLIX LANCETS Misc TEST BLOOD SUGAR ONCE A DAY    amLODIPine (NORVASC) 10 MG tablet Take 1 tablet (10 mg total) by mouth once daily.    atorvastatin (LIPITOR) 20 MG tablet Take 1 tablet (20 mg total) by mouth once daily.    blood sugar  "diagnostic Strp 1 each by Misc.(Non-Drug; Combo Route) route once daily. (Patient not taking: Reported on 1/17/2023)    blood sugar diagnostic Strp Pt to check up to 6 times daily (Patient not taking: Reported on 1/17/2023)    blood sugar diagnostic Strp Use to check blood glucose 1-2 times daily as directed.    blood-glucose meter kit Use as instructed    BOOSTRIX TDAP 2.5-8-5 Lf-mcg-Lf/0.5mL Syrg injection     carvediloL (COREG) 3.125 MG tablet Take 1 tablet (3.125 mg total) by mouth 2 (two) times daily.    diazePAM (VALIUM) 5 MG tablet Take 1 tablet (5 mg total) by mouth as needed for Anxiety (take 1 tablet 30 minute prior to procedure).    diclofenac (VOLTAREN) 75 MG EC tablet Take 1 tablet (75 mg total) by mouth 2 (two) times daily.    doxazosin (CARDURA) 4 MG tablet TAKE 1 TABLET(4 MG) BY MOUTH EVERY EVENING    gabapentin (NEURONTIN) 300 MG capsule Take 1 capsule (300 mg total) by mouth 3 (three) times daily.    glipiZIDE (GLUCOTROL) 10 MG tablet Take 1 tablet (10 mg total) by mouth 2 (two) times daily before meals.    hydroCHLOROthiazide (HYDRODIURIL) 25 MG tablet TAKE ONE-HALF TABLET BY MOUTH ONCE DAILY    insulin aspart U-100 (NOVOLOG FLEXPEN U-100 INSULIN) 100 unit/mL (3 mL) InPn pen Inject 8 Units into the skin 3 (three) times daily with meals.    insulin glargine U-300 conc (TOUJEO MAX U-300 SOLOSTAR) 300 unit/mL (3 mL) insulin pen Inject 28 Units into the skin once daily.    irbesartan (AVAPRO) 300 MG tablet Take 1 tablet (300 mg total) by mouth every evening.    lancing device with lancets Kit 1 each by Misc.(Non-Drug; Combo Route) route 2 (two) times daily.    metFORMIN (GLUCOPHAGE) 1000 MG tablet Take 1 tablet (1,000 mg total) by mouth 2 (two) times daily with meals.    pen needle, diabetic (BD ULTRA-FINE KELECHI PEN NEEDLE) 32 gauge x 5/32" Ndle To use with insulin pens at meals and evening shot.    polyethylene glycol (GOLYTELY) 236-22.74-6.74 -5.86 gram suspension Take 4,000 mLs by mouth once.    " predniSONE (DELTASONE) 5 MG tablet Take 1 tablet (5 mg total) by mouth 2 (two) times daily. (Patient not taking: Reported on 1/17/2023)    predniSONE (DELTASONE) 5 MG tablet Take 1 tablet (5 mg total) by mouth 2 (two) times daily. (Patient not taking: Reported on 12/28/2022)    tobramycin sulfate 0.3% (TOBREX) 0.3 % ophthalmic solution 1-2 drops topically twice daily to affected toe(s). (Patient not taking: Reported on 12/28/2022)    tobramycin sulfate 0.3% (TOBREX) 0.3 % ophthalmic solution 1-2 drops topically twice daily to affected toe(s). (Patient not taking: Reported on 12/28/2022)    triamcinolone acetonide 0.1% (KENALOG) 0.1 % cream Apply topically 2 (two) times daily.   Last reviewed on 1/5/2023  2:15 PM by Jossie Vega RD    Review of patient's allergies indicates:   Allergen Reactions    Hay fever and allergy relief     Last reviewed on  1/17/2023 9:31 AM by Ivett Ferraro      Tasks added this encounter   2/13/2023 - Refill Call (Auto Added)   Tasks due within next 3 months   1/22/2023 - Refill Call (Auto Added)     Jeannine Whiting, PharmD  [facility]  [address] [place] [id number]  Phone: [phone]  Fax: [phone]

## 2023-01-18 DIAGNOSIS — E13.9 DIABETES 1.5, MANAGED AS TYPE 2: ICD-10-CM

## 2023-01-18 PROCEDURE — 77014 PR  CT GUIDANCE PLACEMENT RAD THERAPY FIELDS: CPT | Mod: 26,HCNC,, | Performed by: STUDENT IN AN ORGANIZED HEALTH CARE EDUCATION/TRAINING PROGRAM

## 2023-01-18 PROCEDURE — 77014 HC CT GUIDANCE RADIATION THERAPY FLDS PLACEMENT: CPT | Mod: TC,HCNC | Performed by: STUDENT IN AN ORGANIZED HEALTH CARE EDUCATION/TRAINING PROGRAM

## 2023-01-18 PROCEDURE — 77014 PR  CT GUIDANCE PLACEMENT RAD THERAPY FIELDS: ICD-10-PCS | Mod: 26,HCNC,, | Performed by: STUDENT IN AN ORGANIZED HEALTH CARE EDUCATION/TRAINING PROGRAM

## 2023-01-18 PROCEDURE — 77385 HC IMRT, SIMPLE: CPT | Mod: HCNC | Performed by: STUDENT IN AN ORGANIZED HEALTH CARE EDUCATION/TRAINING PROGRAM

## 2023-01-18 RX ORDER — GLIPIZIDE 10 MG/1
TABLET ORAL
Qty: 180 TABLET | Refills: 0 | Status: SHIPPED | OUTPATIENT
Start: 2023-01-18 | End: 2023-02-06 | Stop reason: SDUPTHER

## 2023-01-18 NOTE — TELEPHONE ENCOUNTER
No new care gaps identified.  [facility] Embedded Care Gaps. Reference number: [id number]. 1/18/2023   1:00:44 PM CST

## 2023-01-19 ENCOUNTER — DOCUMENTATION ONLY (OUTPATIENT)
Dept: RADIATION ONCOLOGY | Facility: CLINIC | Age: 78
End: 2023-01-19
Payer: MEDICARE

## 2023-01-19 PROCEDURE — 77336 RADIATION PHYSICS CONSULT: CPT | Mod: HCNC | Performed by: STUDENT IN AN ORGANIZED HEALTH CARE EDUCATION/TRAINING PROGRAM

## 2023-01-19 PROCEDURE — 77014 PR  CT GUIDANCE PLACEMENT RAD THERAPY FIELDS: CPT | Mod: 26,HCNC,, | Performed by: STUDENT IN AN ORGANIZED HEALTH CARE EDUCATION/TRAINING PROGRAM

## 2023-01-19 PROCEDURE — 77385 HC IMRT, SIMPLE: CPT | Mod: HCNC | Performed by: STUDENT IN AN ORGANIZED HEALTH CARE EDUCATION/TRAINING PROGRAM

## 2023-01-19 PROCEDURE — 77014 PR  CT GUIDANCE PLACEMENT RAD THERAPY FIELDS: ICD-10-PCS | Mod: 26,HCNC,, | Performed by: STUDENT IN AN ORGANIZED HEALTH CARE EDUCATION/TRAINING PROGRAM

## 2023-01-19 PROCEDURE — 77014 HC CT GUIDANCE RADIATION THERAPY FLDS PLACEMENT: CPT | Mod: TC,HCNC | Performed by: STUDENT IN AN ORGANIZED HEALTH CARE EDUCATION/TRAINING PROGRAM

## 2023-01-19 NOTE — TELEPHONE ENCOUNTER
Refill Decision Note   Misha Eldridge  is requesting a refill authorization.  Brief Assessment and Rationale for Refill:  Approve     Medication Therapy Plan:  eGFR is >50 and consistent with the previous lab draws. Ok to approve due to no dose adjustment necessary    Medication Reconciliation Completed: No   Comments:     No Care Gaps recommended.     Note composed:6:09 PM 01/18/2023

## 2023-01-19 NOTE — PLAN OF CARE
Pt. On day 35 of outpt. Xrt to prostate. Pt. Doing well.  No c/o.  Inst. By md to take miralax w/dinner.

## 2023-01-20 ENCOUNTER — OFFICE VISIT (OUTPATIENT)
Dept: UROLOGY | Facility: CLINIC | Age: 78
End: 2023-01-20
Payer: MEDICARE

## 2023-01-20 ENCOUNTER — CLINICAL SUPPORT (OUTPATIENT)
Dept: DIABETES | Facility: CLINIC | Age: 78
End: 2023-01-20
Payer: MEDICARE

## 2023-01-20 VITALS
RESPIRATION RATE: 16 BRPM | BODY MASS INDEX: 27.43 KG/M2 | WEIGHT: 207 LBS | HEART RATE: 86 BPM | OXYGEN SATURATION: 99 % | DIASTOLIC BLOOD PRESSURE: 75 MMHG | SYSTOLIC BLOOD PRESSURE: 141 MMHG | HEIGHT: 73 IN

## 2023-01-20 DIAGNOSIS — E11.65 TYPE 2 DIABETES MELLITUS WITH HYPERGLYCEMIA, WITH LONG-TERM CURRENT USE OF INSULIN: ICD-10-CM

## 2023-01-20 DIAGNOSIS — N39.3 STRESS INCONTINENCE: ICD-10-CM

## 2023-01-20 DIAGNOSIS — C61 PROSTATE CANCER: Primary | ICD-10-CM

## 2023-01-20 DIAGNOSIS — Z79.4 TYPE 2 DIABETES MELLITUS WITH HYPERGLYCEMIA, WITH LONG-TERM CURRENT USE OF INSULIN: ICD-10-CM

## 2023-01-20 PROCEDURE — 1160F PR REVIEW ALL MEDS BY PRESCRIBER/CLIN PHARMACIST DOCUMENTED: ICD-10-PCS | Mod: HCNC,CPTII,S$GLB, | Performed by: NURSE PRACTITIONER

## 2023-01-20 PROCEDURE — 3078F DIAST BP <80 MM HG: CPT | Mod: HCNC,CPTII,S$GLB, | Performed by: NURSE PRACTITIONER

## 2023-01-20 PROCEDURE — 99213 PR OFFICE/OUTPT VISIT, EST, LEVL III, 20-29 MIN: ICD-10-PCS | Mod: HCNC,S$GLB,, | Performed by: NURSE PRACTITIONER

## 2023-01-20 PROCEDURE — 1125F PR PAIN SEVERITY QUANTIFIED, PAIN PRESENT: ICD-10-PCS | Mod: HCNC,CPTII,S$GLB, | Performed by: NURSE PRACTITIONER

## 2023-01-20 PROCEDURE — G0108 DIAB MANAGE TRN  PER INDIV: HCPCS | Mod: HCNC,S$GLB,, | Performed by: DIETITIAN, REGISTERED

## 2023-01-20 PROCEDURE — 77385 HC IMRT, SIMPLE: CPT | Mod: HCNC | Performed by: STUDENT IN AN ORGANIZED HEALTH CARE EDUCATION/TRAINING PROGRAM

## 2023-01-20 PROCEDURE — 3078F PR MOST RECENT DIASTOLIC BLOOD PRESSURE < 80 MM HG: ICD-10-PCS | Mod: HCNC,CPTII,S$GLB, | Performed by: NURSE PRACTITIONER

## 2023-01-20 PROCEDURE — 1101F PR PT FALLS ASSESS DOC 0-1 FALLS W/OUT INJ PAST YR: ICD-10-PCS | Mod: HCNC,CPTII,S$GLB, | Performed by: NURSE PRACTITIONER

## 2023-01-20 PROCEDURE — 99213 OFFICE O/P EST LOW 20 MIN: CPT | Mod: HCNC,S$GLB,, | Performed by: NURSE PRACTITIONER

## 2023-01-20 PROCEDURE — 3077F SYST BP >= 140 MM HG: CPT | Mod: HCNC,CPTII,S$GLB, | Performed by: NURSE PRACTITIONER

## 2023-01-20 PROCEDURE — 1125F AMNT PAIN NOTED PAIN PRSNT: CPT | Mod: HCNC,CPTII,S$GLB, | Performed by: NURSE PRACTITIONER

## 2023-01-20 PROCEDURE — 1159F PR MEDICATION LIST DOCUMENTED IN MEDICAL RECORD: ICD-10-PCS | Mod: HCNC,CPTII,S$GLB, | Performed by: NURSE PRACTITIONER

## 2023-01-20 PROCEDURE — 1160F RVW MEDS BY RX/DR IN RCRD: CPT | Mod: HCNC,CPTII,S$GLB, | Performed by: NURSE PRACTITIONER

## 2023-01-20 PROCEDURE — 99999 PR PBB SHADOW E&M-EST. PATIENT-LVL I: CPT | Mod: PBBFAC,HCNC,, | Performed by: DIETITIAN, REGISTERED

## 2023-01-20 PROCEDURE — 77014 PR  CT GUIDANCE PLACEMENT RAD THERAPY FIELDS: CPT | Mod: 26,HCNC,, | Performed by: STUDENT IN AN ORGANIZED HEALTH CARE EDUCATION/TRAINING PROGRAM

## 2023-01-20 PROCEDURE — 3288F PR FALLS RISK ASSESSMENT DOCUMENTED: ICD-10-PCS | Mod: HCNC,CPTII,S$GLB, | Performed by: NURSE PRACTITIONER

## 2023-01-20 PROCEDURE — 99999 PR PBB SHADOW E&M-EST. PATIENT-LVL I: ICD-10-PCS | Mod: PBBFAC,HCNC,, | Performed by: DIETITIAN, REGISTERED

## 2023-01-20 PROCEDURE — 3288F FALL RISK ASSESSMENT DOCD: CPT | Mod: HCNC,CPTII,S$GLB, | Performed by: NURSE PRACTITIONER

## 2023-01-20 PROCEDURE — 77014 HC CT GUIDANCE RADIATION THERAPY FLDS PLACEMENT: CPT | Mod: TC,HCNC | Performed by: STUDENT IN AN ORGANIZED HEALTH CARE EDUCATION/TRAINING PROGRAM

## 2023-01-20 PROCEDURE — 3077F PR MOST RECENT SYSTOLIC BLOOD PRESSURE >= 140 MM HG: ICD-10-PCS | Mod: HCNC,CPTII,S$GLB, | Performed by: NURSE PRACTITIONER

## 2023-01-20 PROCEDURE — 1101F PT FALLS ASSESS-DOCD LE1/YR: CPT | Mod: HCNC,CPTII,S$GLB, | Performed by: NURSE PRACTITIONER

## 2023-01-20 PROCEDURE — 77014 PR  CT GUIDANCE PLACEMENT RAD THERAPY FIELDS: ICD-10-PCS | Mod: 26,HCNC,, | Performed by: STUDENT IN AN ORGANIZED HEALTH CARE EDUCATION/TRAINING PROGRAM

## 2023-01-20 PROCEDURE — 1159F MED LIST DOCD IN RCRD: CPT | Mod: HCNC,CPTII,S$GLB, | Performed by: NURSE PRACTITIONER

## 2023-01-20 PROCEDURE — G0108 PR DIAB MANAGE TRN  PER INDIV: ICD-10-PCS | Mod: HCNC,S$GLB,, | Performed by: DIETITIAN, REGISTERED

## 2023-01-20 NOTE — PROGRESS NOTES
Diabetes Care Specialist Progress Note  Author: Jossie Vega RD  Date: 1/20/2023    Program Intake  Reason for Diabetes Program Visit:: Intervention  Type of Intervention:: Individual  Individual: Education  Education: Self-Management Skill Review  Current diabetes risk level:: high  In the last 12 months, have you:: been admitted to a hospital  Was the ER or hospital admission related to diabetes?: No    Lab Results   Component Value Date    HGBA1C 13.3 (H) 11/21/2022         Diabetes Self-Management Skills Assessment    Diabetes Disease Process/Treatment Options  Diabetes Disease Process/Treatment Options: Skills Assessment Completed: No  Deferred due to:: Time    Nutrition/Healthy Eating  Nutrition/Healthy Eating Skills Assessment Completed:: No  Deffered due to:: Time    Physical Activity/Exercise  Physical Activity/Exercise Skills Assessment Completed: : No  Deffered due to:: Time    Medications  Patient is able to describe current diabetes management routine.: yes  Diabetes management routine:: oral medications, insulin (He is taking Novolog 8 units + correction scale, Toujeo 28 units HS, and glipizide 10mg)  Patient is able to identify current diabetes medications, dosages, and appropriate timing of medications.: yes  Patient understands the purpose of the medications taken for diabetes.: yes  Patient reports problems or concerns with current medication regimen.: no  Medication regimen problems/concerns:: other (see comments)  Medication Skills Assessment Completed:: Yes  Assessment indicates:: Instruction Needed  Area of need?: Yes    Home Blood Glucose Monitoring  Patient states that blood sugar is checked at home daily.: yes  Monitoring Method:: home glucometer  How often do you check your blood sugar?: 4 times a day  When do you check your blood sugar?: Before breakfast, Before lunch, Before dinner, Before bedtime  When you check what is your typical blood sugar range? : FBG- 299, 260, 272, 195, 163,  188, 194, before lunch - 285, 152, 176, 145, 127, 116, 100, before dinner - 248, 257, 104, 203, 220, HS - 214, 382  Blood glucose logs:: yes, encouraged to keep logs, encouraged to bring logs to provider visits (brought glucometer)  Blood glucose logs reviewed today?: yes  Home Blood Glucose Monitoring Skills Assessment Completed: : Yes  Assessment indicates:: Adequate understanding  Area of need?: No    Acute Complications  Acute Complications Skills Assessment Completed: : No  Deffered due to:: Time    Chronic Complications  Chronic Complications Skills Assessment Completed: : No  Deferred due to:: Time    Psychosocial/Coping  Psychosocial/Coping Skills Assessment Completed: : No  Deffered due to:: Time      Assessment Summary and Plan    Based on today's diabetes care assessment, the following areas of need were identified:      Social 12/9/2022   Cognitive/Behavioral Health No   Culture/Judaism No   Health Literacy No        Clinical 12/9/2022   Medication Adherence No   Lab Compliance No        Diabetes Self-Management Skills 1/20/2023   Diabetes Disease Process/Treatment Options -   Nutrition/Healthy Eating -   Physical Activity/Exercise -   Medication Yes - see care planning   Home Blood Glucose Monitoring No   Acute Complications -   Chronic Complications -   Psychosocial/Coping -          Today's interventions were provided through individual discussion, instruction, and written materials were provided.      Patient verbalized understanding of instruction and written materials.  Pt was able to return back demonstration of instructions today. Patient understood key points, needs reinforcement and further instruction.     Diabetes Self-Management Care Plan:    Today's Diabetes Self-Management Care Plan was developed with Misha's input. Lisle has agreed to work toward the following goal(s) to improve his/her overall diabetes control.      Care Plan: Diabetes Management   Updates made since 12/21/2022 12:00 AM  "       Problem: Medications         Goal: Pt will start taking Novolog before each meal. Completed 1/5/2023   Start Date: 12/9/2022   Expected End Date: 12/16/2022   Recent Progress: Met   Priority: High   Barriers: No Barriers Identified   Note:    12/9/22 - Pt initially did not have an appt today but came to clinic c/o BG staying high. Was able to get pt with me. Pt reported has had significantly elevated BG (300s and 400s) since last month when started on 4 new pills for cancer treatment. Pt was started on prednisone to take along with his radiation treatment. Going through 45 days of radiation. States he was able to get it down as far as 210 or 220 a couple weeks ago after eating only salads and doing a lot of walking ("about 30 blocks"). Has been more tired past week and unable to walk much. Explained prednisone is causing high BG, and while activity and controlling carb intake will help, it will not be enough to reach goal BGs. Discussed care with STAR Mendosa (Dr. Treviño is not in office today and pt has seen Demi recently). Pt started on Novolog 5 units before meals. Pt already on long-acting insulin. Educated pt on rapid-acting insulin, only take before meals, must take right before eating and no more than 15 min before, skip shot if skipping meal, and importance of right shot right time. Encouraged SMBG AC/HS, provided logs, and encouraged bringing log of BGs to future appts.     12/16/22 - Pt went to  Novolog from pharmacy but was vial insulin instead of pens. He left vial at pharmacy but did not call about getting Rx changed. Continues taking Lantus but has not yet started Novolog. Discussed with current readings and requiring prednisone, he will need to start Novolog. Communicated with prescriber regarding changing Rx to Novolog pen. Reviewed again with pt MDI instructions. Novolog 5 units before each meal, 5-15 min before, skip shot if skipping meal, and continue Lantus HS. "     12/22/22 - Pt still has not started Novolog. Contacted Megan earlier this week and Rx has been ready for . Discussed with pt it has been ready for him. Called Megan on speaker phone with pt in the office. Confirmed he can  Rx now. Reviewed timing, dosage and only skip if skipping a meal and do not take with snack between meals. He often eats a lot of fruit for snacks. Encouraged limiting to 1 piece of fruit. Also reviewed continue to take Lantus every night.     12/30/22 - Pt has been on Novolog for about a week. Lantus and Novolog increased per Dmei Bear PA-C 2 days ago. Glucose readings remaining high with increase in dosages. Communicated with Demi and Lantus titrated up to 30 units. Also educated pt on adding correction to Novolog before each meal.       Goal: Pt will add correction scale to Novolog before each meal and continue Lantus nightly.    Start Date: 12/30/2022   Expected End Date: 1/5/2023   This Visit's Progress: Met   Recent Progress: No change   Priority: High   Barriers: No Barriers Identified   Note:    12/30/22 - Pt doing well with taking Novolog before each meal. Pt was overwhelmed when first starting Novolog and was not ready to add correction. Discussed again today and pt willing to add on correction scale. Provided education on correction scale 150-200 + 1 units, etc. Pt showed good understanding and acceptance. Reviewed to only use scale with Novolog, not with Lantus and only take before meals.     1/5/23 - Some good improvement in BG readings with taking Novolog 10 before meals and Lantus 30 units HS. However, pt forgot about using correction scale with Novolog. Reviewed testing BG before each meal and adding units of Novolog to 10 units base dose according to scale. Reviewed examples and pt demonstrated understanding.     1/20/23 - Pt doing well with taking Novolog using correction scale (1:50 starting at 150) before each meal. No hypoglycemia. Has seen  improvement in BGs - no longer in 300s, most readings in 200s, and a few meeting goal. FBG mostly staying >140. Discussed titration of long-acting insulin based on FBG reading every 3-5 days if consistently >140. Pt has about 10 more days of radiation - reviewed if/when prednisone decreased/stopped risk of hypoglycemia and insulin will need to be decreased. Scheduled follow-up for shortly after radiation should be completed.        Task: Reviewed with patient all current diabetes medications and provided basic review of the purpose, dosage, frequency, side effects, and storage of both oral and injectable diabetes medications. Completed 12/30/2022        Task: Discussed guidelines for preventing, detecting and treating hypoglycemia and hyperglycemia and reviewed the importance of meal and medication timing with diabetes mediations for prevention of hypoglycemia and maximum drug benefit. Completed 12/30/2022        Problem: Blood Glucose Self-Monitoring         Goal: Patient will check and record blood sugars before each meal.    Start Date: 12/30/2022   Expected End Date: 1/5/2023   This Visit's Progress: Met   Recent Progress: On track   Priority: High   Barriers: No Barriers Identified   Note:    12/30/22 - Pt currently only SMBG once daily. Discussed importance of testing more often to aid in effective medication adjustment. Advised to test before each meal and use reading for correction with Novolog.     1/5/23 - Pt has been testing more but often only 2x/day. Discussed importance of testing before each meal in order to use correction and get ahead of high BGs while on prednisone. Pt is nearing the end of radiation treatment later this month. Discussed and reviewed will need further adjustment (decrease) in insulin when prednisone is discontinued and testing BG will allow for effective insulin adjustment. Reinforced also increased risk of low with prednisone decrease or d/c and importance of testing in that  regard as well.     1/20/23 - Pt has increased testing to 3-4 times daily. Testing before each meal for proper use of correction scale. Praised excellent efforts and encouraged continuing at this time.       Task: Reviewed the importance of self-monitoring blood glucose and keeping logs. Completed 12/30/2022        Task: Provided patient with blood glucose logs, reviewed appropriate timing and frequency to SMBG, education on parameters on when to notify provider and advised patient to bring logs to all appts with PCP/Endocrinologist/Diabetes Care Specialist. Completed 12/30/2022        Task: Discussed ways to minimize pain when monitoring blood glucose. Completed 12/30/2022          Follow Up Plan     Follow up in about 17 days (around 2/6/2023) for follow-up/log review.    Today's care plan and follow up schedule was discussed with patient.  Misha verbalized understanding of the care plan, goals, and agrees to follow up plan.        The patient was encouraged to communicate with his/her health care provider/physician and care team regarding his/her condition(s) and treatment.  I provided the patient with my contact information today and encouraged to contact me via phone or [facility] Patient Portal as needed.     Length of Visit   Total Time: 30 Minutes

## 2023-01-20 NOTE — PROGRESS NOTES
"Subjective:      Misha Eldridge Jr. is a 77 y.o. male who returns today for PVR.     Prostate Cancer:  - reports PSA screen was abnormal in 2010   - 4/27/2010 RARP with bilateral nerve sparing - prostate adenocarcinoma- surgery at [facility]  - 4/12/2022 PSA 7.1 ng/ml with concern for biochemical recurrence     He underwent radiation simulation on 8/5/2022. He has been taking his zytiga + prednisone daily. Received eligard 45 mg on 8/16/22- due February 16th-administered per Dr. Betancourt.      He was to begin XRT per Dr. Tejada's; however there was "difficulty planning radiation based on PET scan alone which shows recurrence between rectum and bladder caudally. He is refusing MRI again because he is claustrophobic."     Now s/p TRUS/bx of prostatic fossa with Dr. 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 Azevedo. Doing well with this.     Denies bothersome urinary symptoms. Denies slow stream and JOSE DE JESUS. Kegels have helped with the leakage. Denies dysuria and gross hematuria.     The following portions of the patient's history were reviewed and updated as appropriate: allergies, current medications, past family history, past medical history, past social history, past surgical history and problem list.    Review of Systems  Constitutional: no fever or chills  ENT: no nasal congestion or sore throat  Respiratory: no cough or shortness of breath  Cardiovascular: no chest pain or palpitations  Gastrointestinal: no nausea or vomiting, tolerating diet  Genitourinary: as per HPI  Hematologic/Lymphatic: no easy bruising or lymphadenopathy  Musculoskeletal: no arthralgias or myalgias  Neurological: no seizures or tremors  Behavioral/Psych: no auditory or visual hallucinations     Objective:   Vitals:   Vitals:    01/20/23 0952   BP: (!) 141/75   Pulse: 86   Resp: 16     Physical Exam   General: alert and oriented, no " acute distress  Head: normocephalic, atraumatic  Neck: supple, normal ROM  Respiratory: Symmetric expansion, non-labored breathing  Cardiovascular: regular rate and rhythm  Abdomen: soft, non tender, non distended   Genitourinary: deferred  Skin: normal coloration and turgor, no rashes, no suspicious skin lesions noted  Neuro: alert and oriented x3, no gross deficits  Psych: normal judgment and insight, normal mood/affect, and non-anxious    Lab Review   Urinalysis demonstrates positive for glucose  PVR: 0 mL  Lab Results   Component Value Date    WBC 4.12 11/21/2022    HGB 12.9 (L) 11/21/2022    HCT 38.7 (L) 11/21/2022    MCV 96 11/21/2022     (L) 11/21/2022     Lab Results   Component Value Date    CREATININE 1.3 11/21/2022    BUN 16 11/21/2022     Lab Results   Component Value Date    PSA 7.1 (H) 04/12/2022     Imaging   None    Assessment:     1. Prostate cancer    2. Stress incontinence      Plan:   Misha was seen today for prostate cancer.    Diagnoses and all orders for this visit:    Prostate cancer local recurrence sp robotic prostatectomy at [facility]    Stress incontinence    Plan:  --Voiding well  --Incontinence has improved with kegels- continue kegels  --Follow up with Dr. Betancourt and Dr. Azevedo as scheduled

## 2023-01-23 DIAGNOSIS — Z85.46 HISTORY OF PROSTATE CANCER: ICD-10-CM

## 2023-01-23 PROCEDURE — 77014 PR  CT GUIDANCE PLACEMENT RAD THERAPY FIELDS: ICD-10-PCS | Mod: 26,HCNC,, | Performed by: STUDENT IN AN ORGANIZED HEALTH CARE EDUCATION/TRAINING PROGRAM

## 2023-01-23 PROCEDURE — 77385 HC IMRT, SIMPLE: CPT | Mod: HCNC | Performed by: STUDENT IN AN ORGANIZED HEALTH CARE EDUCATION/TRAINING PROGRAM

## 2023-01-23 PROCEDURE — 77014 PR  CT GUIDANCE PLACEMENT RAD THERAPY FIELDS: CPT | Mod: 26,HCNC,, | Performed by: STUDENT IN AN ORGANIZED HEALTH CARE EDUCATION/TRAINING PROGRAM

## 2023-01-23 PROCEDURE — 77014 HC CT GUIDANCE RADIATION THERAPY FLDS PLACEMENT: CPT | Mod: TC,HCNC | Performed by: STUDENT IN AN ORGANIZED HEALTH CARE EDUCATION/TRAINING PROGRAM

## 2023-01-23 RX ORDER — ABIRATERONE ACETATE 250 MG/1
1000 TABLET ORAL DAILY
Qty: 120 TABLET | Refills: 1 | Status: SHIPPED | OUTPATIENT
Start: 2023-01-23 | End: 2023-04-21 | Stop reason: SDUPTHER

## 2023-01-24 PROCEDURE — 77385 HC IMRT, SIMPLE: CPT | Mod: HCNC | Performed by: STUDENT IN AN ORGANIZED HEALTH CARE EDUCATION/TRAINING PROGRAM

## 2023-01-24 PROCEDURE — 77014 PR  CT GUIDANCE PLACEMENT RAD THERAPY FIELDS: CPT | Mod: 26,HCNC,, | Performed by: STUDENT IN AN ORGANIZED HEALTH CARE EDUCATION/TRAINING PROGRAM

## 2023-01-24 PROCEDURE — 77014 HC CT GUIDANCE RADIATION THERAPY FLDS PLACEMENT: CPT | Mod: TC,HCNC | Performed by: STUDENT IN AN ORGANIZED HEALTH CARE EDUCATION/TRAINING PROGRAM

## 2023-01-24 PROCEDURE — 77014 PR  CT GUIDANCE PLACEMENT RAD THERAPY FIELDS: ICD-10-PCS | Mod: 26,HCNC,, | Performed by: STUDENT IN AN ORGANIZED HEALTH CARE EDUCATION/TRAINING PROGRAM

## 2023-01-25 ENCOUNTER — LAB VISIT (OUTPATIENT)
Dept: LAB | Facility: OTHER | Age: 78
End: 2023-01-25
Attending: UROLOGY
Payer: MEDICARE

## 2023-01-25 ENCOUNTER — OFFICE VISIT (OUTPATIENT)
Dept: INTERNAL MEDICINE | Facility: CLINIC | Age: 78
End: 2023-01-25
Attending: INTERNAL MEDICINE
Payer: MEDICARE

## 2023-01-25 VITALS
SYSTOLIC BLOOD PRESSURE: 124 MMHG | HEIGHT: 73 IN | OXYGEN SATURATION: 97 % | DIASTOLIC BLOOD PRESSURE: 62 MMHG | HEART RATE: 67 BPM | WEIGHT: 210.56 LBS | BODY MASS INDEX: 27.91 KG/M2

## 2023-01-25 DIAGNOSIS — E11.65 TYPE 2 DIABETES MELLITUS WITH HYPERGLYCEMIA, WITH LONG-TERM CURRENT USE OF INSULIN: ICD-10-CM

## 2023-01-25 DIAGNOSIS — M46.97 INFLAMMATORY SPONDYLOPATHY OF LUMBOSACRAL REGION: Primary | ICD-10-CM

## 2023-01-25 DIAGNOSIS — N18.30 CKD STAGE 3 SECONDARY TO DIABETES: ICD-10-CM

## 2023-01-25 DIAGNOSIS — D69.6 THROMBOCYTOPENIA, UNSPECIFIED: ICD-10-CM

## 2023-01-25 DIAGNOSIS — E11.22 CKD STAGE 3 SECONDARY TO DIABETES: ICD-10-CM

## 2023-01-25 DIAGNOSIS — Z85.46 HISTORY OF PROSTATE CANCER: ICD-10-CM

## 2023-01-25 DIAGNOSIS — Z79.4 TYPE 2 DIABETES MELLITUS WITH HYPERGLYCEMIA, WITH LONG-TERM CURRENT USE OF INSULIN: ICD-10-CM

## 2023-01-25 DIAGNOSIS — I70.0 AORTIC ATHEROSCLEROSIS: ICD-10-CM

## 2023-01-25 DIAGNOSIS — Z85.038 HISTORY OF COLON CANCER: ICD-10-CM

## 2023-01-25 LAB
ALBUMIN SERPL BCP-MCNC: 3.8 G/DL (ref 3.5–5.2)
ALBUMIN SERPL BCP-MCNC: 3.8 G/DL (ref 3.5–5.2)
ALP SERPL-CCNC: 50 U/L (ref 55–135)
ALP SERPL-CCNC: 50 U/L (ref 55–135)
ALT SERPL W/O P-5'-P-CCNC: 21 U/L (ref 10–44)
ALT SERPL W/O P-5'-P-CCNC: 21 U/L (ref 10–44)
ANION GAP SERPL CALC-SCNC: 9 MMOL/L (ref 8–16)
ANION GAP SERPL CALC-SCNC: 9 MMOL/L (ref 8–16)
AST SERPL-CCNC: 17 U/L (ref 10–40)
AST SERPL-CCNC: 17 U/L (ref 10–40)
BASOPHILS # BLD AUTO: 0.04 K/UL (ref 0–0.2)
BASOPHILS NFR BLD: 1 % (ref 0–1.9)
BILIRUB SERPL-MCNC: 0.8 MG/DL (ref 0.1–1)
BILIRUB SERPL-MCNC: 0.8 MG/DL (ref 0.1–1)
BUN SERPL-MCNC: 17 MG/DL (ref 8–23)
BUN SERPL-MCNC: 17 MG/DL (ref 8–23)
CALCIUM SERPL-MCNC: 9.9 MG/DL (ref 8.7–10.5)
CALCIUM SERPL-MCNC: 9.9 MG/DL (ref 8.7–10.5)
CEA SERPL-MCNC: 3.7 NG/ML (ref 0–5)
CHLORIDE SERPL-SCNC: 104 MMOL/L (ref 95–110)
CHLORIDE SERPL-SCNC: 104 MMOL/L (ref 95–110)
CO2 SERPL-SCNC: 28 MMOL/L (ref 23–29)
CO2 SERPL-SCNC: 28 MMOL/L (ref 23–29)
CREAT SERPL-MCNC: 1.2 MG/DL (ref 0.5–1.4)
CREAT SERPL-MCNC: 1.2 MG/DL (ref 0.5–1.4)
DIFFERENTIAL METHOD: ABNORMAL
EOSINOPHIL # BLD AUTO: 0 K/UL (ref 0–0.5)
EOSINOPHIL NFR BLD: 0.7 % (ref 0–8)
ERYTHROCYTE [DISTWIDTH] IN BLOOD BY AUTOMATED COUNT: 16.9 % (ref 11.5–14.5)
EST. GFR  (NO RACE VARIABLE): >60 ML/MIN/1.73 M^2
EST. GFR  (NO RACE VARIABLE): >60 ML/MIN/1.73 M^2
ESTIMATED AVG GLUCOSE: 260 MG/DL (ref 68–131)
GLUCOSE SERPL-MCNC: 212 MG/DL (ref 70–110)
GLUCOSE SERPL-MCNC: 212 MG/DL (ref 70–110)
HBA1C MFR BLD: 10.7 % (ref 4–5.6)
HCT VFR BLD AUTO: 34.5 % (ref 40–54)
HGB BLD-MCNC: 11.6 G/DL (ref 14–18)
IMM GRANULOCYTES # BLD AUTO: 0.13 K/UL (ref 0–0.04)
IMM GRANULOCYTES NFR BLD AUTO: 3.2 % (ref 0–0.5)
LYMPHOCYTES # BLD AUTO: 0.7 K/UL (ref 1–4.8)
LYMPHOCYTES NFR BLD: 18.1 % (ref 18–48)
MCH RBC QN AUTO: 33.3 PG (ref 27–31)
MCHC RBC AUTO-ENTMCNC: 33.6 G/DL (ref 32–36)
MCV RBC AUTO: 99 FL (ref 82–98)
MONOCYTES # BLD AUTO: 0.4 K/UL (ref 0.3–1)
MONOCYTES NFR BLD: 10.5 % (ref 4–15)
NEUTROPHILS # BLD AUTO: 2.7 K/UL (ref 1.8–7.7)
NEUTROPHILS NFR BLD: 66.5 % (ref 38–73)
NRBC BLD-RTO: 2 /100 WBC
PLATELET # BLD AUTO: 146 K/UL (ref 150–450)
PMV BLD AUTO: 10.4 FL (ref 9.2–12.9)
POTASSIUM SERPL-SCNC: 3.9 MMOL/L (ref 3.5–5.1)
POTASSIUM SERPL-SCNC: 3.9 MMOL/L (ref 3.5–5.1)
PROT SERPL-MCNC: 6.6 G/DL (ref 6–8.4)
PROT SERPL-MCNC: 6.6 G/DL (ref 6–8.4)
RBC # BLD AUTO: 3.48 M/UL (ref 4.6–6.2)
SODIUM SERPL-SCNC: 141 MMOL/L (ref 136–145)
SODIUM SERPL-SCNC: 141 MMOL/L (ref 136–145)
WBC # BLD AUTO: 4.08 K/UL (ref 3.9–12.7)

## 2023-01-25 PROCEDURE — 3288F FALL RISK ASSESSMENT DOCD: CPT | Mod: HCNC,CPTII,S$GLB, | Performed by: INTERNAL MEDICINE

## 2023-01-25 PROCEDURE — 99499 UNLISTED E&M SERVICE: CPT | Mod: HCNC,S$GLB,, | Performed by: INTERNAL MEDICINE

## 2023-01-25 PROCEDURE — 3078F PR MOST RECENT DIASTOLIC BLOOD PRESSURE < 80 MM HG: ICD-10-PCS | Mod: HCNC,CPTII,S$GLB, | Performed by: INTERNAL MEDICINE

## 2023-01-25 PROCEDURE — 3074F SYST BP LT 130 MM HG: CPT | Mod: HCNC,CPTII,S$GLB, | Performed by: INTERNAL MEDICINE

## 2023-01-25 PROCEDURE — 77014 PR  CT GUIDANCE PLACEMENT RAD THERAPY FIELDS: CPT | Mod: 26,HCNC,, | Performed by: STUDENT IN AN ORGANIZED HEALTH CARE EDUCATION/TRAINING PROGRAM

## 2023-01-25 PROCEDURE — 77014 HC CT GUIDANCE RADIATION THERAPY FLDS PLACEMENT: CPT | Mod: TC,HCNC | Performed by: STUDENT IN AN ORGANIZED HEALTH CARE EDUCATION/TRAINING PROGRAM

## 2023-01-25 PROCEDURE — 99214 OFFICE O/P EST MOD 30 MIN: CPT | Mod: HCNC,S$GLB,, | Performed by: INTERNAL MEDICINE

## 2023-01-25 PROCEDURE — 1101F PR PT FALLS ASSESS DOC 0-1 FALLS W/OUT INJ PAST YR: ICD-10-PCS | Mod: HCNC,CPTII,S$GLB, | Performed by: INTERNAL MEDICINE

## 2023-01-25 PROCEDURE — 99499 RISK ADDL DX/OHS AUDIT: ICD-10-PCS | Mod: HCNC,S$GLB,, | Performed by: INTERNAL MEDICINE

## 2023-01-25 PROCEDURE — 3078F DIAST BP <80 MM HG: CPT | Mod: HCNC,CPTII,S$GLB, | Performed by: INTERNAL MEDICINE

## 2023-01-25 PROCEDURE — 82378 CARCINOEMBRYONIC ANTIGEN: CPT | Mod: HCNC | Performed by: STUDENT IN AN ORGANIZED HEALTH CARE EDUCATION/TRAINING PROGRAM

## 2023-01-25 PROCEDURE — 1126F PR PAIN SEVERITY QUANTIFIED, NO PAIN PRESENT: ICD-10-PCS | Mod: HCNC,CPTII,S$GLB, | Performed by: INTERNAL MEDICINE

## 2023-01-25 PROCEDURE — 77385 HC IMRT, SIMPLE: CPT | Mod: HCNC | Performed by: STUDENT IN AN ORGANIZED HEALTH CARE EDUCATION/TRAINING PROGRAM

## 2023-01-25 PROCEDURE — 3288F PR FALLS RISK ASSESSMENT DOCUMENTED: ICD-10-PCS | Mod: HCNC,CPTII,S$GLB, | Performed by: INTERNAL MEDICINE

## 2023-01-25 PROCEDURE — 36415 COLL VENOUS BLD VENIPUNCTURE: CPT | Mod: HCNC | Performed by: STUDENT IN AN ORGANIZED HEALTH CARE EDUCATION/TRAINING PROGRAM

## 2023-01-25 PROCEDURE — 99999 PR PBB SHADOW E&M-EST. PATIENT-LVL III: CPT | Mod: PBBFAC,HCNC,, | Performed by: INTERNAL MEDICINE

## 2023-01-25 PROCEDURE — 83036 HEMOGLOBIN GLYCOSYLATED A1C: CPT | Mod: HCNC | Performed by: INTERNAL MEDICINE

## 2023-01-25 PROCEDURE — 99214 PR OFFICE/OUTPT VISIT, EST, LEVL IV, 30-39 MIN: ICD-10-PCS | Mod: HCNC,S$GLB,, | Performed by: INTERNAL MEDICINE

## 2023-01-25 PROCEDURE — 80053 COMPREHEN METABOLIC PANEL: CPT | Mod: HCNC | Performed by: UROLOGY

## 2023-01-25 PROCEDURE — 99999 PR PBB SHADOW E&M-EST. PATIENT-LVL III: ICD-10-PCS | Mod: PBBFAC,HCNC,, | Performed by: INTERNAL MEDICINE

## 2023-01-25 PROCEDURE — 85025 COMPLETE CBC W/AUTO DIFF WBC: CPT | Mod: HCNC | Performed by: UROLOGY

## 2023-01-25 PROCEDURE — 1101F PT FALLS ASSESS-DOCD LE1/YR: CPT | Mod: HCNC,CPTII,S$GLB, | Performed by: INTERNAL MEDICINE

## 2023-01-25 PROCEDURE — 3074F PR MOST RECENT SYSTOLIC BLOOD PRESSURE < 130 MM HG: ICD-10-PCS | Mod: HCNC,CPTII,S$GLB, | Performed by: INTERNAL MEDICINE

## 2023-01-25 PROCEDURE — 77014 PR  CT GUIDANCE PLACEMENT RAD THERAPY FIELDS: ICD-10-PCS | Mod: 26,HCNC,, | Performed by: STUDENT IN AN ORGANIZED HEALTH CARE EDUCATION/TRAINING PROGRAM

## 2023-01-25 PROCEDURE — 1126F AMNT PAIN NOTED NONE PRSNT: CPT | Mod: HCNC,CPTII,S$GLB, | Performed by: INTERNAL MEDICINE

## 2023-01-25 RX ORDER — ORAL SEMAGLUTIDE 7 MG/1
7 TABLET ORAL DAILY
Qty: 90 TABLET | Refills: 0 | Status: SHIPPED | OUTPATIENT
Start: 2023-01-25 | End: 2023-02-28

## 2023-01-25 NOTE — PATIENT INSTRUCTIONS
If you are able to get the new pill called Rybelsus, or if we are able to get you one of the once a week injections, at that time I would like for you to stop your 10 units with meals.

## 2023-01-25 NOTE — PROGRESS NOTES
Subjective:       Patient ID: Misha Eldridge Jr. is a 77 y.o. male.    Chief Complaint: Follow-up    Here for f/u    Asked to come back due to elevated A1c. Steroid induced. Prednisone as part of prostate CA Tx regimen.     Working with Jossie Vega in DM education.      Prandial 10 with SSI and Abdonoimmanuel 28       HGBA1C                   13.3 (H)            11/21/2022 09:12 AM        HGBA1C                   7.3 (H)             04/12/2022 02:38 PM        HGBA1C                   7.3 (H)             04/12/2022 02:38 PM        HGBA1C                   7.3 (H)             10/08/2021 11:01 AM        HGBA1C                   6.1 (H)             08/19/2020 10:26 AM        HGBA1C                   6.8 (H)             01/30/2020 11:20 AM        HGBA1C                   6.8 (H)             01/30/2020 11:20 AM        HGBA1C                   7.6 (H)             06/27/2019 01:19 PM        HGBA1C                   10.1 (H)            04/03/2019 11:13 AM        HGBA1C                   6.3 (H)             09/24/2018 01:52 PM        HGBA1C                   6.3 (H)             09/24/2018 01:52 PM       ### DM ###  + neuropathy of hands and feet on gabapentin 300mg TID. States feet are slightly worsening but overall stable and controlled. A1c controlled on metformin 1g BID and glipizide 5 BID.  11/2022 worsening A1c, prednisone induced as part of prostate CA Tx regimen. Started on basal and prandial insulin        ### Prostate CA ###  Dx approx 2010 at [facility] s/p partial resection. No XRT. Not sure is his chemo was for colon or prostate. He denies urinary frequency, urinary urgency, decreased force of stream, incomplete emptying of bladder, post void dribble, nocturia, or gross hematuria. Has not been back to see his urologist.    04/2022 recurrence noted with PSA 7.1.            ### Colon CA ####   Dx approx 2011/2012. S/p colectomy and reversal. Overdue for colonoscopy. Had FIT done b/c he was unable to get this done, which was  "negative as of 12/2017.  He is now amenable to getting the appropriate colonscopy        ### Chronic low back pain ###  -daily. Rare radiation down legs. Has never had any treatment. He does not take anything on regular basis for symptoms. Pain is non debilitating.   Right groin pain, described as burning and tingling, only occurs when he touches area. Back continues to bother him. This has been occurring for several years.         Review of Systems   Constitutional:  Negative for appetite change, chills, fever and unexpected weight change.   HENT:  Negative for hearing loss, sore throat and trouble swallowing.    Eyes:  Negative for visual disturbance.   Respiratory:  Negative for cough, chest tightness and shortness of breath.    Cardiovascular:  Negative for chest pain and leg swelling.   Gastrointestinal:  Negative for abdominal pain, blood in stool, constipation, diarrhea, nausea and vomiting.   Endocrine: Negative for polydipsia and polyuria.   Genitourinary:  Negative for decreased urine volume, difficulty urinating, dysuria, frequency and urgency.   Musculoskeletal:  Positive for arthralgias and myalgias. Negative for gait problem.   Skin:  Negative for rash.   Neurological:  Negative for dizziness and numbness.   Psychiatric/Behavioral:  The patient is not nervous/anxious.      Objective:      Vitals:    01/25/23 1525   BP: 124/62   Pulse: 67   SpO2: 97%   Weight: 95.5 kg (210 lb 8.6 oz)   Height: 6' 1" (1.854 m)      Physical Exam  Vitals and nursing note reviewed.   Constitutional:       General: He is not in acute distress.     Appearance: Normal appearance. He is well-developed.   HENT:      Head: Normocephalic and atraumatic.      Mouth/Throat:      Pharynx: No oropharyngeal exudate.   Eyes:      General: No scleral icterus.     Conjunctiva/sclera: Conjunctivae normal.      Pupils: Pupils are equal, round, and reactive to light.   Neck:      Thyroid: No thyromegaly.   Cardiovascular:      Rate and " Rhythm: Normal rate and regular rhythm.      Heart sounds: Normal heart sounds. No murmur heard.  Pulmonary:      Effort: Pulmonary effort is normal.      Breath sounds: Normal breath sounds. No wheezing or rales.   Abdominal:      General: There is no distension.   Musculoskeletal:         General: No tenderness.   Lymphadenopathy:      Cervical: No cervical adenopathy.   Skin:     General: Skin is warm and dry.   Neurological:      Mental Status: He is alert and oriented to person, place, and time.   Psychiatric:         Behavior: Behavior normal.       Assessment:       1. Inflammatory spondylopathy of lumbosacral region    2. Type 2 diabetes mellitus with hyperglycemia, with long-term current use of insulin    3. Thrombocytopenia, unspecified    4. Aortic atherosclerosis    5. CKD stage 3 secondary to diabetes          Plan:       Misha was seen today for follow-up.    Diagnoses and all orders for this visit:    Inflammatory spondylopathy of lumbosacral region   F/u pain    Type 2 diabetes mellitus with hyperglycemia, with long-term current use of insulin  -     Hemoglobin A1C; Future  -     START semaglutide (RYBELSUS) 7 mg tablet; Take 1 tablet (7 mg total) by mouth once daily.    Thrombocytopenia, unspecified   No s/s of easy bruising or bleeding. Update CBC at next lab draw.    Aortic atherosclerosis   Tolerating statin. Continue this.     CKD stage 3 secondary to diabetes  -     Comprehensive Metabolic Panel; Future                 Dipak Mcclendon MD  Internal Medicine-[facility]        Side effects of medication(s) were discussed in detail and patient voiced understanding.  Patient will call back for any issues or complications.

## 2023-01-26 ENCOUNTER — HOSPITAL ENCOUNTER (OUTPATIENT)
Dept: WOUND CARE | Facility: HOSPITAL | Age: 78
Discharge: HOME | End: 2023-01-26
Attending: SURGERY
Payer: MEDICARE

## 2023-01-26 ENCOUNTER — SPECIALTY PHARMACY (OUTPATIENT)
Dept: PHARMACY | Facility: CLINIC | Age: 78
End: 2023-01-26
Payer: MEDICARE

## 2023-01-26 ENCOUNTER — DOCUMENTATION ONLY (OUTPATIENT)
Dept: RADIATION ONCOLOGY | Facility: CLINIC | Age: 78
End: 2023-01-26
Payer: MEDICARE

## 2023-01-26 VITALS
SYSTOLIC BLOOD PRESSURE: 118 MMHG | HEIGHT: 73 IN | HEART RATE: 66 BPM | DIASTOLIC BLOOD PRESSURE: 82 MMHG | TEMPERATURE: 98 F | WEIGHT: 210 LBS | BODY MASS INDEX: 27.83 KG/M2

## 2023-01-26 DIAGNOSIS — E66.3 OVERWEIGHT (BMI 25.0-29.9): ICD-10-CM

## 2023-01-26 DIAGNOSIS — L92.9 HYPERGRANULATION: ICD-10-CM

## 2023-01-26 DIAGNOSIS — Z98.890 HISTORY OF EXPLORATORY LAPAROTOMY: ICD-10-CM

## 2023-01-26 DIAGNOSIS — S31.109A OPEN WOUND OF ANTERIOR ABDOMINAL WALL, INITIAL ENCOUNTER: Primary | ICD-10-CM

## 2023-01-26 PROCEDURE — 77014 PR  CT GUIDANCE PLACEMENT RAD THERAPY FIELDS: ICD-10-PCS | Mod: 26,HCNC,, | Performed by: STUDENT IN AN ORGANIZED HEALTH CARE EDUCATION/TRAINING PROGRAM

## 2023-01-26 PROCEDURE — 77336 RADIATION PHYSICS CONSULT: CPT | Mod: HCNC | Performed by: STUDENT IN AN ORGANIZED HEALTH CARE EDUCATION/TRAINING PROGRAM

## 2023-01-26 PROCEDURE — 77014 HC CT GUIDANCE RADIATION THERAPY FLDS PLACEMENT: CPT | Mod: TC,HCNC | Performed by: STUDENT IN AN ORGANIZED HEALTH CARE EDUCATION/TRAINING PROGRAM

## 2023-01-26 PROCEDURE — 77385 HC IMRT, SIMPLE: CPT | Mod: HCNC | Performed by: STUDENT IN AN ORGANIZED HEALTH CARE EDUCATION/TRAINING PROGRAM

## 2023-01-26 PROCEDURE — 99203 OFFICE O/P NEW LOW 30 MIN: CPT | Mod: HCNC

## 2023-01-26 PROCEDURE — 77014 PR  CT GUIDANCE PLACEMENT RAD THERAPY FIELDS: CPT | Mod: 26,HCNC,, | Performed by: STUDENT IN AN ORGANIZED HEALTH CARE EDUCATION/TRAINING PROGRAM

## 2023-01-26 NOTE — TELEPHONE ENCOUNTER
Specialty Pharmacy - Refill Coordination    Specialty Medication Orders Linked to Encounter      Flowsheet Row Most Recent Value   Medication #1 predniSONE (DELTASONE) 5 MG tablet (Order#[id number], Rx#[id number])            Refill Questions - Documented Responses      Flowsheet Row Most Recent Value   Patient Availability and HIPAA Verification    Does patient want to proceed with activity? Yes   HIPAA/medical authority confirmed? Yes   Relationship to patient of person spoken to? Self   Refill Screening Questions    Changes to allergies? No   Changes to medications? No   New conditions since last clinic visit? No   Unplanned office visit, urgent care, ED, or hospital admission in the last 4 weeks? No   How does patient/caregiver feel medication is working? Good   Financial problems or insurance changes? No   How many doses of your specialty medications were missed in the last 4 weeks? 0   Would patient like to speak to a pharmacist? No   When does the patient need to receive the medication? 01/28/23   Refill Delivery Questions    How will the patient receive the medication? [facility]   When does the patient need to receive the medication? 01/28/23   Shipping Address Home   Address in [facility] confirmed and updated if neccessary? Yes   Expected Copay ($) 0   Is the patient able to afford the medication copay? Yes   Payment Method zero copay   Days supply of Refill 30   Supplies needed? No supplies needed   Refill activity completed? Yes   Refill activity plan Refill scheduled   Shipment/Pickup Date: 01/26/23            Current Outpatient Medications   Medication Sig    abiraterone (ZYTIGA) 250 mg Tab Take 4 tablets (1,000 mg total) by mouth once daily.    ACCU-CHEK SOFTCLIX LANCETS Misc TEST BLOOD SUGAR ONCE A DAY    amLODIPine (NORVASC) 10 MG tablet Take 1 tablet (10 mg total) by mouth once daily.    atorvastatin (LIPITOR) 20 MG tablet Take 1 tablet (20 mg total) by mouth once daily.    blood sugar diagnostic  "Strp 1 each by Misc.(Non-Drug; Combo Route) route once daily.    blood sugar diagnostic Strp Pt to check up to 6 times daily    blood sugar diagnostic Strp Use to check blood glucose 1-2 times daily as directed.    blood-glucose meter kit Use as instructed    BOOSTRIX TDAP 2.5-8-5 Lf-mcg-Lf/0.5mL Syrg injection     carvediloL (COREG) 3.125 MG tablet Take 1 tablet (3.125 mg total) by mouth 2 (two) times daily.    diazePAM (VALIUM) 5 MG tablet Take 1 tablet (5 mg total) by mouth as needed for Anxiety (take 1 tablet 30 minute prior to procedure). (Patient not taking: Reported on 1/25/2023)    diclofenac (VOLTAREN) 75 MG EC tablet Take 1 tablet (75 mg total) by mouth 2 (two) times daily.    doxazosin (CARDURA) 4 MG tablet TAKE 1 TABLET(4 MG) BY MOUTH EVERY EVENING    gabapentin (NEURONTIN) 300 MG capsule Take 1 capsule (300 mg total) by mouth 3 (three) times daily.    glipiZIDE (GLUCOTROL) 10 MG tablet TAKE 1 TABLET(10 MG) BY MOUTH TWICE DAILY BEFORE MEALS    hydroCHLOROthiazide (HYDRODIURIL) 25 MG tablet TAKE ONE-HALF TABLET BY MOUTH ONCE DAILY    insulin aspart U-100 (NOVOLOG FLEXPEN U-100 INSULIN) 100 unit/mL (3 mL) InPn pen Inject 8 Units into the skin 3 (three) times daily with meals.    insulin glargine U-300 conc (TOUJEO MAX U-300 SOLOSTAR) 300 unit/mL (3 mL) insulin pen Inject 28 Units into the skin once daily.    irbesartan (AVAPRO) 300 MG tablet Take 1 tablet (300 mg total) by mouth every evening.    lancing device with lancets Kit 1 each by Misc.(Non-Drug; Combo Route) route 2 (two) times daily.    metFORMIN (GLUCOPHAGE) 1000 MG tablet Take 1 tablet (1,000 mg total) by mouth 2 (two) times daily with meals.    pen needle, diabetic (BD ULTRA-FINE KELECHI PEN NEEDLE) 32 gauge x 5/32" Ndle To use with insulin pens at meals and evening shot.    polyethylene glycol (GOLYTELY) 236-22.74-6.74 -5.86 gram suspension Take 4,000 mLs by mouth once.    predniSONE (DELTASONE) 5 MG tablet Take 1 tablet (5 mg total) by mouth 2 " (two) times daily.    predniSONE (DELTASONE) 5 MG tablet Take 1 tablet (5 mg total) by mouth 2 (two) times daily.    semaglutide (RYBELSUS) 7 mg tablet Take 1 tablet (7 mg total) by mouth once daily.    tobramycin sulfate 0.3% (TOBREX) 0.3 % ophthalmic solution 1-2 drops topically twice daily to affected toe(s). (Patient not taking: Reported on 1/25/2023)    tobramycin sulfate 0.3% (TOBREX) 0.3 % ophthalmic solution 1-2 drops topically twice daily to affected toe(s). (Patient not taking: Reported on 1/25/2023)    triamcinolone acetonide 0.1% (KENALOG) 0.1 % cream Apply topically 2 (two) times daily.   Last reviewed on 1/25/2023  3:28 PM by Nancy Helms MA    Review of patient's allergies indicates:   Allergen Reactions    Hay fever and allergy relief     Last reviewed on  1/25/2023 3:24 PM by Nancy Helms      Tasks added this encounter   2/20/2023 - Refill Call (Auto Added)   Tasks due within next 3 months   2/13/2023 - Refill Call (Auto Added)     [facility]  [address] [facility] [address]  Phone: [phone]  Fax: [phone]

## 2023-01-26 NOTE — PLAN OF CARE
Pt. On day 40 of outpt. Xrt to prostate.  Pt. Doing well.  No c/o n/v/d.  MD instructed on miralax use.

## 2023-01-26 NOTE — PROGRESS NOTES
[facility]    Subjective:       Patient ID: Misha Eldridge Jr. is a 77 y.o. male.    Chief Complaint: Non-healing Wound    Admit to wound care with midline lower abdominal wound.  Hx: prostate and colon cancer with several surgeries along with multiple hernia repairs. Reports active daily radiation for prostate cancer. States wound present over year and half and underwear/ pants rub area causing burning and pain to site. Has not had any local wound care to wound.  Site assessed, plan of care initiated discussed with patient. Patient verbalized understanding.     Review of Systems   All systems were reviewed and are negative, except that mentioned in the HPI.    Objective:     Vitals:    01/26/23 1429   BP: 118/82   Pulse: 66   Temp: 97.8 °F (36.6 °C)         Physical Exam  Constitutional:       Appearance: He is well-developed.   HENT:      Head: Normocephalic and atraumatic.   Eyes:      Pupils: Pupils are equal, round, and reactive to light.   Cardiovascular:      Rate and Rhythm: Normal rate.   Pulmonary:      Effort: Pulmonary effort is normal. No respiratory distress.      Breath sounds: No stridor.   Abdominal:      General: There is no distension.   Musculoskeletal:      Cervical back: Normal range of motion.   Skin:     Comments: See Synopsis for wound details   Neurological:      Mental Status: He is alert and oriented to person, place, and time.          Altered Skin Integrity 01/26/23 1400 lower Abdomen (Active)   01/26/23 1400   Altered Skin Integrity Present on Admission:    Side:    Orientation: lower   Location: Abdomen   Wound Number:    Is this injury device related?:    Primary Wound Type:    Description of Altered Skin Integrity:    Ankle-Brachial Index:    Pulses:    Removal Indication and Assessment:    Wound Outcome:    (Retired) Wound Length (cm):    (Retired) Wound Width (cm):    (Retired) Depth (cm):    Wound Description (Comments):    Removal  Indications:    Wound Image   01/26/23 1507   Description of Altered Skin Integrity Full thickness tissue loss. Subcutaneous fat may be visible but bone, tendon or muscle are not exposed 01/26/23 1507   Dressing Appearance Open to air 01/26/23 1507   Drainage Amount None 01/26/23 1507   Appearance Pink;Dry 01/26/23 1507   Tissue loss description Full thickness 01/26/23 1507   Red (%), Wound Tissue Color 100 % 01/26/23 1507   Periwound Area Intact;Dry;Scar tissue 01/26/23 1507   Wound Edges Defined 01/26/23 1507   Wound Length (cm) 0.6 cm 01/26/23 1507   Wound Width (cm) 0.4 cm 01/26/23 1507   Wound Depth (cm) 0.1 cm 01/26/23 1507   Wound Volume (cm^3) 0.024 cm^3 01/26/23 1507   Wound Surface Area (cm^2) 0.24 cm^2 01/26/23 1507   Care Cleansed with:;Sterile normal saline 01/26/23 1507   Dressing Applied;Silver;Island/border 01/26/23 1507   Dressing Change Due 02/02/23 01/26/23 1507         Assessment/Plan:         ICD-10-CM ICD-9-CM   1. Open wound of anterior abdominal wall, initial encounter  S31.109A 879.2   2. History of exploratory laparotomy  Z98.890 V45.89   3. Overweight (BMI 25.0-29.9)  E66.3 278.02   4. Hypergranulation  L92.9 701.5           Tissue pathology and/or culture taken:  [] Yes [x] No   Sharp debridement performed:   [] Yes [x] No   Labs ordered this visit:   [] Yes [x] No   Imaging ordered this visit:   [] Yes [x] No           Orders Placed This Encounter   Procedures    Ambulatory referral/consult to Wound Clinic     Standing Status:   Standing     Number of Occurrences:   1     Referral Priority:   Urgent     Referral Type:   Consultation     Referral Reason:   Specialty Services Required     Requested Specialty:   Wound Care     Number of Visits Requested:   1    Change dressing     Lower Abdominal Wound  Cleanse wound with:Normal Saline  Lidocaine:PRN   Silver nitrate:PRN  Primary dressing:[place] Border  Frequency:Weekly per patient  Follow-up:2 weeks 2/9/23 Dr.Malsbury Yu  up in about 2 weeks (around 2/9/2023) for .

## 2023-01-26 NOTE — TELEPHONE ENCOUNTER
Outgoing call to pt to see if pred can be filled locally since not sending with [facility]. No answer/LVM. Will make high risk to synch up deliveries

## 2023-01-27 ENCOUNTER — TELEPHONE (OUTPATIENT)
Dept: INTERNAL MEDICINE | Facility: CLINIC | Age: 78
End: 2023-01-27
Payer: MEDICARE

## 2023-01-27 PROCEDURE — 77014 PR  CT GUIDANCE PLACEMENT RAD THERAPY FIELDS: ICD-10-PCS | Mod: 26,HCNC,, | Performed by: STUDENT IN AN ORGANIZED HEALTH CARE EDUCATION/TRAINING PROGRAM

## 2023-01-27 PROCEDURE — 77385 HC IMRT, SIMPLE: CPT | Mod: HCNC | Performed by: STUDENT IN AN ORGANIZED HEALTH CARE EDUCATION/TRAINING PROGRAM

## 2023-01-27 PROCEDURE — 77014 PR  CT GUIDANCE PLACEMENT RAD THERAPY FIELDS: CPT | Mod: 26,HCNC,, | Performed by: STUDENT IN AN ORGANIZED HEALTH CARE EDUCATION/TRAINING PROGRAM

## 2023-01-27 PROCEDURE — 77014 HC CT GUIDANCE RADIATION THERAPY FLDS PLACEMENT: CPT | Mod: TC,HCNC | Performed by: STUDENT IN AN ORGANIZED HEALTH CARE EDUCATION/TRAINING PROGRAM

## 2023-01-27 NOTE — TELEPHONE ENCOUNTER
M for Mr. Eldridge to call . Need to give him the listed message and question him about obtaining jaun medication.

## 2023-01-27 NOTE — TELEPHONE ENCOUNTER
----- Message from Dipak Treviño MD sent at 1/27/2023  8:07 AM CST -----  Please let pt know his BG is a tiny bit better but still in dangerous, hurt or kill you sooner than later levels. Please let me know if he was able to get new medication. thanks

## 2023-01-27 NOTE — PROGRESS NOTES
Please let pt know his BG is a tiny bit better but still in dangerous, hurt or kill you sooner than later levels. Please let me know if he was able to get new medication. thanks

## 2023-01-30 PROCEDURE — 77014 HC CT GUIDANCE RADIATION THERAPY FLDS PLACEMENT: CPT | Mod: TC,HCNC | Performed by: STUDENT IN AN ORGANIZED HEALTH CARE EDUCATION/TRAINING PROGRAM

## 2023-01-30 PROCEDURE — 77014 PR  CT GUIDANCE PLACEMENT RAD THERAPY FIELDS: ICD-10-PCS | Mod: 26,HCNC,, | Performed by: STUDENT IN AN ORGANIZED HEALTH CARE EDUCATION/TRAINING PROGRAM

## 2023-01-30 PROCEDURE — 77014 PR  CT GUIDANCE PLACEMENT RAD THERAPY FIELDS: CPT | Mod: 26,HCNC,, | Performed by: STUDENT IN AN ORGANIZED HEALTH CARE EDUCATION/TRAINING PROGRAM

## 2023-01-30 PROCEDURE — 77385 HC IMRT, SIMPLE: CPT | Mod: HCNC | Performed by: STUDENT IN AN ORGANIZED HEALTH CARE EDUCATION/TRAINING PROGRAM

## 2023-01-30 NOTE — TELEPHONE ENCOUNTER
Called and spoke to Mr. Eldridge. Gave him the message from Dr. Treviño. States he did get the new medication and it is daily and not weekly. States once he gets off chem his blood sugar should go down.

## 2023-01-30 NOTE — TELEPHONE ENCOUNTER
Second Attempt  LVM for Mr. Eldridge to call . Need to give him the message from Dr. Treviño and see if he was able to get his medication    From Dr. Treviño  Please let pt know his BG is a tiny bit better but still in dangerous, hurt or kill you sooner than later levels. Please let me know if he was able to get new medication. thanks

## 2023-01-31 ENCOUNTER — OFFICE VISIT (OUTPATIENT)
Dept: INTERNAL MEDICINE | Facility: CLINIC | Age: 78
End: 2023-01-31
Payer: MEDICARE

## 2023-01-31 VITALS
BODY MASS INDEX: 28.31 KG/M2 | HEART RATE: 71 BPM | OXYGEN SATURATION: 98 % | HEIGHT: 73 IN | DIASTOLIC BLOOD PRESSURE: 78 MMHG | SYSTOLIC BLOOD PRESSURE: 132 MMHG | WEIGHT: 213.63 LBS

## 2023-01-31 DIAGNOSIS — T14.90XD HEALING WOUND: Primary | ICD-10-CM

## 2023-01-31 PROCEDURE — 99214 OFFICE O/P EST MOD 30 MIN: CPT | Mod: HCNC,S$GLB,, | Performed by: INTERNAL MEDICINE

## 2023-01-31 PROCEDURE — 1159F MED LIST DOCD IN RCRD: CPT | Mod: HCNC,CPTII,S$GLB, | Performed by: INTERNAL MEDICINE

## 2023-01-31 PROCEDURE — 77014 HC CT GUIDANCE RADIATION THERAPY FLDS PLACEMENT: CPT | Mod: TC,HCNC | Performed by: STUDENT IN AN ORGANIZED HEALTH CARE EDUCATION/TRAINING PROGRAM

## 2023-01-31 PROCEDURE — 1125F PR PAIN SEVERITY QUANTIFIED, PAIN PRESENT: ICD-10-PCS | Mod: HCNC,CPTII,S$GLB, | Performed by: INTERNAL MEDICINE

## 2023-01-31 PROCEDURE — 1101F PT FALLS ASSESS-DOCD LE1/YR: CPT | Mod: HCNC,CPTII,S$GLB, | Performed by: INTERNAL MEDICINE

## 2023-01-31 PROCEDURE — 77385 HC IMRT, SIMPLE: CPT | Mod: HCNC | Performed by: STUDENT IN AN ORGANIZED HEALTH CARE EDUCATION/TRAINING PROGRAM

## 2023-01-31 PROCEDURE — 99499 UNLISTED E&M SERVICE: CPT | Mod: HCNC,S$GLB,, | Performed by: INTERNAL MEDICINE

## 2023-01-31 PROCEDURE — 77014 PR  CT GUIDANCE PLACEMENT RAD THERAPY FIELDS: CPT | Mod: 26,HCNC,, | Performed by: STUDENT IN AN ORGANIZED HEALTH CARE EDUCATION/TRAINING PROGRAM

## 2023-01-31 PROCEDURE — 99499 RISK ADDL DX/OHS AUDIT: ICD-10-PCS | Mod: HCNC,S$GLB,, | Performed by: INTERNAL MEDICINE

## 2023-01-31 PROCEDURE — 3078F DIAST BP <80 MM HG: CPT | Mod: HCNC,CPTII,S$GLB, | Performed by: INTERNAL MEDICINE

## 2023-01-31 PROCEDURE — 3288F FALL RISK ASSESSMENT DOCD: CPT | Mod: HCNC,CPTII,S$GLB, | Performed by: INTERNAL MEDICINE

## 2023-01-31 PROCEDURE — 3075F PR MOST RECENT SYSTOLIC BLOOD PRESS GE 130-139MM HG: ICD-10-PCS | Mod: HCNC,CPTII,S$GLB, | Performed by: INTERNAL MEDICINE

## 2023-01-31 PROCEDURE — 99214 PR OFFICE/OUTPT VISIT, EST, LEVL IV, 30-39 MIN: ICD-10-PCS | Mod: HCNC,S$GLB,, | Performed by: INTERNAL MEDICINE

## 2023-01-31 PROCEDURE — 3075F SYST BP GE 130 - 139MM HG: CPT | Mod: HCNC,CPTII,S$GLB, | Performed by: INTERNAL MEDICINE

## 2023-01-31 PROCEDURE — 99999 PR PBB SHADOW E&M-EST. PATIENT-LVL V: CPT | Mod: PBBFAC,HCNC,, | Performed by: INTERNAL MEDICINE

## 2023-01-31 PROCEDURE — 3288F PR FALLS RISK ASSESSMENT DOCUMENTED: ICD-10-PCS | Mod: HCNC,CPTII,S$GLB, | Performed by: INTERNAL MEDICINE

## 2023-01-31 PROCEDURE — 1125F AMNT PAIN NOTED PAIN PRSNT: CPT | Mod: HCNC,CPTII,S$GLB, | Performed by: INTERNAL MEDICINE

## 2023-01-31 PROCEDURE — 1159F PR MEDICATION LIST DOCUMENTED IN MEDICAL RECORD: ICD-10-PCS | Mod: HCNC,CPTII,S$GLB, | Performed by: INTERNAL MEDICINE

## 2023-01-31 PROCEDURE — 99999 PR PBB SHADOW E&M-EST. PATIENT-LVL V: ICD-10-PCS | Mod: PBBFAC,HCNC,, | Performed by: INTERNAL MEDICINE

## 2023-01-31 PROCEDURE — 3078F PR MOST RECENT DIASTOLIC BLOOD PRESSURE < 80 MM HG: ICD-10-PCS | Mod: HCNC,CPTII,S$GLB, | Performed by: INTERNAL MEDICINE

## 2023-01-31 PROCEDURE — 77014 PR  CT GUIDANCE PLACEMENT RAD THERAPY FIELDS: ICD-10-PCS | Mod: 26,HCNC,, | Performed by: STUDENT IN AN ORGANIZED HEALTH CARE EDUCATION/TRAINING PROGRAM

## 2023-01-31 PROCEDURE — 1101F PR PT FALLS ASSESS DOC 0-1 FALLS W/OUT INJ PAST YR: ICD-10-PCS | Mod: HCNC,CPTII,S$GLB, | Performed by: INTERNAL MEDICINE

## 2023-01-31 RX ORDER — DOXYCYCLINE HYCLATE 100 MG
100 TABLET ORAL 2 TIMES DAILY
Qty: 20 TABLET | Refills: 0 | Status: SHIPPED | OUTPATIENT
Start: 2023-01-31 | End: 2023-02-10

## 2023-01-31 NOTE — PROGRESS NOTES
INTERNAL MEDICINE CLINIC - SAME DAY APPOINTMENT  Progress Note    PRESENTING HISTORY     PCP: Dipak Treviño MD    Chief Complaint/Reason for Visit:     Chief Complaint   Patient presents with    Wound Check     History of Present Illness & ROS : Mr. Misha Eldridge Jr. is a 77 y.o. male.      Last night he felt pain on the top of the old abdominal scar.  No drainage.  No pain with eating.  Painful with touch.  No fever.    He is getting XRT currently.    PAST HISTORY:     Past Medical History:   Diagnosis Date    Cataract     Colostomy in place     Diabetes mellitus type II     Hemorrhoid     History of colon cancer     History of prostate cancer     History of pulmonary embolism     Hypertension        Past Surgical History:   Procedure Laterality Date    CATARACT EXTRACTION Bilateral     COLONOSCOPY N/A 6/2/2022    Procedure: COLONOSCOPY;  Surgeon: Jose Dangelo MD;  Location: [address]);  Service: Endoscopy;  Laterality: N/A;  fully vaccinated    EYE SURGERY Bilateral     cataract extraction    HERNIA REPAIR      PROSTATECTOMY      SUBTOTAL COLECTOMY         Family History   Problem Relation Age of Onset    Arthritis Mother     Hypertension Mother     Cancer Mother     Alcohol abuse Father     No Known Problems Daughter        Social History     Socioeconomic History    Marital status: Single   Occupational History     Comment: mother lives w/pt   Tobacco Use    Smoking status: Never    Smokeless tobacco: Never   Substance and Sexual Activity    Alcohol use: No    Drug use: No    Sexual activity: Not Currently     Partners: Female       MEDICATIONS & ALLERGIES:     Current Outpatient Medications on File Prior to Visit   Medication Sig Dispense Refill    abiraterone (ZYTIGA) 250 mg Tab Take 4 tablets (1,000 mg total) by mouth once daily. 120 tablet 1    ACCU-CHEK SOFTCLIX LANCETS Misc TEST BLOOD SUGAR ONCE A  each 0    amLODIPine (NORVASC) 10 MG tablet Take 1 tablet (10 mg total) by mouth  "once daily. 90 tablet 1    atorvastatin (LIPITOR) 20 MG tablet Take 1 tablet (20 mg total) by mouth once daily. 90 tablet 1    blood sugar diagnostic Strp 1 each by Misc.(Non-Drug; Combo Route) route once daily. 100 each 3    blood sugar diagnostic Strp Pt to check up to 6 times daily 200 strip 11    blood sugar diagnostic Strp Use to check blood glucose 1-2 times daily as directed. 200 each 0    BOOSTRIX TDAP 2.5-8-5 Lf-mcg-Lf/0.5mL Syrg injection       carvediloL (COREG) 3.125 MG tablet Take 1 tablet (3.125 mg total) by mouth 2 (two) times daily. 180 tablet 1    diclofenac (VOLTAREN) 75 MG EC tablet Take 1 tablet (75 mg total) by mouth 2 (two) times daily. 90 tablet 0    doxazosin (CARDURA) 4 MG tablet TAKE 1 TABLET(4 MG) BY MOUTH EVERY EVENING 90 tablet 1    gabapentin (NEURONTIN) 300 MG capsule Take 1 capsule (300 mg total) by mouth 3 (three) times daily. 270 capsule 1    glipiZIDE (GLUCOTROL) 10 MG tablet TAKE 1 TABLET(10 MG) BY MOUTH TWICE DAILY BEFORE MEALS 180 tablet 0    hydroCHLOROthiazide (HYDRODIURIL) 25 MG tablet TAKE ONE-HALF TABLET BY MOUTH ONCE DAILY 90 tablet 1    insulin aspart U-100 (NOVOLOG FLEXPEN U-100 INSULIN) 100 unit/mL (3 mL) InPn pen Inject 8 Units into the skin 3 (three) times daily with meals. 21.6 mL 0    insulin glargine U-300 conc (TOUJEO MAX U-300 SOLOSTAR) 300 unit/mL (3 mL) insulin pen Inject 28 Units into the skin once daily. 15 pen 11    irbesartan (AVAPRO) 300 MG tablet Take 1 tablet (300 mg total) by mouth every evening. 90 tablet 2    metFORMIN (GLUCOPHAGE) 1000 MG tablet Take 1 tablet (1,000 mg total) by mouth 2 (two) times daily with meals. 180 tablet 1    pen needle, diabetic (BD ULTRA-FINE KELECHI PEN NEEDLE) 32 gauge x 5/32" Ndle To use with insulin pens at meals and evening shot. 200 each 11    polyethylene glycol (GOLYTELY) 236-22.74-6.74 -5.86 gram suspension Take 4,000 mLs by mouth once.      predniSONE (DELTASONE) 5 MG tablet Take 1 tablet (5 mg total) by mouth 2 (two) " times daily. 60 tablet 1    predniSONE (DELTASONE) 5 MG tablet Take 1 tablet (5 mg total) by mouth 2 (two) times daily. 60 tablet 1    semaglutide (RYBELSUS) 7 mg tablet Take 1 tablet (7 mg total) by mouth once daily. 90 tablet 0    triamcinolone acetonide 0.1% (KENALOG) 0.1 % cream Apply topically 2 (two) times daily. 45 g 0    blood-glucose meter kit Use as instructed 1 each 0    diazePAM (VALIUM) 5 MG tablet Take 1 tablet (5 mg total) by mouth as needed for Anxiety (take 1 tablet 30 minute prior to procedure). (Patient not taking: Reported on 1/25/2023) 2 tablet 0    lancing device with lancets Kit 1 each by Misc.(Non-Drug; Combo Route) route 2 (two) times daily. 200 each 11    tobramycin sulfate 0.3% (TOBREX) 0.3 % ophthalmic solution 1-2 drops topically twice daily to affected toe(s). (Patient not taking: Reported on 1/25/2023) 5 mL 3    tobramycin sulfate 0.3% (TOBREX) 0.3 % ophthalmic solution 1-2 drops topically twice daily to affected toe(s). (Patient not taking: Reported on 1/25/2023) 5 mL 3     Current Facility-Administered Medications on File Prior to Visit   Medication Dose Route Frequency Provider Last Rate Last Admin    LIDOcaine HCL 20 mg/ml (2%) injection 10 mL  10 mL Other 1 time in Clinic/HOD Duarte Ravi MD            Review of patient's allergies indicates:   Allergen Reactions    Hay fever and allergy relief        Medications Reconciliation:   I have reconciled the patient's home medications with the patient/family. I have updated all changes.  Refer to After-Visit Medication List.    OBJECTIVE:     Vital Signs:  Vitals:    01/31/23 1112   BP: 132/78   Pulse: 71     Wt Readings from Last 3 Encounters:   01/31/23 1112 96.9 kg (213 lb 10 oz)   01/26/23 1429 95.3 kg (210 lb)   01/25/23 1525 95.5 kg (210 lb 8.6 oz)     Body mass index is 28.18 kg/m².     Physical Exam:  General: Well developed, well nourished. No distress.  HEENT: Head is normocephalic, atraumatic;  Eyes: Clear  conjunctiva.  Neck: Supple, symmetrical neck; trachea midline.  Lungs: Clear to auscultation bilaterally and normal respiratory effort.  Cardiovascular: Heart with regular rate and rhythm.    Extremities: No LE edema.   Abdomen: Abdomen is soft, non-tender non-distended with normal bowel sounds.  Skin: Skin color, texture, turgor normal. No rashes.  Musculoskeletal: Normal gait.   Psychiatric: Normal affect. Alert.      No drainage. No pocket found on palpation.  It is tender to touch on the tip of the scar.  No rebound.  No hernia detected wth cough.        Laboratory  Lab Results   Component Value Date    WBC 4.08 01/25/2023    HGB 11.6 (L) 01/25/2023    HCT 34.5 (L) 01/25/2023     (L) 01/25/2023    CHOL 103 (L) 04/12/2022    CHOL 103 (L) 04/12/2022    TRIG 183 (H) 04/12/2022    TRIG 183 (H) 04/12/2022    HDL 42 04/12/2022    HDL 42 04/12/2022    ALT 21 01/25/2023    ALT 21 01/25/2023    AST 17 01/25/2023    AST 17 01/25/2023     01/25/2023     01/25/2023    K 3.9 01/25/2023    K 3.9 01/25/2023     01/25/2023     01/25/2023    CREATININE 1.2 01/25/2023    CREATININE 1.2 01/25/2023    BUN 17 01/25/2023    BUN 17 01/25/2023    CO2 28 01/25/2023    CO2 28 01/25/2023    TSH 1.549 04/12/2022    PSA 7.1 (H) 04/12/2022    INR 1.0 12/04/2013    HGBA1C 10.7 (H) 01/25/2023       ASSESSMENT & PLAN:     Healing wound  - Has hyperglycemia due to steroid.  Uncontrolled DM. Now on insulin.    Has slow healing wound ulcers.       Plan:  -     doxycycline (VIBRA-TABS) 100 MG tablet; Take 1 tablet (100 mg total) by mouth 2 (two) times daily. for 10 days  Dispense: 20 tablet; Refill: 0      Follow up with Wound care as scheduled.    Scheduled Follow-up :  Future Appointments   Date Time Provider Department Center   2/6/2023 10:00 AM Jossie Vega RD [facility]   2/9/2023  2:00 PM Magy Hastings DO [facility] WOUND [facility]   2/14/2023 12:30 PM LAB, HEMON CANCER DG [facility] LAB HO  [facility]   2/14/2023  1:30 PM Lindsey Betancourt MD [facility] H[facility]ONC2 [facility]   4/3/2023 10:45 AM Hoa Shoemaker OD [facility]       After Visit Medication List :     Medication List            Accurate as of January 31, 2023 11:31 AM. If you have any questions, ask your nurse or doctor.                START taking these medications      doxycycline 100 MG tablet  Commonly known as: VIBRA-TABS  Take 1 tablet (100 mg total) by mouth 2 (two) times daily. for 10 days  Started by: David Serrano MD            CONTINUE taking these medications      abiraterone 250 mg Tab  Commonly known as: ZYTIGA  Take 4 tablets (1,000 mg total) by mouth once daily.     ACCU-CHEK SOFTCLIX LANCETS Misc  Generic drug: lancets  TEST BLOOD SUGAR ONCE A DAY     amLODIPine 10 MG tablet  Commonly known as: NORVASC  Take 1 tablet (10 mg total) by mouth once daily.     atorvastatin 20 MG tablet  Commonly known as: LIPITOR  Take 1 tablet (20 mg total) by mouth once daily.     * blood sugar diagnostic Strp  1 each by Misc.(Non-Drug; Combo Route) route once daily.     * blood sugar diagnostic Strp  Pt to check up to 6 times daily     * blood sugar diagnostic Strp  Use to check blood glucose 1-2 times daily as directed.     blood-glucose meter kit  Use as instructed     BOOSTRIX TDAP 2.5-8-5 Lf-mcg-Lf/0.5mL Syrg injection  Generic drug: diphth,pertus(acell),tetanus     carvediloL 3.125 MG tablet  Commonly known as: COREG  Take 1 tablet (3.125 mg total) by mouth 2 (two) times daily.     diazePAM 5 MG tablet  Commonly known as: VALIUM  Take 1 tablet (5 mg total) by mouth as needed for Anxiety (take 1 tablet 30 minute prior to procedure).     diclofenac 75 MG EC tablet  Commonly known as: VOLTAREN  Take 1 tablet (75 mg total) by mouth 2 (two) times daily.     doxazosin 4 MG tablet  Commonly known as: CARDURA  TAKE 1 TABLET(4 MG) BY MOUTH EVERY EVENING     gabapentin 300 MG capsule  Commonly known as: NEURONTIN  Take 1 capsule (300 mg  "total) by mouth 3 (three) times daily.     glipiZIDE 10 MG tablet  Commonly known as: GLUCOTROL  TAKE 1 TABLET(10 MG) BY MOUTH TWICE DAILY BEFORE MEALS     hydroCHLOROthiazide 25 MG tablet  Commonly known as: HYDRODIURIL  TAKE ONE-HALF TABLET BY MOUTH ONCE DAILY     insulin aspart U-100 100 unit/mL (3 mL) Inpn pen  Commonly known as: NovoLOG FlexPen U-100 Insulin  Inject 8 Units into the skin 3 (three) times daily with meals.     irbesartan 300 MG tablet  Commonly known as: AVAPRO  Take 1 tablet (300 mg total) by mouth every evening.     lancing device with lancets Kit  1 each by Misc.(Non-Drug; Combo Route) route 2 (two) times daily.     metFORMIN 1000 MG tablet  Commonly known as: GLUCOPHAGE  Take 1 tablet (1,000 mg total) by mouth 2 (two) times daily with meals.     pen needle, diabetic 32 gauge x 5/32" Ndle  Commonly known as: BD ULTRA-FINE KELECHI PEN NEEDLE  To use with insulin pens at meals and evening shot.     polyethylene glycol 236-22.74-6.74 -5.86 gram suspension  Commonly known as: GoLYTELY     * predniSONE 5 MG tablet  Commonly known as: DELTASONE  Take 1 tablet (5 mg total) by mouth 2 (two) times daily.     * predniSONE 5 MG tablet  Commonly known as: DELTASONE  Take 1 tablet (5 mg total) by mouth 2 (two) times daily.     RYBELSUS 7 mg tablet  Generic drug: semaglutide  Take 1 tablet (7 mg total) by mouth once daily.     * tobramycin sulfate 0.3% 0.3 % ophthalmic solution  Commonly known as: TOBREX  1-2 drops topically twice daily to affected toe(s).     * tobramycin sulfate 0.3% 0.3 % ophthalmic solution  Commonly known as: TOBREX  1-2 drops topically twice daily to affected toe(s).     TOUJEO MAX U-300 SOLOSTAR 300 unit/mL (3 mL) insulin pen  Generic drug: insulin glargine U-300 conc  Inject 28 Units into the skin once daily.     triamcinolone acetonide 0.1% 0.1 % cream  Commonly known as: KENALOG  Apply topically 2 (two) times daily.           * This list has 7 medication(s) that are the same as other " medications prescribed for you. Read the directions carefully, and ask your doctor or other care provider to review them with you.                   Where to Get Your Medications        These medications were sent to [id number] - [place] [address] [email or web address] [address] [address]      Phone: [phone]   doxycycline 100 MG tablet         Signing Physician:  David Serrano MD   no

## 2023-02-01 ENCOUNTER — APPOINTMENT (OUTPATIENT)
Dept: RADIATION THERAPY | Facility: OTHER | Age: 78
End: 2023-02-01
Attending: STUDENT IN AN ORGANIZED HEALTH CARE EDUCATION/TRAINING PROGRAM
Payer: MEDICARE

## 2023-02-01 ENCOUNTER — DOCUMENTATION ONLY (OUTPATIENT)
Dept: RADIATION ONCOLOGY | Facility: CLINIC | Age: 78
End: 2023-02-01
Payer: MEDICARE

## 2023-02-01 ENCOUNTER — TELEPHONE (OUTPATIENT)
Dept: HEMATOLOGY/ONCOLOGY | Facility: CLINIC | Age: 78
End: 2023-02-01
Payer: MEDICARE

## 2023-02-01 PROCEDURE — 77014 PR  CT GUIDANCE PLACEMENT RAD THERAPY FIELDS: ICD-10-PCS | Mod: 26,HCNC,, | Performed by: STUDENT IN AN ORGANIZED HEALTH CARE EDUCATION/TRAINING PROGRAM

## 2023-02-01 PROCEDURE — 77336 RADIATION PHYSICS CONSULT: CPT | Mod: HCNC | Performed by: STUDENT IN AN ORGANIZED HEALTH CARE EDUCATION/TRAINING PROGRAM

## 2023-02-01 PROCEDURE — 77385 HC IMRT, SIMPLE: CPT | Mod: HCNC | Performed by: STUDENT IN AN ORGANIZED HEALTH CARE EDUCATION/TRAINING PROGRAM

## 2023-02-01 PROCEDURE — 77014 PR  CT GUIDANCE PLACEMENT RAD THERAPY FIELDS: CPT | Mod: 26,HCNC,, | Performed by: STUDENT IN AN ORGANIZED HEALTH CARE EDUCATION/TRAINING PROGRAM

## 2023-02-01 PROCEDURE — 77014 HC CT GUIDANCE RADIATION THERAPY FLDS PLACEMENT: CPT | Mod: TC,HCNC | Performed by: STUDENT IN AN ORGANIZED HEALTH CARE EDUCATION/TRAINING PROGRAM

## 2023-02-01 NOTE — TELEPHONE ENCOUNTER
"----- Message from Tom Adam NP sent at 2/1/2023 12:19 PM CST -----  Hi! Please address below. thanks  ----- Message -----  From: Chapito Hudson  Sent: 2/1/2023  10:05 AM CST  To: Serafin MCKENNA Staff    Consult/Advisory:          Name Of Caller: Self      Contact Preference?: [phone]       Provider Name: Serafin Thompson      Does patient feel the need to be seen today? No      What is the nature of the call?: Calling to speak w/ nurse. Stating he just completed his radiation for 45 days and is inquiring if he needs to continue taking abiraterone (ZYTIGA) 250 mg Tab and predniSONE (DELTASONE) 5 MG tablet          Additional Notes:  "Thank you for all that you do for our patients"        "

## 2023-02-01 NOTE — TELEPHONE ENCOUNTER
Attempted to call patient. Phone went straight to voicemail. VM left with call back number. Spoke with Dr. Betancourt who wants him to continue medication at this time.

## 2023-02-06 ENCOUNTER — OFFICE VISIT (OUTPATIENT)
Dept: INTERNAL MEDICINE | Facility: CLINIC | Age: 78
End: 2023-02-06
Payer: MEDICARE

## 2023-02-06 VITALS
BODY MASS INDEX: 27.47 KG/M2 | HEIGHT: 73 IN | WEIGHT: 207.25 LBS | SYSTOLIC BLOOD PRESSURE: 128 MMHG | OXYGEN SATURATION: 99 % | HEART RATE: 82 BPM | DIASTOLIC BLOOD PRESSURE: 64 MMHG

## 2023-02-06 DIAGNOSIS — Q24.9 HEART ABNORMALITY: ICD-10-CM

## 2023-02-06 DIAGNOSIS — E78.9 ABNORMAL CHOLESTEROL TEST: ICD-10-CM

## 2023-02-06 DIAGNOSIS — I10 ESSENTIAL HYPERTENSION: ICD-10-CM

## 2023-02-06 DIAGNOSIS — I10 HYPERTENSION, UNSPECIFIED TYPE: ICD-10-CM

## 2023-02-06 DIAGNOSIS — S31.109D OPEN WOUND OF ANTERIOR ABDOMINAL WALL, SUBSEQUENT ENCOUNTER: Primary | ICD-10-CM

## 2023-02-06 DIAGNOSIS — E13.9 DIABETES 1.5, MANAGED AS TYPE 2: ICD-10-CM

## 2023-02-06 PROCEDURE — 99214 PR OFFICE/OUTPT VISIT, EST, LEVL IV, 30-39 MIN: ICD-10-PCS | Mod: HCNC,S$GLB,, | Performed by: PHYSICIAN ASSISTANT

## 2023-02-06 PROCEDURE — 99999 PR PBB SHADOW E&M-EST. PATIENT-LVL IV: CPT | Mod: PBBFAC,HCNC,, | Performed by: PHYSICIAN ASSISTANT

## 2023-02-06 PROCEDURE — 3288F FALL RISK ASSESSMENT DOCD: CPT | Mod: HCNC,CPTII,S$GLB, | Performed by: PHYSICIAN ASSISTANT

## 2023-02-06 PROCEDURE — 3078F PR MOST RECENT DIASTOLIC BLOOD PRESSURE < 80 MM HG: ICD-10-PCS | Mod: HCNC,CPTII,S$GLB, | Performed by: PHYSICIAN ASSISTANT

## 2023-02-06 PROCEDURE — 3288F PR FALLS RISK ASSESSMENT DOCUMENTED: ICD-10-PCS | Mod: HCNC,CPTII,S$GLB, | Performed by: PHYSICIAN ASSISTANT

## 2023-02-06 PROCEDURE — 1125F PR PAIN SEVERITY QUANTIFIED, PAIN PRESENT: ICD-10-PCS | Mod: HCNC,CPTII,S$GLB, | Performed by: PHYSICIAN ASSISTANT

## 2023-02-06 PROCEDURE — 3074F SYST BP LT 130 MM HG: CPT | Mod: HCNC,CPTII,S$GLB, | Performed by: PHYSICIAN ASSISTANT

## 2023-02-06 PROCEDURE — 3074F PR MOST RECENT SYSTOLIC BLOOD PRESSURE < 130 MM HG: ICD-10-PCS | Mod: HCNC,CPTII,S$GLB, | Performed by: PHYSICIAN ASSISTANT

## 2023-02-06 PROCEDURE — 1159F MED LIST DOCD IN RCRD: CPT | Mod: HCNC,CPTII,S$GLB, | Performed by: PHYSICIAN ASSISTANT

## 2023-02-06 PROCEDURE — 1101F PR PT FALLS ASSESS DOC 0-1 FALLS W/OUT INJ PAST YR: ICD-10-PCS | Mod: HCNC,CPTII,S$GLB, | Performed by: PHYSICIAN ASSISTANT

## 2023-02-06 PROCEDURE — 99999 PR PBB SHADOW E&M-EST. PATIENT-LVL IV: ICD-10-PCS | Mod: PBBFAC,HCNC,, | Performed by: PHYSICIAN ASSISTANT

## 2023-02-06 PROCEDURE — 99214 OFFICE O/P EST MOD 30 MIN: CPT | Mod: HCNC,S$GLB,, | Performed by: PHYSICIAN ASSISTANT

## 2023-02-06 PROCEDURE — 99499 UNLISTED E&M SERVICE: CPT | Mod: HCNC,S$GLB,, | Performed by: PHYSICIAN ASSISTANT

## 2023-02-06 PROCEDURE — 1125F AMNT PAIN NOTED PAIN PRSNT: CPT | Mod: HCNC,CPTII,S$GLB, | Performed by: PHYSICIAN ASSISTANT

## 2023-02-06 PROCEDURE — 3078F DIAST BP <80 MM HG: CPT | Mod: HCNC,CPTII,S$GLB, | Performed by: PHYSICIAN ASSISTANT

## 2023-02-06 PROCEDURE — 1159F PR MEDICATION LIST DOCUMENTED IN MEDICAL RECORD: ICD-10-PCS | Mod: HCNC,CPTII,S$GLB, | Performed by: PHYSICIAN ASSISTANT

## 2023-02-06 PROCEDURE — 1101F PT FALLS ASSESS-DOCD LE1/YR: CPT | Mod: HCNC,CPTII,S$GLB, | Performed by: PHYSICIAN ASSISTANT

## 2023-02-06 RX ORDER — GLIPIZIDE 10 MG/1
10 TABLET ORAL
Qty: 180 TABLET | Refills: 0 | Status: SHIPPED | OUTPATIENT
Start: 2023-02-06 | End: 2023-07-31

## 2023-02-06 RX ORDER — CARVEDILOL 3.12 MG/1
3.12 TABLET ORAL 2 TIMES DAILY
Qty: 180 TABLET | Refills: 1 | Status: SHIPPED | OUTPATIENT
Start: 2023-02-06 | End: 2023-04-26 | Stop reason: SDUPTHER

## 2023-02-06 RX ORDER — AMLODIPINE BESYLATE 10 MG/1
10 TABLET ORAL DAILY
Qty: 90 TABLET | Refills: 1 | Status: SHIPPED | OUTPATIENT
Start: 2023-02-06 | End: 2023-07-06 | Stop reason: SDUPTHER

## 2023-02-06 RX ORDER — IRBESARTAN 300 MG/1
300 TABLET ORAL NIGHTLY
Qty: 90 TABLET | Refills: 3 | Status: SHIPPED | OUTPATIENT
Start: 2023-02-06 | End: 2024-02-05

## 2023-02-06 RX ORDER — HYDROCHLOROTHIAZIDE 25 MG/1
TABLET ORAL
Qty: 90 TABLET | Refills: 1 | Status: ON HOLD | OUTPATIENT
Start: 2023-02-06 | End: 2023-07-02 | Stop reason: SDUPTHER

## 2023-02-06 RX ORDER — ATORVASTATIN CALCIUM 20 MG/1
20 TABLET, FILM COATED ORAL DAILY
Qty: 90 TABLET | Refills: 1 | Status: SHIPPED | OUTPATIENT
Start: 2023-02-06 | End: 2023-11-10 | Stop reason: SDUPTHER

## 2023-02-06 NOTE — PROGRESS NOTES
INTERNAL MEDICINE URGENT VISIT NOTE    CHIEF COMPLAINT     Chief Complaint   Patient presents with    Abdominal Injury       HPI     Misha Eldridge Jr. is a 77 y.o. male who presents for an urgent visit today.    PCP is Dipak Treviño MD, patient is known to me.     Patient presents with complaints of stomach problem.   He is still  having pain related to wound at the bottom of his abdomen. He is planning to see Wound care on 1/26/2023 - he is unclear of what treatment plan was - he reports they told me to wear a pad over the area but it makes it hurt more. Instead, he has just been not wearing underwear -so that the waist band does not cause friction to the wound.     After seeing wound care, he saw Dr Serrano for an urgent visit -he was having upper abdominal pain related to scar - he was prescribed doxycycline at that time -he has been complaint with this regimen. He reports upper abomdinal pain is resolved. However, lower abdominal pain persists. He will follow-up on Wednesday for scheduled 2 week wound care follow-up.     He has been compliant with his meds. He still reports that DM control is not good -blood glucose was 300 last night 196 this AM. Patient admits to dietary indiscretion. He reports that he has started the rybelsus -initiated by PCP at last OV on 1/25/2023.       Past Medical History:  Past Medical History:   Diagnosis Date    Cataract     Colostomy in place     Diabetes mellitus type II     Hemorrhoid     History of colon cancer     History of prostate cancer     History of pulmonary embolism     Hypertension        Home Medications:  Prior to Admission medications    Medication Sig Start Date End Date Taking? Authorizing Provider   abiraterone (ZYTIGA) 250 mg Tab Take 4 tablets (1,000 mg total) by mouth once daily. 1/23/23 1/23/24  Irene Thompson, ABBEY   ACCU-CHEK SOFTCLIX LANCETS Misc TEST BLOOD SUGAR ONCE A DAY 4/13/20   Zurdo Best MD   amLODIPine (NORVASC) 10 MG tablet Take 1  tablet (10 mg total) by mouth once daily. 12/1/22   Dipak Treviño MD   atorvastatin (LIPITOR) 20 MG tablet Take 1 tablet (20 mg total) by mouth once daily. 10/11/22   Dipak Treviño MD   blood sugar diagnostic Strp 1 each by Misc.(Non-Drug; Combo Route) route once daily. 3/4/19   Kezia Dennis MD   blood sugar diagnostic Strp Pt to check up to 6 times daily 6/6/19   Dipak Treviño MD   blood sugar diagnostic Strp Use to check blood glucose 1-2 times daily as directed. 11/25/22   Juliette Ryan MD   blood-glucose meter kit Use as instructed 3/4/19 9/9/22  Kezia Dennis MD   BOOSTRIX TDAP 2.5-8-5 Lf-mcg-Lf/0.5mL Syrg injection  12/5/17   Historical Provider   carvediloL (COREG) 3.125 MG tablet Take 1 tablet (3.125 mg total) by mouth 2 (two) times daily. 10/11/22   Dipak Treviño MD   diazePAM (VALIUM) 5 MG tablet Take 1 tablet (5 mg total) by mouth as needed for Anxiety (take 1 tablet 30 minute prior to procedure).  Patient not taking: Reported on 1/25/2023 9/20/22   Kenneth Azevedo MD   diclofenac (VOLTAREN) 75 MG EC tablet Take 1 tablet (75 mg total) by mouth 2 (two) times daily. 5/20/22   Dipak Treviño MD   doxazosin (CARDURA) 4 MG tablet TAKE 1 TABLET(4 MG) BY MOUTH EVERY EVENING 7/6/22   Dipak Treviño MD   doxycycline (VIBRA-TABS) 100 MG tablet Take 1 tablet (100 mg total) by mouth 2 (two) times daily. for 10 days 1/31/23 2/10/23  David Serrano MD   gabapentin (NEURONTIN) 300 MG capsule Take 1 capsule (300 mg total) by mouth 3 (three) times daily. 10/11/22   Dipak Treviño MD   glipiZIDE (GLUCOTROL) 10 MG tablet TAKE 1 TABLET(10 MG) BY MOUTH TWICE DAILY BEFORE MEALS 1/18/23   Dipak Treviño MD   hydroCHLOROthiazide (HYDRODIURIL) 25 MG tablet TAKE ONE-HALF TABLET BY MOUTH ONCE DAILY 10/11/22   Dipak Treviño MD   insulin aspart U-100 (NOVOLOG FLEXPEN U-100 INSULIN) 100 unit/mL (3 mL) InPn pen Inject 8 Units into the skin 3 (three) times daily  "with meals. 12/28/22 3/28/23  Demi Bear PA-C   insulin glargine U-300 conc (TOUJEO MAX U-300 SOLOSTAR) 300 unit/mL (3 mL) insulin pen Inject 28 Units into the skin once daily. 1/10/23 1/10/24  Dipak Treviño MD   irbesartan (AVAPRO) 300 MG tablet Take 1 tablet (300 mg total) by mouth every evening. 12/1/22   Dipak Treviño MD   lancing device with lancets Kit 1 each by Misc.(Non-Drug; Combo Route) route 2 (two) times daily. 10/15/20 1/25/23  Dipak Treviño MD   metFORMIN (GLUCOPHAGE) 1000 MG tablet Take 1 tablet (1,000 mg total) by mouth 2 (two) times daily with meals. 12/1/22   Dipak Treviño MD   pen needle, diabetic (BD ULTRA-FINE KELECHI PEN NEEDLE) 32 gauge x 5/32" Ndle To use with insulin pens at meals and evening shot. 12/9/22   Demi Bear PA-C   polyethylene glycol (GOLYTELY) 236-22.74-6.74 -5.86 gram suspension Take 4,000 mLs by mouth once. 4/14/22   Historical Provider   predniSONE (DELTASONE) 5 MG tablet Take 1 tablet (5 mg total) by mouth 2 (two) times daily. 11/18/22   Lindsey Betancourt MD   predniSONE (DELTASONE) 5 MG tablet Take 1 tablet (5 mg total) by mouth 2 (two) times daily. 11/30/22   Irene Thompson, CNS   semaglutide (RYBELSUS) 7 mg tablet Take 1 tablet (7 mg total) by mouth once daily. 1/25/23   Dipak Treviño MD   tobramycin sulfate 0.3% (TOBREX) 0.3 % ophthalmic solution 1-2 drops topically twice daily to affected toe(s).  Patient not taking: Reported on 1/25/2023 12/7/21   Mk Doyle, DPM   tobramycin sulfate 0.3% (TOBREX) 0.3 % ophthalmic solution 1-2 drops topically twice daily to affected toe(s).  Patient not taking: Reported on 1/25/2023 12/28/21   Mk Doyle DPM   triamcinolone acetonide 0.1% (KENALOG) 0.1 % cream Apply topically 2 (two) times daily. 6/6/19   Dipak Treviño MD       Review of Systems:  Review of Systems   Constitutional:  Negative for chills and fever.   HENT:  Negative for sore throat and trouble swallowing.    Eyes:  " "Negative for visual disturbance.   Respiratory:  Negative for cough and shortness of breath.    Cardiovascular:  Negative for chest pain.   Gastrointestinal:  Negative for abdominal pain, constipation, diarrhea, nausea and vomiting.   Genitourinary:  Negative for dysuria and flank pain.   Musculoskeletal:  Negative for back pain, neck pain and neck stiffness.   Skin:  Positive for wound. Negative for rash.   Neurological:  Negative for dizziness, syncope, weakness and headaches.   Psychiatric/Behavioral:  Negative for confusion.      Health Maintainence:   Immunizations:  Health Maintenance         Date Due Completion Date    Shingles Vaccine (2 of 2) 11/25/2019 9/30/2019    Eye Exam 11/10/2022 11/10/2021    Override on 10/11/2016: Done    Override on 8/6/2014: Done    Diabetes Urine Screening 04/12/2023 4/12/2022    Lipid Panel 04/12/2023 4/12/2022    Hemoglobin A1c 04/25/2023 1/25/2023    TETANUS VACCINE 12/05/2027 12/5/2017    Colonoscopy 06/02/2032 6/2/2022    Override on 7/15/2014: Done             PHYSICAL EXAM     /64 (BP Location: Right arm, Patient Position: Sitting, BP Method: Large (Manual))   Pulse 82   Ht 6' 1" (1.854 m)   Wt 94 kg (207 lb 3.7 oz)   SpO2 99%   BMI 27.34 kg/m²     Physical Exam  Vitals and nursing note reviewed.   Constitutional:       Appearance: Normal appearance.      Comments: Healthy appearing male in NAD or apparent pain. He makes good eye contact, speaks in clear full sentences and ambulates with ease.        HENT:      Head: Normocephalic and atraumatic.      Nose: Nose normal.      Mouth/Throat:      Pharynx: Oropharynx is clear.   Eyes:      Conjunctiva/sclera: Conjunctivae normal.   Cardiovascular:      Rate and Rhythm: Normal rate and regular rhythm.      Pulses: Normal pulses.   Pulmonary:      Effort: No respiratory distress.   Abdominal:      Tenderness: There is no abdominal tenderness.   Musculoskeletal:         General: Normal range of motion.      Cervical " back: No rigidity.   Skin:     General: Skin is warm and dry.      Capillary Refill: Capillary refill takes less than 2 seconds.      Findings: No rash.      Comments: Wound examined -there is still a superficial ulceration but there is no longer erythema. There is no edema or purulence. There is some TTP.      Neurological:      General: No focal deficit present.      Mental Status: He is alert.      Gait: Gait normal.   Psychiatric:         Mood and Affect: Mood normal.       LABS     Lab Results   Component Value Date    HGBA1C 10.7 (H) 01/25/2023     CMP  Sodium   Date Value Ref Range Status   01/25/2023 141 136 - 145 mmol/L Final   01/25/2023 141 136 - 145 mmol/L Final     Potassium   Date Value Ref Range Status   01/25/2023 3.9 3.5 - 5.1 mmol/L Final   01/25/2023 3.9 3.5 - 5.1 mmol/L Final     Chloride   Date Value Ref Range Status   01/25/2023 104 95 - 110 mmol/L Final   01/25/2023 104 95 - 110 mmol/L Final     CO2   Date Value Ref Range Status   01/25/2023 28 23 - 29 mmol/L Final   01/25/2023 28 23 - 29 mmol/L Final     Glucose   Date Value Ref Range Status   01/25/2023 212 (H) 70 - 110 mg/dL Final   01/25/2023 212 (H) 70 - 110 mg/dL Final     BUN   Date Value Ref Range Status   01/25/2023 17 8 - 23 mg/dL Final   01/25/2023 17 8 - 23 mg/dL Final     Creatinine   Date Value Ref Range Status   01/25/2023 1.2 0.5 - 1.4 mg/dL Final   01/25/2023 1.2 0.5 - 1.4 mg/dL Final     Calcium   Date Value Ref Range Status   01/25/2023 9.9 8.7 - 10.5 mg/dL Final   01/25/2023 9.9 8.7 - 10.5 mg/dL Final     Total Protein   Date Value Ref Range Status   01/25/2023 6.6 6.0 - 8.4 g/dL Final   01/25/2023 6.6 6.0 - 8.4 g/dL Final     Albumin   Date Value Ref Range Status   01/25/2023 3.8 3.5 - 5.2 g/dL Final   01/25/2023 3.8 3.5 - 5.2 g/dL Final     Total Bilirubin   Date Value Ref Range Status   01/25/2023 0.8 0.1 - 1.0 mg/dL Final     Comment:     For infants and newborns, interpretation of results should be based  on  gestational age, weight and in agreement with clinical  observations.    Premature Infant recommended reference ranges:  Up to 24 hours.............<8.0 mg/dL  Up to 48 hours............<12.0 mg/dL  3-5 days..................<15.0 mg/dL  6-29 days.................<15.0 mg/dL     01/25/2023 0.8 0.1 - 1.0 mg/dL Final     Comment:     For infants and newborns, interpretation of results should be based  on gestational age, weight and in agreement with clinical  observations.    Premature Infant recommended reference ranges:  Up to 24 hours.............<8.0 mg/dL  Up to 48 hours............<12.0 mg/dL  3-5 days..................<15.0 mg/dL  6-29 days.................<15.0 mg/dL       Alkaline Phosphatase   Date Value Ref Range Status   01/25/2023 50 (L) 55 - 135 U/L Final   01/25/2023 50 (L) 55 - 135 U/L Final     AST   Date Value Ref Range Status   01/25/2023 17 10 - 40 U/L Final   01/25/2023 17 10 - 40 U/L Final     ALT   Date Value Ref Range Status   01/25/2023 21 10 - 44 U/L Final   01/25/2023 21 10 - 44 U/L Final     Anion Gap   Date Value Ref Range Status   01/25/2023 9 8 - 16 mmol/L Final   01/25/2023 9 8 - 16 mmol/L Final     eGFR if    Date Value Ref Range Status   06/21/2022 >60.0 >60 mL/min/1.73 m^2 Final     eGFR if non    Date Value Ref Range Status   06/21/2022 53.0 (A) >60 mL/min/1.73 m^2 Final     Comment:     Calculation used to obtain the estimated glomerular filtration  rate (eGFR) is the CKD-EPI equation.        Lab Results   Component Value Date    WBC 4.08 01/25/2023    HGB 11.6 (L) 01/25/2023    HCT 34.5 (L) 01/25/2023    MCV 99 (H) 01/25/2023     (L) 01/25/2023     Lab Results   Component Value Date    CHOL 103 (L) 04/12/2022    CHOL 103 (L) 04/12/2022    CHOL 108 (L) 08/19/2020     Lab Results   Component Value Date    HDL 42 04/12/2022    HDL 42 04/12/2022    HDL 44 08/19/2020     Lab Results   Component Value Date    LDLCALC 24.4 (L) 04/12/2022    LDLCALC  24.4 (L) 04/12/2022    LDLCALC 46.8 (L) 08/19/2020     Lab Results   Component Value Date    TRIG 183 (H) 04/12/2022    TRIG 183 (H) 04/12/2022    TRIG 86 08/19/2020     Lab Results   Component Value Date    CHOLHDL 40.8 04/12/2022    CHOLHDL 40.8 04/12/2022    CHOLHDL 40.7 08/19/2020     Lab Results   Component Value Date    TSH 1.549 04/12/2022       ASSESSMENT/PLAN     Misha Eldridge Jr. is a 77 y.o. male     Misha was seen today for abdominal wound. Seems to be improving on visual inspection but patient reports pain is worse. Continue doxycyline and follow-up with wound care as planned. RTC with PCP as previously planned. Meds refill as requested by patient.     Diagnoses and all orders for this visit:    Open wound of anterior abdominal wall, subsequent encounter    Essential hypertension  -     irbesartan (AVAPRO) 300 MG tablet; Take 1 tablet (300 mg total) by mouth every evening.    Hypertension, unspecified type  -     hydroCHLOROthiazide (HYDRODIURIL) 25 MG tablet; TAKE ONE-HALF TABLET BY MOUTH ONCE DAILY  -     amLODIPine (NORVASC) 10 MG tablet; Take 1 tablet (10 mg total) by mouth once daily.    Diabetes 1.5, managed as type 2  -     glipiZIDE (GLUCOTROL) 10 MG tablet; Take 1 tablet (10 mg total) by mouth 2 (two) times daily before meals.    Heart abnormality  -     carvediloL (COREG) 3.125 MG tablet; Take 1 tablet (3.125 mg total) by mouth 2 (two) times daily.    Abnormal cholesterol test  -     atorvastatin (LIPITOR) 20 MG tablet; Take 1 tablet (20 mg total) by mouth once daily.         Patient was counseled on when and how to seek emergent care.       Demi Bear PA-C   Department of Internal Medicine - [facility]   8:43 AM

## 2023-02-13 ENCOUNTER — SPECIALTY PHARMACY (OUTPATIENT)
Dept: PHARMACY | Facility: CLINIC | Age: 78
End: 2023-02-13
Payer: MEDICARE

## 2023-02-13 DIAGNOSIS — Z85.46 HISTORY OF PROSTATE CANCER: ICD-10-CM

## 2023-02-13 RX ORDER — PREDNISONE 5 MG/1
5 TABLET ORAL 2 TIMES DAILY
Qty: 60 TABLET | Refills: 1 | Status: SHIPPED | OUTPATIENT
Start: 2023-02-13 | End: 2023-04-21 | Stop reason: SDUPTHER

## 2023-02-13 NOTE — TELEPHONE ENCOUNTER
Outgoing call to pt about refill of Zytiga + prednisone. Pt stated he thinks he has enough on hand, but was not home so could not check. Stated he would check and call [facility] back. Will continue to follow up.

## 2023-02-14 ENCOUNTER — INFUSION (OUTPATIENT)
Dept: INFUSION THERAPY | Facility: HOSPITAL | Age: 78
End: 2023-02-14
Payer: MEDICARE

## 2023-02-14 ENCOUNTER — OFFICE VISIT (OUTPATIENT)
Dept: HEMATOLOGY/ONCOLOGY | Facility: CLINIC | Age: 78
End: 2023-02-14
Payer: MEDICARE

## 2023-02-14 VITALS
SYSTOLIC BLOOD PRESSURE: 158 MMHG | RESPIRATION RATE: 18 BRPM | DIASTOLIC BLOOD PRESSURE: 74 MMHG | WEIGHT: 212.5 LBS | BODY MASS INDEX: 28.16 KG/M2 | HEIGHT: 73 IN | TEMPERATURE: 97 F | OXYGEN SATURATION: 96 % | HEART RATE: 75 BPM

## 2023-02-14 DIAGNOSIS — I10 HYPERTENSION, UNSPECIFIED TYPE: ICD-10-CM

## 2023-02-14 DIAGNOSIS — C61 PROSTATE CANCER: Primary | ICD-10-CM

## 2023-02-14 DIAGNOSIS — E11.40 CONTROLLED TYPE 2 DIABETES MELLITUS WITH DIABETIC NEUROPATHY, WITHOUT LONG-TERM CURRENT USE OF INSULIN: ICD-10-CM

## 2023-02-14 DIAGNOSIS — Z86.711 HISTORY OF PULMONARY EMBOLISM: ICD-10-CM

## 2023-02-14 DIAGNOSIS — Z85.46 HISTORY OF PROSTATE CANCER: Primary | ICD-10-CM

## 2023-02-14 DIAGNOSIS — M80.08XA AGE-RELATED OSTEOPOROSIS WITH CURRENT PATHOLOGICAL FRACTURE, VERTEBRA(E), INITIAL ENCOUNTER FOR FRACTURE: ICD-10-CM

## 2023-02-14 DIAGNOSIS — Z85.038 HISTORY OF COLON CANCER: ICD-10-CM

## 2023-02-14 PROCEDURE — 99499 RISK ADDL DX/OHS AUDIT: ICD-10-PCS | Mod: HCNC,S$GLB,, | Performed by: INTERNAL MEDICINE

## 2023-02-14 PROCEDURE — 1126F AMNT PAIN NOTED NONE PRSNT: CPT | Mod: HCNC,CPTII,S$GLB, | Performed by: INTERNAL MEDICINE

## 2023-02-14 PROCEDURE — 1101F PR PT FALLS ASSESS DOC 0-1 FALLS W/OUT INJ PAST YR: ICD-10-PCS | Mod: HCNC,CPTII,S$GLB, | Performed by: INTERNAL MEDICINE

## 2023-02-14 PROCEDURE — 3288F FALL RISK ASSESSMENT DOCD: CPT | Mod: HCNC,CPTII,S$GLB, | Performed by: INTERNAL MEDICINE

## 2023-02-14 PROCEDURE — 1101F PT FALLS ASSESS-DOCD LE1/YR: CPT | Mod: HCNC,CPTII,S$GLB, | Performed by: INTERNAL MEDICINE

## 2023-02-14 PROCEDURE — 3077F PR MOST RECENT SYSTOLIC BLOOD PRESSURE >= 140 MM HG: ICD-10-PCS | Mod: HCNC,CPTII,S$GLB, | Performed by: INTERNAL MEDICINE

## 2023-02-14 PROCEDURE — 3078F PR MOST RECENT DIASTOLIC BLOOD PRESSURE < 80 MM HG: ICD-10-PCS | Mod: HCNC,CPTII,S$GLB, | Performed by: INTERNAL MEDICINE

## 2023-02-14 PROCEDURE — 3077F SYST BP >= 140 MM HG: CPT | Mod: HCNC,CPTII,S$GLB, | Performed by: INTERNAL MEDICINE

## 2023-02-14 PROCEDURE — 1160F RVW MEDS BY RX/DR IN RCRD: CPT | Mod: HCNC,CPTII,S$GLB, | Performed by: INTERNAL MEDICINE

## 2023-02-14 PROCEDURE — 99999 PR PBB SHADOW E&M-EST. PATIENT-LVL V: CPT | Mod: PBBFAC,HCNC,, | Performed by: INTERNAL MEDICINE

## 2023-02-14 PROCEDURE — 99499 UNLISTED E&M SERVICE: CPT | Mod: HCNC,S$GLB,, | Performed by: INTERNAL MEDICINE

## 2023-02-14 PROCEDURE — 99999 PR PBB SHADOW E&M-EST. PATIENT-LVL V: ICD-10-PCS | Mod: PBBFAC,HCNC,, | Performed by: INTERNAL MEDICINE

## 2023-02-14 PROCEDURE — 96402 CHEMO HORMON ANTINEOPL SQ/IM: CPT | Mod: HCNC

## 2023-02-14 PROCEDURE — 1159F PR MEDICATION LIST DOCUMENTED IN MEDICAL RECORD: ICD-10-PCS | Mod: HCNC,CPTII,S$GLB, | Performed by: INTERNAL MEDICINE

## 2023-02-14 PROCEDURE — 3078F DIAST BP <80 MM HG: CPT | Mod: HCNC,CPTII,S$GLB, | Performed by: INTERNAL MEDICINE

## 2023-02-14 PROCEDURE — 63600175 PHARM REV CODE 636 W HCPCS: Mod: JG,HCNC | Performed by: INTERNAL MEDICINE

## 2023-02-14 PROCEDURE — 1159F MED LIST DOCD IN RCRD: CPT | Mod: HCNC,CPTII,S$GLB, | Performed by: INTERNAL MEDICINE

## 2023-02-14 PROCEDURE — 99214 PR OFFICE/OUTPT VISIT, EST, LEVL IV, 30-39 MIN: ICD-10-PCS | Mod: HCNC,S$GLB,, | Performed by: INTERNAL MEDICINE

## 2023-02-14 PROCEDURE — 1126F PR PAIN SEVERITY QUANTIFIED, NO PAIN PRESENT: ICD-10-PCS | Mod: HCNC,CPTII,S$GLB, | Performed by: INTERNAL MEDICINE

## 2023-02-14 PROCEDURE — 99214 OFFICE O/P EST MOD 30 MIN: CPT | Mod: HCNC,S$GLB,, | Performed by: INTERNAL MEDICINE

## 2023-02-14 PROCEDURE — 3288F PR FALLS RISK ASSESSMENT DOCUMENTED: ICD-10-PCS | Mod: HCNC,CPTII,S$GLB, | Performed by: INTERNAL MEDICINE

## 2023-02-14 PROCEDURE — 1160F PR REVIEW ALL MEDS BY PRESCRIBER/CLIN PHARMACIST DOCUMENTED: ICD-10-PCS | Mod: HCNC,CPTII,S$GLB, | Performed by: INTERNAL MEDICINE

## 2023-02-14 RX ADMIN — LEUPROLIDE ACETATE 45 MG: KIT at 03:02
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
## 2023-02-16 NOTE — TELEPHONE ENCOUNTER
Incoming call from pt returning refill call for Zytiga + pabloione. Pt stated he has about a 15 day supply on hand. Pt was agreeable for refill call on 2/23. Pended accordingly.

## 2023-02-23 ENCOUNTER — SPECIALTY PHARMACY (OUTPATIENT)
Dept: PHARMACY | Facility: CLINIC | Age: 78
End: 2023-02-23
Payer: MEDICARE

## 2023-02-23 NOTE — TELEPHONE ENCOUNTER
Specialty Pharmacy - Refill Coordination    Specialty Medication Orders Linked to Encounter      Flowsheet Row Most Recent Value   Medication #1 abiraterone (ZYTIGA) 250 mg Tab (Order#[id number], Rx#[id number])   Medication #2 predniSONE (DELTASONE) 5 MG tablet (Order#[id number], Rx#[id number])            Refill Questions - Documented Responses      Flowsheet Row Most Recent Value   Patient Availability and HIPAA Verification    Does patient want to proceed with activity? Yes   HIPAA/medical authority confirmed? Yes   Relationship to patient of person spoken to? Self   Refill Screening Questions    Changes to allergies? No   Changes to medications? No   New conditions since last clinic visit? No   Unplanned office visit, urgent care, ED, or hospital admission in the last 4 weeks? No   How does patient/caregiver feel medication is working? Good   Financial problems or insurance changes? No   How many doses of your specialty medications were missed in the last 4 weeks? 0   Would patient like to speak to a pharmacist? No   When does the patient need to receive the medication? 02/26/23   Refill Delivery Questions    How will the patient receive the medication? [facility]   When does the patient need to receive the medication? 02/26/23   Shipping Address Home   Address in [facility] confirmed and updated if neccessary? Yes   Expected Copay ($) 0   Is the patient able to afford the medication copay? Yes   Payment Method zero copay   Days supply of Refill 30   Supplies needed? No supplies needed   Refill activity completed? Yes   Refill activity plan Refill scheduled   Shipment/Pickup Date: 02/26/23            Current Outpatient Medications   Medication Sig    abiraterone (ZYTIGA) 250 mg Tab Take 4 tablets (1,000 mg total) by mouth once daily.    ACCU-CHEK SOFTCLIX LANCETS Misc TEST BLOOD SUGAR ONCE A DAY    amLODIPine (NORVASC) 10 MG tablet Take 1 tablet (10 mg total) by mouth once daily.    atorvastatin (LIPITOR) 20 MG  "tablet Take 1 tablet (20 mg total) by mouth once daily.    blood sugar diagnostic Strp 1 each by Misc.(Non-Drug; Combo Route) route once daily.    blood sugar diagnostic Strp Pt to check up to 6 times daily    blood sugar diagnostic Strp Use to check blood glucose 1-2 times daily as directed.    blood-glucose meter kit Use as instructed    carvediloL (COREG) 3.125 MG tablet Take 1 tablet (3.125 mg total) by mouth 2 (two) times daily.    diclofenac (VOLTAREN) 75 MG EC tablet Take 1 tablet (75 mg total) by mouth 2 (two) times daily.    doxazosin (CARDURA) 4 MG tablet TAKE 1 TABLET(4 MG) BY MOUTH EVERY EVENING    gabapentin (NEURONTIN) 300 MG capsule Take 1 capsule (300 mg total) by mouth 3 (three) times daily.    glipiZIDE (GLUCOTROL) 10 MG tablet Take 1 tablet (10 mg total) by mouth 2 (two) times daily before meals.    hydroCHLOROthiazide (HYDRODIURIL) 25 MG tablet TAKE ONE-HALF TABLET BY MOUTH ONCE DAILY    insulin aspart U-100 (NOVOLOG FLEXPEN U-100 INSULIN) 100 unit/mL (3 mL) InPn pen Inject 8 Units into the skin 3 (three) times daily with meals.    insulin glargine U-300 conc (TOUJEO MAX U-300 SOLOSTAR) 300 unit/mL (3 mL) insulin pen Inject 28 Units into the skin once daily.    irbesartan (AVAPRO) 300 MG tablet Take 1 tablet (300 mg total) by mouth every evening.    lancing device with lancets Kit 1 each by Misc.(Non-Drug; Combo Route) route 2 (two) times daily.    metFORMIN (GLUCOPHAGE) 1000 MG tablet Take 1 tablet (1,000 mg total) by mouth 2 (two) times daily with meals.    pen needle, diabetic (BD ULTRA-FINE KELECHI PEN NEEDLE) 32 gauge x 5/32" Ndle To use with insulin pens at meals and evening shot.    polyethylene glycol (GOLYTELY) 236-22.74-6.74 -5.86 gram suspension Take 4,000 mLs by mouth once.    predniSONE (DELTASONE) 5 MG tablet Take 1 tablet (5 mg total) by mouth 2 (two) times daily.    predniSONE (DELTASONE) 5 MG tablet Take 1 tablet (5 mg total) by mouth 2 (two) times daily.    semaglutide (RYBELSUS) 7 " mg tablet Take 1 tablet (7 mg total) by mouth once daily.    triamcinolone acetonide 0.1% (KENALOG) 0.1 % cream Apply topically 2 (two) times daily.   Last reviewed on 2/15/2023  6:36 PM by Lindsey Betancourt MD    Review of patient's allergies indicates:   Allergen Reactions    Hay fever and allergy relief     Last reviewed on  2/15/2023 6:36 PM by Lindsey Betancourt      Tasks added this encounter   3/21/2023 - Refill Call (Auto Added)   Tasks due within next 3 months   No tasks due.     Kristie Castano, PharmD  [facility]  [address] [place] [id number]  Phone: [phone]  Fax: [phone]

## 2023-02-23 NOTE — TELEPHONE ENCOUNTER
Outgoing call to pt about refill of Zytiga + prednisone. Pt stated he was not at home and did not know if it was time for refill. Informed pt that 2/16 he has about two weeks on hand so should have about a week left. Pt stated he would check when he gets home and call back to [facility]. Will continue to follow up.

## 2023-02-28 ENCOUNTER — HOSPITAL ENCOUNTER (OUTPATIENT)
Dept: WOUND CARE | Facility: HOSPITAL | Age: 78
Discharge: HOME | End: 2023-02-28
Attending: SURGERY
Payer: MEDICARE

## 2023-02-28 VITALS
BODY MASS INDEX: 28.1 KG/M2 | SYSTOLIC BLOOD PRESSURE: 116 MMHG | WEIGHT: 212 LBS | HEART RATE: 81 BPM | HEIGHT: 73 IN | DIASTOLIC BLOOD PRESSURE: 74 MMHG | TEMPERATURE: 98 F

## 2023-02-28 DIAGNOSIS — E66.3 OVERWEIGHT (BMI 25.0-29.9): ICD-10-CM

## 2023-02-28 DIAGNOSIS — L92.9 HYPERGRANULATION: ICD-10-CM

## 2023-02-28 DIAGNOSIS — S31.109S OPEN WOUND OF ANTERIOR ABDOMINAL WALL, SEQUELA: Primary | ICD-10-CM

## 2023-02-28 PROCEDURE — 99212 OFFICE O/P EST SF 10 MIN: CPT | Mod: HCNC

## 2023-02-28 NOTE — PROGRESS NOTES
[facility]    Subjective:       Patient ID: Misha Eldridge Jr. is a 77 y.o. male.    Chief Complaint: Non-healing Wound Follow Up    Follow up for lower abdominal wall wound. Wound healed. Discussed with patient protecting area with bandages if needed, verbalized understanding. No new c/o. Discharged from wound care return if needed.     Review of Systems   All systems were reviewed and are negative, except that mentioned in the HPI.    Objective:     Vitals:    02/28/23 1420   BP: 116/74   Pulse: 81   Temp: 97.5 °F (36.4 °C)         Physical Exam  Constitutional:       Appearance: He is well-developed.   HENT:      Head: Normocephalic and atraumatic.   Eyes:      Pupils: Pupils are equal, round, and reactive to light.   Cardiovascular:      Rate and Rhythm: Normal rate.   Pulmonary:      Effort: Pulmonary effort is normal. No respiratory distress.      Breath sounds: No stridor.   Abdominal:      General: There is no distension.   Musculoskeletal:      Cervical back: Normal range of motion.   Skin:     Comments: See Synopsis for wound details   Neurological:      Mental Status: He is alert and oriented to person, place, and time.     [REMOVED]      Altered Skin Integrity 01/26/23 1400 lower Abdomen (Removed)   01/26/23 1400   Altered Skin Integrity Present on Admission - Did Patient arrive to the hospital with altered skin?:    Side:    Orientation: lower   Location: Abdomen   Wound Number:    Is this injury device related?:    Primary Wound Type:    Description of Altered Skin Integrity:    Ankle-Brachial Index:    Pulses:    Removal Indication and Assessment:    Wound Outcome: Healed   (Retired) Wound Length (cm):    (Retired) Wound Width (cm):    (Retired) Depth (cm):    Wound Description (Comments):    Removal Indications:    Removed 02/28/23 1459   Wound Image   02/28/23 5785   Dressing Appearance Open to air 02/28/23 1455   Drainage Amount None 02/28/23 5144    Appearance Closed/resurfaced 02/28/23 1457   Tissue loss description Not applicable 02/28/23 1457   Red (%), Wound Tissue Color 100 % 02/28/23 1457   Periwound Area Intact;Dry 02/28/23 1457   Wound Edges Rolled/closed 02/28/23 1457   Wound Length (cm) 0 cm 02/28/23 1457   Wound Width (cm) 0 cm 02/28/23 1457   Wound Depth (cm) 0 cm 02/28/23 1457   Wound Volume (cm^3) 0 cm^3 02/28/23 1457   Wound Surface Area (cm^2) 0 cm^2 02/28/23 1457   Care Cleansed with:;Sterile normal saline 02/28/23 1457   Dressing Island/border 02/28/23 1457   Dressing Change Due 03/01/23 02/28/23 1457         Assessment/Plan:         ICD-10-CM ICD-9-CM   1. Open wound of anterior abdominal wall, sequela  S31.109S 906.0   2. Overweight (BMI 25.0-29.9)  E66.3 278.02   3. Hypergranulation  L92.9 701.5           Tissue pathology and/or culture taken:  [] Yes [x] No   Sharp debridement performed:   [] Yes [x] No   Labs ordered this visit:   [] Yes [x] No   Imaging ordered this visit:   [] Yes [x] No           Orders Placed This Encounter   Procedures    Change dressing     Lower Abdominal Wound   Healed Wound Instructions:Your wound is healed, it is extremely fragile at this stage; protect it from friction, wash it gently and pat dry.  If the wound should re-open, please call [phone] for further instructions.     Wound has healed.  No new concerns.  All questions were answered.  Follow up for Discharged from [facility] return if needed. .

## 2023-03-10 ENCOUNTER — TELEPHONE (OUTPATIENT)
Dept: INTERNAL MEDICINE | Facility: CLINIC | Age: 78
End: 2023-03-10
Payer: MEDICARE

## 2023-03-10 NOTE — TELEPHONE ENCOUNTER
----- Message from Irisbenigno Conley sent at 3/10/2023  1:08 PM CST -----  Regarding: Refill  Contact: MISHA ELDRIDGE JR. [[id number]]  Name of Who is Calling: Misha Eldridge Jr.    What is the request in detail: Pt states he is out of his sugar  medication and is requesting a refill be sent to     [facility] #[id number] [place] [address] AT [address]     Please advise       Can the clinic reply by [facility]:  No   What Number to Call Back if not in [facility]: Home Phone      [phone]  Work Phone      Not on file.  Mobile          [phone]

## 2023-03-10 NOTE — TELEPHONE ENCOUNTER
[facility] for Mr. Eldridge to return call to . Need to know what sugar medicine that he is out of. Called [facility] and they are stating he just got the Metformin and the Glipizide has a refill on it.

## 2023-03-13 NOTE — TELEPHONE ENCOUNTER
2nd attempt  LVM for Mr. Eldridge to return call to . Need to know what sugar medicine that he is out of. Called [facility] and they are stating he just got the Metformin and the Glipizide has a refill on it.  Rerouting for 2nd attempt

## 2023-03-24 ENCOUNTER — SPECIALTY PHARMACY (OUTPATIENT)
Dept: PHARMACY | Facility: CLINIC | Age: 78
End: 2023-03-24
Payer: MEDICARE

## 2023-03-24 NOTE — TELEPHONE ENCOUNTER
Outgoing call to pt about refill of Zytiga + Prednisone. Pt stated he did not think he was ready for refill, but was not sure because he was not home. Stated he would check count and call back. Will continue to follow up.

## 2023-03-27 ENCOUNTER — SPECIALTY PHARMACY (OUTPATIENT)
Dept: PHARMACY | Facility: CLINIC | Age: 78
End: 2023-03-27
Payer: MEDICARE

## 2023-03-27 NOTE — TELEPHONE ENCOUNTER
Specialty Pharmacy - Refill Coordination    Specialty Medication Orders Linked to Encounter      Flowsheet Row Most Recent Value   Medication #1 abiraterone (ZYTIGA) 250 mg Tab (Order#[id number], Rx#[id number])   Medication #2 predniSONE (DELTASONE) 5 MG tablet (Order#[id number], Rx#[id number])            Refill Questions - Documented Responses      Flowsheet Row Most Recent Value   Patient Availability and HIPAA Verification    Does patient want to proceed with activity? Yes   HIPAA/medical authority confirmed? Yes   Relationship to patient of person spoken to? Self   Refill Screening Questions    Changes to allergies? No   Changes to medications? No   New conditions since last clinic visit? No   Unplanned office visit, urgent care, ED, or hospital admission in the last 4 weeks? No   How does patient/caregiver feel medication is working? Good   Financial problems or insurance changes? No   How many doses of your specialty medications were missed in the last 4 weeks? 0   Would patient like to speak to a pharmacist? No   When does the patient need to receive the medication? 03/29/23   Refill Delivery Questions    How will the patient receive the medication? MEDRx   When does the patient need to receive the medication? 03/29/23   Shipping Address Home   Address in [facility] confirmed and updated if neccessary? Yes   Expected Copay ($) 0   Is the patient able to afford the medication copay? Yes   Payment Method zero copay   Days supply of Refill 30   Supplies needed? No supplies needed   Refill activity completed? Yes   Refill activity plan Refill scheduled   Shipment/Pickup Date: 03/28/23            Current Outpatient Medications   Medication Sig    abiraterone (ZYTIGA) 250 mg Tab Take 4 tablets (1,000 mg total) by mouth once daily.    ACCU-CHEK SOFTCLIX LANCETS Misc TEST BLOOD SUGAR ONCE A DAY    amLODIPine (NORVASC) 10 MG tablet Take 1 tablet (10 mg total) by mouth once daily.    atorvastatin (LIPITOR) 20 MG  "tablet Take 1 tablet (20 mg total) by mouth once daily.    blood sugar diagnostic Strp 1 each by Misc.(Non-Drug; Combo Route) route once daily.    blood sugar diagnostic Strp Pt to check up to 6 times daily    blood sugar diagnostic Strp Use to check blood glucose 1-2 times daily as directed.    blood-glucose meter kit Use as instructed    carvediloL (COREG) 3.125 MG tablet Take 1 tablet (3.125 mg total) by mouth 2 (two) times daily.    diclofenac (VOLTAREN) 75 MG EC tablet Take 1 tablet (75 mg total) by mouth 2 (two) times daily.    doxazosin (CARDURA) 4 MG tablet TAKE 1 TABLET(4 MG) BY MOUTH EVERY EVENING    gabapentin (NEURONTIN) 300 MG capsule Take 1 capsule (300 mg total) by mouth 3 (three) times daily.    glipiZIDE (GLUCOTROL) 10 MG tablet Take 1 tablet (10 mg total) by mouth 2 (two) times daily before meals.    hydroCHLOROthiazide (HYDRODIURIL) 25 MG tablet TAKE ONE-HALF TABLET BY MOUTH ONCE DAILY    insulin aspart U-100 (NOVOLOG FLEXPEN U-100 INSULIN) 100 unit/mL (3 mL) InPn pen Inject 8 Units into the skin 3 (three) times daily with meals.    insulin glargine U-300 conc (TOUJEO MAX U-300 SOLOSTAR) 300 unit/mL (3 mL) insulin pen Inject 28 Units into the skin once daily.    irbesartan (AVAPRO) 300 MG tablet Take 1 tablet (300 mg total) by mouth every evening.    lancing device with lancets Kit 1 each by Misc.(Non-Drug; Combo Route) route 2 (two) times daily.    metFORMIN (GLUCOPHAGE) 1000 MG tablet Take 1 tablet (1,000 mg total) by mouth 2 (two) times daily with meals.    pen needle, diabetic (BD ULTRA-FINE KELECHI PEN NEEDLE) 32 gauge x 5/32" Ndle To use with insulin pens at meals and evening shot.    polyethylene glycol (GOLYTELY) 236-22.74-6.74 -5.86 gram suspension Take 4,000 mLs by mouth once.    predniSONE (DELTASONE) 5 MG tablet Take 1 tablet (5 mg total) by mouth 2 (two) times daily.    predniSONE (DELTASONE) 5 MG tablet Take 1 tablet (5 mg total) by mouth 2 (two) times daily.    semaglutide (RYBELSUS) " 14 mg tablet Take 1 tablet (14 mg total) by mouth once daily.    triamcinolone acetonide 0.1% (KENALOG) 0.1 % cream Apply topically 2 (two) times daily.   Last reviewed on 3/6/2023 12:32 PM by Magy Hastings DO    Review of patient's allergies indicates:   Allergen Reactions    Hay fever and allergy relief     Last reviewed on  3/6/2023 12:32 PM by Magy Hastings      Tasks added this encounter   4/21/2023 - Refill Call (Auto Added)   Tasks due within next 3 months   6/6/2023 - Clinical - Follow Up Assesement (180 day)     Tammy Alfred, PharmD  [facility]  [address] [place] [id number]  Phone: [phone]  Fax: [phone]

## 2023-04-03 ENCOUNTER — OFFICE VISIT (OUTPATIENT)
Dept: OPTOMETRY | Facility: CLINIC | Age: 78
End: 2023-04-03
Payer: MEDICARE

## 2023-04-03 DIAGNOSIS — E11.9 DIABETES MELLITUS WITHOUT COMPLICATION: ICD-10-CM

## 2023-04-03 DIAGNOSIS — H52.4 MYOPIA WITH ASTIGMATISM AND PRESBYOPIA, BILATERAL: ICD-10-CM

## 2023-04-03 DIAGNOSIS — H52.13 MYOPIA WITH ASTIGMATISM AND PRESBYOPIA, BILATERAL: ICD-10-CM

## 2023-04-03 DIAGNOSIS — Z01.00 ROUTINE EYE EXAM: Primary | ICD-10-CM

## 2023-04-03 DIAGNOSIS — H52.203 MYOPIA WITH ASTIGMATISM AND PRESBYOPIA, BILATERAL: ICD-10-CM

## 2023-04-03 PROCEDURE — 92015 DETERMINE REFRACTIVE STATE: CPT | Mod: HCNC,S$GLB,, | Performed by: OPTOMETRIST

## 2023-04-03 PROCEDURE — 92015 PR REFRACTION: ICD-10-PCS | Mod: HCNC,S$GLB,, | Performed by: OPTOMETRIST

## 2023-04-03 PROCEDURE — 92014 PR EYE EXAM, EST PATIENT,COMPREHESV: ICD-10-PCS | Mod: HCNC,S$GLB,, | Performed by: OPTOMETRIST

## 2023-04-03 PROCEDURE — 99999 PR PBB SHADOW E&M-EST. PATIENT-LVL IV: CPT | Mod: PBBFAC,HCNC,, | Performed by: OPTOMETRIST

## 2023-04-03 PROCEDURE — 1126F PR PAIN SEVERITY QUANTIFIED, NO PAIN PRESENT: ICD-10-PCS | Mod: HCNC,CPTII,S$GLB, | Performed by: OPTOMETRIST

## 2023-04-03 PROCEDURE — 99999 PR PBB SHADOW E&M-EST. PATIENT-LVL IV: ICD-10-PCS | Mod: PBBFAC,HCNC,, | Performed by: OPTOMETRIST

## 2023-04-03 PROCEDURE — 1101F PT FALLS ASSESS-DOCD LE1/YR: CPT | Mod: HCNC,CPTII,S$GLB, | Performed by: OPTOMETRIST

## 2023-04-03 PROCEDURE — 1101F PR PT FALLS ASSESS DOC 0-1 FALLS W/OUT INJ PAST YR: ICD-10-PCS | Mod: HCNC,CPTII,S$GLB, | Performed by: OPTOMETRIST

## 2023-04-03 PROCEDURE — 1159F MED LIST DOCD IN RCRD: CPT | Mod: HCNC,CPTII,S$GLB, | Performed by: OPTOMETRIST

## 2023-04-03 PROCEDURE — 92014 COMPRE OPH EXAM EST PT 1/>: CPT | Mod: HCNC,S$GLB,, | Performed by: OPTOMETRIST

## 2023-04-03 PROCEDURE — 1159F PR MEDICATION LIST DOCUMENTED IN MEDICAL RECORD: ICD-10-PCS | Mod: HCNC,CPTII,S$GLB, | Performed by: OPTOMETRIST

## 2023-04-03 PROCEDURE — 2023F PR DILATED RETINAL EXAM W/O EVID OF RETINOPATHY: ICD-10-PCS | Mod: HCNC,CPTII,S$GLB, | Performed by: OPTOMETRIST

## 2023-04-03 PROCEDURE — 2023F DILAT RTA XM W/O RTNOPTHY: CPT | Mod: HCNC,CPTII,S$GLB, | Performed by: OPTOMETRIST

## 2023-04-03 PROCEDURE — 3288F FALL RISK ASSESSMENT DOCD: CPT | Mod: HCNC,CPTII,S$GLB, | Performed by: OPTOMETRIST

## 2023-04-03 PROCEDURE — 3288F PR FALLS RISK ASSESSMENT DOCUMENTED: ICD-10-PCS | Mod: HCNC,CPTII,S$GLB, | Performed by: OPTOMETRIST

## 2023-04-03 PROCEDURE — 1126F AMNT PAIN NOTED NONE PRSNT: CPT | Mod: HCNC,CPTII,S$GLB, | Performed by: OPTOMETRIST

## 2023-04-03 NOTE — PROGRESS NOTES
HPI    Last eye exam was 11/0/21 with Dr. Alva.  Patient states lens fell out of lens and left them at the optical. Wanted   to get an updated glasses rx.  Patient denies diplopia, headaches, flashes/floaters, and pain.    Hemoglobin A1C       Date                     Value               Ref Range             Status                01/25/2023               10.7 (H)            4.0 - 5.6 %           Final                Last edited by Rajni Chapa MA on 4/3/2023 10:48 AM.            Assessment /Plan     For exam results, see Encounter Report.    Routine eye exam    Myopia with astigmatism and presbyopia, bilateral    Diabetes mellitus without complication  -     Ambulatory referral/consult to Optometry          1-2.  Bifocal rx given.  Retina flat and intact OU--no holes, tears, breaks, or RDs.   3.  No retinopathy--monitor yearly.  BS control.  Eye health normal OU.

## 2023-04-17 ENCOUNTER — TELEPHONE (OUTPATIENT)
Dept: HEMATOLOGY/ONCOLOGY | Facility: CLINIC | Age: 78
End: 2023-04-17
Payer: MEDICARE

## 2023-04-18 ENCOUNTER — LAB VISIT (OUTPATIENT)
Dept: LAB | Facility: HOSPITAL | Age: 78
End: 2023-04-18
Attending: INTERNAL MEDICINE
Payer: MEDICARE

## 2023-04-18 ENCOUNTER — OFFICE VISIT (OUTPATIENT)
Dept: HEMATOLOGY/ONCOLOGY | Facility: CLINIC | Age: 78
End: 2023-04-18
Payer: MEDICARE

## 2023-04-18 VITALS
RESPIRATION RATE: 18 BRPM | WEIGHT: 211 LBS | HEIGHT: 73 IN | TEMPERATURE: 98 F | BODY MASS INDEX: 27.96 KG/M2 | DIASTOLIC BLOOD PRESSURE: 76 MMHG | SYSTOLIC BLOOD PRESSURE: 132 MMHG | HEART RATE: 62 BPM | OXYGEN SATURATION: 98 %

## 2023-04-18 DIAGNOSIS — Z86.711 HISTORY OF PULMONARY EMBOLISM: ICD-10-CM

## 2023-04-18 DIAGNOSIS — C61 PROSTATE CANCER: Primary | ICD-10-CM

## 2023-04-18 DIAGNOSIS — C61 PROSTATE CANCER: ICD-10-CM

## 2023-04-18 DIAGNOSIS — E11.40 CONTROLLED TYPE 2 DIABETES MELLITUS WITH DIABETIC NEUROPATHY, WITHOUT LONG-TERM CURRENT USE OF INSULIN: ICD-10-CM

## 2023-04-18 DIAGNOSIS — I10 PRIMARY HYPERTENSION: ICD-10-CM

## 2023-04-18 DIAGNOSIS — Z85.038 HISTORY OF COLON CANCER: ICD-10-CM

## 2023-04-18 LAB
ALBUMIN SERPL BCP-MCNC: 3.7 G/DL (ref 3.5–5.2)
ALP SERPL-CCNC: 51 U/L (ref 55–135)
ALT SERPL W/O P-5'-P-CCNC: 14 U/L (ref 10–44)
ANION GAP SERPL CALC-SCNC: 10 MMOL/L (ref 8–16)
AST SERPL-CCNC: 12 U/L (ref 10–40)
BASOPHILS # BLD AUTO: 0.02 K/UL (ref 0–0.2)
BASOPHILS NFR BLD: 0.6 % (ref 0–1.9)
BILIRUB SERPL-MCNC: 0.5 MG/DL (ref 0.1–1)
BUN SERPL-MCNC: 15 MG/DL (ref 8–23)
CALCIUM SERPL-MCNC: 9.2 MG/DL (ref 8.7–10.5)
CHLORIDE SERPL-SCNC: 105 MMOL/L (ref 95–110)
CO2 SERPL-SCNC: 26 MMOL/L (ref 23–29)
COMPLEXED PSA SERPL-MCNC: <0.01 NG/ML (ref 0–4)
CREAT SERPL-MCNC: 1.2 MG/DL (ref 0.5–1.4)
DIFFERENTIAL METHOD: ABNORMAL
EOSINOPHIL # BLD AUTO: 0 K/UL (ref 0–0.5)
EOSINOPHIL NFR BLD: 1.3 % (ref 0–8)
ERYTHROCYTE [DISTWIDTH] IN BLOOD BY AUTOMATED COUNT: 14.2 % (ref 11.5–14.5)
EST. GFR  (NO RACE VARIABLE): >60 ML/MIN/1.73 M^2
GLUCOSE SERPL-MCNC: 230 MG/DL (ref 70–110)
HCT VFR BLD AUTO: 35.5 % (ref 40–54)
HGB BLD-MCNC: 11.6 G/DL (ref 14–18)
IMM GRANULOCYTES # BLD AUTO: 0.03 K/UL (ref 0–0.04)
IMM GRANULOCYTES NFR BLD AUTO: 1 % (ref 0–0.5)
LYMPHOCYTES # BLD AUTO: 0.7 K/UL (ref 1–4.8)
LYMPHOCYTES NFR BLD: 24 % (ref 18–48)
MCH RBC QN AUTO: 31.9 PG (ref 27–31)
MCHC RBC AUTO-ENTMCNC: 32.7 G/DL (ref 32–36)
MCV RBC AUTO: 98 FL (ref 82–98)
MONOCYTES # BLD AUTO: 0.3 K/UL (ref 0.3–1)
MONOCYTES NFR BLD: 9.4 % (ref 4–15)
NEUTROPHILS # BLD AUTO: 2 K/UL (ref 1.8–7.7)
NEUTROPHILS NFR BLD: 63.7 % (ref 38–73)
NRBC BLD-RTO: 0 /100 WBC
PLATELET # BLD AUTO: 161 K/UL (ref 150–450)
PMV BLD AUTO: 10.1 FL (ref 9.2–12.9)
POTASSIUM SERPL-SCNC: 4.3 MMOL/L (ref 3.5–5.1)
PROT SERPL-MCNC: 6.3 G/DL (ref 6–8.4)
RBC # BLD AUTO: 3.64 M/UL (ref 4.6–6.2)
SODIUM SERPL-SCNC: 141 MMOL/L (ref 136–145)
WBC # BLD AUTO: 3.08 K/UL (ref 3.9–12.7)

## 2023-04-18 PROCEDURE — 3078F PR MOST RECENT DIASTOLIC BLOOD PRESSURE < 80 MM HG: ICD-10-PCS | Mod: HCNC,CPTII,S$GLB, | Performed by: REGISTERED NURSE

## 2023-04-18 PROCEDURE — 3288F FALL RISK ASSESSMENT DOCD: CPT | Mod: HCNC,CPTII,S$GLB, | Performed by: REGISTERED NURSE

## 2023-04-18 PROCEDURE — 1101F PR PT FALLS ASSESS DOC 0-1 FALLS W/OUT INJ PAST YR: ICD-10-PCS | Mod: HCNC,CPTII,S$GLB, | Performed by: REGISTERED NURSE

## 2023-04-18 PROCEDURE — 36415 COLL VENOUS BLD VENIPUNCTURE: CPT | Mod: HCNC | Performed by: INTERNAL MEDICINE

## 2023-04-18 PROCEDURE — 85025 COMPLETE CBC W/AUTO DIFF WBC: CPT | Mod: HCNC | Performed by: INTERNAL MEDICINE

## 2023-04-18 PROCEDURE — 3078F DIAST BP <80 MM HG: CPT | Mod: HCNC,CPTII,S$GLB, | Performed by: REGISTERED NURSE

## 2023-04-18 PROCEDURE — 1160F RVW MEDS BY RX/DR IN RCRD: CPT | Mod: HCNC,CPTII,S$GLB, | Performed by: REGISTERED NURSE

## 2023-04-18 PROCEDURE — 3075F SYST BP GE 130 - 139MM HG: CPT | Mod: HCNC,CPTII,S$GLB, | Performed by: REGISTERED NURSE

## 2023-04-18 PROCEDURE — 1160F PR REVIEW ALL MEDS BY PRESCRIBER/CLIN PHARMACIST DOCUMENTED: ICD-10-PCS | Mod: HCNC,CPTII,S$GLB, | Performed by: REGISTERED NURSE

## 2023-04-18 PROCEDURE — 3075F PR MOST RECENT SYSTOLIC BLOOD PRESS GE 130-139MM HG: ICD-10-PCS | Mod: HCNC,CPTII,S$GLB, | Performed by: REGISTERED NURSE

## 2023-04-18 PROCEDURE — 80053 COMPREHEN METABOLIC PANEL: CPT | Mod: HCNC | Performed by: INTERNAL MEDICINE

## 2023-04-18 PROCEDURE — 1159F MED LIST DOCD IN RCRD: CPT | Mod: HCNC,CPTII,S$GLB, | Performed by: REGISTERED NURSE

## 2023-04-18 PROCEDURE — 99214 OFFICE O/P EST MOD 30 MIN: CPT | Mod: HCNC,S$GLB,, | Performed by: REGISTERED NURSE

## 2023-04-18 PROCEDURE — 84153 ASSAY OF PSA TOTAL: CPT | Mod: HCNC | Performed by: INTERNAL MEDICINE

## 2023-04-18 PROCEDURE — 99214 PR OFFICE/OUTPT VISIT, EST, LEVL IV, 30-39 MIN: ICD-10-PCS | Mod: HCNC,S$GLB,, | Performed by: REGISTERED NURSE

## 2023-04-18 PROCEDURE — 1159F PR MEDICATION LIST DOCUMENTED IN MEDICAL RECORD: ICD-10-PCS | Mod: HCNC,CPTII,S$GLB, | Performed by: REGISTERED NURSE

## 2023-04-18 PROCEDURE — 1125F PR PAIN SEVERITY QUANTIFIED, PAIN PRESENT: ICD-10-PCS | Mod: HCNC,CPTII,S$GLB, | Performed by: REGISTERED NURSE

## 2023-04-18 PROCEDURE — 1101F PT FALLS ASSESS-DOCD LE1/YR: CPT | Mod: HCNC,CPTII,S$GLB, | Performed by: REGISTERED NURSE

## 2023-04-18 PROCEDURE — 1125F AMNT PAIN NOTED PAIN PRSNT: CPT | Mod: HCNC,CPTII,S$GLB, | Performed by: REGISTERED NURSE

## 2023-04-18 PROCEDURE — 99999 PR PBB SHADOW E&M-EST. PATIENT-LVL V: ICD-10-PCS | Mod: PBBFAC,HCNC,, | Performed by: REGISTERED NURSE

## 2023-04-18 PROCEDURE — 99999 PR PBB SHADOW E&M-EST. PATIENT-LVL V: CPT | Mod: PBBFAC,HCNC,, | Performed by: REGISTERED NURSE

## 2023-04-18 PROCEDURE — 3288F PR FALLS RISK ASSESSMENT DOCUMENTED: ICD-10-PCS | Mod: HCNC,CPTII,S$GLB, | Performed by: REGISTERED NURSE
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
## 2023-04-21 DIAGNOSIS — Z85.46 HISTORY OF PROSTATE CANCER: ICD-10-CM

## 2023-04-21 RX ORDER — PREDNISONE 5 MG/1
5 TABLET ORAL 2 TIMES DAILY
Qty: 60 TABLET | Refills: 1 | Status: SHIPPED | OUTPATIENT
Start: 2023-04-21 | End: 2023-07-06 | Stop reason: SDUPTHER

## 2023-04-21 RX ORDER — ABIRATERONE ACETATE 250 MG/1
1000 TABLET ORAL DAILY
Qty: 120 TABLET | Refills: 1 | Status: CANCELLED | OUTPATIENT
Start: 2023-04-21 | End: 2024-04-20

## 2023-04-24 ENCOUNTER — SPECIALTY PHARMACY (OUTPATIENT)
Dept: PHARMACY | Facility: CLINIC | Age: 78
End: 2023-04-24
Payer: MEDICARE

## 2023-04-24 RX ORDER — ABIRATERONE ACETATE 250 MG/1
1000 TABLET ORAL DAILY
Qty: 120 TABLET | Refills: 1 | Status: ACTIVE | OUTPATIENT
Start: 2023-04-24 | End: 2023-06-19 | Stop reason: SDUPTHER

## 2023-04-24 NOTE — TELEPHONE ENCOUNTER
Specialty Pharmacy - Refill Coordination    Specialty Medication Orders Linked to Encounter      Flowsheet Row Most Recent Value   Medication #1 predniSONE (DELTASONE) 5 MG tablet (Order#[id number], Rx#[id number])   Medication #2 abiraterone (ZYTIGA) 250 mg Tab (Order#[id number], Rx#[id number])            Refill Questions - Documented Responses      Flowsheet Row Most Recent Value   Refill Screening Questions    Changes to allergies? No   Changes to medications? No   New conditions since last clinic visit? No   Unplanned office visit, urgent care, ED, or hospital admission in the last 4 weeks? No   How does patient/caregiver feel medication is working? Good   Financial problems or insurance changes? No   How many doses of your specialty medications were missed in the last 4 weeks? 0   Would patient like to speak to a pharmacist? No   When does the patient need to receive the medication? 04/28/23   Refill Delivery Questions    How will the patient receive the medication? MEDRx   When does the patient need to receive the medication? 04/28/23   Shipping Address Home   Address in [facility] confirmed and updated if neccessary? Yes   Expected Copay ($) 0   Is the patient able to afford the medication copay? Yes   Payment Method zero copay   Days supply of Refill 30   Supplies needed? No supplies needed   Refill activity completed? Yes   Refill activity plan Refill scheduled   Shipment/Pickup Date: 04/26/23            Current Outpatient Medications   Medication Sig    abiraterone (ZYTIGA) 250 mg Tab Take 4 tablets (1,000 mg total) by mouth once daily.    ACCU-CHEK SOFTCLIX LANCETS Misc TEST BLOOD SUGAR ONCE A DAY    amLODIPine (NORVASC) 10 MG tablet Take 1 tablet (10 mg total) by mouth once daily.    atorvastatin (LIPITOR) 20 MG tablet Take 1 tablet (20 mg total) by mouth once daily.    blood sugar diagnostic Strp 1 each by Misc.(Non-Drug; Combo Route) route once daily.    blood sugar diagnostic Strp Pt to check up  "to 6 times daily    blood sugar diagnostic Strp Use to check blood glucose 1-2 times daily as directed.    blood-glucose meter kit Use as instructed    carvediloL (COREG) 3.125 MG tablet Take 1 tablet (3.125 mg total) by mouth 2 (two) times daily.    diclofenac (VOLTAREN) 75 MG EC tablet Take 1 tablet (75 mg total) by mouth 2 (two) times daily.    doxazosin (CARDURA) 4 MG tablet TAKE 1 TABLET(4 MG) BY MOUTH EVERY EVENING    gabapentin (NEURONTIN) 300 MG capsule Take 1 capsule (300 mg total) by mouth 3 (three) times daily.    glipiZIDE (GLUCOTROL) 10 MG tablet Take 1 tablet (10 mg total) by mouth 2 (two) times daily before meals.    hydroCHLOROthiazide (HYDRODIURIL) 25 MG tablet TAKE ONE-HALF TABLET BY MOUTH ONCE DAILY    insulin aspart U-100 (NOVOLOG FLEXPEN U-100 INSULIN) 100 unit/mL (3 mL) InPn pen Inject 8 Units into the skin 3 (three) times daily with meals.    insulin glargine U-300 conc (TOUJEO MAX U-300 SOLOSTAR) 300 unit/mL (3 mL) insulin pen Inject 28 Units into the skin once daily.    irbesartan (AVAPRO) 300 MG tablet Take 1 tablet (300 mg total) by mouth every evening.    lancing device with lancets Kit 1 each by Misc.(Non-Drug; Combo Route) route 2 (two) times daily.    metFORMIN (GLUCOPHAGE) 1000 MG tablet Take 1 tablet (1,000 mg total) by mouth 2 (two) times daily with meals.    pen needle, diabetic (BD ULTRA-FINE KELECHI PEN NEEDLE) 32 gauge x 5/32" Ndle To use with insulin pens at meals and evening shot.    polyethylene glycol (GOLYTELY) 236-22.74-6.74 -5.86 gram suspension Take 4,000 mLs by mouth once.    predniSONE (DELTASONE) 5 MG tablet Take 1 tablet (5 mg total) by mouth 2 (two) times daily.    predniSONE (DELTASONE) 5 MG tablet Take 1 tablet (5 mg total) by mouth 2 (two) times daily.    semaglutide (RYBELSUS) 14 mg tablet Take 1 tablet (14 mg total) by mouth once daily.    triamcinolone acetonide 0.1% (KENALOG) 0.1 % cream Apply topically 2 (two) times daily.   Last reviewed on 4/18/2023  2:27 PM " by Irene Thompson, CNS    Review of patient's allergies indicates:   Allergen Reactions    Hay fever and allergy relief     Last reviewed on  4/18/2023 2:27 PM by Irene Thompson      Tasks added this encounter   No tasks added.   Tasks due within next 3 months   6/6/2023 - Clinical Assessment (6 month recurrence)  4/21/2023 - Refill Coordination Outreach (1 time occurrence)     Tammy Alfred, PharmD  [facility]  [address] [place] [id number]  Phone: [phone]  Fax: [phone]

## 2023-04-26 ENCOUNTER — TELEPHONE (OUTPATIENT)
Dept: INTERNAL MEDICINE | Facility: CLINIC | Age: 78
End: 2023-04-26
Payer: MEDICARE

## 2023-04-26 ENCOUNTER — PES CALL (OUTPATIENT)
Dept: ADMINISTRATIVE | Facility: CLINIC | Age: 78
End: 2023-04-26
Payer: MEDICARE

## 2023-04-26 DIAGNOSIS — Z85.46 HISTORY OF PROSTATE CANCER: ICD-10-CM

## 2023-04-26 DIAGNOSIS — E13.9 DIABETES 1.5, MANAGED AS TYPE 2: ICD-10-CM

## 2023-04-26 DIAGNOSIS — G62.9 NEUROPATHY: ICD-10-CM

## 2023-04-26 DIAGNOSIS — Q24.9 HEART ABNORMALITY: ICD-10-CM

## 2023-04-26 NOTE — TELEPHONE ENCOUNTER
----- Message from Gokul Hadley sent at 4/26/2023 12:58 PM CDT -----  Can the clinic reply in [facility]: NO              Please refill the medication(s) listed below. Please call the patient when the prescription(s) is ready for  at this phone number   [phone]           Medication #1 gabapentin (NEURONTIN) 300 MG capsule     Medication #2 predniSONE (DELTASONE) 5 MG tablet     MEDICATION # 3 carvediloL (COREG) 3.125 MG tablet    MEDICATION #4 metFORMIN (GLUCOPHAGE) 1000 MG tablet           Preferred Pharmacy: [id number] - [place] [address] AT [address]    PT stated that he would like an call back from the office.Please contact to further discuss and advise.

## 2023-04-26 NOTE — TELEPHONE ENCOUNTER
Care Due:                  Date            Visit Type   Department     Provider  --------------------------------------------------------------------------------                                EP -                              PRIMARY      [facility] INTERNAL  Last Visit: 01-      CARE ([facility])   MEDICINE       Dipak Treviño                              EP -                              PRIMARY      [facility] INTERNAL  Next Visit: 05-      CARE ([facility])   MEDICINE       Dipak Treviño                                                            Last  Test          Frequency    Reason                     Performed    Due Date  --------------------------------------------------------------------------------    HBA1C.......  6 months...  insulin, metFORMIN,        01- 07-                             semaglutide..............    [employer] Embedded Care Gaps. Reference number: [id number]. 4/26/2023   1:51:57 PM CDT

## 2023-04-26 NOTE — TELEPHONE ENCOUNTER
Left voice message asking for his current BP and to call .with this BP. LOV Ms. Bear 02/02/2013, Ms Bear 01/17/2023

## 2023-04-27 ENCOUNTER — PES CALL (OUTPATIENT)
Dept: ADMINISTRATIVE | Facility: CLINIC | Age: 78
End: 2023-04-27
Payer: MEDICARE

## 2023-04-27 RX ORDER — METFORMIN HYDROCHLORIDE 1000 MG/1
1000 TABLET ORAL 2 TIMES DAILY WITH MEALS
Qty: 180 TABLET | Refills: 1 | Status: SHIPPED | OUTPATIENT
Start: 2023-04-27 | End: 2023-11-10 | Stop reason: SDUPTHER

## 2023-04-27 RX ORDER — GABAPENTIN 300 MG/1
300 CAPSULE ORAL 3 TIMES DAILY
Qty: 270 CAPSULE | Refills: 1 | Status: SHIPPED | OUTPATIENT
Start: 2023-04-27 | End: 2023-11-17

## 2023-04-27 RX ORDER — CARVEDILOL 3.12 MG/1
3.12 TABLET ORAL 2 TIMES DAILY
Qty: 180 TABLET | Refills: 1 | Status: SHIPPED | OUTPATIENT
Start: 2023-04-27 | End: 2023-07-06 | Stop reason: SDUPTHER

## 2023-04-27 RX ORDER — PREDNISONE 5 MG/1
5 TABLET ORAL 2 TIMES DAILY
Qty: 60 TABLET | Refills: 1 | OUTPATIENT
Start: 2023-04-27

## 2023-04-27 NOTE — TELEPHONE ENCOUNTER
Lvm stating refills are sent and stating message below:      Approved Prescriptions     gabapentin (NEURONTIN) 300 MG capsule         Sig: Take 1 capsule (300 mg total) by mouth 3 (three) times daily.    Disp:  270 capsule    Refills:  1    Start: 4/27/2023    Class: Normal    Authorized by: Dipak Treviño MD    For: Neuropathy         carvediloL (COREG) 3.125 MG tablet         Sig: Take 1 tablet (3.125 mg total) by mouth 2 (two) times daily.    Disp:  180 tablet    Refills:  1    Start: 4/27/2023    Class: Normal    Authorized by: Dipak Treviño MD    For: Heart abnormality    To pharmacy: .         metFORMIN (GLUCOPHAGE) 1000 MG tablet         Sig: Take 1 tablet (1,000 mg total) by mouth 2 (two) times daily with meals.    Disp:  180 tablet    Refills:  1    Start: 4/27/2023    Class: Normal    Authorized by: Dipak Treviño MD    For: Diabetes 1.5, managed as type 2        To be filled at: [facility] #[id number] [place] [address] [address] AT [facility] [place]           Refused Prescriptions     predniSONE (DELTASONE) 5 MG tablet         Sig: Take 1 tablet (5 mg total) by mouth 2 (two) times daily.    Disp:  60 tablet    Refills:  1    Start: 4/27/2023    Class: Normal    Refused by: Dipak Treviño MD    Refusal reason: Other (See comments)        Fill requested from: [facility] #[id number] [address] [place]        I am not Rx of prednisone for this pt and will need to send request to that provider.    Rerouting for 2nd attempt

## 2023-05-06 DIAGNOSIS — C61 PROSTATE CANCER: Primary | ICD-10-CM

## 2023-05-08 ENCOUNTER — OFFICE VISIT (OUTPATIENT)
Dept: INTERNAL MEDICINE | Facility: CLINIC | Age: 78
End: 2023-05-08
Attending: INTERNAL MEDICINE
Payer: MEDICARE

## 2023-05-08 ENCOUNTER — TELEPHONE (OUTPATIENT)
Dept: INTERNAL MEDICINE | Facility: CLINIC | Age: 78
End: 2023-05-08
Payer: MEDICARE

## 2023-05-08 ENCOUNTER — LAB VISIT (OUTPATIENT)
Dept: LAB | Facility: OTHER | Age: 78
End: 2023-05-08
Attending: INTERNAL MEDICINE
Payer: MEDICARE

## 2023-05-08 VITALS
WEIGHT: 214.31 LBS | HEART RATE: 89 BPM | OXYGEN SATURATION: 97 % | BODY MASS INDEX: 28.4 KG/M2 | DIASTOLIC BLOOD PRESSURE: 60 MMHG | SYSTOLIC BLOOD PRESSURE: 120 MMHG | HEIGHT: 73 IN

## 2023-05-08 DIAGNOSIS — N18.30 CKD STAGE 3 SECONDARY TO DIABETES: ICD-10-CM

## 2023-05-08 DIAGNOSIS — Z79.4 TYPE 2 DIABETES MELLITUS WITH HYPERGLYCEMIA, WITH LONG-TERM CURRENT USE OF INSULIN: ICD-10-CM

## 2023-05-08 DIAGNOSIS — E11.40 CONTROLLED TYPE 2 DIABETES MELLITUS WITH DIABETIC NEUROPATHY, WITHOUT LONG-TERM CURRENT USE OF INSULIN: Primary | ICD-10-CM

## 2023-05-08 DIAGNOSIS — E11.22 CKD STAGE 3 SECONDARY TO DIABETES: ICD-10-CM

## 2023-05-08 DIAGNOSIS — Z85.038 HISTORY OF COLON CANCER: ICD-10-CM

## 2023-05-08 DIAGNOSIS — E11.40 CONTROLLED TYPE 2 DIABETES MELLITUS WITH DIABETIC NEUROPATHY, WITHOUT LONG-TERM CURRENT USE OF INSULIN: ICD-10-CM

## 2023-05-08 DIAGNOSIS — E11.65 TYPE 2 DIABETES MELLITUS WITH HYPERGLYCEMIA, WITH LONG-TERM CURRENT USE OF INSULIN: ICD-10-CM

## 2023-05-08 LAB
CHOLEST SERPL-MCNC: 103 MG/DL (ref 120–199)
CHOLEST/HDLC SERPL: 2 {RATIO} (ref 2–5)
ESTIMATED AVG GLUCOSE: 286 MG/DL (ref 68–131)
HBA1C MFR BLD: 11.6 % (ref 4–5.6)
HDLC SERPL-MCNC: 51 MG/DL (ref 40–75)
HDLC SERPL: 49.5 % (ref 20–50)
LDLC SERPL CALC-MCNC: 22.4 MG/DL (ref 63–159)
NONHDLC SERPL-MCNC: 52 MG/DL
TRIGL SERPL-MCNC: 148 MG/DL (ref 30–150)

## 2023-05-08 PROCEDURE — 99999 PR PBB SHADOW E&M-EST. PATIENT-LVL V: CPT | Mod: PBBFAC,HCNC,, | Performed by: INTERNAL MEDICINE

## 2023-05-08 PROCEDURE — 83036 HEMOGLOBIN GLYCOSYLATED A1C: CPT | Mod: HCNC | Performed by: INTERNAL MEDICINE

## 2023-05-08 PROCEDURE — 1160F RVW MEDS BY RX/DR IN RCRD: CPT | Mod: HCNC,CPTII,S$GLB, | Performed by: INTERNAL MEDICINE

## 2023-05-08 PROCEDURE — 3288F PR FALLS RISK ASSESSMENT DOCUMENTED: ICD-10-PCS | Mod: HCNC,CPTII,S$GLB, | Performed by: INTERNAL MEDICINE

## 2023-05-08 PROCEDURE — 1125F PR PAIN SEVERITY QUANTIFIED, PAIN PRESENT: ICD-10-PCS | Mod: HCNC,CPTII,S$GLB, | Performed by: INTERNAL MEDICINE

## 2023-05-08 PROCEDURE — 3074F PR MOST RECENT SYSTOLIC BLOOD PRESSURE < 130 MM HG: ICD-10-PCS | Mod: HCNC,CPTII,S$GLB, | Performed by: INTERNAL MEDICINE

## 2023-05-08 PROCEDURE — 99214 PR OFFICE/OUTPT VISIT, EST, LEVL IV, 30-39 MIN: ICD-10-PCS | Mod: HCNC,S$GLB,, | Performed by: INTERNAL MEDICINE

## 2023-05-08 PROCEDURE — 1101F PT FALLS ASSESS-DOCD LE1/YR: CPT | Mod: HCNC,CPTII,S$GLB, | Performed by: INTERNAL MEDICINE

## 2023-05-08 PROCEDURE — 1159F MED LIST DOCD IN RCRD: CPT | Mod: HCNC,CPTII,S$GLB, | Performed by: INTERNAL MEDICINE

## 2023-05-08 PROCEDURE — 80061 LIPID PANEL: CPT | Mod: HCNC | Performed by: INTERNAL MEDICINE

## 2023-05-08 PROCEDURE — 3078F PR MOST RECENT DIASTOLIC BLOOD PRESSURE < 80 MM HG: ICD-10-PCS | Mod: HCNC,CPTII,S$GLB, | Performed by: INTERNAL MEDICINE

## 2023-05-08 PROCEDURE — 3074F SYST BP LT 130 MM HG: CPT | Mod: HCNC,CPTII,S$GLB, | Performed by: INTERNAL MEDICINE

## 2023-05-08 PROCEDURE — 36415 COLL VENOUS BLD VENIPUNCTURE: CPT | Mod: HCNC | Performed by: INTERNAL MEDICINE

## 2023-05-08 PROCEDURE — 1125F AMNT PAIN NOTED PAIN PRSNT: CPT | Mod: HCNC,CPTII,S$GLB, | Performed by: INTERNAL MEDICINE

## 2023-05-08 PROCEDURE — 1101F PR PT FALLS ASSESS DOC 0-1 FALLS W/OUT INJ PAST YR: ICD-10-PCS | Mod: HCNC,CPTII,S$GLB, | Performed by: INTERNAL MEDICINE

## 2023-05-08 PROCEDURE — 3078F DIAST BP <80 MM HG: CPT | Mod: HCNC,CPTII,S$GLB, | Performed by: INTERNAL MEDICINE

## 2023-05-08 PROCEDURE — 99214 OFFICE O/P EST MOD 30 MIN: CPT | Mod: HCNC,S$GLB,, | Performed by: INTERNAL MEDICINE

## 2023-05-08 PROCEDURE — 3288F FALL RISK ASSESSMENT DOCD: CPT | Mod: HCNC,CPTII,S$GLB, | Performed by: INTERNAL MEDICINE

## 2023-05-08 PROCEDURE — 1159F PR MEDICATION LIST DOCUMENTED IN MEDICAL RECORD: ICD-10-PCS | Mod: HCNC,CPTII,S$GLB, | Performed by: INTERNAL MEDICINE

## 2023-05-08 PROCEDURE — 99999 PR PBB SHADOW E&M-EST. PATIENT-LVL V: ICD-10-PCS | Mod: PBBFAC,HCNC,, | Performed by: INTERNAL MEDICINE

## 2023-05-08 PROCEDURE — 1160F PR REVIEW ALL MEDS BY PRESCRIBER/CLIN PHARMACIST DOCUMENTED: ICD-10-PCS | Mod: HCNC,CPTII,S$GLB, | Performed by: INTERNAL MEDICINE

## 2023-05-08 RX ORDER — INSULIN ASPART 100 [IU]/ML
8 INJECTION, SOLUTION INTRAVENOUS; SUBCUTANEOUS
Qty: 21.6 ML | Refills: 0 | Status: SHIPPED | OUTPATIENT
Start: 2023-05-08 | End: 2023-05-16 | Stop reason: SDUPTHER

## 2023-05-08 RX ORDER — INSULIN PUMP SYRINGE, 3 ML
EACH MISCELLANEOUS
Qty: 1 EACH | Refills: 0 | Status: SHIPPED | OUTPATIENT
Start: 2023-05-08 | End: 2027-07-12

## 2023-05-08 NOTE — TELEPHONE ENCOUNTER
VERONA stating message below:    Dr. Mcclendon's schedule today allows him to offer you to come in early if you would like. He is in no way asking you to come in early, especially if your schedule does not allow. We just wanted to let you know you are welcome to come in anytime between now and your scheduled time if this works out better for you. We will see you when you get here. If you are unable to come to today's appointment please notify us as soon as possible.

## 2023-05-08 NOTE — PROGRESS NOTES
Subjective:       Patient ID: Misha Eldridge Jr. is a 78 y.o. male.    Chief Complaint: Hypertension    Here for f/u    FBG ranges from 105 to high 200s. Does not check during the day. No a/s of lows. Remains on prednisone. Completed XRT    ### DM ###  + neuropathy of hands and feet on gabapentin 300mg TID. States feet are slightly worsening but overall stable and controlled.   -11/2022 worsening A1c, prednisone induced as part of prostate CA Tx regimen.   -Working with Jossie Vega in DM education.      -Prandial 10 with SSI and Tuojeo 28. Rybelsus 14mg, metformin 1g BID and glipizide 5 BID.        ### Prostate CA ###  Dx approx 2010 at [facility] s/p partial resection. No XRT. Not sure is his chemo was for colon or prostate. He denies urinary frequency, urinary urgency, decreased force of stream, incomplete emptying of bladder, post void dribble, nocturia, or gross hematuria. Has not been back to see his urologist.    04/2022 recurrence noted with PSA 7.1.  prostate adenocarcinoma, followed by Dr. Betancourt and s/p RadTx with Dr. Michelle   -Completed XRT 2/1/23  -Current Tx. Zytiga with prednisone     ### Colon CA ####   Dx approx 2011/2012. S/p colectomy and reversal. Overdue for colonoscopy. Had FIT done b/c he was unable to get this done, which was negative as of 12/2017.  He is now amenable to getting the appropriate colonscopy  -colonoscopy needed in 6/2023     ### Chronic low back pain ###  -daily. Rare radiation down legs. Has never had any treatment. He does not take anything on regular basis for symptoms. Pain is non debilitating.   Right groin pain, described as burning and tingling, only occurs when he touches area. Back continues to bother him. This has been occurring for several years.                   Review of Systems   Constitutional:  Negative for appetite change, chills, fever and unexpected weight change.   HENT:  Negative for hearing loss, sore throat and trouble swallowing.    Eyes:  Negative  "for visual disturbance.   Respiratory:  Negative for cough, chest tightness and shortness of breath.    Cardiovascular:  Negative for chest pain and leg swelling.   Gastrointestinal:  Negative for abdominal pain, blood in stool, constipation, diarrhea, nausea and vomiting.   Endocrine: Negative for polydipsia and polyuria.   Genitourinary:  Negative for decreased urine volume, difficulty urinating, dysuria, frequency and urgency.   Musculoskeletal:  Positive for arthralgias and back pain. Negative for gait problem.   Skin:  Negative for rash.   Neurological:  Negative for dizziness and numbness.   Psychiatric/Behavioral:  The patient is not nervous/anxious.      Objective:      Vitals:    05/08/23 1148   BP: 120/60   Pulse: 89   SpO2: 97%   Weight: 97.2 kg (214 lb 4.6 oz)   Height: 6' 1" (1.854 m)      Physical Exam  Vitals and nursing note reviewed.   Constitutional:       General: He is not in acute distress.     Appearance: Normal appearance. He is well-developed.   HENT:      Head: Normocephalic and atraumatic.      Mouth/Throat:      Pharynx: No oropharyngeal exudate.   Eyes:      General: No scleral icterus.     Conjunctiva/sclera: Conjunctivae normal.      Pupils: Pupils are equal, round, and reactive to light.   Neck:      Thyroid: No thyromegaly.   Cardiovascular:      Rate and Rhythm: Normal rate and regular rhythm.      Heart sounds: Normal heart sounds. No murmur heard.  Pulmonary:      Effort: Pulmonary effort is normal.      Breath sounds: Normal breath sounds. No wheezing or rales.   Abdominal:      General: There is no distension.   Musculoskeletal:         General: No tenderness.   Lymphadenopathy:      Cervical: No cervical adenopathy.   Skin:     General: Skin is warm and dry.   Neurological:      Mental Status: He is alert and oriented to person, place, and time.   Psychiatric:         Behavior: Behavior normal.       Assessment:       1. Controlled type 2 diabetes mellitus with diabetic " neuropathy, without long-term current use of insulin    2. Type 2 diabetes mellitus with hyperglycemia, with long-term current use of insulin    3. CKD stage 3 secondary to diabetes    4. History of colon cancer        Plan:       Misha was seen today for hypertension.    Diagnoses and all orders for this visit:    Controlled type 2 diabetes mellitus with diabetic neuropathy, without long-term current use of insulin  -     Hemoglobin A1C; Future  -     Lipid Panel; Future  -     blood sugar diagnostic Strp; Use to check blood glucose 1-2 times daily as directed.  -     Hemoglobin A1C; Future    Type 2 diabetes mellitus with hyperglycemia, with long-term current use of insulin  -     insulin aspart U-100 (NOVOLOG FLEXPEN U-100 INSULIN) 100 unit/mL (3 mL) InPn pen; Inject 8 Units into the skin 3 (three) times daily with meals.    CKD stage 3 secondary to diabetes    History of colon cancer  -     Ambulatory referral/consult to Endo Procedure ; Future    Other orders  -     blood-glucose meter kit; Use as instructed         RTC in 3 months or sooner lety Mcclendon MD  Internal Medicine-[facility]        Side effects of medication(s) were discussed in detail and patient voiced understanding.  Patient will call back for any issues or complications.

## 2023-05-08 NOTE — TELEPHONE ENCOUNTER
2nd attempt  LVM stating message below:    Dr. Mcclendon's schedule today allows him to offer you to come in early if you would like. He is in no way asking you to come in early, especially if your schedule does not allow. We just wanted to let you know you are welcome to come in anytime between now and your scheduled time if this works out better for you. We will see you when you get here. If you are unable to come to today's appointment please notify us as soon as possible.

## 2023-05-11 ENCOUNTER — OFFICE VISIT (OUTPATIENT)
Dept: RADIATION ONCOLOGY | Facility: CLINIC | Age: 78
End: 2023-05-11
Payer: MEDICARE

## 2023-05-11 VITALS
HEIGHT: 73 IN | SYSTOLIC BLOOD PRESSURE: 145 MMHG | BODY MASS INDEX: 28.4 KG/M2 | DIASTOLIC BLOOD PRESSURE: 65 MMHG | WEIGHT: 214.31 LBS | TEMPERATURE: 98 F | HEART RATE: 69 BPM | RESPIRATION RATE: 18 BRPM | OXYGEN SATURATION: 96 %

## 2023-05-11 DIAGNOSIS — C61 PROSTATE CANCER: Primary | ICD-10-CM

## 2023-05-11 PROCEDURE — 1101F PT FALLS ASSESS-DOCD LE1/YR: CPT | Mod: HCNC,CPTII,S$GLB, | Performed by: STUDENT IN AN ORGANIZED HEALTH CARE EDUCATION/TRAINING PROGRAM

## 2023-05-11 PROCEDURE — 3077F SYST BP >= 140 MM HG: CPT | Mod: HCNC,CPTII,S$GLB, | Performed by: STUDENT IN AN ORGANIZED HEALTH CARE EDUCATION/TRAINING PROGRAM

## 2023-05-11 PROCEDURE — 99214 OFFICE O/P EST MOD 30 MIN: CPT | Mod: HCNC,S$GLB,, | Performed by: STUDENT IN AN ORGANIZED HEALTH CARE EDUCATION/TRAINING PROGRAM

## 2023-05-11 PROCEDURE — 99999 PR PBB SHADOW E&M-EST. PATIENT-LVL V: CPT | Mod: PBBFAC,HCNC,, | Performed by: STUDENT IN AN ORGANIZED HEALTH CARE EDUCATION/TRAINING PROGRAM

## 2023-05-11 PROCEDURE — 99214 PR OFFICE/OUTPT VISIT, EST, LEVL IV, 30-39 MIN: ICD-10-PCS | Mod: HCNC,S$GLB,, | Performed by: STUDENT IN AN ORGANIZED HEALTH CARE EDUCATION/TRAINING PROGRAM

## 2023-05-11 PROCEDURE — 1159F MED LIST DOCD IN RCRD: CPT | Mod: HCNC,CPTII,S$GLB, | Performed by: STUDENT IN AN ORGANIZED HEALTH CARE EDUCATION/TRAINING PROGRAM

## 2023-05-11 PROCEDURE — 99999 PR PBB SHADOW E&M-EST. PATIENT-LVL V: ICD-10-PCS | Mod: PBBFAC,HCNC,, | Performed by: STUDENT IN AN ORGANIZED HEALTH CARE EDUCATION/TRAINING PROGRAM

## 2023-05-11 PROCEDURE — 3078F DIAST BP <80 MM HG: CPT | Mod: HCNC,CPTII,S$GLB, | Performed by: STUDENT IN AN ORGANIZED HEALTH CARE EDUCATION/TRAINING PROGRAM

## 2023-05-11 PROCEDURE — 1125F PR PAIN SEVERITY QUANTIFIED, PAIN PRESENT: ICD-10-PCS | Mod: HCNC,CPTII,S$GLB, | Performed by: STUDENT IN AN ORGANIZED HEALTH CARE EDUCATION/TRAINING PROGRAM

## 2023-05-11 PROCEDURE — 3288F PR FALLS RISK ASSESSMENT DOCUMENTED: ICD-10-PCS | Mod: HCNC,CPTII,S$GLB, | Performed by: STUDENT IN AN ORGANIZED HEALTH CARE EDUCATION/TRAINING PROGRAM

## 2023-05-11 PROCEDURE — 1125F AMNT PAIN NOTED PAIN PRSNT: CPT | Mod: HCNC,CPTII,S$GLB, | Performed by: STUDENT IN AN ORGANIZED HEALTH CARE EDUCATION/TRAINING PROGRAM

## 2023-05-11 PROCEDURE — 1101F PR PT FALLS ASSESS DOC 0-1 FALLS W/OUT INJ PAST YR: ICD-10-PCS | Mod: HCNC,CPTII,S$GLB, | Performed by: STUDENT IN AN ORGANIZED HEALTH CARE EDUCATION/TRAINING PROGRAM

## 2023-05-11 PROCEDURE — 3077F PR MOST RECENT SYSTOLIC BLOOD PRESSURE >= 140 MM HG: ICD-10-PCS | Mod: HCNC,CPTII,S$GLB, | Performed by: STUDENT IN AN ORGANIZED HEALTH CARE EDUCATION/TRAINING PROGRAM

## 2023-05-11 PROCEDURE — 3078F PR MOST RECENT DIASTOLIC BLOOD PRESSURE < 80 MM HG: ICD-10-PCS | Mod: HCNC,CPTII,S$GLB, | Performed by: STUDENT IN AN ORGANIZED HEALTH CARE EDUCATION/TRAINING PROGRAM

## 2023-05-11 PROCEDURE — 1159F PR MEDICATION LIST DOCUMENTED IN MEDICAL RECORD: ICD-10-PCS | Mod: HCNC,CPTII,S$GLB, | Performed by: STUDENT IN AN ORGANIZED HEALTH CARE EDUCATION/TRAINING PROGRAM

## 2023-05-11 PROCEDURE — 3288F FALL RISK ASSESSMENT DOCD: CPT | Mod: HCNC,CPTII,S$GLB, | Performed by: STUDENT IN AN ORGANIZED HEALTH CARE EDUCATION/TRAINING PROGRAM

## 2023-05-11 NOTE — PROGRESS NOTES
Radiation Oncology Follow-up Note        Date of Service: 05/11/2023     Chief Complaint: prostate cancer, s/p RP, salvage EBRT+ADT     Reason for visit: post EBRT toxicity and PSA check     Referring Physician: Dr Ravi (urology)     Implantable devices: denies     Therapy to Date:  Course: C1 Pelvis 2022    Treatment Site Ref. ID Energy Dose/Fx (Gy) #Fx Dose Correction (Gy) Total Dose (Gy) Start Date End Date Elapsed Days   IM Prostate Prostate 6X 1.8 27 / 27 0 48.6 11/29/2022 1/6/2023 38   IM Prst_Bst1 Prostate 6X 1.8 11 / 11 0 19.8 1/9/2023 1/24/2023 15   IM Prst_Bst2 Prostate 6X 1.8 6 / 6 0 10.8 1/25/2023 2/1/2023 7        Diagnosis/Assessment:   Misha Eldridge Jr. is a 78 y.o. man with prostate adenocarcinoma s/p RA-RP with b/l nerve sparing (04/27/2010, at [facility]) with biochemical recurrence, latest PSA 7.1, with gross PET avid recurrence along the anterior rectal wall on PET Axumin, no regional or distant mets. Recurrent stage IIIB (rcT4, cN0, cM0, PSA: 7.2, Grade Group: 3)  He is s/p MRI prostate bed to help delineate the high dose radiation target and s/p biopsy 9/9/2022 with GG3  PMH of Stage IIIC2 (wS4bA6l, G2-3) colon adenocarcinoma, s/p LAR/small bowel resection/colostomy 5/2/2012, adjuvant mFOLFOX6 x 10c (7/11/2012-11/14/2012, at [facility])  He is s/p ADT (Dr Betancourt, 8/16/2022- ) and salvage radiation in 2 sequential boosts: 48.6Gy/27fx to pelvic nodes, followed by 19.8Gy/11fx (68.4Gy/38fx) to prostate bed and 10.8Gy/6fx (79.2Gy/44fx) to the gross disease completed 2/1/2023     ECOG 1  On ADT with Dr Betancourt, PSA continues to be undetectable  No apparent RT related side effects       Plan       - continue lupron and PSA surveillance with Dr Betancourt  - Epic- 26 survey  filled  - return to radiation oncology PRN        Interval history:       11/21/222 PSA <0.01    2/1/2023 Tolerated radiation therapy well with no expected toxicities, significant treatment delays or breaks.  Recommended again taking miralax  while eating dinner  PSA per Dr Betancourt, return to [facility] in 3 months    2/14/2023 PSA <0.01    2/14/2023 Lupron 45mg (Dr Betancourt)    4/18/2023 PSA < 0.01     Subjective:   In clinic the patient is alone and is feeling tired overall. He reports intermittent diffuse myalgia with lower back pain, and tries to do stretches and upper body pushups daily.  Otherwise he denies any urinary issues.  He also denies bowel issues other than constipation. Not taking stool softeners. Denies tenesmus, melena or hematochezia.  He reports significant ED    He denies other major complaints       AUA score 0 (0,0,0,0,0,0,0) delighted      Current Outpatient Medications on File Prior to Visit   Medication Sig Dispense Refill    abiraterone (ZYTIGA) 250 mg Tab Take 4 tablets (1,000 mg total) by mouth once daily. 120 tablet 1    ACCU-CHEK SOFTCLIX LANCETS Misc TEST BLOOD SUGAR ONCE A  each 0    amLODIPine (NORVASC) 10 MG tablet Take 1 tablet (10 mg total) by mouth once daily. 90 tablet 1    atorvastatin (LIPITOR) 20 MG tablet Take 1 tablet (20 mg total) by mouth once daily. 90 tablet 1    blood sugar diagnostic Strp 1 each by Misc.(Non-Drug; Combo Route) route once daily. 100 each 3    blood sugar diagnostic Strp Pt to check up to 6 times daily 200 strip 11    blood sugar diagnostic Strp Use to check blood glucose 1-2 times daily as directed. 200 each 11    blood-glucose meter kit Use as instructed 1 each 0    carvediloL (COREG) 3.125 MG tablet Take 1 tablet (3.125 mg total) by mouth 2 (two) times daily. 180 tablet 1    diclofenac (VOLTAREN) 75 MG EC tablet Take 1 tablet (75 mg total) by mouth 2 (two) times daily. 90 tablet 0    doxazosin (CARDURA) 4 MG tablet TAKE 1 TABLET(4 MG) BY MOUTH EVERY EVENING 90 tablet 1    gabapentin (NEURONTIN) 300 MG capsule Take 1 capsule (300 mg total) by mouth 3 (three) times daily. 270 capsule 1    glipiZIDE (GLUCOTROL) 10 MG tablet Take 1 tablet (10 mg total) by mouth 2 (two) times daily before  "meals. 180 tablet 0    hydroCHLOROthiazide (HYDRODIURIL) 25 MG tablet TAKE ONE-HALF TABLET BY MOUTH ONCE DAILY 90 tablet 1    insulin aspart U-100 (NOVOLOG FLEXPEN U-100 INSULIN) 100 unit/mL (3 mL) InPn pen Inject 8 Units into the skin 3 (three) times daily with meals. 21.6 mL 0    insulin glargine U-300 conc (TOUJEO MAX U-300 SOLOSTAR) 300 unit/mL (3 mL) insulin pen Inject 28 Units into the skin once daily. 15 pen 11    irbesartan (AVAPRO) 300 MG tablet Take 1 tablet (300 mg total) by mouth every evening. 90 tablet 3    lancing device with lancets Kit 1 each by Misc.(Non-Drug; Combo Route) route 2 (two) times daily. 200 each 11    metFORMIN (GLUCOPHAGE) 1000 MG tablet Take 1 tablet (1,000 mg total) by mouth 2 (two) times daily with meals. 180 tablet 1    pen needle, diabetic (BD ULTRA-FINE KELECHI PEN NEEDLE) 32 gauge x 5/32" Ndle To use with insulin pens at meals and evening shot. 200 each 11    polyethylene glycol (GOLYTELY) 236-22.74-6.74 -5.86 gram suspension Take 4,000 mLs by mouth once.      predniSONE (DELTASONE) 5 MG tablet Take 1 tablet (5 mg total) by mouth 2 (two) times daily. 60 tablet 1    predniSONE (DELTASONE) 5 MG tablet Take 1 tablet (5 mg total) by mouth 2 (two) times daily. 60 tablet 1    semaglutide (RYBELSUS) 14 mg tablet Take 1 tablet (14 mg total) by mouth once daily. 90 tablet 3    triamcinolone acetonide 0.1% (KENALOG) 0.1 % cream Apply topically 2 (two) times daily. 45 g 0     Current Facility-Administered Medications on File Prior to Visit   Medication Dose Route Frequency Provider Last Rate Last Admin    LIDOcaine HCL 20 mg/ml (2%) injection 10 mL  10 mL Other 1 time in Clinic/HOD Duarte Ravi MD             Review of patient's allergies indicates:   Allergen Reactions    Hay fever and allergy relief              Review of Systems   Negative unless as stated above     HPI:   Misha Eldridge JrTyrese is a 77 y.o. man with prostate adenocarcinoma s/p RA-RP with b/l nerve sparing and PSA " recurrence     Oncologic history  04/27/2010 Prostate adenocarcinoma s/p RA-RP with b/l nerve sparing         Colon adenocarcinoma Stage IIIC2 (G2-G3, +1/17LN, cV0cS5afy 2012), s/p LAR/small bowel resection/colosctomy 5/2/2012, adjuvant mFOLFOX6 x 10c (7/11/2012-11/14/2012) (Dr Juvencio Enciso)     12/14/2016 PSA 0.38     12/5/2017 PSA 0.72     4/12/2022 PSA 7.1     5/19/2022 initial visit with urology (Dr Ravi)       5/25/2022 initial radonc visit: need PET axumin, refer to Dr Betancourt, meet on 6/21 with radonc in [facility] clinic              AUA score 1 (0,0,0,0,0,0,1) delighted              BIRD score 2 (2,0,0,0,0) severe ED     6/9/2022 PET axumin: I contacted Dr Serrano => no kirill or distant recurrence, but avidity anterior to rectum           6/20/2022 received rest of prostate cancer surgery [place]     6/21/2022 started zytiga     8/16/2022 PSA 0.33     8/16/2222 Lupron Dr Betancourt     9/9/2022 Prostate nodule fossa biopsy (Dr Ravi):               1cm hypoechoic lesion just to the right of midline on the anterior rectal wall posterior to the trigone.  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, he is from [place]  Denies tobacco use or a,cohol use  He drove himself to the clinic     Family history  No cancers      Objective:   Physical Exam  Vitals reviewed.   Constitutional:       Appearance: Normal appearance.   HENT:      Head: Normocephalic and atraumatic.   Pulmonary:      Effort: Pulmonary effort is normal.   Abdominal:      General: There is no distension.   Genitourinary:     Comments: JANAY deferred, s/p RP, s/p salvage EBRT +ADT  Musculoskeletal:         General: Normal range of motion.   Neurological:      General: No focal deficit present.      Mental Status: He is alert and oriented  Psychiatric:          Mood and Affect: Mood normal.         Behavior: Behavior normal.                Laboratory: I have personally reviewed the patient's available laboratory values and summarized pertinent findings above in HPI.            I spent approximately 30 minutes reviewing the available records and evaluating the patient, out of which over 50% of the time was spent face to face with the patient in counseling and coordinating this patient's care.     Thank you for the opportunity to care for this patient. Please do not hesitate to contact me with any questions.     Kenneth Azevedo MD/PhD

## 2023-05-16 DIAGNOSIS — E11.65 TYPE 2 DIABETES MELLITUS WITH HYPERGLYCEMIA, WITH LONG-TERM CURRENT USE OF INSULIN: ICD-10-CM

## 2023-05-16 DIAGNOSIS — Z79.4 TYPE 2 DIABETES MELLITUS WITH HYPERGLYCEMIA, WITH LONG-TERM CURRENT USE OF INSULIN: ICD-10-CM

## 2023-05-16 NOTE — TELEPHONE ENCOUNTER
Pended medication requires prior authorization. Patient notified prior auth will need to be completed by [facility] and confirmed preferred pharmacy location. Routing refill request to PCP to sign refill order.

## 2023-05-16 NOTE — TELEPHONE ENCOUNTER
No care due was identified.  [facility] Embedded Care Due Messages. Reference number: [id number].   5/16/2023 6:16:09 PM CDT

## 2023-05-17 RX ORDER — INSULIN ASPART 100 [IU]/ML
8 INJECTION, SOLUTION INTRAVENOUS; SUBCUTANEOUS
Qty: 15 ML | Refills: 0 | Status: SHIPPED | OUTPATIENT
Start: 2023-05-17 | End: 2023-12-27

## 2023-05-17 NOTE — TELEPHONE ENCOUNTER
Called Mr. Eldridge. The listed message from Dr. Treviño was read to him. Reinforced need to keep a log of Blood sugar in AM and 2 hrs after eating; told to bring log with him to his 4 week clinic visit June 14, 2023 at 3 PM. Gave him examples of simple sugars (cookies, candy, ice cream., cake, soda, ) and complex sugars (bread, crackers, pasta).

## 2023-05-17 NOTE — TELEPHONE ENCOUNTER
----- Message from Dipak Mcclendon MD sent at 5/16/2023  5:23 PM CDT -----  Your diabetes is very very very very un-controlled, defined by a hemoglobin A1c of 11.6%  Please record BG every morning when you wake up and 2 hours after eating and write all this down please and RTC in 4 weeks. Please make sure you are looking at all nutritional labels and please continue to reduce your consumption of simple sugars and processed complex carbohydrates. Weight loss is a good recommendation for most of my patients. We need to repeat your A1c in 2-3 months.    Respectfully,  Dipak Mcclendon

## 2023-05-22 ENCOUNTER — SPECIALTY PHARMACY (OUTPATIENT)
Dept: PHARMACY | Facility: CLINIC | Age: 78
End: 2023-05-22
Payer: MEDICARE

## 2023-05-22 NOTE — TELEPHONE ENCOUNTER
Outgoing call to pt about refill of Zytiga and prednisone. Pt stated he was not home so was not sure of on hand count. Stated he will call [facility] back once he checks count. Will follow up.

## 2023-05-25 ENCOUNTER — SPECIALTY PHARMACY (OUTPATIENT)
Dept: PHARMACY | Facility: CLINIC | Age: 78
End: 2023-05-25
Payer: MEDICARE

## 2023-05-25 NOTE — TELEPHONE ENCOUNTER
Specialty Pharmacy - Refill Coordination    Specialty Medication Orders Linked to Encounter      Flowsheet Row Most Recent Value   Medication #1 abiraterone (ZYTIGA) 250 mg Tab (Order#[id number], Rx#[id number])            Refill Questions - Documented Responses      Flowsheet Row Most Recent Value   Refill Screening Questions    Changes to allergies? No   Changes to medications? No   New conditions since last clinic visit? No   Unplanned office visit, urgent care, ED, or hospital admission in the last 4 weeks? No   How does patient/caregiver feel medication is working? Good   Financial problems or insurance changes? No   How many doses of your specialty medications were missed in the last 4 weeks? 0   Would patient like to speak to a pharmacist? No   When does the patient need to receive the medication? 05/30/23   Refill Delivery Questions    How will the patient receive the medication? MEDRx   When does the patient need to receive the medication? 05/30/23   Shipping Address Home   Address in [facility] confirmed and updated if neccessary? Yes   Expected Copay ($) 0   Is the patient able to afford the medication copay? Yes   Payment Method zero copay   Days supply of Refill 30   Supplies needed? No supplies needed   Refill activity completed? Yes   Refill activity plan Refill scheduled   Shipment/Pickup Date: 05/26/23            Current Outpatient Medications   Medication Sig    abiraterone (ZYTIGA) 250 mg Tab Take 4 tablets (1,000 mg total) by mouth once daily.    ACCU-CHEK SOFTCLIX LANCETS Misc TEST BLOOD SUGAR ONCE A DAY    amLODIPine (NORVASC) 10 MG tablet Take 1 tablet (10 mg total) by mouth once daily.    atorvastatin (LIPITOR) 20 MG tablet Take 1 tablet (20 mg total) by mouth once daily.    blood sugar diagnostic Strp 1 each by Misc.(Non-Drug; Combo Route) route once daily.    blood sugar diagnostic Strp Pt to check up to 6 times daily    blood sugar diagnostic Strp Use to check blood glucose 1-2 times  "daily as directed.    blood-glucose meter kit Use as instructed    carvediloL (COREG) 3.125 MG tablet Take 1 tablet (3.125 mg total) by mouth 2 (two) times daily.    diclofenac (VOLTAREN) 75 MG EC tablet Take 1 tablet (75 mg total) by mouth 2 (two) times daily.    doxazosin (CARDURA) 4 MG tablet TAKE 1 TABLET(4 MG) BY MOUTH EVERY EVENING    gabapentin (NEURONTIN) 300 MG capsule Take 1 capsule (300 mg total) by mouth 3 (three) times daily.    glipiZIDE (GLUCOTROL) 10 MG tablet Take 1 tablet (10 mg total) by mouth 2 (two) times daily before meals.    hydroCHLOROthiazide (HYDRODIURIL) 25 MG tablet TAKE ONE-HALF TABLET BY MOUTH ONCE DAILY    insulin aspart U-100 (NOVOLOG FLEXPEN U-100 INSULIN) 100 unit/mL (3 mL) InPn pen Inject 8 Units into the skin 3 (three) times daily with meals.    insulin glargine U-300 conc (TOUJEO MAX U-300 SOLOSTAR) 300 unit/mL (3 mL) insulin pen Inject 28 Units into the skin once daily.    irbesartan (AVAPRO) 300 MG tablet Take 1 tablet (300 mg total) by mouth every evening.    lancing device with lancets Kit 1 each by Misc.(Non-Drug; Combo Route) route 2 (two) times daily.    metFORMIN (GLUCOPHAGE) 1000 MG tablet Take 1 tablet (1,000 mg total) by mouth 2 (two) times daily with meals.    pen needle, diabetic (BD ULTRA-FINE KELECHI PEN NEEDLE) 32 gauge x 5/32" Ndle To use with insulin pens at meals and evening shot.    polyethylene glycol (GOLYTELY) 236-22.74-6.74 -5.86 gram suspension Take 4,000 mLs by mouth once.    predniSONE (DELTASONE) 5 MG tablet Take 1 tablet (5 mg total) by mouth 2 (two) times daily.    predniSONE (DELTASONE) 5 MG tablet Take 1 tablet (5 mg total) by mouth 2 (two) times daily.    semaglutide (RYBELSUS) 14 mg tablet Take 1 tablet (14 mg total) by mouth once daily.    triamcinolone acetonide 0.1% (KENALOG) 0.1 % cream Apply topically 2 (two) times daily.   Last reviewed on 5/11/2023  1:16 PM by Kaylee Barry MA    Review of patient's allergies indicates:   Allergen " Reactions    Hay fever and allergy relief     Last reviewed on  5/16/2023 6:12 PM by Marivel Enciso      Tasks added this encounter   No tasks added.   Tasks due within next 3 months   6/6/2023 - Clinical Assessment (6 month recurrence)  5/28/2023 - Refill Coordination Outreach (1 time occurrence)     Jeannine Whiting, PharmD  [facility]  [address] [place] [id number]  Phone: [phone]  Fax: [phone]

## 2023-05-25 NOTE — TELEPHONE ENCOUNTER
Outgoing call to pt about refill of Zytiga + Prednisone. Pt stated he was not home again and would check and call back. Will continue to follow up.

## 2023-05-31 ENCOUNTER — TELEPHONE (OUTPATIENT)
Dept: ADMINISTRATIVE | Facility: CLINIC | Age: 78
End: 2023-05-31
Payer: MEDICARE

## 2023-06-02 ENCOUNTER — OFFICE VISIT (OUTPATIENT)
Dept: INTERNAL MEDICINE | Facility: CLINIC | Age: 78
End: 2023-06-02
Payer: MEDICARE

## 2023-06-02 VITALS
BODY MASS INDEX: 29.65 KG/M2 | HEART RATE: 73 BPM | HEIGHT: 72 IN | WEIGHT: 218.94 LBS | DIASTOLIC BLOOD PRESSURE: 62 MMHG | SYSTOLIC BLOOD PRESSURE: 120 MMHG | OXYGEN SATURATION: 98 %

## 2023-06-02 DIAGNOSIS — H91.90 HEARING LOSS, UNSPECIFIED HEARING LOSS TYPE, UNSPECIFIED LATERALITY: ICD-10-CM

## 2023-06-02 DIAGNOSIS — M54.2 CERVICAL PAIN (NECK): Chronic | ICD-10-CM

## 2023-06-02 DIAGNOSIS — E11.40 CONTROLLED TYPE 2 DIABETES MELLITUS WITH DIABETIC NEUROPATHY, WITHOUT LONG-TERM CURRENT USE OF INSULIN: ICD-10-CM

## 2023-06-02 DIAGNOSIS — C61 PROSTATE CANCER: ICD-10-CM

## 2023-06-02 DIAGNOSIS — Z85.46 HISTORY OF PROSTATE CANCER: ICD-10-CM

## 2023-06-02 DIAGNOSIS — M54.50 CHRONIC BILATERAL LOW BACK PAIN WITHOUT SCIATICA: Chronic | ICD-10-CM

## 2023-06-02 DIAGNOSIS — N18.30 CKD STAGE 3 SECONDARY TO DIABETES: ICD-10-CM

## 2023-06-02 DIAGNOSIS — Z00.00 ENCOUNTER FOR PREVENTIVE HEALTH EXAMINATION: Primary | ICD-10-CM

## 2023-06-02 DIAGNOSIS — I10 HYPERTENSION, UNSPECIFIED TYPE: ICD-10-CM

## 2023-06-02 DIAGNOSIS — M46.97 INFLAMMATORY SPONDYLOPATHY OF LUMBOSACRAL REGION: ICD-10-CM

## 2023-06-02 DIAGNOSIS — H93.13 TINNITUS OF BOTH EARS: Chronic | ICD-10-CM

## 2023-06-02 DIAGNOSIS — S31.109D OPEN WOUND OF ANTERIOR ABDOMINAL WALL, SUBSEQUENT ENCOUNTER: ICD-10-CM

## 2023-06-02 DIAGNOSIS — Z86.711 HISTORY OF PULMONARY EMBOLISM: ICD-10-CM

## 2023-06-02 DIAGNOSIS — G89.29 CHRONIC BILATERAL LOW BACK PAIN WITHOUT SCIATICA: Chronic | ICD-10-CM

## 2023-06-02 DIAGNOSIS — L92.9 HYPERGRANULATION: ICD-10-CM

## 2023-06-02 DIAGNOSIS — I70.0 AORTIC ATHEROSCLEROSIS: ICD-10-CM

## 2023-06-02 DIAGNOSIS — D69.6 THROMBOCYTOPENIA, UNSPECIFIED: ICD-10-CM

## 2023-06-02 DIAGNOSIS — Z85.038 HISTORY OF COLON CANCER: ICD-10-CM

## 2023-06-02 DIAGNOSIS — M62.89 WEAKNESS OF PELVIC FLOOR IN MALE: ICD-10-CM

## 2023-06-02 DIAGNOSIS — E11.22 CKD STAGE 3 SECONDARY TO DIABETES: ICD-10-CM

## 2023-06-02 PROCEDURE — G9920 SCRNING PERF AND NEGATIVE: HCPCS | Mod: CPTII,S$GLB,, | Performed by: NURSE PRACTITIONER

## 2023-06-02 PROCEDURE — 1159F PR MEDICATION LIST DOCUMENTED IN MEDICAL RECORD: ICD-10-PCS | Mod: CPTII,S$GLB,, | Performed by: NURSE PRACTITIONER

## 2023-06-02 PROCEDURE — 1159F MED LIST DOCD IN RCRD: CPT | Mod: CPTII,S$GLB,, | Performed by: NURSE PRACTITIONER

## 2023-06-02 PROCEDURE — 99999 PR PBB SHADOW E&M-EST. PATIENT-LVL V: ICD-10-PCS | Mod: PBBFAC,,, | Performed by: NURSE PRACTITIONER

## 2023-06-02 PROCEDURE — G9920 PR SCREENING AND NEGATIVE: ICD-10-PCS | Mod: CPTII,S$GLB,, | Performed by: NURSE PRACTITIONER

## 2023-06-02 PROCEDURE — 1160F RVW MEDS BY RX/DR IN RCRD: CPT | Mod: CPTII,S$GLB,, | Performed by: NURSE PRACTITIONER

## 2023-06-02 PROCEDURE — 1101F PT FALLS ASSESS-DOCD LE1/YR: CPT | Mod: CPTII,S$GLB,, | Performed by: NURSE PRACTITIONER

## 2023-06-02 PROCEDURE — G0439 PPPS, SUBSEQ VISIT: HCPCS | Mod: S$GLB,,, | Performed by: NURSE PRACTITIONER

## 2023-06-02 PROCEDURE — 3074F SYST BP LT 130 MM HG: CPT | Mod: CPTII,S$GLB,, | Performed by: NURSE PRACTITIONER

## 2023-06-02 PROCEDURE — 1160F PR REVIEW ALL MEDS BY PRESCRIBER/CLIN PHARMACIST DOCUMENTED: ICD-10-PCS | Mod: CPTII,S$GLB,, | Performed by: NURSE PRACTITIONER

## 2023-06-02 PROCEDURE — 99999 PR PBB SHADOW E&M-EST. PATIENT-LVL V: CPT | Mod: PBBFAC,,, | Performed by: NURSE PRACTITIONER

## 2023-06-02 PROCEDURE — 3078F DIAST BP <80 MM HG: CPT | Mod: CPTII,S$GLB,, | Performed by: NURSE PRACTITIONER

## 2023-06-02 PROCEDURE — 3288F PR FALLS RISK ASSESSMENT DOCUMENTED: ICD-10-PCS | Mod: CPTII,S$GLB,, | Performed by: NURSE PRACTITIONER

## 2023-06-02 PROCEDURE — 3288F FALL RISK ASSESSMENT DOCD: CPT | Mod: CPTII,S$GLB,, | Performed by: NURSE PRACTITIONER

## 2023-06-02 PROCEDURE — 1126F PR PAIN SEVERITY QUANTIFIED, NO PAIN PRESENT: ICD-10-PCS | Mod: CPTII,S$GLB,, | Performed by: NURSE PRACTITIONER

## 2023-06-02 PROCEDURE — 1170F PR FUNCTIONAL STATUS ASSESSED: ICD-10-PCS | Mod: CPTII,S$GLB,, | Performed by: NURSE PRACTITIONER

## 2023-06-02 PROCEDURE — 3074F PR MOST RECENT SYSTOLIC BLOOD PRESSURE < 130 MM HG: ICD-10-PCS | Mod: CPTII,S$GLB,, | Performed by: NURSE PRACTITIONER

## 2023-06-02 PROCEDURE — 1170F FXNL STATUS ASSESSED: CPT | Mod: CPTII,S$GLB,, | Performed by: NURSE PRACTITIONER

## 2023-06-02 PROCEDURE — G0439 PR MEDICARE ANNUAL WELLNESS SUBSEQUENT VISIT: ICD-10-PCS | Mod: S$GLB,,, | Performed by: NURSE PRACTITIONER

## 2023-06-02 PROCEDURE — 1101F PR PT FALLS ASSESS DOC 0-1 FALLS W/OUT INJ PAST YR: ICD-10-PCS | Mod: CPTII,S$GLB,, | Performed by: NURSE PRACTITIONER

## 2023-06-02 PROCEDURE — 3078F PR MOST RECENT DIASTOLIC BLOOD PRESSURE < 80 MM HG: ICD-10-PCS | Mod: CPTII,S$GLB,, | Performed by: NURSE PRACTITIONER

## 2023-06-02 PROCEDURE — 1126F AMNT PAIN NOTED NONE PRSNT: CPT | Mod: CPTII,S$GLB,, | Performed by: NURSE PRACTITIONER

## 2023-06-02 NOTE — PATIENT INSTRUCTIONS
Counseling and Referral of Other Preventative  (Italic type indicates deductible and co-insurance are waived)    Patient Name: Misha Eldridge  Today's Date: 6/2/2023    Health Maintenance       Date Due Completion Date    Shingles Vaccine (2 of 2) 11/25/2019 9/30/2019    COVID-19 Vaccine (6 - Moderna series) 05/26/2023 1/26/2023    Diabetes Urine Screening 04/12/2023 4/12/2022    Hemoglobin A1c 08/08/2023 5/8/2023    Eye Exam 04/03/2024 4/3/2023    Override on 10/11/2016: Done    Override on 8/6/2014: Done    Lipid Panel 05/08/2024 5/8/2023    TETANUS VACCINE 12/05/2027 12/5/2017    Colonoscopy 06/02/2032 6/2/2022    Override on 7/15/2014: Done        No orders of the defined types were placed in this encounter.      The following information is provided to all patients.  This information is to help you find resources for any of the problems found today that may be affecting your health:                Living healthy guide: www.[facility].[place].gov      Understanding Diabetes: www.diabetes.org      Eating healthy: www.cdc.gov/healthyweight      CDC home safety checklist: www.cdc.gov/steadi/patient.html      Agency on Aging: www.goea.[place].[facility]      Alcoholics anonymous (AA): www.aa.org      Physical Activity: www.nan.nih.gov/wj4vcdf      Tobacco use: www.quitwithusla.org

## 2023-06-02 NOTE — PROGRESS NOTES
Misha Eldridge presented for a  Medicare AWV and comprehensive Health Risk Assessment today. The following components were reviewed and updated:    Medical history  Family History  Social history  Allergies and Current Medications  Health Risk Assessment  Health Maintenance  Care Team         ** See Completed Assessments for Annual Wellness Visit within the encounter summary.**         The following assessments were completed:  Living Situation  CAGE  Depression Screening  Timed Get Up and Go  Whisper Test  Cognitive Function Screening  Nutrition Screening  ADL Screening  PAQ Screening          Vitals:    06/02/23 1402   BP: 120/62   BP Location: Left arm   Patient Position: Sitting   Pulse: 73   SpO2: 98%   Weight: 99.3 kg (218 lb 14.7 oz)   Height: 6' (1.829 m)     Body mass index is 29.69 kg/m².    Physical Exam  Vitals reviewed.   Constitutional:       Appearance: He is well-developed. He is obese.   HENT:      Head: Normocephalic and atraumatic. Not macrocephalic and not microcephalic. No raccoon eyes, Hussein's sign, abrasion, contusion, right periorbital erythema, left periorbital erythema or laceration. Hair is normal.      Right Ear: No decreased hearing noted. No laceration, drainage, swelling or tenderness. No middle ear effusion. No foreign body. No mastoid tenderness. No hemotympanum. Tympanic membrane is not injected, scarred, perforated, erythematous, retracted or bulging. Tympanic membrane has normal mobility.      Left Ear: No decreased hearing noted. No laceration, drainage, swelling or tenderness.  No middle ear effusion. No foreign body. No mastoid tenderness. No hemotympanum. Tympanic membrane is not injected, scarred, perforated, erythematous, retracted or bulging. Tympanic membrane has normal mobility.      Nose: Nose normal. No nasal deformity, laceration or mucosal edema.      Mouth/Throat:      Pharynx: Uvula midline.   Eyes:      General: Lids are normal. No scleral icterus.      Conjunctiva/sclera: Conjunctivae normal.   Neck:      Thyroid: No thyroid mass or thyromegaly.      Trachea: Trachea normal.   Cardiovascular:      Rate and Rhythm: Normal rate and regular rhythm.   Pulmonary:      Effort: Pulmonary effort is normal. No respiratory distress.      Breath sounds: Normal breath sounds.   Abdominal:      Palpations: Abdomen is soft.   Musculoskeletal:         General: Normal range of motion.      Cervical back: Neck supple. No edema or erythema. No spinous process tenderness or muscular tenderness. Normal range of motion.   Lymphadenopathy:      Head:      Right side of head: No submental, submandibular, tonsillar, preauricular or posterior auricular adenopathy.      Left side of head: No submental, submandibular, tonsillar, preauricular, posterior auricular or occipital adenopathy.   Skin:     General: Skin is warm and dry.   Neurological:      Mental Status: He is alert and oriented to person, place, and time.      Cranial Nerves: No cranial nerve deficit.      Sensory: No sensory deficit.   Psychiatric:         Behavior: Behavior normal.         Thought Content: Thought content normal.         Judgment: Judgment normal.           Diagnoses and health risks identified today and associated recommendations/orders:    1. Encounter for preventive health examination  Annual Health Risk Assessment (HRA) visit today.  Counseling and referral of health maintenance and preventative health measures performed.  Patient given annual wellness paperwork to take home.  Encouraged to return in 1 year for subsequent HRA visit.     2. Aortic atherosclerosis  Chronic. Stable. Continue current treatment plan as previously prescribed by PCP.    3. CKD stage 3 secondary to diabetes  Chronic. Stable. Continue current treatment plan as previously prescribed by PCP.    4. Thrombocytopenia, unspecified  Chronic. Stable. Continue current treatment plan as previously prescribed by PCP.    5. Hypertension,  unspecified type  Chronic. Stable. Controlled. Encouraged to increase exercise as tolerated (moderate-intensity aerobic activity and muscle-strengthening activities) improve diet to heart healthy, low sodium diet.  Continue current treatment plan as previously prescribed by PCP.    6. Hypergranulation  Chronic. Stable. Continue current treatment plan as previously prescribed by PCP.    7. Tinnitus of both ears  Chronic. Stable. Continue current treatment plan as previously prescribed by PCP.    8. History of pulmonary embolism  Chronic. Stable. Continue current treatment plan as previously prescribed by PCP.    9. History of colon cancer  Chronic. Stable. Continue current treatment plan as previously prescribed by PCP.    10. History of prostate cancer  Chronic. Stable. Continue current treatment plan as previously prescribed by PCP.    11. Prostate cancer  Chronic. Stable. Continue current treatment plan as previously prescribed by PCP.    12. Controlled type 2 diabetes mellitus with diabetic neuropathy, without long-term current use of insulin  Chronic. Stable. Uncontrolled. Last Hgb A1c=11.6 from 5/8/23. Continue current treatment plan as previously prescribed by PCP.    13. 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 Stable. Continue current treatment plan as previously prescribed by PCP.    14. Chronic bilateral low back pain without sciatica  Chronic. Stable. Continue current treatment plan as previously prescribed by PCP.    15. Cervical pain (neck)  Chronic. Stable. Continue current treatment plan as previously prescribed by PCP.    16. Inflammatory spondylopathy of lumbosacral region  Chronic. Stable. Continue current treatment plan as previously prescribed by PCP.    17. Open wound of anterior abdominal wall, subsequent encounter  Chronic. Stable. Continue current treatment plan as previously prescribed by PCP.      Provided [place] with a 5-10 year written screening schedule and personal prevention plan.  Recommendations were developed using the USPSTF age appropriate recommendations. Education, counseling, and referrals were provided as needed. After Visit Summary printed and given to patient which includes a list of additional screenings\tests needed.      I offered to discuss end of life issues, including information on how to make advance directives that the patient could use to name someone who would make medical decisions on their behalf if they became too ill to make themselves.    ___Patient declined  _X_Patient is interested, I provided paper work and offered to discuss.    Follow up in about 1 year (around 6/2/2024).    NADIA Bishop offered to discuss advanced care planning, including how to pick a person who would make decisions for you if you were unable to make them for yourself, called a health care power of , and what kind of decisions you might make such as use of life sustaining treatments such as ventilators and tube feeding when faced with a life limiting illness recorded on a living will that they will need to know. (How you want to be cared for as you near the end of your natural life)     X Patient is interested in learning more about how to make advanced directives.  I provided them paperwork and offered to discuss this with them.

## 2023-06-08 ENCOUNTER — SPECIALTY PHARMACY (OUTPATIENT)
Dept: PHARMACY | Facility: CLINIC | Age: 78
End: 2023-06-08
Payer: MEDICARE

## 2023-06-08 ENCOUNTER — HOSPITAL ENCOUNTER (OUTPATIENT)
Dept: RADIOLOGY | Facility: CLINIC | Age: 78
Discharge: HOME | End: 2023-06-08
Attending: INTERNAL MEDICINE
Payer: MEDICARE

## 2023-06-08 DIAGNOSIS — C61 PROSTATE CANCER: ICD-10-CM

## 2023-06-08 DIAGNOSIS — M80.08XA AGE-RELATED OSTEOPOROSIS WITH CURRENT PATHOLOGICAL FRACTURE, VERTEBRA(E), INITIAL ENCOUNTER FOR FRACTURE: ICD-10-CM

## 2023-06-08 PROCEDURE — 77080 DEXA BONE DENSITY SPINE HIP: ICD-10-PCS | Mod: 26,,, | Performed by: INTERNAL MEDICINE

## 2023-06-08 PROCEDURE — 77080 DXA BONE DENSITY AXIAL: CPT | Mod: 26,,, | Performed by: INTERNAL MEDICINE

## 2023-06-08 PROCEDURE — 77080 DXA BONE DENSITY AXIAL: CPT | Mod: TC

## 2023-06-08 NOTE — TELEPHONE ENCOUNTER
Specialty Pharmacy - Clinical Reassessment    Specialty Medication Orders Linked to Encounter      Flowsheet Row Most Recent Value   Medication #1 predniSONE (DELTASONE) 5 MG tablet (Order#[id number], Rx#[id number])   Medication #2 abiraterone (ZYTIGA) 250 mg Tab (Order#[id number], Rx#[id number])          Patient Diagnosis   Z85.46 - History of prostate cancer    Misha Eldridge Jr. is a 78 y.o. male, who is followed by the specialty pharmacy service for management and education of his Zytiga.  He has been on therapy with Zytiga for 12 months.  I have reviewed his electronic medical record and current medication list and determined that specialty medication adjustment Is not needed at this time.    Patient has not experienced adverse events.  He Is adherent reporting 0 missed doses since last review.  Adherence has been encouraged with the following mechanism(s): proactive refill calls.  He is meeting goals of therapy and will continue treatment. PSA is remaining at <0.01 ng/mL. He is responding well to treatment.         5/25/2023 4/24/2023 3/27/2023 2/23/2023 1/26/2023 1/17/2023 12/27/2022   Follow Up Review   # of missed doses 0 0 0 0 0 0 0   New Medications? No No No No No No No   New Conditions? No No No No No No No   New Allergies? No No No No No No No   Med Effective? Good Good Good Good Good Very good Good   Urgent Care? No No No No No No No   Requested Pharmacist? No No No No No No No         Therapy is appropriate to continue.    Therapy is effective: Yes  On scale of 1 to 10, how does patient rank quality of life? (10 - Best): Unable to Assess  Recommendations: none at this time.  Review Method: Chart Review    Tasks added this encounter   No tasks added.   Tasks due within next 3 months   6/6/2023 - Clinical Assessment (6 month recurrence)  6/18/2023 - Refill Coordination Outreach (1 time occurrence)     Tammy Alfred, PharmD  [facility]  [address] [facility]  [address]  Phone: [phone]  Fax: [phone]

## 2023-06-15 ENCOUNTER — OFFICE VISIT (OUTPATIENT)
Dept: HEMATOLOGY/ONCOLOGY | Facility: CLINIC | Age: 78
End: 2023-06-15
Payer: MEDICARE

## 2023-06-15 ENCOUNTER — LAB VISIT (OUTPATIENT)
Dept: LAB | Facility: HOSPITAL | Age: 78
End: 2023-06-15
Attending: INTERNAL MEDICINE
Payer: MEDICARE

## 2023-06-15 VITALS
HEART RATE: 73 BPM | TEMPERATURE: 98 F | RESPIRATION RATE: 18 BRPM | WEIGHT: 216.06 LBS | HEIGHT: 73 IN | BODY MASS INDEX: 28.63 KG/M2 | DIASTOLIC BLOOD PRESSURE: 78 MMHG | OXYGEN SATURATION: 97 % | SYSTOLIC BLOOD PRESSURE: 132 MMHG

## 2023-06-15 DIAGNOSIS — N18.30 CKD STAGE 3 SECONDARY TO DIABETES: ICD-10-CM

## 2023-06-15 DIAGNOSIS — C61 PROSTATE CANCER: ICD-10-CM

## 2023-06-15 DIAGNOSIS — E11.22 CKD STAGE 3 SECONDARY TO DIABETES: ICD-10-CM

## 2023-06-15 DIAGNOSIS — I10 HYPERTENSION, UNSPECIFIED TYPE: ICD-10-CM

## 2023-06-15 DIAGNOSIS — Z85.038 HISTORY OF COLON CANCER: Primary | ICD-10-CM

## 2023-06-15 DIAGNOSIS — D69.6 THROMBOCYTOPENIA, UNSPECIFIED: ICD-10-CM

## 2023-06-15 DIAGNOSIS — E11.40 CONTROLLED TYPE 2 DIABETES MELLITUS WITH DIABETIC NEUROPATHY, WITHOUT LONG-TERM CURRENT USE OF INSULIN: ICD-10-CM

## 2023-06-15 LAB
ALBUMIN SERPL BCP-MCNC: 3.5 G/DL (ref 3.5–5.2)
ALP SERPL-CCNC: 60 U/L (ref 55–135)
ALT SERPL W/O P-5'-P-CCNC: 15 U/L (ref 10–44)
ANION GAP SERPL CALC-SCNC: 9 MMOL/L (ref 8–16)
AST SERPL-CCNC: 15 U/L (ref 10–40)
BASOPHILS # BLD AUTO: 0.02 K/UL (ref 0–0.2)
BASOPHILS NFR BLD: 0.5 % (ref 0–1.9)
BILIRUB SERPL-MCNC: 0.7 MG/DL (ref 0.1–1)
BUN SERPL-MCNC: 12 MG/DL (ref 8–23)
CALCIUM SERPL-MCNC: 9.4 MG/DL (ref 8.7–10.5)
CHLORIDE SERPL-SCNC: 105 MMOL/L (ref 95–110)
CO2 SERPL-SCNC: 28 MMOL/L (ref 23–29)
COMPLEXED PSA SERPL-MCNC: <0.01 NG/ML (ref 0–4)
CREAT SERPL-MCNC: 1.2 MG/DL (ref 0.5–1.4)
DIFFERENTIAL METHOD: ABNORMAL
EOSINOPHIL # BLD AUTO: 0.1 K/UL (ref 0–0.5)
EOSINOPHIL NFR BLD: 2.2 % (ref 0–8)
ERYTHROCYTE [DISTWIDTH] IN BLOOD BY AUTOMATED COUNT: 14.4 % (ref 11.5–14.5)
EST. GFR  (NO RACE VARIABLE): >60 ML/MIN/1.73 M^2
GLUCOSE SERPL-MCNC: 316 MG/DL (ref 70–110)
HCT VFR BLD AUTO: 37.1 % (ref 40–54)
HGB BLD-MCNC: 12.3 G/DL (ref 14–18)
IMM GRANULOCYTES # BLD AUTO: 0.05 K/UL (ref 0–0.04)
IMM GRANULOCYTES NFR BLD AUTO: 1.4 % (ref 0–0.5)
LYMPHOCYTES # BLD AUTO: 0.6 K/UL (ref 1–4.8)
LYMPHOCYTES NFR BLD: 16 % (ref 18–48)
MCH RBC QN AUTO: 31.5 PG (ref 27–31)
MCHC RBC AUTO-ENTMCNC: 33.2 G/DL (ref 32–36)
MCV RBC AUTO: 95 FL (ref 82–98)
MONOCYTES # BLD AUTO: 0.3 K/UL (ref 0.3–1)
MONOCYTES NFR BLD: 8.9 % (ref 4–15)
NEUTROPHILS # BLD AUTO: 2.6 K/UL (ref 1.8–7.7)
NEUTROPHILS NFR BLD: 71 % (ref 38–73)
NRBC BLD-RTO: 0 /100 WBC
PLATELET # BLD AUTO: 159 K/UL (ref 150–450)
PMV BLD AUTO: 10.4 FL (ref 9.2–12.9)
POTASSIUM SERPL-SCNC: 4.6 MMOL/L (ref 3.5–5.1)
PROT SERPL-MCNC: 6 G/DL (ref 6–8.4)
RBC # BLD AUTO: 3.91 M/UL (ref 4.6–6.2)
SODIUM SERPL-SCNC: 142 MMOL/L (ref 136–145)
WBC # BLD AUTO: 3.69 K/UL (ref 3.9–12.7)

## 2023-06-15 PROCEDURE — 3288F PR FALLS RISK ASSESSMENT DOCUMENTED: ICD-10-PCS | Mod: CPTII,S$GLB,, | Performed by: INTERNAL MEDICINE

## 2023-06-15 PROCEDURE — 99999 PR PBB SHADOW E&M-EST. PATIENT-LVL V: ICD-10-PCS | Mod: PBBFAC,,, | Performed by: INTERNAL MEDICINE

## 2023-06-15 PROCEDURE — 1160F RVW MEDS BY RX/DR IN RCRD: CPT | Mod: CPTII,S$GLB,, | Performed by: INTERNAL MEDICINE

## 2023-06-15 PROCEDURE — 36415 COLL VENOUS BLD VENIPUNCTURE: CPT | Performed by: INTERNAL MEDICINE

## 2023-06-15 PROCEDURE — 1160F PR REVIEW ALL MEDS BY PRESCRIBER/CLIN PHARMACIST DOCUMENTED: ICD-10-PCS | Mod: CPTII,S$GLB,, | Performed by: INTERNAL MEDICINE

## 2023-06-15 PROCEDURE — 3078F PR MOST RECENT DIASTOLIC BLOOD PRESSURE < 80 MM HG: ICD-10-PCS | Mod: CPTII,S$GLB,, | Performed by: INTERNAL MEDICINE

## 2023-06-15 PROCEDURE — 1101F PT FALLS ASSESS-DOCD LE1/YR: CPT | Mod: CPTII,S$GLB,, | Performed by: INTERNAL MEDICINE

## 2023-06-15 PROCEDURE — 1125F PR PAIN SEVERITY QUANTIFIED, PAIN PRESENT: ICD-10-PCS | Mod: CPTII,S$GLB,, | Performed by: INTERNAL MEDICINE

## 2023-06-15 PROCEDURE — 1101F PR PT FALLS ASSESS DOC 0-1 FALLS W/OUT INJ PAST YR: ICD-10-PCS | Mod: CPTII,S$GLB,, | Performed by: INTERNAL MEDICINE

## 2023-06-15 PROCEDURE — 99499 UNLISTED E&M SERVICE: CPT | Mod: HCNC,S$GLB,, | Performed by: INTERNAL MEDICINE

## 2023-06-15 PROCEDURE — 99999 PR PBB SHADOW E&M-EST. PATIENT-LVL V: CPT | Mod: PBBFAC,,, | Performed by: INTERNAL MEDICINE

## 2023-06-15 PROCEDURE — 1159F PR MEDICATION LIST DOCUMENTED IN MEDICAL RECORD: ICD-10-PCS | Mod: CPTII,S$GLB,, | Performed by: INTERNAL MEDICINE

## 2023-06-15 PROCEDURE — 3078F DIAST BP <80 MM HG: CPT | Mod: CPTII,S$GLB,, | Performed by: INTERNAL MEDICINE

## 2023-06-15 PROCEDURE — 80053 COMPREHEN METABOLIC PANEL: CPT | Performed by: INTERNAL MEDICINE

## 2023-06-15 PROCEDURE — 1159F MED LIST DOCD IN RCRD: CPT | Mod: CPTII,S$GLB,, | Performed by: INTERNAL MEDICINE

## 2023-06-15 PROCEDURE — 3075F PR MOST RECENT SYSTOLIC BLOOD PRESS GE 130-139MM HG: ICD-10-PCS | Mod: CPTII,S$GLB,, | Performed by: INTERNAL MEDICINE

## 2023-06-15 PROCEDURE — 85025 COMPLETE CBC W/AUTO DIFF WBC: CPT | Performed by: INTERNAL MEDICINE

## 2023-06-15 PROCEDURE — 99214 PR OFFICE/OUTPT VISIT, EST, LEVL IV, 30-39 MIN: ICD-10-PCS | Mod: S$GLB,,, | Performed by: INTERNAL MEDICINE

## 2023-06-15 PROCEDURE — 99499 RISK ADDL DX/OHS AUDIT: ICD-10-PCS | Mod: HCNC,S$GLB,, | Performed by: INTERNAL MEDICINE

## 2023-06-15 PROCEDURE — 3288F FALL RISK ASSESSMENT DOCD: CPT | Mod: CPTII,S$GLB,, | Performed by: INTERNAL MEDICINE

## 2023-06-15 PROCEDURE — 84153 ASSAY OF PSA TOTAL: CPT | Performed by: INTERNAL MEDICINE

## 2023-06-15 PROCEDURE — 3075F SYST BP GE 130 - 139MM HG: CPT | Mod: CPTII,S$GLB,, | Performed by: INTERNAL MEDICINE

## 2023-06-15 PROCEDURE — 1125F AMNT PAIN NOTED PAIN PRSNT: CPT | Mod: CPTII,S$GLB,, | Performed by: INTERNAL MEDICINE

## 2023-06-15 PROCEDURE — 99214 OFFICE O/P EST MOD 30 MIN: CPT | Mod: S$GLB,,, | Performed by: INTERNAL MEDICINE

## 2023-06-15 NOTE — PROGRESS NOTES
Subjective     Patient ID: Misha Eldridge Jr. is a 78 y.o. male.    Chief Complaint: Prostate Cancer    HPI    Returns to clinic for follow up of local prostate cancer recurrence  Completed XRT 2/1/23     Reports compliance with his Zytiga + prednisone daily with PSA response seen  Today remains undetectable at < 0.01 ng/ml     Reports tolerating therapy well overall  No hot flashes  No significant fatigue, weight stable  Has not yet started swimming-- knows he needs more exercise  No changes in urination  Denies new pain - has chronic arthritic pain     Oncology History:   Prostate Cancer:  - reports PSA screen was abnormal in 2010   - 4/27/2010 RARP with bilateral nerve sparing - prostate adenocarcinoma- surgery at [facility]  - 4/12/2022 PSA 7.1 ng/ml with concern for biochemical recurrence     - 6/9/2022 [facility] PET:  FINDINGS:  Internal reference regions are as follows:  Abdominal aorta SUV (Mean): 1.6  L3 vertebral body SUV (Mean): 2.8  Urinary bladder lumen SUV (Mean): 0.57  In the head and neck, there are no tracer avid lesions suspicious for malignancy.  In the chest, there are no tracer avid lesions suspicious for malignancy.  There are scattered subcentimeter pulmonary nodules too small to characterize on PET (e.g.  Images 90 and 116).  Calcified pleural plaques bilaterally.  In the abdomen, there is no definite evidence of local recurrence at the vesicoureteral anastomosis status post prostatectomy.  There is physiologic uptake in the liver and pancreas, and there are no pathologically enlarged or tracer avid pelvic or retroperitoneal lymph nodes.  There is nonspecific uptake in the inguinal lymph nodes.  In the bones, there is physiologic uptake in the bone marrow, and there are no tracer avid lesions suspicious for malignancy.  There is focal non tracer avid sclerosis in the left trochanteric femur on image 241.  Additional CT findings: Left lateral chest wall muscular lipoma.  Impression:  No definite  evidence of local recurrence or metastatic disease.  Non tracer avid sclerotic focus in the left femur.  Differential considerations include benign bone island versus solitary osseous metastasis.  Subcentimeter pulmonary nodules are too small to characterize by PET and for percutaneous biopsy.  Attention on follow-up.  Upon further review there appears to be eccentric thickening of the anterior rectal wall with nonspecific prominence of uptake.  There is no associated stranding or adenopathy, and the maximum SUV is 5.7 on image 228.  Recommend correlation with history for symptoms and direct visualization if clinically indicated.  Otherwise, attention on followup.     - Underwent radiation to prostate bed and LNs 8/2022.     Colon cancer:  Stage IIIC ([id number])   - 5/2/2012 s/p LAR/small bowel resection/colostomy  - 7/11/2012- 11/14/2012 adjuvant [id number] x 10 cycles at [facility]       - 6/2/2022 Colonoscopy:  Findings:        The perianal and digital rectal examinations were normal. Pertinent        negatives include normal sphincter tone.        There was evidence of a prior end-to-side ileo-colonic anastomosis        in the ascending colon. This was patent and was characterized by        healthy appearing mucosa.        Multiple small and large-mouthed diverticula were found in the        descending colon.        There was evidence of a prior end-to-end colo-colonic anastomosis in        the recto-sigmoid colon. This was patent and was characterized by        healthy appearing mucosa. The anastomosis was traversed.        The exam was otherwise without abnormality on direct and        retroflexion views.   Impression:            - Preparation of the colon was fair.                          - Patent end-to-side ileo-colonic anastomosis,                          characterized by healthy appearing mucosa.                          - Diverticulosis in the descending colon.                          - Patent end-to-end  colo-colonic anastomosis,                          characterized by healthy appearing mucosa.                          - The examination was otherwise normal on direct                          and retroflexion views.                          - No specimens collected.   Recommendation:        - Discharge patient to home.                          - Resume previous diet.                          - Continue present medications.                          - Repeat colonoscopy in 1 year because the bowel                          preparation was suboptimal.                          - Return to referring physician as previously                          scheduled.      PMH:          Active Ambulatory Problems     Diagnosis Date Noted    Hypertension      History of pulmonary embolism      History of colon cancer      History of prostate cancer      Controlled type 2 diabetes mellitus with diabetic neuropathy, without long-term current use of insulin 08/21/2013    Tinnitus 09/16/2014    Chronic low back pain 09/16/2014    Cervical pain (neck) 09/16/2014    CKD stage 3 secondary to diabetes 01/19/2021              Resolved Ambulatory Problems     Diagnosis Date Noted    Hemorrhoid      Colostomy in place                Past Medical History:   Diagnosis Date    Cataract      Diabetes mellitus type II     Prior DKA left him in a coma x 18 days (this was when he was first diagnosed)  Now with complications of neuropathy, CKD     Prior DVT/PE > 20 years ago- off all medication x 8-9 years     FH:  No prostate cancer known  No colon cancer known  Mother- breast cancer (she had mastectomy but never discussed with him and she was middle aged)  No other cancers     SH:  Retired  , body and fender pain  Single  1 child- healthy  Prior tobacco- quit 20 years ago  Prior EtOH- quit 20 years ago    Review of Systems       Objective     Physical Exam  Vitals and nursing note reviewed.   Constitutional:       General: He  is not in acute distress.     Appearance: Normal appearance. He is well-developed and normal weight. He is not ill-appearing.      Comments: Presents alone.   ECOG= 0  Very pleasant   HENT:      Head: Normocephalic and atraumatic.      Right Ear: External ear normal.      Left Ear: External ear normal.   Eyes:      General: No scleral icterus.     Extraocular Movements: Extraocular movements intact.      Conjunctiva/sclera: Conjunctivae normal.      Pupils: Pupils are equal, round, and reactive to light.   Neck:      Thyroid: No thyromegaly.      Trachea: No tracheal deviation.   Cardiovascular:      Rate and Rhythm: Normal rate and regular rhythm.      Heart sounds: Normal heart sounds. No murmur heard.    No friction rub. No gallop.   Pulmonary:      Effort: Pulmonary effort is normal. No respiratory distress.      Breath sounds: Normal breath sounds. No wheezing, rhonchi or rales.   Chest:      Chest wall: No tenderness.   Abdominal:      General: Abdomen is flat. Bowel sounds are normal. There is no distension.      Palpations: Abdomen is soft. There is no mass.      Tenderness: There is no abdominal tenderness. There is no guarding or rebound.      Comments: No hepatosplenomegaly  Reducible ventral hernia   Musculoskeletal:         General: No tenderness or deformity. Normal range of motion.      Cervical back: Normal range of motion and neck supple. No rigidity or tenderness.      Right lower leg: No edema.      Left lower leg: No edema.   Lymphadenopathy:      Cervical: No cervical adenopathy.   Skin:     General: Skin is warm and dry.      Coloration: Skin is not jaundiced or pale.      Findings: No erythema, lesion or rash.      Comments: Multiple well healed scars   Neurological:      General: No focal deficit present.      Mental Status: He is alert and oriented to person, place, and time. Mental status is at baseline.      Cranial Nerves: No cranial nerve deficit.      Sensory: Sensory deficit (DM  neuropathy) present.      Motor: No weakness or abnormal muscle tone.      Coordination: Coordination normal.      Gait: Gait abnormal (secondary to arthralgias and neuropathy).      Deep Tendon Reflexes: Reflexes are normal and symmetric. Reflexes normal.   Psychiatric:         Mood and Affect: Mood normal.         Behavior: Behavior normal.         Thought Content: Thought content normal.         Judgment: Judgment normal.          Assessment and Plan     1. History of colon cancer    2. Prostate cancer    3. Controlled type 2 diabetes mellitus with diabetic neuropathy, without long-term current use of insulin    4. Thrombocytopenia, unspecified    5. CKD stage 3 secondary to diabetes    6. Hypertension, unspecified type        Prostate cancer-   He has local recurrence prostate cancer bed.   Completed salvage XRT in 2/2023  On Lupron and abiraterone/prednisone. Continue regimen in adjuvant setting for total one year    Received first dose of Lupron on 8/16/2022. Last dose given 2/14/23. Will receive again in 8/2023.  PSA remains undetectable today.   Discussed proposed length of treatment again in clinic today.   BMD results are pending     HTN, DM-  BP controlled in clinic.   Glucose elevated, but improving  Defer to management per PCP     Hx colon cancer-   INGRID, colonoscopy needed in 6/2023     Hx pulm embolus-  Resolved    Thrombocytopenia- stable    Route Chart for Scheduling    Med Onc Chart Routing      Follow up with physician    Follow up with PAT . Mid Augusst cbc, cmp, psa and Lupron   Infusion scheduling note    Injection scheduling note    Labs    Imaging    Pharmacy appointment    Other referrals            Therapy Plan Information  Medications  leuprolide acetate (6 month) injection 45 mg  45 mg, Intramuscular, Every 24 weeks

## 2023-06-19 ENCOUNTER — SPECIALTY PHARMACY (OUTPATIENT)
Dept: PHARMACY | Facility: CLINIC | Age: 78
End: 2023-06-19
Payer: MEDICARE

## 2023-06-19 DIAGNOSIS — Z85.46 HISTORY OF PROSTATE CANCER: ICD-10-CM

## 2023-06-19 RX ORDER — ABIRATERONE ACETATE 250 MG/1
1000 TABLET ORAL DAILY
Qty: 120 TABLET | Refills: 1 | Status: ACTIVE | OUTPATIENT
Start: 2023-06-19 | End: 2024-06-18

## 2023-06-19 NOTE — TELEPHONE ENCOUNTER
Incoming call from patient regarding Zytiga and prednisone therapies. Was asked earlier today to see how much he has left on hand. Pt states he has half a bottle left of each one. Asked how pt is taking them and he was able to confirm x4 tabs of the Zytiga a day and then x2 of the prednisone. Asked if patient counted the pills out and he declined.     Informed pt we will check in a week how much he has left but if he happens to run low in the meantime to please call us sooner. Assigned [facility] aware to address adherence at next call outreach.

## 2023-06-19 NOTE — TELEPHONE ENCOUNTER
Outgoing call to pt about refill of Zytiga and prednsione. Pt stated he was not sure how many he had on hand and was not home to check. Stated he will check and call [facility] back. Will continue to follow up.

## 2023-06-28 ENCOUNTER — OFFICE VISIT (OUTPATIENT)
Dept: INTERNAL MEDICINE | Facility: CLINIC | Age: 78
End: 2023-06-28
Attending: FAMILY MEDICINE
Payer: MEDICARE

## 2023-06-28 ENCOUNTER — TELEPHONE (OUTPATIENT)
Dept: INTERNAL MEDICINE | Facility: CLINIC | Age: 78
End: 2023-06-28
Payer: MEDICARE

## 2023-06-28 ENCOUNTER — HOSPITAL ENCOUNTER (OUTPATIENT)
Facility: HOSPITAL | Age: 78
Discharge: HOME | End: 2023-07-02
Attending: EMERGENCY MEDICINE | Admitting: STUDENT IN AN ORGANIZED HEALTH CARE EDUCATION/TRAINING PROGRAM
Payer: MEDICARE

## 2023-06-28 VITALS
HEART RATE: 82 BPM | OXYGEN SATURATION: 96 % | BODY MASS INDEX: 28.08 KG/M2 | DIASTOLIC BLOOD PRESSURE: 70 MMHG | SYSTOLIC BLOOD PRESSURE: 145 MMHG | WEIGHT: 211.88 LBS | HEIGHT: 73 IN

## 2023-06-28 DIAGNOSIS — R07.9 CHEST PAIN: ICD-10-CM

## 2023-06-28 DIAGNOSIS — R10.9 ABDOMINAL PAIN: ICD-10-CM

## 2023-06-28 DIAGNOSIS — N17.9 AKI (ACUTE KIDNEY INJURY): ICD-10-CM

## 2023-06-28 DIAGNOSIS — Z85.46 HISTORY OF PROSTATE CANCER: ICD-10-CM

## 2023-06-28 DIAGNOSIS — R11.2 NAUSEA AND VOMITING, UNSPECIFIED VOMITING TYPE: Primary | ICD-10-CM

## 2023-06-28 DIAGNOSIS — E87.6 HYPOKALEMIA: ICD-10-CM

## 2023-06-28 DIAGNOSIS — E83.42 HYPOMAGNESEMIA: ICD-10-CM

## 2023-06-28 DIAGNOSIS — I10 HYPERTENSION, UNSPECIFIED TYPE: ICD-10-CM

## 2023-06-28 DIAGNOSIS — Q24.9 HEART ABNORMALITY: ICD-10-CM

## 2023-06-28 DIAGNOSIS — R13.10 DYSPHAGIA, UNSPECIFIED TYPE: ICD-10-CM

## 2023-06-28 DIAGNOSIS — Z85.038 HISTORY OF COLON CANCER: ICD-10-CM

## 2023-06-28 DIAGNOSIS — K59.09 CHRONIC CONSTIPATION: ICD-10-CM

## 2023-06-28 DIAGNOSIS — R11.2 INTRACTABLE NAUSEA AND VOMITING: Primary | ICD-10-CM

## 2023-06-28 DIAGNOSIS — R11.10 REGURGITATION OF FOOD: ICD-10-CM

## 2023-06-28 PROBLEM — N18.9 ACUTE KIDNEY INJURY SUPERIMPOSED ON CHRONIC KIDNEY DISEASE: Status: ACTIVE | Noted: 2021-01-19

## 2023-06-28 PROBLEM — K59.00 CONSTIPATION: Status: ACTIVE | Noted: 2023-06-28

## 2023-06-28 LAB
ALBUMIN SERPL BCP-MCNC: 3.5 G/DL (ref 3.5–5.2)
ALP SERPL-CCNC: 75 U/L (ref 55–135)
ALT SERPL W/O P-5'-P-CCNC: 19 U/L (ref 10–44)
ANION GAP SERPL CALC-SCNC: 16 MMOL/L (ref 8–16)
AST SERPL-CCNC: 30 U/L (ref 10–40)
BASOPHILS # BLD AUTO: 0.01 K/UL (ref 0–0.2)
BASOPHILS NFR BLD: 0.3 % (ref 0–1.9)
BILIRUB SERPL-MCNC: 1.1 MG/DL (ref 0.1–1)
BUN SERPL-MCNC: 12 MG/DL (ref 6–30)
BUN SERPL-MCNC: 12 MG/DL (ref 8–23)
CALCIUM SERPL-MCNC: 9.2 MG/DL (ref 8.7–10.5)
CHLORIDE SERPL-SCNC: 91 MMOL/L (ref 95–110)
CHLORIDE SERPL-SCNC: 93 MMOL/L (ref 95–110)
CO2 SERPL-SCNC: 25 MMOL/L (ref 23–29)
CREAT SERPL-MCNC: 1.5 MG/DL (ref 0.5–1.4)
CREAT SERPL-MCNC: 1.5 MG/DL (ref 0.5–1.4)
DIFFERENTIAL METHOD: ABNORMAL
EOSINOPHIL # BLD AUTO: 0.3 K/UL (ref 0–0.5)
EOSINOPHIL NFR BLD: 8.4 % (ref 0–8)
ERYTHROCYTE [DISTWIDTH] IN BLOOD BY AUTOMATED COUNT: 13.5 % (ref 11.5–14.5)
EST. GFR  (NO RACE VARIABLE): 47.7 ML/MIN/1.73 M^2
GLUCOSE SERPL-MCNC: 191 MG/DL (ref 70–110)
GLUCOSE SERPL-MCNC: 196 MG/DL (ref 70–110)
HCT VFR BLD AUTO: 32.8 % (ref 40–54)
HCT VFR BLD CALC: 32 %PCV (ref 36–54)
HCV AB SERPL QL IA: NORMAL
HGB BLD-MCNC: 10.9 G/DL (ref 14–18)
HIV 1+2 AB+HIV1 P24 AG SERPL QL IA: NORMAL
IMM GRANULOCYTES # BLD AUTO: 0.02 K/UL (ref 0–0.04)
IMM GRANULOCYTES NFR BLD AUTO: 0.7 % (ref 0–0.5)
LACTATE SERPL-SCNC: 1.7 MMOL/L (ref 0.5–2.2)
LIPASE SERPL-CCNC: 15 U/L (ref 4–60)
LYMPHOCYTES # BLD AUTO: 0.5 K/UL (ref 1–4.8)
LYMPHOCYTES NFR BLD: 16.8 % (ref 18–48)
MAGNESIUM SERPL-MCNC: 1.4 MG/DL (ref 1.6–2.6)
MCH RBC QN AUTO: 30.6 PG (ref 27–31)
MCHC RBC AUTO-ENTMCNC: 33.2 G/DL (ref 32–36)
MCV RBC AUTO: 92 FL (ref 82–98)
MONOCYTES # BLD AUTO: 0.3 K/UL (ref 0.3–1)
MONOCYTES NFR BLD: 11.1 % (ref 4–15)
NEUTROPHILS # BLD AUTO: 1.9 K/UL (ref 1.8–7.7)
NEUTROPHILS NFR BLD: 62.7 % (ref 38–73)
NRBC BLD-RTO: 0 /100 WBC
PLATELET # BLD AUTO: 206 K/UL (ref 150–450)
PMV BLD AUTO: 10.4 FL (ref 9.2–12.9)
POC IONIZED CALCIUM: 0.91 MMOL/L (ref 1.06–1.42)
POC TCO2 (MEASURED): 27 MMOL/L (ref 23–29)
POTASSIUM BLD-SCNC: 2.8 MMOL/L (ref 3.5–5.1)
POTASSIUM SERPL-SCNC: 2.9 MMOL/L (ref 3.5–5.1)
PROT SERPL-MCNC: 6.7 G/DL (ref 6–8.4)
RBC # BLD AUTO: 3.56 M/UL (ref 4.6–6.2)
SAMPLE: ABNORMAL
SODIUM BLD-SCNC: 133 MMOL/L (ref 136–145)
SODIUM SERPL-SCNC: 134 MMOL/L (ref 136–145)
WBC # BLD AUTO: 2.98 K/UL (ref 3.9–12.7)

## 2023-06-28 PROCEDURE — 3288F FALL RISK ASSESSMENT DOCD: CPT | Mod: HCNC,CPTII,S$GLB, | Performed by: FAMILY MEDICINE

## 2023-06-28 PROCEDURE — 83735 ASSAY OF MAGNESIUM: CPT | Mod: HCNC | Performed by: EMERGENCY MEDICINE

## 2023-06-28 PROCEDURE — G0378 HOSPITAL OBSERVATION PER HR: HCPCS | Mod: HCNC

## 2023-06-28 PROCEDURE — 1159F MED LIST DOCD IN RCRD: CPT | Mod: HCNC,CPTII,S$GLB, | Performed by: FAMILY MEDICINE

## 2023-06-28 PROCEDURE — 83605 ASSAY OF LACTIC ACID: CPT | Mod: HCNC | Performed by: EMERGENCY MEDICINE

## 2023-06-28 PROCEDURE — 83690 ASSAY OF LIPASE: CPT | Mod: HCNC | Performed by: EMERGENCY MEDICINE

## 2023-06-28 PROCEDURE — 96366 THER/PROPH/DIAG IV INF ADDON: CPT | Mod: HCNC

## 2023-06-28 PROCEDURE — 25500020 PHARM REV CODE 255: Mod: HCNC | Performed by: EMERGENCY MEDICINE

## 2023-06-28 PROCEDURE — 99999 PR PBB SHADOW E&M-EST. PATIENT-LVL IV: ICD-10-PCS | Mod: PBBFAC,HCNC,, | Performed by: FAMILY MEDICINE

## 2023-06-28 PROCEDURE — 80047 BASIC METABLC PNL IONIZED CA: CPT | Mod: HCNC

## 2023-06-28 PROCEDURE — 93005 ELECTROCARDIOGRAM TRACING: CPT | Mod: HCNC

## 2023-06-28 PROCEDURE — 82330 ASSAY OF CALCIUM: CPT

## 2023-06-28 PROCEDURE — 96365 THER/PROPH/DIAG IV INF INIT: CPT | Mod: 59,HCNC

## 2023-06-28 PROCEDURE — 1100F PR PT FALLS ASSESS DOC 2+ FALLS/FALL W/INJURY/YR: ICD-10-PCS | Mod: HCNC,CPTII,S$GLB, | Performed by: FAMILY MEDICINE

## 2023-06-28 PROCEDURE — 1126F AMNT PAIN NOTED NONE PRSNT: CPT | Mod: HCNC,CPTII,S$GLB, | Performed by: FAMILY MEDICINE

## 2023-06-28 PROCEDURE — 3078F DIAST BP <80 MM HG: CPT | Mod: HCNC,CPTII,S$GLB, | Performed by: FAMILY MEDICINE

## 2023-06-28 PROCEDURE — 1160F PR REVIEW ALL MEDS BY PRESCRIBER/CLIN PHARMACIST DOCUMENTED: ICD-10-PCS | Mod: HCNC,CPTII,S$GLB, | Performed by: FAMILY MEDICINE

## 2023-06-28 PROCEDURE — 99285 EMERGENCY DEPT VISIT HI MDM: CPT | Mod: 25,HCNC

## 2023-06-28 PROCEDURE — 3288F PR FALLS RISK ASSESSMENT DOCUMENTED: ICD-10-PCS | Mod: HCNC,CPTII,S$GLB, | Performed by: FAMILY MEDICINE

## 2023-06-28 PROCEDURE — 1160F RVW MEDS BY RX/DR IN RCRD: CPT | Mod: HCNC,CPTII,S$GLB, | Performed by: FAMILY MEDICINE

## 2023-06-28 PROCEDURE — 82565 ASSAY OF CREATININE: CPT | Mod: HCNC

## 2023-06-28 PROCEDURE — 3077F SYST BP >= 140 MM HG: CPT | Mod: HCNC,CPTII,S$GLB, | Performed by: FAMILY MEDICINE

## 2023-06-28 PROCEDURE — 99223 1ST HOSP IP/OBS HIGH 75: CPT | Mod: HCNC,,,

## 2023-06-28 PROCEDURE — 1126F PR PAIN SEVERITY QUANTIFIED, NO PAIN PRESENT: ICD-10-PCS | Mod: HCNC,CPTII,S$GLB, | Performed by: FAMILY MEDICINE

## 2023-06-28 PROCEDURE — 99215 PR OFFICE/OUTPT VISIT, EST, LEVL V, 40-54 MIN: ICD-10-PCS | Mod: HCNC,S$GLB,, | Performed by: FAMILY MEDICINE

## 2023-06-28 PROCEDURE — 86803 HEPATITIS C AB TEST: CPT | Mod: HCNC | Performed by: PHYSICIAN ASSISTANT

## 2023-06-28 PROCEDURE — 1100F PTFALLS ASSESS-DOCD GE2>/YR: CPT | Mod: HCNC,CPTII,S$GLB, | Performed by: FAMILY MEDICINE

## 2023-06-28 PROCEDURE — 96368 THER/DIAG CONCURRENT INF: CPT | Mod: HCNC

## 2023-06-28 PROCEDURE — 1159F PR MEDICATION LIST DOCUMENTED IN MEDICAL RECORD: ICD-10-PCS | Mod: HCNC,CPTII,S$GLB, | Performed by: FAMILY MEDICINE

## 2023-06-28 PROCEDURE — 80053 COMPREHEN METABOLIC PANEL: CPT | Mod: HCNC | Performed by: EMERGENCY MEDICINE

## 2023-06-28 PROCEDURE — 93010 ELECTROCARDIOGRAM REPORT: CPT | Mod: HCNC,,, | Performed by: INTERNAL MEDICINE

## 2023-06-28 PROCEDURE — 99223 PR INITIAL HOSPITAL CARE,LEVL III: ICD-10-PCS | Mod: HCNC,,,

## 2023-06-28 PROCEDURE — 87389 HIV-1 AG W/HIV-1&-2 AB AG IA: CPT | Mod: HCNC | Performed by: PHYSICIAN ASSISTANT

## 2023-06-28 PROCEDURE — 25000003 PHARM REV CODE 250: Mod: HCNC | Performed by: EMERGENCY MEDICINE

## 2023-06-28 PROCEDURE — 63600175 PHARM REV CODE 636 W HCPCS: Mod: HCNC | Performed by: EMERGENCY MEDICINE

## 2023-06-28 PROCEDURE — 3078F PR MOST RECENT DIASTOLIC BLOOD PRESSURE < 80 MM HG: ICD-10-PCS | Mod: HCNC,CPTII,S$GLB, | Performed by: FAMILY MEDICINE

## 2023-06-28 PROCEDURE — 93010 EKG 12-LEAD: ICD-10-PCS | Mod: HCNC,,, | Performed by: INTERNAL MEDICINE

## 2023-06-28 PROCEDURE — 85025 COMPLETE CBC W/AUTO DIFF WBC: CPT | Mod: HCNC | Performed by: EMERGENCY MEDICINE

## 2023-06-28 PROCEDURE — 3077F PR MOST RECENT SYSTOLIC BLOOD PRESSURE >= 140 MM HG: ICD-10-PCS | Mod: HCNC,CPTII,S$GLB, | Performed by: FAMILY MEDICINE

## 2023-06-28 PROCEDURE — 99215 OFFICE O/P EST HI 40 MIN: CPT | Mod: HCNC,S$GLB,, | Performed by: FAMILY MEDICINE

## 2023-06-28 PROCEDURE — 99999 PR PBB SHADOW E&M-EST. PATIENT-LVL IV: CPT | Mod: PBBFAC,HCNC,, | Performed by: FAMILY MEDICINE

## 2023-06-28 PROCEDURE — 96375 TX/PRO/DX INJ NEW DRUG ADDON: CPT | Mod: HCNC

## 2023-06-28 RX ORDER — SIMETHICONE 80 MG
1 TABLET,CHEWABLE ORAL 4 TIMES DAILY PRN
Status: DISCONTINUED | OUTPATIENT
Start: 2023-06-28 | End: 2023-07-02 | Stop reason: HOSPADM

## 2023-06-28 RX ORDER — PREDNISONE 5 MG/1
5 TABLET ORAL 2 TIMES DAILY
Qty: 60 TABLET | Refills: 1 | Status: CANCELLED | OUTPATIENT
Start: 2023-06-28

## 2023-06-28 RX ORDER — GLUCAGON 1 MG
1 KIT INJECTION
Status: DISCONTINUED | OUTPATIENT
Start: 2023-06-28 | End: 2023-07-02 | Stop reason: HOSPADM

## 2023-06-28 RX ORDER — MORPHINE SULFATE 4 MG/ML
4 INJECTION, SOLUTION INTRAMUSCULAR; INTRAVENOUS
Status: COMPLETED | OUTPATIENT
Start: 2023-06-28 | End: 2023-06-28

## 2023-06-28 RX ORDER — ENOXAPARIN SODIUM 100 MG/ML
40 INJECTION SUBCUTANEOUS EVERY 24 HOURS
Status: DISCONTINUED | OUTPATIENT
Start: 2023-06-29 | End: 2023-07-02 | Stop reason: HOSPADM

## 2023-06-28 RX ORDER — CARVEDILOL 3.12 MG/1
3.12 TABLET ORAL 2 TIMES DAILY
Qty: 180 TABLET | Refills: 1 | Status: CANCELLED | OUTPATIENT
Start: 2023-06-28

## 2023-06-28 RX ORDER — ACETAMINOPHEN 500 MG
1000 TABLET ORAL EVERY 8 HOURS PRN
Status: DISCONTINUED | OUTPATIENT
Start: 2023-06-28 | End: 2023-07-02 | Stop reason: HOSPADM

## 2023-06-28 RX ORDER — ONDANSETRON 8 MG/1
8 TABLET, ORALLY DISINTEGRATING ORAL EVERY 8 HOURS PRN
Status: DISCONTINUED | OUTPATIENT
Start: 2023-06-28 | End: 2023-07-02 | Stop reason: HOSPADM

## 2023-06-28 RX ORDER — ORAL SEMAGLUTIDE 14 MG/1
14 TABLET ORAL DAILY
Qty: 90 TABLET | Refills: 3 | Status: CANCELLED | OUTPATIENT
Start: 2023-06-28

## 2023-06-28 RX ORDER — SODIUM CHLORIDE 0.9 % (FLUSH) 0.9 %
5 SYRINGE (ML) INJECTION
Status: DISCONTINUED | OUTPATIENT
Start: 2023-06-28 | End: 2023-07-02 | Stop reason: HOSPADM

## 2023-06-28 RX ORDER — MAGNESIUM SULFATE HEPTAHYDRATE 40 MG/ML
2 INJECTION, SOLUTION INTRAVENOUS ONCE
Status: COMPLETED | OUTPATIENT
Start: 2023-06-28 | End: 2023-06-28

## 2023-06-28 RX ORDER — ONDANSETRON 2 MG/ML
4 INJECTION INTRAMUSCULAR; INTRAVENOUS
Status: COMPLETED | OUTPATIENT
Start: 2023-06-28 | End: 2023-06-28

## 2023-06-28 RX ORDER — IBUPROFEN 200 MG
16 TABLET ORAL
Status: DISCONTINUED | OUTPATIENT
Start: 2023-06-28 | End: 2023-07-02 | Stop reason: HOSPADM

## 2023-06-28 RX ORDER — IBUPROFEN 200 MG
24 TABLET ORAL
Status: DISCONTINUED | OUTPATIENT
Start: 2023-06-28 | End: 2023-07-02 | Stop reason: HOSPADM

## 2023-06-28 RX ORDER — TALC
6 POWDER (GRAM) TOPICAL NIGHTLY PRN
Status: DISCONTINUED | OUTPATIENT
Start: 2023-06-28 | End: 2023-07-02 | Stop reason: HOSPADM

## 2023-06-28 RX ORDER — NALOXONE HCL 0.4 MG/ML
0.02 VIAL (ML) INJECTION
Status: DISCONTINUED | OUTPATIENT
Start: 2023-06-28 | End: 2023-07-02 | Stop reason: HOSPADM

## 2023-06-28 RX ORDER — POTASSIUM CHLORIDE 7.45 MG/ML
10 INJECTION INTRAVENOUS ONCE
Status: COMPLETED | OUTPATIENT
Start: 2023-06-28 | End: 2023-06-28

## 2023-06-28 RX ORDER — BISACODYL 10 MG
10 SUPPOSITORY, RECTAL RECTAL DAILY PRN
Status: DISCONTINUED | OUTPATIENT
Start: 2023-06-28 | End: 2023-07-02 | Stop reason: HOSPADM

## 2023-06-28 RX ORDER — ACETAMINOPHEN 325 MG/1
650 TABLET ORAL EVERY 4 HOURS PRN
Status: DISCONTINUED | OUTPATIENT
Start: 2023-06-28 | End: 2023-07-02 | Stop reason: HOSPADM

## 2023-06-28 RX ORDER — MAG HYDROX/ALUMINUM HYD/SIMETH 200-200-20
30 SUSPENSION, ORAL (FINAL DOSE FORM) ORAL 4 TIMES DAILY PRN
Status: DISCONTINUED | OUTPATIENT
Start: 2023-06-28 | End: 2023-07-02 | Stop reason: HOSPADM

## 2023-06-28 RX ORDER — INSULIN ASPART 100 [IU]/ML
0-5 INJECTION, SOLUTION INTRAVENOUS; SUBCUTANEOUS
Status: DISCONTINUED | OUTPATIENT
Start: 2023-06-28 | End: 2023-07-02 | Stop reason: HOSPADM

## 2023-06-28 RX ORDER — POLYETHYLENE GLYCOL 3350 17 G/17G
17 POWDER, FOR SOLUTION ORAL DAILY
Status: DISCONTINUED | OUTPATIENT
Start: 2023-06-28 | End: 2023-06-29

## 2023-06-28 RX ORDER — PROCHLORPERAZINE EDISYLATE 5 MG/ML
5 INJECTION INTRAMUSCULAR; INTRAVENOUS EVERY 6 HOURS PRN
Status: DISCONTINUED | OUTPATIENT
Start: 2023-06-28 | End: 2023-07-02 | Stop reason: HOSPADM

## 2023-06-28 RX ADMIN — IOHEXOL 100 ML: 350 INJECTION, SOLUTION INTRAVENOUS at 08:06

## 2023-06-28 RX ADMIN — SODIUM CHLORIDE 1000 ML: 9 INJECTION, SOLUTION INTRAVENOUS at 09:06

## 2023-06-28 RX ADMIN — ONDANSETRON 4 MG: 2 INJECTION INTRAMUSCULAR; INTRAVENOUS at 08:06

## 2023-06-28 RX ADMIN — POTASSIUM CHLORIDE 10 MEQ: 7.46 INJECTION, SOLUTION INTRAVENOUS at 09:06

## 2023-06-28 RX ADMIN — MAGNESIUM SULFATE 2 G: 2 INJECTION INTRAVENOUS at 08:06

## 2023-06-28 RX ADMIN — POTASSIUM CHLORIDE 10 MEQ: 7.46 INJECTION, SOLUTION INTRAVENOUS at 07:06

## 2023-06-28 RX ADMIN — MORPHINE SULFATE 4 MG: 4 INJECTION INTRAVENOUS at 08:06

## 2023-06-28 NOTE — ED NOTES
I-STAT Chem-8+ Results:   Value Reference Range   Sodium 133 136-145 mmol/L   Potassium  2.8 3.5-5.1 mmol/L   Chloride 91  mmol/L   Ionized Calcium 0.91 1.06-1.42 mmol/L   CO2 (measured) 27 23-29 mmol/L   Glucose 196  mg/dL   BUN 12 6-30 mg/dL   Creatinine 1.5 0.5-1.4 mg/dL   Hematocrit 32 36-54%      Critical potassium communicated to MD.

## 2023-06-28 NOTE — PROGRESS NOTES
"CHIEF COMPLAINT:  Nausea vomiting    HISTORY OF PRESENT ILLNESS: The patient is a chronically ill 78-year-old black male.  The patient has SBO possible.  No PO except water 5 days.  He has emesis if he tries to eat food.  No BM 4 days.  To make things  More complicated he has a distal history of a colostomy for colon cancer a little more than 10 years ago.  He also had a radical prostatectomy a little more than 10 years ago.    He has a long history of diabetes.    The patient has a history of stable hypertension on current medications.  Patient denies chest pain or shortness of breath today.    REVIEW OF SYSTEMS:  GENERAL: No fever, chills, fatigability or weight loss.  SKIN: No rashes, itching or changes in color or texture of skin.  HEAD: No headaches or recent head trauma.  EYES: Visual acuity fine. No photophobia, ocular pain or diplopia.  EARS: Denies ear pain, discharge or vertigo.  NOSE: No loss of smell, no epistaxis or postnasal drip.  MOUTH & THROAT: No hoarseness or change in voice. No excessive gum bleeding.  NODES: Denies swollen glands.  CHEST: Denies DE PAZ, cyanosis, wheezing, cough and sputum production.  CARDIOVASCULAR: Denies chest pain, PND, orthopnea or reduced exercise tolerance.  ABDOMEN:  No weight loss. Denies diarrhea, hematemesis or blood in stool.  URINARY: No flank pain, dysuria or hematuria.  PERIPHERAL VASCULAR: No claudication or cyanosis.  MUSCULOSKELETAL: No joint stiffness or swelling. Denies back pain.  NEUROLOGIC: No history of seizures, paralysis, alteration of gait or coordination.    SOCIAL HISTORY: The patient does not smoke.  The patient consumes alcohol socially.  The patient is single.    PHYSICAL EXAMINATION:   Blood pressure (!) 145/70, pulse 82, height 6' 1" (1.854 m), weight 96.1 kg (211 lb 13.8 oz), SpO2 96 %.  APPEARANCE: Well nourished, well developed, in no acute distress.    HEAD: Normocephalic, atraumatic.  EYES: PERRL. EOMI.  Conjunctivae without injection and  " anicteric  NOSE: Mucosa pink. Airway clear.  MOUTH & THROAT: No tonsillar enlargement. No pharyngeal erythema or exudate. No stridor.  Dehydrated  NECK: Supple.   NODES: No cervical, axillary or inguinal lymph node enlargement.  CHEST: Lungs clear to auscultation.  No retractions are noted.  No rales or rhonchi are present.  CARDIOVASCULAR: Normal S1, S2. No rubs, murmurs or gallops.  ABDOMEN: Bowel sounds normal. + distended.  No tenderness or masses.  No ascites is noted.  MUSCULOSKELETAL:  There is no clubbing, cyanosis, or edema of the extremities x4.  There is full range of motion of the lumbar spine.  There is full range of motion of the extremities x4.  There is no deformity noted.    NEUROLOGIC:       Normal speech development.      Hearing normal.      Normal gait.      Motor and sensory exams grossly normal.  PSYCHIATRIC: Patient is alert and oriented x3.  Thought processes are all normal.  There is no homicidality.  There is no suicidality.  There is no evidence of psychosis.    LABORATORY/RADIOLOGY:   Chart reviewed.      ASSESSMENT:   Nausea vomiting and distended abdomen possibly due to bowel obstruction  Colon cancer  Prostate cancer  Diabetes T2  Hypertension    PLAN:  Needs to be seen in ER for probable blood work and CT scan to evaluate for acute pathology

## 2023-06-28 NOTE — TELEPHONE ENCOUNTER
----- Message from Jose Bobo sent at 6/28/2023  3:12 PM CDT -----  Name of Who is Calling:MUSA SEGURA JR. [[id number]]                What is the request in detail: Patient sates that you sent him to [facility] and they don't have a record of him over there.Please call back to further assist.                 Can the clinic reply by [facility]: No                What Number to Call Back if not in [facility]: [phone]

## 2023-06-28 NOTE — TELEPHONE ENCOUNTER
----- Message from Malka Maldonado sent at 6/28/2023  2:39 PM CDT -----  Type: Patient Call Back    Who called:pt     What is the request in detail:pt requesting to discuss today's visit. Calling to find out what is his instructions. Call pt     Can the clinic reply by [facility]?    Would the patient rather a call back or a response via [facility]? call    Best call back number:[phone] (home)       Additional Information:

## 2023-06-29 PROBLEM — R11.2 INTRACTABLE NAUSEA AND VOMITING: Status: ACTIVE | Noted: 2023-06-28

## 2023-06-29 PROBLEM — E87.6 HYPOKALEMIA: Status: ACTIVE | Noted: 2023-06-29

## 2023-06-29 PROBLEM — R13.10 DYSPHAGIA: Status: ACTIVE | Noted: 2023-06-29

## 2023-06-29 PROBLEM — R11.10 REGURGITATION OF FOOD: Status: ACTIVE | Noted: 2023-06-29

## 2023-06-29 PROBLEM — K59.09 CHRONIC CONSTIPATION: Status: ACTIVE | Noted: 2023-06-29

## 2023-06-29 PROBLEM — R11.15 EMESIS, PERSISTENT: Status: ACTIVE | Noted: 2023-06-28

## 2023-06-29 PROBLEM — E83.42 HYPOMAGNESEMIA: Status: ACTIVE | Noted: 2023-06-29

## 2023-06-29 LAB
ALBUMIN SERPL BCP-MCNC: 2.9 G/DL (ref 3.5–5.2)
ALP SERPL-CCNC: 64 U/L (ref 55–135)
ALT SERPL W/O P-5'-P-CCNC: 15 U/L (ref 10–44)
ANION GAP SERPL CALC-SCNC: 12 MMOL/L (ref 8–16)
AST SERPL-CCNC: 23 U/L (ref 10–40)
BASOPHILS # BLD AUTO: 0.01 K/UL (ref 0–0.2)
BASOPHILS NFR BLD: 0.5 % (ref 0–1.9)
BILIRUB SERPL-MCNC: 1 MG/DL (ref 0.1–1)
BILIRUB UR QL STRIP: NEGATIVE
BUN SERPL-MCNC: 9 MG/DL (ref 8–23)
CALCIUM SERPL-MCNC: 8.4 MG/DL (ref 8.7–10.5)
CHLORIDE SERPL-SCNC: 96 MMOL/L (ref 95–110)
CLARITY UR REFRACT.AUTO: CLEAR
CO2 SERPL-SCNC: 28 MMOL/L (ref 23–29)
COLOR UR AUTO: NORMAL
CREAT SERPL-MCNC: 1 MG/DL (ref 0.5–1.4)
CRP SERPL-MCNC: 43.6 MG/L (ref 0–8.2)
DIFFERENTIAL METHOD: ABNORMAL
EOSINOPHIL # BLD AUTO: 0.2 K/UL (ref 0–0.5)
EOSINOPHIL NFR BLD: 8.4 % (ref 0–8)
ERYTHROCYTE [DISTWIDTH] IN BLOOD BY AUTOMATED COUNT: 13.6 % (ref 11.5–14.5)
ERYTHROCYTE [SEDIMENTATION RATE] IN BLOOD BY PHOTOMETRIC METHOD: 42 MM/HR (ref 0–23)
EST. GFR  (NO RACE VARIABLE): >60 ML/MIN/1.73 M^2
GLUCOSE SERPL-MCNC: 161 MG/DL (ref 70–110)
GLUCOSE UR QL STRIP: NEGATIVE
HCT VFR BLD AUTO: 28.8 % (ref 40–54)
HGB BLD-MCNC: 9.7 G/DL (ref 14–18)
HGB UR QL STRIP: NEGATIVE
IMM GRANULOCYTES # BLD AUTO: 0.01 K/UL (ref 0–0.04)
IMM GRANULOCYTES NFR BLD AUTO: 0.5 % (ref 0–0.5)
KETONES UR QL STRIP: NEGATIVE
LEUKOCYTE ESTERASE UR QL STRIP: NEGATIVE
LYMPHOCYTES # BLD AUTO: 0.4 K/UL (ref 1–4.8)
LYMPHOCYTES NFR BLD: 20 % (ref 18–48)
MAGNESIUM SERPL-MCNC: 1.6 MG/DL (ref 1.6–2.6)
MCH RBC QN AUTO: 31.3 PG (ref 27–31)
MCHC RBC AUTO-ENTMCNC: 33.7 G/DL (ref 32–36)
MCV RBC AUTO: 93 FL (ref 82–98)
MONOCYTES # BLD AUTO: 0.3 K/UL (ref 0.3–1)
MONOCYTES NFR BLD: 14.4 % (ref 4–15)
NEUTROPHILS # BLD AUTO: 1.2 K/UL (ref 1.8–7.7)
NEUTROPHILS NFR BLD: 56.2 % (ref 38–73)
NITRITE UR QL STRIP: NEGATIVE
NRBC BLD-RTO: 0 /100 WBC
PH UR STRIP: 6 [PH] (ref 5–8)
PLATELET # BLD AUTO: 175 K/UL (ref 150–450)
PMV BLD AUTO: 10.5 FL (ref 9.2–12.9)
POCT GLUCOSE: 159 MG/DL (ref 70–110)
POCT GLUCOSE: 188 MG/DL (ref 70–110)
POCT GLUCOSE: 235 MG/DL (ref 70–110)
POTASSIUM SERPL-SCNC: 2.9 MMOL/L (ref 3.5–5.1)
POTASSIUM SERPL-SCNC: 3.5 MMOL/L (ref 3.5–5.1)
PROCALCITONIN SERPL IA-MCNC: 0.07 NG/ML
PROT SERPL-MCNC: 5.6 G/DL (ref 6–8.4)
PROT UR QL STRIP: NEGATIVE
RBC # BLD AUTO: 3.1 M/UL (ref 4.6–6.2)
SODIUM SERPL-SCNC: 136 MMOL/L (ref 136–145)
SP GR UR STRIP: 1.03 (ref 1–1.03)
URN SPEC COLLECT METH UR: NORMAL
WBC # BLD AUTO: 2.15 K/UL (ref 3.9–12.7)

## 2023-06-29 PROCEDURE — 63600175 PHARM REV CODE 636 W HCPCS: Mod: HCNC

## 2023-06-29 PROCEDURE — 96372 THER/PROPH/DIAG INJ SC/IM: CPT

## 2023-06-29 PROCEDURE — 25000003 PHARM REV CODE 250: Mod: HCNC

## 2023-06-29 PROCEDURE — 85025 COMPLETE CBC W/AUTO DIFF WBC: CPT | Mod: HCNC

## 2023-06-29 PROCEDURE — 84132 ASSAY OF SERUM POTASSIUM: CPT | Mod: 91,HCNC

## 2023-06-29 PROCEDURE — C9113 INJ PANTOPRAZOLE SODIUM, VIA: HCPCS | Mod: HCNC

## 2023-06-29 PROCEDURE — 36415 COLL VENOUS BLD VENIPUNCTURE: CPT | Mod: HCNC

## 2023-06-29 PROCEDURE — 86140 C-REACTIVE PROTEIN: CPT | Mod: HCNC

## 2023-06-29 PROCEDURE — 99215 OFFICE O/P EST HI 40 MIN: CPT | Mod: HCNC,GC,, | Performed by: INTERNAL MEDICINE

## 2023-06-29 PROCEDURE — 99233 SBSQ HOSP IP/OBS HIGH 50: CPT | Mod: HCNC,,,

## 2023-06-29 PROCEDURE — 99233 PR SUBSEQUENT HOSPITAL CARE,LEVL III: ICD-10-PCS | Mod: HCNC,,,

## 2023-06-29 PROCEDURE — G0378 HOSPITAL OBSERVATION PER HR: HCPCS | Mod: HCNC

## 2023-06-29 PROCEDURE — 81003 URINALYSIS AUTO W/O SCOPE: CPT | Mod: HCNC | Performed by: EMERGENCY MEDICINE

## 2023-06-29 PROCEDURE — 96376 TX/PRO/DX INJ SAME DRUG ADON: CPT

## 2023-06-29 PROCEDURE — 85652 RBC SED RATE AUTOMATED: CPT | Mod: HCNC

## 2023-06-29 PROCEDURE — 99215 PR OFFICE/OUTPT VISIT, EST, LEVL V, 40-54 MIN: ICD-10-PCS | Mod: HCNC,GC,, | Performed by: INTERNAL MEDICINE

## 2023-06-29 PROCEDURE — 96375 TX/PRO/DX INJ NEW DRUG ADDON: CPT

## 2023-06-29 PROCEDURE — 83735 ASSAY OF MAGNESIUM: CPT | Mod: HCNC

## 2023-06-29 PROCEDURE — 80053 COMPREHEN METABOLIC PANEL: CPT | Mod: HCNC

## 2023-06-29 PROCEDURE — 96361 HYDRATE IV INFUSION ADD-ON: CPT

## 2023-06-29 PROCEDURE — 84145 PROCALCITONIN (PCT): CPT | Mod: HCNC

## 2023-06-29 RX ORDER — POTASSIUM CHLORIDE 7.45 MG/ML
10 INJECTION INTRAVENOUS
Status: COMPLETED | OUTPATIENT
Start: 2023-06-29 | End: 2023-06-29

## 2023-06-29 RX ORDER — SODIUM CHLORIDE 9 MG/ML
INJECTION, SOLUTION INTRAVENOUS CONTINUOUS
Status: ACTIVE | OUTPATIENT
Start: 2023-06-29 | End: 2023-06-29

## 2023-06-29 RX ORDER — ATORVASTATIN CALCIUM 20 MG/1
20 TABLET, FILM COATED ORAL DAILY
Status: DISCONTINUED | OUTPATIENT
Start: 2023-06-29 | End: 2023-07-02 | Stop reason: HOSPADM

## 2023-06-29 RX ORDER — POLYETHYLENE GLYCOL 3350 17 G/17G
17 POWDER, FOR SOLUTION ORAL DAILY
Status: DISCONTINUED | OUTPATIENT
Start: 2023-06-29 | End: 2023-06-29

## 2023-06-29 RX ORDER — PANTOPRAZOLE SODIUM 40 MG/10ML
40 INJECTION, POWDER, LYOPHILIZED, FOR SOLUTION INTRAVENOUS DAILY
Status: DISCONTINUED | OUTPATIENT
Start: 2023-06-29 | End: 2023-07-02 | Stop reason: HOSPADM

## 2023-06-29 RX ORDER — SENNOSIDES 8.6 MG/1
8.6 TABLET ORAL 2 TIMES DAILY
Status: DISCONTINUED | OUTPATIENT
Start: 2023-06-29 | End: 2023-06-30

## 2023-06-29 RX ORDER — AMLODIPINE BESYLATE 10 MG/1
10 TABLET ORAL DAILY
Status: DISCONTINUED | OUTPATIENT
Start: 2023-06-29 | End: 2023-07-02 | Stop reason: HOSPADM

## 2023-06-29 RX ORDER — POLYETHYLENE GLYCOL 3350 17 G/17G
17 POWDER, FOR SOLUTION ORAL 2 TIMES DAILY
Status: DISCONTINUED | OUTPATIENT
Start: 2023-06-29 | End: 2023-06-30

## 2023-06-29 RX ORDER — INSULIN ASPART 100 [IU]/ML
3 INJECTION, SOLUTION INTRAVENOUS; SUBCUTANEOUS
Status: DISCONTINUED | OUTPATIENT
Start: 2023-06-29 | End: 2023-07-02 | Stop reason: HOSPADM

## 2023-06-29 RX ORDER — CARVEDILOL 3.12 MG/1
3.12 TABLET ORAL 2 TIMES DAILY
Status: DISCONTINUED | OUTPATIENT
Start: 2023-06-29 | End: 2023-07-02 | Stop reason: HOSPADM

## 2023-06-29 RX ORDER — ABIRATERONE ACETATE 250 MG/1
1000 TABLET ORAL DAILY
Status: DISCONTINUED | OUTPATIENT
Start: 2023-06-29 | End: 2023-06-29

## 2023-06-29 RX ADMIN — SENNOSIDES 8.6 MG: 8.6 TABLET, FILM COATED ORAL at 09:06

## 2023-06-29 RX ADMIN — POTASSIUM CHLORIDE 10 MEQ: 10 INJECTION, SOLUTION INTRAVENOUS at 06:06

## 2023-06-29 RX ADMIN — POTASSIUM CHLORIDE 10 MEQ: 10 INJECTION, SOLUTION INTRAVENOUS at 10:06

## 2023-06-29 RX ADMIN — INSULIN ASPART 3 UNITS: 100 INJECTION, SOLUTION INTRAVENOUS; SUBCUTANEOUS at 05:06

## 2023-06-29 RX ADMIN — SENNOSIDES 8.6 MG: 8.6 TABLET, FILM COATED ORAL at 10:06

## 2023-06-29 RX ADMIN — ATORVASTATIN CALCIUM 20 MG: 20 TABLET, FILM COATED ORAL at 08:06

## 2023-06-29 RX ADMIN — AMLODIPINE BESYLATE 10 MG: 10 TABLET ORAL at 08:06

## 2023-06-29 RX ADMIN — POTASSIUM CHLORIDE 10 MEQ: 10 INJECTION, SOLUTION INTRAVENOUS at 04:06

## 2023-06-29 RX ADMIN — PANTOPRAZOLE SODIUM 40 MG: 40 INJECTION, POWDER, FOR SOLUTION INTRAVENOUS at 08:06

## 2023-06-29 RX ADMIN — POLYETHYLENE GLYCOL 3350 17 G: 17 POWDER, FOR SOLUTION ORAL at 09:06

## 2023-06-29 RX ADMIN — ENOXAPARIN SODIUM 40 MG: 40 INJECTION SUBCUTANEOUS at 04:06

## 2023-06-29 RX ADMIN — INSULIN ASPART 2 UNITS: 100 INJECTION, SOLUTION INTRAVENOUS; SUBCUTANEOUS at 11:06

## 2023-06-29 RX ADMIN — INSULIN ASPART 2 UNITS: 100 INJECTION, SOLUTION INTRAVENOUS; SUBCUTANEOUS at 04:06

## 2023-06-29 RX ADMIN — INSULIN DETEMIR 15 UNITS: 100 INJECTION, SOLUTION SUBCUTANEOUS at 09:06

## 2023-06-29 RX ADMIN — CARVEDILOL 3.12 MG: 3.12 TABLET, FILM COATED ORAL at 08:06

## 2023-06-29 RX ADMIN — SODIUM CHLORIDE: 9 INJECTION, SOLUTION INTRAVENOUS at 10:06

## 2023-06-29 RX ADMIN — POTASSIUM CHLORIDE 10 MEQ: 10 INJECTION, SOLUTION INTRAVENOUS at 05:06

## 2023-06-29 RX ADMIN — CARVEDILOL 3.12 MG: 3.12 TABLET, FILM COATED ORAL at 09:06

## 2023-06-29 RX ADMIN — POTASSIUM CHLORIDE 10 MEQ: 10 INJECTION, SOLUTION INTRAVENOUS at 09:06

## 2023-06-29 RX ADMIN — SODIUM CHLORIDE: 9 INJECTION, SOLUTION INTRAVENOUS at 04:06

## 2023-06-29 RX ADMIN — POTASSIUM CHLORIDE 10 MEQ: 10 INJECTION, SOLUTION INTRAVENOUS at 08:06

## 2023-06-29 RX ADMIN — POTASSIUM CHLORIDE 10 MEQ: 10 INJECTION, SOLUTION INTRAVENOUS at 07:06

## 2023-06-29 NOTE — H&P
[facility] - Emergency Dept  [facility]  History & Physical    Patient Name: Misha Eldridge Jr.  MRN: [id number]  Patient Class: OP- Observation  Admission Date: 6/28/2023  Attending Physician: Halle Galindo MD   Primary Care Provider: Dipak Treviño MD         Patient information was obtained from patient, past medical records and ER records.     Subjective:     Principal Problem:Emesis, persistent    Chief Complaint:   Chief Complaint   Patient presents with    Abdominal Pain     Not eating since thurs,  finished radiation for prostate cancer month ago,  thinks might have blockage        HPI: Misha Eldridge Jr. Is a 78 y.o. male with PMHx ht, DMII, prostate cancer and colon cancer  who presents with emesis immediately after PO intake, abdominal distension, and constipation. He eas last able to eat a full meal last week. Over the last few days he has emesis immediately after PO intake without nausea. His abdomen has gradually become more distended but he denies abdominal pain. He had some diarrhea a few days ago but now has constipation. He strains but only has small amounts of loose stool. Denies bloody BM or emesis. Denies urinary changes. He denies fever, chills, sore throat, or globus sensation. Denies chest pain. He is a somewhat poor historian.        In the ED, T 99 F, HR 87, RR 20, /63, SpO2 97%. WBC 2.98, H/H 10.9/32.8. Cmp with K 2.9, Cl 93, Na 134. Cr 1.5 (bl 1.2-1.3), Mag 1.4. Tbili 1.1. Lipase wnl. Lactate 1.7. CT Postoperative changes of the bowel, diverticulosis w/o diverticulitis, Mild diffuse wall thickening of the rectum could represent proctitis. Given 1L NS bolus, 10 meq Kcl x2, 2 g magnesium, zofran, morphine.  Admitted to  for further management.       Past Medical History:   Diagnosis Date    Cataract     Colostomy in place     Dermatochalasis     Diabetes mellitus type II     Dry eye syndrome     Hemorrhoid     History of colon cancer     History of prostate cancer      History of pulmonary embolism     Hypertension     Macular degeneration        Past Surgical History:   Procedure Laterality Date    CATARACT EXTRACTION Bilateral     COLONOSCOPY N/A 06/02/2022    Procedure: COLONOSCOPY;  Surgeon: Jose Dangelo MD;  Location: [facility] ([address] [place] [address]);  Service: Endoscopy;  Laterality: N/A;  fully vaccinated    EYE SURGERY Bilateral     cataract extraction    HERNIA REPAIR      PROSTATECTOMY      SUBTOTAL COLECTOMY         Review of patient's allergies indicates:   Allergen Reactions    Hay fever and allergy relief        Current Facility-Administered Medications on File Prior to Encounter   Medication    LIDOcaine HCL 20 mg/ml (2%) injection 10 mL     Current Outpatient Medications on File Prior to Encounter   Medication Sig    abiraterone (ZYTIGA) 250 mg Tab Take 4 tablets (1,000 mg total) by mouth once daily.    ACCU-CHEK SOFTCLIX LANCETS Misc TEST BLOOD SUGAR ONCE A DAY    amLODIPine (NORVASC) 10 MG tablet Take 1 tablet (10 mg total) by mouth once daily.    atorvastatin (LIPITOR) 20 MG tablet Take 1 tablet (20 mg total) by mouth once daily.    blood sugar diagnostic Strp 1 each by Misc.(Non-Drug; Combo Route) route once daily.    blood sugar diagnostic Strp Pt to check up to 6 times daily    blood sugar diagnostic Strp Use to check blood glucose 1-2 times daily as directed.    blood-glucose meter kit Use as instructed    carvediloL (COREG) 3.125 MG tablet Take 1 tablet (3.125 mg total) by mouth 2 (two) times daily.    diclofenac (VOLTAREN) 75 MG EC tablet Take 1 tablet (75 mg total) by mouth 2 (two) times daily.    doxazosin (CARDURA) 4 MG tablet TAKE 1 TABLET(4 MG) BY MOUTH EVERY EVENING    gabapentin (NEURONTIN) 300 MG capsule Take 1 capsule (300 mg total) by mouth 3 (three) times daily.    glipiZIDE (GLUCOTROL) 10 MG tablet Take 1 tablet (10 mg total) by mouth 2 (two) times daily before meals.    hydroCHLOROthiazide (HYDRODIURIL) 25 MG tablet TAKE ONE-HALF TABLET BY MOUTH  "ONCE DAILY    insulin aspart U-100 (NOVOLOG FLEXPEN U-100 INSULIN) 100 unit/mL (3 mL) InPn pen Inject 8 Units into the skin 3 (three) times daily with meals.    insulin glargine U-300 conc (TOUJEO MAX U-300 SOLOSTAR) 300 unit/mL (3 mL) insulin pen Inject 28 Units into the skin once daily.    irbesartan (AVAPRO) 300 MG tablet Take 1 tablet (300 mg total) by mouth every evening.    lancing device with lancets Kit 1 each by Misc.(Non-Drug; Combo Route) route 2 (two) times daily.    metFORMIN (GLUCOPHAGE) 1000 MG tablet Take 1 tablet (1,000 mg total) by mouth 2 (two) times daily with meals.    pen needle, diabetic (BD ULTRA-FINE KELECHI PEN NEEDLE) 32 gauge x 5/32" Ndle To use with insulin pens at meals and evening shot.    polyethylene glycol (GOLYTELY) 236-22.74-6.74 -5.86 gram suspension Take 4,000 mLs by mouth once.    predniSONE (DELTASONE) 5 MG tablet Take 1 tablet (5 mg total) by mouth 2 (two) times daily.    predniSONE (DELTASONE) 5 MG tablet Take 1 tablet (5 mg total) by mouth 2 (two) times daily.    semaglutide (RYBELSUS) 14 mg tablet Take 1 tablet (14 mg total) by mouth once daily.    triamcinolone acetonide 0.1% (KENALOG) 0.1 % cream Apply topically 2 (two) times daily.     Family History       Problem Relation (Age of Onset)    Alcohol abuse Father    Arthritis Mother    Cancer Mother    Hypertension Mother    No Known Problems Daughter          Tobacco Use    Smoking status: Never    Smokeless tobacco: Never   Substance and Sexual Activity    Alcohol use: No    Drug use: No    Sexual activity: Not Currently     Partners: Female     Review of Systems   Constitutional:  Negative for chills and fever.   HENT:  Negative for sore throat and trouble swallowing.    Eyes:  Negative for photophobia and visual disturbance.   Respiratory:  Negative for shortness of breath and wheezing.    Cardiovascular:  Negative for chest pain and leg swelling.   Gastrointestinal:  Positive for abdominal distention, constipation, " diarrhea and vomiting. Negative for abdominal pain and nausea.   Genitourinary:  Negative for dysuria and frequency.   Musculoskeletal:  Negative for back pain and gait problem.   Skin:  Negative for color change and rash.   Neurological:  Negative for light-headedness and headaches.   Psychiatric/Behavioral:  Negative for confusion. The patient is not nervous/anxious.    Objective:     Vital Signs (Most Recent):  Temp: 98.2 °F (36.8 °C) (06/29/23 0005)  Pulse: 80 (06/29/23 0102)  Resp: 17 (06/29/23 0102)  BP: (!) 113/57 (06/29/23 0102)  SpO2: (!) 94 % (06/29/23 0102) Vital Signs (24h Range):  Temp:  [98.2 °F (36.8 °C)-99 °F (37.2 °C)] 98.2 °F (36.8 °C)  Pulse:  [71-88] 80  Resp:  [12-26] 17  SpO2:  [88 %-97 %] 94 %  BP: (101-145)/(55-76) 113/57     Weight: 97.1 kg (214 lb)  Body mass index is 28.23 kg/m².     Physical Exam  Vitals and nursing note reviewed.   Constitutional:       General: He is not in acute distress.  HENT:      Head: Normocephalic and atraumatic.      Nose: Nose normal.      Mouth/Throat:      Pharynx: No oropharyngeal exudate.   Eyes:      Extraocular Movements: Extraocular movements intact.      Conjunctiva/sclera: Conjunctivae normal.   Cardiovascular:      Rate and Rhythm: Normal rate and regular rhythm.      Heart sounds: Normal heart sounds.   Pulmonary:      Effort: Pulmonary effort is normal. No respiratory distress.      Breath sounds: Normal breath sounds.   Abdominal:      General: Bowel sounds are normal.      Tenderness: There is no guarding or rebound.      Comments: Large non tender abdomen with old surgical scars   Musculoskeletal:         General: Normal range of motion.      Cervical back: Normal range of motion.      Right lower leg: No edema.      Left lower leg: No edema.   Skin:     General: Skin is warm and dry.   Neurological:      General: No focal deficit present.      Mental Status: He is alert and oriented to person, place, and time.   Psychiatric:         Mood and  Affect: Mood normal.         Behavior: Behavior normal.              Significant Labs: All pertinent labs within the past 24 hours have been reviewed.  Blood Culture: No results for input(s): LABBLOO in the last 48 hours.  CBC:   Recent Labs   Lab 06/28/23  1724 06/28/23  1724   WBC 2.98*  --    HGB 10.9*  --    HCT 32.8* 32*     --      CMP:   Recent Labs   Lab 06/28/23  1724   *   K 2.9*   CL 93*   CO2 25   *   BUN 12   CREATININE 1.5*   CALCIUM 9.2   PROT 6.7   ALBUMIN 3.5   BILITOT 1.1*   ALKPHOS 75   AST 30   ALT 19   ANIONGAP 16     Lactic Acid:   Recent Labs   Lab 06/28/23  1724   LACTATE 1.7     Lipase:   Recent Labs   Lab 06/28/23  1724   LIPASE 15     Magnesium:   Recent Labs   Lab 06/28/23  1724   MG 1.4*       Significant Imaging: I have reviewed all pertinent imaging results/findings within the past 24 hours.  CT Abdomen Pelvis With Contrast  Narrative: EXAMINATION:  CT ABDOMEN PELVIS WITH CONTRAST    CLINICAL HISTORY:  Abdominal pain, acute, nonlocalized;    TECHNIQUE:  Low dose axial images, sagittal and coronal reformations were obtained from the lung bases to the pubic symphysis following the IV administration of 100 mL of Omnipaque 350 .  Oral contrast was not administered.    COMPARISON:  None.    FINDINGS:  Abdomen:    - Lower thorax:Few calcified pleural plaques anteriorly at the right lung base.    - Lung bases: No infiltrates and no nodules.    - Liver: The liver is enlarged with mild diffuse fatty infiltration.  No focal abnormality.    - Gallbladder: No calcified gallstones.    - Bile Ducts: No evidence of intra or extra hepatic biliary ductal dilation.    - Spleen: Negative.    - Kidneys: Few bilateral renal cysts.  No stone, soft tissue mass or hydronephrosis bilaterally.    - Adrenals: Unremarkable.    - Pancreas: No mass or peripancreatic fat stranding.    - Retroperitoneum:  No significant adenopathy.    - Vascular: No abdominal aortic aneurysm.  Retroaortic left  renal vein as an anatomic variant.    - Abdominal wall:  Unremarkable.    Pelvis:    Mild urinary bladder wall thickening.  No focal abnormality.    Bowel/Mesentery:    No evidence of bowel obstruction.  Mild wall thickening of the rectum.  Postop changes of the rectosigmoid junction.    Diverticulosis of the colon with no evidence of acute diverticulitis.    Postop changes of the cecum.  Suspected prior appendectomy.    Postop changes of the small bowel near the midline in the pelvis.    Bones:  No acute fractures.    Mild diffuse disc bulge at L4-5 with severe central canal narrowing.    Severe foraminal narrowing at L5-S1 left.  Mild-to-moderate foraminal narrowing elsewhere in the lumbar spine bilaterally.    Mild disc space narrowing and vacuum disc phenomena at L5-S1.  Impression: 1. Postoperative changes of the bowel.  See above comments.  2. Mild diffuse wall thickening of the rectum could represent proctitis.  Recommend follow-up.  3. Diverticulosis of the colon.  4. Hepatomegaly and hepatic steatosis.  5. Few calcified pleural plaques anteriorly at the right lung base.  6. Multilevel chronic degenerative changes of the lumbar spine.  See above comments.    Electronically signed by: Harish Reilly  Date:    06/28/2023  Time:    21:23       Assessment/Plan:     * Emesis, persistent  Pt has been unable to tolerate PO intake x5 days. He has immediate emesis after eating without associated nausea or abdominal discomfort. He also feels the urge for BM but reports constipation with scant diarrhea. Reports abdominal distension.  -AFVSS. Wbc 2.93k (chronicaly low)  -Lipase, LFT's unremarkable but with electrolyte abnormalities/EDUAR and isolated elevation of T bili  -CT unremarkable, except Mild diffuse wall thickening of the rectum could represent proctitis noted  -No previous EGD, most recent c-scope reviewed  -Bariatric clears, advance diet as tolerated  -Supportive care  -GI consulted, appreciate  recommendaitons    Hypokalemia  Patient with noted electrolyte deficiencies with Hypo-Kalemia. Latest electrolytes have been reviewed and values are   Potassium   Date Value Ref Range Status   06/28/2023 2.9 (L) 3.5 - 5.1 mmol/L Final   . Will continue to monitor electrolytes closely. Will replace the affected electrolytes and repeat labs to be done after interventions completed.      Hypomagnesemia  Patient with noted electrolyte deficiencies with Hypo-Magnesemia. Latest electrolytes have been reviewed and values are   Magnesium   Date Value Ref Range Status   06/28/2023 1.4 (L) 1.6 - 2.6 mg/dL Final   . Will continue to monitor electrolytes closely. Will replace the affected electrolytes and repeat labs to be done after interventions completed    Prostate cancer  -Follows w/ heme onc.   -Recent recurrence, completed XRT 2/2023 now on zytiga 1,000 mg daily and prednisone w/ PSA response seen.  -On CT, Mild diffuse wall thickening of the rectum could represent proctitis.  Recommend follow-up.    Acute kidney injury superimposed on chronic kidney disease  -Cr 1.5, baseline 1.2.   -likely prerenal hypovolemia in setting of no NPO for days  -Given 1L NS bolus in ED  -continue IVFs until able to tolerate PO  -daily BMP    Controlled type 2 diabetes mellitus with diabetic neuropathy, without long-term current use of insulin  Patient's FSGs are controlled on current medication regimen.  Last A1c reviewed-   Lab Results   Component Value Date    HGBA1C 11.6 (H) 05/08/2023     Most recent fingerstick glucose reviewed- No results for input(s): POCTGLUCOSE in the last 24 hours.  Current correctional scale  Low  Maintain anti-hyperglycemic dose as follows-   Antihyperglycemics (From admission, onward)      Start     Stop Route Frequency Ordered    06/29/23 2100  insulin detemir U-100 (Levemir) pen 10 Units         -- SubQ Nightly 06/28/23 2217    06/28/23 2309  insulin aspart U-100 pen 0-5 Units         -- SubQ Before meals &  nightly PRN 06/28/23 2217          Hold Oral hypoglycemics while patient is in the hospital.    History of colon cancer  5/2/2012 s/p LAR/small bowel resection/colostomy    Hypertension  -Stable on admission  -Continue home antihypertensive, hold home ARB in setting of EDUAR      VTE Risk Mitigation (From admission, onward)           Ordered     enoxaparin injection 40 mg  Daily         06/28/23 2217     IP VTE HIGH RISK PATIENT  Once         06/28/23 2217     Place sequential compression device  Until discontinued         06/28/23 2217                         On 06/28/2023, patient should be placed in hospital observation services under my care in collaboration with Bubba Davis MD.      Mila Green PA-C  Department of Hospital Medicine  [facility] - Emergency Dept

## 2023-06-29 NOTE — ASSESSMENT & PLAN NOTE
-Cr 1.5, baseline 1.2.   -likely prerenal hypovolemia in setting of no NPO for days  -Given 1L NS bolus in ED  -continue IVFs until able to tolerate PO  -daily BMP

## 2023-06-29 NOTE — HPI
Misha Eldridge Jr. Is a 78 y.o. male with PMHx ht, DMII, prostate cancer and colon cancer  who presents with emesis immediately after PO intake, abdominal distension, and constipation. He eas last able to eat a full meal last week. Over the last few days he has emesis immediately after PO intake without nausea. His abdomen has gradually become more distended but he denies abdominal pain. He had some diarrhea a few days ago but now has constipation. He strains but only has small amounts of loose stool. Denies bloody BM or emesis. Denies urinary changes. He denies fever, chills, sore throat, or globus sensation. Denies chest pain. He is a somewhat poor historian.        In the ED, T 99 F, HR 87, RR 20, /63, SpO2 97%. WBC 2.98, H/H 10.9/32.8. Cmp with K 2.9, Cl 93, Na 134. Cr 1.5 (bl 1.2-1.3), Mag 1.4. Tbili 1.1. Lipase wnl. Lactate 1.7. CT Postoperative changes of the bowel, diverticulosis w/o diverticulitis, Mild diffuse wall thickening of the rectum could represent proctitis. Given 1L NS bolus, 10 meq Kcl x2, 2 g magnesium, zofran, morphine.  Admitted to  for further management.

## 2023-06-29 NOTE — ASSESSMENT & PLAN NOTE
Pt has been unable to tolerate PO intake x5 days. He has immediate emesis after eating without associated nausea or abdominal discomfort. He also feels the urge for BM but reports constipation with scant diarrhea. Reports abdominal distension.  -AFVSS. Wbc 2.93k (chronicaly low)  -Lipase, LFT's unremarkable but with electrolyte abnormalities/EDUAR and isolated elevation of T bili  -CT unremarkable, except Mild diffuse wall thickening of the rectum could represent proctitis noted  -No previous EGD, most recent c-scope reviewed  -Bariatric clears, advance diet as tolerated  -Supportive care  -GI consulted, appreciate recommendaitons

## 2023-06-29 NOTE — PLAN OF CARE
Problem: Infection  Goal: Absence of Infection Signs and Symptoms  6/29/2023 0939 by Rachel Davis RN  Outcome: Ongoing, Progressing  6/29/2023 0939 by Rachel Davis RN  Outcome: Ongoing, Progressing     Problem: Adult Inpatient Plan of Care  Goal: Plan of Care Review  6/29/2023 0939 by Rachel Davis RN  Outcome: Ongoing, Progressing  6/29/2023 0939 by Rachel Davis RN  Outcome: Ongoing, Progressing  Goal: Patient-Specific Goal (Individualized)  6/29/2023 0939 by Rachel Davis RN  Outcome: Ongoing, Progressing  6/29/2023 0939 by Rachel Davis RN  Outcome: Ongoing, Progressing  Goal: Absence of Hospital-Acquired Illness or Injury  6/29/2023 0939 by Rachel Davis RN  Outcome: Ongoing, Progressing  6/29/2023 0939 by Rachel Davis RN  Outcome: Ongoing, Progressing  Goal: Optimal Comfort and Wellbeing  6/29/2023 0939 by Rachel Davis RN  Outcome: Ongoing, Progressing  6/29/2023 0939 by Rachel Davis RN  Outcome: Ongoing, Progressing  Goal: Readiness for Transition of Care  6/29/2023 0939 by Rachel Davis RN  Outcome: Ongoing, Progressing  6/29/2023 0939 by Rachel Davis RN  Outcome: Ongoing, Progressing     Problem: Diabetes Comorbidity  Goal: Blood Glucose Level Within Targeted Range  6/29/2023 0939 by Rachel Davis RN  Outcome: Ongoing, Progressing  6/29/2023 0939 by Rachel Davis RN  Outcome: Ongoing, Progressing     Problem: Fluid and Electrolyte Imbalance (Acute Kidney Injury/Impairment)  Goal: Fluid and Electrolyte Balance  6/29/2023 0939 by Rachel Davis RN  Outcome: Ongoing, Progressing  6/29/2023 0939 by Rachel Davis RN  Outcome: Ongoing, Progressing     Problem: Oral Intake Inadequate (Acute Kidney Injury/Impairment)  Goal: Optimal Nutrition Intake  6/29/2023 0939 by Rachel Davis RN  Outcome: Ongoing, Progressing  6/29/2023 0939 by Rachel Davis RN  Outcome: Ongoing, Progressing     Problem: Renal Function Impairment (Acute Kidney Injury/Impairment)  Goal: Effective Renal  Function  6/29/2023 0939 by Rachel Davis RN  Outcome: Ongoing, Progressing  6/29/2023 0939 by Rachel Davis RN  Outcome: Ongoing, Progressing

## 2023-06-29 NOTE — PLAN OF CARE
[facility]  Initial Discharge Assessment       Primary Care Provider: Dipak Treviño MD    Admission Diagnosis: Hypokalemia [E87.6]  Hypomagnesemia [E83.42]  EDUAR (acute kidney injury) [N17.9]  Abdominal pain [R10.9]  Chest pain [R07.9]    Admission Date: 6/28/2023  Expected Discharge Date: 6/30/2023         Payor: [employer] MEDICARE / Plan: [employer] HMO PPO SPECIAL NEEDS / Product Type: Medicare Advantage /     Extended Emergency Contact Information  Primary Emergency Contact: Lianet Krueger   [facility]  Home Phone: [phone]  Relation: Mother    Discharge Plan A: (P) Home, Home with family  Discharge Plan B: (P) Home, Home with family, Home Health      "[facility]" STORE #[id number] [place] [address] [place] [address] [address] [facility] [address]  Phone: [phone] Fax: [phone]    [address] [address]  Phone: [phone] [phone] Fax: [phone]       06/29/23 1205   Discharge Planning   Assessment Type Discharge Planning Brief Assessment   Resource/Environmental Concerns none   Support Systems Family members   Equipment Currently Used at Home none   Current Living Arrangements home   Patient/Family Anticipates Transition to home   Patient/Family Anticipated Services at Transition none   DME Needed Upon Discharge  none   Discharge Plan A Home;Home with family   Discharge Plan B Home;Home with family;Home Health   Social Connections   Are you , , , , never , or living with a partner? Never marrie        Patient states he was independent prior to admission. Patient denies use of DME and services in the home. Patient states he is not on HD or take coumadin. Patient states that his car is in the parking deck he will transport self home. His daughter lives in [place]. Patient states he went to his PCP yesterday. No  needs at this time. Bisi Bazan RN

## 2023-06-29 NOTE — ASSESSMENT & PLAN NOTE
Patient with noted electrolyte deficiencies with Hypo-Magnesemia. Latest electrolytes have been reviewed and values are   Magnesium   Date Value Ref Range Status   06/28/2023 1.4 (L) 1.6 - 2.6 mg/dL Final   . Will continue to monitor electrolytes closely. Will replace the affected electrolytes and repeat labs to be done after interventions completed

## 2023-06-29 NOTE — ASSESSMENT & PLAN NOTE
resolved  -Cr 1.5 > 1.0  -likely prerenal hypovolemia in setting of no NPO for days  -continue IVFs until able to tolerate PO  -daily BMP

## 2023-06-29 NOTE — ED NOTES
Report rec'd from Joyce. Assumed care of 79 yo M presenting to the ED c/o abdominal pain. Resting in stretcher, resp even & unlabored, VSS. Bed locked & int he lowest position, rails up x2, call button in reach.

## 2023-06-29 NOTE — NURSING
Patient admitted to room 1124. Patient oriented to room and call light. VSS. Tele maintained. NSR noted. No acute distress noted. Call light in reach. Denies feeling of nausea and vomiting. Will  continue monitor.

## 2023-06-29 NOTE — PROGRESS NOTES
[facility] [address] [place] [address] Medicine  Progress Note    Patient Name: Misha Eldridge Jr.  MRN: [id number]  Patient Class: OP- Observation   Admission Date: 6/28/2023  Length of Stay: 0 days  Attending Physician: Anna Cottrell MD  Primary Care Provider: Dipak Treviño MD        Subjective:     Principal Problem:Intractable nausea and vomiting        HPI:  Misha Eldridge Jr. Is a 78 y.o. male with PMHx ht, DMII, prostate cancer and colon cancer  who presents with emesis immediately after PO intake, abdominal distension, and constipation. He eas last able to eat a full meal last week. Over the last few days he has emesis immediately after PO intake without nausea. His abdomen has gradually become more distended but he denies abdominal pain. He had some diarrhea a few days ago but now has constipation. He strains but only has small amounts of loose stool. Denies bloody BM or emesis. Denies urinary changes. He denies fever, chills, sore throat, or globus sensation. Denies chest pain. He is a somewhat poor historian.        In the ED, T 99 F, HR 87, RR 20, /63, SpO2 97%. WBC 2.98, H/H 10.9/32.8. Cmp with K 2.9, Cl 93, Na 134. Cr 1.5 (bl 1.2-1.3), Mag 1.4. Tbili 1.1. Lipase wnl. Lactate 1.7. CT Postoperative changes of the bowel, diverticulosis w/o diverticulitis, Mild diffuse wall thickening of the rectum could represent proctitis. Given 1L NS bolus, 10 meq Kcl x2, 2 g magnesium, zofran, morphine.  Admitted to  for further management.       Overview/Hospital Course:  Misha Eldridge Jr. Was admitted to hospital medicine for management of dysphagia, abdominal pain and constipation. GI consulted, plan for inpatient EGD.       Interval History:  NAEON. AFVSS. K replaced, 2.9 > 3.5.  Patient evaluated at bedside. Endorsing persistent decreased appetite and poor PO intake secondary to dysphagia/regurgitation. GI consulted, plan for inpatient EGD.  Aggressive bowel regimen initiated, had successful BM today.      Review of Systems   Constitutional:  Negative for chills and fever.   HENT:  Negative for sore throat and trouble swallowing.    Eyes:  Negative for photophobia and visual disturbance.   Respiratory:  Negative for shortness of breath and wheezing.    Cardiovascular:  Negative for chest pain and leg swelling.   Gastrointestinal:  Positive for abdominal distention, constipation, diarrhea and vomiting. Negative for abdominal pain and nausea.   Genitourinary:  Negative for dysuria and frequency.   Musculoskeletal:  Negative for back pain and gait problem.   Skin:  Negative for color change and rash.   Neurological:  Negative for light-headedness and headaches.   Psychiatric/Behavioral:  Negative for confusion. The patient is not nervous/anxious.    Objective:     Vital Signs (Most Recent):  Temp: 97.3 °F (36.3 °C) (06/29/23 1505)  Pulse: 69 (06/29/23 1505)  Resp: 18 (06/29/23 1505)  BP: 116/81 (06/29/23 1505)  SpO2: (!) 92 % (06/29/23 1505) Vital Signs (24h Range):  Temp:  [97.2 °F (36.2 °C)-98.3 °F (36.8 °C)] 97.3 °F (36.3 °C)  Pulse:  [69-88] 69  Resp:  [12-26] 18  SpO2:  [88 %-96 %] 92 %  BP: (101-139)/(55-81) 116/81     Weight: 92.3 kg (203 lb 7.8 oz)  Body mass index is 26.85 kg/m².    Intake/Output Summary (Last 24 hours) at 6/29/2023 1655  Last data filed at 6/29/2023 1038  Gross per 24 hour   Intake 760 ml   Output 1150 ml   Net -390 ml         Physical Exam  Vitals and nursing note reviewed.   Constitutional:       General: He is not in acute distress.     Appearance: He is well-developed.   HENT:      Head: Normocephalic and atraumatic.   Eyes:      Extraocular Movements: Extraocular movements intact.   Cardiovascular:      Rate and Rhythm: Normal rate and regular rhythm.      Heart sounds: Normal heart sounds.   Pulmonary:      Effort: Pulmonary effort is normal. No respiratory distress.   Abdominal:      General: Bowel sounds are increased. There is distension.      Tenderness: There is abdominal  tenderness in the left lower quadrant.      Comments: Prior midline surgical scarring   Musculoskeletal:         General: No tenderness. Normal range of motion.   Skin:     General: Skin is warm and dry.      Findings: No rash.   Neurological:      Mental Status: He is alert and oriented to person, place, and time.   Psychiatric:         Behavior: Behavior normal.         Thought Content: Thought content normal.         Judgment: Judgment normal.       Significant Labs: All pertinent labs within the past 24 hours have been reviewed.    Significant Imaging: I have reviewed all pertinent imaging results/findings within the past 24 hours.      Assessment/Plan:      * Intractable nausea and vomiting  Pt has been unable to tolerate PO intake x5 days. He has immediate emesis after eating without associated nausea or abdominal discomfort. He also feels the urge for BM but reports constipation with scant diarrhea. Reports abdominal distension.  -AFVSS. Wbc 2.93k (chronicaly low)  -Lipase, LFT's unremarkable but with electrolyte abnormalities/EDUAR and isolated elevation of T bili  -CT unremarkable, except Mild diffuse wall thickening of the rectum could represent proctitis noted  -No previous EGD, most recent c-scope reviewed  -Bariatric clears, advance diet as tolerated  -Supportive care  -GI consulted, appreciate recommendations   - Plan for EGD pending improvement in electrolytes and once patient has had bowel movement   - Intravascular resuscitation/support with IVFs     Hypokalemia due to excessive gastrointestinal loss of potassium  Patient with noted electrolyte deficiencies with Hypo-Kalemia. Latest electrolytes have been reviewed and values are   Potassium   Date Value Ref Range Status   06/29/2023 3.5 3.5 - 5.1 mmol/L Final   . Will continue to monitor electrolytes closely. Will replace the affected electrolytes and repeat labs to be done after interventions completed.     Hypomagnesemia  Patient with noted  electrolyte deficiencies with Hypo-Magnesemia. Latest electrolytes have been reviewed and values are   Magnesium   Date Value Ref Range Status   06/28/2023 1.4 (L) 1.6 - 2.6 mg/dL Final   . Will continue to monitor electrolytes closely. Will replace the affected electrolytes and repeat labs to be done after interventions completed    Prostate cancer  -Follows w/ heme onc.   -Recent recurrence, completed XRT 2/2023 now on zytiga 1,000 mg daily and prednisone w/ PSA response seen.  -On CT, Mild diffuse wall thickening of the rectum could represent proctitis.  Recommend follow-up.    Acute kidney injury superimposed on chronic kidney disease  resolved  -Cr 1.5 > 1.0  -likely prerenal hypovolemia in setting of no NPO for days  -continue IVFs until able to tolerate PO  -daily BMP    Controlled type 2 diabetes mellitus with diabetic neuropathy, without long-term current use of insulin  Patient's FSGs are controlled on current medication regimen.  Last A1c reviewed-   Lab Results   Component Value Date    HGBA1C 11.6 (H) 05/08/2023     Most recent fingerstick glucose reviewed-   Recent Labs   Lab 06/29/23  0717 06/29/23  1110   POCTGLUCOSE 159* 235*     Current correctional scale  Low  Maintain anti-hyperglycemic dose as follows-   Antihyperglycemics (From admission, onward)    Start     Stop Route Frequency Ordered    06/29/23 2100  insulin detemir U-100 (Levemir) pen 15 Units         -- SubQ Nightly 06/29/23 1643    06/29/23 1745  insulin aspart U-100 pen 3 Units         -- SubQ 3 times daily with meals 06/29/23 1643    06/28/23 2309  insulin aspart U-100 pen 0-5 Units         -- SubQ Before meals & nightly PRN 06/28/23 2217        Hold Oral hypoglycemics while patient is in the hospital.    History of colon cancer  5/2/2012 s/p LAR/small bowel resection/colostomy    Hypertension  -Stable on admission  -Continue home antihypertensive, hold home ARB in setting of EDUAR      VTE Risk Mitigation (From admission, onward)          Ordered     enoxaparin injection 40 mg  Daily         06/28/23 2217     IP VTE HIGH RISK PATIENT  Once         06/28/23 2217     Place sequential compression device  Until discontinued         06/28/23 2217                Discharge Planning   ELIZABETH: 6/30/2023     Code Status: Full Code   Is the patient medically ready for discharge?: No    Reason for patient still in hospital (select all that apply): Patient trending condition, Treatment, Imaging and Consult recommendations  Discharge Plan A: Home, Home with family                  Sheila Rhoades PA-C  Department of Hospital Medicine   [facility]

## 2023-06-29 NOTE — ASSESSMENT & PLAN NOTE
-Follows w/ heme onc.   -Recent recurrence, completed XRT 2/2023 now on zytiga 1,000 mg daily and prednisone w/ PSA response seen.  -On CT, Mild diffuse wall thickening of the rectum could represent proctitis.  Recommend follow-up.

## 2023-06-29 NOTE — ED PROVIDER NOTES
Encounter Date: 6/28/2023       History     Chief Complaint   Patient presents with    Abdominal Pain     Not eating since thurs,  finished radiation for prostate cancer month ago,  thinks might have blockage     Pt is a 77 yo M with PMH of prostate ca s/p prostatectomy and radiation, DM2, HTN, PE, colostomy for colon cancer a little more than 10 years ago who presents with abdominal distension, pain, nausea, vomiting, constipation. He has only had water for 5 days because he gets nauseated and vomiting with eating. He has not had a bowel movement in 4 days. He had a telehealth visit today and was referred to the ED.    The history is provided by the patient and medical records.   Review of patient's allergies indicates:   Allergen Reactions    Hay fever and allergy relief      Past Medical History:   Diagnosis Date    Cataract     Colostomy in place     Dermatochalasis     Diabetes mellitus type II     Dry eye syndrome     Hemorrhoid     History of colon cancer     History of prostate cancer     History of pulmonary embolism     Hypertension     Macular degeneration      Past Surgical History:   Procedure Laterality Date    CATARACT EXTRACTION Bilateral     COLONOSCOPY N/A 06/02/2022    Procedure: COLONOSCOPY;  Surgeon: Jose Dangelo MD;  Location: [address];  Service: Endoscopy;  Laterality: N/A;  fully vaccinated    EYE SURGERY Bilateral     cataract extraction    HERNIA REPAIR      PROSTATECTOMY      SUBTOTAL COLECTOMY       Family History   Problem Relation Age of Onset    Arthritis Mother     Hypertension Mother     Cancer Mother     Alcohol abuse Father     No Known Problems Daughter      Social History     Tobacco Use    Smoking status: Never    Smokeless tobacco: Never   Substance Use Topics    Alcohol use: No    Drug use: No         Physical Exam     Initial Vitals [06/28/23 1536]   BP Pulse Resp Temp SpO2   120/63 87 20 99 °F (37.2 °C) 97 %      MAP       --         Physical  Exam    Nursing note and vitals reviewed.  Constitutional: He appears well-developed and well-nourished. He is not diaphoretic. No distress.   HENT:   Head: Normocephalic and atraumatic.   Eyes: EOM are normal.   Neck: Neck supple.   Normal range of motion.  Cardiovascular:  Normal rate and regular rhythm.           Pulmonary/Chest: No respiratory distress.   Abdominal:   Abdomen is distended and firm but no rigid and is tender to palpation   no rebound or guarding  No palpable masses or hernias   Musculoskeletal:         General: Normal range of motion.      Cervical back: Normal range of motion and neck supple.     Neurological: He is alert and oriented to person, place, and time. GCS score is 15. GCS eye subscore is 4. GCS verbal subscore is 5. GCS motor subscore is 6.   Skin: Skin is warm and dry.   Psychiatric: He has a normal mood and affect. His behavior is normal. Judgment and thought content normal.       ED Course   Procedures  Labs Reviewed   CBC W/ AUTO DIFFERENTIAL - Abnormal; Notable for the following components:       Result Value    WBC 2.98 (*)     RBC 3.56 (*)     Hemoglobin 10.9 (*)     Hematocrit 32.8 (*)     Immature Granulocytes 0.7 (*)     Lymph # 0.5 (*)     Lymph % 16.8 (*)     Eosinophil % 8.4 (*)     All other components within normal limits   COMPREHENSIVE METABOLIC PANEL - Abnormal; Notable for the following components:    Sodium 134 (*)     Potassium 2.9 (*)     Chloride 93 (*)     Glucose 191 (*)     Creatinine 1.5 (*)     Total Bilirubin 1.1 (*)     eGFR 47.7 (*)     All other components within normal limits   MAGNESIUM - Abnormal; Notable for the following components:    Magnesium 1.4 (*)     All other components within normal limits    Narrative:     Add on MG per Dr. Chiu @ 18:47 pm to order # [id number]   ISTAT PROCEDURE - Abnormal; Notable for the following components:    POC Glucose 196 (*)     POC Creatinine 1.5 (*)     POC Sodium 133 (*)     POC Potassium 2.8 (*)     POC  Chloride 91 (*)     POC Ionized Calcium 0.91 (*)     POC Hematocrit 32 (*)     All other components within normal limits   HIV 1 / 2 ANTIBODY    Narrative:     Release to patient->Immediate   HEPATITIS C ANTIBODY    Narrative:     Release to patient->Immediate   LIPASE   LACTIC ACID, PLASMA   MAGNESIUM   ISTAT CHEM8     EKG Readings: (Independently Interpreted)   Initial Reading: No STEMI. Previous EKG: Compared with most recent EKG   At 5:14 p.m. sinus rhythm with frequent PVCs rate of 80 normal axis compared with EKG from 11/30/2020 PVCs are now present   ECG Results              EKG 12-lead (Final result)  Result time 06/29/23 12:32:04      Final result by Interface, Lab In [facility] (06/29/23 12:32:04)                   Narrative:    Test Reason : R10.9,    Vent. Rate : 080 BPM     Atrial Rate : 080 BPM     P-R Int : 188 ms          QRS Dur : 084 ms      QT Int : 400 ms       P-R-T Axes : 022 040 019 degrees     QTc Int : 461 ms    Sinus rhythm with frequent Premature ventricular complexes  Otherwise normal ECG  When compared with ECG of 30-NOV-2020 07:45,  Premature ventricular complexes are now Present  Confirmed by Renetta Miles MD ([id number]) on 6/29/2023 12:31:51 PM    Referred By: [email or web address]   SELF           Confirmed By:Renetta Miles MD                                  Imaging Results              CT Abdomen Pelvis With Contrast (Final result)  Result time 06/28/23 21:23:03      Final result by Harish Barnes MD (06/28/23 21:23:03)                   Impression:      1. Postoperative changes of the bowel.  See above comments.  2. Mild diffuse wall thickening of the rectum could represent proctitis.  Recommend follow-up.  3. Diverticulosis of the colon.  4. Hepatomegaly and hepatic steatosis.  5. Few calcified pleural plaques anteriorly at the right lung base.  6. Multilevel chronic degenerative changes of the lumbar spine.  See above comments.      Electronically signed by: Harish  Tommie  Date:    06/28/2023  Time:    21:23               Narrative:    EXAMINATION:  CT ABDOMEN PELVIS WITH CONTRAST    CLINICAL HISTORY:  Abdominal pain, acute, nonlocalized;    TECHNIQUE:  Low dose axial images, sagittal and coronal reformations were obtained from the lung bases to the pubic symphysis following the IV administration of 100 mL of Omnipaque 350 .  Oral contrast was not administered.    COMPARISON:  None.    FINDINGS:  Abdomen:    - Lower thorax:Few calcified pleural plaques anteriorly at the right lung base.    - Lung bases: No infiltrates and no nodules.    - Liver: The liver is enlarged with mild diffuse fatty infiltration.  No focal abnormality.    - Gallbladder: No calcified gallstones.    - Bile Ducts: No evidence of intra or extra hepatic biliary ductal dilation.    - Spleen: Negative.    - Kidneys: Few bilateral renal cysts.  No stone, soft tissue mass or hydronephrosis bilaterally.    - Adrenals: Unremarkable.    - Pancreas: No mass or peripancreatic fat stranding.    - Retroperitoneum:  No significant adenopathy.    - Vascular: No abdominal aortic aneurysm.  Retroaortic left renal vein as an anatomic variant.    - Abdominal wall:  Unremarkable.    Pelvis:    Mild urinary bladder wall thickening.  No focal abnormality.    Bowel/Mesentery:    No evidence of bowel obstruction.  Mild wall thickening of the rectum.  Postop changes of the rectosigmoid junction.    Diverticulosis of the colon with no evidence of acute diverticulitis.    Postop changes of the cecum.  Suspected prior appendectomy.    Postop changes of the small bowel near the midline in the pelvis.    Bones:  No acute fractures.    Mild diffuse disc bulge at L4-5 with severe central canal narrowing.    Severe foraminal narrowing at L5-S1 left.  Mild-to-moderate foraminal narrowing elsewhere in the lumbar spine bilaterally.    Mild disc space narrowing and vacuum disc phenomena at L5-S1.                                        Medications   sodium chloride 0.9% flush 5 mL (has no administration in time range)   melatonin tablet 6 mg (has no administration in time range)   ondansetron disintegrating tablet 8 mg (has no administration in time range)   prochlorperazine injection Soln 5 mg (has no administration in time range)   bisacodyL suppository 10 mg (has no administration in time range)   simethicone chewable tablet 80 mg (has no administration in time range)   aluminum-magnesium hydroxide-simethicone 200-200-20 mg/5 mL suspension 30 mL (has no administration in time range)   acetaminophen tablet 650 mg (has no administration in time range)   acetaminophen tablet 1,000 mg (has no administration in time range)   naloxone 0.4 mg/mL injection 0.02 mg (has no administration in time range)   glucose chewable tablet 16 g (has no administration in time range)   glucose chewable tablet 24 g (has no administration in time range)   glucagon (human recombinant) injection 1 mg (has no administration in time range)   dextrose 10% bolus 125 mL 125 mL (has no administration in time range)   dextrose 10% bolus 250 mL 250 mL (has no administration in time range)   insulin aspart U-100 pen 0-5 Units (2 Units Subcutaneous Given 6/29/23 1639)   enoxaparin injection 40 mg (40 mg Subcutaneous Given 6/29/23 1638)   0.9%  NaCl infusion ( Intravenous New Bag 6/29/23 1038)   amLODIPine tablet 10 mg (10 mg Oral Given 6/29/23 0816)   atorvastatin tablet 20 mg (20 mg Oral Given 6/29/23 0817)   carvediloL tablet 3.125 mg (3.125 mg Oral Given 6/29/23 0817)   pantoprazole injection 40 mg (40 mg Intravenous Given 6/29/23 0817)   polyethylene glycol packet 17 g (has no administration in time range)   senna tablet 8.6 mg (8.6 mg Oral Given 6/29/23 1038)   insulin detemir U-100 (Levemir) pen 15 Units (has no administration in time range)   insulin aspart U-100 pen 3 Units (3 Units Subcutaneous Given 6/29/23 1702)   potassium chloride 10 mEq in 100 mL IVPB (0 mEq  Intravenous Stopped 6/28/23 2111)   magnesium sulfate 2g in water 50mL IVPB (premix) (0 g Intravenous Stopped 6/28/23 2250)   morphine injection 4 mg (4 mg Intravenous Given 6/28/23 2031)   ondansetron injection 4 mg (4 mg Intravenous Given 6/28/23 2032)   sodium chloride 0.9% bolus 1,000 mL 1,000 mL (0 mLs Intravenous Stopped 6/28/23 2250)   potassium chloride 10 mEq in 100 mL IVPB (0 mEq Intravenous Stopped 6/28/23 2250)   iohexoL (OMNIPAQUE 350) injection 100 mL (100 mLs Intravenous Given 6/28/23 2051)   potassium chloride 10 mEq in 100 mL IVPB (10 mEq Intravenous New Bag 6/29/23 0604)   potassium chloride 10 mEq in 100 mL IVPB (10 mEq Intravenous New Bag 6/29/23 1038)     Medical Decision Making:   History:   Old Medical Records: I decided to obtain old medical records.  Old Records Summarized: records from clinic visits, records from previous admission(s) and records from another hospital.  Initial Assessment:   Patient with abdominal distention constipation and vomiting with risk factors for SBO including radiation and prior abdominal surgery  Differential Diagnosis:   Colitis, SBO, fecal impaction, perforated viscus,malignancy  Independently Interpreted Test(s):   I have ordered and independently interpreted EKG Reading(s) - see prior notes  Clinical Tests:   Lab Tests: Ordered  Radiological Study: Ordered  Medical Tests: Ordered and Reviewed  ED Management:  Pt with low potassium, low magnesium, a mild EDUAR, a mild elevation in his total bilirubin from his baseline  He has a downtrending white blood cell count  He has received IV pain medication, IV nausea medicine kitchen, 1 L normal saline IV  He also received IV potassium and IV magnesium   CT is pending  Pt will require admission                        Clinical Impression:   Final diagnoses:  [R10.9] Abdominal pain  [E87.6] Hypokalemia (Primary)  [E83.42] Hypomagnesemia  [N17.9] EDUAR (acute kidney injury)        ED Disposition Condition    Observation                  Shanda Hoffman MD  06/29/23 8647

## 2023-06-29 NOTE — CONSULTS
[facility]  Gastroenterology  Consult Note    Patient Name: Misha Eldridge Jr.  MRN: [id number]  Admission Date: 6/28/2023  Hospital Length of Stay: 0 days  Code Status: Full Code   Attending Provider: Anna Cottrell MD   Consulting Provider: Mayela Dorsey MD  Primary Care Physician: Dipak Treviño MD  Principal Problem:Emesis, persistent    Inpatient consult to Gastroenterology  Consult performed by: Mayela Dorsey MD  Consult ordered by: Mila Green PA-C      Subjective:     HPI: Misha Eldridge Jr. is a 78 y.o. male with history of HTN, diabetes mellitus, prostate cancer status post prostatectomy and radiation (10 years ago) and colon cancer with subsequent colostomy and eventual removal (at OSH) who presents with 5 day history of vomiting oral intake. Per chart review, patient presented to his PCP on 06/28 reporting no P.O. intake for the past 5 days except for water. He also had not had a bowel movement in 4 days. He states that as soon as he tries to eat anything it immediately comes back up. He makes hand motions as if to describe some sort of projectile vomiting. The symptoms seem to be associated with meals only.    On presentation to ED, patient was afebrile and hemodynamically stable. Labs revealed hypokalemia to 2.9, hypomagnesemia to 1.4 and EDUAR with improvement in creatinine after fluid administration. Glucose was 196 on admission. Lipase was normal on presentation.     CT abdomen was without abnormalities of the pancreas or gallbladder but showed postoperative changes of the bowel and mild diffuse wall thickening of the rectum which could represent proctitis. Diverticulosis and hepatic steatosis also noted.     06/2022 colonoscopy with sub-optimal preparation revealed patent anastomosis and diverticulosis in the descending colon. No prior EGD noted on file. Of note, his home medications include glipizide, leuprolide, zytiga and oral semaglutide.     Gastroenterology was consulted  for vomiting.     Past Medical History:   Diagnosis Date    Cataract     Colostomy in place     Dermatochalasis     Diabetes mellitus type II     Dry eye syndrome     Hemorrhoid     History of colon cancer     History of prostate cancer     History of pulmonary embolism     Hypertension     Macular degeneration        Past Surgical History:   Procedure Laterality Date    CATARACT EXTRACTION Bilateral     COLONOSCOPY N/A 06/02/2022    Procedure: COLONOSCOPY;  Surgeon: Jose Dangelo MD;  Location: [address]);  Service: Endoscopy;  Laterality: N/A;  fully vaccinated    EYE SURGERY Bilateral     cataract extraction    HERNIA REPAIR      PROSTATECTOMY      SUBTOTAL COLECTOMY         Family History   Problem Relation Age of Onset    Arthritis Mother     Hypertension Mother     Cancer Mother     Alcohol abuse Father     No Known Problems Daughter        Social History     Socioeconomic History    Marital status: Single   Occupational History     Comment: mother lives w/pt   Tobacco Use    Smoking status: Never    Smokeless tobacco: Never   Substance and Sexual Activity    Alcohol use: No    Drug use: No    Sexual activity: Not Currently     Partners: Female       No current facility-administered medications on file prior to encounter.     Current Outpatient Medications on File Prior to Encounter   Medication Sig Dispense Refill    abiraterone (ZYTIGA) 250 mg Tab Take 4 tablets (1,000 mg total) by mouth once daily. 120 tablet 1    ACCU-CHEK SOFTCLIX LANCETS Misc TEST BLOOD SUGAR ONCE A  each 0    amLODIPine (NORVASC) 10 MG tablet Take 1 tablet (10 mg total) by mouth once daily. 90 tablet 1    atorvastatin (LIPITOR) 20 MG tablet Take 1 tablet (20 mg total) by mouth once daily. 90 tablet 1    blood sugar diagnostic Strp 1 each by Misc.(Non-Drug; Combo Route) route once daily. 100 each 3    blood sugar diagnostic Strp Pt to check up to 6 times daily 200 strip 11    blood sugar diagnostic Strp Use to check  "blood glucose 1-2 times daily as directed. 200 each 11    blood-glucose meter kit Use as instructed 1 each 0    carvediloL (COREG) 3.125 MG tablet Take 1 tablet (3.125 mg total) by mouth 2 (two) times daily. 180 tablet 1    diclofenac (VOLTAREN) 75 MG EC tablet Take 1 tablet (75 mg total) by mouth 2 (two) times daily. 90 tablet 0    doxazosin (CARDURA) 4 MG tablet TAKE 1 TABLET(4 MG) BY MOUTH EVERY EVENING 90 tablet 1    gabapentin (NEURONTIN) 300 MG capsule Take 1 capsule (300 mg total) by mouth 3 (three) times daily. 270 capsule 1    glipiZIDE (GLUCOTROL) 10 MG tablet Take 1 tablet (10 mg total) by mouth 2 (two) times daily before meals. 180 tablet 0    hydroCHLOROthiazide (HYDRODIURIL) 25 MG tablet TAKE ONE-HALF TABLET BY MOUTH ONCE DAILY 90 tablet 1    insulin aspart U-100 (NOVOLOG FLEXPEN U-100 INSULIN) 100 unit/mL (3 mL) InPn pen Inject 8 Units into the skin 3 (three) times daily with meals. 15 mL 0    insulin glargine U-300 conc (TOUJEO MAX U-300 SOLOSTAR) 300 unit/mL (3 mL) insulin pen Inject 28 Units into the skin once daily. 15 pen 11    irbesartan (AVAPRO) 300 MG tablet Take 1 tablet (300 mg total) by mouth every evening. 90 tablet 3    lancing device with lancets Kit 1 each by Misc.(Non-Drug; Combo Route) route 2 (two) times daily. 200 each 11    metFORMIN (GLUCOPHAGE) 1000 MG tablet Take 1 tablet (1,000 mg total) by mouth 2 (two) times daily with meals. 180 tablet 1    pen needle, diabetic (BD ULTRA-FINE KELECHI PEN NEEDLE) 32 gauge x 5/32" Ndle To use with insulin pens at meals and evening shot. 200 each 11    polyethylene glycol (GOLYTELY) 236-22.74-6.74 -5.86 gram suspension Take 4,000 mLs by mouth once.      predniSONE (DELTASONE) 5 MG tablet Take 1 tablet (5 mg total) by mouth 2 (two) times daily. 60 tablet 1    predniSONE (DELTASONE) 5 MG tablet Take 1 tablet (5 mg total) by mouth 2 (two) times daily. 60 tablet 1    semaglutide (RYBELSUS) 14 mg tablet Take 1 tablet (14 mg total) by mouth once daily. " 90 tablet 3    triamcinolone acetonide 0.1% (KENALOG) 0.1 % cream Apply topically 2 (two) times daily. 45 g 0       Review of patient's allergies indicates:   Allergen Reactions    Hay fever and allergy relief        Review of Systems   Constitutional:  Positive for malaise/fatigue. Negative for fever.   HENT:  Negative for sore throat.    Eyes:  Negative for pain.   Respiratory:  Negative for shortness of breath.    Cardiovascular:  Negative for chest pain.   Gastrointestinal:  Positive for vomiting. Negative for abdominal pain.   Genitourinary:  Negative for dysuria.   Musculoskeletal:  Negative for myalgias.   Skin:  Negative for rash.   Neurological:  Negative for headaches.   Psychiatric/Behavioral:  The patient is not nervous/anxious.       Objective:     Vitals:    06/29/23 0716   BP: 139/65   Pulse: 72   Resp: 18   Temp: 97.7 °F (36.5 °C)       Constitutional: NAD, pleasant elderly male patient   HENT: Normocephalic, atraumatic, no obvious deformities   Eyes: sclera non-icteric  Cardiovascular: no murmurs noted, S1/S2 noted   Respiratory: normal respiratory effort, CTAB  Abdominal: Distended  Skin: warm, dry  Neurological: Alert and oriented x 3  Rectal: deferred    Significant Labs:  Recent Labs   Lab 06/28/23  1724 06/29/23  0444   HGB 10.9* 9.7*       Lab Results   Component Value Date    WBC 2.15 (L) 06/29/2023    HGB 9.7 (L) 06/29/2023    HCT 28.8 (L) 06/29/2023    MCV 93 06/29/2023     06/29/2023       Lab Results   Component Value Date     06/29/2023    K 2.9 (L) 06/29/2023    CL 96 06/29/2023    CO2 28 06/29/2023    BUN 9 06/29/2023    CREATININE 1.0 06/29/2023    CALCIUM 8.4 (L) 06/29/2023    ANIONGAP 12 06/29/2023    ESTGFRAFRICA >60.0 06/21/2022    EGFRNONAA 53.0 (A) 06/21/2022       Lab Results   Component Value Date    ALT 15 06/29/2023    AST 23 06/29/2023    ALKPHOS 64 06/29/2023    BILITOT 1.0 06/29/2023       Lab Results   Component Value Date    INR 1.0 12/04/2013    INR 4.5  (H) 11/27/2013    INR 1.6 (H) 08/28/2013       Significant Imaging:  Reviewed pertinent radiology findings.       Assessment/Plan:     Misha Eldridge Jr. is a 78 y.o. male with history of diabetes mellitus, prostate cancer, colon cancer, prostate cancer, colon cancer presenting with vomiting on attempting to eat.     Problem List:    Vomiting    Patient with A1C of 11.6 presenting with 5 day history of emesis when attempting to eat. CT scan not immediately supportive of bowel obstruction. Does not describe regurgitation or choking. Possible etiology could include Diabetic gastroparesis? But given reduced oral intake affecting electrolytes and causing dehydration, would recommend further evaluation.  He also takes a number of medications that could cause vomiting symptoms such as semaglutide.    Recommendations:    - Plan for EGD pending improvement in electrolytes and once patient has had bowel movement  - Intravascular resuscitation/support with IVFs   - Please notify GI team if there is significant change in patient's clinical status     Thank you for involving us in the care of Misha Eldridge Jr.. Please call with any additional questions, concerns or changes in the patient's clinical status. We will continue to follow.     Mayela Dorsey MD  Internal Medicine Resident, PGY-1  [facility]

## 2023-06-29 NOTE — HOSPITAL COURSE
Misha Eldridge Jr. Was admitted to hospital medicine for management of dysphagia, abdominal pain and constipation. GI consulted, planned for inpatient EGD but patient refused. SLP consulted, no dysphagia noted and patient cleared for regular diet. Slowly advanced diet, no further episodes of nausea or vomiting. Started on bowel regimen with resolution of constipation. Imaging of neck and chest unremarkable for underlying etiology of symptoms. Did reveal small lung nodules; patient advised to follow up with outpatient heme/onc for further monitoring and testing given cancer history. Outpatient esophagram ordered with referral to outpatient GI sent.     On day of discharge patient's vital signs were stable and patient appeared clinically ready for discharge. Hospital course, discharge plan and return precautions discussed - patient expressed understanding. All questions answered at that time.

## 2023-06-29 NOTE — ASSESSMENT & PLAN NOTE
Patient's FSGs are controlled on current medication regimen.  Last A1c reviewed-   Lab Results   Component Value Date    HGBA1C 11.6 (H) 05/08/2023     Most recent fingerstick glucose reviewed-   Recent Labs   Lab 06/29/23  0717 06/29/23  1110   POCTGLUCOSE 159* 235*     Current correctional scale  Low  Maintain anti-hyperglycemic dose as follows-   Antihyperglycemics (From admission, onward)    Start     Stop Route Frequency Ordered    06/29/23 2100  insulin detemir U-100 (Levemir) pen 15 Units         -- SubQ Nightly 06/29/23 1643    06/29/23 1745  insulin aspart U-100 pen 3 Units         -- SubQ 3 times daily with meals 06/29/23 1643    06/28/23 2309  insulin aspart U-100 pen 0-5 Units         -- SubQ Before meals & nightly PRN 06/28/23 2217        Hold Oral hypoglycemics while patient is in the hospital.

## 2023-06-29 NOTE — SUBJECTIVE & OBJECTIVE
Interval History:  NAEON. AFVSS. K replaced, 2.9 > 3.5.  Patient evaluated at bedside. Endorsing persistent decreased appetite and poor PO intake secondary to dysphagia/regurgitation. GI consulted, plan for inpatient EGD.  Aggressive bowel regimen initiated, had successful BM today.     Review of Systems   Constitutional:  Negative for chills and fever.   HENT:  Negative for sore throat and trouble swallowing.    Eyes:  Negative for photophobia and visual disturbance.   Respiratory:  Negative for shortness of breath and wheezing.    Cardiovascular:  Negative for chest pain and leg swelling.   Gastrointestinal:  Positive for abdominal distention, constipation, diarrhea and vomiting. Negative for abdominal pain and nausea.   Genitourinary:  Negative for dysuria and frequency.   Musculoskeletal:  Negative for back pain and gait problem.   Skin:  Negative for color change and rash.   Neurological:  Negative for light-headedness and headaches.   Psychiatric/Behavioral:  Negative for confusion. The patient is not nervous/anxious.    Objective:     Vital Signs (Most Recent):  Temp: 97.3 °F (36.3 °C) (06/29/23 1505)  Pulse: 69 (06/29/23 1505)  Resp: 18 (06/29/23 1505)  BP: 116/81 (06/29/23 1505)  SpO2: (!) 92 % (06/29/23 1505) Vital Signs (24h Range):  Temp:  [97.2 °F (36.2 °C)-98.3 °F (36.8 °C)] 97.3 °F (36.3 °C)  Pulse:  [69-88] 69  Resp:  [12-26] 18  SpO2:  [88 %-96 %] 92 %  BP: (101-139)/(55-81) 116/81     Weight: 92.3 kg (203 lb 7.8 oz)  Body mass index is 26.85 kg/m².    Intake/Output Summary (Last 24 hours) at 6/29/2023 1655  Last data filed at 6/29/2023 1038  Gross per 24 hour   Intake 760 ml   Output 1150 ml   Net -390 ml         Physical Exam  Vitals and nursing note reviewed.   Constitutional:       General: He is not in acute distress.     Appearance: He is well-developed.   HENT:      Head: Normocephalic and atraumatic.   Eyes:      Extraocular Movements: Extraocular movements intact.   Cardiovascular:       Rate and Rhythm: Normal rate and regular rhythm.      Heart sounds: Normal heart sounds.   Pulmonary:      Effort: Pulmonary effort is normal. No respiratory distress.   Abdominal:      General: Bowel sounds are increased. There is distension.      Tenderness: There is abdominal tenderness in the left lower quadrant.      Comments: Prior midline surgical scarring   Musculoskeletal:         General: No tenderness. Normal range of motion.   Skin:     General: Skin is warm and dry.      Findings: No rash.   Neurological:      Mental Status: He is alert and oriented to person, place, and time.   Psychiatric:         Behavior: Behavior normal.         Thought Content: Thought content normal.         Judgment: Judgment normal.       Significant Labs: All pertinent labs within the past 24 hours have been reviewed.    Significant Imaging: I have reviewed all pertinent imaging results/findings within the past 24 hours.

## 2023-06-29 NOTE — ASSESSMENT & PLAN NOTE
Patient's FSGs are controlled on current medication regimen.  Last A1c reviewed-   Lab Results   Component Value Date    HGBA1C 11.6 (H) 05/08/2023     Most recent fingerstick glucose reviewed- No results for input(s): POCTGLUCOSE in the last 24 hours.  Current correctional scale  Low  Maintain anti-hyperglycemic dose as follows-   Antihyperglycemics (From admission, onward)    Start     Stop Route Frequency Ordered    06/29/23 2100  insulin detemir U-100 (Levemir) pen 10 Units         -- SubQ Nightly 06/28/23 2217    06/28/23 2309  insulin aspart U-100 pen 0-5 Units         -- SubQ Before meals & nightly PRN 06/28/23 2217        Hold Oral hypoglycemics while patient is in the hospital.

## 2023-06-29 NOTE — ED NOTES
Report called to XUAN Garcias. All questions answered. Tele box requested - unit out of boxes - Unit Charge RN working to find a box.

## 2023-06-29 NOTE — SUBJECTIVE & OBJECTIVE
Past Medical History:   Diagnosis Date    Cataract     Colostomy in place     Dermatochalasis     Diabetes mellitus type II     Dry eye syndrome     Hemorrhoid     History of colon cancer     History of prostate cancer     History of pulmonary embolism     Hypertension     Macular degeneration        Past Surgical History:   Procedure Laterality Date    CATARACT EXTRACTION Bilateral     COLONOSCOPY N/A 06/02/2022    Procedure: COLONOSCOPY;  Surgeon: Jose Dangelo MD;  Location: [facility] ([address] [place] [address]);  Service: Endoscopy;  Laterality: N/A;  fully vaccinated    EYE SURGERY Bilateral     cataract extraction    HERNIA REPAIR      PROSTATECTOMY      SUBTOTAL COLECTOMY         Review of patient's allergies indicates:   Allergen Reactions    Hay fever and allergy relief        Current Facility-Administered Medications on File Prior to Encounter   Medication    LIDOcaine HCL 20 mg/ml (2%) injection 10 mL     Current Outpatient Medications on File Prior to Encounter   Medication Sig    abiraterone (ZYTIGA) 250 mg Tab Take 4 tablets (1,000 mg total) by mouth once daily.    ACCU-CHEK SOFTCLIX LANCETS Misc TEST BLOOD SUGAR ONCE A DAY    amLODIPine (NORVASC) 10 MG tablet Take 1 tablet (10 mg total) by mouth once daily.    atorvastatin (LIPITOR) 20 MG tablet Take 1 tablet (20 mg total) by mouth once daily.    blood sugar diagnostic Strp 1 each by Misc.(Non-Drug; Combo Route) route once daily.    blood sugar diagnostic Strp Pt to check up to 6 times daily    blood sugar diagnostic Strp Use to check blood glucose 1-2 times daily as directed.    blood-glucose meter kit Use as instructed    carvediloL (COREG) 3.125 MG tablet Take 1 tablet (3.125 mg total) by mouth 2 (two) times daily.    diclofenac (VOLTAREN) 75 MG EC tablet Take 1 tablet (75 mg total) by mouth 2 (two) times daily.    doxazosin (CARDURA) 4 MG tablet TAKE 1 TABLET(4 MG) BY MOUTH EVERY EVENING    gabapentin (NEURONTIN) 300 MG capsule Take 1 capsule (300 mg total)  "by mouth 3 (three) times daily.    glipiZIDE (GLUCOTROL) 10 MG tablet Take 1 tablet (10 mg total) by mouth 2 (two) times daily before meals.    hydroCHLOROthiazide (HYDRODIURIL) 25 MG tablet TAKE ONE-HALF TABLET BY MOUTH ONCE DAILY    insulin aspart U-100 (NOVOLOG FLEXPEN U-100 INSULIN) 100 unit/mL (3 mL) InPn pen Inject 8 Units into the skin 3 (three) times daily with meals.    insulin glargine U-300 conc (TOUJEO MAX U-300 SOLOSTAR) 300 unit/mL (3 mL) insulin pen Inject 28 Units into the skin once daily.    irbesartan (AVAPRO) 300 MG tablet Take 1 tablet (300 mg total) by mouth every evening.    lancing device with lancets Kit 1 each by Misc.(Non-Drug; Combo Route) route 2 (two) times daily.    metFORMIN (GLUCOPHAGE) 1000 MG tablet Take 1 tablet (1,000 mg total) by mouth 2 (two) times daily with meals.    pen needle, diabetic (BD ULTRA-FINE KELECHI PEN NEEDLE) 32 gauge x 5/32" Ndle To use with insulin pens at meals and evening shot.    polyethylene glycol (GOLYTELY) 236-22.74-6.74 -5.86 gram suspension Take 4,000 mLs by mouth once.    predniSONE (DELTASONE) 5 MG tablet Take 1 tablet (5 mg total) by mouth 2 (two) times daily.    predniSONE (DELTASONE) 5 MG tablet Take 1 tablet (5 mg total) by mouth 2 (two) times daily.    semaglutide (RYBELSUS) 14 mg tablet Take 1 tablet (14 mg total) by mouth once daily.    triamcinolone acetonide 0.1% (KENALOG) 0.1 % cream Apply topically 2 (two) times daily.     Family History       Problem Relation (Age of Onset)    Alcohol abuse Father    Arthritis Mother    Cancer Mother    Hypertension Mother    No Known Problems Daughter          Tobacco Use    Smoking status: Never    Smokeless tobacco: Never   Substance and Sexual Activity    Alcohol use: No    Drug use: No    Sexual activity: Not Currently     Partners: Female     Review of Systems   Constitutional:  Negative for chills and fever.   HENT:  Negative for sore throat and trouble swallowing.    Eyes:  Negative for photophobia " and visual disturbance.   Respiratory:  Negative for shortness of breath and wheezing.    Cardiovascular:  Negative for chest pain and leg swelling.   Gastrointestinal:  Positive for abdominal distention, constipation, diarrhea and vomiting. Negative for abdominal pain and nausea.   Genitourinary:  Negative for dysuria and frequency.   Musculoskeletal:  Negative for back pain and gait problem.   Skin:  Negative for color change and rash.   Neurological:  Negative for light-headedness and headaches.   Psychiatric/Behavioral:  Negative for confusion. The patient is not nervous/anxious.    Objective:     Vital Signs (Most Recent):  Temp: 98.2 °F (36.8 °C) (06/29/23 0005)  Pulse: 80 (06/29/23 0102)  Resp: 17 (06/29/23 0102)  BP: (!) 113/57 (06/29/23 0102)  SpO2: (!) 94 % (06/29/23 0102) Vital Signs (24h Range):  Temp:  [98.2 °F (36.8 °C)-99 °F (37.2 °C)] 98.2 °F (36.8 °C)  Pulse:  [71-88] 80  Resp:  [12-26] 17  SpO2:  [88 %-97 %] 94 %  BP: (101-145)/(55-76) 113/57     Weight: 97.1 kg (214 lb)  Body mass index is 28.23 kg/m².     Physical Exam  Vitals and nursing note reviewed.   Constitutional:       General: He is not in acute distress.  HENT:      Head: Normocephalic and atraumatic.      Nose: Nose normal.      Mouth/Throat:      Pharynx: No oropharyngeal exudate.   Eyes:      Extraocular Movements: Extraocular movements intact.      Conjunctiva/sclera: Conjunctivae normal.   Cardiovascular:      Rate and Rhythm: Normal rate and regular rhythm.      Heart sounds: Normal heart sounds.   Pulmonary:      Effort: Pulmonary effort is normal. No respiratory distress.      Breath sounds: Normal breath sounds.   Abdominal:      General: Bowel sounds are normal.      Tenderness: There is no guarding or rebound.      Comments: Large non tender abdomen with old surgical scars   Musculoskeletal:         General: Normal range of motion.      Cervical back: Normal range of motion.      Right lower leg: No edema.      Left lower  leg: No edema.   Skin:     General: Skin is warm and dry.   Neurological:      General: No focal deficit present.      Mental Status: He is alert and oriented to person, place, and time.   Psychiatric:         Mood and Affect: Mood normal.         Behavior: Behavior normal.              Significant Labs: All pertinent labs within the past 24 hours have been reviewed.  Blood Culture: No results for input(s): LABBLOO in the last 48 hours.  CBC:   Recent Labs   Lab 06/28/23  1724 06/28/23  1724   WBC 2.98*  --    HGB 10.9*  --    HCT 32.8* 32*     --      CMP:   Recent Labs   Lab 06/28/23  1724   *   K 2.9*   CL 93*   CO2 25   *   BUN 12   CREATININE 1.5*   CALCIUM 9.2   PROT 6.7   ALBUMIN 3.5   BILITOT 1.1*   ALKPHOS 75   AST 30   ALT 19   ANIONGAP 16     Lactic Acid:   Recent Labs   Lab 06/28/23  1724   LACTATE 1.7     Lipase:   Recent Labs   Lab 06/28/23  1724   LIPASE 15     Magnesium:   Recent Labs   Lab 06/28/23  1724   MG 1.4*       Significant Imaging: I have reviewed all pertinent imaging results/findings within the past 24 hours.  CT Abdomen Pelvis With Contrast  Narrative: EXAMINATION:  CT ABDOMEN PELVIS WITH CONTRAST    CLINICAL HISTORY:  Abdominal pain, acute, nonlocalized;    TECHNIQUE:  Low dose axial images, sagittal and coronal reformations were obtained from the lung bases to the pubic symphysis following the IV administration of 100 mL of Omnipaque 350 .  Oral contrast was not administered.    COMPARISON:  None.    FINDINGS:  Abdomen:    - Lower thorax:Few calcified pleural plaques anteriorly at the right lung base.    - Lung bases: No infiltrates and no nodules.    - Liver: The liver is enlarged with mild diffuse fatty infiltration.  No focal abnormality.    - Gallbladder: No calcified gallstones.    - Bile Ducts: No evidence of intra or extra hepatic biliary ductal dilation.    - Spleen: Negative.    - Kidneys: Few bilateral renal cysts.  No stone, soft tissue mass or  hydronephrosis bilaterally.    - Adrenals: Unremarkable.    - Pancreas: No mass or peripancreatic fat stranding.    - Retroperitoneum:  No significant adenopathy.    - Vascular: No abdominal aortic aneurysm.  Retroaortic left renal vein as an anatomic variant.    - Abdominal wall:  Unremarkable.    Pelvis:    Mild urinary bladder wall thickening.  No focal abnormality.    Bowel/Mesentery:    No evidence of bowel obstruction.  Mild wall thickening of the rectum.  Postop changes of the rectosigmoid junction.    Diverticulosis of the colon with no evidence of acute diverticulitis.    Postop changes of the cecum.  Suspected prior appendectomy.    Postop changes of the small bowel near the midline in the pelvis.    Bones:  No acute fractures.    Mild diffuse disc bulge at L4-5 with severe central canal narrowing.    Severe foraminal narrowing at L5-S1 left.  Mild-to-moderate foraminal narrowing elsewhere in the lumbar spine bilaterally.    Mild disc space narrowing and vacuum disc phenomena at L5-S1.  Impression: 1. Postoperative changes of the bowel.  See above comments.  2. Mild diffuse wall thickening of the rectum could represent proctitis.  Recommend follow-up.  3. Diverticulosis of the colon.  4. Hepatomegaly and hepatic steatosis.  5. Few calcified pleural plaques anteriorly at the right lung base.  6. Multilevel chronic degenerative changes of the lumbar spine.  See above comments.    Electronically signed by: Harish Reilly  Date:    06/28/2023  Time:    21:23

## 2023-06-29 NOTE — ASSESSMENT & PLAN NOTE
Patient with noted electrolyte deficiencies with Hypo-Kalemia. Latest electrolytes have been reviewed and values are   Potassium   Date Value Ref Range Status   06/29/2023 3.5 3.5 - 5.1 mmol/L Final   . Will continue to monitor electrolytes closely. Will replace the affected electrolytes and repeat labs to be done after interventions completed.

## 2023-06-30 LAB
ALBUMIN SERPL BCP-MCNC: 2.8 G/DL (ref 3.5–5.2)
ALP SERPL-CCNC: 60 U/L (ref 55–135)
ALT SERPL W/O P-5'-P-CCNC: 15 U/L (ref 10–44)
ANION GAP SERPL CALC-SCNC: 9 MMOL/L (ref 8–16)
AST SERPL-CCNC: 22 U/L (ref 10–40)
BASOPHILS # BLD AUTO: 0.01 K/UL (ref 0–0.2)
BASOPHILS NFR BLD: 0.5 % (ref 0–1.9)
BILIRUB SERPL-MCNC: 0.8 MG/DL (ref 0.1–1)
BUN SERPL-MCNC: 5 MG/DL (ref 8–23)
CALCIUM SERPL-MCNC: 7.9 MG/DL (ref 8.7–10.5)
CHLORIDE SERPL-SCNC: 105 MMOL/L (ref 95–110)
CO2 SERPL-SCNC: 25 MMOL/L (ref 23–29)
CREAT SERPL-MCNC: 0.9 MG/DL (ref 0.5–1.4)
DIFFERENTIAL METHOD: ABNORMAL
EOSINOPHIL # BLD AUTO: 0.2 K/UL (ref 0–0.5)
EOSINOPHIL NFR BLD: 7.5 % (ref 0–8)
ERYTHROCYTE [DISTWIDTH] IN BLOOD BY AUTOMATED COUNT: 13.5 % (ref 11.5–14.5)
EST. GFR  (NO RACE VARIABLE): >60 ML/MIN/1.73 M^2
GLUCOSE SERPL-MCNC: 150 MG/DL (ref 70–110)
HCT VFR BLD AUTO: 27.4 % (ref 40–54)
HGB BLD-MCNC: 9.1 G/DL (ref 14–18)
IMM GRANULOCYTES # BLD AUTO: 0.02 K/UL (ref 0–0.04)
IMM GRANULOCYTES NFR BLD AUTO: 1 % (ref 0–0.5)
LYMPHOCYTES # BLD AUTO: 0.5 K/UL (ref 1–4.8)
LYMPHOCYTES NFR BLD: 23.5 % (ref 18–48)
MAGNESIUM SERPL-MCNC: 1.5 MG/DL (ref 1.6–2.6)
MCH RBC QN AUTO: 31.3 PG (ref 27–31)
MCHC RBC AUTO-ENTMCNC: 33.2 G/DL (ref 32–36)
MCV RBC AUTO: 94 FL (ref 82–98)
MONOCYTES # BLD AUTO: 0.1 K/UL (ref 0.3–1)
MONOCYTES NFR BLD: 6.5 % (ref 4–15)
NEUTROPHILS # BLD AUTO: 1.2 K/UL (ref 1.8–7.7)
NEUTROPHILS NFR BLD: 61 % (ref 38–73)
NRBC BLD-RTO: 0 /100 WBC
PLATELET # BLD AUTO: 184 K/UL (ref 150–450)
PMV BLD AUTO: 10.8 FL (ref 9.2–12.9)
POCT GLUCOSE: 159 MG/DL (ref 70–110)
POCT GLUCOSE: 161 MG/DL (ref 70–110)
POCT GLUCOSE: 192 MG/DL (ref 70–110)
POCT GLUCOSE: 241 MG/DL (ref 70–110)
POCT GLUCOSE: 247 MG/DL (ref 70–110)
POTASSIUM SERPL-SCNC: 3.2 MMOL/L (ref 3.5–5.1)
PROT SERPL-MCNC: 5.2 G/DL (ref 6–8.4)
RBC # BLD AUTO: 2.91 M/UL (ref 4.6–6.2)
SODIUM SERPL-SCNC: 139 MMOL/L (ref 136–145)
WBC # BLD AUTO: 2 K/UL (ref 3.9–12.7)

## 2023-06-30 PROCEDURE — 63600175 PHARM REV CODE 636 W HCPCS: Mod: HCNC

## 2023-06-30 PROCEDURE — 36415 COLL VENOUS BLD VENIPUNCTURE: CPT | Mod: HCNC

## 2023-06-30 PROCEDURE — 83735 ASSAY OF MAGNESIUM: CPT | Mod: HCNC

## 2023-06-30 PROCEDURE — 80053 COMPREHEN METABOLIC PANEL: CPT | Mod: HCNC

## 2023-06-30 PROCEDURE — 99233 SBSQ HOSP IP/OBS HIGH 50: CPT | Mod: HCNC,,,

## 2023-06-30 PROCEDURE — 25000003 PHARM REV CODE 250: Mod: HCNC

## 2023-06-30 PROCEDURE — 85025 COMPLETE CBC W/AUTO DIFF WBC: CPT | Mod: HCNC

## 2023-06-30 PROCEDURE — G0378 HOSPITAL OBSERVATION PER HR: HCPCS | Mod: HCNC

## 2023-06-30 PROCEDURE — 96376 TX/PRO/DX INJ SAME DRUG ADON: CPT

## 2023-06-30 PROCEDURE — 92610 EVALUATE SWALLOWING FUNCTION: CPT | Mod: HCNC

## 2023-06-30 PROCEDURE — C9113 INJ PANTOPRAZOLE SODIUM, VIA: HCPCS | Mod: HCNC

## 2023-06-30 PROCEDURE — 99233 PR SUBSEQUENT HOSPITAL CARE,LEVL III: ICD-10-PCS | Mod: HCNC,,,

## 2023-06-30 PROCEDURE — 96366 THER/PROPH/DIAG IV INF ADDON: CPT

## 2023-06-30 PROCEDURE — 96361 HYDRATE IV INFUSION ADD-ON: CPT

## 2023-06-30 PROCEDURE — 96372 THER/PROPH/DIAG INJ SC/IM: CPT

## 2023-06-30 RX ORDER — SODIUM CHLORIDE 9 MG/ML
INJECTION, SOLUTION INTRAVENOUS CONTINUOUS
Status: DISCONTINUED | OUTPATIENT
Start: 2023-06-30 | End: 2023-06-30

## 2023-06-30 RX ORDER — MAGNESIUM SULFATE HEPTAHYDRATE 40 MG/ML
2 INJECTION, SOLUTION INTRAVENOUS ONCE
Status: COMPLETED | OUTPATIENT
Start: 2023-06-30 | End: 2023-06-30

## 2023-06-30 RX ORDER — POTASSIUM CHLORIDE 20 MEQ/1
40 TABLET, EXTENDED RELEASE ORAL ONCE
Status: COMPLETED | OUTPATIENT
Start: 2023-06-30 | End: 2023-06-30

## 2023-06-30 RX ADMIN — POLYETHYLENE GLYCOL 3350 17 G: 17 POWDER, FOR SOLUTION ORAL at 10:06

## 2023-06-30 RX ADMIN — INSULIN ASPART 1 UNITS: 100 INJECTION, SOLUTION INTRAVENOUS; SUBCUTANEOUS at 10:06

## 2023-06-30 RX ADMIN — PANTOPRAZOLE SODIUM 40 MG: 40 INJECTION, POWDER, FOR SOLUTION INTRAVENOUS at 10:06

## 2023-06-30 RX ADMIN — CARVEDILOL 3.12 MG: 3.12 TABLET, FILM COATED ORAL at 10:06

## 2023-06-30 RX ADMIN — INSULIN ASPART 3 UNITS: 100 INJECTION, SOLUTION INTRAVENOUS; SUBCUTANEOUS at 12:06

## 2023-06-30 RX ADMIN — ATORVASTATIN CALCIUM 20 MG: 20 TABLET, FILM COATED ORAL at 10:06

## 2023-06-30 RX ADMIN — INSULIN ASPART 3 UNITS: 100 INJECTION, SOLUTION INTRAVENOUS; SUBCUTANEOUS at 06:06

## 2023-06-30 RX ADMIN — ENOXAPARIN SODIUM 40 MG: 40 INJECTION SUBCUTANEOUS at 06:06

## 2023-06-30 RX ADMIN — SODIUM CHLORIDE: 9 INJECTION, SOLUTION INTRAVENOUS at 07:06

## 2023-06-30 RX ADMIN — INSULIN DETEMIR 15 UNITS: 100 INJECTION, SOLUTION SUBCUTANEOUS at 10:06

## 2023-06-30 RX ADMIN — POTASSIUM CHLORIDE 40 MEQ: 1500 TABLET, EXTENDED RELEASE ORAL at 10:06

## 2023-06-30 RX ADMIN — SENNOSIDES 8.6 MG: 8.6 TABLET, FILM COATED ORAL at 10:06

## 2023-06-30 RX ADMIN — MAGNESIUM SULFATE 2 G: 2 INJECTION INTRAVENOUS at 10:06

## 2023-06-30 NOTE — PLAN OF CARE
Problem: Infection  Goal: Absence of Infection Signs and Symptoms  6/30/2023 0928 by Rachel Davis RN  Outcome: Ongoing, Progressing  6/30/2023 0928 by Rachel Davis RN  Outcome: Ongoing, Progressing     Problem: Adult Inpatient Plan of Care  Goal: Plan of Care Review  6/30/2023 0928 by Rachel Davis RN  Outcome: Ongoing, Progressing  6/30/2023 0928 by Rachel Davis RN  Outcome: Ongoing, Progressing  Goal: Patient-Specific Goal (Individualized)  6/30/2023 0928 by Rachel Davis RN  Outcome: Ongoing, Progressing  6/30/2023 0928 by Rachel Davis RN  Outcome: Ongoing, Progressing  Goal: Absence of Hospital-Acquired Illness or Injury  6/30/2023 0928 by Rachel Davis RN  Outcome: Ongoing, Progressing  6/30/2023 0928 by Rachel Davis RN  Outcome: Ongoing, Progressing  Goal: Optimal Comfort and Wellbeing  6/30/2023 0928 by Rachel Davis RN  Outcome: Ongoing, Progressing  6/30/2023 0928 by Rachel Davis RN  Outcome: Ongoing, Progressing  Goal: Readiness for Transition of Care  6/30/2023 0928 by Rachel Davis RN  Outcome: Ongoing, Progressing  6/30/2023 0928 by Rachel Davis RN  Outcome: Ongoing, Progressing     Problem: Diabetes Comorbidity  Goal: Blood Glucose Level Within Targeted Range  6/30/2023 0928 by Rachel Davis RN  Outcome: Ongoing, Progressing  6/30/2023 0928 by Rachel Davis RN  Outcome: Ongoing, Progressing     Problem: Oral Intake Inadequate (Acute Kidney Injury/Impairment)  Goal: Optimal Nutrition Intake  6/30/2023 0928 by Rachel Davis RN  Outcome: Ongoing, Progressing  6/30/2023 0928 by Rachel Davis RN  Outcome: Ongoing, Progressing     Problem: Fluid and Electrolyte Imbalance (Acute Kidney Injury/Impairment)  Goal: Fluid and Electrolyte Balance  6/30/2023 0928 by Rachel Davis RN  Outcome: Ongoing, Progressing  6/30/2023 0928 by Rachel Davis RN  Outcome: Ongoing, Progressing     Problem: Skin Injury Risk Increased  Goal: Skin Health and Integrity  6/30/2023 0928 by Rachel Davis  RN  Outcome: Ongoing, Progressing  6/30/2023 0928 by Rachel Davis RN  Outcome: Ongoing, Progressing     Problem: Renal Function Impairment (Acute Kidney Injury/Impairment)  Goal: Effective Renal Function  6/30/2023 0928 by Rachel Davsi RN  Outcome: Ongoing, Progressing  6/30/2023 0928 by Rachel Davis RN  Outcome: Ongoing, Progressing

## 2023-06-30 NOTE — PT/OT/SLP EVAL
"Speech Language Pathology Evaluation  Bedside Swallow/ Discharge Summary     Patient Name:  Misha Eldridge Jr.   MRN:  [id number]  Admitting Diagnosis: Intractable nausea and vomiting    Recommendations:                 General Recommendations:  Follow-up not indicated  Diet recommendations:  Regular Diet - IDDSI Level 7, Thin liquids - IDDSI Level 0   Aspiration Precautions: Standard aspiration precautions   General Precautions: Standard,    Communication strategies:  none    Assessment:     Misha Eldridge Jr. is a 78 y.o. male with an SLP diagnosis of  functional swallow .  No ST needs indicated.     History:     Past Medical History:   Diagnosis Date    Cataract     Colostomy in place     Dermatochalasis     Diabetes mellitus type II     Dry eye syndrome     Hemorrhoid     History of colon cancer     History of prostate cancer     History of pulmonary embolism     Hypertension     Macular degeneration      Past Surgical History:   Procedure Laterality Date    CATARACT EXTRACTION Bilateral     COLONOSCOPY N/A 06/02/2022    Procedure: COLONOSCOPY;  Surgeon: Jose Dangelo MD;  Location: [address]);  Service: Endoscopy;  Laterality: N/A;  fully vaccinated    EYE SURGERY Bilateral     cataract extraction    HERNIA REPAIR      PROSTATECTOMY      SUBTOTAL COLECTOMY       Principal Problem:Emesis, persistent     Chief Complaint:        Chief Complaint   Patient presents with    Abdominal Pain       Not eating since thurs,  finished radiation for prostate cancer month ago,  thinks might have blockage      HPI: "Misha Eldridge Jr. Is a 78 y.o. male with PMHx ht, DMII, prostate cancer and colon cancer  who presents with emesis immediately after PO intake, abdominal distension, and constipation. He eas last able to eat a full meal last week. Over the last few days he has emesis immediately after PO intake without nausea. His abdomen has gradually become more distended but he denies abdominal pain. He had some " "diarrhea a few days ago but now has constipation. He strains but only has small amounts of loose stool. Denies bloody BM or emesis. Denies urinary changes. He denies fever, chills, sore throat, or globus sensation. Denies chest pain. He is a somewhat poor historian."     Prior Intubation HX:  n/a    Modified Barium Swallow: none    Chest X-Rays: n/a    Prior diet: regular/thin    Subjective     Pt awake/alert and agreeable to ST. Pt sitting upright in bed eating lunch upon entry to room. No nausea reported.     Pain/Comfort:  Pain Rating 1: 0/10  Pain Rating Post-Intervention 1: 0/10    Respiratory Status: Room air    Objective:     Oral Musculature Evaluation  Oral Musculature: WFL  Dentition: upper and lower dentures  Secretion Management: adequate  Mucosal Quality: adequate  Mandibular Strength and Mobility: WFL  Oral Labial Strength and Mobility: WFL  Lingual Strength and Mobility: WFL  Voice Prior to PO Intake: clear    Bedside Swallow Eval:   Consistencies Assessed:  Thin liquids 4 oz from cup rim and straw- single and consecutive sips  Solids cookie from meal tray      Oral Phase:   WNL    Pharyngeal Phase:   no overt clinical signs/symptoms of aspiration  no overt clinical signs/symptoms of pharyngeal dysphagia    Compensatory Strategies  None    Treatment: HOB raised. Pt able to self present and tolerated all consistencies. No reported nausea and no observed dysphagia, regurgitation or emesis. Education provided re: role of SLP, diet recs, swallow precs and POC.  Pt verbalized understanding and agreement.     Plan:     Plan of Care reviewed with:  patient   SLP Follow-Up:  No      Barriers to Discharge:  None    Time Tracking:     SLP Treatment Date:   06/30/23  Speech Start Time:  1400  Speech Stop Time:  1412     Speech Total Time (min):  12 min    Billable Minutes: Eval Swallow and Oral Function 12    06/30/2023     "

## 2023-06-30 NOTE — TREATMENT PLAN
Brief GI Treatment Plan    EGD cancelled as patient refused procedure after extensive discussions. Does seem anxious.     If patient does become amenable to EGD and remains inpatient, could do so early next week. If ongoing symptoms, could try to obtain esophagram over the weekend if pt amenable.    Krzysztof Velasquez  Gastroenterology Fellow, PGY-IV

## 2023-06-30 NOTE — PLAN OF CARE
Problem: Infection  Goal: Absence of Infection Signs and Symptoms  Outcome: Ongoing, Progressing     Problem: Adult Inpatient Plan of Care  Goal: Plan of Care Review  Outcome: Ongoing, Progressing  Goal: Absence of Hospital-Acquired Illness or Injury  Outcome: Ongoing, Progressing     Problem: Diabetes Comorbidity  Goal: Blood Glucose Level Within Targeted Range  Outcome: Ongoing, Progressing     Problem: Fluid and Electrolyte Imbalance (Acute Kidney Injury/Impairment)  Goal: Fluid and Electrolyte Balance  Outcome: Ongoing, Progressing     Problem: Oral Intake Inadequate (Acute Kidney Injury/Impairment)  Goal: Optimal Nutrition Intake  Outcome: Ongoing, Progressing

## 2023-06-30 NOTE — NURSING
Notified Sheila GRAVES that the sepsis BPA popped up on thios pt. Continuos fluids and electrolyte replacements ordered

## 2023-07-01 LAB
ALBUMIN SERPL BCP-MCNC: 2.9 G/DL (ref 3.5–5.2)
ALP SERPL-CCNC: 63 U/L (ref 55–135)
ALT SERPL W/O P-5'-P-CCNC: 15 U/L (ref 10–44)
ANION GAP SERPL CALC-SCNC: 11 MMOL/L (ref 8–16)
AST SERPL-CCNC: 22 U/L (ref 10–40)
BASOPHILS # BLD AUTO: 0.01 K/UL (ref 0–0.2)
BASOPHILS NFR BLD: 0.4 % (ref 0–1.9)
BILIRUB SERPL-MCNC: 0.8 MG/DL (ref 0.1–1)
BUN SERPL-MCNC: 4 MG/DL (ref 8–23)
CALCIUM SERPL-MCNC: 8.1 MG/DL (ref 8.7–10.5)
CHLORIDE SERPL-SCNC: 108 MMOL/L (ref 95–110)
CO2 SERPL-SCNC: 21 MMOL/L (ref 23–29)
CREAT SERPL-MCNC: 0.7 MG/DL (ref 0.5–1.4)
DIFFERENTIAL METHOD: ABNORMAL
EOSINOPHIL # BLD AUTO: 0.1 K/UL (ref 0–0.5)
EOSINOPHIL NFR BLD: 5.7 % (ref 0–8)
ERYTHROCYTE [DISTWIDTH] IN BLOOD BY AUTOMATED COUNT: 13.7 % (ref 11.5–14.5)
EST. GFR  (NO RACE VARIABLE): >60 ML/MIN/1.73 M^2
GLUCOSE SERPL-MCNC: 148 MG/DL (ref 70–110)
HCT VFR BLD AUTO: 28.5 % (ref 40–54)
HGB BLD-MCNC: 9.2 G/DL (ref 14–18)
IMM GRANULOCYTES # BLD AUTO: 0.03 K/UL (ref 0–0.04)
IMM GRANULOCYTES NFR BLD AUTO: 1.2 % (ref 0–0.5)
LYMPHOCYTES # BLD AUTO: 0.5 K/UL (ref 1–4.8)
LYMPHOCYTES NFR BLD: 21.1 % (ref 18–48)
MAGNESIUM SERPL-MCNC: 1.7 MG/DL (ref 1.6–2.6)
MCH RBC QN AUTO: 30.4 PG (ref 27–31)
MCHC RBC AUTO-ENTMCNC: 32.3 G/DL (ref 32–36)
MCV RBC AUTO: 94 FL (ref 82–98)
MONOCYTES # BLD AUTO: 0.3 K/UL (ref 0.3–1)
MONOCYTES NFR BLD: 13 % (ref 4–15)
NEUTROPHILS # BLD AUTO: 1.4 K/UL (ref 1.8–7.7)
NEUTROPHILS NFR BLD: 58.6 % (ref 38–73)
NRBC BLD-RTO: 0 /100 WBC
PLATELET # BLD AUTO: 193 K/UL (ref 150–450)
PMV BLD AUTO: 10 FL (ref 9.2–12.9)
POCT GLUCOSE: 144 MG/DL (ref 70–110)
POCT GLUCOSE: 199 MG/DL (ref 70–110)
POCT GLUCOSE: 211 MG/DL (ref 70–110)
POCT GLUCOSE: 230 MG/DL (ref 70–110)
POCT GLUCOSE: 230 MG/DL (ref 70–110)
POTASSIUM SERPL-SCNC: 3.3 MMOL/L (ref 3.5–5.1)
PROT SERPL-MCNC: 5.4 G/DL (ref 6–8.4)
RBC # BLD AUTO: 3.03 M/UL (ref 4.6–6.2)
SODIUM SERPL-SCNC: 140 MMOL/L (ref 136–145)
WBC # BLD AUTO: 2.46 K/UL (ref 3.9–12.7)

## 2023-07-01 PROCEDURE — 36415 COLL VENOUS BLD VENIPUNCTURE: CPT | Mod: HCNC

## 2023-07-01 PROCEDURE — C9113 INJ PANTOPRAZOLE SODIUM, VIA: HCPCS | Mod: HCNC

## 2023-07-01 PROCEDURE — 96366 THER/PROPH/DIAG IV INF ADDON: CPT

## 2023-07-01 PROCEDURE — 63600175 PHARM REV CODE 636 W HCPCS: Mod: HCNC

## 2023-07-01 PROCEDURE — 96372 THER/PROPH/DIAG INJ SC/IM: CPT

## 2023-07-01 PROCEDURE — 97116 GAIT TRAINING THERAPY: CPT | Mod: HCNC

## 2023-07-01 PROCEDURE — 85025 COMPLETE CBC W/AUTO DIFF WBC: CPT | Mod: HCNC

## 2023-07-01 PROCEDURE — 83735 ASSAY OF MAGNESIUM: CPT | Mod: HCNC

## 2023-07-01 PROCEDURE — 99233 SBSQ HOSP IP/OBS HIGH 50: CPT | Mod: HCNC,,,

## 2023-07-01 PROCEDURE — 25000003 PHARM REV CODE 250: Mod: HCNC

## 2023-07-01 PROCEDURE — 80053 COMPREHEN METABOLIC PANEL: CPT | Mod: HCNC

## 2023-07-01 PROCEDURE — 96376 TX/PRO/DX INJ SAME DRUG ADON: CPT

## 2023-07-01 PROCEDURE — 97161 PT EVAL LOW COMPLEX 20 MIN: CPT | Mod: HCNC

## 2023-07-01 PROCEDURE — G0378 HOSPITAL OBSERVATION PER HR: HCPCS | Mod: HCNC

## 2023-07-01 PROCEDURE — 99233 PR SUBSEQUENT HOSPITAL CARE,LEVL III: ICD-10-PCS | Mod: HCNC,,,

## 2023-07-01 RX ORDER — MAGNESIUM SULFATE HEPTAHYDRATE 40 MG/ML
2 INJECTION, SOLUTION INTRAVENOUS ONCE
Status: COMPLETED | OUTPATIENT
Start: 2023-07-01 | End: 2023-07-01

## 2023-07-01 RX ORDER — POTASSIUM CHLORIDE 20 MEQ/1
40 TABLET, EXTENDED RELEASE ORAL ONCE
Status: COMPLETED | OUTPATIENT
Start: 2023-07-01 | End: 2023-07-01

## 2023-07-01 RX ORDER — PREDNISONE 5 MG/1
5 TABLET ORAL 2 TIMES DAILY
Status: DISCONTINUED | OUTPATIENT
Start: 2023-07-01 | End: 2023-07-02 | Stop reason: HOSPADM

## 2023-07-01 RX ADMIN — PREDNISONE 5 MG: 5 TABLET ORAL at 08:07

## 2023-07-01 RX ADMIN — INSULIN ASPART 2 UNITS: 100 INJECTION, SOLUTION INTRAVENOUS; SUBCUTANEOUS at 12:07

## 2023-07-01 RX ADMIN — ACETAMINOPHEN 1000 MG: 500 TABLET ORAL at 09:07

## 2023-07-01 RX ADMIN — POTASSIUM CHLORIDE 40 MEQ: 1500 TABLET, EXTENDED RELEASE ORAL at 07:07

## 2023-07-01 RX ADMIN — INSULIN ASPART 3 UNITS: 100 INJECTION, SOLUTION INTRAVENOUS; SUBCUTANEOUS at 12:07

## 2023-07-01 RX ADMIN — AMLODIPINE BESYLATE 10 MG: 10 TABLET ORAL at 08:07

## 2023-07-01 RX ADMIN — CARVEDILOL 3.12 MG: 3.12 TABLET, FILM COATED ORAL at 09:07

## 2023-07-01 RX ADMIN — MAGNESIUM SULFATE 2 G: 2 INJECTION INTRAVENOUS at 08:07

## 2023-07-01 RX ADMIN — INSULIN ASPART 3 UNITS: 100 INJECTION, SOLUTION INTRAVENOUS; SUBCUTANEOUS at 08:07

## 2023-07-01 RX ADMIN — ATORVASTATIN CALCIUM 20 MG: 20 TABLET, FILM COATED ORAL at 08:07

## 2023-07-01 RX ADMIN — ENOXAPARIN SODIUM 40 MG: 40 INJECTION SUBCUTANEOUS at 05:07

## 2023-07-01 RX ADMIN — PREDNISONE 5 MG: 5 TABLET ORAL at 09:07

## 2023-07-01 RX ADMIN — CARVEDILOL 3.12 MG: 3.12 TABLET, FILM COATED ORAL at 08:07

## 2023-07-01 RX ADMIN — PANTOPRAZOLE SODIUM 40 MG: 40 INJECTION, POWDER, FOR SOLUTION INTRAVENOUS at 08:07

## 2023-07-01 RX ADMIN — INSULIN DETEMIR 15 UNITS: 100 INJECTION, SOLUTION SUBCUTANEOUS at 09:07

## 2023-07-01 RX ADMIN — INSULIN ASPART 2 UNITS: 100 INJECTION, SOLUTION INTRAVENOUS; SUBCUTANEOUS at 05:07

## 2023-07-01 RX ADMIN — INSULIN ASPART 3 UNITS: 100 INJECTION, SOLUTION INTRAVENOUS; SUBCUTANEOUS at 05:07

## 2023-07-01 RX ADMIN — POTASSIUM CHLORIDE 40 MEQ: 1500 TABLET, EXTENDED RELEASE ORAL at 08:07

## 2023-07-01 NOTE — ASSESSMENT & PLAN NOTE
Patient's FSGs are controlled on current medication regimen.  Last A1c reviewed-   Lab Results   Component Value Date    HGBA1C 11.6 (H) 05/08/2023     Most recent fingerstick glucose reviewed-   Recent Labs   Lab 06/30/23  2213 07/01/23  0812 07/01/23  1144 07/01/23  1622   POCTGLUCOSE 241* 144* 230* 211*     Current correctional scale  Low  Maintain anti-hyperglycemic dose as follows-   Antihyperglycemics (From admission, onward)    Start     Stop Route Frequency Ordered    06/29/23 2100  insulin detemir U-100 (Levemir) pen 15 Units         -- SubQ Nightly 06/29/23 1643    06/29/23 1745  insulin aspart U-100 pen 3 Units         -- SubQ 3 times daily with meals 06/29/23 1643    06/28/23 2309  insulin aspart U-100 pen 0-5 Units         -- SubQ Before meals & nightly PRN 06/28/23 2217        Hold Oral hypoglycemics while patient is in the hospital.

## 2023-07-01 NOTE — PROGRESS NOTES
[facility] [address] [place] [address] Medicine  Progress Note    Patient Name: Misha Eldridge Jr.  MRN: [id number]  Patient Class: OP- Observation   Admission Date: 6/28/2023  Length of Stay: 0 days  Attending Physician: Brad Lawson MD  Primary Care Provider: Dipak Treviño MD        Subjective:     Principal Problem:Intractable nausea and vomiting        HPI:  Misha Eldridge Jr. Is a 78 y.o. male with PMHx ht, DMII, prostate cancer and colon cancer  who presents with emesis immediately after PO intake, abdominal distension, and constipation. He eas last able to eat a full meal last week. Over the last few days he has emesis immediately after PO intake without nausea. His abdomen has gradually become more distended but he denies abdominal pain. He had some diarrhea a few days ago but now has constipation. He strains but only has small amounts of loose stool. Denies bloody BM or emesis. Denies urinary changes. He denies fever, chills, sore throat, or globus sensation. Denies chest pain. He is a somewhat poor historian.        In the ED, T 99 F, HR 87, RR 20, /63, SpO2 97%. WBC 2.98, H/H 10.9/32.8. Cmp with K 2.9, Cl 93, Na 134. Cr 1.5 (bl 1.2-1.3), Mag 1.4. Tbili 1.1. Lipase wnl. Lactate 1.7. CT Postoperative changes of the bowel, diverticulosis w/o diverticulitis, Mild diffuse wall thickening of the rectum could represent proctitis. Given 1L NS bolus, 10 meq Kcl x2, 2 g magnesium, zofran, morphine.  Admitted to  for further management.       Overview/Hospital Course:  Misha Eldridge Jr. Was admitted to hospital medicine for management of dysphagia, abdominal pain and constipation. GI consulted, planned for inpatient EGD but patient refused. SLP consulted, no dysphagia noted and patient cleared for regular diet.       Interval History:  NAEON. AFVSS.  Patient evaluated at bedside. Dysphagia improved, no further episodes of nausea or vomiting. Monitor intake to ensure true resolution of symptoms. CT head  and neck unrevealing for any acute anatomical processes to explain dysphagia. No new complaints all questions answered.     Review of Systems   Constitutional:  Negative for chills and fever.   Respiratory:  Negative for chest tightness and shortness of breath.    Cardiovascular:  Negative for chest pain and leg swelling.   Gastrointestinal:  Negative for abdominal pain and nausea.   Neurological:  Negative for dizziness and weakness.   Objective:     Vital Signs (Most Recent):  Temp: 99.3 °F (37.4 °C) (07/01/23 1621)  Pulse: 83 (07/01/23 1621)  Resp: 18 (07/01/23 1621)  BP: 113/70 (07/01/23 1621)  SpO2: 96 % (07/01/23 1621) Vital Signs (24h Range):  Temp:  [98.1 °F (36.7 °C)-99.3 °F (37.4 °C)] 99.3 °F (37.4 °C)  Pulse:  [71-85] 83  Resp:  [16-20] 18  SpO2:  [95 %-98 %] 96 %  BP: (110-135)/(62-70) 113/70     Weight: 92.1 kg (203 lb)  Body mass index is 26.78 kg/m².    Intake/Output Summary (Last 24 hours) at 7/1/2023 1833  Last data filed at 7/1/2023 1130  Gross per 24 hour   Intake --   Output 2 ml   Net -2 ml         Physical Exam  Vitals and nursing note reviewed.   Constitutional:       General: He is not in acute distress.     Appearance: He is well-developed.   HENT:      Head: Normocephalic and atraumatic.   Eyes:      Extraocular Movements: Extraocular movements intact.   Pulmonary:      Effort: Pulmonary effort is normal. No respiratory distress.   Abdominal:      General: Bowel sounds are normal.      Palpations: There is no mass.      Tenderness: There is no abdominal tenderness.      Comments: Prior midline surgical scarring   Musculoskeletal:         General: No tenderness. Normal range of motion.   Skin:     General: Skin is warm and dry.      Findings: No rash.   Neurological:      Mental Status: He is alert and oriented to person, place, and time.   Psychiatric:         Behavior: Behavior normal.         Thought Content: Thought content normal.         Judgment: Judgment normal.       Significant  Labs: All pertinent labs within the past 24 hours have been reviewed.    Significant Imaging: I have reviewed all pertinent imaging results/findings within the past 24 hours.      Assessment/Plan:      * Intractable nausea and vomiting  Dysphagia   Regurgitation  Pt has been unable to tolerate PO intake x5 days. He has immediate emesis after eating without associated nausea or abdominal discomfort. He also feels the urge for BM but reports constipation with scant diarrhea. Reports abdominal distension.  -AFVSS. Wbc 2.93k (chronicaly low)  -Lipase, LFT's unremarkable but with electrolyte abnormalities/EDUAR and isolated elevation of T bili  -CT unremarkable, except Mild diffuse wall thickening of the rectum could represent proctitis noted  -No previous EGD, most recent c-scope reviewed  -Supportive care  -GI consulted, appreciate recommendations   - EGD cancelled as patient refused procedure after extensive discussions. Does seem anxious.     - If patient does become amenable to EGD and remains inpatient, could do so early next week. If ongoing symptoms, could try to obtain esophagram over the weekend if pt amenable.  - SLP consulted     General Recommendations:  Follow-up not indicated  Diet recommendations:  Regular Diet - IDDSI Level 7, Thin liquids - IDDSI Level 7    Dysphagia        Regurgitation of food        Chronic constipation  - resolved  - several BMs on 06/30    Hypokalemia due to excessive gastrointestinal loss of potassium  Patient with noted electrolyte deficiencies with Hypo-Kalemia. Latest electrolytes have been reviewed and values are   Potassium   Date Value Ref Range Status   07/01/2023 3.3 (L) 3.5 - 5.1 mmol/L Final   . Will continue to monitor electrolytes closely. Will replace the affected electrolytes and repeat labs to be done after interventions completed.     Hypomagnesemia  Patient with noted electrolyte deficiencies with Hypo-Magnesemia. Latest electrolytes have been reviewed and values  are   Magnesium   Date Value Ref Range Status   07/01/2023 1.7 1.6 - 2.6 mg/dL Final   . Will continue to monitor electrolytes closely. Will replace the affected electrolytes and repeat labs to be done after interventions completed    Prostate cancer  -Follows w/ heme onc.   -Recent recurrence, completed XRT 2/2023 now on zytiga 1,000 mg daily and prednisone w/ PSA response seen.  -On CT, Mild diffuse wall thickening of the rectum could represent proctitis.  Recommend follow-up.    Acute kidney injury superimposed on chronic kidney disease  resolved  -Cr 1.5 > 1.0  -daily BMP    Controlled type 2 diabetes mellitus with diabetic neuropathy, without long-term current use of insulin  Patient's FSGs are controlled on current medication regimen.  Last A1c reviewed-   Lab Results   Component Value Date    HGBA1C 11.6 (H) 05/08/2023     Most recent fingerstick glucose reviewed-   Recent Labs   Lab 06/30/23  2213 07/01/23  0812 07/01/23  1144 07/01/23  1622   POCTGLUCOSE 241* 144* 230* 211*     Current correctional scale  Low  Maintain anti-hyperglycemic dose as follows-   Antihyperglycemics (From admission, onward)    Start     Stop Route Frequency Ordered    06/29/23 2100  insulin detemir U-100 (Levemir) pen 15 Units         -- SubQ Nightly 06/29/23 1643    06/29/23 1745  insulin aspart U-100 pen 3 Units         -- SubQ 3 times daily with meals 06/29/23 1643    06/28/23 2309  insulin aspart U-100 pen 0-5 Units         -- SubQ Before meals & nightly PRN 06/28/23 2217        Hold Oral hypoglycemics while patient is in the hospital.    History of colon cancer  5/2/2012 s/p LAR/small bowel resection/colostomy    Hypertension  -Stable on admission  -Continue home antihypertensive, hold home ARB in setting of hypotension       VTE Risk Mitigation (From admission, onward)         Ordered     enoxaparin injection 40 mg  Daily         06/28/23 2217     IP VTE HIGH RISK PATIENT  Once         06/28/23 2217     Place sequential  compression device  Until discontinued         06/28/23 2218                Discharge Planning   ELIZABETH: 7/2/2023     Code Status: Full Code   Is the patient medically ready for discharge?: No    Reason for patient still in hospital (select all that apply): Patient trending condition and Imaging  Discharge Plan A: Home, Home with family                  Sheila Rhoades PA-C  Department of Hospital Medicine   [facility]

## 2023-07-01 NOTE — PLAN OF CARE
Problem: Diabetes Comorbidity  Goal: Blood Glucose Level Within Targeted Range  Outcome: Ongoing, Progressing     Problem: Diarrhea  Goal: Fluid and Electrolyte Balance  Outcome: Ongoing, Progressing

## 2023-07-01 NOTE — ASSESSMENT & PLAN NOTE
Patient with noted electrolyte deficiencies with Hypo-Magnesemia. Latest electrolytes have been reviewed and values are   Magnesium   Date Value Ref Range Status   07/01/2023 1.7 1.6 - 2.6 mg/dL Final   . Will continue to monitor electrolytes closely. Will replace the affected electrolytes and repeat labs to be done after interventions completed

## 2023-07-01 NOTE — PLAN OF CARE
Problem: Physical Therapy  Goal: Physical Therapy Goal  Description: Goals to be met by: 7/15/23     Patient will increase functional independence with mobility by performin. Sit to stand transfer with Modified [place]  2. Gait  x 250 feet with Supervision .   3. Ascend/descend 12 stair with no  Handrails Stand-by Assistance .     Outcome: Ongoing, Progressing   Evaluation completed and goals appropriate. 2023

## 2023-07-01 NOTE — SUBJECTIVE & OBJECTIVE
Interval History: NAEON. AFVSS.  Patient scheduled for inpatient EGD but refused this morning. Had extensive discussion about the procedure and his options and he still wished to not proceed.   Discussed with GI who recommended an esophagram this weekend, patient agreeable. SLP consulted, he tolerated PO intake well without signs of dysphagia. Had several bowel movements today, bowel regimen de-escalated.     Review of Systems   Constitutional:  Negative for chills and fever.   HENT:  Negative for sore throat and trouble swallowing.    Eyes:  Negative for photophobia and visual disturbance.   Respiratory:  Negative for shortness of breath and wheezing.    Cardiovascular:  Negative for chest pain and leg swelling.   Gastrointestinal:  Positive for abdominal distention. Negative for abdominal pain and nausea.   Genitourinary:  Negative for dysuria and frequency.   Musculoskeletal:  Negative for back pain and gait problem.   Skin:  Negative for color change and rash.   Neurological:  Negative for light-headedness and headaches.   Psychiatric/Behavioral:  Negative for confusion. The patient is not nervous/anxious.    Objective:     Vital Signs (Most Recent):  Temp: 98.2 °F (36.8 °C) (06/30/23 1555)  Pulse: 70 (06/30/23 1555)  Resp: 18 (06/30/23 1555)  BP: 134/63 (06/30/23 1555)  SpO2: 96 % (06/30/23 1555) Vital Signs (24h Range):  Temp:  [98.2 °F (36.8 °C)-98.8 °F (37.1 °C)] 98.2 °F (36.8 °C)  Pulse:  [61-91] 70  Resp:  [16-18] 18  SpO2:  [94 %-96 %] 96 %  BP: (111-145)/(55-67) 134/63     Weight: 92.1 kg (203 lb)  Body mass index is 26.78 kg/m².    Intake/Output Summary (Last 24 hours) at 6/30/2023 1916  Last data filed at 6/30/2023 1741  Gross per 24 hour   Intake 1048.33 ml   Output 325 ml   Net 723.33 ml         Physical Exam  Vitals and nursing note reviewed.   Constitutional:       General: He is not in acute distress.     Appearance: He is well-developed.   HENT:      Head: Normocephalic and atraumatic.   Eyes:       Extraocular Movements: Extraocular movements intact.   Pulmonary:      Effort: Pulmonary effort is normal. No respiratory distress.   Abdominal:      General: Bowel sounds are increased. There is distension.      Comments: Prior midline surgical scarring   Musculoskeletal:         General: No tenderness. Normal range of motion.   Skin:     General: Skin is warm and dry.      Findings: No rash.   Neurological:      Mental Status: He is alert and oriented to person, place, and time.   Psychiatric:         Behavior: Behavior normal.         Thought Content: Thought content normal.         Judgment: Judgment normal.      Significant Labs: All pertinent labs within the past 24 hours have been reviewed.    Significant Imaging: I have reviewed all pertinent imaging results/findings within the past 24 hours.

## 2023-07-01 NOTE — ASSESSMENT & PLAN NOTE
Patient with noted electrolyte deficiencies with Hypo-Kalemia. Latest electrolytes have been reviewed and values are   Potassium   Date Value Ref Range Status   07/01/2023 3.3 (L) 3.5 - 5.1 mmol/L Final   . Will continue to monitor electrolytes closely. Will replace the affected electrolytes and repeat labs to be done after interventions completed.

## 2023-07-01 NOTE — PROGRESS NOTES
[facility] [address] [place] [address] Medicine  Progress Note    Patient Name: Misha Eldridge Jr.  MRN: [id number]  Patient Class: OP- Observation   Admission Date: 6/28/2023  Length of Stay: 0 days  Attending Physician: Anna Cottrell MD  Primary Care Provider: Dipak Treviño MD        Subjective:     Principal Problem:Intractable nausea and vomiting        HPI:  Misha Eldridge Jr. Is a 78 y.o. male with PMHx ht, DMII, prostate cancer and colon cancer  who presents with emesis immediately after PO intake, abdominal distension, and constipation. He eas last able to eat a full meal last week. Over the last few days he has emesis immediately after PO intake without nausea. His abdomen has gradually become more distended but he denies abdominal pain. He had some diarrhea a few days ago but now has constipation. He strains but only has small amounts of loose stool. Denies bloody BM or emesis. Denies urinary changes. He denies fever, chills, sore throat, or globus sensation. Denies chest pain. He is a somewhat poor historian.        In the ED, T 99 F, HR 87, RR 20, /63, SpO2 97%. WBC 2.98, H/H 10.9/32.8. Cmp with K 2.9, Cl 93, Na 134. Cr 1.5 (bl 1.2-1.3), Mag 1.4. Tbili 1.1. Lipase wnl. Lactate 1.7. CT Postoperative changes of the bowel, diverticulosis w/o diverticulitis, Mild diffuse wall thickening of the rectum could represent proctitis. Given 1L NS bolus, 10 meq Kcl x2, 2 g magnesium, zofran, morphine.  Admitted to  for further management.       Overview/Hospital Course:  Misha Eldridge Jr. Was admitted to hospital medicine for management of dysphagia, abdominal pain and constipation. GI consulted, planned for inpatient EGD but patient refused. SLP consulted, no dysphagia noted and patient cleared for regular diet.       Interval History: NAEON. AFVSS.  Patient scheduled for inpatient EGD but refused this morning. Had extensive discussion about the procedure and his options and he still wished to not proceed.    Discussed with GI who recommended an esophagram this weekend, patient agreeable. SLP consulted, he tolerated PO intake well without signs of dysphagia. Had several bowel movements today, bowel regimen de-escalated.     Review of Systems   Constitutional:  Negative for chills and fever.   HENT:  Negative for sore throat and trouble swallowing.    Eyes:  Negative for photophobia and visual disturbance.   Respiratory:  Negative for shortness of breath and wheezing.    Cardiovascular:  Negative for chest pain and leg swelling.   Gastrointestinal:  Positive for abdominal distention. Negative for abdominal pain and nausea.   Genitourinary:  Negative for dysuria and frequency.   Musculoskeletal:  Negative for back pain and gait problem.   Skin:  Negative for color change and rash.   Neurological:  Negative for light-headedness and headaches.   Psychiatric/Behavioral:  Negative for confusion. The patient is not nervous/anxious.    Objective:     Vital Signs (Most Recent):  Temp: 98.2 °F (36.8 °C) (06/30/23 1555)  Pulse: 70 (06/30/23 1555)  Resp: 18 (06/30/23 1555)  BP: 134/63 (06/30/23 1555)  SpO2: 96 % (06/30/23 1555) Vital Signs (24h Range):  Temp:  [98.2 °F (36.8 °C)-98.8 °F (37.1 °C)] 98.2 °F (36.8 °C)  Pulse:  [61-91] 70  Resp:  [16-18] 18  SpO2:  [94 %-96 %] 96 %  BP: (111-145)/(55-67) 134/63     Weight: 92.1 kg (203 lb)  Body mass index is 26.78 kg/m².    Intake/Output Summary (Last 24 hours) at 6/30/2023 1916  Last data filed at 6/30/2023 1741  Gross per 24 hour   Intake 1048.33 ml   Output 325 ml   Net 723.33 ml         Physical Exam  Vitals and nursing note reviewed.   Constitutional:       General: He is not in acute distress.     Appearance: He is well-developed.   HENT:      Head: Normocephalic and atraumatic.   Eyes:      Extraocular Movements: Extraocular movements intact.   Pulmonary:      Effort: Pulmonary effort is normal. No respiratory distress.   Abdominal:      General: Bowel sounds are increased.  There is distension.      Comments: Prior midline surgical scarring   Musculoskeletal:         General: No tenderness. Normal range of motion.   Skin:     General: Skin is warm and dry.      Findings: No rash.   Neurological:      Mental Status: He is alert and oriented to person, place, and time.   Psychiatric:         Behavior: Behavior normal.         Thought Content: Thought content normal.         Judgment: Judgment normal.      Significant Labs: All pertinent labs within the past 24 hours have been reviewed.    Significant Imaging: I have reviewed all pertinent imaging results/findings within the past 24 hours.      Assessment/Plan:      * Intractable nausea and vomiting  Dysphagia   Regurgitation  Pt has been unable to tolerate PO intake x5 days. He has immediate emesis after eating without associated nausea or abdominal discomfort. He also feels the urge for BM but reports constipation with scant diarrhea. Reports abdominal distension.  -AFVSS. Wbc 2.93k (chronicaly low)  -Lipase, LFT's unremarkable but with electrolyte abnormalities/EDUAR and isolated elevation of T bili  -CT unremarkable, except Mild diffuse wall thickening of the rectum could represent proctitis noted  -No previous EGD, most recent c-scope reviewed  -Supportive care  -GI consulted, appreciate recommendations   - EGD cancelled as patient refused procedure after extensive discussions. Does seem anxious.     - If patient does become amenable to EGD and remains inpatient, could do so early next week. If ongoing symptoms, could try to obtain esophagram over the weekend if pt amenable.  - SLP consulted     General Recommendations:  Follow-up not indicated  Diet recommendations:  Regular Diet - IDDSI Level 7, Thin liquids - IDDSI Level 7    Chronic constipation  - resolved  - several BMs on 06/30    Hypokalemia due to excessive gastrointestinal loss of potassium  Patient with noted electrolyte deficiencies with Hypo-Kalemia. Latest electrolytes  have been reviewed and values are   Potassium   Date Value Ref Range Status   06/30/2023 3.2 (L) 3.5 - 5.1 mmol/L Final   . Will continue to monitor electrolytes closely. Will replace the affected electrolytes and repeat labs to be done after interventions completed.     Hypomagnesemia  Patient with noted electrolyte deficiencies with Hypo-Magnesemia. Latest electrolytes have been reviewed and values are   Magnesium   Date Value Ref Range Status   06/30/2023 1.5 (L) 1.6 - 2.6 mg/dL Final   . Will continue to monitor electrolytes closely. Will replace the affected electrolytes and repeat labs to be done after interventions completed    Prostate cancer  -Follows w/ heme onc.   -Recent recurrence, completed XRT 2/2023 now on zytiga 1,000 mg daily and prednisone w/ PSA response seen.  -On CT, Mild diffuse wall thickening of the rectum could represent proctitis.  Recommend follow-up.    Acute kidney injury superimposed on chronic kidney disease  resolved  -Cr 1.5 > 1.0  -daily BMP    Controlled type 2 diabetes mellitus with diabetic neuropathy, without long-term current use of insulin  Patient's FSGs are controlled on current medication regimen.  Last A1c reviewed-   Lab Results   Component Value Date    HGBA1C 11.6 (H) 05/08/2023     Most recent fingerstick glucose reviewed-   Recent Labs   Lab 06/29/23  1947 06/30/23  0825 06/30/23  1200 06/30/23  1557   POCTGLUCOSE 188* 161* 159* 192*     Current correctional scale  Low  Maintain anti-hyperglycemic dose as follows-   Antihyperglycemics (From admission, onward)    Start     Stop Route Frequency Ordered    06/29/23 2100  insulin detemir U-100 (Levemir) pen 15 Units         -- SubQ Nightly 06/29/23 1643    06/29/23 1745  insulin aspart U-100 pen 3 Units         -- SubQ 3 times daily with meals 06/29/23 1643    06/28/23 2309  insulin aspart U-100 pen 0-5 Units         -- SubQ Before meals & nightly PRN 06/28/23 2217        Hold Oral hypoglycemics while patient is in the  hospital.    History of colon cancer  5/2/2012 s/p LAR/small bowel resection/colostomy    Hypertension  -Stable on admission  -Continue home antihypertensive, hold home ARB in setting of EDUAR      VTE Risk Mitigation (From admission, onward)         Ordered     enoxaparin injection 40 mg  Daily         06/28/23 2217     IP VTE HIGH RISK PATIENT  Once         06/28/23 2217     Place sequential compression device  Until discontinued         06/28/23 2217                Discharge Planning   ELIZABETH: 7/1/2023     Code Status: Full Code   Is the patient medically ready for discharge?: No    Reason for patient still in hospital (select all that apply): Patient trending condition, Laboratory test, Imaging and Consult recommendations.  Discharge Plan A: Home, Home with family                  Sheila Rhoades PA-C  Department of Hospital Medicine   [facility]

## 2023-07-01 NOTE — SUBJECTIVE & OBJECTIVE
Interval History:  NAEON. AFVSS.  Patient evaluated at bedside. Dysphagia improved, no further episodes of nausea or vomiting. Monitor intake to ensure true resolution of symptoms. CT head and neck unrevealing for any acute anatomical processes to explain dysphagia. No new complaints all questions answered.     Review of Systems   Constitutional:  Negative for chills and fever.   Respiratory:  Negative for chest tightness and shortness of breath.    Cardiovascular:  Negative for chest pain and leg swelling.   Gastrointestinal:  Negative for abdominal pain and nausea.   Neurological:  Negative for dizziness and weakness.   Objective:     Vital Signs (Most Recent):  Temp: 99.3 °F (37.4 °C) (07/01/23 1621)  Pulse: 83 (07/01/23 1621)  Resp: 18 (07/01/23 1621)  BP: 113/70 (07/01/23 1621)  SpO2: 96 % (07/01/23 1621) Vital Signs (24h Range):  Temp:  [98.1 °F (36.7 °C)-99.3 °F (37.4 °C)] 99.3 °F (37.4 °C)  Pulse:  [71-85] 83  Resp:  [16-20] 18  SpO2:  [95 %-98 %] 96 %  BP: (110-135)/(62-70) 113/70     Weight: 92.1 kg (203 lb)  Body mass index is 26.78 kg/m².    Intake/Output Summary (Last 24 hours) at 7/1/2023 1833  Last data filed at 7/1/2023 1130  Gross per 24 hour   Intake --   Output 2 ml   Net -2 ml         Physical Exam  Vitals and nursing note reviewed.   Constitutional:       General: He is not in acute distress.     Appearance: He is well-developed.   HENT:      Head: Normocephalic and atraumatic.   Eyes:      Extraocular Movements: Extraocular movements intact.   Pulmonary:      Effort: Pulmonary effort is normal. No respiratory distress.   Abdominal:      General: Bowel sounds are normal.      Palpations: There is no mass.      Tenderness: There is no abdominal tenderness.      Comments: Prior midline surgical scarring   Musculoskeletal:         General: No tenderness. Normal range of motion.   Skin:     General: Skin is warm and dry.      Findings: No rash.   Neurological:      Mental Status: He is alert and  oriented to person, place, and time.   Psychiatric:         Behavior: Behavior normal.         Thought Content: Thought content normal.         Judgment: Judgment normal.       Significant Labs: All pertinent labs within the past 24 hours have been reviewed.    Significant Imaging: I have reviewed all pertinent imaging results/findings within the past 24 hours.

## 2023-07-01 NOTE — ASSESSMENT & PLAN NOTE
Dysphagia   Regurgitation  Pt has been unable to tolerate PO intake x5 days. He has immediate emesis after eating without associated nausea or abdominal discomfort. He also feels the urge for BM but reports constipation with scant diarrhea. Reports abdominal distension.  -AFVSS. Wbc 2.93k (chronicaly low)  -Lipase, LFT's unremarkable but with electrolyte abnormalities/EDUAR and isolated elevation of T bili  -CT unremarkable, except Mild diffuse wall thickening of the rectum could represent proctitis noted  -No previous EGD, most recent c-scope reviewed  -Supportive care  -GI consulted, appreciate recommendations   - EGD cancelled as patient refused procedure after extensive discussions. Does seem anxious.     - If patient does become amenable to EGD and remains inpatient, could do so early next week. If ongoing symptoms, could try to obtain esophagram over the weekend if pt amenable.  - SLP consulted     General Recommendations:  Follow-up not indicated  Diet recommendations:  Regular Diet - IDDSI Level 7, Thin liquids - IDDSI Level 7

## 2023-07-01 NOTE — ASSESSMENT & PLAN NOTE
Patient with noted electrolyte deficiencies with Hypo-Magnesemia. Latest electrolytes have been reviewed and values are   Magnesium   Date Value Ref Range Status   06/30/2023 1.5 (L) 1.6 - 2.6 mg/dL Final   . Will continue to monitor electrolytes closely. Will replace the affected electrolytes and repeat labs to be done after interventions completed

## 2023-07-01 NOTE — ASSESSMENT & PLAN NOTE
Patient's FSGs are controlled on current medication regimen.  Last A1c reviewed-   Lab Results   Component Value Date    HGBA1C 11.6 (H) 05/08/2023     Most recent fingerstick glucose reviewed-   Recent Labs   Lab 06/29/23  1947 06/30/23  0825 06/30/23  1200 06/30/23  1557   POCTGLUCOSE 188* 161* 159* 192*     Current correctional scale  Low  Maintain anti-hyperglycemic dose as follows-   Antihyperglycemics (From admission, onward)    Start     Stop Route Frequency Ordered    06/29/23 2100  insulin detemir U-100 (Levemir) pen 15 Units         -- SubQ Nightly 06/29/23 1643    06/29/23 1745  insulin aspart U-100 pen 3 Units         -- SubQ 3 times daily with meals 06/29/23 1643    06/28/23 2309  insulin aspart U-100 pen 0-5 Units         -- SubQ Before meals & nightly PRN 06/28/23 2217        Hold Oral hypoglycemics while patient is in the hospital.

## 2023-07-01 NOTE — PLAN OF CARE
Problem: Infection  Goal: Absence of Infection Signs and Symptoms  Outcome: Ongoing, Progressing     Problem: Adult Inpatient Plan of Care  Goal: Plan of Care Review  Outcome: Ongoing, Progressing  Goal: Patient-Specific Goal (Individualized)  Outcome: Ongoing, Progressing  Goal: Absence of Hospital-Acquired Illness or Injury  Outcome: Ongoing, Progressing  Goal: Optimal Comfort and Wellbeing  Outcome: Ongoing, Progressing  Goal: Readiness for Transition of Care  Outcome: Ongoing, Progressing     Problem: Diabetes Comorbidity  Goal: Blood Glucose Level Within Targeted Range  Outcome: Ongoing, Progressing     Problem: Fluid and Electrolyte Imbalance (Acute Kidney Injury/Impairment)  Goal: Fluid and Electrolyte Balance  Outcome: Ongoing, Progressing     Problem: Oral Intake Inadequate (Acute Kidney Injury/Impairment)  Goal: Optimal Nutrition Intake  Outcome: Ongoing, Progressing     Problem: Renal Function Impairment (Acute Kidney Injury/Impairment)  Goal: Effective Renal Function  Outcome: Ongoing, Progressing     Problem: Skin Injury Risk Increased  Goal: Skin Health and Integrity  Outcome: Ongoing, Progressing     Problem: Diarrhea  Goal: Fluid and Electrolyte Balance  Outcome: Ongoing, Progressing     Patient rounded on through shift as per policy, Patient in stable condition with no complaints. Safety measures in place: bed in lowest position, three rails up, call light in reach, personal belonging in reach. No acute events during shift. Ongoing care.

## 2023-07-01 NOTE — ASSESSMENT & PLAN NOTE
Patient with noted electrolyte deficiencies with Hypo-Kalemia. Latest electrolytes have been reviewed and values are   Potassium   Date Value Ref Range Status   06/30/2023 3.2 (L) 3.5 - 5.1 mmol/L Final   . Will continue to monitor electrolytes closely. Will replace the affected electrolytes and repeat labs to be done after interventions completed.

## 2023-07-01 NOTE — PT/OT/SLP EVAL
Physical Therapy Evaluation and treatment.     Patient Name:  Misha Eldridge Jr.   MRN:  [id number]    Recommendations:     Discharge Recommendations: home   Discharge Equipment Recommendations: none   Barriers to discharge: Inaccessible home pt lives in 2 story with B&B upstairs.     Assessment:     Misha Eldridge Jr. is a 78 y.o. male admitted with a medical diagnosis of Intractable nausea and vomiting.  He presents with the following impairments/functional limitations: impaired endurance, impaired functional mobility, gait instability, impaired balance, decreased safety awareness pt tolerated treatment well but constant diarrhea decreased functional mobility. Pt will benefit from skilled PT 3x/wk to progress physically. Pt will be able to discharge home with no needs when medically stable. Pt came to ED with emesis.    Rehab Prognosis: Good; patient would benefit from acute skilled PT services to address these deficits and reach maximum level of function.    Recent Surgery: Procedure(s) (LRB):  EGD (ESOPHAGOGASTRODUODENOSCOPY) (N/A) 1 Day Post-Op    Plan:     During this hospitalization, patient to be seen 3 x/week to address the identified rehab impairments via gait training, therapeutic activities and progress toward the following goals:    Plan of Care Expires:  07/28/23    Subjective     Chief Complaint: pt c/o diarrhea   Patient/Family Comments/goals: to get better and go home.   Pain/Comfort:  Pain Rating 1: 0/10  Pain Rating Post-Intervention 1: 0/10    Patients cultural, spiritual, Mandaeism conflicts given the current situation: no    Living Environment:  Pt is retired and lives alone in 2 story with B&B upstairs and slab entrance.   Prior to admission, patients level of function was independent .  Equipment used at home: cane, straight.  DME owned (not currently used): none.  Upon discharge, patient will have assistance from no one .    Objective:     Communicated with nurse  prior to session.  Patient  found  sitting on BSC  with telemetry, peripheral IV  upon PT entry to room.    General Precautions: Standard, fall  Orthopedic Precautions:    Braces:    Respiratory Status: Room air    Exams:  Cognitive Exam:  Patient is oriented to Person, Place, Time, and Situation  RLE ROM: WFL  RLE Strength: WFL  LLE ROM: WFL  LLE Strength: WFL    Functional Mobility:  Bed Mobility:     Rolling Left:  independence  Supine to Sit: independence    Transfers:     Sit to Stand:  stand by assistance with no AD    Gait: pt received gait training ~ 4 ft with rolling IV pole for balance. Pt gait trained to [facility].     Pt white board updated with current therapists name and level of mobility assistance needed.       AM-PAC 6 CLICK MOBILITY  Total Score:20       Treatment & Education:  Pt received verbal instructions in role of PT and POC. Pt verbally expressed understanding of such.     Patient left  sitting on BSC   with all lines intact, call button in reach, and RN  notified.    GOALS:   Multidisciplinary Problems       Physical Therapy Goals          Problem: Physical Therapy    Goal Priority Disciplines Outcome Goal Variances Interventions   Physical Therapy Goal     PT, PT/OT Ongoing, Progressing     Description: Goals to be met by: 7/15/23     Patient will increase functional independence with mobility by performin. Sit to stand transfer with Modified [place]  2. Gait  x 250 feet with Supervision .   3. Ascend/descend 12 stair with no  Handrails Stand-by Assistance .                          History:     Past Medical History:   Diagnosis Date    Cataract     Colostomy in place     Dermatochalasis     Diabetes mellitus type II     Dry eye syndrome     Hemorrhoid     History of colon cancer     History of prostate cancer     History of pulmonary embolism     Hypertension     Macular degeneration        Past Surgical History:   Procedure Laterality Date    CATARACT EXTRACTION Bilateral     COLONOSCOPY N/A 2022     Procedure: COLONOSCOPY;  Surgeon: Jose Dangelo MD;  Location: [facility] ([address] [place] [address]);  Service: Endoscopy;  Laterality: N/A;  fully vaccinated    EYE SURGERY Bilateral     cataract extraction    HERNIA REPAIR      PROSTATECTOMY      SUBTOTAL COLECTOMY         Time Tracking:     PT Received On: 07/01/23  PT Start Time: 1008     PT Stop Time: 1018  PT Total Time (min): 10 min     2nd PT start time: 10:45  2nd PT end time: 10:52  (10 min+ 7 min = 17 min total time)     Billable Minutes: Evaluation 8 min  and Gait Training 9 min       07/01/2023

## 2023-07-02 VITALS
TEMPERATURE: 98 F | SYSTOLIC BLOOD PRESSURE: 130 MMHG | WEIGHT: 203 LBS | DIASTOLIC BLOOD PRESSURE: 72 MMHG | OXYGEN SATURATION: 98 % | BODY MASS INDEX: 26.9 KG/M2 | HEIGHT: 73 IN | RESPIRATION RATE: 18 BRPM | HEART RATE: 70 BPM

## 2023-07-02 LAB
POCT GLUCOSE: 156 MG/DL (ref 70–110)
POCT GLUCOSE: 230 MG/DL (ref 70–110)

## 2023-07-02 PROCEDURE — 63600175 PHARM REV CODE 636 W HCPCS: Mod: HCNC

## 2023-07-02 PROCEDURE — G0378 HOSPITAL OBSERVATION PER HR: HCPCS | Mod: HCNC

## 2023-07-02 PROCEDURE — C9113 INJ PANTOPRAZOLE SODIUM, VIA: HCPCS | Mod: HCNC

## 2023-07-02 PROCEDURE — 99239 HOSP IP/OBS DSCHRG MGMT >30: CPT | Mod: HCNC,,,

## 2023-07-02 PROCEDURE — 99239 PR HOSPITAL DISCHARGE DAY,>30 MIN: ICD-10-PCS | Mod: HCNC,,,

## 2023-07-02 PROCEDURE — 96376 TX/PRO/DX INJ SAME DRUG ADON: CPT

## 2023-07-02 PROCEDURE — 25000003 PHARM REV CODE 250: Mod: HCNC

## 2023-07-02 RX ORDER — HYDROCHLOROTHIAZIDE 25 MG/1
TABLET ORAL
Qty: 90 TABLET | Refills: 1
Start: 2023-07-02 | End: 2023-07-06 | Stop reason: SDUPTHER

## 2023-07-02 RX ADMIN — INSULIN ASPART 2 UNITS: 100 INJECTION, SOLUTION INTRAVENOUS; SUBCUTANEOUS at 11:07

## 2023-07-02 RX ADMIN — INSULIN ASPART 3 UNITS: 100 INJECTION, SOLUTION INTRAVENOUS; SUBCUTANEOUS at 09:07

## 2023-07-02 RX ADMIN — INSULIN ASPART 3 UNITS: 100 INJECTION, SOLUTION INTRAVENOUS; SUBCUTANEOUS at 11:07

## 2023-07-02 RX ADMIN — PREDNISONE 5 MG: 5 TABLET ORAL at 09:07

## 2023-07-02 RX ADMIN — ATORVASTATIN CALCIUM 20 MG: 20 TABLET, FILM COATED ORAL at 09:07

## 2023-07-02 RX ADMIN — CARVEDILOL 3.12 MG: 3.12 TABLET, FILM COATED ORAL at 09:07

## 2023-07-02 RX ADMIN — PANTOPRAZOLE SODIUM 40 MG: 40 INJECTION, POWDER, FOR SOLUTION INTRAVENOUS at 09:07

## 2023-07-02 RX ADMIN — AMLODIPINE BESYLATE 10 MG: 10 TABLET ORAL at 09:07

## 2023-07-02 NOTE — PLAN OF CARE
Problem: Infection  Goal: Absence of Infection Signs and Symptoms  7/2/2023 0706 by Magy Bazan LPN  Outcome: Ongoing, Progressing  7/2/2023 0706 by Magy Bazan LPN  Outcome: Ongoing, Progressing     Problem: Adult Inpatient Plan of Care  Goal: Plan of Care Review  7/2/2023 0706 by Magy Bazan LPN  Outcome: Ongoing, Progressing  7/2/2023 0706 by Magy Bazan LPN  Outcome: Ongoing, Progressing  Goal: Patient-Specific Goal (Individualized)  7/2/2023 0706 by Magy Bazan LPN  Outcome: Ongoing, Progressing  7/2/2023 0706 by Magy Bazan LPN  Outcome: Ongoing, Progressing  Goal: Absence of Hospital-Acquired Illness or Injury  7/2/2023 0706 by Magy Bazan LPN  Outcome: Ongoing, Progressing  7/2/2023 0706 by Magy Bazan LPN  Outcome: Ongoing, Progressing  Goal: Optimal Comfort and Wellbeing  7/2/2023 0706 by Magy Bazan LPN  Outcome: Ongoing, Progressing  7/2/2023 0706 by Magy Bazan LPN  Outcome: Ongoing, Progressing  Goal: Readiness for Transition of Care  7/2/2023 0706 by Magy Bazan LPN  Outcome: Ongoing, Progressing  7/2/2023 0706 by Magy Bazan LPN  Outcome: Ongoing, Progressing     Problem: Diabetes Comorbidity  Goal: Blood Glucose Level Within Targeted Range  7/2/2023 0706 by Magy Bazan LPN  Outcome: Ongoing, Progressing  7/2/2023 0706 by Magy Bazan LPN  Outcome: Ongoing, Progressing     Problem: Fluid and Electrolyte Imbalance (Acute Kidney Injury/Impairment)  Goal: Fluid and Electrolyte Balance  7/2/2023 0706 by Magy Bazan LPN  Outcome: Ongoing, Progressing  7/2/2023 0706 by Magy Bazan LPN  Outcome: Ongoing, Progressing     Problem: Oral Intake Inadequate (Acute Kidney Injury/Impairment)  Goal: Optimal Nutrition Intake  7/2/2023 0706 by Magy Bazan LPN  Outcome: Ongoing, Progressing  7/2/2023 0706 by Magy Baazn LPN  Outcome: Ongoing, Progressing     Problem: Renal Function Impairment (Acute Kidney Injury/Impairment)  Goal: Effective Renal  Function  7/2/2023 0706 by Magy Bazan LPN  Outcome: Ongoing, Progressing  7/2/2023 0706 by Magy Bazan LPN  Outcome: Ongoing, Progressing     Problem: Skin Injury Risk Increased  Goal: Skin Health and Integrity  7/2/2023 0706 by Magy Bazan LPN  Outcome: Ongoing, Progressing  7/2/2023 0706 by Magy Bazan LPN  Outcome: Ongoing, Progressing     Problem: Diarrhea  Goal: Fluid and Electrolyte Balance  7/2/2023 0706 by Magy Bazan LPN  Outcome: Ongoing, Progressing  7/2/2023 0706 by Magy Bazan LPN  Outcome: Ongoing, Progressing

## 2023-07-02 NOTE — DISCHARGE INSTRUCTIONS
Please check blood pressure daily around the same time (preferably in the morning prior to morning blood pressure medications) and document to bring to Primary Care appointment:    If Systolic blood pressure (top number) is at or above 180 and/or diastolic blood pressure (bottom number) is at or above 110   - Please take blood pressure medications as prescribed and repeat blood pressure in 1-2 hours   - If blood pressure continues to meet parameters above, contact primary care provider    If Systolic blood pressure (top number) is between  and/or diastolic blood pressure (bottom number) is between    - Please take blood pressure medications as prescribed    If Systolic blood pressure (top number) is at or below 90 and/or diastolic blood pressure (bottom number) is at or below 60   - Please do not take blood pressure medications, and repeat blood pressure in 20-30 minutes   - If blood pressure continues to meet parameters above, do not take medicaions and contact primary care provider

## 2023-07-03 ENCOUNTER — TELEPHONE (OUTPATIENT)
Dept: OTOLARYNGOLOGY | Facility: CLINIC | Age: 78
End: 2023-07-03
Payer: MEDICARE

## 2023-07-03 NOTE — PLAN OF CARE
07/03/23 1521   Final Note   Assessment Type Final Discharge Note   Anticipated Discharge Disposition Home     Patient d/c home. Bisi Bazan RN

## 2023-07-03 NOTE — TELEPHONE ENCOUNTER
----- Message from MONTRELL Greenwood sent at 7/3/2023  4:01 PM CDT -----  Regarding: FW: Missed appt while in hospital, needs hearing loss appt  Contact: @[phone]  Hi,  Needs appointment with ENT and audio.    Thanks!  ----- Message -----  From: Radha Raygoza CCC-SHANON  Sent: 7/3/2023   3:58 PM CDT  To: MONTRELL Greenwood  Subject: FW: Missed appt while in hospital, needs hea#    This is another message.  I would think Dr. Quiroz's staff would be rescheduling this appointment with another audio.  ----- Message -----  From: Jael Park  Sent: 7/3/2023   1:59 PM CDT  To: [facility] Ent Clinical Staff, #  Subject: Missed appt while in hospital, needs hearing#    Pt was unaware of appt, recently released from hospital- Missed appt while in hospital, needs hearing loss appt please call back @[phone]

## 2023-07-03 NOTE — PLAN OF CARE
OSCAR made appointment. Patient's GI clinic is made for 7/21/23 @3:45pm . His hem/onc appointment . The  had to send a message to arrange his appointment. They will call patient. Oscar called patient but had to leave a voice message about his appointment. Bisi Bazan RN

## 2023-07-03 NOTE — TELEPHONE ENCOUNTER
----- Message from Jigna Lindquist, CCC-A sent at 7/3/2023  4:14 PM CDT -----  Regarding: FW: Missed appt while in hospital, needs hearing loss appt  Contact: @[phone]  Hey!    No sure how best to direct this as I know Dr. Castro does not have a set MA yet - but would you be able to reschedule the patient for audio/ENT or pass the request on to someone who can?    Thanks! AOI  ----- Message -----  From: Jael Park  Sent: 7/3/2023   1:59 PM CDT  To: [facility] Ent Clinical Staff, #  Subject: Missed appt while in hospital, needs hearing#    Pt was unaware of appt, recently released from hospital- Missed appt while in hospital, needs hearing loss appt please call back @[phone]

## 2023-07-06 DIAGNOSIS — Q24.9 HEART ABNORMALITY: ICD-10-CM

## 2023-07-06 DIAGNOSIS — Z85.46 HISTORY OF PROSTATE CANCER: ICD-10-CM

## 2023-07-06 DIAGNOSIS — I10 HYPERTENSION, UNSPECIFIED TYPE: ICD-10-CM

## 2023-07-06 RX ORDER — ORAL SEMAGLUTIDE 14 MG/1
14 TABLET ORAL DAILY
Qty: 34 TABLET | Refills: 0 | Status: SHIPPED | OUTPATIENT
Start: 2023-07-06 | End: 2023-08-09

## 2023-07-06 RX ORDER — AMLODIPINE BESYLATE 10 MG/1
10 TABLET ORAL DAILY
Qty: 34 TABLET | Refills: 0 | Status: SHIPPED | OUTPATIENT
Start: 2023-07-06

## 2023-07-06 RX ORDER — CARVEDILOL 3.12 MG/1
3.12 TABLET ORAL 2 TIMES DAILY
Qty: 68 TABLET | Refills: 0 | Status: SHIPPED | OUTPATIENT
Start: 2023-07-06 | End: 2023-07-12 | Stop reason: SDUPTHER

## 2023-07-06 NOTE — TELEPHONE ENCOUNTER
No care due was identified.  [facility] Embedded Care Due Messages. Reference number: [id number].   7/06/2023 9:17:18 AM CDT

## 2023-07-06 NOTE — DISCHARGE SUMMARY
[facility] [address] [place] [address] Medicine  Discharge Summary      Patient Name: Misha Eldridge Jr.  MRN: [id number]  CARLI: [id number]  Patient Class: OP- Observation  Admission Date: 6/28/2023  Hospital Length of Stay: 0 days  Discharge Date and Time: 7/2/2023  5:18 PM  Attending Physician: Karolina att. providers found   Discharging Provider: Sheila Rhoades PA-C  Primary Care Provider: Dipak Treviño MD  [facility] Medicine Team: [facility] HOSP MED Y Sheila Rhoades PA-C  Primary Care Team: [facility] MED Y    HPI:   Misha Eldridge Jr. Is a 78 y.o. male with PMHx ht, DMII, prostate cancer and colon cancer  who presents with emesis immediately after PO intake, abdominal distension, and constipation. He eas last able to eat a full meal last week. Over the last few days he has emesis immediately after PO intake without nausea. His abdomen has gradually become more distended but he denies abdominal pain. He had some diarrhea a few days ago but now has constipation. He strains but only has small amounts of loose stool. Denies bloody BM or emesis. Denies urinary changes. He denies fever, chills, sore throat, or globus sensation. Denies chest pain. He is a somewhat poor historian.        In the ED, T 99 F, HR 87, RR 20, /63, SpO2 97%. WBC 2.98, H/H 10.9/32.8. Cmp with K 2.9, Cl 93, Na 134. Cr 1.5 (bl 1.2-1.3), Mag 1.4. Tbili 1.1. Lipase wnl. Lactate 1.7. CT Postoperative changes of the bowel, diverticulosis w/o diverticulitis, Mild diffuse wall thickening of the rectum could represent proctitis. Given 1L NS bolus, 10 meq Kcl x2, 2 g magnesium, zofran, morphine.  Admitted to  for further management.       Procedure(s) (LRB):  EGD (ESOPHAGOGASTRODUODENOSCOPY) (N/A)      Hospital Course:   Misha Eldridge Jr. Was admitted to hospital medicine for management of dysphagia, abdominal pain and constipation. GI consulted, planned for inpatient EGD but patient refused. SLP consulted, no dysphagia noted and patient cleared for  regular diet. Slowly advanced diet, no further episodes of nausea or vomiting. Started on bowel regimen with resolution of constipation. Imaging of neck and chest unremarkable for underlying etiology of symptoms. Did reveal small lung nodules; patient advised to follow up with outpatient heme/onc for further monitoring and testing given cancer history. Outpatient esophagram ordered with referral to outpatient GI sent.     On day of discharge patient's vital signs were stable and patient appeared clinically ready for discharge. Hospital course, discharge plan and return precautions discussed - patient expressed understanding. All questions answered at that time.          Goals of Care Treatment Preferences:  Code Status: Full Code      Consults:   Consults (From admission, onward)        Status Ordering Provider     Inpatient consult to Gastroenterology  Once        Provider:  (Not yet assigned)    Completed MERA VEGA          No new Assessment & Plan notes have been filed under this hospital service since the last note was generated.  Service: Hospital Medicine    Final Active Diagnoses:    Diagnosis Date Noted POA    PRINCIPAL PROBLEM:  Intractable nausea and vomiting [R11.2] 06/28/2023 Yes    Hypomagnesemia [E83.42] 06/29/2023 Yes    Hypokalemia due to excessive gastrointestinal loss of potassium [E87.6] 06/29/2023 Yes    Chronic constipation [K59.09] 06/29/2023 Yes    Regurgitation of food [R11.10] 06/29/2023 Yes    Dysphagia [R13.10] 06/29/2023 Yes    Prostate cancer [C61] 10/25/2022 Yes    Acute kidney injury superimposed on chronic kidney disease [N17.9, N18.9] 01/19/2021 Yes    Controlled type 2 diabetes mellitus with diabetic neuropathy, without long-term current use of insulin [E11.40] 08/21/2013 Yes    Hypertension [I10]  Yes    History of colon cancer [Z85.038]  Yes      Problems Resolved During this Admission:       Discharged Condition: stable    Disposition: Home or Self  Care    Follow Up:   Follow-up Information     Dipak Treviño MD Follow up.    Specialty: Internal Medicine  Contact information:  [address]  [address]  [facility] [address]  [phone]             [facility] 4th Fl Follow up.    Specialty: Gastroenterology  Contact information:  [address] [facility]  [facility] [address] [phone]  Additional information:  [facility] - Main Building, 4th Floor   Please park in [facility] and take Atrium elevator                     Patient Instructions:      FL Esophagram Complete   Standing Status: Future Standing Exp. Date: 07/02/24     Order Specific Question Answer Comments   May the Radiologist modify the order per protocol to meet the clinical needs of the patient? Yes    Is the patient allergic to iodine contrast? No    Release to patient Immediate      Ambulatory referral/consult to Outpatient Case Management   Referral Priority: Routine Referral Type: Consultation   Referral Reason: Specialty Services Required   Number of Visits Requested: 1     Ambulatory referral/consult to Gastroenterology   Standing Status: Future   Referral Priority: Urgent Referral Type: Consultation   Referral Reason: Specialty Services Required   Requested Specialty: Gastroenterology   Number of Visits Requested: 1     Ambulatory referral/consult to Hematology / Oncology   Standing Status: Future   Referral Priority: Urgent Referral Type: Consultation   Referral Reason: Specialty Services Required   Referred to Provider: TIM MALAGON Requested Specialty: Hematology and Oncology   Number of Visits Requested: 1     Diet Cardiac     Diet diabetic     Notify your health care provider if you experience any of the following:  temperature >100.4     Notify your health care provider if you experience any of the following:  persistent nausea and vomiting or diarrhea     Notify your health care provider if you experience any of the following:  severe  uncontrolled pain     Notify your health care provider if you experience any of the following:  difficulty breathing or increased cough     Notify your health care provider if you experience any of the following:  severe persistent headache     Notify your health care provider if you experience any of the following:  persistent dizziness, light-headedness, or visual disturbances     Notify your health care provider if you experience any of the following:  increased confusion or weakness     Activity as tolerated       Significant Diagnostic Studies:   Lab Results   Component Value Date    WBC 2.46 (L) 07/01/2023    HGB 9.2 (L) 07/01/2023    HCT 28.5 (L) 07/01/2023    MCV 94 07/01/2023     07/01/2023       CMP  Sodium   Date Value Ref Range Status   07/01/2023 140 136 - 145 mmol/L Final     Potassium   Date Value Ref Range Status   07/01/2023 3.3 (L) 3.5 - 5.1 mmol/L Final     Chloride   Date Value Ref Range Status   07/01/2023 108 95 - 110 mmol/L Final     CO2   Date Value Ref Range Status   07/01/2023 21 (L) 23 - 29 mmol/L Final     Glucose   Date Value Ref Range Status   07/01/2023 148 (H) 70 - 110 mg/dL Final     BUN   Date Value Ref Range Status   07/01/2023 4 (L) 8 - 23 mg/dL Final     Creatinine   Date Value Ref Range Status   07/01/2023 0.7 0.5 - 1.4 mg/dL Final     Calcium   Date Value Ref Range Status   07/01/2023 8.1 (L) 8.7 - 10.5 mg/dL Final     Total Protein   Date Value Ref Range Status   07/01/2023 5.4 (L) 6.0 - 8.4 g/dL Final     Albumin   Date Value Ref Range Status   07/01/2023 2.9 (L) 3.5 - 5.2 g/dL Final     Total Bilirubin   Date Value Ref Range Status   07/01/2023 0.8 0.1 - 1.0 mg/dL Final     Comment:     For infants and newborns, interpretation of results should be based  on gestational age, weight and in agreement with clinical  observations.    Premature Infant recommended reference ranges:  Up to 24 hours.............<8.0 mg/dL  Up to 48 hours............<12.0 mg/dL  3-5  days..................<15.0 mg/dL  6-29 days.................<15.0 mg/dL       Alkaline Phosphatase   Date Value Ref Range Status   07/01/2023 63 55 - 135 U/L Final     AST   Date Value Ref Range Status   07/01/2023 22 10 - 40 U/L Final     ALT   Date Value Ref Range Status   07/01/2023 15 10 - 44 U/L Final     Anion Gap   Date Value Ref Range Status   07/01/2023 11 8 - 16 mmol/L Final     eGFR   Date Value Ref Range Status   07/01/2023 >60.0 >60 mL/min/1.73 m^2 Final     Imaging Results          CT Abdomen Pelvis With Contrast (Final result)  Result time 06/28/23 21:23:03    Final result by Harish Barnes MD (06/28/23 21:23:03)                 Impression:      1. Postoperative changes of the bowel.  See above comments.  2. Mild diffuse wall thickening of the rectum could represent proctitis.  Recommend follow-up.  3. Diverticulosis of the colon.  4. Hepatomegaly and hepatic steatosis.  5. Few calcified pleural plaques anteriorly at the right lung base.  6. Multilevel chronic degenerative changes of the lumbar spine.  See above comments.      Electronically signed by: Harish Barnes  Date:    06/28/2023  Time:    21:23             Narrative:    EXAMINATION:  CT ABDOMEN PELVIS WITH CONTRAST    CLINICAL HISTORY:  Abdominal pain, acute, nonlocalized;    TECHNIQUE:  Low dose axial images, sagittal and coronal reformations were obtained from the lung bases to the pubic symphysis following the IV administration of 100 mL of Omnipaque 350 .  Oral contrast was not administered.    COMPARISON:  None.    FINDINGS:  Abdomen:    - Lower thorax:Few calcified pleural plaques anteriorly at the right lung base.    - Lung bases: No infiltrates and no nodules.    - Liver: The liver is enlarged with mild diffuse fatty infiltration.  No focal abnormality.    - Gallbladder: No calcified gallstones.    - Bile Ducts: No evidence of intra or extra hepatic biliary ductal dilation.    - Spleen: Negative.    - Kidneys: Few bilateral renal  cysts.  No stone, soft tissue mass or hydronephrosis bilaterally.    - Adrenals: Unremarkable.    - Pancreas: No mass or peripancreatic fat stranding.    - Retroperitoneum:  No significant adenopathy.    - Vascular: No abdominal aortic aneurysm.  Retroaortic left renal vein as an anatomic variant.    - Abdominal wall:  Unremarkable.    Pelvis:    Mild urinary bladder wall thickening.  No focal abnormality.    Bowel/Mesentery:    No evidence of bowel obstruction.  Mild wall thickening of the rectum.  Postop changes of the rectosigmoid junction.    Diverticulosis of the colon with no evidence of acute diverticulitis.    Postop changes of the cecum.  Suspected prior appendectomy.    Postop changes of the small bowel near the midline in the pelvis.    Bones:  No acute fractures.    Mild diffuse disc bulge at L4-5 with severe central canal narrowing.    Severe foraminal narrowing at L5-S1 left.  Mild-to-moderate foraminal narrowing elsewhere in the lumbar spine bilaterally.    Mild disc space narrowing and vacuum disc phenomena at L5-S1.                                  Pending Diagnostic Studies:     None         Medications:  Reconciled Home Medications:      Medication List      CHANGE how you take these medications    predniSONE 5 MG tablet  Commonly known as: DELTASONE  Take 1 tablet (5 mg total) by mouth 2 (two) times daily.  What changed: Another medication with the same name was removed. Continue taking this medication, and follow the directions you see here.        CONTINUE taking these medications    abiraterone 250 mg Tab  Commonly known as: ZYTIGA  Take 4 tablets (1,000 mg total) by mouth once daily.     ACCU-CHEK SOFTCLIX LANCETS Misc  Generic drug: lancets  TEST BLOOD SUGAR ONCE A DAY     amLODIPine 10 MG tablet  Commonly known as: NORVASC  Take 1 tablet (10 mg total) by mouth once daily.     atorvastatin 20 MG tablet  Commonly known as: LIPITOR  Take 1 tablet (20 mg total) by mouth once daily.     * blood  "sugar diagnostic Strp  1 each by Misc.(Non-Drug; Combo Route) route once daily.     * blood sugar diagnostic Strp  Pt to check up to 6 times daily     * blood sugar diagnostic Strp  Use to check blood glucose 1-2 times daily as directed.     blood-glucose meter kit  Use as instructed     carvediloL 3.125 MG tablet  Commonly known as: COREG  Take 1 tablet (3.125 mg total) by mouth 2 (two) times daily.     diclofenac 75 MG EC tablet  Commonly known as: VOLTAREN  Take 1 tablet (75 mg total) by mouth 2 (two) times daily.     doxazosin 4 MG tablet  Commonly known as: CARDURA  TAKE 1 TABLET(4 MG) BY MOUTH EVERY EVENING     gabapentin 300 MG capsule  Commonly known as: NEURONTIN  Take 1 capsule (300 mg total) by mouth 3 (three) times daily.     glipiZIDE 10 MG tablet  Commonly known as: GLUCOTROL  Take 1 tablet (10 mg total) by mouth 2 (two) times daily before meals.     hydroCHLOROthiazide 25 MG tablet  Commonly known as: HYDRODIURIL  TAKE ONE-HALF TABLET BY MOUTH ONCE DAILY     insulin aspart U-100 100 unit/mL (3 mL) Inpn pen  Commonly known as: NovoLOG FlexPen U-100 Insulin  Inject 8 Units into the skin 3 (three) times daily with meals.     irbesartan 300 MG tablet  Commonly known as: AVAPRO  Take 1 tablet (300 mg total) by mouth every evening.     lancing device with lancets Kit  1 each by Misc.(Non-Drug; Combo Route) route 2 (two) times daily.     metFORMIN 1000 MG tablet  Commonly known as: GLUCOPHAGE  Take 1 tablet (1,000 mg total) by mouth 2 (two) times daily with meals.     pen needle, diabetic 32 gauge x 5/32" Ndle  Commonly known as: BD ULTRA-FINE KELECHI PEN NEEDLE  To use with insulin pens at meals and evening shot.     RYBELSUS 14 mg tablet  Generic drug: semaglutide  Take 1 tablet (14 mg total) by mouth once daily.     TOUJEO MAX U-300 SOLOSTAR 300 unit/mL (3 mL) insulin pen  Generic drug: insulin glargine U-300 conc  Inject 28 Units into the skin once daily.     triamcinolone acetonide 0.1% 0.1 % " cream  Commonly known as: KENALOG  Apply topically 2 (two) times daily.         * This list has 3 medication(s) that are the same as other medications prescribed for you. Read the directions carefully, and ask your doctor or other care provider to review them with you.            STOP taking these medications    polyethylene glycol 236-22.74-6.74 -5.86 gram suspension  Commonly known as: GoLYTELY            Indwelling Lines/Drains at time of discharge:   Lines/Drains/Airways     None                 Time spent on the discharge of patient: 36 minutes         Sheila Rhoades PA-C  Department of Hospital Medicine  [facility]

## 2023-07-06 NOTE — TELEPHONE ENCOUNTER
Refill Routing Note   Medication(s) are not appropriate for processing by [facility] for the following reason(s):      Medication outside of protocol  Patient seen in ED/Hospital since LOV with PCP  Required labs abnormal    [facility] action(s):  Defer  Route Care Due:  None identified   Medication Therapy Plan: HCTZ; ENCOUNTER ON 07/02/23 WAS SENT AS NO PRINT; WILL REPEND AS NORMAL; HCTZ K < 3.5-5.1 mEq/L    Pharmacist review requested: Yes     Appointments  past 12m or future 3m with PCP    Date Provider   Last Visit   5/8/2023 Dipak Treviño MD   Next Visit   8/9/2023 Dipak Treviño MD   ED visits in past 90 days: 0        Note composed:3:52 PM 07/06/2023

## 2023-07-06 NOTE — TELEPHONE ENCOUNTER
"Refill Routing Note   Medication(s) are not appropriate for processing by [facility] for the following reason(s):      Medication outside of protocol: prednisone  Required labs abnormal: hctz; sent on 7/2/23 but order set to "no print"    [facility] action(s):  Defer  Route  Approve Care Due:  None identified   Medication Therapy Plan: ED documentation reviewed. No changes to therapy noted. FOVS 8/9/23    Pharmacist review requested: Yes   Extended chart review required: Yes     Appointments  past 12m or future 3m with PCP    Date Provider   Last Visit   5/8/2023 Dipak Treviño MD   Next Visit   8/9/2023 Dipak Treviño MD   ED visits in past 90 days: 0        Note composed:4:29 PM 07/06/2023          "

## 2023-07-07 ENCOUNTER — TELEPHONE (OUTPATIENT)
Dept: INTERNAL MEDICINE | Facility: CLINIC | Age: 78
End: 2023-07-07
Payer: MEDICARE

## 2023-07-07 ENCOUNTER — SPECIALTY PHARMACY (OUTPATIENT)
Dept: PHARMACY | Facility: CLINIC | Age: 78
End: 2023-07-07
Payer: MEDICARE

## 2023-07-07 RX ORDER — HYDROCHLOROTHIAZIDE 25 MG/1
TABLET ORAL
Qty: 17 TABLET | Refills: 0 | Status: SHIPPED | OUTPATIENT
Start: 2023-07-07 | End: 2023-07-10 | Stop reason: SDUPTHER

## 2023-07-07 RX ORDER — PREDNISONE 5 MG/1
5 TABLET ORAL 2 TIMES DAILY
Qty: 60 TABLET | Refills: 1 | Status: SHIPPED | OUTPATIENT
Start: 2023-07-07

## 2023-07-07 NOTE — TELEPHONE ENCOUNTER
Specialty Pharmacy - Refill Coordination    Specialty Medication Orders Linked to Encounter      Flowsheet Row Most Recent Value   Medication #1 predniSONE (DELTASONE) 5 MG tablet (Order#[id number], Rx#[id number])   Medication #2 abiraterone (ZYTIGA) 250 mg Tab (Order#[id number], Rx#[id number])            Refill Questions - Documented Responses      Flowsheet Row Most Recent Value   Patient Availability and HIPAA Verification    Does patient want to proceed with activity? Yes   HIPAA/medical authority confirmed? Yes   Relationship to patient of person spoken to? Self   Refill Screening Questions    Changes to allergies? No   Changes to medications? No   New conditions since last clinic visit? No   Unplanned office visit, urgent care, ED, or hospital admission in the last 4 weeks? No   How does patient/caregiver feel medication is working? Good   Financial problems or insurance changes? No   How many doses of your specialty medications were missed in the last 4 weeks? 0   Would patient like to speak to a pharmacist? No   When does the patient need to receive the medication? 07/13/23   Refill Delivery Questions    How will the patient receive the medication? MEDRx   When does the patient need to receive the medication? 07/13/23   Shipping Address Home   Address in [facility] confirmed and updated if neccessary? Yes   Expected Copay ($) 0   Is the patient able to afford the medication copay? Yes   Payment Method zero copay   Days supply of Refill 30   Supplies needed? No supplies needed   Refill activity completed? Yes   Refill activity plan Refill scheduled   Shipment/Pickup Date: 07/11/23            Current Outpatient Medications   Medication Sig    abiraterone (ZYTIGA) 250 mg Tab Take 4 tablets (1,000 mg total) by mouth once daily.    ACCU-CHEK SOFTCLIX LANCETS Misc TEST BLOOD SUGAR ONCE A DAY    amLODIPine (NORVASC) 10 MG tablet Take 1 tablet (10 mg total) by mouth once daily.    atorvastatin (LIPITOR) 20 MG  "tablet Take 1 tablet (20 mg total) by mouth once daily.    blood sugar diagnostic Strp 1 each by Misc.(Non-Drug; Combo Route) route once daily.    blood sugar diagnostic Strp Pt to check up to 6 times daily    blood sugar diagnostic Strp Use to check blood glucose 1-2 times daily as directed.    blood-glucose meter kit Use as instructed    carvediloL (COREG) 3.125 MG tablet Take 1 tablet (3.125 mg total) by mouth 2 (two) times daily.    diclofenac (VOLTAREN) 75 MG EC tablet Take 1 tablet (75 mg total) by mouth 2 (two) times daily.    doxazosin (CARDURA) 4 MG tablet TAKE 1 TABLET(4 MG) BY MOUTH EVERY EVENING    gabapentin (NEURONTIN) 300 MG capsule Take 1 capsule (300 mg total) by mouth 3 (three) times daily.    glipiZIDE (GLUCOTROL) 10 MG tablet Take 1 tablet (10 mg total) by mouth 2 (two) times daily before meals.    hydroCHLOROthiazide (HYDRODIURIL) 25 MG tablet TAKE ONE-HALF TABLET BY MOUTH ONCE DAILY    insulin aspart U-100 (NOVOLOG FLEXPEN U-100 INSULIN) 100 unit/mL (3 mL) InPn pen Inject 8 Units into the skin 3 (three) times daily with meals.    insulin glargine U-300 conc (TOUJEO MAX U-300 SOLOSTAR) 300 unit/mL (3 mL) insulin pen Inject 28 Units into the skin once daily.    irbesartan (AVAPRO) 300 MG tablet Take 1 tablet (300 mg total) by mouth every evening.    lancing device with lancets Kit 1 each by Misc.(Non-Drug; Combo Route) route 2 (two) times daily.    metFORMIN (GLUCOPHAGE) 1000 MG tablet Take 1 tablet (1,000 mg total) by mouth 2 (two) times daily with meals.    pen needle, diabetic (BD ULTRA-FINE KELECHI PEN NEEDLE) 32 gauge x 5/32" Ndle To use with insulin pens at meals and evening shot.    predniSONE (DELTASONE) 5 MG tablet Take 1 tablet (5 mg total) by mouth 2 (two) times daily.    semaglutide (RYBELSUS) 14 mg tablet Take 1 tablet (14 mg total) by mouth once daily.    triamcinolone acetonide 0.1% (KENALOG) 0.1 % cream Apply topically 2 (two) times daily.   Last reviewed on 6/30/2023  9:05 AM by " Mally Padron, RN    Review of patient's allergies indicates:   Allergen Reactions    Hay fever and allergy relief     Last reviewed on  7/6/2023 9:16 AM by Ray Esquivel      Tasks added this encounter   No tasks added.   Tasks due within next 3 months   7/9/2023 - Refill Coordination Outreach (1 time occurrence)     Tammy Alfred, PharmD  [facility]  [address]  [facility] [address]  Phone: [phone]  Fax: [phone]

## 2023-07-10 DIAGNOSIS — I10 HYPERTENSION, UNSPECIFIED TYPE: ICD-10-CM

## 2023-07-10 NOTE — TELEPHONE ENCOUNTER
No care due was identified.  [facility] Embedded Care Due Messages. Reference number: [id number].   7/10/2023 2:38:56 PM CDT

## 2023-07-10 NOTE — TELEPHONE ENCOUNTER
LOV 6/28/2023;allergies confirmed  APPT SCHEDULED 8/17/2023 FOR 3MTH F/U     hydroCHLOROthiazide (HYDRODIURIL) 25 MG tablet 17 tablet 0 7/7/2023  No   Sig: TAKE ONE-HALF TABLET BY MOUTH ONCE DAILY   Sent to pharmacy as: hydroCHLOROthiazide (HYDRODIURIL) 25 MG tablet   Class: Normal   Notes to Pharmacy: Quantity sufficient to hold patient over til future office visit.   Order: [id number]   Date/Time Signed: 7/7/2023 14:47       E-Prescribing Status: Receipt confirmed by pharmacy (7/7/2023  2:47 PM CDT)     Pharmacy    [id number] - [place] [address]    Associated Diagnoses    Hypertension, unspecified type

## 2023-07-11 RX ORDER — HYDROCHLOROTHIAZIDE 25 MG/1
TABLET ORAL
Qty: 45 TABLET | Refills: 3 | Status: SHIPPED | OUTPATIENT
Start: 2023-07-11

## 2023-07-12 ENCOUNTER — OUTPATIENT CASE MANAGEMENT (OUTPATIENT)
Dept: ADMINISTRATIVE | Facility: OTHER | Age: 78
End: 2023-07-12
Payer: MEDICARE

## 2023-07-12 DIAGNOSIS — Q24.9 HEART ABNORMALITY: ICD-10-CM

## 2023-07-12 RX ORDER — CARVEDILOL 3.12 MG/1
3.12 TABLET ORAL 2 TIMES DAILY
Qty: 180 TABLET | Refills: 3 | Status: SHIPPED | OUTPATIENT
Start: 2023-07-12

## 2023-07-12 NOTE — TELEPHONE ENCOUNTER
No care due was identified.  [facility] Embedded Care Due Messages. Reference number: [id number].   7/12/2023 9:19:57 AM CDT

## 2023-07-12 NOTE — TELEPHONE ENCOUNTER
Per [facility]([address]) 90 day Rx request:  carvediloL (COREG) 3.125 MG tablet 68 tablet 0 7/6/2023  No   Sig - Route: Take 1 tablet (3.125 mg total) by mouth 2 (two) times daily. - Oral   Sent to pharmacy as: carvediloL (COREG) 3.125 MG tablet   Class: Normal   Notes to Pharmacy: Quantity sufficient to hold patient over til future office visit.   Order: [id number]   Date/Time Signed: 7/6/2023 16:28       E-Prescribing Status: Receipt confirmed by pharmacy (7/6/2023  4:29 PM CDT)     Pharmacy    [facility] #[id number] - [place], [place] - [address]    Associated Diagnoses    Heart abnormality              Pt is requesting 180 quantity instead of 68.

## 2023-07-12 NOTE — LETTER
Misha Eldridge  [address] [address]      Dear Misha Eldridge,     I work with [facility] Outpatient Care Management Department. We received a referral to call you to discuss your medical history. These services are free of charge and are offered to [facility] patients who have recently been discharged from any of our facilities or who have medical conditions that may require the skill of a nurse to assist with management.     I am a Registered Nurse who specializes in connecting patients with available medical and financial resources as well as addressing any educational needs that may be indicated.    I attempted to reach you by telephone, but I was unsuccessful. Please call our department so that we can go over some questions with you, regarding your health.    The Outpatient Care Management Department can be reached at [phone], from 8:00AM to 4:30 PM, on Monday thru Friday.     Additionally, [facility] also has a program where a nurse is available 24/7 to answer questions or provide medical advice, their number is [phone].      Thanks,    Kimmie Servin RN  Outpatient Care Management  Phone #: [phone]

## 2023-07-18 ENCOUNTER — OUTPATIENT CASE MANAGEMENT (OUTPATIENT)
Dept: ADMINISTRATIVE | Facility: OTHER | Age: 78
End: 2023-07-18
Payer: MEDICARE

## 2023-07-18 NOTE — PROGRESS NOTES
Outpatient Care Management  Initial Patient Assessment    Patient: Misha Eldridge Jr.  MRN: [id number]  Date of Service: 07/18/2023  Completed by: Kerri Servin RN  Referral Date: 06/30/2023  Program: High Risk  Status: Ongoing  Effective Dates: 7/18/2023 - present  Responsible Staff: Kerri Servin RN        Reason for Visit   Patient presents with    OPCM Enrollment Call       Brief Summary:  Misha Eldridge Jr. was referred by Dr. Anna Cottrell for intractable nausea and vomiting. Patient qualifies for program based on risk score of 66.4%.   Active problem list, medical, surgical and social history reviewed. Active comorbidities include HTN, CKD, PE, thrombocytopenia, DM2. Areas of need identified by patient include DM2.     CM sent referral to Francisca Villalba W for SDOH, reinforcement of blood glucose checks and appointment reminders.    CM sent message to Dr. Treviño concerning the patient not being on Cardura as prescribed, patient states he doesn't have any.  CM also sent message concerning Diclofenac the patient used to take last year, he would like to know if he can have it again for his pain.  He is suffering a lot from his back pain.    Next steps:   Follow up phone call in one week  Follow up if taking blood sugar before insulin  Follow up if received educational materials      Disability Status  Is the patient alert and oriented (person, place, time, and situation)?: Alert and oriented x 4  Hearing Difficulty or Deaf: yes  Hearing Management: -- (needs hearing test, appt on 8/10)  Visual Difficulty or Blind: no  Visual and Hearing Needs Conclusion: Has reading glasses and an ear doctor appt on 8/10  Vision Management: Other  Difficulty Concentrating, Remembering or Making Decisions: no  Communication Difficulty: no  Eating/Swallowing Difficulty: no  Walking or Climbing Stairs Difficulty: yes  Walking or Climbing Stairs: stair climbing difficulty, requires equipment  Mobility Management: Has 13  steps up to front door of house.  Difficult to climb steps sometimes.  Dressing/Bathing Difficulty: no  Toileting : Independent  Continence : Continence - Not a problem  Difficulty Managing Errands Independently: no  Equipment Currently Used at Home: glucometer  ADL Conclusion Statement: Has to take his time bringing groceries up the stairs  Change in Functional Status Since Onset of Current Illness/Injury: yes        Spiritual Beliefs  Spiritual, Cultural Beliefs, Restorationist Practices, Values that Affect Care: no      Social History     Socioeconomic History    Marital status: Single   Occupational History     Comment: mother lives w/pt   Tobacco Use    Smoking status: Never    Smokeless tobacco: Never   Substance and Sexual Activity    Alcohol use: No    Drug use: No    Sexual activity: Not Currently     Partners: Female     Social Determinants of Health     Social Connections: Unknown    Marital Status: Never        Roles and Relationships  Primary Source of Support/Comfort: parent  Name of Support/Comfort Primary Source: Lianet Krueger  Secondary Source of Support/Comfort: child(shante)  Name of Support/Comfort Secondary Source: Kim      Advance Directives (For Healthcare)  Advance Directive  (If Adv Dir status is received, view document under Adv Dir in header or Chart Review Media tab): Patient does not have Advance Directive, requests information.  Patient Requests Assistance: Handouts provided        Patient Reported Insurance  Verified current insurance plan:: [employer] Advantage; Medicaid  [employer] benefits discussed:: OTC Prescription Discounts; Well Dine; Transportation; Silver Sneakers; Mail Order Pharmacy        Depression Patient Health Questionnaire 7/18/2023 6/2/2023 5/8/2023 1/25/2023 1/17/2023 12/28/2022 11/22/2022   Over the last two weeks how often have you been bothered by little interest or pleasure in doing things Not at all Several days Not at all Not at all Several days Not at all  Not at all   Over the last two weeks how often have you been bothered by feeling down, depressed or hopeless Not at all Not at all Not at all Not at all Several days Not at all Not at all   PHQ-2 Total Score 0 1 0 0 2 0 0       Learning Assessment       06/29/2023 0438 [facility]H (6/28/2023 - 7/2/2023)   Created by Dieter Pike RN - RN (Nurse) Status: Complete                 PRIMARY LEARNER     Primary Learner Name:  Misha Eldridge CR - 06/29/2023 0438    Relationship:  Patient CR - 06/29/2023 0438    Does the primary learner have any barriers to learning?:  Visual CR - 06/29/2023 0438    What is the preferred language of the primary learner?:  English CR - 06/29/2023 0438    Is an  required?:  No CR - 06/29/2023 0438    How does the primary learner prefer to learn new concepts?:  Listening, Demonstration, Pictures/Video CR - 06/29/2023 0438    How often do you need to have someone help you read instructions, pamphlets, or written material from your doctor or pharmacy?:  Rarely CR - 06/29/2023 0438        CO-LEARNER #1     No question answered        CO-LEARNER #2     No question answered        SPECIAL TOPICS     No question answered        ANSWERED BY:     No question answered        Edit History       Dieter Pike RN - RN (Nurse)   06/29/2023 0438

## 2023-07-18 NOTE — LETTER
Misha Eldridge  [address] [address]    Dear Misha Eldridge,     Welcome to [facility] Outpatient Care Management Program. We are here to assist patients with multiple long-term (chronic) conditions who often need more personalized healthcare.    It was a pleasure talking with you today. My name is Kimmie Servin RN. I look forward to working with you as your Care Manager. I will be contacting you by telephone routinely to help coordinate care and resolve issues.    My goal is to help you function at the healthiest and highest level possible. You can contact me directly at [phone].    As an [facility] patient with [employer], some of the services we provide, at no cost to you, include:     Development of an individualized care plan with a Registered Nurse   Connection with a   Assistance from a Community Health Worker  Connection with available resources and services    Coordinate communication among your care team members   Provide coaching and education   Help you understand your doctors treatment plan  Help you obtain information about your insurance coverage.     All services provided by [facility]s Outpatient Care Managers and other care team members are coordinated with and communicated to your primary care team.      As part of your enrollment, you will be receiving education materials and more information about these services in your [facility] account, by phone, or through the mail. If you do not wish to participate or receive information, you can Opt Out by contacting our office at [phone].      Sincerely,      Kimmie Servin RN  [facility]   Outpatient Care Management

## 2023-07-19 ENCOUNTER — TELEPHONE (OUTPATIENT)
Dept: GASTROENTEROLOGY | Facility: CLINIC | Age: 78
End: 2023-07-19
Payer: MEDICARE

## 2023-07-19 NOTE — TELEPHONE ENCOUNTER
Pt called multiple times to reschedule appointment with Sheila Orona. Patients phone went to . Pt appointment was canceled.

## 2023-07-20 ENCOUNTER — PATIENT OUTREACH (OUTPATIENT)
Dept: ADMINISTRATIVE | Facility: OTHER | Age: 78
End: 2023-07-20
Payer: MEDICARE

## 2023-07-20 DIAGNOSIS — Z85.46 HISTORY OF PROSTATE CANCER: ICD-10-CM

## 2023-07-20 NOTE — TELEPHONE ENCOUNTER
----- Message from Kerri Servin RN sent at 7/18/2023 12:14 PM CDT -----  Regarding: Admit to OPCM and medications  7/18/2023     Good afternoon,    I just admitted this patient to OPCM and he states he does not take his Cardura 4 mg every evening because he ran out of it.  Not sure what his blood pressure is running because he doesn't check it very often.  I instructed him to start checking it daily.  Please advise.    Also, he states he used to take something for pain but doesn't have that anymore either.  The only thing I see listed is Diclofenac 75mg twice a day.  He would like to know if you can reorder this for his back pain.  He states he is hurting all the time to the point he can hardly do anything for himself. Please advise.    He uses the [facility] on [address] in [place].        Your patient [id number] Misha Eldridge Jr.  was  referred to [facility] Complex Care Management Program by Provider Referral.      My name is Kimmie, Care Manager.  At times, it may be necessary to have a  involved in the coordination of care, their names will be added to the Care Team where appropriate.    In our enrollment encounter, the following needs were identified:  Medication Assistance, Disease Management Education, and Self-care Management Education    All needs and services provided by [facility] Complex Care Managers and other care team members will be communicated to you via your Resource Pool.      Our documentation can be readily accessed in the EMR using the following tabs: Chart review -> Episodes: High Risk.     It is our goal to provide holistic care, if at any time you feel other areas of concern need to be addressed, please communicate with me by [employer] messaging.      Sincerely,    Kimmie Servin RN  RN Case Manager  Outpatient Complex Care Management  [facility]  [phone]  [email or web address]

## 2023-07-20 NOTE — PROGRESS NOTES
Community Health Worker attempted to contact patient via telephone to assist with . Left a voicemail message on patient's telephone number [phone].  Will attempt again at a later date.

## 2023-07-20 NOTE — TELEPHONE ENCOUNTER
As of 7/18/2023 it was reported that pt is not taking neither of these medications.  3mth f/u scheduled on 8/17/2023 with you.

## 2023-07-24 ENCOUNTER — PATIENT OUTREACH (OUTPATIENT)
Dept: ADMINISTRATIVE | Facility: OTHER | Age: 78
End: 2023-07-24
Payer: MEDICARE

## 2023-07-24 NOTE — PROGRESS NOTES
This Community Health Worker (CHW) competed Social Determinant of Health (SDOH)  Questionnaire with patient/caregiver via telephone today.  Notified OPCM CM Kerri Servin RN of completion. Mr. Eldridge stated he has his doctor appointments written in his calendar and his emails, as well as on a paper on his table. He decline witting down his appointment for his table or receiving a list of them but would like them each before the time of the appointment. Patient denied any SDOH needs at this times, case closed.

## 2023-07-24 NOTE — PROGRESS NOTES
Community Health Worker's second attempt to contact this patient regarding SDOH assessment.  Unable to contact patient at this time.  Left a voicemail message and will try again at a later date.

## 2023-07-25 ENCOUNTER — OUTPATIENT CASE MANAGEMENT (OUTPATIENT)
Dept: ADMINISTRATIVE | Facility: OTHER | Age: 78
End: 2023-07-25
Payer: MEDICARE

## 2023-07-25 RX ORDER — DOXAZOSIN 4 MG/1
4 TABLET ORAL NIGHTLY
Qty: 90 TABLET | Refills: 1 | Status: SHIPPED | OUTPATIENT
Start: 2023-07-25 | End: 2024-01-22 | Stop reason: SDUPTHER

## 2023-07-25 NOTE — PROGRESS NOTES
Outpatient Care Management  Plan of Care Follow Up Visit    Patient: Misha Eldridge Jr.  MRN: [id number]  Date of Service: 07/25/2023  Completed by: Kerri Servin RN  Referral Date: 06/30/2023    Reason for Visit   Patient presents with    OPCM RN Follow Up Call       Brief Summary: Patient states he is still only checking his blood sugar in the mornings.  He states he checked it only once before he gave himself insulin and it was 200 but it was also one hour after he ate.  He states his AM blood sugar has been running  because of the [employer] that he received when CM ordered them with the last phone call.  He requested to get more [employer]als if possible.  He received his educational materials and started reading through them but hasn't read them all yet.  Patient states he doesn't know the signs and symptoms of hypoglycemia but he does know that if his blood sugar is too low then he treats it by eating hard peppermint candies that he keeps on hand.  He also doesn't know the signs and symptoms of hyperglycemia.  He states he used to walk many blocks outside to get exercise but he doesn't right now because of the heat.  He states he exercises with weight three times a week at home.     CM encouraged patient to check insulin before he administers insulin to himself in the evening.  CM called [employer] to see if patient qualifies for chronic disease meals for his DM.  They state patient only has post discharge meal benefits and no chronic disease meal benefits.  CM reviewed the signs and symptoms of hypoglycemia including shakiness, sweating, irritability, confusion, lightheadedness, hunger, tingling or numbness in the lips or tongue, weakness or fatigue.  CM also reviewed other treatments for hypoglycemia besides hard candies like 4 ounces (1/2 cup) of juice or regular soda (not diet), 1 tablespoon sugar, honey or corn syrup, glucose tablets or 8 ounces of nonfat or 1% milk.  CM then reviewed the signs  and symptoms of hyperglycemia including excessive thirst; excessive, dilute urine output; frequent urination at night; unintentional weight loss; recurrent infections; increased fatigue/weakness; blurred vision and fruity odor to breath.  CM encouraged patient to continue with his exercise.  Patient agrees to follow up phone call in three weeks.    Next steps:   Follow up phone call in three weeks  Follow up if taking blood sugar before insulin  Follow up if read educational materials  Follow up on S&S of hypoglycemia  Follow up on S&S of hyperglycemia  Follow up on diet

## 2023-07-25 NOTE — TELEPHONE ENCOUNTER
No care due was identified.  [facility] Embedded Care Due Messages. Reference number: [id number].   7/25/2023 10:43:23 AM CDT

## 2023-07-29 DIAGNOSIS — E13.9 DIABETES 1.5, MANAGED AS TYPE 2: ICD-10-CM

## 2023-07-31 RX ORDER — GLIPIZIDE 10 MG/1
10 TABLET ORAL
Qty: 180 TABLET | Refills: 0 | Status: SHIPPED | OUTPATIENT
Start: 2023-07-31 | End: 2023-10-26

## 2023-07-31 NOTE — TELEPHONE ENCOUNTER
No care due was identified.  [facility] Embedded Care Due Messages. Reference number: [id number].   7/31/2023 11:13:51 AM CDT

## 2023-07-31 NOTE — TELEPHONE ENCOUNTER
Refill Decision Note   Misha Eldridge  is requesting a refill authorization.  Brief Assessment and Rationale for Refill:  Approve     Medication Therapy Plan:  reviewed ED visit notes; approved 180+0 to bridge to FOV 8/9/23      Extended chart review required: Yes   Comments:     Note composed:11:33 AM 07/31/2023             Appointments     Last Visit   5/8/2023 Dipak Treviño MD   Next Visit   8/17/2023 Dipak Treviño MD

## 2023-08-03 ENCOUNTER — PATIENT OUTREACH (OUTPATIENT)
Dept: ADMINISTRATIVE | Facility: HOSPITAL | Age: 78
End: 2023-08-03
Payer: MEDICARE

## 2023-08-03 ENCOUNTER — SPECIALTY PHARMACY (OUTPATIENT)
Dept: PHARMACY | Facility: CLINIC | Age: 78
End: 2023-08-03
Payer: MEDICARE

## 2023-08-03 DIAGNOSIS — E11.9 DIABETES MELLITUS WITHOUT COMPLICATION: Primary | ICD-10-CM

## 2023-08-03 NOTE — TELEPHONE ENCOUNTER
At last refill call, pt was inquiring about how long he should be on Zytiga. Per Dr. Betancourt, he will take until august apt, which is last Lupron shot. Apt scheduled for 8/10. Outgoing call to pt to see if he has enough on hand to make it to that apt. Stated he does. Informed pt will follow up in chart after apt to see treatment plan. Voiced understanding.

## 2023-08-08 ENCOUNTER — TELEPHONE (OUTPATIENT)
Dept: INTERNAL MEDICINE | Facility: CLINIC | Age: 78
End: 2023-08-08
Payer: MEDICARE

## 2023-08-08 NOTE — TELEPHONE ENCOUNTER
----- Message from Shama Davis sent at 8/7/2023  4:39 PM CDT -----  Regarding: Refill  Type: RX Refill Request    Who Called:PT    RX Name and Strength:carvediloL (COREG) 3.125 MG tablet,gabapentin (NEURONTIN) 300 MG capsule    Preferred Pharmacy with phone number:[id number] [place] [address] [address] AT [employer] [facility]    Would the patient rather a call back or a response via [facility]?call back     Best Call Back Number:[phone]    Additional Information:

## 2023-08-09 DIAGNOSIS — E11.40 CONTROLLED TYPE 2 DIABETES MELLITUS WITH DIABETIC NEUROPATHY, WITHOUT LONG-TERM CURRENT USE OF INSULIN: Primary | ICD-10-CM

## 2023-08-09 RX ORDER — ORAL SEMAGLUTIDE 14 MG/1
TABLET ORAL
Qty: 30 TABLET | Refills: 0 | Status: SHIPPED | OUTPATIENT
Start: 2023-08-09

## 2023-08-09 NOTE — TELEPHONE ENCOUNTER
Care Due:                  Date            Visit Type   Department     Provider  --------------------------------------------------------------------------------                                SAME DAY -                              ESTABLISHED   [facility] INTERNAL  Rajindermanolomarcelino Young  Last Visit: 06-      PATIENT      MEDICINE       Ajay LEE -                              PRIMARY      [facility] INTERNAL  Next Visit: 08-      CARE ([facility])   MEDICINE       Dipak Treviño                                                            Last  Test          Frequency    Reason                     Performed    Due Date  --------------------------------------------------------------------------------    HBA1C.......  6 months...  glipiZIDE, insulin,        05- 11-                             metFORMIN, semaglutide...    [employer] Embedded Care Due Messages. Reference number: [id number].   8/09/2023 11:58:49 AM CDT

## 2023-08-10 ENCOUNTER — OFFICE VISIT (OUTPATIENT)
Dept: HEMATOLOGY/ONCOLOGY | Facility: CLINIC | Age: 78
End: 2023-08-10
Payer: MEDICARE

## 2023-08-10 ENCOUNTER — INFUSION (OUTPATIENT)
Dept: INFUSION THERAPY | Facility: HOSPITAL | Age: 78
End: 2023-08-10
Payer: MEDICARE

## 2023-08-10 ENCOUNTER — OFFICE VISIT (OUTPATIENT)
Dept: OTOLARYNGOLOGY | Facility: CLINIC | Age: 78
End: 2023-08-10
Payer: MEDICARE

## 2023-08-10 ENCOUNTER — CLINICAL SUPPORT (OUTPATIENT)
Dept: AUDIOLOGY | Facility: CLINIC | Age: 78
End: 2023-08-10
Payer: MEDICARE

## 2023-08-10 VITALS
OXYGEN SATURATION: 98 % | TEMPERATURE: 98 F | DIASTOLIC BLOOD PRESSURE: 68 MMHG | HEART RATE: 63 BPM | RESPIRATION RATE: 18 BRPM | WEIGHT: 212.88 LBS | SYSTOLIC BLOOD PRESSURE: 150 MMHG | BODY MASS INDEX: 28.21 KG/M2 | HEIGHT: 73 IN

## 2023-08-10 DIAGNOSIS — E11.22 CKD STAGE 3 SECONDARY TO DIABETES: ICD-10-CM

## 2023-08-10 DIAGNOSIS — Z85.46 HISTORY OF PROSTATE CANCER: Primary | ICD-10-CM

## 2023-08-10 DIAGNOSIS — H90.3 SENSORINEURAL HEARING LOSS (SNHL), BILATERAL: Primary | ICD-10-CM

## 2023-08-10 DIAGNOSIS — I10 PRIMARY HYPERTENSION: ICD-10-CM

## 2023-08-10 DIAGNOSIS — H93.13 TINNITUS OF BOTH EARS: ICD-10-CM

## 2023-08-10 DIAGNOSIS — H93.13 TINNITUS, SUBJECTIVE, BILATERAL: ICD-10-CM

## 2023-08-10 DIAGNOSIS — Z86.711 HISTORY OF PULMONARY EMBOLISM: ICD-10-CM

## 2023-08-10 DIAGNOSIS — Z85.038 HISTORY OF COLON CANCER: ICD-10-CM

## 2023-08-10 DIAGNOSIS — D69.6 THROMBOCYTOPENIA, UNSPECIFIED: ICD-10-CM

## 2023-08-10 DIAGNOSIS — H61.23 BILATERAL IMPACTED CERUMEN: ICD-10-CM

## 2023-08-10 DIAGNOSIS — E11.40 CONTROLLED TYPE 2 DIABETES MELLITUS WITH DIABETIC NEUROPATHY, WITHOUT LONG-TERM CURRENT USE OF INSULIN: ICD-10-CM

## 2023-08-10 DIAGNOSIS — N18.30 CKD STAGE 3 SECONDARY TO DIABETES: ICD-10-CM

## 2023-08-10 DIAGNOSIS — C61 PROSTATE CANCER: Primary | ICD-10-CM

## 2023-08-10 DIAGNOSIS — H90.3 SENSORINEURAL HEARING LOSS (SNHL) OF BOTH EARS: Primary | ICD-10-CM

## 2023-08-10 PROCEDURE — 3288F PR FALLS RISK ASSESSMENT DOCUMENTED: ICD-10-PCS | Mod: HCNC,CPTII,S$GLB, | Performed by: REGISTERED NURSE

## 2023-08-10 PROCEDURE — 69210 REMOVE IMPACTED EAR WAX UNI: CPT | Mod: HCNC,S$GLB,,

## 2023-08-10 PROCEDURE — 99214 OFFICE O/P EST MOD 30 MIN: CPT | Mod: 25,HCNC,S$GLB,

## 2023-08-10 PROCEDURE — 1101F PT FALLS ASSESS-DOCD LE1/YR: CPT | Mod: HCNC,CPTII,S$GLB,

## 2023-08-10 PROCEDURE — 3078F PR MOST RECENT DIASTOLIC BLOOD PRESSURE < 80 MM HG: ICD-10-PCS | Mod: HCNC,CPTII,S$GLB, | Performed by: REGISTERED NURSE

## 2023-08-10 PROCEDURE — 3077F SYST BP >= 140 MM HG: CPT | Mod: HCNC,CPTII,S$GLB, | Performed by: REGISTERED NURSE

## 2023-08-10 PROCEDURE — 99215 PR OFFICE/OUTPT VISIT, EST, LEVL V, 40-54 MIN: ICD-10-PCS | Mod: HCNC,S$GLB,, | Performed by: REGISTERED NURSE

## 2023-08-10 PROCEDURE — 1101F PR PT FALLS ASSESS DOC 0-1 FALLS W/OUT INJ PAST YR: ICD-10-PCS | Mod: HCNC,CPTII,S$GLB, | Performed by: REGISTERED NURSE

## 2023-08-10 PROCEDURE — 92567 TYMPANOMETRY: CPT | Mod: HCNC,S$GLB,,

## 2023-08-10 PROCEDURE — 3077F PR MOST RECENT SYSTOLIC BLOOD PRESSURE >= 140 MM HG: ICD-10-PCS | Mod: HCNC,CPTII,S$GLB, | Performed by: REGISTERED NURSE

## 2023-08-10 PROCEDURE — 1159F MED LIST DOCD IN RCRD: CPT | Mod: HCNC,CPTII,S$GLB,

## 2023-08-10 PROCEDURE — 1159F MED LIST DOCD IN RCRD: CPT | Mod: HCNC,CPTII,S$GLB, | Performed by: REGISTERED NURSE

## 2023-08-10 PROCEDURE — 99999 PR PBB SHADOW E&M-EST. PATIENT-LVL V: CPT | Mod: PBBFAC,HCNC,, | Performed by: REGISTERED NURSE

## 2023-08-10 PROCEDURE — 1101F PR PT FALLS ASSESS DOC 0-1 FALLS W/OUT INJ PAST YR: ICD-10-PCS | Mod: HCNC,CPTII,S$GLB,

## 2023-08-10 PROCEDURE — 1160F RVW MEDS BY RX/DR IN RCRD: CPT | Mod: HCNC,CPTII,S$GLB,

## 2023-08-10 PROCEDURE — 99999 PR PBB SHADOW E&M-EST. PATIENT-LVL III: CPT | Mod: PBBFAC,HCNC,,

## 2023-08-10 PROCEDURE — 1160F PR REVIEW ALL MEDS BY PRESCRIBER/CLIN PHARMACIST DOCUMENTED: ICD-10-PCS | Mod: HCNC,CPTII,S$GLB, | Performed by: REGISTERED NURSE

## 2023-08-10 PROCEDURE — 3288F PR FALLS RISK ASSESSMENT DOCUMENTED: ICD-10-PCS | Mod: HCNC,CPTII,S$GLB,

## 2023-08-10 PROCEDURE — 1160F RVW MEDS BY RX/DR IN RCRD: CPT | Mod: HCNC,CPTII,S$GLB, | Performed by: REGISTERED NURSE

## 2023-08-10 PROCEDURE — 1101F PT FALLS ASSESS-DOCD LE1/YR: CPT | Mod: HCNC,CPTII,S$GLB, | Performed by: REGISTERED NURSE

## 2023-08-10 PROCEDURE — 1160F PR REVIEW ALL MEDS BY PRESCRIBER/CLIN PHARMACIST DOCUMENTED: ICD-10-PCS | Mod: HCNC,CPTII,S$GLB,

## 2023-08-10 PROCEDURE — 99999 PR PBB SHADOW E&M-EST. PATIENT-LVL III: ICD-10-PCS | Mod: PBBFAC,HCNC,,

## 2023-08-10 PROCEDURE — 3288F FALL RISK ASSESSMENT DOCD: CPT | Mod: HCNC,CPTII,S$GLB,

## 2023-08-10 PROCEDURE — 1126F PR PAIN SEVERITY QUANTIFIED, NO PAIN PRESENT: ICD-10-PCS | Mod: HCNC,CPTII,S$GLB,

## 2023-08-10 PROCEDURE — 92557 PR COMPREHENSIVE HEARING TEST: ICD-10-PCS | Mod: HCNC,S$GLB,,

## 2023-08-10 PROCEDURE — 63600175 PHARM REV CODE 636 W HCPCS: Mod: JZ,JG,HCNC | Performed by: INTERNAL MEDICINE

## 2023-08-10 PROCEDURE — 1159F PR MEDICATION LIST DOCUMENTED IN MEDICAL RECORD: ICD-10-PCS | Mod: HCNC,CPTII,S$GLB, | Performed by: REGISTERED NURSE

## 2023-08-10 PROCEDURE — 1159F PR MEDICATION LIST DOCUMENTED IN MEDICAL RECORD: ICD-10-PCS | Mod: HCNC,CPTII,S$GLB,

## 2023-08-10 PROCEDURE — 99215 OFFICE O/P EST HI 40 MIN: CPT | Mod: HCNC,S$GLB,, | Performed by: REGISTERED NURSE

## 2023-08-10 PROCEDURE — 69210 PR REMOVAL IMPACTED CERUMEN REQUIRING INSTRUMENTATION, UNILATERAL: ICD-10-PCS | Mod: HCNC,S$GLB,,

## 2023-08-10 PROCEDURE — 1126F AMNT PAIN NOTED NONE PRSNT: CPT | Mod: HCNC,CPTII,S$GLB,

## 2023-08-10 PROCEDURE — 96402 CHEMO HORMON ANTINEOPL SQ/IM: CPT | Mod: HCNC

## 2023-08-10 PROCEDURE — 92567 PR TYMPA2METRY: ICD-10-PCS | Mod: HCNC,S$GLB,,

## 2023-08-10 PROCEDURE — 3288F FALL RISK ASSESSMENT DOCD: CPT | Mod: HCNC,CPTII,S$GLB, | Performed by: REGISTERED NURSE

## 2023-08-10 PROCEDURE — 99214 PR OFFICE/OUTPT VISIT, EST, LEVL IV, 30-39 MIN: ICD-10-PCS | Mod: 25,HCNC,S$GLB,

## 2023-08-10 PROCEDURE — 3078F DIAST BP <80 MM HG: CPT | Mod: HCNC,CPTII,S$GLB, | Performed by: REGISTERED NURSE

## 2023-08-10 PROCEDURE — 99999 PR PBB SHADOW E&M-EST. PATIENT-LVL V: ICD-10-PCS | Mod: PBBFAC,HCNC,, | Performed by: REGISTERED NURSE

## 2023-08-10 PROCEDURE — 92557 COMPREHENSIVE HEARING TEST: CPT | Mod: HCNC,S$GLB,,

## 2023-08-10 RX ADMIN — LEUPROLIDE ACETATE 45 MG: KIT at 01:08

## 2023-08-10 NOTE — PROGRESS NOTES
Misha Eldridge Jr. was seen today in the clinic for an audiologic evaluation.  Patient's main complaint was decreased hearing and tinnitus. Mr. Eldridge reported longstanding hearing difficulty and non-intrusive ringing tinnitus bilaterally. Mr. Eldridge denied otalgia, aural fullness, and dizziness.    Tympanometry revealed Type A in the right ear and Type A in the left ear.     Audiogram results revealed a mild to moderate sensorineural hearing loss bilaterally.      Speech reception thresholds were noted at 35 dBHL in the right ear and 40 dBHL in the left ear.    Speech discrimination scores were 88% in the right ear and 88% in the left ear.    Recommendations:  Otologic evaluation  Hearing aid consultation  Annual audiogram or sooner if change perceived  Hearing protection in noise

## 2023-08-10 NOTE — NURSING
Pt here for Lupron injection. Administered Lupron in left ventrogluteal muscle. No questions or concerns. Pt ambulated out of  unit unassisted.

## 2023-08-10 NOTE — PROGRESS NOTES
Subjective:   Misha Eldridge Jr. is a 78 y.o. male who presents for a hearing evaluation. He reports his hearing is normal in bilateral ears. He denies any difficulty hearing speech. He reports bilateral constant tinnitus since after a diabetic coma. He reports he has learned to live with it. He denies any otalgia, drainage or dizziness.   There is not a family history of hearing loss at a young age. There is not a prior history of ear surgery. There is not a prior history of ear infections. There is not a history of ear trauma. 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    Past Surgical History  He has a past surgical history that includes Subtotal colectomy; Prostatectomy; Hernia repair; Cataract extraction (Bilateral); Eye surgery (Bilateral); Colonoscopy (N/A, 06/02/2022); and Esophagogastroduodenoscopy (N/A, 6/30/2023).    Family History  His family history includes Alcohol abuse in his father; Arthritis in his mother; Cancer in his mother; Hypertension in his mother; No Known Problems in his daughter.    Social History  He reports that he has never smoked. He has never used smokeless tobacco. He reports that he does not drink alcohol and does not use drugs.    Allergies  He is allergic to hay fever and allergy relief.    Medications  He has a current medication list which includes the following prescription(s): abiraterone, accu-chek softclix lancets, amlodipine, atorvastatin, blood sugar diagnostic, blood sugar diagnostic, blood sugar diagnostic, blood-glucose meter, carvedilol, diclofenac, doxazosin, gabapentin, glipizide, hydrochlorothiazide, insulin aspart u-100, toujeo max u-300 solostar,  irbesartan, metformin, pen needle, diabetic, prednisone, prednisone, rybelsus, triamcinolone acetonide 0.1%, and lancing device with lancets, and the following Facility-Administered Medications: leuprolide acetate (6 month).  Review of Systems   HENT: Positive for ringing in the ears.  Negative for ear discharge, ear infection, ear pain, hearing loss, runny nose, sinus infection, sinus pressure, sore throat and stuffy nose.      Respiratory:  Negative for shortness of breath.      Neurological: Negative for dizziness and headaches.          Objective:     Constitutional:   He is oriented to person, place, and time. He appears well-developed and well-nourished. He appears alert. He is cooperative.  Non-toxic appearance. He does not have a sickly appearance. He does not appear ill. Normal speech.      Head:  Normocephalic and atraumatic. Head is without right periorbital erythema, without left periorbital erythema and without TMJ tenderness. Salivary glands normal.  Facial strength is normal.      Ears:    Right Ear: No lacerations. No drainage, swelling or tenderness. No foreign bodies. No mastoid tenderness. Tympanic membrane is not injected, not scarred, not perforated, not erythematous, not retracted and not bulging. Tympanic membrane mobility is normal. No middle ear effusion. No PE tube. No hemotympanum. Decreased hearing is noted.   Left Ear: No lacerations. No drainage, swelling or tenderness. No foreign bodies. No mastoid tenderness. Tympanic membrane is not injected, not scarred, not perforated, not erythematous, not retracted and not bulging. Tympanic membrane mobility is normal.  No middle ear effusion.  No PE tube. No hemotympanum. Decreased hearing is noted.   Ears:      Nose:  No mucosal edema, rhinorrhea or sinus tenderness. No epistaxis. Turbinates normal.  Right sinus exhibits no maxillary sinus tenderness and no frontal sinus tenderness. Left sinus exhibits no maxillary sinus tenderness and no  frontal sinus tenderness.     Mouth/Throat  Oropharynx clear and moist without lesions or asymmetry and normal uvula midline. Normal dentition. No uvula swelling, oral lesions, trismus or mucous membrane lesions. No oropharyngeal exudate, posterior oropharyngeal edema or posterior oropharyngeal erythema.     Neck:  Trachea normal, phonation normal and no adenopathy. Thyroid tenderness is present. No edema, no erythema and no neck rigidity present. No thyroid mass and no thyromegaly present.     He has no cervical adenopathy.     Pulmonary/Chest:   Effort normal.     Psychiatric:   He has a normal mood and affect. His speech is normal and behavior is normal.     Neurological:   He is alert and oriented to person, place, and time. He has neurological normal, alert and oriented.     Procedure  Procedure Note:    Patient was brought to the minor procedure room and using the operating microscope the right ear canal  was cleaned of ceruminous debris. There was a significant cerumen impaction.  The forceps and suction were both used to perform this. Tympanic membrane intact. Pt tolerated well. There were no complications.  Procedure Note:    The patient was brought to the minor procedure room and placed under the operating microscope. Using a combination of suction, curettes and cup forceps the patient's cerumen impaction was removed left EAC. The tympanic membrane was evaluated and was unremarkable. The patient tolerated the procedure well. There were no complications.    Audiogram      I independently reviewed the tracings of the complete audiometric evaluation performed today. I reviewed the audiogram with the patient as well. Pertinent findings include :   Tympanometry revealed Type A in the right ear and Type A in the left ear.    Audiogram results revealed a mild to moderate sensorineural hearing loss bilaterally.     Speech reception thresholds were noted at 35 dBHL in the right ear and 40 dBHL in the left ear.   Speech discrimination scores were 88% in the right ear and 88% in the left ear.         Assessment:     1. Sensorineural hearing loss (SNHL) of both ears    2. Tinnitus of both ears    3. Bilateral impacted cerumen      Plan:   Misha was seen today for hearing loss and cerumen impaction.    Diagnoses and all orders for this visit:    Sensorineural hearing loss (SNHL) of both ears  Audiometric testing interpretation consistent with sensorineural hearing loss.  Discussed the etiology of SNHL. Medically cleared for hearing amplification, and will follow-up with Audiology if interested. Hearing conservation in noisy environments. Return to clinic every year for audiometric testing.   Tinnitus of both ears  Discussed the etiology of tinnitus and management strategies including the use of background sound enrichment or maskers/ hearing aids. Further instructed that avoidance of caffeine, alcohol, tobacco, better sleep hygiene and stress can be of benefit.    Bilateral impacted cerumen  Bilateral cerumen impaction removed under microscopy.  Patient tolerated procedure well and noted improvement upon impaction removal.  Otoscopic exam benign.  Education about normal ear hygiene and the avoidance of Q-tips was reviewed.       RTC as needed or if symptoms persist.  Questions answered.
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
## 2023-08-10 NOTE — TELEPHONE ENCOUNTER
LVM STATING MESSAGE BELOW:    Approved Prescriptions     semaglutide (RYBELSUS) 14 mg tablet         Sig: TAKE 1 TABLET(14 MG) BY MOUTH EVERY DAY    Disp:  30 tablet    Refills:  0 (Pharmacy requested: Not specified)    Start: 8/9/2023    Class: Normal    Authorized by: Nick Villegas MD    For: Controlled type 2 diabetes mellitus with diabetic neuropathy, without long-term current use of insulin        To be filled at: [id number] - [place] [id number] [address] [facility] Due:                  Date            Visit Type   Department     Provider  --------------------------------------------------------------------------------                                SAME DAY -                              ESTABLISHED   [facility] INTERNAL  Mingo Vidal  Last Visit: 06-      PATIENT      MEDICINE       Ajay                              EP -                              PRIMARY      [facility] INTERNAL  Next Visit: 08-      CARE ([facility])   MEDICINE       Dipak Treviño                                                            Last  Test          Frequency    Reason                     Performed    Due Date  --------------------------------------------------------------------------------    HBA1C.......  6 months...  glipiZIDE, insulin,        05- 11-                             metFORMIN, semaglutide...    [employer] Embedded Care Due Messages. Reference number: [id number].   8/09/2023 11:58:49 AM CDT

## 2023-08-11 ENCOUNTER — SPECIALTY PHARMACY (OUTPATIENT)
Dept: PHARMACY | Facility: CLINIC | Age: 78
End: 2023-08-11
Payer: MEDICARE

## 2023-08-11 NOTE — TELEPHONE ENCOUNTER
Outgoing call to pt to check status of Zytiga after MDO apt. Stated he was told to stop. Confirmed in chart. Closing out at [facility].

## 2023-08-17 ENCOUNTER — LAB VISIT (OUTPATIENT)
Dept: LAB | Facility: OTHER | Age: 78
End: 2023-08-17
Attending: INTERNAL MEDICINE
Payer: MEDICARE

## 2023-08-17 ENCOUNTER — TELEPHONE (OUTPATIENT)
Dept: INTERNAL MEDICINE | Facility: CLINIC | Age: 78
End: 2023-08-17
Payer: MEDICARE

## 2023-08-17 ENCOUNTER — OFFICE VISIT (OUTPATIENT)
Dept: INTERNAL MEDICINE | Facility: CLINIC | Age: 78
End: 2023-08-17
Attending: INTERNAL MEDICINE
Payer: MEDICARE

## 2023-08-17 VITALS
SYSTOLIC BLOOD PRESSURE: 134 MMHG | BODY MASS INDEX: 28.63 KG/M2 | DIASTOLIC BLOOD PRESSURE: 70 MMHG | OXYGEN SATURATION: 97 % | HEIGHT: 73 IN | WEIGHT: 216.06 LBS | HEART RATE: 61 BPM

## 2023-08-17 DIAGNOSIS — Z85.038 HISTORY OF COLON CANCER: ICD-10-CM

## 2023-08-17 DIAGNOSIS — E11.40 CONTROLLED TYPE 2 DIABETES MELLITUS WITH DIABETIC NEUROPATHY, WITHOUT LONG-TERM CURRENT USE OF INSULIN: ICD-10-CM

## 2023-08-17 DIAGNOSIS — N18.30 CKD STAGE 3 SECONDARY TO DIABETES: ICD-10-CM

## 2023-08-17 DIAGNOSIS — Z59.41 FOOD INSECURITY: ICD-10-CM

## 2023-08-17 DIAGNOSIS — R45.84 ANHEDONIA: ICD-10-CM

## 2023-08-17 DIAGNOSIS — E11.40 CONTROLLED TYPE 2 DIABETES MELLITUS WITH DIABETIC NEUROPATHY, WITHOUT LONG-TERM CURRENT USE OF INSULIN: Primary | ICD-10-CM

## 2023-08-17 DIAGNOSIS — E11.22 CKD STAGE 3 SECONDARY TO DIABETES: ICD-10-CM

## 2023-08-17 DIAGNOSIS — Z85.46 HISTORY OF PROSTATE CANCER: ICD-10-CM

## 2023-08-17 LAB
ESTIMATED AVG GLUCOSE: 166 MG/DL (ref 68–131)
HBA1C MFR BLD: 7.4 % (ref 4–5.6)

## 2023-08-17 PROCEDURE — 1159F PR MEDICATION LIST DOCUMENTED IN MEDICAL RECORD: ICD-10-PCS | Mod: HCNC,CPTII,S$GLB, | Performed by: INTERNAL MEDICINE

## 2023-08-17 PROCEDURE — 3078F DIAST BP <80 MM HG: CPT | Mod: HCNC,CPTII,S$GLB, | Performed by: INTERNAL MEDICINE

## 2023-08-17 PROCEDURE — 3075F PR MOST RECENT SYSTOLIC BLOOD PRESS GE 130-139MM HG: ICD-10-PCS | Mod: HCNC,CPTII,S$GLB, | Performed by: INTERNAL MEDICINE

## 2023-08-17 PROCEDURE — 3078F PR MOST RECENT DIASTOLIC BLOOD PRESSURE < 80 MM HG: ICD-10-PCS | Mod: HCNC,CPTII,S$GLB, | Performed by: INTERNAL MEDICINE

## 2023-08-17 PROCEDURE — 1160F RVW MEDS BY RX/DR IN RCRD: CPT | Mod: HCNC,CPTII,S$GLB, | Performed by: INTERNAL MEDICINE

## 2023-08-17 PROCEDURE — 1160F PR REVIEW ALL MEDS BY PRESCRIBER/CLIN PHARMACIST DOCUMENTED: ICD-10-PCS | Mod: HCNC,CPTII,S$GLB, | Performed by: INTERNAL MEDICINE

## 2023-08-17 PROCEDURE — 36415 COLL VENOUS BLD VENIPUNCTURE: CPT | Mod: HCNC | Performed by: INTERNAL MEDICINE

## 2023-08-17 PROCEDURE — 83036 HEMOGLOBIN GLYCOSYLATED A1C: CPT | Mod: HCNC | Performed by: INTERNAL MEDICINE

## 2023-08-17 PROCEDURE — 99999 PR PBB SHADOW E&M-EST. PATIENT-LVL V: ICD-10-PCS | Mod: PBBFAC,HCNC,, | Performed by: INTERNAL MEDICINE

## 2023-08-17 PROCEDURE — 99999 PR PBB SHADOW E&M-EST. PATIENT-LVL V: CPT | Mod: PBBFAC,HCNC,, | Performed by: INTERNAL MEDICINE

## 2023-08-17 PROCEDURE — 99214 PR OFFICE/OUTPT VISIT, EST, LEVL IV, 30-39 MIN: ICD-10-PCS | Mod: HCNC,S$GLB,, | Performed by: INTERNAL MEDICINE

## 2023-08-17 PROCEDURE — 1125F AMNT PAIN NOTED PAIN PRSNT: CPT | Mod: HCNC,CPTII,S$GLB, | Performed by: INTERNAL MEDICINE

## 2023-08-17 PROCEDURE — 1125F PR PAIN SEVERITY QUANTIFIED, PAIN PRESENT: ICD-10-PCS | Mod: HCNC,CPTII,S$GLB, | Performed by: INTERNAL MEDICINE

## 2023-08-17 PROCEDURE — 3075F SYST BP GE 130 - 139MM HG: CPT | Mod: HCNC,CPTII,S$GLB, | Performed by: INTERNAL MEDICINE

## 2023-08-17 PROCEDURE — 1159F MED LIST DOCD IN RCRD: CPT | Mod: HCNC,CPTII,S$GLB, | Performed by: INTERNAL MEDICINE

## 2023-08-17 PROCEDURE — 99214 OFFICE O/P EST MOD 30 MIN: CPT | Mod: HCNC,S$GLB,, | Performed by: INTERNAL MEDICINE

## 2023-08-17 SDOH — SOCIAL DETERMINANTS OF HEALTH (SDOH): FOOD INSECURITY: Z59.41

## 2023-08-17 NOTE — TELEPHONE ENCOUNTER
Spoke with pt stating message below:    Dr. Mcclendon's schedule today allows him to offer you to come in early if you would like. He is in no way asking you to come in early, especially if your schedule does not allow. We just wanted to let you know you are welcome to come in anytime between now and your scheduled time if this works out better for you. We will see you when you get here. If you are unable to come to today's appointment please notify us as soon as possible.

## 2023-08-17 NOTE — PROGRESS NOTES
Subjective:       Patient ID: Misha Eldridge Jr. is a 78 y.o. male.    Chief Complaint: Hypertension (3mth f/u)    Here for 3 month f/u for BP      He was discharged 1 month prior and [employer] sent him 2 weeks of meals. While eating these his FBG went from average 250s to  (perfect). He is on a fixed income       ### DM ###  + neuropathy of hands and feet on gabapentin 300mg TID. States feet are slightly worsening but overall stable and controlled.   -11/2022 worsening A1c, prednisone induced as part of prostate CA Tx regimen.   -Working with Jossie Vega in DM education.      -Prandial 10 with SSI and Tuojeo 28. Rybelsus 14mg, metformin 1g BID and glipizide 10 BID.        ### Prostate CA ###  Dx approx 2010 at [facility] s/p partial resection. No XRT. Not sure is his chemo was for colon or prostate. He denies urinary frequency, urinary urgency, decreased force of stream, incomplete emptying of bladder, post void dribble, nocturia, or gross hematuria. Has not been back to see his urologist.    04/2022 recurrence noted with PSA 7.1.  prostate adenocarcinoma, followed by Dr. Betancourt and s/p RadTx with Dr. Michelle   -Completed XRT 2/1/23  -Current Tx. Zytiga with prednisone   [facility] urology Irene Thompson, CNS on 8/15/2023    ### Colon CA ####   Dx approx 2011/2012. S/p colectomy and reversal. Overdue for colonoscopy. Had FIT done b/c he was unable to get this done, which was negative as of 12/2017.  He is now amenable to getting the appropriate colonscopy  -colonoscopy needed in 6/2023. Scheduled for 09/2023     ### Chronic low back pain ###  -daily. Rare radiation down legs. Has never had any treatment. He does not take anything on regular basis for symptoms. Pain is non debilitating.   Right groin pain, described as burning and tingling, only occurs when he touches area. Back continues to bother him. This has been occurring for several years.                 Review of Systems   Constitutional:  Negative for  "appetite change, chills, fever and unexpected weight change.   HENT:  Negative for hearing loss, sore throat and trouble swallowing.    Eyes:  Negative for visual disturbance.   Respiratory:  Negative for cough, chest tightness and shortness of breath.    Cardiovascular:  Negative for chest pain and leg swelling.   Gastrointestinal:  Negative for abdominal pain, blood in stool, constipation, diarrhea, nausea and vomiting.   Endocrine: Negative for polydipsia and polyuria.   Genitourinary:  Negative for decreased urine volume, difficulty urinating, dysuria, frequency and urgency.   Musculoskeletal:  Negative for gait problem.   Skin:  Negative for rash.   Neurological:  Negative for dizziness and numbness.   Psychiatric/Behavioral:  The patient is not nervous/anxious.        Objective:      Vitals:    08/17/23 1226   BP: 134/70   Pulse: 61   SpO2: 97%   Weight: 98 kg (216 lb 0.8 oz)   Height: 6' 1" (1.854 m)      Physical Exam  Vitals and nursing note reviewed.   Constitutional:       General: He is not in acute distress.     Appearance: Normal appearance. He is well-developed.   HENT:      Head: Normocephalic and atraumatic.      Mouth/Throat:      Pharynx: No oropharyngeal exudate.   Eyes:      General: No scleral icterus.     Conjunctiva/sclera: Conjunctivae normal.      Pupils: Pupils are equal, round, and reactive to light.   Neck:      Thyroid: No thyromegaly.   Cardiovascular:      Rate and Rhythm: Normal rate and regular rhythm.      Heart sounds: Normal heart sounds. No murmur heard.  Pulmonary:      Effort: Pulmonary effort is normal.      Breath sounds: Normal breath sounds. No wheezing or rales.   Abdominal:      General: There is no distension.   Musculoskeletal:         General: No tenderness.   Lymphadenopathy:      Cervical: No cervical adenopathy.   Skin:     General: Skin is warm and dry.   Neurological:      Mental Status: He is alert and oriented to person, place, and time.   Psychiatric:         " Behavior: Behavior normal.         Assessment:       1. Controlled type 2 diabetes mellitus with diabetic neuropathy, without long-term current use of insulin    2. Food insecurity    3. Anhedonia    4. History of prostate cancer    5. CKD stage 3 secondary to diabetes    6. History of colon cancer        Plan:       Misha was seen today for hypertension.    Diagnoses and all orders for this visit:    Controlled type 2 diabetes mellitus with diabetic neuropathy, without long-term current use of insulin  -     Hemoglobin A1C; Future  -     Microalbumin/Creatinine Ratio, Urine  -     Ambulatory referral/consult to Social Work; Future  -     Ambulatory referral/consult to Outpatient Case Management    Food insecurity  -     Hemoglobin A1C; Future  -     Microalbumin/Creatinine Ratio, Urine  -     Ambulatory referral/consult to Social Work; Future  -     Ambulatory referral/consult to Outpatient Case Management    Anhedonia  -     Ambulatory referral/consult to Social Work; Future    History of prostate cancer  -     Ambulatory referral/consult to Social Work; Future  -     Ambulatory referral/consult to Outpatient Case Management    CKD stage 3 secondary to diabetes    History of colon cancer           Dipak Mcclendon MD  Internal Medicine-[facility]        Side effects of medication(s) were discussed in detail and patient voiced understanding.  Patient will call back for any issues or complications.

## 2023-08-18 ENCOUNTER — TELEPHONE (OUTPATIENT)
Dept: INTERNAL MEDICINE | Facility: CLINIC | Age: 78
End: 2023-08-18
Payer: MEDICARE

## 2023-08-18 NOTE — TELEPHONE ENCOUNTER
----- Message from Dipak Treviño MD sent at 8/18/2023  2:15 PM CDT -----  Please let pt know his A1c looks amazing and so much better. Try to cook the same meals he was getting in frozen form and call [employer] and ask for more.

## 2023-08-21 ENCOUNTER — OUTPATIENT CASE MANAGEMENT (OUTPATIENT)
Dept: ADMINISTRATIVE | Facility: OTHER | Age: 78
End: 2023-08-21
Payer: MEDICARE

## 2023-08-21 NOTE — PROGRESS NOTES
Outpatient Care Management  Plan of Care Follow Up Visit    Patient: Misha Eldridge Jr.  MRN: [id number]  Date of Service: 08/21/2023  Completed by: Kerri Servin RN  Referral Date: 08/17/2023    Reason for Visit   Patient presents with    [facility] RN Follow Up Call       Brief Summary: Patient states his blood sugar has been good.  Yesterday it was 108 and has been running about .  Patient's A1c on 5/8/23 was 11.6 and a repeat A1c was 7.4 on 8/17/23.  He states it is difficult to keep his blood sugar steady, he would like to find out how he can obtain more Mom[employer] Meals.  He has been eating things like chicken that he fries then stews it.  Shrimp and vegetables chinese food with shrimp fried rice.  Seafood gumbo with very little rice.  Broiled fish with creamed potatoes and greens or cabbage.  He eats for breakfast an egg, one packet of grits with sausage and milk or he eats a bowl of cereal with milk.  Patient states he doesn't have an food insecurities because he gets $225/month from [employer] and $150/month from food stamps.  Patient states the signs and symptoms of hypoglycemia that he can remember is weakness.  He states he doesn't remember any others at this time.  He also states he cannot remember any signs and symptoms of hyperglycemia.  He states when his blood sugar is high, he doesn't usually have symptoms.  He states he is still walking around the house because it is too hot to walk outside.  He is also still lifting weights 3-4 times per week but states the 15 lb weights he has are too heavy and he needs to go get some 10 lb weights from the store.    OSCAR called [employer] who stated that the patient would have to call [employer] and discuss insurance plans that include the Chronic Illness Mom's Meals with a .  [employer] rep states that he can purchase the Mom's Meals himself if he chooses to.  OSCAR then called [employer] and found out that the meals would cost the patient $7.59 per meal.  OSCAR then  called patient back and informed him of the above.  Patient is on a fixed income of $800/month and cannot afford to purchase the meals.  CM encouraged patient to call [employer] to discuss insurance plans that include the Chronic Illness Mom's Meals.  CM reviewed the plate method with the patient as found on page 16 of the Diabetes Management Guide.  CM encouraged patient to refer to his Diabetes Management Guide whenever needed.  CM reiterated the signs and symptoms of hypoglycemia including shakiness, sweating, irritability, confusion, lightheadedness, hunger, tingling or numbness in the lips or tongue, weakness or fatigue.  CM also reiterated other treatments for hypoglycemia besides hard candies like 4 ounces (1/2 cup) of juice or regular soda (not diet), 1 tablespoon sugar, honey or corn syrup, glucose tablets or 8 ounces of nonfat or 1% milk.  CM then reiterated the signs and symptoms of hyperglycemia including excessive thirst; excessive, dilute urine output; frequent urination at night; unintentional weight loss; recurrent infections; increased fatigue/weakness; blurred vision and fruity odor to breath.  CM encouraged patient to continue with his exercise.  Patient agrees to follow up phone call in four weeks.    Next steps:   Follow up phone call in four weeks  Follow up if taking blood sugar before insulin  Follow up if referring to DMG as needed  Follow up on plate method  Follow up if called [employer] about Mom's Meals Plans  Follow up on S&S of hypoglycemia  Follow up on S&S of hyperglycemia  Follow up on exercise  Follow up if got new weights (current ones too heavy)

## 2023-08-24 ENCOUNTER — TELEPHONE (OUTPATIENT)
Dept: HEMATOLOGY/ONCOLOGY | Facility: CLINIC | Age: 78
End: 2023-08-24
Payer: MEDICARE

## 2023-08-24 NOTE — TELEPHONE ENCOUNTER
----- Message from Cece Peralta sent at 8/24/2023 10:36 AM CDT -----  Regarding: pt referral  Name of Who is Calling:MUSA SEGURA JR. [[id number]]        What is the request in detail: Pt requesting referral to see Orthopedic Dr regarding foot swollen/Pain  Please advise            Can the clinic reply by [facility]: no         What Number to Call Back if not in [facility]: Telephone Information:  Mobile          [phone]

## 2023-08-28 ENCOUNTER — OFFICE VISIT (OUTPATIENT)
Dept: PODIATRY | Facility: CLINIC | Age: 78
End: 2023-08-28
Payer: MEDICARE

## 2023-08-28 ENCOUNTER — HOSPITAL ENCOUNTER (OUTPATIENT)
Dept: RADIOLOGY | Facility: OTHER | Age: 78
Discharge: HOME | End: 2023-08-28
Attending: PODIATRIST
Payer: MEDICARE

## 2023-08-28 VITALS
BODY MASS INDEX: 28.63 KG/M2 | HEIGHT: 73 IN | DIASTOLIC BLOOD PRESSURE: 65 MMHG | HEART RATE: 78 BPM | WEIGHT: 216.06 LBS | SYSTOLIC BLOOD PRESSURE: 136 MMHG

## 2023-08-28 DIAGNOSIS — M79.672 BILATERAL FOOT PAIN: Primary | ICD-10-CM

## 2023-08-28 DIAGNOSIS — M79.605 PAIN IN BOTH LOWER EXTREMITIES: ICD-10-CM

## 2023-08-28 DIAGNOSIS — M79.604 PAIN IN BOTH LOWER EXTREMITIES: ICD-10-CM

## 2023-08-28 DIAGNOSIS — M79.89 FOOT SWELLING: ICD-10-CM

## 2023-08-28 DIAGNOSIS — M79.671 BILATERAL FOOT PAIN: ICD-10-CM

## 2023-08-28 DIAGNOSIS — M79.671 BILATERAL FOOT PAIN: Primary | ICD-10-CM

## 2023-08-28 DIAGNOSIS — M79.672 BILATERAL FOOT PAIN: ICD-10-CM

## 2023-08-28 PROCEDURE — 93970 US LOWER EXTREMITY VEINS BILATERAL: ICD-10-PCS | Mod: 26,HCNC,, | Performed by: RADIOLOGY

## 2023-08-28 PROCEDURE — 3288F PR FALLS RISK ASSESSMENT DOCUMENTED: ICD-10-PCS | Mod: HCNC,CPTII,S$GLB, | Performed by: PODIATRIST

## 2023-08-28 PROCEDURE — 1125F PR PAIN SEVERITY QUANTIFIED, PAIN PRESENT: ICD-10-PCS | Mod: HCNC,CPTII,S$GLB, | Performed by: PODIATRIST

## 2023-08-28 PROCEDURE — 1160F PR REVIEW ALL MEDS BY PRESCRIBER/CLIN PHARMACIST DOCUMENTED: ICD-10-PCS | Mod: HCNC,CPTII,S$GLB, | Performed by: PODIATRIST

## 2023-08-28 PROCEDURE — 3078F DIAST BP <80 MM HG: CPT | Mod: HCNC,CPTII,S$GLB, | Performed by: PODIATRIST

## 2023-08-28 PROCEDURE — 3075F PR MOST RECENT SYSTOLIC BLOOD PRESS GE 130-139MM HG: ICD-10-PCS | Mod: HCNC,CPTII,S$GLB, | Performed by: PODIATRIST

## 2023-08-28 PROCEDURE — 93970 EXTREMITY STUDY: CPT | Mod: TC,HCNC

## 2023-08-28 PROCEDURE — 93970 EXTREMITY STUDY: CPT | Mod: 26,HCNC,, | Performed by: RADIOLOGY

## 2023-08-28 PROCEDURE — 99213 OFFICE O/P EST LOW 20 MIN: CPT | Mod: HCNC,S$GLB,, | Performed by: PODIATRIST

## 2023-08-28 PROCEDURE — 1159F MED LIST DOCD IN RCRD: CPT | Mod: HCNC,CPTII,S$GLB, | Performed by: PODIATRIST

## 2023-08-28 PROCEDURE — 1160F RVW MEDS BY RX/DR IN RCRD: CPT | Mod: HCNC,CPTII,S$GLB, | Performed by: PODIATRIST

## 2023-08-28 PROCEDURE — 99999 PR PBB SHADOW E&M-EST. PATIENT-LVL V: ICD-10-PCS | Mod: PBBFAC,HCNC,, | Performed by: PODIATRIST

## 2023-08-28 PROCEDURE — 1159F PR MEDICATION LIST DOCUMENTED IN MEDICAL RECORD: ICD-10-PCS | Mod: HCNC,CPTII,S$GLB, | Performed by: PODIATRIST

## 2023-08-28 PROCEDURE — 1101F PR PT FALLS ASSESS DOC 0-1 FALLS W/OUT INJ PAST YR: ICD-10-PCS | Mod: HCNC,CPTII,S$GLB, | Performed by: PODIATRIST

## 2023-08-28 PROCEDURE — 3288F FALL RISK ASSESSMENT DOCD: CPT | Mod: HCNC,CPTII,S$GLB, | Performed by: PODIATRIST

## 2023-08-28 PROCEDURE — 99213 PR OFFICE/OUTPT VISIT, EST, LEVL III, 20-29 MIN: ICD-10-PCS | Mod: HCNC,S$GLB,, | Performed by: PODIATRIST

## 2023-08-28 PROCEDURE — 3078F PR MOST RECENT DIASTOLIC BLOOD PRESSURE < 80 MM HG: ICD-10-PCS | Mod: HCNC,CPTII,S$GLB, | Performed by: PODIATRIST

## 2023-08-28 PROCEDURE — 3075F SYST BP GE 130 - 139MM HG: CPT | Mod: HCNC,CPTII,S$GLB, | Performed by: PODIATRIST

## 2023-08-28 PROCEDURE — 99999 PR PBB SHADOW E&M-EST. PATIENT-LVL V: CPT | Mod: PBBFAC,HCNC,, | Performed by: PODIATRIST

## 2023-08-28 PROCEDURE — 1125F AMNT PAIN NOTED PAIN PRSNT: CPT | Mod: HCNC,CPTII,S$GLB, | Performed by: PODIATRIST

## 2023-08-28 PROCEDURE — 1101F PT FALLS ASSESS-DOCD LE1/YR: CPT | Mod: HCNC,CPTII,S$GLB, | Performed by: PODIATRIST

## 2023-08-28 RX ORDER — METHYLPREDNISOLONE 4 MG/1
4 TABLET ORAL DAILY
Qty: 1 TABLET | Refills: 0 | Status: SHIPPED | OUTPATIENT
Start: 2023-08-28 | End: 2023-12-21

## 2023-08-28 NOTE — PROGRESS NOTES
Chief Complaint   Patient presents with    Foot Pain     Bilateral foot pain and swelling              HPI:   The patient is a 78 y.o.  male  who presents for a diabetic foot exam.     Patient is concerned about chronic Foot Pain (Bilateral foot pain and swelling)   He has pitting edema bilateral lower extremities for at least a year.  Pain around the calf.   Has not discussed with PCP.           The patient is under the Active Care of Dipak Treviño MD , internal medicine.       Patient Active Problem List   Diagnosis    Hypertension    History of pulmonary embolism    History of colon cancer    History of prostate cancer    Controlled type 2 diabetes mellitus with diabetic neuropathy, without long-term current use of insulin    Tinnitus    Chronic low back pain    Cervical pain (neck)    Acute kidney injury superimposed on chronic kidney disease    Prostate cancer    Weakness of pelvic floor in male    Inflammatory spondylopathy of lumbosacral region    Thrombocytopenia, unspecified    Aortic atherosclerosis    Hypergranulation    Open wnd anterior abdomen    Intractable nausea and vomiting    Hypomagnesemia    Hypokalemia due to excessive gastrointestinal loss of potassium    Chronic constipation    Regurgitation of food    Dysphagia             Current Outpatient Medications on File Prior to Visit   Medication Sig Dispense Refill    abiraterone (ZYTIGA) 250 mg Tab Take 4 tablets (1,000 mg total) by mouth once daily. 120 tablet 1    ACCU-CHEK SOFTCLIX LANCETS Misc TEST BLOOD SUGAR ONCE A  each 0    amLODIPine (NORVASC) 10 MG tablet Take 1 tablet (10 mg total) by mouth once daily. 34 tablet 0    atorvastatin (LIPITOR) 20 MG tablet Take 1 tablet (20 mg total) by mouth once daily. 90 tablet 1    blood sugar diagnostic Strp 1 each by Misc.(Non-Drug; Combo Route) route once daily. 100 each 3    blood sugar diagnostic Strp Pt to check up to 6 times daily 200 strip 11    blood sugar diagnostic Strp Use to  "check blood glucose 1-2 times daily as directed. 200 each 11    blood-glucose meter kit Use as instructed 1 each 0    carvediloL (COREG) 3.125 MG tablet Take 1 tablet (3.125 mg total) by mouth 2 (two) times daily. 180 tablet 3    diclofenac (VOLTAREN) 75 MG EC tablet Take 1 tablet (75 mg total) by mouth 2 (two) times daily. 90 tablet 0    doxazosin (CARDURA) 4 MG tablet Take 1 tablet (4 mg total) by mouth every evening. 90 tablet 1    gabapentin (NEURONTIN) 300 MG capsule Take 1 capsule (300 mg total) by mouth 3 (three) times daily. 270 capsule 1    glipiZIDE (GLUCOTROL) 10 MG tablet TAKE 1 TABLET(10 MG) BY MOUTH TWICE DAILY BEFORE MEALS 180 tablet 0    hydroCHLOROthiazide (HYDRODIURIL) 25 MG tablet TAKE ONE-HALF TABLET BY MOUTH ONCE DAILY 45 tablet 3    insulin glargine U-300 conc (TOUJEO MAX U-300 SOLOSTAR) 300 unit/mL (3 mL) insulin pen Inject 28 Units into the skin once daily. 15 pen 11    irbesartan (AVAPRO) 300 MG tablet Take 1 tablet (300 mg total) by mouth every evening. 90 tablet 3    metFORMIN (GLUCOPHAGE) 1000 MG tablet Take 1 tablet (1,000 mg total) by mouth 2 (two) times daily with meals. 180 tablet 1    pen needle, diabetic (BD ULTRA-FINE KELECHI PEN NEEDLE) 32 gauge x 5/32" Ndle To use with insulin pens at meals and evening shot. 200 each 11    predniSONE (DELTASONE) 5 MG tablet Take 1 tablet (5 mg total) by mouth 2 (two) times daily. 60 tablet 1    predniSONE (DELTASONE) 5 MG tablet Take one tablet by mouth twice a day 60 tablet 1    semaglutide (RYBELSUS) 14 mg tablet TAKE 1 TABLET(14 MG) BY MOUTH EVERY DAY 30 tablet 0    triamcinolone acetonide 0.1% (KENALOG) 0.1 % cream Apply topically 2 (two) times daily. 45 g 0    insulin aspart U-100 (NOVOLOG FLEXPEN U-100 INSULIN) 100 unit/mL (3 mL) InPn pen Inject 8 Units into the skin 3 (three) times daily with meals. 15 mL 0    lancing device with lancets Kit 1 each by Misc.(Non-Drug; Combo Route) route 2 (two) times daily. 200 each 11     No current " "facility-administered medications on file prior to visit.           Review of patient's allergies indicates:   Allergen Reactions    Hay fever and allergy relief              ROS:  General ROS: negative  Respiratory ROS: no cough, shortness of breath, or wheezing  Cardiovascular ROS: no chest pain or dyspnea on exertion  Musculoskeletal ROS: negative  Neurological ROS:   positive for - numbness/tingling  Dermatological ROS: negative      LAST HbA1c:   Lab Results   Component Value Date    HGBA1C 7.4 (H) 08/17/2023           EXAM:   Vitals:    08/28/23 1202   BP: 136/65   Pulse: 78   Weight: 98 kg (216 lb 0.8 oz)   Height: 6' 1" (1.854 m)       General: alert, no distress, cooperative    Vascular:   Dorsalis Pedis:  present     Posterior Tibial:  present  Capillary refill time:  3 seconds  Temperature of toes cool to touch  Edema: 2+ pitting edema  bilateral       Neurological:     Sharp touch:  normal  Light touch: normal  Tinels Sign:  Absent  Mulders Click:   Absent  [place]:  (+) deficits to sharp/dull, light touch or vibratory sensation feet, ten points tested.   (+)paresthesias (Abnormal spontaneous sensations in feet)        Dermatological:   Skin: thin and dry  Hair growth:  decreased  Wounds/Ulcers:  Absent  Bruising:  Absent  Erythema:  Absent  Toenails:  Short,  Yellowish in color,  without subungual debris.   Absent paronychia      Musculoskeletal:   Metatarsophalangeal range of motion:   diminished range of motion  Subtalar joint range of motion: full range of motion  Ankle joint range of motion:  diminished range of motion  Bunions:  Present  Hammertoes: Present               ASSESSMENT/PLAN:      Bilateral foot pain  -     X-Ray Foot Complete Bilateral; Future; Expected date: 08/28/2023  -     US Lower Extremity Veins Bilateral; Future; Expected date: 08/28/2023  -     X-Ray Tibia Fibula Bilateral; Future; Expected date: 08/28/2023    Foot swelling  -     X-Ray Foot Complete Bilateral; Future; Expected " date: 08/28/2023  -      Lower Extremity Veins Bilateral; Future; Expected date: 08/28/2023  -     X-Ray Tibia Fibula Bilateral; Future; Expected date: 08/28/2023    Pain in both lower extremities  -     X-Ray Tibia Fibula Bilateral; Future; Expected date: 08/28/2023    Other orders  -     methylPREDNISolone (MEDROL DOSEPACK) 4 mg tablet; Take 1 tablet (4 mg total) by mouth once daily. Use as instructed on dose pack.  Dispense: 1 tablet; Refill: 0            I counseled the patient on the patient's conditions, their implications and medical management.     Imaging as ordered.  Will call if abnormal.     Recommend compression stockings.      Meds as ordered.     Follow up PCP and/or Cardiology for bilateral lower extremities pitting edema.               Sharla Burnett DPM  NPI: [id number]       [facility] - PODIATRY  [address] [address]  Dept: [phone]  Dept Fax: [phone]

## 2023-08-31 ENCOUNTER — TELEPHONE (OUTPATIENT)
Dept: PODIATRY | Facility: CLINIC | Age: 78
End: 2023-08-31
Payer: MEDICARE

## 2023-08-31 NOTE — TELEPHONE ENCOUNTER
----- Message from Iris Barajas sent at 8/31/2023  1:44 PM CDT -----  Regarding: pt call back  Name of Who is Calling: MUSA SEGURA JR. [[id number]]        What is the request in detail: pt needs to know how to take medication, I did inform him of the instructions but just to be sure would like a call back to confirm, please advise.   methylPREDNISolone (MEDROL DOSEPACK) 4 mg tablet      Can the clinic reply by [facility]:           What Number [facility] Back [facility]: [phone]

## 2023-08-31 NOTE — TELEPHONE ENCOUNTER
Staff contacted and spoke with patient informing him to take the methylPREDNISolone (MEDROL DOSEPACK) 4 mg tablet once daily as per Dr. Burnett.      Patient verbalized understanding.

## 2023-09-14 ENCOUNTER — CLINICAL SUPPORT (OUTPATIENT)
Dept: ENDOSCOPY | Facility: HOSPITAL | Age: 78
End: 2023-09-14
Attending: INTERNAL MEDICINE
Payer: MEDICARE

## 2023-09-14 ENCOUNTER — TELEPHONE (OUTPATIENT)
Dept: ENDOSCOPY | Facility: HOSPITAL | Age: 78
End: 2023-09-14

## 2023-09-14 VITALS — WEIGHT: 216 LBS | HEIGHT: 73 IN | BODY MASS INDEX: 28.63 KG/M2

## 2023-09-14 DIAGNOSIS — Z12.11 SPECIAL SCREENING FOR MALIGNANT NEOPLASMS, COLON: Primary | ICD-10-CM

## 2023-09-14 DIAGNOSIS — Z85.038 HISTORY OF COLON CANCER: ICD-10-CM

## 2023-09-14 NOTE — PLAN OF CARE
Contacted Pt to schedule colonoscopy, Pt stated that he was at [facility] 4th floor endoscopy. Pt was very hard of hearing during phone call. Pt hung up on endoscopy . Russel Lyle RN endoscopy scheduling to schedule Pt in hallway in person. Spoke to  Shahnaz on 4th floor endoscopy to confirm Pt was waiting in waiting area for scheduling nurseShahnaz confirmed.

## 2023-09-14 NOTE — TELEPHONE ENCOUNTER
Spoke to patient to schedule procedure(s) Colonoscopy       Physician to perform procedure(s) Dr. YAMILA Mckinney  Date of Procedure (s) 12/29/23  Arrival Time 7:00 AM  Time of Procedure(s) 8:00 AM   Location of Procedure(s) [place] 4th Floor  Type of Rx Prep sent to patient: PEG  Instructions provided to patient via handed to patient    Patient was informed on the following information and verbalized understanding. Screening questionnaire reviewed with patient and complete. If procedure requires anesthesia, a responsible adult needs to be present to accompany the patient home, patient cannot drive after receiving anesthesia. Appointment details are tentative, especially check-in time. Patient will receive a prep-op call 4 days prior to confirm check-in time for procedure. If applicable the patient should contact their pharmacy to verify Rx for procedure prep is ready for pick-up. Patient was advised to call the scheduling department at [phone] if pharmacy states no Rx is available. Patient was advised to call the endoscopy scheduling department if any questions or concerns arise.      [facility] Endoscopy Scheduling Department

## 2023-09-14 NOTE — TELEPHONE ENCOUNTER
Colonoscopy Procedure Prep Instructions    Date of procedure: 12/29/23 Arrive at: 6:00 AM    Location of Department:   [facility] [address] [place] [address]  Take the [facility] Elevators to 4th Floor Endoscopy Lab    As soon as possible:   your prep from pharmacy and over the counter DULCOLAX LAXATIVE TABLETS            On the day before your procedure   What You CAN do:   You may have clear liquids ONLY-see below for list.     Liquids That Are OK to Drink:   Water  Sports drinks (Gatorade, Power-Aid)  Coffee or tea (no cream or nondairy creamer)  Clear juices without pulp (apple, white grape)  Gelatin desserts (no fruit or toppings)  Clear soda (sprite, coke, ginger ale)  Chicken broth (until 12 midnight the night before procedure)    What You CANNOT do:   Do not EAT solid food, drink milk or anything   colored red.  Do not drink alcohol.  Do not take oral medications within 1 hour of starting   each dose of prep.  No gum chewing or candy morning of procedure                       Note:   (Please disregard the insert instructions from pharmacy).  PEG Bowel Prep is indicated for cleansing of the colon as a preparation for colonoscopy in adults.   Be sure to tell your doctor about all the medicines you take, including prescription and non-prescription medicines, vitamins, and herbal supplements. PEG Bowel Prep may affect how other medicines work.  Medication taken by mouth may not be absorbed properly when taken within 1 hour before the start of each dose of PEG Bowel Prep.    It is not uncommon to experience some abdominal cramping, nausea and/or vomiting when taking the prep. If you have nausea and/or vomiting while taking the prep, stop drinking for 20 to 30 minutes then continue.      How to take prep:    PEG Bowel Prep is a (2-day) prep.    One (1) bottle of prep are required for a complete preparation for colonoscopy. Dilute the solution concentrate as directed prior to  use. You must drink water with each dose of prep, and additional water after each dose.    DOSE 1--Day Before Colonoscopy 12/28/23     Drink at least 6 to 8 glasses of clear liquids from time you wake up until you begin your prep and then continue until bedtime to avoid dehydration.     12:00 pm (NOON) Mix your entire container of prep with lukewarm water and refrigerate. Take four (4) Dulcolax (Bisacodyl) tablets with at least 8 ounces or more of clear liquids.       6:00 pm:    You must complete Steps 1 and 2 below before going to bed:    Step 1-Drink half the liquid in the container within one (1) hour.   Step 2-Refrigerate the remaining half of the liquid for dose 2. See below when to begin this step.                       IMPORTANT: If you experience preparation-related symptoms (for example, nausea, bloating, or cramping), stop, or slow the rate of drinking the additional water until your symptoms decrease.    DOSE 2--Day of the Colonoscopy 12/29/23 at 2-3 AM.    For this dose, repeat Step 1 shown above using the remaining half of the liquid prep.   You may continue drinking water/clear liquids until   4 hours before your colonoscopy or as directed by the scheduling nurse  3:00 AM.    For more information about your procedure, please watch this informational video. It is important to watch this animated consent video prior to your arrival.   If you haven't watched the video prior to arriving, you are required to watch it during admission which can cause delays.     Options for viewing:  Using a keyboard:  press and hold the control tab (Ctrl) and left mouse click to follow link          Colonoscopy Instructional Video                                                        OR    Type link address into your web browser's address bar:  [email or web address]    Using a mobile phone: tap on web address/link.     Comments:             IMPORTANT INFORMATION TO KNOW BEFORE YOUR  PROCEDURE    [facility] 4th Floor    If your procedure requires the administration of anesthesia, it is necessary for a responsible adult to drive you home. (Medical Transportation, Uber, Lyft, Taxi, etc. may ONLY be used if a responsible adult is present to accompany you home.  The responsible adult CAN'T be the  of the service).      person must be available to return to pick you up within 30 minutes of being notified of discharge.     Due to the limited socially distant seating in our waiting room, please limit your guest (1) who accompany you for this procedure. If someone accompanies you for this procedure into the facility:    Consider having them proceed to an area that is socially distant other than the lobby until a member of the medical team contacts them to provide an update after the procedure.     Also, please consider being dropped off and picked up from the facility.      Please bring a picture ID, insurance card, & copayment    Take Medications as directed below:        If you begin taking any blood thinning medications, please contact the  listed below as soon as possible.    If you are diabetic see the attached instruction sheet regarding your medication.     If you take HEART, BLOOD PRESSURE, SEIZURE, PAIN, LUNG (including inhalers/nebulizers), ANTI-REJECTION (transplant patients), or PSYCHIATRIC medications, please take at your regular times with a sip of water or as directed by the scheduling nurse.     Important contact information:    Endoscopy [phone] Hours of operation Monday-Friday 8:00-4:30pm.    Questions about insurance or financial obligations call [phone] or [phone].    If you have questions regarding the prep or need to reschedule, please call [phone]. After hours questions requiring immediate assistance, contact [facility] On-Call nurse line at [phone] or [phone].   NOTE:     On occasion,  unforeseen circumstances may cause a delay in your procedure start time. We respect your time and appreciate your patience during these circumstances.      Comments:         Are you ready for your Colonoscopy?      __ If you take blood thinners, have you stopped taking them according to your doctor's instructions before your procedures?  __ Have you stopped eating solid foods and followed a clear liquid diet a full day before your procedure or followed the diet       guidelines indicated in your instructions? REMINDER: NO BROTH AFTER MIDNIGHT THE DAY BEFORE PROCEDURE.   __ Have you completed all your prep solution? PLEASE DO NOT FOLLOW the insert/or box instructions from pharmacy)  __ Have you taken your blood pressure, heart, seizure, or other essential medications the morning of your procedure?  __ Have you planned for a ride to and from procedure?  (Medical Transportation, Uber, Lyft, Taxi, etc. may ONLY be used if a responsible adult is present to accompany you home). The responsible adult CANNOT be the  of the service.       Questions or Concerns?  Please call us!  [phone] (M-F) [phone] (Nights and weekends)    Listed below are some helpful tips:    Please bring protective cases for eyewear and hearing aids-wear comfortable clothing/shoes.  Follow prep instructions closely so you don't have to do it twice.  Bowel prep is prescription used to clean out the colon before a colonoscopy. The prep increases movement of your colon by causing you to have diarrhea (loose stools). Cleaning out stool from the colon helps your doctor to see in your colon clearly during this procedure.   It is important to stay hydrated before, during and after bowel prep to prevent loss of fluid (dehydration). You can have water and your choice of clear liquids. Reminder: these liquids you will drink in addition to the bowel prep.     Preparing the mixture:    First, mix the prep with water.  Make the taste better by adding  a sugar free drink mix (Crystal Light) can improve the taste of your prep.   Use a large bore (opening) straw. Place towards the back of mouth (throat) as tolerated.  Prepare a prep mixture that is lightly chilled, but not ice-cold. Drinking a large amount of ice-cold liquid can make you feel very ill.  Avoid drinking any RED beverages or eating popsicles with this color for the 24 hours leading up to your procedure. This color can look like blood in the colon.    Consuming the prep:    Take your time. If you feel ill, take a 15-minute break from drinking the prep mixture  Combat hunger and dehydration with clear liquids. Options like JELL-O, or popsicles will help.  Settle in with good reading material. The goal is to clean out 6 feet of colon, so you can plan on spending a good deal of time in the bathroom. Have some good reading material on-hand or an iPad ready to keep yourself entertained!  Keep yourself comfortable. We recommend applying personal hygiene wipes, Tucks pads and a soothing ointment, like A&D ointment, Desitin, or Vaseline to your bottom before starting and as needed to protect your skin.

## 2023-09-19 ENCOUNTER — OUTPATIENT CASE MANAGEMENT (OUTPATIENT)
Dept: ADMINISTRATIVE | Facility: OTHER | Age: 78
End: 2023-09-19
Payer: MEDICARE

## 2023-09-19 NOTE — PROGRESS NOTES
Outpatient Care Management  Plan of Care Follow Up Visit    Patient: Misha Eldridge Jr.  MRN: [id number]  Date of Service: 09/19/2023  Completed by: Kerri Servin RN  Referral Date: 08/17/2023    Reason for Visit   Patient presents with    OPCM RN Follow Up Call       Brief Summary: Patient states that his blood sugars have been running good, 105-120 but not often around 120, it mostly states 110-115.  He states he is checking his blood sugars before insulin now.  He states he doesn't remember the plate method OSCAR educated him on on the previous phone call.  He states he has not called [employer] about plans with the chronic illness Mom's Meals yet.  He states he called Medicaid but forgot to call [employer].  Patient states the signs and symptoms of hypoglycemia he remembers is shakiness and blurriness.  He states he doesn't remember any of the signs and symptoms of hyperglycemia.  Patient states he hasn't made it to a store yet to get lighter weights to exercise with.  He states he still uses his old weights but can only do a couple of repetitions with them.  He also does exercises like jumping jacks, stretching and walking.     CM reiterated to the patient that if he really wants the Mom's Meals for chronic illness then to call [employer] to find out about plans that include those meals.  He states he will call them as soon as he gets home from the grocery.  CM reminded patient to reference the Diabetes Management Guide CM sent him for the plate method diagram to help him understand it better.  CM reminded patient it is on page 16 of the guide.  CM reiterated the signs and symptoms of hypoglycemia including shakiness, sweating, irritability, confusion, lightheadedness, hunger, tingling or numbness in the lips or tongue, weakness or fatigue.  OSCAR then reiterated the signs and symptoms of hyperglycemia including excessive thirst; excessive, dilute urine output; frequent urination at night; unintentional weight loss; recurrent  infections; increased fatigue/weakness; blurred vision and fruity odor to breath.  CM explained that the symptom of blurry vision is usually a sign of high blood sugars, not usually low blood sugars.  CM encouraged patient to continue to exercise and to try to make it to the store to get the lighter weights so that he can do more exercises but also so that he doesn't hurt himself with the heavier weights.    Next steps:   Follow up phone call in four weeks  Follow up if taking blood sugar before insulin  Follow up if referring to DMG as needed  Follow up on plate method  Follow up if called [employer] about Mom's Meals Plans  Follow up on S&S of hypoglycemia  Follow up on S&S of hyperglycemia  Follow up on exercise  Follow up if got new weights (current ones too heavy)

## 2023-10-02 ENCOUNTER — LAB VISIT (OUTPATIENT)
Dept: LAB | Facility: OTHER | Age: 78
End: 2023-10-02
Attending: PHYSICIAN ASSISTANT
Payer: MEDICARE

## 2023-10-02 ENCOUNTER — OFFICE VISIT (OUTPATIENT)
Dept: INTERNAL MEDICINE | Facility: CLINIC | Age: 78
End: 2023-10-02
Payer: MEDICARE

## 2023-10-02 VITALS
DIASTOLIC BLOOD PRESSURE: 66 MMHG | SYSTOLIC BLOOD PRESSURE: 132 MMHG | HEART RATE: 83 BPM | OXYGEN SATURATION: 96 % | HEIGHT: 73 IN | WEIGHT: 216.94 LBS | BODY MASS INDEX: 28.75 KG/M2

## 2023-10-02 DIAGNOSIS — I10 HYPERTENSION, UNSPECIFIED TYPE: ICD-10-CM

## 2023-10-02 DIAGNOSIS — R60.0 LOCALIZED EDEMA: ICD-10-CM

## 2023-10-02 DIAGNOSIS — M79.89 SWELLING OF LOWER EXTREMITY: ICD-10-CM

## 2023-10-02 DIAGNOSIS — E11.40 CONTROLLED TYPE 2 DIABETES MELLITUS WITH DIABETIC NEUROPATHY, WITHOUT LONG-TERM CURRENT USE OF INSULIN: ICD-10-CM

## 2023-10-02 DIAGNOSIS — M79.89 SWELLING OF LOWER EXTREMITY: Primary | ICD-10-CM

## 2023-10-02 PROBLEM — N17.9 ACUTE KIDNEY INJURY SUPERIMPOSED ON CHRONIC KIDNEY DISEASE: Status: RESOLVED | Noted: 2021-01-19 | Resolved: 2023-10-02

## 2023-10-02 PROBLEM — N18.9 ACUTE KIDNEY INJURY SUPERIMPOSED ON CHRONIC KIDNEY DISEASE: Status: RESOLVED | Noted: 2021-01-19 | Resolved: 2023-10-02

## 2023-10-02 LAB
BILIRUB UR QL STRIP: NEGATIVE
CLARITY UR: CLEAR
COLOR UR: YELLOW
GLUCOSE UR QL STRIP: ABNORMAL
HGB UR QL STRIP: NEGATIVE
KETONES UR QL STRIP: NEGATIVE
LEUKOCYTE ESTERASE UR QL STRIP: NEGATIVE
NITRITE UR QL STRIP: NEGATIVE
PH UR STRIP: 6 [PH] (ref 5–8)
PROT UR QL STRIP: NEGATIVE
SP GR UR STRIP: 1.01 (ref 1–1.03)
URN SPEC COLLECT METH UR: ABNORMAL
UROBILINOGEN UR STRIP-ACNC: NEGATIVE EU/DL

## 2023-10-02 PROCEDURE — 3075F SYST BP GE 130 - 139MM HG: CPT | Mod: HCNC,CPTII,S$GLB, | Performed by: PHYSICIAN ASSISTANT

## 2023-10-02 PROCEDURE — 3078F PR MOST RECENT DIASTOLIC BLOOD PRESSURE < 80 MM HG: ICD-10-PCS | Mod: HCNC,CPTII,S$GLB, | Performed by: PHYSICIAN ASSISTANT

## 2023-10-02 PROCEDURE — 3288F PR FALLS RISK ASSESSMENT DOCUMENTED: ICD-10-PCS | Mod: HCNC,CPTII,S$GLB, | Performed by: PHYSICIAN ASSISTANT

## 2023-10-02 PROCEDURE — 3075F PR MOST RECENT SYSTOLIC BLOOD PRESS GE 130-139MM HG: ICD-10-PCS | Mod: HCNC,CPTII,S$GLB, | Performed by: PHYSICIAN ASSISTANT

## 2023-10-02 PROCEDURE — 1159F MED LIST DOCD IN RCRD: CPT | Mod: HCNC,CPTII,S$GLB, | Performed by: PHYSICIAN ASSISTANT

## 2023-10-02 PROCEDURE — 3288F FALL RISK ASSESSMENT DOCD: CPT | Mod: HCNC,CPTII,S$GLB, | Performed by: PHYSICIAN ASSISTANT

## 2023-10-02 PROCEDURE — 1101F PR PT FALLS ASSESS DOC 0-1 FALLS W/OUT INJ PAST YR: ICD-10-PCS | Mod: HCNC,CPTII,S$GLB, | Performed by: PHYSICIAN ASSISTANT

## 2023-10-02 PROCEDURE — 99999 PR PBB SHADOW E&M-EST. PATIENT-LVL V: CPT | Mod: PBBFAC,HCNC,, | Performed by: PHYSICIAN ASSISTANT

## 2023-10-02 PROCEDURE — 1159F PR MEDICATION LIST DOCUMENTED IN MEDICAL RECORD: ICD-10-PCS | Mod: HCNC,CPTII,S$GLB, | Performed by: PHYSICIAN ASSISTANT

## 2023-10-02 PROCEDURE — 1101F PT FALLS ASSESS-DOCD LE1/YR: CPT | Mod: HCNC,CPTII,S$GLB, | Performed by: PHYSICIAN ASSISTANT

## 2023-10-02 PROCEDURE — 81003 URINALYSIS AUTO W/O SCOPE: CPT | Mod: HCNC | Performed by: PHYSICIAN ASSISTANT

## 2023-10-02 PROCEDURE — 99214 OFFICE O/P EST MOD 30 MIN: CPT | Mod: HCNC,S$GLB,, | Performed by: PHYSICIAN ASSISTANT

## 2023-10-02 PROCEDURE — 99214 PR OFFICE/OUTPT VISIT, EST, LEVL IV, 30-39 MIN: ICD-10-PCS | Mod: HCNC,S$GLB,, | Performed by: PHYSICIAN ASSISTANT

## 2023-10-02 PROCEDURE — 99999 PR PBB SHADOW E&M-EST. PATIENT-LVL V: ICD-10-PCS | Mod: PBBFAC,HCNC,, | Performed by: PHYSICIAN ASSISTANT

## 2023-10-02 PROCEDURE — 1125F AMNT PAIN NOTED PAIN PRSNT: CPT | Mod: HCNC,CPTII,S$GLB, | Performed by: PHYSICIAN ASSISTANT

## 2023-10-02 PROCEDURE — 3078F DIAST BP <80 MM HG: CPT | Mod: HCNC,CPTII,S$GLB, | Performed by: PHYSICIAN ASSISTANT

## 2023-10-02 PROCEDURE — 1125F PR PAIN SEVERITY QUANTIFIED, PAIN PRESENT: ICD-10-PCS | Mod: HCNC,CPTII,S$GLB, | Performed by: PHYSICIAN ASSISTANT

## 2023-10-02 RX ORDER — FUROSEMIDE 20 MG/1
20 TABLET ORAL DAILY
Qty: 3 TABLET | Refills: 0 | Status: SHIPPED | OUTPATIENT
Start: 2023-10-02 | End: 2023-12-21 | Stop reason: SDUPTHER

## 2023-10-02 NOTE — PROGRESS NOTES
INTERNAL MEDICINE URGENT VISIT NOTE    CHIEF COMPLAINT     Chief Complaint   Patient presents with    Leg Pain     left    Diarrhea       HPI     Misha Eldridge Jr. is a 78 y.o. male who presents for an urgent visit today.    PCP is Dipak Treviño MD, patient is known to me.   He presents with complaints of bilateral leg lower edema and pain in the left leg. He reports these symptoms have been present for a while and were evaluated by a podiatrist in August -he reports nothing was done and he got now answers. Chart check shows that US was done and ruled out DVT, x-rays were done and r/o acute osseous process, he was treated with medrol dose pack.     He reports that symptoms have been consistent. He denies SOB, chest pain, elevated BP, bleeding, coughing. Leg trauma or injury. No skin infections.     Also reports three weeks of  soft stools. No fever, abd pain, no blood in stool, no watery stool. No recent travel, no sick contacts.     Past Medical History:  Past Medical History:   Diagnosis Date    Cataract     Colostomy in place     Dermatochalasis     Diabetes mellitus type II     Dry eye syndrome     Hemorrhoid     History of colon cancer     History of prostate cancer     History of pulmonary embolism     Hypertension     Macular degeneration        Home Medications:  Prior to Admission medications    Medication Sig Start Date End Date Taking? Authorizing Provider   abiraterone (ZYTIGA) 250 mg Tab Take 4 tablets (1,000 mg total) by mouth once daily. 6/19/23 6/18/24 Yes Irene Thompson CNS   ACCU-CHEK SOFTCLIX LANCETS Misc TEST BLOOD SUGAR ONCE A DAY 4/13/20  Yes Zurdo Best MD   amLODIPine (NORVASC) 10 MG tablet Take 1 tablet (10 mg total) by mouth once daily. 7/6/23  Yes Dipak Treviño MD   atorvastatin (LIPITOR) 20 MG tablet Take 1 tablet (20 mg total) by mouth once daily. 2/6/23  Yes Demi Bear PA-TERESA   blood sugar diagnostic Strp 1 each by Misc.(Non-Drug; Combo Route) route once  "daily. 3/4/19  Yes Kezia Dennis MD   blood sugar diagnostic Strp Pt to check up to 6 times daily 6/6/19  Yes Dipak Treviño MD   blood sugar diagnostic Strp Use to check blood glucose 1-2 times daily as directed. 5/8/23  Yes Dipak Treviño MD   blood-glucose meter kit Use as instructed 5/8/23 7/12/27 Yes Dipak Treviño MD   carvediloL (COREG) 3.125 MG tablet Take 1 tablet (3.125 mg total) by mouth 2 (two) times daily. 7/12/23  Yes Dipak Treviño MD   diclofenac (VOLTAREN) 75 MG EC tablet Take 1 tablet (75 mg total) by mouth 2 (two) times daily. 5/20/22  Yes Dipak Treviño MD   doxazosin (CARDURA) 4 MG tablet Take 1 tablet (4 mg total) by mouth every evening. 7/25/23  Yes Dipak Treviño MD   gabapentin (NEURONTIN) 300 MG capsule Take 1 capsule (300 mg total) by mouth 3 (three) times daily. 4/27/23  Yes Dipak Treviño MD   glipiZIDE (GLUCOTROL) 10 MG tablet TAKE 1 TABLET(10 MG) BY MOUTH TWICE DAILY BEFORE MEALS 7/31/23  Yes Dipak Treviño MD   hydroCHLOROthiazide (HYDRODIURIL) 25 MG tablet TAKE ONE-HALF TABLET BY MOUTH ONCE DAILY 7/11/23  Yes Dipak Treviño MD   insulin glargine U-300 conc (TOUJEO MAX U-300 SOLOSTAR) 300 unit/mL (3 mL) insulin pen Inject 28 Units into the skin once daily. 1/10/23 1/10/24 Yes Dipak Treviño MD   irbesartan (AVAPRO) 300 MG tablet Take 1 tablet (300 mg total) by mouth every evening. 2/6/23  Yes Demi Bear PA-C   metFORMIN (GLUCOPHAGE) 1000 MG tablet Take 1 tablet (1,000 mg total) by mouth 2 (two) times daily with meals. 4/27/23  Yes Dipak Treviño MD   methylPREDNISolone (MEDROL DOSEPACK) 4 mg tablet Take 1 tablet (4 mg total) by mouth once daily. Use as instructed on dose pack. 8/28/23  Yes Sharla Burnett DPM   pen needle, diabetic (BD ULTRA-FINE KELECHI PEN NEEDLE) 32 gauge x 5/32" Ndle To use with insulin pens at meals and evening shot. 12/9/22  Yes Demi Bear, PATerryC   predniSONE (DELTASONE) 5 MG " tablet Take 1 tablet (5 mg total) by mouth 2 (two) times daily. 7/7/23  Yes Dipak Treviño MD   predniSONE (DELTASONE) 5 MG tablet Take one tablet by mouth twice a day 7/7/23  Yes Dipak Treviño MD   semaglutide (RYBELSUS) 14 mg tablet TAKE 1 TABLET(14 MG) BY MOUTH EVERY DAY 8/9/23  Yes Nick Villegas MD   triamcinolone acetonide 0.1% (KENALOG) 0.1 % cream Apply topically 2 (two) times daily. 6/6/19  Yes Dipak Treviño MD   insulin aspart U-100 (NOVOLOG FLEXPEN U-100 INSULIN) 100 unit/mL (3 mL) InPn pen Inject 8 Units into the skin 3 (three) times daily with meals. 5/17/23 8/17/23  Dipak Treviño MD   lancing device with lancets Kit 1 each by Misc.(Non-Drug; Combo Route) route 2 (two) times daily. 10/15/20 8/17/23  Dipak Treviño MD       Review of Systems:  Review of Systems   Constitutional:  Negative for chills and fever.   HENT:  Negative for sore throat and trouble swallowing.    Eyes:  Negative for visual disturbance.   Respiratory:  Negative for cough and shortness of breath.    Cardiovascular:  Negative for chest pain.   Gastrointestinal:  Positive for diarrhea. Negative for abdominal pain, constipation, nausea and vomiting.   Genitourinary:  Negative for dysuria and flank pain.   Musculoskeletal:  Negative for back pain, neck pain and neck stiffness.        Left leg pain    Skin:  Negative for rash.   Neurological:  Negative for dizziness, syncope, weakness and headaches.   Psychiatric/Behavioral:  Negative for confusion.        Health Maintainence:   Immunizations:  Health Maintenance         Date Due Completion Date    Shingles Vaccine (2 of 2) 11/25/2019 9/30/2019    Diabetes Urine Screening 04/12/2023 4/12/2022    COVID-19 Vaccine (6 - Moderna series) 05/26/2023 1/26/2023    Influenza Vaccine (1) 09/01/2023 1/26/2023    Override on 12/14/2016: Declined    Hemoglobin A1c 02/17/2024 8/17/2023    Eye Exam 04/03/2024 4/3/2023    Override on 10/11/2016: Done    Override  "on 8/6/2014: Done    Lipid Panel 05/08/2024 5/8/2023    TETANUS VACCINE 12/05/2027 12/5/2017    DEXA Scan 06/08/2028 6/8/2023    Colonoscopy 06/02/2032 6/2/2022    Override on 7/15/2014: Done             PHYSICAL EXAM     /66 (BP Location: Left arm, Patient Position: Sitting, BP Method: Large (Manual))   Pulse 83   Ht 6' 1" (1.854 m)   Wt 98.4 kg (216 lb 14.9 oz)   SpO2 96%   BMI 28.62 kg/m²     Physical Exam  Constitutional:       Appearance: Normal appearance.      Comments: Elderly male in NAD or apparent pain. He makes good eye contact, speaks in clear full sentences and ambulates with ease.    HENT:      Head: Normocephalic and atraumatic.      Nose: Nose normal.      Mouth/Throat:      Pharynx: Oropharynx is clear.   Eyes:      Conjunctiva/sclera: Conjunctivae normal.   Cardiovascular:      Rate and Rhythm: Normal rate and regular rhythm.      Pulses: Normal pulses.   Pulmonary:      Effort: No respiratory distress.   Abdominal:      Tenderness: There is no abdominal tenderness.   Musculoskeletal:         General: Normal range of motion.      Cervical back: No rigidity.      Comments: Lower extremity 2+ pitting edema   No erythema, no warmth to touch  No calf TTP  Normal knee ROM      Skin:     General: Skin is warm and dry.      Capillary Refill: Capillary refill takes less than 2 seconds.      Findings: No rash.   Neurological:      General: No focal deficit present.      Mental Status: He is alert.      Gait: Gait normal.   Psychiatric:         Mood and Affect: Mood normal.         LABS     Lab Results   Component Value Date    HGBA1C 7.4 (H) 08/17/2023     CMP  Sodium   Date Value Ref Range Status   08/10/2023 146 (H) 136 - 145 mmol/L Final     Potassium   Date Value Ref Range Status   08/10/2023 4.3 3.5 - 5.1 mmol/L Final     Chloride   Date Value Ref Range Status   08/10/2023 110 95 - 110 mmol/L Final     CO2   Date Value Ref Range Status   08/10/2023 26 23 - 29 mmol/L Final     Glucose   Date " Value Ref Range Status   08/10/2023 170 (H) 70 - 110 mg/dL Final     BUN   Date Value Ref Range Status   08/10/2023 13 8 - 23 mg/dL Final     Creatinine   Date Value Ref Range Status   08/10/2023 0.9 0.5 - 1.4 mg/dL Final     Calcium   Date Value Ref Range Status   08/10/2023 9.3 8.7 - 10.5 mg/dL Final     Total Protein   Date Value Ref Range Status   08/10/2023 6.5 6.0 - 8.4 g/dL Final     Albumin   Date Value Ref Range Status   08/10/2023 3.6 3.5 - 5.2 g/dL Final     Total Bilirubin   Date Value Ref Range Status   08/10/2023 0.4 0.1 - 1.0 mg/dL Final     Comment:     For infants and newborns, interpretation of results should be based  on gestational age, weight and in agreement with clinical  observations.    Premature Infant recommended reference ranges:  Up to 24 hours.............<8.0 mg/dL  Up to 48 hours............<12.0 mg/dL  3-5 days..................<15.0 mg/dL  6-29 days.................<15.0 mg/dL       Alkaline Phosphatase   Date Value Ref Range Status   08/10/2023 60 55 - 135 U/L Final     AST   Date Value Ref Range Status   08/10/2023 12 10 - 40 U/L Final     ALT   Date Value Ref Range Status   08/10/2023 11 10 - 44 U/L Final     Anion Gap   Date Value Ref Range Status   08/10/2023 10 8 - 16 mmol/L Final     eGFR if    Date Value Ref Range Status   06/21/2022 >60.0 >60 mL/min/1.73 m^2 Final     eGFR if non    Date Value Ref Range Status   06/21/2022 53.0 (A) >60 mL/min/1.73 m^2 Final     Comment:     Calculation used to obtain the estimated glomerular filtration  rate (eGFR) is the CKD-EPI equation.        Lab Results   Component Value Date    WBC 3.57 (L) 08/10/2023    HGB 10.5 (L) 08/10/2023    HCT 32.2 (L) 08/10/2023    MCV 91 08/10/2023     08/10/2023     Lab Results   Component Value Date    CHOL 103 (L) 05/08/2023    CHOL 103 (L) 04/12/2022    CHOL 103 (L) 04/12/2022     Lab Results   Component Value Date    HDL 51 05/08/2023    HDL 42 04/12/2022    HDL 42  04/12/2022     Lab Results   Component Value Date    LDLCALC 22.4 (L) 05/08/2023    LDLCALC 24.4 (L) 04/12/2022    LDLCALC 24.4 (L) 04/12/2022     Lab Results   Component Value Date    TRIG 148 05/08/2023    TRIG 183 (H) 04/12/2022    TRIG 183 (H) 04/12/2022     Lab Results   Component Value Date    CHOLHDL 49.5 05/08/2023    CHOLHDL 40.8 04/12/2022    CHOLHDL 40.8 04/12/2022     Lab Results   Component Value Date    TSH 1.549 04/12/2022       ASSESSMENT/PLAN     Misha Eldridge Jr. is a 78 y.o. male     Misha was seen today for leg pain and diarrhea. Check labs, venous US for insufficiency, and ECHO for abn hahn function. Will start three days of lasix for symptom mgmt, Will see patient again in one week for symptom re-check and diagnostic results review. ED prompts discussed.     Diagnoses and all orders for this visit:    Swelling of lower extremity  -     CBC Auto Differential; Future  -     COMPREHENSIVE METABOLIC PANEL; Future  -     B-TYPE NATRIURETIC PEPTIDE; Future  -     Echo; Future  -     Urinalysis; Future  -     US Lower Extremity Venous Insufficiency Right; Future  -     US Lower Extremity Venous Insufficiency Left; Future    Localized edema  -     US Lower Extremity Venous Insufficiency Right; Future  -     US Lower Extremity Venous Insufficiency Left; Future    Hypertension, unspecified type    Controlled type 2 diabetes mellitus with diabetic neuropathy, without long-term current use of insulin    Other orders  -     furosemide (LASIX) 20 MG tablet; Take 1 tablet (20 mg total) by mouth once daily. for 3 days         Patient was counseled on when and how to seek emergent care.       Demi Bear PA-C   Department of Internal Medicine - [facility]   1:40 PM

## 2023-10-03 ENCOUNTER — HOSPITAL ENCOUNTER (OUTPATIENT)
Dept: RADIOLOGY | Facility: OTHER | Age: 78
Discharge: HOME | End: 2023-10-03
Attending: PHYSICIAN ASSISTANT
Payer: MEDICARE

## 2023-10-03 DIAGNOSIS — R60.0 LOCALIZED EDEMA: ICD-10-CM

## 2023-10-03 DIAGNOSIS — M79.89 SWELLING OF LOWER EXTREMITY: ICD-10-CM

## 2023-10-03 PROCEDURE — 93970 EXTREMITY STUDY: CPT | Mod: 26,HCNC,, | Performed by: RADIOLOGY

## 2023-10-03 PROCEDURE — 93970 US LOWER EXTREMITY VEINS BILATERAL INSUFFICIENCY: ICD-10-PCS | Mod: 26,HCNC,, | Performed by: RADIOLOGY

## 2023-10-03 PROCEDURE — 93970 EXTREMITY STUDY: CPT | Mod: TC,HCNC

## 2023-10-06 ENCOUNTER — TELEPHONE (OUTPATIENT)
Dept: INTERNAL MEDICINE | Facility: CLINIC | Age: 78
End: 2023-10-06
Payer: MEDICARE

## 2023-10-06 ENCOUNTER — NURSE TRIAGE (OUTPATIENT)
Dept: ADMINISTRATIVE | Facility: CLINIC | Age: 78
End: 2023-10-06
Payer: MEDICARE

## 2023-10-06 ENCOUNTER — HOSPITAL ENCOUNTER (EMERGENCY)
Facility: OTHER | Age: 78
Discharge: HOME | End: 2023-10-06
Attending: EMERGENCY MEDICINE
Payer: MEDICARE

## 2023-10-06 VITALS
BODY MASS INDEX: 28.36 KG/M2 | WEIGHT: 214 LBS | SYSTOLIC BLOOD PRESSURE: 128 MMHG | RESPIRATION RATE: 16 BRPM | HEIGHT: 73 IN | HEART RATE: 69 BPM | DIASTOLIC BLOOD PRESSURE: 60 MMHG | TEMPERATURE: 98 F | OXYGEN SATURATION: 96 %

## 2023-10-06 DIAGNOSIS — K59.00 CONSTIPATION: Primary | ICD-10-CM

## 2023-10-06 PROCEDURE — 99283 EMERGENCY DEPT VISIT LOW MDM: CPT | Mod: HCNC

## 2023-10-06 RX ORDER — POLYETHYLENE GLYCOL 3350 17 G/17G
17 POWDER, FOR SOLUTION ORAL DAILY PRN
Qty: 14 PACKET | Refills: 0 | Status: SHIPPED | OUTPATIENT
Start: 2023-10-06

## 2023-10-06 RX ORDER — DOCUSATE SODIUM 100 MG/1
100 CAPSULE, LIQUID FILLED ORAL 2 TIMES DAILY PRN
Qty: 20 CAPSULE | Refills: 0 | Status: SHIPPED | OUTPATIENT
Start: 2023-10-06

## 2023-10-06 RX ORDER — DEXTROMETHORPHAN POLISTIREX 30 MG/5 ML
1 SUSPENSION, EXTENDED RELEASE 12 HR ORAL ONCE
Qty: 133 ML | Refills: 0 | Status: SHIPPED | OUTPATIENT
Start: 2023-10-06 | End: 2023-10-06

## 2023-10-06 NOTE — TELEPHONE ENCOUNTER
----- Message from Aguila Contreras sent at 10/6/2023 10:25 AM CDT -----  Regarding: Appointment  Name of Who is Calling:  Patient          What is the request in detail:  Patient would like to make an appointment he has been having diarrhea for 5 days and stated it feels like lump on his buttocks. The earliest appointment available was November            Can the clinic reply by [facility]: No            What Number to Call Back if not in [facility]: [phone]

## 2023-10-06 NOTE — ED PROVIDER NOTES
Source of History:  Patient     Chief complaint:  Constipation (Last BM was one week ago. Pt did not use any otc meds for these s/s. )      HPI:  Misha Eldridge Jr. is a 78 y.o. male who is presenting to emergency department with constipation.  Patient states approximately 2 weeks ago he had a one-week history of nonbloody loose stools.  He states this then subsided and he is now been constipated for the past week.  He denies taking antidiarrheal agents.  He reports some lower abdominal pressure but denies significant pain.  No nausea or vomiting.  He has of multiple abdominal surgeries. Denies fever or difficulty urinating.    ROS: As per HPI     Review of patient's allergies indicates:   Allergen Reactions    Hay fever and allergy relief        PMH:  As per HPI and below:  Past Medical History:   Diagnosis Date    Cataract     Colostomy in place     Dermatochalasis     Diabetes mellitus type II     Dry eye syndrome     Hemorrhoid     History of colon cancer     History of prostate cancer     History of pulmonary embolism     Hypertension     Macular degeneration      Past Surgical History:   Procedure Laterality Date    CATARACT EXTRACTION Bilateral     COLONOSCOPY N/A 06/02/2022    Procedure: COLONOSCOPY;  Surgeon: Jose Dangelo MD;  Location: [facility] (4TH FLR);  Service: Endoscopy;  Laterality: N/A;  fully vaccinated    ESOPHAGOGASTRODUODENOSCOPY N/A 6/30/2023    Procedure: EGD (ESOPHAGOGASTRODUODENOSCOPY);  Surgeon: Terrence Beatty MD;  Location: [facility] (2ND FLR);  Service: Endoscopy;  Laterality: N/A;    EYE SURGERY Bilateral     cataract extraction    HERNIA REPAIR      PROSTATECTOMY      SUBTOTAL COLECTOMY         Social History     Tobacco Use    Smoking status: Never    Smokeless tobacco: Never   Substance Use Topics    Alcohol use: No    Drug use: No       Physical Exam:    /66 (BP Location: Left arm, Patient Position: Sitting)   Pulse 73   Temp 98 °F (36.7 °C) (Oral)   Resp 18    "Ht 6' 1" (1.854 m)   Wt 97.1 kg (214 lb)   SpO2 97%   BMI 28.23 kg/m²     Nursing note and vital signs reviewed.  Appearance: No acute distress.  Nontoxic appearing.  Eyes: No conjunctival injection.  ENT: Oropharynx clear.    Chest/ Respiratory: Clear to auscultation bilaterally.  Good air movement.  No wheezes.  No rhonchi. No rales. No accessory muscle use.  Cardiovascular: Regular rate and rhythm.  No murmurs. No gallops. No rubs.  Abdomen:  Large vertical scar to lower abdomen.  Soft.  Not distended.  Nontender.  No guarding.  No rebound. Non-peritoneal.  :  Rectal exam performed with chaperone (Leila Jaramillo RN).  No external masses.  No bowel impaction.  Musculoskeletal: Good range of motion all joints.  No deformities.  Neck supple.  No meningismus.  Skin: No rashes seen.  Good turgor.  No abrasions.  No ecchymoses.  Neurologic: Motor intact.  Sensation intact.   Mental Status:  Alert and oriented x 3.  Appropriate, conversant.     Labs that have been ordered have been independently reviewed and interpreted by myself.    MDM/ Differential Dx:    78 y.o. male to emergency department constipation x1 week that was preceded by diarrhea.  He is not taking any medications at this time that would cause constipation, denies iron supplement, narcotic use or antidiarrheals.  He is afebrile, nontoxic appearing hemodynamically stable.  Benign abdominal exam.    Differential diagnosis includes functional constipation, enteritis, obstruction, fecal impaction.    ED Course as of 10/06/23 1456   Fri Oct 06, 2023   1309 KUB showed:  Moderate-large amount of stool burden.  Nonspecific bowel gas pattern.  No bowel dilation.  The upright film demonstrate no air-fluid level.  No organomegaly.   [AG]    Patient advised on supportive care for sx and sent home with medications      ED Course User Index  [AG] Tomás Hassan PA-C     This note was created using M Modal Fluency Direct. There may be typographical errors " secondary to dictation.          Diagnostic Impression:    1. Constipation                   Tomás Hassan, FERNANDO  10/06/23 6218

## 2023-10-06 NOTE — ED TRIAGE NOTES
Pt reports to ED with constipation. Reports that he feels abdominal discomfort when sitting down. Reports last bowel movement was a week ago. Denies nausea or vomiting. Aaox4.

## 2023-10-06 NOTE — TELEPHONE ENCOUNTER
----- Message from Aguila Contreras sent at 10/6/2023 10:25 AM CDT -----  Regarding: Appointment  Name of Who is Calling:  Patient          What is the request in detail:  Patient would like to make an appointment he has been having diarrhea for 5 days and stated it feels like lump on his buttocks. The earliest appointment available was November            Can the clinic reply by [facility]: No            What Number to Call Back if not in [facility]: [phone]

## 2023-10-06 NOTE — TELEPHONE ENCOUNTER
OOC Rn  Patient states when he sits down for BM he feels something hard,  constipation. No BM in week.   Not normal for him.   Care advise is to see in office today.  If no appt.   Need to go to UC/ED.  For any new or worsening symptoms to callback.  Talked to  and no appt for any Dr.s at [facility].   Informed paient to go to UC or [facility].  Paient vu.      Reason for Disposition   Rectal pain or fullness from fecal impaction (rectum full of stool) and NOT better after SITZ bath, suppository or enema    Additional Information   Negative: Vomiting bile (green color)   Negative: Patient sounds very sick or weak to the triager   Negative: Constant abdominal pain lasting > 2 hours   Negative: Vomiting and abdomen looks much more swollen than usual    Protocols used: Constipation-A-OH

## 2023-10-06 NOTE — TELEPHONE ENCOUNTER
Patient has been contacted in regard to Dr Treviño advising and does voice understanding. However, there are no opening on Dr Terviño schedule til first week of November. However patient is scheduled with STAR Bear on 10/16/2023. MD informed of situation and states we will see how patient is doing over the weekend and make further advising next week. Understanding has been voiced.

## 2023-10-13 ENCOUNTER — HOSPITAL ENCOUNTER (OUTPATIENT)
Dept: CARDIOLOGY | Facility: OTHER | Age: 78
Discharge: HOME | End: 2023-10-13
Attending: PHYSICIAN ASSISTANT
Payer: MEDICARE

## 2023-10-13 VITALS
SYSTOLIC BLOOD PRESSURE: 132 MMHG | BODY MASS INDEX: 28.36 KG/M2 | DIASTOLIC BLOOD PRESSURE: 66 MMHG | HEIGHT: 73 IN | HEART RATE: 83 BPM | WEIGHT: 214 LBS

## 2023-10-13 DIAGNOSIS — M79.89 SWELLING OF LOWER EXTREMITY: ICD-10-CM

## 2023-10-13 LAB
ASCENDING AORTA: 3.15 CM
AV INDEX (PROSTH): 0.7
AV MEAN GRADIENT: 6 MMHG
AV PEAK GRADIENT: 10 MMHG
AV REGURGITATION PRESSURE HALF TIME: 1409 MS
AV VALVE AREA BY VELOCITY RATIO: 2.52 CM²
AV VALVE AREA: 2.94 CM²
AV VELOCITY RATIO: 0.6
BSA FOR ECHO PROCEDURE: 2.24 M2
CV ECHO LV RWT: 0.54 CM
DOP CALC AO PEAK VEL: 1.58 M/S
DOP CALC AO VTI: 28.6 CM
DOP CALC LVOT AREA: 4.2 CM2
DOP CALC LVOT DIAMETER: 2.31 CM
DOP CALC LVOT PEAK VEL: 0.95 M/S
DOP CALC LVOT STROKE VOLUME: 84.2 CM3
DOP CALC RVOT PEAK VEL: 0.96 M/S
DOP CALC RVOT VTI: 23 CM
DOP CALCLVOT PEAK VEL VTI: 20.1 CM
E WAVE DECELERATION TIME: 187.2 MSEC
E/A RATIO: 0.66
E/E' RATIO: 9.69 M/S
ECHO LV POSTERIOR WALL: 1.16 CM (ref 0.6–1.1)
FRACTIONAL SHORTENING: 34 % (ref 28–44)
INTERVENTRICULAR SEPTUM: 1.16 CM (ref 0.6–1.1)
IVC DIAMETER: 1.27 CM
IVRT: 106.57 MSEC
LA MAJOR: 5.44 CM
LA MINOR: 4.88 CM
LA WIDTH: 3.8 CM
LEFT ATRIUM SIZE: 3.25 CM
LEFT ATRIUM VOLUME INDEX MOD: 30.7 ML/M2
LEFT ATRIUM VOLUME INDEX: 24.4 ML/M2
LEFT ATRIUM VOLUME MOD: 67.83 CM3
LEFT ATRIUM VOLUME: 54.01 CM3
LEFT INTERNAL DIMENSION IN SYSTOLE: 2.83 CM (ref 2.1–4)
LEFT VENTRICLE DIASTOLIC VOLUME INDEX: 38.09 ML/M2
LEFT VENTRICLE DIASTOLIC VOLUME: 84.18 ML
LEFT VENTRICLE MASS INDEX: 80 G/M2
LEFT VENTRICLE SYSTOLIC VOLUME INDEX: 13.7 ML/M2
LEFT VENTRICLE SYSTOLIC VOLUME: 30.27 ML
LEFT VENTRICULAR INTERNAL DIMENSION IN DIASTOLE: 4.32 CM (ref 3.5–6)
LEFT VENTRICULAR MASS: 177.09 G
LV LATERAL E/E' RATIO: 9 M/S
LV SEPTAL E/E' RATIO: 10.5 M/S
LVOT MG: 2.21 MMHG
LVOT MV: 0.71 CM/S
MV PEAK A VEL: 0.96 M/S
MV PEAK E VEL: 0.63 M/S
MV STENOSIS PRESSURE HALF TIME: 54.29 MS
MV VALVE AREA P 1/2 METHOD: 4.05 CM2
PISA AR MAX VEL: 2.21 M/S
PISA MRMAX VEL: 3.98 M/S
PISA TR MAX VEL: 2.27 M/S
PULM VEIN S/D RATIO: 0.98
PV MEAN GRADIENT: 2 MMHG
PV PEAK D VEL: 0.57 M/S
PV PEAK GRADIENT: 5 MMHG
PV PEAK S VEL: 0.56 M/S
PV PEAK VELOCITY: 1.11 M/S
RA MAJOR: 5.44 CM
RA PRESSURE ESTIMATED: 3 MMHG
RA WIDTH: 3.9 CM
RIGHT VENTRICULAR END-DIASTOLIC DIMENSION: 3.7 CM
RV TB RVSP: 5 MMHG
RV TISSUE DOPPLER FREE WALL SYSTOLIC VELOCITY 1 (APICAL 4 CHAMBER VIEW): 13.3 CM/S
SINUS: 3.1 CM
STJ: 3.12 CM
TDI LATERAL: 0.07 M/S
TDI SEPTAL: 0.06 M/S
TDI: 0.07 M/S
TR MAX PG: 21 MMHG
TRICUSPID ANNULAR PLANE SYSTOLIC EXCURSION: 2.5 CM
TV REST PULMONARY ARTERY PRESSURE: 24 MMHG
Z-SCORE OF LEFT VENTRICULAR DIMENSION IN END DIASTOLE: -5.69
Z-SCORE OF LEFT VENTRICULAR DIMENSION IN END SYSTOLE: -3.9

## 2023-10-13 PROCEDURE — 93306 TTE W/DOPPLER COMPLETE: CPT | Mod: HCNC

## 2023-10-13 PROCEDURE — 93306 ECHO (CUPID ONLY): ICD-10-PCS | Mod: 26,HCNC,, | Performed by: INTERNAL MEDICINE

## 2023-10-13 PROCEDURE — 93306 TTE W/DOPPLER COMPLETE: CPT | Mod: 26,HCNC,, | Performed by: INTERNAL MEDICINE

## 2023-10-16 ENCOUNTER — OFFICE VISIT (OUTPATIENT)
Dept: INTERNAL MEDICINE | Facility: CLINIC | Age: 78
End: 2023-10-16
Payer: MEDICARE

## 2023-10-16 VITALS
WEIGHT: 218.69 LBS | OXYGEN SATURATION: 95 % | BODY MASS INDEX: 28.98 KG/M2 | HEIGHT: 73 IN | DIASTOLIC BLOOD PRESSURE: 64 MMHG | SYSTOLIC BLOOD PRESSURE: 132 MMHG | HEART RATE: 68 BPM

## 2023-10-16 DIAGNOSIS — I51.89 DIASTOLIC DYSFUNCTION: Primary | ICD-10-CM

## 2023-10-16 DIAGNOSIS — R60.0 LOWER EXTREMITY EDEMA: ICD-10-CM

## 2023-10-16 PROCEDURE — 3288F FALL RISK ASSESSMENT DOCD: CPT | Mod: HCNC,CPTII,S$GLB, | Performed by: PHYSICIAN ASSISTANT

## 2023-10-16 PROCEDURE — 3075F PR MOST RECENT SYSTOLIC BLOOD PRESS GE 130-139MM HG: ICD-10-PCS | Mod: HCNC,CPTII,S$GLB, | Performed by: PHYSICIAN ASSISTANT

## 2023-10-16 PROCEDURE — 1126F AMNT PAIN NOTED NONE PRSNT: CPT | Mod: HCNC,CPTII,S$GLB, | Performed by: PHYSICIAN ASSISTANT

## 2023-10-16 PROCEDURE — 1159F PR MEDICATION LIST DOCUMENTED IN MEDICAL RECORD: ICD-10-PCS | Mod: HCNC,CPTII,S$GLB, | Performed by: PHYSICIAN ASSISTANT

## 2023-10-16 PROCEDURE — 1101F PT FALLS ASSESS-DOCD LE1/YR: CPT | Mod: HCNC,CPTII,S$GLB, | Performed by: PHYSICIAN ASSISTANT

## 2023-10-16 PROCEDURE — 3078F DIAST BP <80 MM HG: CPT | Mod: HCNC,CPTII,S$GLB, | Performed by: PHYSICIAN ASSISTANT

## 2023-10-16 PROCEDURE — 1101F PR PT FALLS ASSESS DOC 0-1 FALLS W/OUT INJ PAST YR: ICD-10-PCS | Mod: HCNC,CPTII,S$GLB, | Performed by: PHYSICIAN ASSISTANT

## 2023-10-16 PROCEDURE — 3078F PR MOST RECENT DIASTOLIC BLOOD PRESSURE < 80 MM HG: ICD-10-PCS | Mod: HCNC,CPTII,S$GLB, | Performed by: PHYSICIAN ASSISTANT

## 2023-10-16 PROCEDURE — 3075F SYST BP GE 130 - 139MM HG: CPT | Mod: HCNC,CPTII,S$GLB, | Performed by: PHYSICIAN ASSISTANT

## 2023-10-16 PROCEDURE — 1159F MED LIST DOCD IN RCRD: CPT | Mod: HCNC,CPTII,S$GLB, | Performed by: PHYSICIAN ASSISTANT

## 2023-10-16 PROCEDURE — 3288F PR FALLS RISK ASSESSMENT DOCUMENTED: ICD-10-PCS | Mod: HCNC,CPTII,S$GLB, | Performed by: PHYSICIAN ASSISTANT

## 2023-10-16 PROCEDURE — 99214 PR OFFICE/OUTPT VISIT, EST, LEVL IV, 30-39 MIN: ICD-10-PCS | Mod: HCNC,S$GLB,, | Performed by: PHYSICIAN ASSISTANT

## 2023-10-16 PROCEDURE — 99214 OFFICE O/P EST MOD 30 MIN: CPT | Mod: HCNC,S$GLB,, | Performed by: PHYSICIAN ASSISTANT

## 2023-10-16 PROCEDURE — 99999 PR PBB SHADOW E&M-EST. PATIENT-LVL III: CPT | Mod: PBBFAC,HCNC,, | Performed by: PHYSICIAN ASSISTANT

## 2023-10-16 PROCEDURE — 1126F PR PAIN SEVERITY QUANTIFIED, NO PAIN PRESENT: ICD-10-PCS | Mod: HCNC,CPTII,S$GLB, | Performed by: PHYSICIAN ASSISTANT

## 2023-10-16 PROCEDURE — 99999 PR PBB SHADOW E&M-EST. PATIENT-LVL III: ICD-10-PCS | Mod: PBBFAC,HCNC,, | Performed by: PHYSICIAN ASSISTANT

## 2023-10-16 NOTE — PROGRESS NOTES
INTERNAL MEDICINE PROGRESS NOTE    CHIEF COMPLAINT     Chief Complaint   Patient presents with    Follow-up     2 wk symptoms       HPI     Misha Eldridge Jr. is a 78 y.o. male who presents for a follow-up visit today.    PCP is Dipak Treviño MD, patient is known to me.     He was seen two weeks ago for lower extremity edema - work up shows G1DD and no concern for lower extremity venous insufficiency. He had three day course of lasix which he did not noticed improved the edema. There has been no worsening. He does admit that despite recommendations to limit dietary Na, he often eats out for dinner, especially at [facility] [employer] and his local [employer]. I do suspect that his edema is related to dietary Na, I recommended decreasing dietary Na and recommended that he wear compression socks daily.     ECHO results    Left Ventricle: The left ventricle is normal in size. Normal wall thickness. There is concentric remodeling. There is normal systolic function with a visually estimated ejection fraction of 60 - 65%. Grade I diastolic dysfunction.    Right Ventricle: Normal right ventricular cavity size. Wall thickness is normal. Systolic function is normal.    Tricuspid Valve: There is mild regurgitation.     Venous US shows no reflux     Given three days of lasix for diuresis -minimal to no improvement.       Past Medical History:  Past Medical History:   Diagnosis Date    Cataract     Colostomy in place     Dermatochalasis     Diabetes mellitus type II     Dry eye syndrome     Hemorrhoid     History of colon cancer     History of prostate cancer     History of pulmonary embolism     Hypertension     Macular degeneration        Home Medications:  Prior to Admission medications    Medication Sig Start Date End Date Taking? Authorizing Provider   abiraterone (ZYTIGA) 250 mg Tab Take 4 tablets (1,000 mg total) by mouth once daily. 6/19/23 6/18/24  Irene Thompson, CNS   ACCU-CHEK SOFTCLIX  LANCETS Misc TEST BLOOD SUGAR ONCE A DAY 4/13/20   Zurdo Best MD   amLODIPine (NORVASC) 10 MG tablet Take 1 tablet (10 mg total) by mouth once daily. 7/6/23   Dipak Treviño MD   atorvastatin (LIPITOR) 20 MG tablet Take 1 tablet (20 mg total) by mouth once daily. 2/6/23   Demi Bear PA-C   blood sugar diagnostic Strp 1 each by Misc.(Non-Drug; Combo Route) route once daily. 3/4/19   Kezia Dennis MD   blood sugar diagnostic Strp Pt to check up to 6 times daily 6/6/19   Dipak Treviño MD   blood sugar diagnostic Strp Use to check blood glucose 1-2 times daily as directed. 5/8/23   Dipak Treviño MD   blood-glucose meter kit Use as instructed 5/8/23 7/12/27  Dipak Treviño MD   carvediloL (COREG) 3.125 MG tablet Take 1 tablet (3.125 mg total) by mouth 2 (two) times daily. 7/12/23   Dipak Treviño MD   diclofenac (VOLTAREN) 75 MG EC tablet Take 1 tablet (75 mg total) by mouth 2 (two) times daily. 5/20/22   Dipak Treviño MD   docusate sodium (COLACE) 100 MG capsule Take 1 capsule (100 mg total) by mouth 2 (two) times daily as needed for Constipation. 10/6/23   Tomás Hassan PA-C   doxazosin (CARDURA) 4 MG tablet Take 1 tablet (4 mg total) by mouth every evening. 7/25/23   Dipak Treviño MD   furosemide (LASIX) 20 MG tablet Take 1 tablet (20 mg total) by mouth once daily. for 3 days 10/2/23 10/5/23  Demi Bear PA-C   gabapentin (NEURONTIN) 300 MG capsule Take 1 capsule (300 mg total) by mouth 3 (three) times daily. 4/27/23   Dipak Treviño MD   glipiZIDE (GLUCOTROL) 10 MG tablet TAKE 1 TABLET(10 MG) BY MOUTH TWICE DAILY BEFORE MEALS 7/31/23   Dipak Treviño MD   hydroCHLOROthiazide (HYDRODIURIL) 25 MG tablet TAKE ONE-HALF TABLET BY MOUTH ONCE DAILY 7/11/23   Dipak Treviño MD   insulin aspart U-100 (NOVOLOG FLEXPEN U-100 INSULIN) 100 unit/mL (3 mL) InPn pen Inject 8 Units into the skin 3 (three) times daily with meals.  "5/17/23 8/17/23  Dipak Treviño MD   insulin glargine U-300 conc (TOUJEO MAX U-300 SOLOSTAR) 300 unit/mL (3 mL) insulin pen Inject 28 Units into the skin once daily. 1/10/23 1/10/24  Dipak Treviño MD   irbesartan (AVAPRO) 300 MG tablet Take 1 tablet (300 mg total) by mouth every evening. 2/6/23   Demi Bear PA-C   lancing device with lancets Kit 1 each by Misc.(Non-Drug; Combo Route) route 2 (two) times daily. 10/15/20 8/17/23  Dipak Treviño MD   metFORMIN (GLUCOPHAGE) 1000 MG tablet Take 1 tablet (1,000 mg total) by mouth 2 (two) times daily with meals. 4/27/23   Dipak Treviño MD   methylPREDNISolone (MEDROL DOSEPACK) 4 mg tablet Take 1 tablet (4 mg total) by mouth once daily. Use as instructed on dose pack. 8/28/23   Sharla Burnett DPM   pen needle, diabetic (BD ULTRA-FINE KELECHI PEN NEEDLE) 32 gauge x 5/32" Ndle To use with insulin pens at meals and evening shot. 12/9/22   Demi Bear PA-C   polyethylene glycol (GLYCOLAX) 17 gram PwPk Take 17 g by mouth daily as needed (Constipation). 10/6/23   Tomás Hassan PA-C   predniSONE (DELTASONE) 5 MG tablet Take 1 tablet (5 mg total) by mouth 2 (two) times daily. 7/7/23   Dipak Treviño MD   predniSONE (DELTASONE) 5 MG tablet Take one tablet by mouth twice a day 7/7/23   Dipak Treviño MD   semaglutide (RYBELSUS) 14 mg tablet TAKE 1 TABLET(14 MG) BY MOUTH EVERY DAY 8/9/23   Nick Villegas MD   triamcinolone acetonide 0.1% (KENALOG) 0.1 % cream Apply topically 2 (two) times daily. 6/6/19   Dipak Treviño MD       Review of Systems:  Review of Systems   Constitutional:  Negative for chills and fever.   HENT:  Negative for sore throat and trouble swallowing.    Eyes:  Negative for visual disturbance.   Respiratory:  Negative for cough and shortness of breath.    Cardiovascular:  Negative for chest pain.   Gastrointestinal:  Negative for abdominal pain, constipation, diarrhea, nausea and vomiting. " "  Genitourinary:  Negative for dysuria and flank pain.   Musculoskeletal:  Negative for back pain, neck pain and neck stiffness.        Lower extremity edema    Skin:  Negative for rash.   Neurological:  Negative for dizziness, syncope, weakness and headaches.   Psychiatric/Behavioral:  Negative for confusion.        Health Maintainence:   Immunizations:  Health Maintenance         Date Due Completion Date    Shingles Vaccine (2 of 2) 11/25/2019 9/30/2019    Diabetes Urine Screening 04/12/2023 4/12/2022    Influenza Vaccine (1) 09/01/2023 1/26/2023    Override on 12/14/2016: Declined    COVID-19 Vaccine (6 - 2023-24 season) 09/01/2023 1/26/2023    Hemoglobin A1c 02/17/2024 8/17/2023    Eye Exam 04/03/2024 4/3/2023    Override on 10/11/2016: Done    Override on 8/6/2014: Done    Lipid Panel 05/08/2024 5/8/2023    TETANUS VACCINE 12/05/2027 12/5/2017    DEXA Scan 06/08/2028 6/8/2023    Colonoscopy 06/02/2032 6/2/2022    Override on 7/15/2014: Done             PHYSICAL EXAM     /64 (BP Location: Left arm, Patient Position: Sitting, BP Method: Large (Manual))   Pulse 68   Ht 6' 1" (1.854 m)   Wt 99.2 kg (218 lb 11.1 oz)   SpO2 95%   BMI 28.85 kg/m²     Physical Exam  Vitals and nursing note reviewed.   Constitutional:       Appearance: Normal appearance.      Comments: Elderly male in NAD or apparent pain. He makes good eye contact, speaks in clear full sentences and ambulates with ease.      HENT:      Head: Normocephalic and atraumatic.      Nose: Nose normal.      Mouth/Throat:      Pharynx: Oropharynx is clear.   Eyes:      Conjunctiva/sclera: Conjunctivae normal.   Cardiovascular:      Rate and Rhythm: Normal rate and regular rhythm.      Pulses: Normal pulses.   Pulmonary:      Effort: No respiratory distress.   Abdominal:      Tenderness: There is no abdominal tenderness.   Musculoskeletal:         General: Normal range of motion.      Cervical back: No rigidity.      Comments: 2+ pitting edema " bilaterally    Skin:     General: Skin is warm and dry.      Capillary Refill: Capillary refill takes less than 2 seconds.      Findings: No rash.   Neurological:      General: No focal deficit present.      Mental Status: He is alert.      Gait: Gait normal.   Psychiatric:         Mood and Affect: Mood normal.         LABS     Lab Results   Component Value Date    HGBA1C 7.4 (H) 08/17/2023     CMP  Sodium   Date Value Ref Range Status   10/02/2023 136 136 - 145 mmol/L Final     Potassium   Date Value Ref Range Status   10/02/2023 4.1 3.5 - 5.1 mmol/L Final     Chloride   Date Value Ref Range Status   10/02/2023 102 95 - 110 mmol/L Final     CO2   Date Value Ref Range Status   10/02/2023 22 (L) 23 - 29 mmol/L Final     Glucose   Date Value Ref Range Status   10/02/2023 250 (H) 70 - 110 mg/dL Final     BUN   Date Value Ref Range Status   10/02/2023 11 8 - 23 mg/dL Final     Creatinine   Date Value Ref Range Status   10/02/2023 1.2 0.5 - 1.4 mg/dL Final     Calcium   Date Value Ref Range Status   10/02/2023 9.5 8.7 - 10.5 mg/dL Final     Total Protein   Date Value Ref Range Status   10/02/2023 6.7 6.0 - 8.4 g/dL Final     Albumin   Date Value Ref Range Status   10/02/2023 3.6 3.5 - 5.2 g/dL Final     Total Bilirubin   Date Value Ref Range Status   10/02/2023 0.6 0.1 - 1.0 mg/dL Final     Comment:     For infants and newborns, interpretation of results should be based  on gestational age, weight and in agreement with clinical  observations.    Premature Infant recommended reference ranges:  Up to 24 hours.............<8.0 mg/dL  Up to 48 hours............<12.0 mg/dL  3-5 days..................<15.0 mg/dL  6-29 days.................<15.0 mg/dL       Alkaline Phosphatase   Date Value Ref Range Status   10/02/2023 59 55 - 135 U/L Final     AST   Date Value Ref Range Status   10/02/2023 13 10 - 40 U/L Final     ALT   Date Value Ref Range Status   10/02/2023 9 (L) 10 - 44 U/L Final     Anion Gap   Date Value Ref Range  Status   10/02/2023 12 8 - 16 mmol/L Final     eGFR if    Date Value Ref Range Status   06/21/2022 >60.0 >60 mL/min/1.73 m^2 Final     eGFR if non    Date Value Ref Range Status   06/21/2022 53.0 (A) >60 mL/min/1.73 m^2 Final     Comment:     Calculation used to obtain the estimated glomerular filtration  rate (eGFR) is the CKD-EPI equation.        Lab Results   Component Value Date    WBC 3.90 10/02/2023    HGB 10.0 (L) 10/02/2023    HCT 29.5 (L) 10/02/2023    MCV 87 10/02/2023     10/02/2023     Lab Results   Component Value Date    CHOL 103 (L) 05/08/2023    CHOL 103 (L) 04/12/2022    CHOL 103 (L) 04/12/2022     Lab Results   Component Value Date    HDL 51 05/08/2023    HDL 42 04/12/2022    HDL 42 04/12/2022     Lab Results   Component Value Date    LDLCALC 22.4 (L) 05/08/2023    LDLCALC 24.4 (L) 04/12/2022    LDLCALC 24.4 (L) 04/12/2022     Lab Results   Component Value Date    TRIG 148 05/08/2023    TRIG 183 (H) 04/12/2022    TRIG 183 (H) 04/12/2022     Lab Results   Component Value Date    CHOLHDL 49.5 05/08/2023    CHOLHDL 40.8 04/12/2022    CHOLHDL 40.8 04/12/2022     Lab Results   Component Value Date    TSH 1.549 04/12/2022       ASSESSMENT/PLAN     Misha Eldridge  is a 78 y.o. male     Misha was seen today for follow-up. Recommend decreasing dietary Na, I wrapped his legs in ACE's b/l for controllable compression. He verbalizes understanding of dietary changes required and compression recs to help with edema. No clinical evidence of pulmonary edema on exam today.     Diagnoses and all orders for this visit:    Diastolic dysfunction    Lower extremity edema        Demi Bear PA-C   Department of Internal Medicine - [facility]   10:27 AM

## 2023-10-17 ENCOUNTER — OUTPATIENT CASE MANAGEMENT (OUTPATIENT)
Dept: ADMINISTRATIVE | Facility: OTHER | Age: 78
End: 2023-10-17
Payer: MEDICARE

## 2023-10-17 NOTE — PROGRESS NOTES
Outpatient Care Management  Plan of Care Follow Up Visit    Patient: Misha Eldridge Jr.  MRN: [id number]  Date of Service: 10/17/2023  Completed by: Kerri Servin RN  Referral Date: 08/17/2023    Reason for Visit   Patient presents with    OPCM RN Follow Up Call       Brief Summary: Patient states his blood sugars are running 110's -120's.  He states again he does not remember about the plate method OSCAR taught him about.  He states he has his Diabetes Management Guide.  He states he called [employer] twice about the chronic disease Mom's Meals plans and they said they would get back to him.  They still have not called back yet.  Patient states he doesn't remember any signs or symptoms of hypoglycemia or hyperglycemia at this time, he states he just woke up.  He states he sleeps a lot but stays up late at night watching TV.  He has not gotten his lighter weights as of yet.  He states he has to take the heavier weights to [facility] and they will give him a credit towards lighter weights and he hasn't made the time to do it yet.  He states right now he is exercising by walking and doing squats.  Patient states his legs are swollen and he has gained about four pounds.  He had an office visit with Demi GRAVES from internal medicine on yesterday.  He admitted to eating out a lot, places like [employer] and chinese restaurants.  His legs were wrapped with ace wraps by her.  She suggested he wear compression stockings.      OSCAR reiterated the plate method to the patient and informed him again that he can see the diagram on page 16 of the Diabetes Management Guide.  CM informed the patient he should refer to the DMG anytime he is in question about anything and should read it multiple times to learn more about his DM.  CM also encouraged patient to call [employer] again about the Mom's Meals plans that they have to offer.  OSCAR informed him he should not wait for them to call back.  OSCAR reiterated the signs  and symptoms of hypoglycemia including shakiness, sweating, irritability, confusion, lightheadedness, hunger, tingling or numbness in the lips or tongue, weakness or fatigue.  CM then reiterated the signs and symptoms of hyperglycemia including excessive thirst; excessive, dilute urine output; frequent urination at night; unintentional weight loss; recurrent infections; increased fatigue/weakness; blurred vision and fruity odor to breath.  CM encouraged patient to go to the store to exchange his weights so he can resume his exercises he usually does.  CM educated patient about a 9257-3882 mg sodium diet.  CM informed him he should not being eating at [employer] or chinese foods because they are very high in sodium.  CM also encouraged patient to wear the compression stockings as told to by the PA.    Next steps:   Follow up phone call in four weeks  Follow up on blood sugars  Follow up if referring to DMG as needed  Follow up on plate method  Follow up if called [facility] about Mom's Meals Plans  Follow up on S&S of hypoglycemia  Follow up on S&S of hyperglycemia  Follow up on exercise  Follow up if got new weights (current ones too heavy)

## 2023-10-26 DIAGNOSIS — E13.9 DIABETES 1.5, MANAGED AS TYPE 2: ICD-10-CM

## 2023-10-26 RX ORDER — GLIPIZIDE 10 MG/1
10 TABLET ORAL
Qty: 180 TABLET | Refills: 1 | Status: SHIPPED | OUTPATIENT
Start: 2023-10-26

## 2023-10-26 NOTE — TELEPHONE ENCOUNTER
Refill Decision Note   Misha Eldridge  is requesting a refill authorization.  Brief Assessment and Rationale for Refill:  Approve     Medication Therapy Plan:  ED visit unrelated to requested rx    Medication Reconciliation Completed: No   Comments:     No Care Gaps recommended.     Note composed:11:00 AM 10/26/2023

## 2023-10-26 NOTE — TELEPHONE ENCOUNTER
No care due was identified.  [facility] Embedded Care Due Messages. Reference number: [id number].   10/26/2023 3:26:31 AM CDT

## 2023-11-10 DIAGNOSIS — E13.9 DIABETES 1.5, MANAGED AS TYPE 2: ICD-10-CM

## 2023-11-10 DIAGNOSIS — E78.9 ABNORMAL CHOLESTEROL TEST: ICD-10-CM

## 2023-11-10 NOTE — TELEPHONE ENCOUNTER
No care due was identified.  [facility] Embedded Care Due Messages. Reference number: [id number].   11/10/2023 3:28:31 PM CST

## 2023-11-13 RX ORDER — ATORVASTATIN CALCIUM 20 MG/1
20 TABLET, FILM COATED ORAL DAILY
Qty: 90 TABLET | Refills: 1 | Status: SHIPPED | OUTPATIENT
Start: 2023-11-13

## 2023-11-13 RX ORDER — METFORMIN HYDROCHLORIDE 1000 MG/1
1000 TABLET ORAL 2 TIMES DAILY WITH MEALS
Qty: 180 TABLET | Refills: 1 | Status: SHIPPED | OUTPATIENT
Start: 2023-11-13

## 2023-11-15 ENCOUNTER — OUTPATIENT CASE MANAGEMENT (OUTPATIENT)
Dept: ADMINISTRATIVE | Facility: OTHER | Age: 78
End: 2023-11-15
Payer: MEDICARE

## 2023-11-15 NOTE — PROGRESS NOTES
Outpatient Care Management  Plan of Care Follow Up Visit    Patient: Misha Eldridge Jr.  MRN: [id number]  Date of Service: 11/15/2023  Completed by: Kerri Servin RN  Referral Date: 08/17/2023    Reason for Visit   Patient presents with    Case Closure       Brief Summary: Patient states that his blood sugars are running 116-122.  He states they have been really good.  He states he still has his Diabetes Management Guide.  Patient states he forgot to call [employer] about the plans that include chronic condition [employer] Me[employer].  Patient not very engaged in the conversations the last several interactions.  Patient states he still has not read the educational materials.  Patient states he doesn't remember any of the signs and symptoms of hypoglycemia and the only signs and symptoms he can remember of hyperglycemia is a fruity taste in his mouth.  He states he still has not exchanged his weights for lighter ones like he was going to do.  He is still walking for exercise.  Patient states he still has some leg swelling.  He denies shortness of breath.  He states today he is having for dinner snap beans, neck bones and corn bread.  He states he doesn't know how much sodium he takes in on his diet.  He states he doesn't remember about the low sodium diet CM instructed him on during last phone call.    CM reiterated the plate method again and informed patient to refer to page 16 of the Diabetes Management Guide.  CM encouraged patient again to read his educational materials and to use them daily to help him with his blood sugar control.  OSCAR reiterated the signs and symptoms of hypoglycemia including shakiness, sweating, irritability, confusion, lightheadedness, hunger, tingling or numbness in the lips or tongue, weakness or fatigue.  OSCAR then reiterated the signs and symptoms of hyperglycemia including excessive thirst; excessive, dilute urine output; frequent urination at night; unintentional weight loss; recurrent infections;  increased fatigue/weakness; blurred vision and fruity odor to breath.  CM discussed case closure since patient not engaged in the action plan.  Patient states he is good with that.  CM encouraged patient to keep CM's phone number and to call if he has any questions or needs in the future.

## 2023-11-16 DIAGNOSIS — G62.9 NEUROPATHY: ICD-10-CM

## 2023-11-16 NOTE — TELEPHONE ENCOUNTER
LOV: 10/2/2023 with SHANON GRAVES  LOV: 8/17/2023 with PCP VICTOR HUGO Treviño    No upcoming appointment on file at this time.   Allergies confirmed, medication pended, and routed to PCP.

## 2023-11-17 RX ORDER — GABAPENTIN 300 MG/1
300 CAPSULE ORAL 3 TIMES DAILY
Qty: 270 CAPSULE | Refills: 1 | Status: SHIPPED | OUTPATIENT
Start: 2023-11-17

## 2023-12-12 ENCOUNTER — LAB VISIT (OUTPATIENT)
Dept: LAB | Facility: HOSPITAL | Age: 78
End: 2023-12-12
Payer: MEDICARE

## 2023-12-12 DIAGNOSIS — C61 PROSTATE CANCER: ICD-10-CM

## 2023-12-12 LAB
ALBUMIN SERPL BCP-MCNC: 3.7 G/DL (ref 3.5–5.2)
ALP SERPL-CCNC: 72 U/L (ref 55–135)
ALT SERPL W/O P-5'-P-CCNC: 7 U/L (ref 10–44)
ANION GAP SERPL CALC-SCNC: 11 MMOL/L (ref 8–16)
AST SERPL-CCNC: 12 U/L (ref 10–40)
BASOPHILS # BLD AUTO: 0.02 K/UL (ref 0–0.2)
BASOPHILS NFR BLD: 0.6 % (ref 0–1.9)
BILIRUB SERPL-MCNC: 0.4 MG/DL (ref 0.1–1)
BUN SERPL-MCNC: 11 MG/DL (ref 8–23)
CALCIUM SERPL-MCNC: 9.7 MG/DL (ref 8.7–10.5)
CHLORIDE SERPL-SCNC: 108 MMOL/L (ref 95–110)
CO2 SERPL-SCNC: 25 MMOL/L (ref 23–29)
COMPLEXED PSA SERPL-MCNC: <0.01 NG/ML (ref 0–4)
CREAT SERPL-MCNC: 1.2 MG/DL (ref 0.5–1.4)
DIFFERENTIAL METHOD: ABNORMAL
EOSINOPHIL # BLD AUTO: 0.3 K/UL (ref 0–0.5)
EOSINOPHIL NFR BLD: 9.9 % (ref 0–8)
ERYTHROCYTE [DISTWIDTH] IN BLOOD BY AUTOMATED COUNT: 14.6 % (ref 11.5–14.5)
EST. GFR  (NO RACE VARIABLE): >60 ML/MIN/1.73 M^2
GLUCOSE SERPL-MCNC: 152 MG/DL (ref 70–110)
HCT VFR BLD AUTO: 30.3 % (ref 40–54)
HGB BLD-MCNC: 10.1 G/DL (ref 14–18)
IMM GRANULOCYTES # BLD AUTO: 0.01 K/UL (ref 0–0.04)
IMM GRANULOCYTES NFR BLD AUTO: 0.3 % (ref 0–0.5)
LYMPHOCYTES # BLD AUTO: 0.9 K/UL (ref 1–4.8)
LYMPHOCYTES NFR BLD: 25 % (ref 18–48)
MCH RBC QN AUTO: 29.5 PG (ref 27–31)
MCHC RBC AUTO-ENTMCNC: 33.3 G/DL (ref 32–36)
MCV RBC AUTO: 89 FL (ref 82–98)
MONOCYTES # BLD AUTO: 0.3 K/UL (ref 0.3–1)
MONOCYTES NFR BLD: 9 % (ref 4–15)
NEUTROPHILS # BLD AUTO: 1.9 K/UL (ref 1.8–7.7)
NEUTROPHILS NFR BLD: 55.2 % (ref 38–73)
NRBC BLD-RTO: 0 /100 WBC
PLATELET # BLD AUTO: 199 K/UL (ref 150–450)
PMV BLD AUTO: 10.2 FL (ref 9.2–12.9)
POTASSIUM SERPL-SCNC: 4.2 MMOL/L (ref 3.5–5.1)
PROT SERPL-MCNC: 6.9 G/DL (ref 6–8.4)
RBC # BLD AUTO: 3.42 M/UL (ref 4.6–6.2)
SODIUM SERPL-SCNC: 144 MMOL/L (ref 136–145)
WBC # BLD AUTO: 3.44 K/UL (ref 3.9–12.7)

## 2023-12-12 PROCEDURE — 85025 COMPLETE CBC W/AUTO DIFF WBC: CPT | Mod: HCNC | Performed by: INTERNAL MEDICINE

## 2023-12-12 PROCEDURE — 36415 COLL VENOUS BLD VENIPUNCTURE: CPT | Mod: HCNC | Performed by: INTERNAL MEDICINE

## 2023-12-12 PROCEDURE — 80053 COMPREHEN METABOLIC PANEL: CPT | Mod: HCNC | Performed by: INTERNAL MEDICINE

## 2023-12-12 PROCEDURE — 84153 ASSAY OF PSA TOTAL: CPT | Mod: HCNC | Performed by: INTERNAL MEDICINE

## 2023-12-15 ENCOUNTER — OFFICE VISIT (OUTPATIENT)
Dept: INTERNAL MEDICINE | Facility: CLINIC | Age: 78
End: 2023-12-15
Attending: INTERNAL MEDICINE
Payer: MEDICARE

## 2023-12-15 ENCOUNTER — HOSPITAL ENCOUNTER (OUTPATIENT)
Dept: RADIOLOGY | Facility: OTHER | Age: 78
Discharge: HOME | End: 2023-12-15
Attending: INTERNAL MEDICINE
Payer: MEDICARE

## 2023-12-15 ENCOUNTER — TELEPHONE (OUTPATIENT)
Dept: INTERNAL MEDICINE | Facility: CLINIC | Age: 78
End: 2023-12-15
Payer: MEDICARE

## 2023-12-15 VITALS
SYSTOLIC BLOOD PRESSURE: 130 MMHG | HEART RATE: 60 BPM | OXYGEN SATURATION: 98 % | BODY MASS INDEX: 27.17 KG/M2 | WEIGHT: 205.94 LBS | DIASTOLIC BLOOD PRESSURE: 70 MMHG

## 2023-12-15 DIAGNOSIS — R07.9 CHEST PAIN, UNSPECIFIED TYPE: ICD-10-CM

## 2023-12-15 DIAGNOSIS — G89.29 CHRONIC SHOULDER PAIN, UNSPECIFIED LATERALITY: ICD-10-CM

## 2023-12-15 DIAGNOSIS — M25.519 CHRONIC SHOULDER PAIN, UNSPECIFIED LATERALITY: ICD-10-CM

## 2023-12-15 DIAGNOSIS — Z86.711 HISTORY OF PULMONARY EMBOLISM: ICD-10-CM

## 2023-12-15 DIAGNOSIS — J92.9 PLEURAL PLAQUE: ICD-10-CM

## 2023-12-15 DIAGNOSIS — L98.9 SKIN LESION: Primary | ICD-10-CM

## 2023-12-15 DIAGNOSIS — J94.8 PLEURAL CALCIFICATION: ICD-10-CM

## 2023-12-15 DIAGNOSIS — R07.89 CHEST WALL PAIN: ICD-10-CM

## 2023-12-15 PROCEDURE — 71275 CT ANGIOGRAPHY CHEST: CPT | Mod: 26,HCNC,, | Performed by: RADIOLOGY

## 2023-12-15 PROCEDURE — 1160F PR REVIEW ALL MEDS BY PRESCRIBER/CLIN PHARMACIST DOCUMENTED: ICD-10-PCS | Mod: HCNC,CPTII,S$GLB, | Performed by: INTERNAL MEDICINE

## 2023-12-15 PROCEDURE — 1101F PR PT FALLS ASSESS DOC 0-1 FALLS W/OUT INJ PAST YR: ICD-10-PCS | Mod: HCNC,CPTII,S$GLB, | Performed by: INTERNAL MEDICINE

## 2023-12-15 PROCEDURE — 99999 PR PBB SHADOW E&M-EST. PATIENT-LVL IV: CPT | Mod: PBBFAC,HCNC,, | Performed by: INTERNAL MEDICINE

## 2023-12-15 PROCEDURE — 1159F PR MEDICATION LIST DOCUMENTED IN MEDICAL RECORD: ICD-10-PCS | Mod: HCNC,CPTII,S$GLB, | Performed by: INTERNAL MEDICINE

## 2023-12-15 PROCEDURE — 1160F RVW MEDS BY RX/DR IN RCRD: CPT | Mod: HCNC,CPTII,S$GLB, | Performed by: INTERNAL MEDICINE

## 2023-12-15 PROCEDURE — 1159F MED LIST DOCD IN RCRD: CPT | Mod: HCNC,CPTII,S$GLB, | Performed by: INTERNAL MEDICINE

## 2023-12-15 PROCEDURE — 3078F DIAST BP <80 MM HG: CPT | Mod: HCNC,CPTII,S$GLB, | Performed by: INTERNAL MEDICINE

## 2023-12-15 PROCEDURE — 3288F FALL RISK ASSESSMENT DOCD: CPT | Mod: HCNC,CPTII,S$GLB, | Performed by: INTERNAL MEDICINE

## 2023-12-15 PROCEDURE — 99215 OFFICE O/P EST HI 40 MIN: CPT | Mod: HCNC,S$GLB,, | Performed by: INTERNAL MEDICINE

## 2023-12-15 PROCEDURE — 71275 CT ANGIOGRAPHY CHEST: CPT | Mod: TC,HCNC

## 2023-12-15 PROCEDURE — 99215 PR OFFICE/OUTPT VISIT, EST, LEVL V, 40-54 MIN: ICD-10-PCS | Mod: HCNC,S$GLB,, | Performed by: INTERNAL MEDICINE

## 2023-12-15 PROCEDURE — 3078F PR MOST RECENT DIASTOLIC BLOOD PRESSURE < 80 MM HG: ICD-10-PCS | Mod: HCNC,CPTII,S$GLB, | Performed by: INTERNAL MEDICINE

## 2023-12-15 PROCEDURE — 1101F PT FALLS ASSESS-DOCD LE1/YR: CPT | Mod: HCNC,CPTII,S$GLB, | Performed by: INTERNAL MEDICINE

## 2023-12-15 PROCEDURE — 3075F SYST BP GE 130 - 139MM HG: CPT | Mod: HCNC,CPTII,S$GLB, | Performed by: INTERNAL MEDICINE

## 2023-12-15 PROCEDURE — 25500020 PHARM REV CODE 255: Mod: HCNC | Performed by: INTERNAL MEDICINE

## 2023-12-15 PROCEDURE — 99999 PR PBB SHADOW E&M-EST. PATIENT-LVL IV: ICD-10-PCS | Mod: PBBFAC,HCNC,, | Performed by: INTERNAL MEDICINE

## 2023-12-15 PROCEDURE — 3288F PR FALLS RISK ASSESSMENT DOCUMENTED: ICD-10-PCS | Mod: HCNC,CPTII,S$GLB, | Performed by: INTERNAL MEDICINE

## 2023-12-15 PROCEDURE — 71275 CTA CHEST NON CORONARY (PE STUDIES): ICD-10-PCS | Mod: 26,HCNC,, | Performed by: RADIOLOGY

## 2023-12-15 PROCEDURE — 3075F PR MOST RECENT SYSTOLIC BLOOD PRESS GE 130-139MM HG: ICD-10-PCS | Mod: HCNC,CPTII,S$GLB, | Performed by: INTERNAL MEDICINE

## 2023-12-15 RX ORDER — DICLOFENAC SODIUM 10 MG/G
2 GEL TOPICAL 2 TIMES DAILY
Qty: 150 G | Refills: 0 | Status: SHIPPED | OUTPATIENT
Start: 2023-12-15 | End: 2023-12-29

## 2023-12-15 RX ADMIN — IOHEXOL 75 ML: 350 INJECTION, SOLUTION INTRAVENOUS at 10:12

## 2023-12-15 NOTE — TELEPHONE ENCOUNTER
----- Message from Mary Pruett MD sent at 12/15/2023  2:56 PM CST -----  Please let pt know that there was NO blood clot which is excellent news! I suspect his symptoms are due to muscle strain - recommend continue with recommendations as discussed in clinic today     The soft tissue mass on the side of his chest is a lipoma which is a benign fatty mass - nothing that requires treatment unless it causes pain at that area which I do not think is cause of current symptoms     There are still plaques at the lining of his lungs as seen on prior CT scan and discussed in clinic today - recommend get an opinion on this from pulmonary - referral signed - please arrange nonurgently

## 2023-12-15 NOTE — PROGRESS NOTES
Please let pt know that there was NO blood clot which is excellent news! I suspect his symptoms are due to muscle strain - recommend continue with recommendations as discussed in clinic today     The soft tissue mass on the side of his chest is a lipoma which is a benign fatty mass - nothing that requires treatment unless it causes pain at that area which I do not think is cause of current symptoms     There are still plaques at the lining of his lungs as seen on prior CT scan and discussed in clinic today - recommend get an opinion on this from pulmonary - referral signed - please arrange nonurgently

## 2023-12-15 NOTE — PROGRESS NOTES
Subjective:   Patient ID: Misha Eldridge Jr. is a 78 y.o. male  Chief complaint:   Chief Complaint   Patient presents with    Chest Pain    Shoulder Pain     Shoulder pain(b)       HPI  78M with prostate cancer, hx of colon cancer, diabetes type 2, htn, hld, atherosclerosis, chronic lower back pain and hx of PE:     Pcp: shana  Pt new to me    Hx of PE - He reports thinks this occurred after colon cancer surgery - in 2012 on chart check     left chest wall pain under axilla - extends to left post chest wall under scapula   - pain inthis area with breathing x 3-4 days   No cough   No wheezing   Sx started a couple of days ago - no inciting event   Reports had this before he thinks  On zytiga and prednisone daily   No sob but will notice pain sx wth breathing   No wheezing or orthopnea or LE edema   Tried: 'Biofreez and Advil which is mildly helpful     Bilateral shoulder pain - right side worse than left   Bilateral shoulder pain - limits rom   No falls or trauma   Present for several years   Tried meds as above     Reports left lower wound - not healed over past few years - extended   No pain   Does itch at times     Review of Systems    Objective:  Vitals:    12/15/23 0856   BP: 130/70   BP Location: Left arm   Patient Position: Sitting   Pulse: 60   SpO2: 98%   Weight: 93.4 kg (205 lb 14.6 oz)     Body mass index is 27.17 kg/m².    Physical Exam  Vitals reviewed.   Constitutional:       Appearance: He is well-developed.   HENT:      Head: Normocephalic and atraumatic.   Eyes:      Extraocular Movements: Extraocular movements intact.      Conjunctiva/sclera: Conjunctivae normal.   Cardiovascular:      Rate and Rhythm: Normal rate and regular rhythm.      Pulses: Normal pulses.      Heart sounds: Normal heart sounds.   Pulmonary:      Effort: Pulmonary effort is normal. No respiratory distress.      Breath sounds: Normal breath sounds. No stridor. No wheezing, rhonchi or rales.   Abdominal:      General: Bowel  sounds are normal.      Palpations: Abdomen is soft.   Musculoskeletal:         General: No swelling.      Cervical back: Neck supple.      Comments: Mild ttp at left chest wall   No crepitus or rash   Dec rom with B shoulders    Lymphadenopathy:      Cervical: No cervical adenopathy.   Skin:     General: Skin is warm and dry.   Neurological:      General: No focal deficit present.      Mental Status: He is alert. Mental status is at baseline.   Psychiatric:         Behavior: Behavior normal.         Thought Content: Thought content normal.         Judgment: Judgment normal.       Assessment:  1. Skin lesion    2. Chest wall pain    3. Chest pain, unspecified type    4. Chronic shoulder pain, unspecified laterality    5. Pleural calcification    6. Pleural plaque    7. History of pulmonary embolism        Plan:  1. Skin lesion  -     CV Ultrasound doppler arterial legs bilat; Future  -     Ambulatory referral/consult to Dermatology; Future; Expected date: 12/22/2023  Monitor - can try otc hydrocortisone bid for 1 week to see if improves   F/u with derm     2. Chest wall pain  -     SCHEDULED EKG 12-LEAD (to Muse); Future  -     diclofenac sodium (VOLTAREN) 1 % Gel; Apply 2 g topically 2 (two) times daily. for 7 days  Dispense: 150 g; Refill: 0  CTA now   Voltaren gel bid for 1 week  Apap arthritis   Er and rtc prompts given     3. Chest pain, unspecified type  -     CTA Chest Non-Coronary (PE Studies); Future; Expected date: 12/15/2023  As above     4. Chronic shoulder pain, unspecified laterality  -     X-Ray Shoulder 2 or More views Bilateral; Future; Expected date: 12/15/2023  -     Ambulatory referral/consult to Physical/Occupational Therapy; Future; Expected date: 12/22/2023  Apap arthritis   Voltaren gel     5. Pleural calcification  -     Ambulatory referral/consult to Pulmonology; Future; Expected date: 12/22/2023  Seen on ct chest   F/u with pulm     6. Pleural plaque  -     Ambulatory referral/consult to  Pulmonology; Future; Expected date: 12/22/2023    7. History of pulmonary embolism  Overview:  Occurred in 11/2012.  Resulted in coma x 18d.  Pt was [facility] at [facility].  After PE and coma pt had some weakness requiring PT/OT, use of a walker.  He now uses a cane.    As above     40 min spent in care of patient including chart review, history, orders, physical, orders and coordination of care            Health Maintenance   Topic Date Due    Shingles Vaccine (2 of 2) 11/25/2019    Hemoglobin A1c  02/17/2024    Eye Exam  04/03/2024    Lipid Panel  05/08/2024    TETANUS VACCINE  12/05/2027    DEXA Scan  06/08/2028    Colonoscopy  06/02/2032    Hepatitis C Screening  Completed

## 2023-12-16 PROBLEM — G89.29 CHRONIC SHOULDER PAIN: Status: ACTIVE | Noted: 2023-12-16

## 2023-12-16 PROBLEM — J94.8 PLEURAL CALCIFICATION: Status: ACTIVE | Noted: 2023-12-16

## 2023-12-16 PROBLEM — J92.9 PLEURAL PLAQUE: Status: ACTIVE | Noted: 2023-12-16

## 2023-12-16 PROBLEM — M25.519 CHRONIC SHOULDER PAIN: Status: ACTIVE | Noted: 2023-12-16

## 2023-12-20 ENCOUNTER — TELEPHONE (OUTPATIENT)
Dept: ENDOSCOPY | Facility: HOSPITAL | Age: 78
End: 2023-12-20
Payer: MEDICARE

## 2023-12-20 ENCOUNTER — TELEPHONE (OUTPATIENT)
Dept: INTERNAL MEDICINE | Facility: CLINIC | Age: 78
End: 2023-12-20
Payer: MEDICARE

## 2023-12-20 NOTE — TELEPHONE ENCOUNTER
----- Message from Magy Herzog sent at 12/20/2023  2:13 PM CST -----  Contact: MUSA SEGURA JR. [[id number]]  Type: Call Back      Who called: Miss Barba       What is the request in detail: Patient is requesting a call back. She would like to know if Tuesday 12/26 @8am is still available.   Please advise.     [facility] No      Would the patient rather a call back or a response via [facility]? Call back       Best call back number: [phone]       Additional Information:

## 2023-12-20 NOTE — TELEPHONE ENCOUNTER
Returned call to Sister-in-law of Mr. Eldridge. States he stopped all of his medications as they make him pee too much. States BP elevated at 180/100 and she needs to know which medications he need to take. Told to bring him to Urgent Care with all of bottles. Offered next Tuesday with Mr. Wu and states she cannot come into clinic with him than Given 12/27/23 0800. [facility]

## 2023-12-20 NOTE — TELEPHONE ENCOUNTER
----- Message from Catia Taveras MA sent at 12/20/2023 12:45 PM CST -----  Name of Who is Calling: Courtnay sister in law calling on behalf of MUSA SEGURA JR. [[id number]]        What is the request in detail: Pt needs to be seen sooner than next available to discuss medications/ frequent urination. Pt stopped all his medication. Please assist.                [facility] the [facility] reply by [facility]: No                What Number to Call Back if not [facility]: [phone]

## 2023-12-21 ENCOUNTER — OFFICE VISIT (OUTPATIENT)
Dept: INTERNAL MEDICINE | Facility: CLINIC | Age: 78
End: 2023-12-21
Payer: MEDICARE

## 2023-12-21 VITALS
HEIGHT: 73 IN | SYSTOLIC BLOOD PRESSURE: 126 MMHG | WEIGHT: 203.06 LBS | BODY MASS INDEX: 26.91 KG/M2 | HEART RATE: 71 BPM | DIASTOLIC BLOOD PRESSURE: 82 MMHG | OXYGEN SATURATION: 98 %

## 2023-12-21 DIAGNOSIS — E11.40 CONTROLLED TYPE 2 DIABETES MELLITUS WITH DIABETIC NEUROPATHY, WITHOUT LONG-TERM CURRENT USE OF INSULIN: ICD-10-CM

## 2023-12-21 DIAGNOSIS — I10 HYPERTENSION, UNSPECIFIED TYPE: Primary | ICD-10-CM

## 2023-12-21 PROCEDURE — 3074F PR MOST RECENT SYSTOLIC BLOOD PRESSURE < 130 MM HG: ICD-10-PCS | Mod: HCNC,CPTII,S$GLB,

## 2023-12-21 PROCEDURE — 99999 PR PBB SHADOW E&M-EST. PATIENT-LVL V: CPT | Mod: PBBFAC,HCNC,,

## 2023-12-21 PROCEDURE — 1101F PT FALLS ASSESS-DOCD LE1/YR: CPT | Mod: HCNC,CPTII,S$GLB,

## 2023-12-21 PROCEDURE — 3288F PR FALLS RISK ASSESSMENT DOCUMENTED: ICD-10-PCS | Mod: HCNC,CPTII,S$GLB,

## 2023-12-21 PROCEDURE — 99214 OFFICE O/P EST MOD 30 MIN: CPT | Mod: HCNC,S$GLB,,

## 2023-12-21 PROCEDURE — 99999 PR PBB SHADOW E&M-EST. PATIENT-LVL V: ICD-10-PCS | Mod: PBBFAC,HCNC,,

## 2023-12-21 PROCEDURE — 3074F SYST BP LT 130 MM HG: CPT | Mod: HCNC,CPTII,S$GLB,

## 2023-12-21 PROCEDURE — 3079F PR MOST RECENT DIASTOLIC BLOOD PRESSURE 80-89 MM HG: ICD-10-PCS | Mod: HCNC,CPTII,S$GLB,

## 2023-12-21 PROCEDURE — 1101F PR PT FALLS ASSESS DOC 0-1 FALLS W/OUT INJ PAST YR: ICD-10-PCS | Mod: HCNC,CPTII,S$GLB,

## 2023-12-21 PROCEDURE — 99214 PR OFFICE/OUTPT VISIT, EST, LEVL IV, 30-39 MIN: ICD-10-PCS | Mod: HCNC,S$GLB,,

## 2023-12-21 PROCEDURE — 1159F MED LIST DOCD IN RCRD: CPT | Mod: HCNC,CPTII,S$GLB,

## 2023-12-21 PROCEDURE — 3079F DIAST BP 80-89 MM HG: CPT | Mod: HCNC,CPTII,S$GLB,

## 2023-12-21 PROCEDURE — 1159F PR MEDICATION LIST DOCUMENTED IN MEDICAL RECORD: ICD-10-PCS | Mod: HCNC,CPTII,S$GLB,

## 2023-12-21 PROCEDURE — 3288F FALL RISK ASSESSMENT DOCD: CPT | Mod: HCNC,CPTII,S$GLB,

## 2023-12-21 RX ORDER — FUROSEMIDE 20 MG/1
20 TABLET ORAL DAILY
Qty: 3 TABLET | Refills: 0 | Status: SHIPPED | OUTPATIENT
Start: 2023-12-21 | End: 2023-12-29

## 2023-12-21 RX ORDER — INSULIN GLARGINE 100 [IU]/ML
INJECTION, SOLUTION SUBCUTANEOUS
COMMUNITY
Start: 2023-11-20

## 2023-12-21 NOTE — PROGRESS NOTES
Subjective     Patient ID: Misha Eldridge Jr. is a 78 y.o. male.    Chief Complaint: Health Maintenance (Discuss dx, rx. Pt dc'd all rx 2w ago stating they were making him leak urine.  ) and Hypertension (Yesterday was 180/100, this morning 160/79.  States he's taken rx yesterday and this morning.  )    Pt seen in clinic today for medication review. No acute complaints. Pt states that since he stopped taking his medication for a ct or MRI he recently had his urinary symptoms resolved. He is no longer leaking urine.     Hypertension  Pertinent negatives include no chest pain, palpitations or shortness of breath.     Review of Systems   Constitutional:  Negative for activity change, fatigue and fever.   HENT: Negative.     Eyes: Negative.    Respiratory:  Negative for chest tightness, shortness of breath and wheezing.    Cardiovascular:  Positive for leg swelling. Negative for chest pain and palpitations.   Gastrointestinal:  Negative for change in bowel habit.   Endocrine: Negative for polydipsia and polyuria.   Genitourinary: Negative.    Musculoskeletal: Negative.    Integumentary:  Negative.   Neurological:  Negative for facial asymmetry and speech difficulty.   Psychiatric/Behavioral: Negative.            Objective     Physical Exam  Vitals reviewed.   Constitutional:       Appearance: He is well-developed.   HENT:      Head: Normocephalic and atraumatic.   Eyes:      Conjunctiva/sclera: Conjunctivae normal.   Cardiovascular:      Rate and Rhythm: Normal rate.   Pulmonary:      Effort: Pulmonary effort is normal. No respiratory distress.   Skin:     General: Skin is warm and dry.      Findings: No rash.   Neurological:      Mental Status: He is alert and oriented to person, place, and time.      Coordination: Coordination normal.   Psychiatric:         Behavior: Behavior normal.            Assessment and Plan     1. Hypertension, unspecified type  -     furosemide (LASIX) 20 MG tablet; Take 1 tablet (20 mg total)  by mouth once daily. for 3 days  Dispense: 3 tablet; Refill: 0    2. Controlled type 2 diabetes mellitus with diabetic neuropathy, without long-term current use of insulin    Discussed medication and the reasons he takes them. Pt CG present for visit. AVS printed with med list and dx listed next to each med. Provider to check with urology and hem/onc regarding continued need for zytega, prednisone, and doxazosin. Lasix reordered. Was ordered by [facility] in October but pt did not start taking it.    I spent a total of 30 minutes on the day of the visit.  This includes face to face time and non-face to face time preparing to see the patient (eg, review of tests), obtaining and/or reviewing separately obtained history, documenting clinical information in the electronic or other health record, independently interpreting results and communicating results to the patient/family/caregiver, or care coordinator.           No follow-ups on file.

## 2023-12-26 ENCOUNTER — HOSPITAL ENCOUNTER (OUTPATIENT)
Dept: RADIOLOGY | Facility: OTHER | Age: 78
Discharge: HOME | End: 2023-12-26
Attending: INTERNAL MEDICINE
Payer: MEDICARE

## 2023-12-26 DIAGNOSIS — L98.9 SKIN LESION: ICD-10-CM

## 2023-12-26 PROCEDURE — 93925 LOWER EXTREMITY STUDY: CPT | Mod: 26,HCNC,, | Performed by: RADIOLOGY

## 2023-12-26 PROCEDURE — 93925 LOWER EXTREMITY STUDY: CPT | Mod: TC,HCNC

## 2023-12-26 PROCEDURE — 93925 US LOWER EXTREMITY ARTERIES BILATERAL: ICD-10-PCS | Mod: 26,HCNC,, | Performed by: RADIOLOGY
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
## 2023-12-27 ENCOUNTER — OFFICE VISIT (OUTPATIENT)
Dept: HEMATOLOGY/ONCOLOGY | Facility: CLINIC | Age: 78
End: 2023-12-27
Payer: MEDICARE

## 2023-12-27 ENCOUNTER — TELEPHONE (OUTPATIENT)
Dept: INTERNAL MEDICINE | Facility: CLINIC | Age: 78
End: 2023-12-27
Payer: MEDICARE

## 2023-12-27 ENCOUNTER — TELEPHONE (OUTPATIENT)
Dept: HEMATOLOGY/ONCOLOGY | Facility: CLINIC | Age: 78
End: 2023-12-27
Payer: MEDICARE

## 2023-12-27 VITALS
OXYGEN SATURATION: 97 % | HEART RATE: 65 BPM | HEIGHT: 73 IN | WEIGHT: 201.81 LBS | DIASTOLIC BLOOD PRESSURE: 84 MMHG | SYSTOLIC BLOOD PRESSURE: 162 MMHG | TEMPERATURE: 98 F | BODY MASS INDEX: 26.75 KG/M2

## 2023-12-27 DIAGNOSIS — C61 PROSTATE CANCER: Primary | ICD-10-CM

## 2023-12-27 DIAGNOSIS — E11.40 CONTROLLED TYPE 2 DIABETES MELLITUS WITH DIABETIC NEUROPATHY, WITHOUT LONG-TERM CURRENT USE OF INSULIN: ICD-10-CM

## 2023-12-27 DIAGNOSIS — Z85.038 HISTORY OF COLON CANCER: ICD-10-CM

## 2023-12-27 DIAGNOSIS — Z86.711 HISTORY OF PULMONARY EMBOLISM: ICD-10-CM

## 2023-12-27 DIAGNOSIS — I10 PRIMARY HYPERTENSION: ICD-10-CM

## 2023-12-27 DIAGNOSIS — E11.22 CKD STAGE 3 SECONDARY TO DIABETES: ICD-10-CM

## 2023-12-27 DIAGNOSIS — D69.6 THROMBOCYTOPENIA, UNSPECIFIED: ICD-10-CM

## 2023-12-27 DIAGNOSIS — N18.30 CKD STAGE 3 SECONDARY TO DIABETES: ICD-10-CM

## 2023-12-27 PROCEDURE — 99214 OFFICE O/P EST MOD 30 MIN: CPT | Mod: HCNC,S$GLB,, | Performed by: REGISTERED NURSE

## 2023-12-27 PROCEDURE — 3079F DIAST BP 80-89 MM HG: CPT | Mod: HCNC,CPTII,S$GLB, | Performed by: REGISTERED NURSE

## 2023-12-27 PROCEDURE — 99999 PR PBB SHADOW E&M-EST. PATIENT-LVL V: CPT | Mod: PBBFAC,HCNC,, | Performed by: REGISTERED NURSE

## 2023-12-27 PROCEDURE — 3072F LOW RISK FOR RETINOPATHY: CPT | Mod: HCNC,CPTII,S$GLB, | Performed by: REGISTERED NURSE

## 2023-12-27 PROCEDURE — 1125F AMNT PAIN NOTED PAIN PRSNT: CPT | Mod: HCNC,CPTII,S$GLB, | Performed by: REGISTERED NURSE

## 2023-12-27 PROCEDURE — 3077F SYST BP >= 140 MM HG: CPT | Mod: HCNC,CPTII,S$GLB, | Performed by: REGISTERED NURSE

## 2023-12-27 NOTE — TELEPHONE ENCOUNTER
----- Message from Mary Pruett MD sent at 12/26/2023  6:16 PM CST -----  Please let pt know that his test to check circulation was unremarkable - good news as no significant narrowing was seen

## 2023-12-27 NOTE — TELEPHONE ENCOUNTER
Returned pt.'s phone call in regards to today's appt. with Irene Thompson CNS. Pt. stated that he is here and coming up the elevator. MA acknowledged.     BLD, MA Ext. [phone]

## 2023-12-27 NOTE — TELEPHONE ENCOUNTER
Spoke with pt. in regards to today's appt. with ABBEY Aiken at 8:30 AM. Pt. was unaware of the appt. and asked MA if he could possibly come for 9:00 AM instead. MA placed pt. on a brief hold to get approval of ABBEY Thompson. ABBEY Thompson approved. MA informed pt. that he was cleared to come for 9:00 AM. Pt. acknowledged.     BLD, MA Ext. [phone]

## 2023-12-28 ENCOUNTER — ANESTHESIA EVENT (OUTPATIENT)
Dept: ENDOSCOPY | Facility: HOSPITAL | Age: 78
End: 2023-12-28
Payer: MEDICARE

## 2023-12-28 NOTE — ANESTHESIA PREPROCEDURE EVALUATION
12/28/2023  Misha Eldridge Jr. is a 78 y.o., male.  Past Medical History:   Diagnosis Date    Cataract     Colostomy in place     Dermatochalasis     Diabetes mellitus type II     Dry eye syndrome     Hemorrhoid     History of colon cancer     History of prostate cancer     History of pulmonary embolism     Hypertension     Macular degeneration        Past Surgical History:   Procedure Laterality Date    CATARACT EXTRACTION Bilateral     COLONOSCOPY N/A 06/02/2022    Procedure: COLONOSCOPY;  Surgeon: Jose Dangelo MD;  Location: [facility] (4TH FLR);  Service: Endoscopy;  Laterality: N/A;  fully vaccinated    ESOPHAGOGASTRODUODENOSCOPY N/A 6/30/2023    Procedure: EGD (ESOPHAGOGASTRODUODENOSCOPY);  Surgeon: Terrence Beatty MD;  Location: [facility] (2ND FLR);  Service: Endoscopy;  Laterality: N/A;    EYE SURGERY Bilateral     cataract extraction    HERNIA REPAIR      PROSTATECTOMY      SUBTOTAL COLECTOMY         Family History   Problem Relation Age of Onset    Arthritis Mother     Hypertension Mother     Cancer Mother     Alcohol abuse Father     No Known Problems Daughter        Social History     Socioeconomic History    Marital status: Single   Occupational History     Comment: mother lives w/pt   Tobacco Use    Smoking status: Never    Smokeless tobacco: Never   Substance and Sexual Activity    Alcohol use: No    Drug use: No    Sexual activity: Not Currently     Partners: Female     Social Determinants of Health     Financial Resource Strain: Low Risk  (7/24/2023)    Overall Financial Resource Strain (CARDIA)     Difficulty of Paying Living Expenses: Not very hard   Food Insecurity: No Food Insecurity (7/24/2023)    Hunger Vital Sign     Worried About Running Out of Food in the Last Year: Never true     Ran Out of Food in the Last Year: Never true   Transportation  Needs: No Transportation Needs (7/24/2023)    PRAPARE - Transportation     Lack of Transportation (Medical): No     Lack of Transportation (Non-Medical): No   Physical Activity: Insufficiently Active (7/24/2023)    Exercise Vital Sign     Days of Exercise per Week: 3 days     Minutes of Exercise per Session: 20 min   Stress: No Stress Concern Present (7/24/2023)    [place] of [employer]ational [employer] - Occupational Stress Questionnaire     Feeling of Stress : Not at all   Social Connections: Socially Isolated (7/24/2023)    Social Connection and Isolation Panel [NHANES]     Frequency of Communication with Friends and Family: Three times a week     Frequency of Social Gatherings with Friends and Family: Once a week     Attends [employer] Services: Never     Active Member of Clubs or Organizations: No     Attends Club or Organization Meetings: Never     Marital Status:    Housing Stability: Low Risk  (7/24/2023)    Housing Stability Vital Sign     Unable to Pay for Housing in the Last Year: No     Number of Places Lived in the Last Year: 1     Unstable Housing in the Last Year: No       No current facility-administered medications for this encounter.     Current Outpatient Medications   Medication Sig Dispense Refill    abiraterone (ZYTIGA) 250 mg Tab Take 4 tablets (1,000 mg total) by mouth once daily. 120 tablet 1    ACCU-CHEK SOFTCLIX LANCETS Misc TEST BLOOD SUGAR ONCE A  each 0    amLODIPine (NORVASC) 10 MG tablet Take 1 tablet (10 mg total) by mouth once daily. 34 tablet 0    atorvastatin (LIPITOR) 20 MG tablet Take 1 tablet (20 mg total) by mouth once daily. 90 tablet 1    blood sugar diagnostic Strp 1 each by Misc.(Non-Drug; Combo Route) route once daily. 100 each 3    blood sugar diagnostic Strp Pt to check up to 6 times daily 200 strip 11    blood sugar diagnostic Strp Use to check blood glucose 1-2 times daily as directed. 200 each 11    blood-glucose meter kit Use as  "instructed 1 each 0    carvediloL (COREG) 3.125 MG tablet Take 1 tablet (3.125 mg total) by mouth 2 (two) times daily. 180 tablet 3    diclofenac sodium (VOLTAREN) 1 % Gel Apply 2 g topically 2 (two) times daily. for 7 days 150 g 0    docusate sodium (COLACE) 100 MG capsule Take 1 capsule (100 mg total) by mouth 2 (two) times daily as needed for Constipation. 20 capsule 0    doxazosin (CARDURA) 4 MG tablet Take 1 tablet (4 mg total) by mouth every evening. 90 tablet 1    furosemide (LASIX) 20 MG tablet Take 1 tablet (20 mg total) by mouth once daily. for 3 days 3 tablet 0    gabapentin (NEURONTIN) 300 MG capsule TAKE 1 CAPSULE(300 MG) BY MOUTH THREE TIMES DAILY 270 capsule 1    glipiZIDE (GLUCOTROL) 10 MG tablet TAKE 1 TABLET(10 MG) BY MOUTH TWICE DAILY BEFORE MEALS 180 tablet 1    hydroCHLOROthiazide (HYDRODIURIL) 25 MG tablet TAKE ONE-HALF TABLET BY MOUTH ONCE DAILY 45 tablet 3    insulin aspart U-100 (NOVOLOG FLEXPEN U-100 INSULIN) 100 unit/mL (3 mL) InPn pen Inject 8 Units into the skin 3 (three) times daily with meals. 15 mL 0    irbesartan (AVAPRO) 300 MG tablet Take 1 tablet (300 mg total) by mouth every evening. 90 tablet 3    lancing device with lancets Kit 1 each by Misc.(Non-Drug; Combo Route) route 2 (two) times daily. 200 each 11    LANTUS SOLOSTAR U-100 INSULIN glargine 100 units/mL SubQ pen SMARTSI Unit(s) SUB-Q Every Evening      metFORMIN (GLUCOPHAGE) 1000 MG tablet Take 1 tablet (1,000 mg total) by mouth 2 (two) times daily with meals. 180 tablet 1    pen needle, diabetic (BD ULTRA-FINE KELECHI PEN NEEDLE) 32 gauge x 5/32" Ndle To use with insulin pens at meals and evening shot. 200 each 11    polyethylene glycol (GLYCOLAX) 17 gram PwPk Take 17 g by mouth daily as needed (Constipation). 14 packet 0    predniSONE (DELTASONE) 5 MG tablet Take 1 tablet (5 mg total) by mouth 2 (two) times daily. 60 tablet 1    semaglutide (RYBELSUS) 14 mg tablet TAKE 1 TABLET(14 MG) BY MOUTH EVERY DAY " 30 tablet 0    triamcinolone acetonide 0.1% (KENALOG) 0.1 % cream Apply topically 2 (two) times daily. 45 g 0       Review of patient's allergies indicates:   Allergen Reactions    Hay fever and allergy relief         Pre-op Assessment    I have reviewed the Patient Summary Reports.     I have reviewed the Nursing Notes. I have reviewed the NPO Status.   I have reviewed the Medications.     Review of Systems  Anesthesia Hx:             Denies Family Hx of Anesthesia complications.    Denies Personal Hx of Anesthesia complications.                    Hematology/Oncology:  Hematology Normal   Oncology Normal                                   EENT/Dental:  EENT/Dental Normal           Cardiovascular:     Hypertension                                        Pulmonary:  Pulmonary Normal                       Renal/:  Renal/ Normal                 Hepatic/GI:  Hepatic/GI Normal                 Musculoskeletal:  Musculoskeletal Normal                Neurological:    Neuromuscular Disease,                                   Endocrine:  Diabetes           Dermatological:  Skin Normal    Psych:  Psychiatric Normal                  Physical Exam  General: Well nourished, Cooperative, Alert and Oriented    Airway:  Mallampati: II   Mouth Opening: Normal  TM Distance: Normal  Tongue: Normal  Neck ROM: Normal ROM    Dental:  Intact    Chest/Lungs:  Clear to auscultation, Normal Respiratory Rate    Heart:  Rate: Normal  Rhythm: Regular Rhythm  Sounds: Normal    Abdomen:  Normal    Anesthesia Plan  Type of Anesthesia, risks & benefits discussed:    Anesthesia Type: Gen Natural Airway  Intra-op Monitoring Plan: Standard ASA Monitors  Induction:  IV  Informed Consent: Informed consent signed with the Patient and all parties understand the risks and agree with anesthesia plan.  All questions answered. Patient consented to blood products? No  ASA Score: 2  Day of Surgery Review of History & Physical: H&P Update referred to the  surgeon/provider.    Ready For Surgery From Anesthesia Perspective.     .

## 2023-12-29 ENCOUNTER — ANESTHESIA (OUTPATIENT)
Dept: ENDOSCOPY | Facility: HOSPITAL | Age: 78
End: 2023-12-29
Payer: MEDICARE

## 2023-12-29 ENCOUNTER — TELEPHONE (OUTPATIENT)
Dept: ENDOSCOPY | Facility: HOSPITAL | Age: 78
End: 2023-12-29
Payer: MEDICARE

## 2023-12-29 ENCOUNTER — HOSPITAL ENCOUNTER (OUTPATIENT)
Facility: HOSPITAL | Age: 78
Discharge: HOME | End: 2023-12-29
Attending: COLON & RECTAL SURGERY | Admitting: COLON & RECTAL SURGERY
Payer: MEDICARE

## 2023-12-29 VITALS
HEIGHT: 73 IN | SYSTOLIC BLOOD PRESSURE: 162 MMHG | BODY MASS INDEX: 26.77 KG/M2 | DIASTOLIC BLOOD PRESSURE: 77 MMHG | TEMPERATURE: 98 F | WEIGHT: 202 LBS | HEART RATE: 72 BPM | OXYGEN SATURATION: 100 % | RESPIRATION RATE: 19 BRPM

## 2023-12-29 DIAGNOSIS — Z85.038 HISTORY OF COLON CANCER: Primary | ICD-10-CM

## 2023-12-29 DIAGNOSIS — Z85.038 PERSONAL HISTORY OF COLON CANCER: ICD-10-CM

## 2023-12-29 LAB — POCT GLUCOSE: 184 MG/DL (ref 70–110)

## 2023-12-29 PROCEDURE — E9220 PRA ENDO ANESTHESIA: HCPCS | Mod: PT,HCNC,, | Performed by: NURSE ANESTHETIST, CERTIFIED REGISTERED

## 2023-12-29 PROCEDURE — 45385 COLONOSCOPY W/LESION REMOVAL: CPT | Mod: PT,HCNC | Performed by: COLON & RECTAL SURGERY

## 2023-12-29 PROCEDURE — 82962 GLUCOSE BLOOD TEST: CPT | Mod: HCNC | Performed by: COLON & RECTAL SURGERY

## 2023-12-29 PROCEDURE — 27201089 HC SNARE, DISP (ANY): Mod: HCNC | Performed by: COLON & RECTAL SURGERY

## 2023-12-29 PROCEDURE — 88305 TISSUE EXAM BY PATHOLOGIST: CPT | Mod: HCNC | Performed by: PATHOLOGY

## 2023-12-29 PROCEDURE — 25000003 PHARM REV CODE 250: Mod: HCNC | Performed by: COLON & RECTAL SURGERY

## 2023-12-29 PROCEDURE — 25000003 PHARM REV CODE 250: Mod: HCNC | Performed by: NURSE ANESTHETIST, CERTIFIED REGISTERED

## 2023-12-29 PROCEDURE — 37000008 HC ANESTHESIA 1ST 15 MINUTES: Mod: HCNC | Performed by: COLON & RECTAL SURGERY

## 2023-12-29 PROCEDURE — 88305 TISSUE EXAM BY PATHOLOGIST: CPT | Mod: 26,HCNC,, | Performed by: PATHOLOGY

## 2023-12-29 PROCEDURE — 45385 COLONOSCOPY W/LESION REMOVAL: CPT | Mod: PT,HCNC,, | Performed by: COLON & RECTAL SURGERY

## 2023-12-29 PROCEDURE — 37000009 HC ANESTHESIA EA ADD 15 MINS: Mod: HCNC | Performed by: COLON & RECTAL SURGERY

## 2023-12-29 PROCEDURE — 63600175 PHARM REV CODE 636 W HCPCS: Mod: HCNC | Performed by: NURSE ANESTHETIST, CERTIFIED REGISTERED

## 2023-12-29 PROCEDURE — E9220 PRA ENDO ANESTHESIA: ICD-10-PCS | Mod: PT,HCNC,, | Performed by: NURSE ANESTHETIST, CERTIFIED REGISTERED

## 2023-12-29 RX ORDER — SODIUM CHLORIDE 9 MG/ML
INJECTION, SOLUTION INTRAVENOUS CONTINUOUS
Status: DISCONTINUED | OUTPATIENT
Start: 2023-12-29 | End: 2023-12-29 | Stop reason: HOSPADM

## 2023-12-29 RX ORDER — PROPOFOL 10 MG/ML
VIAL (ML) INTRAVENOUS
Status: DISCONTINUED | OUTPATIENT
Start: 2023-12-29 | End: 2023-12-29

## 2023-12-29 RX ORDER — LIDOCAINE HYDROCHLORIDE 20 MG/ML
INJECTION INTRAVENOUS
Status: DISCONTINUED | OUTPATIENT
Start: 2023-12-29 | End: 2023-12-29

## 2023-12-29 RX ADMIN — SODIUM CHLORIDE: 9 INJECTION, SOLUTION INTRAVENOUS at 07:12

## 2023-12-29 RX ADMIN — GLYCOPYRROLATE 0.1 MG: 0.2 INJECTION, SOLUTION INTRAMUSCULAR; INTRAVENOUS at 07:12

## 2023-12-29 RX ADMIN — PROPOFOL 70 MG: 10 INJECTION, EMULSION INTRAVENOUS at 07:12

## 2023-12-29 RX ADMIN — PROPOFOL 150 MCG/KG/MIN: 10 INJECTION, EMULSION INTRAVENOUS at 07:12

## 2023-12-29 RX ADMIN — SODIUM CHLORIDE: 0.9 INJECTION, SOLUTION INTRAVENOUS at 07:12

## 2023-12-29 RX ADMIN — LIDOCAINE HYDROCHLORIDE 25 MG: 20 INJECTION INTRAVENOUS at 07:12

## 2023-12-29 RX ADMIN — GLYCOPYRROLATE 0.2 MG: 0.2 INJECTION, SOLUTION INTRAMUSCULAR; INTRAVENOUS at 07:12

## 2023-12-29 NOTE — TRANSFER OF CARE
"Anesthesia Transfer of Care Note    Patient: Misha Eldridge Jr.    Procedure(s) Performed: Procedure(s) (LRB):  COLONOSCOPY (N/A)    Patient location: PACU    Anesthesia Type: general    Transport from OR: Transported from OR on 6-10 L/min O2 by face mask with adequate spontaneous ventilation    Post pain: adequate analgesia    Post assessment: no apparent anesthetic complications    Post vital signs: stable    Level of consciousness: sedated    Nausea/Vomiting: no nausea/vomiting    Complications: none    Transfer of care protocol was followed    Last vitals: Visit Vitals  BP (!) 181/90   Pulse 75   Temp 36.9 °C (98.4 °F) (Temporal)   Resp 18   Ht 6' 1" (1.854 m)   Wt 91.6 kg (202 lb)   SpO2 97%   BMI 26.65 kg/m²     "

## 2023-12-29 NOTE — ANESTHESIA POSTPROCEDURE EVALUATION
Anesthesia Post Evaluation    Patient: Misha Eldridge Jr.    Procedure(s) Performed: Procedure(s) (LRB):  COLONOSCOPY (N/A)    Final Anesthesia Type: general      Patient location during evaluation: PACU  Patient participation: Yes- Able to Participate  Level of consciousness: awake and alert  Post-procedure vital signs: reviewed and stable  Pain management: adequate  Airway patency: patent    PONV status at discharge: No PONV  Anesthetic complications: no      Cardiovascular status: blood pressure returned to baseline  Respiratory status: unassisted  Hydration status: euvolemic  Follow-up not needed.              Vitals Value Taken Time   /77 12/29/23 0805   Temp 36.6 °C (97.9 °F) 12/29/23 0735   Pulse 72 12/29/23 0805   Resp 19 12/29/23 0805   SpO2 100 % 12/29/23 0805         Event Time   Out of Recovery 08:25:35         Pain/Marlon Score: Marlon Score: 10 (12/29/2023  8:05 AM)

## 2023-12-29 NOTE — TELEPHONE ENCOUNTER
Medical assistant returned patient call that was left on voicemail no answer left voicemail asking patient to call us back @ [phone]

## 2023-12-29 NOTE — PROVATION PATIENT INSTRUCTIONS
Discharge Summary/Instructions after an Endoscopic Procedure  Patient Name: Misha Eldridge  Patient MRN: [id number]  Patient YOB: 1945  Friday, December 29, 2023  Jose Mckinney MD  Dear patient,  As a result of recent federal legislation (The Federal Cures Act), you may   receive lab or pathology results from your procedure in your [facility]   account before your physician is able to contact you. Your physician or   their representative will relay the results to you with their   recommendations at their soonest availability.  Thank you,  RESTRICTIONS:  During your procedure today, you received medications for sedation.  These   medications may affect your judgment, balance and coordination.  Therefore,   for 24 hours, you have the following restrictions:   - DO NOT drive a car, operate machinery, make legal/financial decisions,   sign important papers or drink alcohol.    ACTIVITY:  Today: no heavy lifting, straining or running due to procedural   sedation/anesthesia.  The following day: return to full activity including work.  DIET:  Eat and drink normally unless instructed otherwise.     TREATMENT FOR COMMON SIDE EFFECTS:  - Mild abdominal pain, nausea, belching, bloating or excessive gas:  rest,   eat lightly and use a heating pad.  - Sore Throat: treat with throat lozenges and/or gargle with warm salt   water.  - Because air was used during the procedure, expelling large amounts of air   from your rectum or belching is normal.  - If a bowel prep was taken, you may not have a bowel movement for 1-3 days.    This is normal.  SYMPTOMS TO WATCH FOR AND REPORT TO YOUR PHYSICIAN:  1. Abdominal pain or bloating, other than gas cramps.  2. Chest pain.  3. Back pain.  4. Signs of infection such as: chills or fever occurring within 24 hours   after the procedure.  5. Rectal bleeding, which would show as bright red, maroon, or black stools.   (A tablespoon of blood from the rectum is not serious, especially  if   hemorrhoids are present.)  6. Vomiting.  7. Weakness or dizziness.  GO DIRECTLY TO THE NEAREST EMERGENCY ROOM IF YOU HAVE ANY OF THE FOLLOWING:      Difficulty breathing              Chills and/or fever over 101 F   Persistent vomiting and/or vomiting blood   Severe abdominal pain   Severe chest pain   Black, tarry stools   Bleeding- more than one tablespoon   Any other symptom or condition that you feel may need urgent attention  Your doctor recommends these additional instructions:  If any biopsies were taken, your doctors clinic will contact you in 1 to 2   weeks with any results.  - Discharge patient to home (ambulatory).   - Resume previous diet.   - Continue present medications.   - Await pathology results.   - Repeat colonoscopy in 3 years for surveillance of multiple adenomas.  For questions, problems or results please call your physician - Jose Mckinney MD at Work:  [phone].  [facility] EMERGENCY ROOM PHONE NUMBER: [phone]  IF A COMPLICATION OR EMERGENCY SITUATION ARISES AND YOU ARE UNABLE TO REACH   YOUR PHYSICIAN - GO DIRECTLY TO THE EMERGENCY ROOM.  Jose Mckinney MD  12/29/2023 7:36:04 AM  This report has been verified and signed electronically.  Dear patient,  As a result of recent federal legislation (The Federal Cures Act), you may   receive lab or pathology results from your procedure in your [facility]   account before your physician is able to contact you. Your physician or   their representative will relay the results to you with their   recommendations at their soonest availability.  Thank you,  PROVATION

## 2023-12-29 NOTE — PLAN OF CARE
Discharge instructions reviewed with the patient. Patient verbalizes understanding. Patient instructed to dress carefully and ask for assistance as needed. Ready for discharge.  Brother called and is on the way to pick the patient up.

## 2023-12-29 NOTE — H&P
COLONOSCOPY HISTORY AND PHYSICAL EXAM    Procedure : Colonoscopy      INDICATIONS: personal history of colon cancer    Colon cancer:  Stage IIIC ([id number])   - 5/2/2012 s/p LAR/small bowel resection/colostomy  - 7/11/2012- 11/14/2012 adjuvant [id number] x 10 cycles at [facility]    Last Colonoscopy:  6/2022  Findings:   - Preparation of the colon was fair.   - Patent end-to-side ileo-colonic anastomosis, characterized by healthy appearing mucosa.   - Diverticulosis in the descending colon.   - Patent end-to-end colo-colonic anastomosis, characterized by healthy appearing mucosa.   - The examination was otherwise normal on direct and retroflexion views.   - No specimens collected.       Past Medical History:   Diagnosis Date    Cataract     Colostomy in place     Dermatochalasis     Diabetes mellitus type II     Dry eye syndrome     Hemorrhoid     History of colon cancer     History of prostate cancer     History of pulmonary embolism     Hypertension     Macular degeneration      Sedation Problems: NO  Family History   Problem Relation Age of Onset    Arthritis Mother     Hypertension Mother     Cancer Mother     Alcohol abuse Father     No Known Problems Daughter      Fam Hx of Sedation Problems: NO  Social History     Socioeconomic History    Marital status: Single   Occupational History     Comment: mother lives w/pt   Tobacco Use    Smoking status: Never    Smokeless tobacco: Never   Substance and Sexual Activity    Alcohol use: No    Drug use: No    Sexual activity: Not Currently     Partners: Female     Social Determinants of Health     Financial Resource Strain: Low Risk  (7/24/2023)    Overall Financial Resource Strain (CARDIA)     Difficulty of Paying Living Expenses: Not very hard   Food Insecurity: No Food Insecurity (7/24/2023)    Hunger Vital Sign     Worried About Running Out of Food in the Last Year: Never true     Ran Out of Food in the Last Year: Never true   Transportation Needs: No Transportation Needs  (7/24/2023)    PRAPARE - Transportation     Lack of Transportation (Medical): No     Lack of Transportation (Non-Medical): No   Physical Activity: Insufficiently Active (7/24/2023)    Exercise Vital Sign     Days of Exercise per Week: 3 days     Minutes of Exercise per Session: 20 min   Stress: No Stress Concern Present (7/24/2023)    [place] of Occupational Health - Occupational Stress Questionnaire     Feeling of Stress : Not at all   Social Connections: Socially Isolated (7/24/2023)    Social Connection and Isolation Panel [NHANES]     Frequency of Communication with Friends and Family: Three times a week     Frequency of Social Gatherings with Friends and Family: Once a week     Attends [employer] Services: Never     Active Member of Clubs or Organizations: No     Attends Club or Organization Meetings: Never     Marital Status:    Housing Stability: Low Risk  (7/24/2023)    Housing Stability Vital Sign     Unable to Pay for Housing in the Last Year: No     Number of Places Lived in the Last Year: 1     Unstable Housing in the Last Year: No       Review of Systems - Negative except   Respiratory ROS: no dyspnea  Cardiovascular ROS: no exertional chest pain  Gastrointestinal ROS: occasinoal abdominal discomfort,  NO rectal bleeding  Musculoskeletal ROS: no muscular pain  Neurological ROS: no recent stroke    Physical Exam:  There were no vitals taken for this visit.  General: no distress  Head: normocephalic  Mallampati Score   Neck: supple, symmetrical, trachea midline  Lungs:  normal respiratory effort  Heart: regular rate and rhythm  Abdomen: soft, non-tender non-distented; bowel sounds normal; no masses,  no organomegaly  Extremities: no cyanosis or edema, or clubbing    ASA:  II    PLAN  COLONOSCOPY.    SedationPlan :MAC    The details of the procedure, the possible need for biopsy or polypectomy and the potential risks including bleeding, perforation, missed polyps were discussed in  detail.

## 2023-12-29 NOTE — H&P
COLONOSCOPY HISTORY AND PHYSICAL EXAM    Procedure : Colonoscopy      INDICATIONS: personal history of colon cancer s/p right colectomy    Last Colonoscopy:  6/2/2022  Findings: poor prep.  Repeat in 1 year       Past Medical History:   Diagnosis Date    Cataract     Colostomy in place     Dermatochalasis     Diabetes mellitus type II     Dry eye syndrome     Hemorrhoid     History of colon cancer     History of prostate cancer     History of pulmonary embolism     Hypertension     Macular degeneration      Sedation Problems: NO  Family History   Problem Relation Age of Onset    Arthritis Mother     Hypertension Mother     Cancer Mother     Alcohol abuse Father     No Known Problems Daughter      Fam Hx of Sedation Problems: NO  Social History     Socioeconomic History    Marital status: Single   Occupational History     Comment: mother lives w/pt   Tobacco Use    Smoking status: Never    Smokeless tobacco: Never   Substance and Sexual Activity    Alcohol use: No    Drug use: No    Sexual activity: Not Currently     Partners: Female     Social Determinants of Health     Financial Resource Strain: Low Risk  (7/24/2023)    Overall Financial Resource Strain (CARDIA)     Difficulty of Paying Living Expenses: Not very hard   Food Insecurity: No Food Insecurity (7/24/2023)    Hunger Vital Sign     Worried About Running Out of Food in the Last Year: Never true     Ran Out of Food in the Last Year: Never true   Transportation Needs: No Transportation Needs (7/24/2023)    PRAPARE - Transportation     Lack of Transportation (Medical): No     Lack of Transportation (Non-Medical): No   Physical Activity: Insufficiently Active (7/24/2023)    Exercise Vital Sign     Days of Exercise per Week: 3 days     Minutes of Exercise per Session: 20 min   Stress: No Stress Concern Present (7/24/2023)    [place] of Occupational Health - Occupational Stress Questionnaire     Feeling of Stress : Not at all   Social Connections:  Socially Isolated (7/24/2023)    Social Connection and Isolation Panel [NHANES]     Frequency of Communication with Friends and Family: Three times a week     Frequency of Social Gatherings with Friends and Family: Once a week     Attends Hinduism Services: Never     Active Member of Clubs or Organizations: No     Attends Club or Organization Meetings: Never     Marital Status:    Housing Stability: Low Risk  (7/24/2023)    Housing Stability Vital Sign     Unable to Pay for Housing in the Last Year: No     Number of Places Lived in the Last Year: 1     Unstable Housing in the Last Year: No       Review of Systems - Negative except   Respiratory ROS: no dyspnea  Cardiovascular ROS: no exertional chest pain  Gastrointestinal ROS: NO abdominal discomfort,  NO rectal bleeding  Musculoskeletal ROS: no muscular pain  Neurological ROS: no recent stroke    Physical Exam:  There were no vitals taken for this visit.  General: no distress  Head: normocephalic  Mallampati Score   Neck: supple, symmetrical, trachea midline  Lungs:  clear to auscultation bilaterally and normal respiratory effort  Heart: regular rate and rhythm and no murmur  Abdomen: soft, non-tender non-distented; bowel sounds normal; no masses,  no organomegaly  Extremities: no cyanosis or edema, or clubbing    ASA:  II    PLAN  COLONOSCOPY.    SedationPlan :MAC    The details of the procedure, the possible need for biopsy or polypectomy and the potential risks including bleeding, perforation, missed polyps were discussed in detail.

## 2024-01-02 ENCOUNTER — TELEPHONE (OUTPATIENT)
Dept: INTERNAL MEDICINE | Facility: CLINIC | Age: 79
End: 2024-01-02
Payer: MEDICARE

## 2024-01-03 LAB
FINAL PATHOLOGIC DIAGNOSIS: NORMAL
GROSS: NORMAL
Lab: NORMAL

## 2024-01-05 ENCOUNTER — TELEPHONE (OUTPATIENT)
Dept: HEMATOLOGY/ONCOLOGY | Facility: CLINIC | Age: 79
End: 2024-01-05
Payer: MEDICARE

## 2024-01-05 ENCOUNTER — TELEPHONE (OUTPATIENT)
Dept: INTERNAL MEDICINE | Facility: CLINIC | Age: 79
End: 2024-01-05

## 2024-01-05 ENCOUNTER — CLINICAL SUPPORT (OUTPATIENT)
Dept: REHABILITATION | Facility: OTHER | Age: 79
End: 2024-01-05
Attending: INTERNAL MEDICINE
Payer: MEDICARE

## 2024-01-05 DIAGNOSIS — G89.29 CHRONIC SHOULDER PAIN, UNSPECIFIED LATERALITY: ICD-10-CM

## 2024-01-05 DIAGNOSIS — M25.619 DECREASED RANGE OF MOTION OF SHOULDER, UNSPECIFIED LATERALITY: Primary | ICD-10-CM

## 2024-01-05 DIAGNOSIS — M25.519 CHRONIC SHOULDER PAIN, UNSPECIFIED LATERALITY: ICD-10-CM

## 2024-01-05 PROCEDURE — 97161 PT EVAL LOW COMPLEX 20 MIN: CPT | Mod: HCNC,PN

## 2024-01-05 PROCEDURE — 97112 NEUROMUSCULAR REEDUCATION: CPT | Mod: HCNC,PN

## 2024-01-05 NOTE — TELEPHONE ENCOUNTER
----- Message from Giovanyavi Gastonry sent at 1/3/2024  3:22 PM CST -----  Type:  Patient Returning Call    Who Called:pt  Who Left Message for Patient:  Does the patient know what this is regarding?:pt advice  Would the patient rather a call back or a response via [facility]? call  Best Call Back Number:[phone]  Additional Information: pt would like an call back in regards to the spots on his chest the pt would like to know the name. Please give the pt an call to advise

## 2024-01-05 NOTE — TELEPHONE ENCOUNTER
Spoke to patient, he is asking about what the spots are on his chest. Will speak to provider and be in touch.

## 2024-01-05 NOTE — TELEPHONE ENCOUNTER
Attempted to contact Tyrese Chente to inform him of Dr. Pruett CTA results response, but patient states that he was in therapy.

## 2024-01-05 NOTE — TELEPHONE ENCOUNTER
----- Message from Renata Plascencia RN sent at 1/4/2024  3:41 PM CST -----  Regarding: CT results  Patient asking for callback to discuss (again) the recent CTA results    - Renata  ----- Message -----  From: Alda Baum  Sent: 1/4/2024   2:06 PM CST  To: Jay Bonilla Staff    Type: General Call Back         Name of Caller:pt  Would the patient rather a call back or a response via [facility]? call  Best Call Back Number:[phone]   Additional Information: Pt states he was told he had spots on chest or neck and wanted to know what kind of cancer he has. Please call pt with further info and assistance. Thank You.

## 2024-01-08 PROBLEM — M25.619 DECREASED RANGE OF MOTION (ROM) OF SHOULDER: Status: ACTIVE | Noted: 2024-01-08

## 2024-01-09 NOTE — PLAN OF CARE
[facility]   Physical Therapy Initial Evaluation      Name: Misha Eldridge Jr.  Clinic Number: [id number]    Therapy Diagnosis:   Encounter Diagnoses   Name Primary?    Chronic shoulder pain, unspecified laterality     Decreased range of motion of shoulder, unspecified laterality Yes        Physician: Mary Pruett MD    Physician Orders: PT Eval and Treat   Medical Diagnosis from Referral: M25.519,G89.29 (ICD-10-CM) - Chronic shoulder pain, unspecified laterality   Evaluation Date: 1/5/2024  Authorization Period Expiration: 12/14/2024  Plan of Care Expiration: 3/15/2024  Progress Note Due: 2/5/2024  Visit # / Visits authorized: 1/ 1   FOTO: 0/ 3    Precautions: Diabetes and cancer     Time In: 11:05  Time Out: 12:00  Total Billable Time: 55 minutes    Subjective     Date of onset: Couple of Years    History of current condition - Misha reports:  He has been dealing with bilateral shoulder pain for years. He lives alone and his shoulder pain makes it difficult to perform certain activities such reaching behind his pain and overhead. He feels his shoulder range of motion and strength are both limited. He lives on the second floor of an apartment and has difficulty carrying his groceries upstairs. He feels before the new year due to carrying some raccoons up his stairs. He is able to get dressed on his own but cannot reach his upper back.       Falls: Yes, 12/31/2023    Imaging: none: for shoulders    Prior Therapy: Yes   Social History:  lives alone  Occupation: Retired   Prior Level of Function: Pt has no limitations due to his shoulders  Current Level of Function: Pt now has difficulty reaching overhead with his arms    Pain:  Current 0/10, worst 9/10, best 0/10   Location: bilateral shoulder   Description: Aching  Aggravating Factors: Lifting and reaching overhead  Easing Factors: rest    Patients goals: Pt would like to have more function of his shoulders      Medical History:    Past Medical History:   Diagnosis Date    Cataract     Colostomy in place     Dermatochalasis     Diabetes mellitus type II     Dry eye syndrome     Hemorrhoid     History of colon cancer     History of prostate cancer     History of pulmonary embolism     Hypertension     Macular degeneration        Surgical History:   Misha Eldridge Jr.  has a past surgical history that includes Subtotal colectomy; Prostatectomy; Hernia repair; Cataract extraction (Bilateral); Eye surgery (Bilateral); Colonoscopy (N/A, 06/02/2022); Esophagogastroduodenoscopy (N/A, 6/30/2023); and Colonoscopy (N/A, 12/29/2023).    Medications:   Misha has a current medication list which includes the following prescription(s): abiraterone, accu-chek softclix lancets, amlodipine, atorvastatin, blood sugar diagnostic, blood sugar diagnostic, blood sugar diagnostic, blood-glucose meter, carvedilol, diclofenac sodium, docusate sodium, doxazosin, furosemide, gabapentin, glipizide, hydrochlorothiazide, insulin aspart u-100, irbesartan, lancing device with lancets, lantus solostar u-100 insulin, metformin, pen needle, diabetic, polyethylene glycol, prednisone, rybelsus, and triamcinolone acetonide 0.1%.    Allergies:   Review of patient's allergies indicates:   Allergen Reactions    Hay fever and allergy relief         Objective      Passive Range of Motion:   Shoulder Right Left   Flexion 140 140   Abduction 100 100   ER at 0 40 40   ER at 90 60 65   IR 30 35      Active Range of Motion:   Shoulder Right Left   Flexion 115 120   Abduction 85 85   ER at 0 30 30   Reach behind head C7 C7   Reach behind back  Hip L4     Strength:  Shoulder Right Left   Flexion 4-/5 4-/5   Abduction 3/5 pain 3/5 pain   ER 3+/5 3+/5   IR 4/5 4/5   Serratus Anterior 3-/5 3-/5       Special Tests:  Impingement Cluster:   Right Left   Hawkin's [place]ndy + +   Painful Arc unable unable   Resisted ER + +     Rotator Cuff Tear   Right Left   Painful Arc unable unable   Drop Arm test +  +   Resisted ER + +     Joint Mobility: Decreased posterior glide of humerus    Intake Outcome Measure for FOTO Shoulder Survey    Therapist reviewed FOTO scores for Misha Eldridge Jr. on 1/5/2024.   FOTO report - see Media section or FOTO account episode details.    Intake Score: 47%         Treatment     Total Treatment time (time-based codes) separate from Evaluation: 16 minutes     Misha received the treatments listed below:      neuromuscular re-education activities to improve: Coordination, Kinesthetic, Sense, and Proprioception for 16 minutes. The following activities were included:  YTB ER 3x10 each  Supine dowel flexion 3x10  Land Mine press - cues for scapular upward rotation 2x12 each      Patient Education and Home Exercises     Education provided:   - HEP  - Role of PT    Written Home Exercises Provided: yes. Exercises were reviewed and Misha was able to demonstrate them prior to the end of the session.  Misha demonstrated fair  understanding of the education provided. See EMR under Patient Instructions for exercises provided during therapy sessions.    Assessment     Misha is a 78 y.o. male referred to outpatient Physical Therapy with a medical diagnosis of M25.519,G89.29 (ICD-10-CM) - Chronic shoulder pain, unspecified laterality . Patient presents with decreased passive and active shoulder range of motion, decreased upper extremity strength, decreased glenohumeral shoulder mobility and likely degenerative rotator cuff tear. He demonstrated HEP during the session and tolerated it well, but was educated on the difference between muscle fatigue and pain. He required verbal and tactile cues to perform exercises correctly without compensation. His impairments are decreasing his quality of life and limiting his ability to perform ADLs.     Patient prognosis is Good.   Patient will benefit from skilled outpatient Physical Therapy to address the deficits stated above and in the chart below, provide patient  /family education, and to maximize patientt's level of independence.     Plan of care discussed with patient: Yes  Patient's spiritual, cultural and educational needs considered and patient is agreeable to the plan of care and goals as stated below:     Anticipated Barriers for therapy: Chronic Symptoms    Medical Necessity is demonstrated by the following  History  Co-morbidities and personal factors that may impact the plan of care [x] LOW: no personal factors / co-morbidities  [] MODERATE: 1-2 personal factors / co-morbidities  [] HIGH: 3+ personal factors / co-morbidities    Moderate / High Support Documentation:   Co-morbidities affecting plan of care:     Personal Factors:   no deficits     Examination  Body Structures and Functions, activity limitations and participation restrictions that may impact the plan of care [x] LOW: addressing 1-2 elements  [] MODERATE: 3+ elements  [] HIGH: 4+ elements (please support below)    Moderate / High Support Documentation:      Clinical Presentation [x] LOW: stable  [] MODERATE: Evolving  [] HIGH: Unstable     Decision Making/ Complexity Score: low       GOALS: Short Term Goals:  5 weeks  1.Report decreased bilateral shoulder pain < / =  2/10  to increase tolerance for ADL's, grooming, bathing, and reaching overhead  2. Increase PROM 150 degrees of shoulder flexion   3. Increased strength by 1/3 MMT grade in bilateral upper extremities to increase tolerance for ADL and work activities.  4. Pt to tolerate HEP to improve ROM and independence with ADL's    Long Term Goals: 6 weeks  1.Report decreased bilateral shoulder pain  < / =  1 /10  to increase tolerance for ADL's, grooming, bathing, and reaching overhead  2.Increase AROM to 150 degrees shoulder flexion  3.Increase strength to >/= 4/5 in bilateral upper extremities to increase tolerance for ADL and work activities.  4. Pt goal: Pt will have be able to reach behind his back without pain  5. Pt will have improved FOTO of  63 on FOTO shoulder in order to demonstrate true functional improvement.   Plan     Plan of care Certification: 1/5/2024 to 3/15/2024.    Outpatient Physical Therapy 1-2 times weekly for 10 weeks to include the following interventions: Manual Therapy, Neuromuscular Re-ed, Patient Education, Therapeutic Activities, and Therapeutic Exercise.     Albin Bauman, PT, DPT        Physician's Signature: _________________________________________ Date: ________________

## 2024-01-19 ENCOUNTER — OFFICE VISIT (OUTPATIENT)
Dept: PODIATRY | Facility: CLINIC | Age: 79
End: 2024-01-19
Payer: MEDICARE

## 2024-01-19 VITALS
HEART RATE: 69 BPM | BODY MASS INDEX: 27.26 KG/M2 | SYSTOLIC BLOOD PRESSURE: 149 MMHG | WEIGHT: 205.69 LBS | HEIGHT: 73 IN | DIASTOLIC BLOOD PRESSURE: 70 MMHG

## 2024-01-19 DIAGNOSIS — E11.40 CONTROLLED TYPE 2 DIABETES MELLITUS WITH DIABETIC NEUROPATHY, WITHOUT LONG-TERM CURRENT USE OF INSULIN: Primary | ICD-10-CM

## 2024-01-19 DIAGNOSIS — B35.1 ONYCHOMYCOSIS DUE TO DERMATOPHYTE: ICD-10-CM

## 2024-01-19 PROCEDURE — 99499 UNLISTED E&M SERVICE: CPT | Mod: HCNC,S$GLB,, | Performed by: PODIATRIST

## 2024-01-19 PROCEDURE — 11721 DEBRIDE NAIL 6 OR MORE: CPT | Mod: Q9,HCNC,S$GLB, | Performed by: PODIATRIST

## 2024-01-19 PROCEDURE — 99999 PR PBB SHADOW E&M-EST. PATIENT-LVL IV: CPT | Mod: PBBFAC,HCNC,, | Performed by: PODIATRIST

## 2024-01-19 NOTE — PROGRESS NOTES
Subjective:      Patient ID: Misha Eldridge Jr. is a 78 y.o. male.    Chief Complaint: Diabetic Foot Exam (Pcp - Dipak Treviño MD - )    Misha is a 78 y.o. male who presents to the clinic for evaluation and treatment of high risk feet. Misha has a past medical history of Cataract, Colostomy in place, Dermatochalasis, Diabetes mellitus type II, Dry eye syndrome, Hemorrhoid, History of colon cancer, History of prostate cancer, History of pulmonary embolism, Hypertension, and Macular degeneration. The patient's chief complaint is long, thick toenails. This patient has documented high risk feet requiring routine maintenance secondary to peripheral neuropathy.    PCP: Dipak Treviño MD    Date Last Seen by PCP:   Chief Complaint   Patient presents with    Diabetic Foot Exam     Pcp - Dipak Treviño MD -          Current shoe gear:  Affected Foot: Slip-on shoes     Unaffected Foot: Slip-on shoes    Hemoglobin A1C   Date Value Ref Range Status   08/17/2023 7.4 (H) 4.0 - 5.6 % Final     Comment:     ADA Screening Guidelines:  5.7-6.4%  Consistent with prediabetes  >or=6.5%  Consistent with diabetes    High levels of fetal hemoglobin interfere with the HbA1C  assay. Heterozygous hemoglobin variants (HbS, HgC, etc)do  not significantly interfere with this assay.   However, presence of multiple variants may affect accuracy.     05/08/2023 11.6 (H) 4.0 - 5.6 % Final     Comment:     ADA Screening Guidelines:  5.7-6.4%  Consistent with prediabetes  >or=6.5%  Consistent with diabetes    High levels of fetal hemoglobin interfere with the HbA1C  assay. Heterozygous hemoglobin variants (HbS, HgC, etc)do  not significantly interfere with this assay.   However, presence of multiple variants may affect accuracy.     01/25/2023 10.7 (H) 4.0 - 5.6 % Final     Comment:     ADA Screening Guidelines:  5.7-6.4%  Consistent with prediabetes  >or=6.5%  Consistent with diabetes    High levels of fetal hemoglobin interfere with  the HbA1C  assay. Heterozygous hemoglobin variants (HbS, HgC, etc)do  not significantly interfere with this assay.   However, presence of multiple variants may affect accuracy.         Review of Systems   Constitutional: Negative for chills, fever and malaise/fatigue.   HENT:  Negative for hearing loss.    Cardiovascular:  Positive for leg swelling. Negative for claudication.   Respiratory:  Negative for shortness of breath.    Skin:  Positive for color change, dry skin, nail changes and unusual hair distribution. Negative for flushing and rash.   Musculoskeletal:  Negative for joint pain and myalgias.   Neurological:  Positive for numbness, paresthesias and sensory change. Negative for loss of balance.   Psychiatric/Behavioral:  Negative for altered mental status.            Objective:      Physical Exam  Vitals reviewed.   Constitutional:       Appearance: He is well-developed.   Cardiovascular:      Pulses:           Dorsalis pedis pulses are 0 on the right side and 0 on the left side.        Posterior tibial pulses are 1+ on the right side and 1+ on the left side.   Musculoskeletal:      Right ankle: Decreased range of motion. Abnormal pulse.      Right Achilles Tendon: Russell's test negative.      Left ankle: Decreased range of motion. Abnormal pulse.      Left Achilles Tendon: Russell's test negative.      Right foot: Decreased range of motion.      Left foot: Decreased range of motion.   Feet:      Right foot:      Protective Sensation: 5 sites tested.  2 sites sensed.      Left foot:      Protective Sensation: 5 sites tested.  2 sites sensed.   Skin:     General: Skin is dry.      Capillary Refill: Capillary refill takes more than 3 seconds.      Comments:  No open lesions noted.      Neurological:      Mental Status: He is alert.      Comments: diminished sensation noted to b/L lower extremities   Psychiatric:         Behavior: Behavior normal. Behavior is cooperative.         Nails x10 are elongated by   5-6mm's, thickened by 2-3 mm's, dystrophic, and are yellowish in  coloration . Xerosis Bilaterally. No open lesions noted.             Assessment:       Encounter Diagnoses   Name Primary?    Controlled type 2 diabetes mellitus with diabetic neuropathy, without long-term current use of insulin Yes    Onychomycosis due to dermatophyte          Plan:       Misha was seen today for diabetic foot exam.    Diagnoses and all orders for this visit:    Controlled type 2 diabetes mellitus with diabetic neuropathy, without long-term current use of insulin    Onychomycosis due to dermatophyte      I counseled the patient on his conditions, their implications and medical management.        - Shoe inspection. Diabetic Foot Education. Patient reminded of the importance of good nutrition and blood sugar control to help prevent podiatric complications of diabetes. Patient instructed on proper foot hygeine. We discussed wearing proper shoe gear, daily foot inspections, never walking without protective shoe gear, never putting sharp instruments to feet, routine podiatric nail visits every 2-3 months.      - With patient's permission, nails were aggressively reduced and debrided x 10 to their soft tissue attachment mechanically and with electric , removing all offending nail and debris. Patient relates relief following the procedure. He will continue to monitor the areas daily, inspect his feet, wear protective shoe gear when ambulatory, moisturizer to maintain skin integrity and follow in this office in approximately 2-3 months, sooner p.r.n.

## 2024-01-22 DIAGNOSIS — Z85.46 HISTORY OF PROSTATE CANCER: ICD-10-CM

## 2024-01-22 NOTE — TELEPHONE ENCOUNTER
Care Due:                  Date            Visit Type   Department     Provider  --------------------------------------------------------------------------------                                SAME DAY -                              ESTABLISHED   [facility] INTERNAL  Last Visit: 12-      PATIENT      MEDICINE       Mary Pruett  Next Visit: None Scheduled  None         None Found                                                            Last  Test          Frequency    Reason                     Performed    Due Date  --------------------------------------------------------------------------------    HBA1C.......  6 months...  glipiZIDE, insulin,        08- 02-                             metFORMIN, semaglutide...    [employer] Embedded Care Due Messages. Reference number: [id number].   1/22/2024 2:51:01 PM CST

## 2024-01-23 RX ORDER — DOXAZOSIN 4 MG/1
4 TABLET ORAL NIGHTLY
Qty: 90 TABLET | Refills: 0 | Status: SHIPPED | OUTPATIENT
Start: 2024-01-23

## 2024-01-23 NOTE — TELEPHONE ENCOUNTER
Provider Staff:  Action required for this patient    Requires labs      Please see care gap opportunities below in Care Due Message.    Thanks!  [facility]     Appointments      Date Provider   Last Visit   8/17/2023 Dipak Treviño MD   Next Visit   Visit date not found Dipak Treviño MD     Refill Decision Note   Misha Eldridge  is requesting a refill authorization.  Brief Assessment and Rationale for Refill:  Approve     Medication Therapy Plan: ED documentation reviewed. No change in therapy. Need OV with PCP.      Extended chart review required: Yes   Comments:     Note composed:4:12 AM 01/23/2024

## 2024-01-23 NOTE — TELEPHONE ENCOUNTER
Provider Staff:  Action required. This patient has received emergency care.     Please schedule patient for a follow up appointment.     Thanks!  [facility]     Appointments      Date Provider   Last Visit   8/17/2023 Dipak Treviño MD   Next Visit   Visit date not found Dipak Treviño MD

## 2024-01-28 ENCOUNTER — HOSPITAL ENCOUNTER (EMERGENCY)
Facility: HOSPITAL | Age: 79
Discharge: HOME | End: 2024-01-28
Attending: EMERGENCY MEDICINE
Payer: MEDICARE

## 2024-01-28 VITALS
WEIGHT: 205 LBS | BODY MASS INDEX: 27.17 KG/M2 | SYSTOLIC BLOOD PRESSURE: 141 MMHG | OXYGEN SATURATION: 100 % | RESPIRATION RATE: 18 BRPM | HEIGHT: 73 IN | DIASTOLIC BLOOD PRESSURE: 69 MMHG | TEMPERATURE: 98 F | HEART RATE: 70 BPM

## 2024-01-28 DIAGNOSIS — R31.9 HEMATURIA, UNSPECIFIED TYPE: ICD-10-CM

## 2024-01-28 DIAGNOSIS — N30.01 ACUTE CYSTITIS WITH HEMATURIA: Primary | ICD-10-CM

## 2024-01-28 LAB
ALBUMIN SERPL BCP-MCNC: 4 G/DL (ref 3.5–5.2)
ALP SERPL-CCNC: 72 U/L (ref 55–135)
ALT SERPL W/O P-5'-P-CCNC: 12 U/L (ref 10–44)
ANION GAP SERPL CALC-SCNC: 10 MMOL/L (ref 8–16)
AST SERPL-CCNC: 18 U/L (ref 10–40)
BACTERIA #/AREA URNS AUTO: ABNORMAL /HPF
BASOPHILS # BLD AUTO: 0.01 K/UL (ref 0–0.2)
BASOPHILS NFR BLD: 0.3 % (ref 0–1.9)
BILIRUB SERPL-MCNC: 0.4 MG/DL (ref 0.1–1)
BILIRUB UR QL STRIP: NEGATIVE
BUN SERPL-MCNC: 11 MG/DL (ref 8–23)
CALCIUM SERPL-MCNC: 9.8 MG/DL (ref 8.7–10.5)
CHLORIDE SERPL-SCNC: 107 MMOL/L (ref 95–110)
CLARITY UR REFRACT.AUTO: CLEAR
CO2 SERPL-SCNC: 22 MMOL/L (ref 23–29)
COLOR UR AUTO: YELLOW
CREAT SERPL-MCNC: 0.9 MG/DL (ref 0.5–1.4)
DIFFERENTIAL METHOD BLD: ABNORMAL
EOSINOPHIL # BLD AUTO: 0.3 K/UL (ref 0–0.5)
EOSINOPHIL NFR BLD: 9.8 % (ref 0–8)
ERYTHROCYTE [DISTWIDTH] IN BLOOD BY AUTOMATED COUNT: 15.2 % (ref 11.5–14.5)
EST. GFR  (NO RACE VARIABLE): >60 ML/MIN/1.73 M^2
GLUCOSE SERPL-MCNC: 172 MG/DL (ref 70–110)
GLUCOSE UR QL STRIP: NEGATIVE
HCT VFR BLD AUTO: 31.6 % (ref 40–54)
HGB BLD-MCNC: 10.7 G/DL (ref 14–18)
HGB UR QL STRIP: ABNORMAL
HYALINE CASTS UR QL AUTO: 0 /LPF
IMM GRANULOCYTES # BLD AUTO: 0.01 K/UL (ref 0–0.04)
IMM GRANULOCYTES NFR BLD AUTO: 0.3 % (ref 0–0.5)
KETONES UR QL STRIP: NEGATIVE
LACTATE SERPL-SCNC: 1.5 MMOL/L (ref 0.5–2.2)
LEUKOCYTE ESTERASE UR QL STRIP: NEGATIVE
LYMPHOCYTES # BLD AUTO: 0.8 K/UL (ref 1–4.8)
LYMPHOCYTES NFR BLD: 23.1 % (ref 18–48)
MCH RBC QN AUTO: 28.7 PG (ref 27–31)
MCHC RBC AUTO-ENTMCNC: 33.9 G/DL (ref 32–36)
MCV RBC AUTO: 85 FL (ref 82–98)
MICROSCOPIC COMMENT: ABNORMAL
MONOCYTES # BLD AUTO: 0.3 K/UL (ref 0.3–1)
MONOCYTES NFR BLD: 8 % (ref 4–15)
NEUTROPHILS # BLD AUTO: 2 K/UL (ref 1.8–7.7)
NEUTROPHILS NFR BLD: 58.5 % (ref 38–73)
NITRITE UR QL STRIP: NEGATIVE
NRBC BLD-RTO: 0 /100 WBC
PH UR STRIP: 7 [PH] (ref 5–8)
PLATELET # BLD AUTO: 175 K/UL (ref 150–450)
PMV BLD AUTO: 9.5 FL (ref 9.2–12.9)
POTASSIUM SERPL-SCNC: 3.8 MMOL/L (ref 3.5–5.1)
PROT SERPL-MCNC: 7.2 G/DL (ref 6–8.4)
PROT UR QL STRIP: ABNORMAL
RBC # BLD AUTO: 3.73 M/UL (ref 4.6–6.2)
RBC #/AREA URNS AUTO: >100 /HPF (ref 0–4)
SODIUM SERPL-SCNC: 139 MMOL/L (ref 136–145)
SP GR UR STRIP: 1.01 (ref 1–1.03)
URN SPEC COLLECT METH UR: ABNORMAL
WBC # BLD AUTO: 3.37 K/UL (ref 3.9–12.7)
WBC #/AREA URNS AUTO: 2 /HPF (ref 0–5)

## 2024-01-28 PROCEDURE — 25500020 PHARM REV CODE 255: Mod: HCNC | Performed by: EMERGENCY MEDICINE

## 2024-01-28 PROCEDURE — 63600175 PHARM REV CODE 636 W HCPCS: Mod: HCNC | Performed by: EMERGENCY MEDICINE

## 2024-01-28 PROCEDURE — 99285 EMERGENCY DEPT VISIT HI MDM: CPT | Mod: 25,HCNC

## 2024-01-28 PROCEDURE — 85025 COMPLETE CBC W/AUTO DIFF WBC: CPT | Mod: HCNC | Performed by: EMERGENCY MEDICINE

## 2024-01-28 PROCEDURE — 81001 URINALYSIS AUTO W/SCOPE: CPT | Mod: HCNC | Performed by: EMERGENCY MEDICINE

## 2024-01-28 PROCEDURE — 83605 ASSAY OF LACTIC ACID: CPT | Mod: HCNC | Performed by: EMERGENCY MEDICINE

## 2024-01-28 PROCEDURE — 25000003 PHARM REV CODE 250: Mod: HCNC | Performed by: EMERGENCY MEDICINE

## 2024-01-28 PROCEDURE — 80053 COMPREHEN METABOLIC PANEL: CPT | Mod: HCNC | Performed by: EMERGENCY MEDICINE

## 2024-01-28 PROCEDURE — 96365 THER/PROPH/DIAG IV INF INIT: CPT | Mod: 59,HCNC

## 2024-01-28 RX ORDER — CEPHALEXIN 500 MG/1
500 CAPSULE ORAL 2 TIMES DAILY
Qty: 20 CAPSULE | Refills: 0 | Status: SHIPPED | OUTPATIENT
Start: 2024-01-28 | End: 2024-02-07

## 2024-01-28 RX ADMIN — IOHEXOL 100 ML: 350 INJECTION, SOLUTION INTRAVENOUS at 11:01

## 2024-01-28 RX ADMIN — CEFTRIAXONE 1 G: 1 INJECTION, POWDER, FOR SOLUTION INTRAMUSCULAR; INTRAVENOUS at 12:01

## 2024-01-28 NOTE — DISCHARGE INSTRUCTIONS
Please complete the entire course of antibiotics as prescribed    Please follow-up in Urology Clinic as soon as possible to discuss the blood in your urine.     Return to the ED immediately for any new chest pain, shortness of breath, severe abdominal pain, abdominal pain that is constant, nausea/and vomiting that does not stop on its own, severe headache, new numbness, tingling, or weakness anywhere, fever greater than 101F or any additional concerns.

## 2024-01-28 NOTE — ED PROVIDER NOTES
Encounter Date: 1/28/2024       History     Chief Complaint   Patient presents with    Dysuria     Pt endorses dysuria and urinary frequency.      HPI patient is a 78-year-old male with a history of diabetes, colon and prostate cancer, hypertension multiple abdominal surgeries who presents emergency department with 5 days of worsening dysuria, urgency and frequency without hematuria.  Patient notes no nausea, no vomiting, no diarrhea, no flank pain, no new back pain, no fevers or chills.  Review of patient's allergies indicates:   Allergen Reactions    Hay fever and allergy relief      Past Medical History:   Diagnosis Date    Cataract     Colostomy in place     Dermatochalasis     Diabetes mellitus type II     Dry eye syndrome     Hemorrhoid     History of colon cancer     History of prostate cancer     History of pulmonary embolism     Hypertension     Macular degeneration      Past Surgical History:   Procedure Laterality Date    CATARACT EXTRACTION Bilateral     COLONOSCOPY N/A 06/02/2022    Procedure: COLONOSCOPY;  Surgeon: Jose Dangelo MD;  Location: [facility] (4TH FLR);  Service: Endoscopy;  Laterality: N/A;  fully vaccinated    COLONOSCOPY N/A 12/29/2023    Procedure: COLONOSCOPY;  Surgeon: YAMILA Mckinney MD;  Location: [facility] (4TH FLR);  Service: Endoscopy;  Laterality: N/A;  9/14 ref. by Dipak Treviño MD, PEG, instr. handed to patient-st  12/20-lvm for precall-MS  12/21/23- precall complete - ERW    ESOPHAGOGASTRODUODENOSCOPY N/A 6/30/2023    Procedure: EGD (ESOPHAGOGASTRODUODENOSCOPY);  Surgeon: Terrence Beatty MD;  Location: [facility] (2ND FLR);  Service: Endoscopy;  Laterality: N/A;    EYE SURGERY Bilateral     cataract extraction    HERNIA REPAIR      PROSTATECTOMY      SUBTOTAL COLECTOMY       Family History   Problem Relation Age of Onset    Arthritis Mother     Hypertension Mother     Cancer Mother     Alcohol abuse Father     No Known Problems Daughter      Social History      Tobacco Use    Smoking status: Former     Types: Cigarettes    Smokeless tobacco: Never    Tobacco comments:     Quit smoking 20 years ago   Substance Use Topics    Alcohol use: No    Drug use: No     Review of Systems  10 point review of systems reviewed with patient otherwise negative.     Physical Exam     Initial Vitals [01/28/24 0842]   BP Pulse Resp Temp SpO2   (!) 173/75 74 20 98.1 °F (36.7 °C) 97 %      MAP       --         Physical Exam    Nursing note and vitals reviewed.  Constitutional: Patient appears well-developed and well-nourished. No distress.   HENT:   Head: Normocephalic and atraumatic.   Eyes: Conjunctivae and EOM are normal. Pupils are equal, round, and reactive to light.   Neck: Neck supple.   Normal range of motion.  Cardiovascular: Normal rate, regular rhythm, normal heart sounds and intact distal pulses.   Pulmonary/Chest: Breath sounds normal.   Abdominal: Multiple abdominal surgical scars with L > R L abd pain with guarding, no rebound.   : Nl appearing penis, no testicular pain, no scrotal swelling  Musculoskeletal:      Cervical back: Normal range of motion and neck supple.   Neurological: Patient is alert and oriented to person, place, and time. No cranial nerve deficit. Gait normal. GCS score is 15.    Skin: Skin is warm and dry.  Psych: Normal mood/affect    ED Course   Procedures  Labs Reviewed   CBC W/ AUTO DIFFERENTIAL - Abnormal; Notable for the following components:       Result Value    WBC 3.37 (*)     RBC 3.73 (*)     Hemoglobin 10.7 (*)     Hematocrit 31.6 (*)     RDW 15.2 (*)     Lymph # 0.8 (*)     Eosinophil % 9.8 (*)     All other components within normal limits   COMPREHENSIVE METABOLIC PANEL - Abnormal; Notable for the following components:    CO2 22 (*)     Glucose 172 (*)     All other components within normal limits   URINALYSIS, REFLEX TO URINE CULTURE - Abnormal; Notable for the following components:    Protein, UA 1+ (*)     Occult Blood UA 2+ (*)     All  other components within normal limits    Narrative:     Specimen Source->Urine   URINALYSIS MICROSCOPIC - Abnormal; Notable for the following components:    RBC, UA >100 (*)     All other components within normal limits    Narrative:     Specimen Source->Urine   LACTIC ACID, PLASMA          Imaging Results              CT Abdomen Pelvis With IV Contrast NO Oral Contrast (Final result)  Result time 01/28/24 12:05:50      Final result by Bisi Dawkins MD (01/28/24 12:05:50)                   Impression:      Severe bladder wall thickening, it is also nondistended, there is adjacent fat stranding suggestive of an underlying inflammatory or infectious process.    Bilateral low attenuating renal lesions are unchanged and suggestive of cysts no obstructive process seen.    Prior bowel surgery.    Prior prostatectomy.    Pleural plaque calcification may be the result of prior asbestos exposure.      Electronically signed by: Bisi Dawkins MD  Date:    01/28/2024  Time:    12:05               Narrative:    EXAMINATION:  CT ABDOMEN PELVIS WITH IV CONTRAST    CLINICAL HISTORY:  Abdominal pain, acute, nonlocalized;    TECHNIQUE:  Low dose axial images, sagittal and coronal reformations were obtained from the lung bases to the pubic symphysis following the IV administration of 100 mL of Omnipaque 350 .  Oral contrast was not given. Axial and coronal images reformatted.    COMPARISON:  06/28/2023 CT abdomen and pelvis    FINDINGS:  Pleural plaque calcification noted at the lung basis, unchanged.  No pleural effusion.  No pericardial effusion.    The liver appears normal.  The gallbladder is present, no radiopaque stones seen within.    The distal esophagus and the stomach appear normal.    The pancreas, spleen and bilateral adrenal glands appear normal.    The right kidney demonstrate 2 small hypoattenuating lesions, the largest 1.4 cm represent a benign cyst.  The smaller lesion is too small to be characterized.   There is no hydronephrosis, no hydroureter, no definite stone identified.    The left kidney enhances normally.  There are 2 hypoattenuating lesions, the largest is a benign cyst measuring 3.6 cm.  It appears similar to the prior exam.  The left ureter is normal.    There is significant bladder wall thickening, there is fat stranding adjacent to the bladder.  The prostate is removed.    The abdominal aorta tapers normally, there is moderate calcified atherosclerotic plaque.  No para-aortic lymphadenopathy.    Moderate stool retention, no inflammatory changes of the bowel seen, no bowel dilation.  There are surgical bowel sutures at the rectosigmoid region and within the right lower abdominal quadrant.  No inflammatory changes of the bowel seen.    The mesentery appears normal.  No adenopathy.  No free air, no free fluid.    The abdominal wall is intact, the inguinal regions appear normal.    The osseous structures demonstrate no destructive osseous lesions.                                       Medications   iohexoL (OMNIPAQUE 350) injection 100 mL (100 mLs Intravenous Given 1/28/24 1106)   cefTRIAXone (Rocephin) 1 g in dextrose 5 % in water (D5W) 100 mL IVPB (MB+) (0 g Intravenous Stopped 1/28/24 1255)     Medical Decision Making  Amount and/or Complexity of Data Reviewed  Labs: ordered.  Radiology: ordered.    Risk  Prescription drug management.                     I have personally reviewed and interpreted the patients laboratory studies:  Labs are remarkable for hematuria, nitrite negative, creatinine within normal limits, hemoglobin 10 which is stable from previous, lactate within normal limits      CT abdomen pelvis is remarkable for bladder inflammation which includes infection differential diagnosis    I have also reviewed the following:   external records:  Previous hemoglobin 10.1, patient is not currently anticoagulated    I had a discussion with the patient about his results - I explained that his urine  has a significant amount of blood in it and his CT scan is suggestive of possible infection versus inflammation of the bladder of unclear etiology and we will plan for a course of oral antibiotics as an outpatient as a precaution should this reflect hemorrhagic cystitis, particularly in the setting of the patient's dysuria.  The patient has an upcoming appointment on the 5th with his urologist but a repeat referral was placed to help facilitate follow-up.  I encouraged the patient to take his antibiotics as prescribed and to follow up with his urologist and return to the emergency department should he experience any worsening or increased bleeding, inability to urinate, fevers chills or any other concerns        Clinical Impression:  Final diagnoses:  [N30.01] Acute cystitis with hematuria (Primary)  [R31.9] Hematuria, unspecified type          ED Disposition Condition    Discharge Stable          ED Prescriptions       Medication Sig Dispense Start Date End Date Auth. Provider    cephALEXin (KEFLEX) 500 MG capsule Take 1 capsule (500 mg total) by mouth 2 (two) times a day. for 10 days 20 capsule 1/28/2024 2/7/2024 Corrie Connolly MD          Follow-up Information       Follow up With Specialties Details Why Contact Info    Dipak Treviño MD Internal Medicine Schedule an appointment as soon as possible for a visit in 1 week  [address]  [facility] [address]  [phone]      [facility] - Emergency Dept Emergency Medicine  As needed, If symptoms worsen [address] [id number] [phone]    [facility] UROLOGY Urology   [address] [address]  [phone]             Corrie Connolly MD  01/30/24 2744

## 2024-01-28 NOTE — ED TRIAGE NOTES
Misha Eldridge Jr., an 78 y.o. male presents to the ED via POV for complaints of suprapubic pain that has been ongoing x1 week with pain worsening over the past 3 days. Pain is radiating from his suprapubic area down his penis. Denies dysuria, hematuria but explains he had 1 episode a month ago of hematuria. Endorses frequency and sensation that he is unable to empty his bladder frequently. Explains he got up 10x last night to urinate.      LOC: The patient is awake, alert and aware of environment with an appropriate affect, the patient is oriented x 3 and speaking appropriately.   APPEARANCE: Patient appears comfortable and in no acute distress, patient is clean and well groomed.  SKIN: The skin is warm and dry, color consistent with ethnicity.   MUSCULOSKELETAL: Patient moving all extremities spontaneously, no swelling noted.  RESPIRATORY: Airway is open and patent, respirations are spontaneous, patient has a normal effort and rate, no accessory muscle use noted.  CARDIAC: Patient has a normal rate and regular rhythm, no edema noted, capillary refill < 3 seconds. +HTN  GASTRO: Soft and non tender to palpation, no distention noted. +abd pain  : +frequency +suprapubic pain +penile pain   NEURO: Pt opens eyes spontaneously, behavior appropriate to situation, follows commands.        Chief Complaint   Patient presents with    Dysuria     Pt endorses dysuria and urinary frequency.      Review of patient's allergies indicates:   Allergen Reactions    Hay fever and allergy relief      Past Medical History:   Diagnosis Date    Cataract     Colostomy in place     Dermatochalasis     Diabetes mellitus type II     Dry eye syndrome     Hemorrhoid     History of colon cancer     History of prostate cancer     History of pulmonary embolism     Hypertension     Macular degeneration

## 2024-02-05 ENCOUNTER — OFFICE VISIT (OUTPATIENT)
Dept: UROLOGY | Facility: CLINIC | Age: 79
End: 2024-02-05
Payer: MEDICARE

## 2024-02-05 VITALS
DIASTOLIC BLOOD PRESSURE: 76 MMHG | BODY MASS INDEX: 26.3 KG/M2 | HEART RATE: 72 BPM | HEIGHT: 73 IN | WEIGHT: 198.44 LBS | SYSTOLIC BLOOD PRESSURE: 156 MMHG | OXYGEN SATURATION: 98 %

## 2024-02-05 DIAGNOSIS — R31.9 HEMATURIA, UNSPECIFIED TYPE: ICD-10-CM

## 2024-02-05 DIAGNOSIS — C61 PROSTATE CANCER: ICD-10-CM

## 2024-02-05 DIAGNOSIS — I10 ESSENTIAL HYPERTENSION: ICD-10-CM

## 2024-02-05 PROCEDURE — 87086 URINE CULTURE/COLONY COUNT: CPT | Mod: HCNC | Performed by: NURSE PRACTITIONER

## 2024-02-05 PROCEDURE — 1160F RVW MEDS BY RX/DR IN RCRD: CPT | Mod: HCNC,CPTII,S$GLB, | Performed by: NURSE PRACTITIONER

## 2024-02-05 PROCEDURE — 3077F SYST BP >= 140 MM HG: CPT | Mod: HCNC,CPTII,S$GLB, | Performed by: NURSE PRACTITIONER

## 2024-02-05 PROCEDURE — 3072F LOW RISK FOR RETINOPATHY: CPT | Mod: HCNC,CPTII,S$GLB, | Performed by: NURSE PRACTITIONER

## 2024-02-05 PROCEDURE — 1159F MED LIST DOCD IN RCRD: CPT | Mod: HCNC,CPTII,S$GLB, | Performed by: NURSE PRACTITIONER

## 2024-02-05 PROCEDURE — 3078F DIAST BP <80 MM HG: CPT | Mod: HCNC,CPTII,S$GLB, | Performed by: NURSE PRACTITIONER

## 2024-02-05 PROCEDURE — 1126F AMNT PAIN NOTED NONE PRSNT: CPT | Mod: HCNC,CPTII,S$GLB, | Performed by: NURSE PRACTITIONER

## 2024-02-05 PROCEDURE — 99213 OFFICE O/P EST LOW 20 MIN: CPT | Mod: HCNC,S$GLB,, | Performed by: NURSE PRACTITIONER

## 2024-02-05 PROCEDURE — 88112 CYTOPATH CELL ENHANCE TECH: CPT | Mod: 26,HCNC,, | Performed by: STUDENT IN AN ORGANIZED HEALTH CARE EDUCATION/TRAINING PROGRAM

## 2024-02-05 PROCEDURE — 88112 CYTOPATH CELL ENHANCE TECH: CPT | Mod: HCNC | Performed by: STUDENT IN AN ORGANIZED HEALTH CARE EDUCATION/TRAINING PROGRAM

## 2024-02-05 RX ORDER — IRBESARTAN 300 MG/1
300 TABLET ORAL NIGHTLY
Qty: 90 TABLET | Refills: 3 | Status: SHIPPED | OUTPATIENT
Start: 2024-02-05

## 2024-02-05 NOTE — PROGRESS NOTES
"Subjective:      Misha Eldridge Jr. is a 78 y.o. male who returns today regarding his gross hematuria.     Prostate Cancer:  - reports PSA screen was abnormal in 2010   - 4/27/2010 RARP with bilateral nerve sparing - prostate adenocarcinoma- surgery at [facility]  - 4/12/2022 PSA 7.1 ng/ml with concern for biochemical recurrence     He underwent radiation simulation on 8/5/2022. He has been taking his zytiga + prednisone daily. Received eligard 45 mg on 8/16/22- due February 16th-administered per Dr. Betancourt.      He was to begin XRT per Dr. Tejada's; however there was "difficulty planning radiation based on PET scan alone which shows recurrence between rectum and bladder caudally. He is refusing MRI again because he is claustrophobic."      Now s/p TRUS/bx of prostatic fossa with Dr. 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 Azevedo in February 2023. On Lupron and abiraterone/prednisone per Dr. Betancourt.     PSA Diagnostic   Latest Ref Rng 0.00 - 4.00 ng/mL   8/10/2023 <0.01    12/12/2023 <0.01      He presented to the ED on 1/28 with dysuria, frequency, and urgency. UA negative for leukocytes and nitrites- culture was not done. Micro UA with >100 RBCS/HPF. Treated with keflex BID x 7 days.    Today he reports persistent symptoms. Voiding well. Reports seeing dark urine- maybe blood. Chronic constipation.   Former smoker.       The following portions of the patient's history were reviewed and updated as appropriate: allergies, current medications, past family history, past medical history, past social history, past surgical history and problem list.    Review of Systems  Constitutional: no fever or chills  ENT: no nasal congestion or sore throat  Respiratory: no cough or shortness of breath  Cardiovascular: no chest pain or palpitations  Gastrointestinal: no nausea or vomiting, tolerating diet  Genitourinary: as per " "HPI  Hematologic/Lymphatic: no easy bruising or lymphadenopathy  Musculoskeletal: no arthralgias or myalgias  Neurological: no seizures or tremors  Behavioral/Psych: no auditory or visual hallucinations     Objective:   Vitals:   Vitals:    02/05/24 1036   BP: (!) 156/76   Pulse: 72     Physical Exam   General: alert and oriented, no acute distress  Head: normocephalic, atraumatic  Neck: supple, normal ROM  Respiratory: Symmetric expansion, non-labored breathing  Cardiovascular: regular rate and rhythm  Abdomen: soft, non tender, non distended  Genitourinary: deferred  Skin: normal coloration and turgor, no rashes, no suspicious skin lesions noted  Neuro: alert and oriented x3, no gross deficits  Psych: normal judgment and insight, normal mood/affect, and non-anxious    Lab Review   Urinalysis demonstrates positive for red blood cells  Lab Results   Component Value Date    WBC 3.37 (L) 01/28/2024    HGB 10.7 (L) 01/28/2024    HCT 31.6 (L) 01/28/2024    HCT 32 (L) 06/28/2023    MCV 85 01/28/2024     01/28/2024     Lab Results   Component Value Date    CREATININE 0.9 01/28/2024    BUN 11 01/28/2024     Lab Results   Component Value Date    PSA 7.1 (H) 04/12/2022     Imaging   CT abdomen/pelvis with IV contrast-"Severe bladder wall thickening, it is also nondistended, there is adjacent fat stranding suggestive of an underlying inflammatory or infectious process.  Bilateral low attenuating renal lesions are unchanged and suggestive of cysts no obstructive process seen.  Prior bowel surgery.  Prior prostatectomy."    Assessment:     1. Prostate cancer    2. Hematuria, unspecified type      Plan:   Misha was seen today for hematuria.    Diagnoses and all orders for this visit:    Prostate cancer  -     Ambulatory referral/consult to Urology    Hematuria, unspecified type  -     Ambulatory referral/consult to Urology  -     Cystoscopy; Future  -     Urine culture  -     Cytology, Urine    Plan:  -- Discussed " differential diagnosis for hematuria, including infection, nephrolithiasis, and neoplasms of kidney, ureter, or bladder. Suspect radiation cystitis.   -- Recommended proceeding with full hematuria workup, to include CT urogram (defer CT- negative CT in the ED) and cystoscopy  -- Will send urine for culture and cytology   -- Cystoscopy in clinic

## 2024-02-05 NOTE — TELEPHONE ENCOUNTER
Requested refill has been sent to pharmacy. If patient is due for visit then please schedule appointment. If not then please contact patient and see if they have home blood pressure readings or the ability to check their blood pressure at home. Please obtain their most recent blood pressure along with an average. If they do not have home BP then please schedule follow up within 7-10 days for nurse visit for BP check. Please make sure patient has all meds prescribed and is taking all medications prior to their nurse visit.     Respectfully,  Dipak Mcclendon

## 2024-02-06 LAB — BACTERIA UR CULT: NORMAL

## 2024-02-08 LAB
FINAL PATHOLOGIC DIAGNOSIS: NORMAL
Lab: NORMAL
MICROSCOPIC EXAM: NORMAL

## 2024-02-19 DIAGNOSIS — C61 PROSTATE CANCER: Primary | ICD-10-CM

## 2024-02-19 DIAGNOSIS — N18.30 CKD STAGE 3 SECONDARY TO DIABETES: ICD-10-CM

## 2024-02-19 DIAGNOSIS — E11.22 CKD STAGE 3 SECONDARY TO DIABETES: ICD-10-CM

## 2024-02-20 ENCOUNTER — OFFICE VISIT (OUTPATIENT)
Dept: HEMATOLOGY/ONCOLOGY | Facility: CLINIC | Age: 79
End: 2024-02-20
Payer: MEDICARE

## 2024-02-20 ENCOUNTER — OFFICE VISIT (OUTPATIENT)
Dept: INTERNAL MEDICINE | Facility: CLINIC | Age: 79
End: 2024-02-20
Attending: INTERNAL MEDICINE
Payer: MEDICARE

## 2024-02-20 ENCOUNTER — LAB VISIT (OUTPATIENT)
Dept: LAB | Facility: HOSPITAL | Age: 79
End: 2024-02-20
Payer: MEDICARE

## 2024-02-20 VITALS
WEIGHT: 197.63 LBS | DIASTOLIC BLOOD PRESSURE: 67 MMHG | HEIGHT: 73 IN | HEART RATE: 66 BPM | OXYGEN SATURATION: 100 % | SYSTOLIC BLOOD PRESSURE: 146 MMHG | RESPIRATION RATE: 17 BRPM | BODY MASS INDEX: 26.19 KG/M2 | TEMPERATURE: 97 F

## 2024-02-20 VITALS
DIASTOLIC BLOOD PRESSURE: 70 MMHG | WEIGHT: 198.44 LBS | HEIGHT: 73 IN | HEART RATE: 67 BPM | SYSTOLIC BLOOD PRESSURE: 112 MMHG | OXYGEN SATURATION: 99 % | BODY MASS INDEX: 26.3 KG/M2

## 2024-02-20 DIAGNOSIS — Z86.711 HISTORY OF PULMONARY EMBOLISM: ICD-10-CM

## 2024-02-20 DIAGNOSIS — M62.89 WEAKNESS OF PELVIC FLOOR IN MALE: ICD-10-CM

## 2024-02-20 DIAGNOSIS — M46.97 INFLAMMATORY SPONDYLOPATHY OF LUMBOSACRAL REGION: ICD-10-CM

## 2024-02-20 DIAGNOSIS — Z85.46 HISTORY OF PROSTATE CANCER: ICD-10-CM

## 2024-02-20 DIAGNOSIS — R20.9 UNSPECIFIED DISTURBANCES OF SKIN SENSATION: ICD-10-CM

## 2024-02-20 DIAGNOSIS — C61 PROSTATE CANCER: ICD-10-CM

## 2024-02-20 DIAGNOSIS — E11.40 CONTROLLED TYPE 2 DIABETES MELLITUS WITH DIABETIC NEUROPATHY, WITHOUT LONG-TERM CURRENT USE OF INSULIN: ICD-10-CM

## 2024-02-20 DIAGNOSIS — E11.22 CKD STAGE 3 SECONDARY TO DIABETES: ICD-10-CM

## 2024-02-20 DIAGNOSIS — R39.15 URINARY URGENCY: ICD-10-CM

## 2024-02-20 DIAGNOSIS — N18.30 CKD STAGE 3 SECONDARY TO DIABETES: ICD-10-CM

## 2024-02-20 DIAGNOSIS — I70.0 AORTIC ATHEROSCLEROSIS: ICD-10-CM

## 2024-02-20 DIAGNOSIS — G89.29 CHRONIC BILATERAL LOW BACK PAIN WITHOUT SCIATICA: ICD-10-CM

## 2024-02-20 DIAGNOSIS — M54.2 CHRONIC NECK PAIN: ICD-10-CM

## 2024-02-20 DIAGNOSIS — G89.29 CHRONIC SHOULDER PAIN, UNSPECIFIED LATERALITY: ICD-10-CM

## 2024-02-20 DIAGNOSIS — M25.519 CHRONIC SHOULDER PAIN, UNSPECIFIED LATERALITY: ICD-10-CM

## 2024-02-20 DIAGNOSIS — D64.9 ANEMIA, UNSPECIFIED TYPE: ICD-10-CM

## 2024-02-20 DIAGNOSIS — Z85.038 HISTORY OF COLON CANCER: ICD-10-CM

## 2024-02-20 DIAGNOSIS — D69.6 THROMBOCYTOPENIA, UNSPECIFIED: ICD-10-CM

## 2024-02-20 DIAGNOSIS — I10 HYPERTENSION, UNSPECIFIED TYPE: ICD-10-CM

## 2024-02-20 DIAGNOSIS — E11.40 CONTROLLED TYPE 2 DIABETES MELLITUS WITH DIABETIC NEUROPATHY, WITHOUT LONG-TERM CURRENT USE OF INSULIN: Primary | ICD-10-CM

## 2024-02-20 DIAGNOSIS — G89.29 CHRONIC NECK PAIN: ICD-10-CM

## 2024-02-20 DIAGNOSIS — M54.50 CHRONIC BILATERAL LOW BACK PAIN WITHOUT SCIATICA: ICD-10-CM

## 2024-02-20 DIAGNOSIS — C61 PROSTATE CANCER: Primary | ICD-10-CM

## 2024-02-20 LAB
ALBUMIN SERPL BCP-MCNC: 3.8 G/DL (ref 3.5–5.2)
ALP SERPL-CCNC: 72 U/L (ref 55–135)
ALT SERPL W/O P-5'-P-CCNC: 9 U/L (ref 10–44)
ANION GAP SERPL CALC-SCNC: 10 MMOL/L (ref 8–16)
AST SERPL-CCNC: 12 U/L (ref 10–40)
BASOPHILS # BLD AUTO: 0.02 K/UL (ref 0–0.2)
BASOPHILS NFR BLD: 0.6 % (ref 0–1.9)
BILIRUB SERPL-MCNC: 0.3 MG/DL (ref 0.1–1)
BUN SERPL-MCNC: 17 MG/DL (ref 8–23)
CALCIUM SERPL-MCNC: 9.6 MG/DL (ref 8.7–10.5)
CHLORIDE SERPL-SCNC: 107 MMOL/L (ref 95–110)
CO2 SERPL-SCNC: 24 MMOL/L (ref 23–29)
COMPLEXED PSA SERPL-MCNC: <0.01 NG/ML (ref 0–4)
CREAT SERPL-MCNC: 1.1 MG/DL (ref 0.5–1.4)
DIFFERENTIAL METHOD BLD: ABNORMAL
EOSINOPHIL # BLD AUTO: 0.3 K/UL (ref 0–0.5)
EOSINOPHIL NFR BLD: 8.6 % (ref 0–8)
ERYTHROCYTE [DISTWIDTH] IN BLOOD BY AUTOMATED COUNT: 16 % (ref 11.5–14.5)
EST. GFR  (NO RACE VARIABLE): >60 ML/MIN/1.73 M^2
GLUCOSE SERPL-MCNC: 166 MG/DL (ref 70–110)
HCT VFR BLD AUTO: 32.2 % (ref 40–54)
HGB BLD-MCNC: 10.6 G/DL (ref 14–18)
IMM GRANULOCYTES # BLD AUTO: 0.01 K/UL (ref 0–0.04)
IMM GRANULOCYTES NFR BLD AUTO: 0.3 % (ref 0–0.5)
LYMPHOCYTES # BLD AUTO: 1 K/UL (ref 1–4.8)
LYMPHOCYTES NFR BLD: 28.3 % (ref 18–48)
MCH RBC QN AUTO: 28.2 PG (ref 27–31)
MCHC RBC AUTO-ENTMCNC: 32.9 G/DL (ref 32–36)
MCV RBC AUTO: 86 FL (ref 82–98)
MONOCYTES # BLD AUTO: 0.3 K/UL (ref 0.3–1)
MONOCYTES NFR BLD: 9.4 % (ref 4–15)
NEUTROPHILS # BLD AUTO: 1.9 K/UL (ref 1.8–7.7)
NEUTROPHILS NFR BLD: 52.8 % (ref 38–73)
NRBC BLD-RTO: 0 /100 WBC
PLATELET # BLD AUTO: 180 K/UL (ref 150–450)
PMV BLD AUTO: 9.3 FL (ref 9.2–12.9)
POTASSIUM SERPL-SCNC: 4.1 MMOL/L (ref 3.5–5.1)
PROT SERPL-MCNC: 6.8 G/DL (ref 6–8.4)
RBC # BLD AUTO: 3.76 M/UL (ref 4.6–6.2)
SODIUM SERPL-SCNC: 141 MMOL/L (ref 136–145)
WBC # BLD AUTO: 3.5 K/UL (ref 3.9–12.7)

## 2024-02-20 PROCEDURE — 3078F DIAST BP <80 MM HG: CPT | Mod: HCNC,CPTII,S$GLB, | Performed by: INTERNAL MEDICINE

## 2024-02-20 PROCEDURE — 1159F MED LIST DOCD IN RCRD: CPT | Mod: HCNC,CPTII,S$GLB, | Performed by: INTERNAL MEDICINE

## 2024-02-20 PROCEDURE — 3074F SYST BP LT 130 MM HG: CPT | Mod: HCNC,CPTII,S$GLB, | Performed by: INTERNAL MEDICINE

## 2024-02-20 PROCEDURE — 99999 PR PBB SHADOW E&M-EST. PATIENT-LVL V: CPT | Mod: PBBFAC,HCNC,, | Performed by: INTERNAL MEDICINE

## 2024-02-20 PROCEDURE — 99214 OFFICE O/P EST MOD 30 MIN: CPT | Mod: HCNC,S$GLB,, | Performed by: INTERNAL MEDICINE

## 2024-02-20 PROCEDURE — 1160F RVW MEDS BY RX/DR IN RCRD: CPT | Mod: HCNC,CPTII,S$GLB, | Performed by: INTERNAL MEDICINE

## 2024-02-20 PROCEDURE — 36415 COLL VENOUS BLD VENIPUNCTURE: CPT | Mod: HCNC | Performed by: INTERNAL MEDICINE

## 2024-02-20 PROCEDURE — 3072F LOW RISK FOR RETINOPATHY: CPT | Mod: HCNC,CPTII,S$GLB, | Performed by: INTERNAL MEDICINE

## 2024-02-20 PROCEDURE — 1101F PT FALLS ASSESS-DOCD LE1/YR: CPT | Mod: HCNC,CPTII,S$GLB, | Performed by: INTERNAL MEDICINE

## 2024-02-20 PROCEDURE — 84153 ASSAY OF PSA TOTAL: CPT | Mod: HCNC | Performed by: INTERNAL MEDICINE

## 2024-02-20 PROCEDURE — 3288F FALL RISK ASSESSMENT DOCD: CPT | Mod: HCNC,CPTII,S$GLB, | Performed by: INTERNAL MEDICINE

## 2024-02-20 PROCEDURE — 3077F SYST BP >= 140 MM HG: CPT | Mod: HCNC,CPTII,S$GLB, | Performed by: INTERNAL MEDICINE

## 2024-02-20 PROCEDURE — 80053 COMPREHEN METABOLIC PANEL: CPT | Mod: HCNC | Performed by: INTERNAL MEDICINE

## 2024-02-20 PROCEDURE — 1126F AMNT PAIN NOTED NONE PRSNT: CPT | Mod: HCNC,CPTII,S$GLB, | Performed by: INTERNAL MEDICINE

## 2024-02-20 PROCEDURE — 85025 COMPLETE CBC W/AUTO DIFF WBC: CPT | Mod: HCNC | Performed by: INTERNAL MEDICINE

## 2024-02-20 PROCEDURE — 1125F AMNT PAIN NOTED PAIN PRSNT: CPT | Mod: HCNC,CPTII,S$GLB, | Performed by: INTERNAL MEDICINE

## 2024-02-20 NOTE — PROGRESS NOTES
Subjective:       Patient ID: Misha Eldridge Jr. is a 78 y.o. male.    Chief Complaint: Follow-up, Diabetes, and Hypertension    Here for annual exam    Chronic leg and neck pains. Right shoulder remains painful. Was told in past would need surgery. He is satisfied with current status.         ### DM ###  + neuropathy of hands and feet on gabapentin 300mg TID. States feet are slightly worsening but overall stable and controlled.   -11/2022 worsening A1c, prednisone induced as part of prostate CA Tx regimen.   -Working with Jossie Vega in DM education.      -Prandial 10 with SSI and Tuojeo 28. Rybelsus 14mg, metformin 1g BID and glipizide 10 BID.        ### Prostate CA ###  Dx approx 2010 at [facility] s/p partial resection. No XRT. Not sure is his chemo was for colon or prostate. He denies urinary frequency, urinary urgency, decreased force of stream, incomplete emptying of bladder, post void dribble, nocturia, or gross hematuria. Has not been back to see his urologist.    04/2022 recurrence noted with PSA 7.1.  prostate adenocarcinoma, followed by Dr. Betancourt and s/p RadTx with Dr. Michelle   -Completed XRT 2/1/23  -Current Tx. Zytiga with prednisone   [facility] urology Irene Thompson, CNS on 8/15/2023     ### Colon CA ####   Dx approx 2011/2012. S/p colectomy and reversal. Overdue for colonoscopy. Had FIT done b/c he was unable to get this done, which was negative as of 12/2017.  He is now amenable to getting the appropriate colonscopy  -colonoscopy needed in 6/2023. Scheduled for 09/2023     ### Chronic low back pain ###  -daily. Rare radiation down legs. Has never had any treatment. He does not take anything on regular basis for symptoms. Pain is non debilitating.   Right groin pain, described as burning and tingling, only occurs when he touches area. Back continues to bother him. This has been occurring for several years.             Review of Systems   Constitutional:  Negative for appetite change, chills, fever  "and unexpected weight change.   HENT:  Negative for hearing loss, sore throat and trouble swallowing.    Eyes:  Negative for visual disturbance.   Respiratory:  Negative for cough, chest tightness and shortness of breath.    Cardiovascular:  Negative for chest pain and leg swelling.   Gastrointestinal:  Negative for abdominal pain, blood in stool, constipation, diarrhea, nausea and vomiting.   Endocrine: Negative for polydipsia and polyuria.   Genitourinary:  Negative for decreased urine volume, difficulty urinating, dysuria, frequency and urgency.   Musculoskeletal:  Positive for arthralgias and back pain. Negative for gait problem.   Skin:  Negative for rash.   Neurological:  Negative for dizziness and numbness.   Psychiatric/Behavioral:  The patient is not nervous/anxious.        Objective:      Vitals:    02/20/24 1456   BP: 112/70   BP Location: Left arm   Patient Position: Sitting   Pulse: 67   SpO2: 99%   Weight: 90 kg (198 lb 6.6 oz)   Height: 6' 1" (1.854 m)      Physical Exam  Vitals and nursing note reviewed.   Constitutional:       General: He is not in acute distress.     Appearance: Normal appearance. He is well-developed.   HENT:      Head: Normocephalic and atraumatic.      Mouth/Throat:      Pharynx: No oropharyngeal exudate.   Eyes:      General: No scleral icterus.     Conjunctiva/sclera: Conjunctivae normal.      Pupils: Pupils are equal, round, and reactive to light.   Neck:      Thyroid: No thyromegaly.   Cardiovascular:      Rate and Rhythm: Normal rate and regular rhythm.      Heart sounds: Normal heart sounds. No murmur heard.  Pulmonary:      Effort: Pulmonary effort is normal.      Breath sounds: Normal breath sounds. No wheezing or rales.   Abdominal:      General: There is no distension.   Musculoskeletal:         General: No tenderness.   Lymphadenopathy:      Cervical: No cervical adenopathy.   Skin:     General: Skin is warm and dry.   Neurological:      Mental Status: He is alert and " oriented to person, place, and time.   Psychiatric:         Behavior: Behavior normal.         Assessment:       1. Controlled type 2 diabetes mellitus with diabetic neuropathy, without long-term current use of insulin    2. Hypertension, unspecified type    3. Chronic shoulder pain, unspecified laterality    4. History of pulmonary embolism    5. CKD stage 3 secondary to diabetes    6. Chronic bilateral low back pain without sciatica    7. History of prostate cancer    8. Chronic neck pain    9. Urinary urgency    10. Anemia, unspecified type    11. Unspecified disturbances of skin sensation        Plan:       Misha was seen today for follow-up, diabetes and hypertension.    Diagnoses and all orders for this visit:    Controlled type 2 diabetes mellitus with diabetic neuropathy, without long-term current use of insulin  -     Microalbumin/Creatinine Ratio, Urine; Future  -     Hemoglobin A1C; Future    Hypertension, unspecified type   Controlled and asymptomatic.  Continue current Rx regimen.    Chronic shoulder pain, unspecified laterality  -     Ambulatory referral/consult to Pain Clinic; Future    History of pulmonary embolism   No s/s of recurrence    CKD stage 3 secondary to diabetes   Controlled and asymptomatic.  Continue current Rx regimen.    Chronic bilateral low back pain without sciatica  -     Ambulatory referral/consult to Pain Clinic; Future    History of prostate cancer c recurrence   He completed salvage XRT per Dr. Azevedo in February 2023. On Lupron and abiraterone/prednisone per Dr. Betancourt     Chronic neck pain  -     Ambulatory referral/consult to Pain Clinic; Future    Urinary urgency  -     Urinalysis, Reflex to Urine Culture Urine, Clean Catch; Future    Anemia, unspecified type  -     CBC Auto Differential; Future  -     Reticulocytes; Future  -     Vitamin B12; Future  -     Ferritin; Future  -     Iron and TIBC; Future    Unspecified disturbances of skin sensation  -     Vitamin B12;  Future           Dipak Mcclendon MD  Internal Medicine-[facility]        Side effects of medication(s) were discussed in detail and patient voiced understanding.  Patient will call back for any issues or complications.
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
## 2024-02-21 ENCOUNTER — TELEPHONE (OUTPATIENT)
Dept: ORTHOPEDICS | Facility: CLINIC | Age: 79
End: 2024-02-21
Payer: MEDICARE

## 2024-02-23 ENCOUNTER — OFFICE VISIT (OUTPATIENT)
Dept: PAIN MEDICINE | Facility: CLINIC | Age: 79
End: 2024-02-23
Attending: INTERNAL MEDICINE
Payer: MEDICARE

## 2024-02-23 ENCOUNTER — HOSPITAL ENCOUNTER (OUTPATIENT)
Dept: RADIOLOGY | Facility: OTHER | Age: 79
Discharge: HOME | End: 2024-02-23
Attending: STUDENT IN AN ORGANIZED HEALTH CARE EDUCATION/TRAINING PROGRAM
Payer: MEDICARE

## 2024-02-23 VITALS
HEIGHT: 73 IN | WEIGHT: 196.19 LBS | SYSTOLIC BLOOD PRESSURE: 128 MMHG | BODY MASS INDEX: 26 KG/M2 | DIASTOLIC BLOOD PRESSURE: 72 MMHG | HEART RATE: 66 BPM

## 2024-02-23 DIAGNOSIS — G89.29 CHRONIC SHOULDER PAIN, UNSPECIFIED LATERALITY: Primary | ICD-10-CM

## 2024-02-23 DIAGNOSIS — M25.519 CHRONIC SHOULDER PAIN, UNSPECIFIED LATERALITY: ICD-10-CM

## 2024-02-23 DIAGNOSIS — G89.29 CHRONIC NECK PAIN: ICD-10-CM

## 2024-02-23 DIAGNOSIS — M25.519 CHRONIC SHOULDER PAIN, UNSPECIFIED LATERALITY: Primary | ICD-10-CM

## 2024-02-23 DIAGNOSIS — G89.29 CHRONIC SHOULDER PAIN, UNSPECIFIED LATERALITY: ICD-10-CM

## 2024-02-23 DIAGNOSIS — M54.2 CHRONIC NECK PAIN: ICD-10-CM

## 2024-02-23 PROCEDURE — 73030 X-RAY EXAM OF SHOULDER: CPT | Mod: TC,50,HCNC,FY

## 2024-02-23 PROCEDURE — 1159F MED LIST DOCD IN RCRD: CPT | Mod: HCNC,CPTII,S$GLB, | Performed by: ANESTHESIOLOGY

## 2024-02-23 PROCEDURE — 1101F PT FALLS ASSESS-DOCD LE1/YR: CPT | Mod: HCNC,CPTII,S$GLB, | Performed by: ANESTHESIOLOGY

## 2024-02-23 PROCEDURE — 3074F SYST BP LT 130 MM HG: CPT | Mod: HCNC,CPTII,S$GLB, | Performed by: ANESTHESIOLOGY

## 2024-02-23 PROCEDURE — 3078F DIAST BP <80 MM HG: CPT | Mod: HCNC,CPTII,S$GLB, | Performed by: ANESTHESIOLOGY

## 2024-02-23 PROCEDURE — 3072F LOW RISK FOR RETINOPATHY: CPT | Mod: HCNC,CPTII,S$GLB, | Performed by: ANESTHESIOLOGY

## 2024-02-23 PROCEDURE — 73030 X-RAY EXAM OF SHOULDER: CPT | Mod: 26,50,HCNC, | Performed by: RADIOLOGY

## 2024-02-23 PROCEDURE — 99999 PR PBB SHADOW E&M-EST. PATIENT-LVL V: CPT | Mod: PBBFAC,HCNC,, | Performed by: ANESTHESIOLOGY

## 2024-02-23 PROCEDURE — 1125F AMNT PAIN NOTED PAIN PRSNT: CPT | Mod: HCNC,CPTII,S$GLB, | Performed by: ANESTHESIOLOGY

## 2024-02-23 PROCEDURE — 99204 OFFICE O/P NEW MOD 45 MIN: CPT | Mod: HCNC,S$GLB,, | Performed by: ANESTHESIOLOGY

## 2024-02-23 PROCEDURE — 3288F FALL RISK ASSESSMENT DOCD: CPT | Mod: HCNC,CPTII,S$GLB, | Performed by: ANESTHESIOLOGY

## 2024-02-23 NOTE — PROGRESS NOTES
PCP: Dipak Treviño MD    REFERRING PHYSICIAN: Dipak Treviño MD    CHIEF COMPLAINT: Right shoulder pain    Original HISTORY OF PRESENT ILLNESS: Misha Eldridge Jr. presents to the clinic for the evaluation of the above pain. The pain started 15-20 yrs ago, without known inciting trauma.  He states that more than 10 yrs ago he saw a surgeon who examined his shoulder and stated he needed to have surgery, however he never proceeded with that surgery.  He has continued to perform arm and shoulder exercises including lat pulldowns until recently.    Original Pain Description:  The pain is located in the right shoulder, both anteriorly and posteriorly and sometimes radiates to the right elbow. He describes the pain is described as stabbing. Exacerbating factors: raising arms above head, picking up heavy items, sleeping on the arm. Mitigating factors heat. Symptoms interfere with daily activity, sleeping, and work. The patient feels like symptoms have been worsening. Patient reports urinary incontinence and bowel incontinence.    Original PAIN SCORES:  Best: Pain is 8  Worst: Pain is 10  Current: Pain is 8        2/23/2024     2:54 PM   Last 3 PDI Scores   Pain Disability Index (PDI) 30       INTERVAL HISTORY: (Newest visit at the bottom)   Interval History (Date):       6 weeks of Conservative therapy:  PT: none (Must include dates)  Chiro: no  HEP: Has been trying but is unable to tolerate because of the pain      Treatments / Medications: (Ice/Heat/NSAIDS/APAP/etc):  BC powder (helps)  Tylenol (1 g BID)  Gabapentin 300 mg TID (diabetic neuropathy)      Interventional Pain Procedures: (Previous injections)  none    Past Medical History:   Diagnosis Date    Cataract     Colostomy in place     Dermatochalasis     Diabetes mellitus type II     Dry eye syndrome     Hemorrhoid     History of colon cancer     History of prostate cancer     History of pulmonary embolism     Hypertension     Macular degeneration       Past Surgical History:   Procedure Laterality Date    CATARACT EXTRACTION Bilateral     COLONOSCOPY N/A 06/02/2022    Procedure: COLONOSCOPY;  Surgeon: Jose Dangelo MD;  Location: [facility] ENDO (4TH FLR);  Service: Endoscopy;  Laterality: N/A;  fully vaccinated    COLONOSCOPY N/A 12/29/2023    Procedure: COLONOSCOPY;  Surgeon: YAMILA Mckinney MD;  Location: [facility] ENDO (4TH FLR);  Service: Endoscopy;  Laterality: N/A;  9/14 ref. by Dipak Treviño MD, PEG, instr. handed to patient-st  12/20-lvm for precall-MS  12/21/23- precall complete - ERW    ESOPHAGOGASTRODUODENOSCOPY N/A 6/30/2023    Procedure: EGD (ESOPHAGOGASTRODUODENOSCOPY);  Surgeon: Terrence Beatty MD;  Location: [facility] ENDO (2ND FLR);  Service: Endoscopy;  Laterality: N/A;    EYE SURGERY Bilateral     cataract extraction    HERNIA REPAIR      PROSTATECTOMY      SUBTOTAL COLECTOMY       Social History     Socioeconomic History    Marital status: Single   Occupational History     Comment: mother lives w/pt   Tobacco Use    Smoking status: Former     Types: Cigarettes    Smokeless tobacco: Never    Tobacco comments:     Quit smoking 20 years ago   Substance and Sexual Activity    Alcohol use: No    Drug use: No    Sexual activity: Not Currently     Partners: Female     Social Determinants of Health     Financial Resource Strain: Low Risk  (7/24/2023)    Overall Financial Resource Strain (CARDIA)     Difficulty of Paying Living Expenses: Not very hard   Food Insecurity: No Food Insecurity (7/24/2023)    Hunger Vital Sign     Worried About Running Out of Food in the Last Year: Never true     Ran Out of Food in the Last Year: Never true   Transportation Needs: No Transportation Needs (7/24/2023)    PRAPARE - Transportation     Lack of Transportation (Medical): No     Lack of Transportation (Non-Medical): No   Physical Activity: Insufficiently Active (7/24/2023)    Exercise Vital Sign     Days of Exercise per Week: 3 days     Minutes of Exercise per  Session: 20 min   Stress: No Stress Concern Present (7/24/2023)    [place] [employer] - Occupational Stress Questionnaire     Feeling of Stress : Not at all   Social Connections: Socially Isolated (7/24/2023)    Social Connection and Isolation Panel [NHANES]     Frequency of Communication with Friends and Family: Three times a week     Frequency of Social Gatherings with Friends and Family: Once a week     Attends Jain Services: Never     Active Member of Clubs or Organizations: No     Attends Club or Organization Meetings: Never     Marital Status:    Housing Stability: Low Risk  (7/24/2023)    Housing Stability Vital Sign     Unable to Pay for Housing in the Last Year: No     Number of Places Lived in the Last Year: 1     Unstable Housing in the Last Year: No     Family History   Problem Relation Age of Onset    Arthritis Mother     Hypertension Mother     Cancer Mother     Alcohol abuse Father     No Known Problems Daughter        Review of patient's allergies indicates:   Allergen Reactions    Hay fever and allergy relief        Current Outpatient Medications   Medication Sig    abiraterone (ZYTIGA) 250 mg Tab Take 4 tablets (1,000 mg total) by mouth once daily.    ACCU-CHEK SOFTCLIX LANCETS Misc TEST BLOOD SUGAR ONCE A DAY    amLODIPine (NORVASC) 10 MG tablet Take 1 tablet (10 mg total) by mouth once daily.    atorvastatin (LIPITOR) 20 MG tablet Take 1 tablet (20 mg total) by mouth once daily.    blood sugar diagnostic Strp 1 each by Misc.(Non-Drug; Combo Route) route once daily.    blood sugar diagnostic Strp Pt to check up to 6 times daily    blood sugar diagnostic Strp Use to check blood glucose 1-2 times daily as directed.    blood-glucose meter kit Use as instructed    carvediloL (COREG) 3.125 MG tablet Take 1 tablet (3.125 mg total) by mouth 2 (two) times daily.    docusate sodium (COLACE) 100 MG capsule Take 1 capsule (100 mg total) by mouth 2 (two) times daily as  "needed for Constipation.    doxazosin (CARDURA) 4 MG tablet Take 1 tablet (4 mg total) by mouth every evening.    gabapentin (NEURONTIN) 300 MG capsule TAKE 1 CAPSULE(300 MG) BY MOUTH THREE TIMES DAILY    glipiZIDE (GLUCOTROL) 10 MG tablet TAKE 1 TABLET(10 MG) BY MOUTH TWICE DAILY BEFORE MEALS    hydroCHLOROthiazide (HYDRODIURIL) 25 MG tablet TAKE ONE-HALF TABLET BY MOUTH ONCE DAILY    irbesartan (AVAPRO) 300 MG tablet TAKE 1 TABLET(300 MG) BY MOUTH EVERY EVENING    LANTUS SOLOSTAR U-100 INSULIN glargine 100 units/mL SubQ pen SMARTSI Unit(s) SUB-Q Every Evening    metFORMIN (GLUCOPHAGE) 1000 MG tablet Take 1 tablet (1,000 mg total) by mouth 2 (two) times daily with meals.    pen needle, diabetic (BD ULTRA-FINE KELECHI PEN NEEDLE) 32 gauge x 5/32" Ndle To use with insulin pens at meals and evening shot.    polyethylene glycol (GLYCOLAX) 17 gram PwPk Take 17 g by mouth daily as needed (Constipation).    predniSONE (DELTASONE) 5 MG tablet Take 1 tablet (5 mg total) by mouth 2 (two) times daily.    semaglutide (RYBELSUS) 14 mg tablet TAKE 1 TABLET(14 MG) BY MOUTH EVERY DAY    triamcinolone acetonide 0.1% (KENALOG) 0.1 % cream Apply topically 2 (two) times daily.    diclofenac sodium (VOLTAREN) 1 % Gel Apply 2 g topically 2 (two) times daily. for 7 days    furosemide (LASIX) 20 MG tablet Take 1 tablet (20 mg total) by mouth once daily. for 3 days    insulin aspart U-100 (NOVOLOG FLEXPEN U-100 INSULIN) 100 unit/mL (3 mL) InPn pen Inject 8 Units into the skin 3 (three) times daily with meals.    lancing device with lancets Kit 1 each by Misc.(Non-Drug; Combo Route) route 2 (two) times daily.     No current facility-administered medications for this visit.       ROS:  GENERAL: No fever. No chills. No fatigue. Denies weight loss. Denies weight gain.  HEENT: Denies headaches. Denies vision change. Denies eye pain. Denies double vision. Denies ear pain.   CV: Denies chest pain.   PULM: Denies of shortness of breath.  GI: " "Denies constipation. No diarrhea. No abdominal pain. Denies nausea. Denies vomiting. No blood in stool.  HEME: Denies bleeding problems.  : Denies urgency. No painful urination. No blood in urine.  MS: Denies joint stiffness. Denies joint swelling.  Denies back pain.  SKIN: Denies rash.   NEURO: Denies seizures. No weakness.  PSYCH:  Denies difficulty sleeping. No anxiety. Denies depression. No suicidal thoughts.       VITALS:   Vitals:    02/23/24 1452   BP: 128/72   Pulse: 66   Weight: 89 kg (196 lb 3.4 oz)   Height: 6' 1" (1.854 m)   PainSc:   6   PainLoc: Back         PHYSICAL EXAM:   GENERAL: Poor memory. Well appearing, in no acute distress, alert and oriented x3.  PSYCH:  Mood and affect appropriate.  SKIN: Skin color, texture, turgor normal, no rashes or lesions.  HEENT:  Normocephalic, atraumatic. Cranial nerves grossly intact.  NECK: No pain to palpation over the cervical paraspinous muscles. No pain to palpation over facets. No pain with neck flexion, extension, or lateral flexion.   PULM: No evidence of respiratory difficulty, symmetric chest rise.  GI:  Non-distended  BACK: Normal range of motion. No pain to palpation over the spinous processes.   MUSCULOSKELETAL: Shoulders are grossly symmetric in inspection, no tenderness over AC joint. Scapular motion symmetric and appropriate. Primary pain reproduced with palpation of anterolateral shoulder. +Hawking's, +Painful arc and to a slightly lesser degree, neer's test. No pain with resisted external or internal rotation, which are both strong. No pain with Scarf's. Relatively less painful with speed's test and empty can.  NEURO: Sensation is equal and appropriate bilaterally. Bilateral upper extremity strength. Bilateral upper and lower extremity coordination and muscle stretch reflexes are physiologic and symmetric.   GAIT: short, non-antalgic.      LABS:    Chemistry        Component Value Date/Time     02/20/2024 0815    K 4.1 02/20/2024 0815    " " 02/20/2024 0815    CO2 24 02/20/2024 0815    BUN 17 02/20/2024 0815    CREATININE 1.1 02/20/2024 0815     (H) 02/20/2024 0815        Component Value Date/Time    CALCIUM 9.6 02/20/2024 0815    ALKPHOS 72 02/20/2024 0815    AST 12 02/20/2024 0815    ALT 9 (L) 02/20/2024 0815    BILITOT 0.3 02/20/2024 0815    ESTGFRAFRICA >60.0 06/21/2022 1046    EGFRNONAA 53.0 (A) 06/21/2022 1046            IMAGING:    No dedicated shoulder imaging, however on his 1/28/24 abdominal CT, his left glenohumeral joint was visible and did not show any significant osseous pathology, which agreed with the radiology read, which states "The osseous structures demonstrate no destructive osseous lesions. "    ASSESSMENT: 78 y.o. year old male with pain, consistent with:    Encounter Diagnosis   Name Primary?    Chronic shoulder pain, unspecified laterality Yes       DISCUSSION: Mr. Eldridge is a nice bessie who comes to us from Dr. Treviño with bilateral shoulder pain (R>L). No imaging available on consult. Exam shows pain reproduced with palpation adjacent to the subacromial bursae. Crepitus present on shoulder exam as well. Will obtain x-rays and refer to physical therapy before considering steroid injections.    PLAN:  Discussed appropriate dosing of BC powder and tylenol.  Discussed the use of ice and heat.  Some memory difficulties were noted on today's interview and we reached out to Dr. Treviño.   Refer to physical therapy for bilateral subacromial bursitis  Bilateral shoulder x-rays ordered  Return to clinic in 6 weeks to consider interventions    I would like to thank Dipak Treviño MD for the opportunity to assist in the care of this patient. We had a very nice visit and I look forward to continuing their care. Please let me know if I can be of further assistance.     Roselia Vance  02/23/2024      "

## 2024-03-04 ENCOUNTER — LAB VISIT (OUTPATIENT)
Dept: LAB | Facility: OTHER | Age: 79
End: 2024-03-04
Attending: INTERNAL MEDICINE
Payer: MEDICARE

## 2024-03-04 DIAGNOSIS — E11.40 CONTROLLED TYPE 2 DIABETES MELLITUS WITH DIABETIC NEUROPATHY, WITHOUT LONG-TERM CURRENT USE OF INSULIN: ICD-10-CM

## 2024-03-04 DIAGNOSIS — R20.9 UNSPECIFIED DISTURBANCES OF SKIN SENSATION: ICD-10-CM

## 2024-03-04 DIAGNOSIS — D64.9 ANEMIA, UNSPECIFIED TYPE: ICD-10-CM

## 2024-03-04 LAB
BASOPHILS # BLD AUTO: 0.02 K/UL (ref 0–0.2)
BASOPHILS NFR BLD: 0.7 % (ref 0–1.9)
DIFFERENTIAL METHOD BLD: ABNORMAL
EOSINOPHIL # BLD AUTO: 0.3 K/UL (ref 0–0.5)
EOSINOPHIL NFR BLD: 11.4 % (ref 0–8)
ERYTHROCYTE [DISTWIDTH] IN BLOOD BY AUTOMATED COUNT: 16.6 % (ref 11.5–14.5)
ESTIMATED AVG GLUCOSE: 151 MG/DL (ref 68–131)
FERRITIN SERPL-MCNC: 117 NG/ML (ref 20–300)
HBA1C MFR BLD: 6.9 % (ref 4–5.6)
HCT VFR BLD AUTO: 33.6 % (ref 40–54)
HGB BLD-MCNC: 11.1 G/DL (ref 14–18)
IMM GRANULOCYTES # BLD AUTO: 0.01 K/UL (ref 0–0.04)
IMM GRANULOCYTES NFR BLD AUTO: 0.3 % (ref 0–0.5)
IRON SERPL-MCNC: 70 UG/DL (ref 45–160)
LYMPHOCYTES # BLD AUTO: 0.8 K/UL (ref 1–4.8)
LYMPHOCYTES NFR BLD: 28.1 % (ref 18–48)
MCH RBC QN AUTO: 28.5 PG (ref 27–31)
MCHC RBC AUTO-ENTMCNC: 33 G/DL (ref 32–36)
MCV RBC AUTO: 86 FL (ref 82–98)
MONOCYTES # BLD AUTO: 0.3 K/UL (ref 0.3–1)
MONOCYTES NFR BLD: 10 % (ref 4–15)
NEUTROPHILS # BLD AUTO: 1.5 K/UL (ref 1.8–7.7)
NEUTROPHILS NFR BLD: 49.5 % (ref 38–73)
NRBC BLD-RTO: 0 /100 WBC
PLATELET # BLD AUTO: 183 K/UL (ref 150–450)
PMV BLD AUTO: 9.5 FL (ref 9.2–12.9)
RBC # BLD AUTO: 3.89 M/UL (ref 4.6–6.2)
RETICS/RBC NFR AUTO: 1.7 % (ref 0.4–2)
SATURATED IRON: 21 % (ref 20–50)
TOTAL IRON BINDING CAPACITY: 327 UG/DL (ref 250–450)
TRANSFERRIN SERPL-MCNC: 221 MG/DL (ref 200–375)
VIT B12 SERPL-MCNC: 247 PG/ML (ref 210–950)
WBC # BLD AUTO: 2.99 K/UL (ref 3.9–12.7)

## 2024-03-04 PROCEDURE — 85045 AUTOMATED RETICULOCYTE COUNT: CPT | Mod: HCNC | Performed by: INTERNAL MEDICINE

## 2024-03-04 PROCEDURE — 83540 ASSAY OF IRON: CPT | Mod: HCNC | Performed by: INTERNAL MEDICINE

## 2024-03-04 PROCEDURE — 85025 COMPLETE CBC W/AUTO DIFF WBC: CPT | Mod: HCNC | Performed by: INTERNAL MEDICINE

## 2024-03-04 PROCEDURE — 82728 ASSAY OF FERRITIN: CPT | Mod: HCNC | Performed by: INTERNAL MEDICINE

## 2024-03-04 PROCEDURE — 82607 VITAMIN B-12: CPT | Mod: HCNC | Performed by: INTERNAL MEDICINE

## 2024-03-04 PROCEDURE — 36415 COLL VENOUS BLD VENIPUNCTURE: CPT | Mod: HCNC | Performed by: INTERNAL MEDICINE

## 2024-03-04 PROCEDURE — 83036 HEMOGLOBIN GLYCOSYLATED A1C: CPT | Mod: HCNC | Performed by: INTERNAL MEDICINE

## 2024-03-06 ENCOUNTER — TELEPHONE (OUTPATIENT)
Dept: INTERNAL MEDICINE | Facility: CLINIC | Age: 79
End: 2024-03-06
Payer: MEDICARE

## 2024-03-06 NOTE — PROGRESS NOTES
Please notify patient of results:  Excellent! Your diabetes is controlled, confirmed by a hemoglobin A1c less than 7%. Congratulations on your hard work. That being said the overall goal of diabetic treatment is to get your A1c as low as possible without hurting you. Well done and keep going. Slow and steady.

## 2024-03-06 NOTE — TELEPHONE ENCOUNTER
----- Message from Dipak Treviño MD sent at 3/6/2024 11:57 AM CST -----  Please notify patient of results:  Excellent! Your diabetes is controlled, confirmed by a hemoglobin A1c less than 7%. Congratulations on your hard work. That being said the overall goal of diabetic treatment is to get your A1c as low as possible without hurting you. Well done and keep going. Slow and steady.

## 2024-03-06 NOTE — TELEPHONE ENCOUNTER
Spoke to patient he was told per dr. Treviño: Excellent! Your diabetes is controlled, confirmed by a hemoglobin A1c less than 7%. Congratulations on your hard work. That being said the overall goal of diabetic treatment is to get your A1c as low as possible without hurting you. Well done and keep going. Slow and steady.   Patient thanked me for calling and said he be jogging from here to [place] every day

## 2024-03-27 ENCOUNTER — OFFICE VISIT (OUTPATIENT)
Dept: PULMONOLOGY | Facility: CLINIC | Age: 79
End: 2024-03-27
Payer: MEDICARE

## 2024-03-27 VITALS
DIASTOLIC BLOOD PRESSURE: 62 MMHG | HEART RATE: 69 BPM | BODY MASS INDEX: 26.44 KG/M2 | SYSTOLIC BLOOD PRESSURE: 120 MMHG | WEIGHT: 199.5 LBS | HEIGHT: 73 IN | OXYGEN SATURATION: 98 %

## 2024-03-27 DIAGNOSIS — J92.9 PLEURAL PLAQUE: ICD-10-CM

## 2024-03-27 DIAGNOSIS — Z87.891 HISTORY OF NICOTINE DEPENDENCE: Primary | ICD-10-CM

## 2024-03-27 DIAGNOSIS — J94.8 PLEURAL CALCIFICATION: ICD-10-CM

## 2024-03-27 PROCEDURE — 99999 PR PBB SHADOW E&M-EST. PATIENT-LVL IV: CPT | Mod: PBBFAC,HCNC,, | Performed by: INTERNAL MEDICINE

## 2024-03-27 PROCEDURE — 1159F MED LIST DOCD IN RCRD: CPT | Mod: HCNC,CPTII,S$GLB, | Performed by: INTERNAL MEDICINE

## 2024-03-27 PROCEDURE — 3074F SYST BP LT 130 MM HG: CPT | Mod: HCNC,CPTII,S$GLB, | Performed by: INTERNAL MEDICINE

## 2024-03-27 PROCEDURE — 1126F AMNT PAIN NOTED NONE PRSNT: CPT | Mod: HCNC,CPTII,S$GLB, | Performed by: INTERNAL MEDICINE

## 2024-03-27 PROCEDURE — 99203 OFFICE O/P NEW LOW 30 MIN: CPT | Mod: HCNC,S$GLB,, | Performed by: INTERNAL MEDICINE

## 2024-03-27 PROCEDURE — 1101F PT FALLS ASSESS-DOCD LE1/YR: CPT | Mod: HCNC,CPTII,S$GLB, | Performed by: INTERNAL MEDICINE

## 2024-03-27 PROCEDURE — 3072F LOW RISK FOR RETINOPATHY: CPT | Mod: HCNC,CPTII,S$GLB, | Performed by: INTERNAL MEDICINE

## 2024-03-27 PROCEDURE — 3078F DIAST BP <80 MM HG: CPT | Mod: HCNC,CPTII,S$GLB, | Performed by: INTERNAL MEDICINE

## 2024-03-27 PROCEDURE — 3288F FALL RISK ASSESSMENT DOCD: CPT | Mod: HCNC,CPTII,S$GLB, | Performed by: INTERNAL MEDICINE

## 2024-03-27 NOTE — PROGRESS NOTES
Subjective:       Patient ID: Misha Eldridge Jr. is a 78 y.o. male.    Chief Complaint: No chief complaint on file.    HPI:   Misha Eldridge Jr. is a 78 y.o. male new to me who presents for evaluation of pleural plaques.    Being treated for prostate ca so he underwent CT Chests which showed pleural plaques that have been stable.    Denies significant respiratory sx including cough, sputum production, dyspnea, recurrent URI/LRTIs    Denies coryza, rhinorrhea    Denies chest pain, orthopnea, PND, LE edema, palpitations, syncope/presyncope    Denies f/c/ns, weight loss, malaise, fatigue      Exposures:  Work- Body/[employer] work his whole life. Worked on the [employer] in various roles as well.   Tobacco- up to 2ppd for 40 years, quit around 15 years ago  Inhalational Agents- Denies  Mold- Denies        Denies lung ca in the family  Family History of Lung Disease: Denies  Childhood History of Lung Disease: Denies    Review of Systems    As above    Social History     Tobacco Use    Smoking status: Former     Types: Cigarettes    Smokeless tobacco: Never    Tobacco comments:     Quit smoking 20 years ago   Substance Use Topics    Alcohol use: No       Review of patient's allergies indicates:   Allergen Reactions    Hay fever and allergy relief      Past Medical History:   Diagnosis Date    Cataract     Colostomy in place     Dermatochalasis     Diabetes mellitus type II     Dry eye syndrome     Hemorrhoid     History of colon cancer     History of prostate cancer     History of pulmonary embolism     Hypertension     Macular degeneration      Past Surgical History:   Procedure Laterality Date    CATARACT EXTRACTION Bilateral     COLONOSCOPY N/A 06/02/2022    Procedure: COLONOSCOPY;  Surgeon: Jose Dangelo MD;  Location: [facility] ([address] [place] [address]);  Service: Endoscopy;  Laterality: N/A;  fully vaccinated    COLONOSCOPY N/A 12/29/2023    Procedure: COLONOSCOPY;  Surgeon: YAMILA Mckinney MD;  Location: [facility]  (4TH FLR);  Service: Endoscopy;  Laterality: N/A;   ref. by Dipak Treviño MD, PEG, instr. handed to patient-st  -lvm for precall-MS  23- precall complete - ERW    ESOPHAGOGASTRODUODENOSCOPY N/A 2023    Procedure: EGD (ESOPHAGOGASTRODUODENOSCOPY);  Surgeon: Terrence Beatty MD;  Location: [facility] (2ND FLR);  Service: Endoscopy;  Laterality: N/A;    EYE SURGERY Bilateral     cataract extraction    HERNIA REPAIR      PROSTATECTOMY      SUBTOTAL COLECTOMY       Current Outpatient Medications on File Prior to Visit   Medication Sig    abiraterone (ZYTIGA) 250 mg Tab Take 4 tablets (1,000 mg total) by mouth once daily.    ACCU-CHEK SOFTCLIX LANCETS Misc TEST BLOOD SUGAR ONCE A DAY    amLODIPine (NORVASC) 10 MG tablet Take 1 tablet (10 mg total) by mouth once daily.    atorvastatin (LIPITOR) 20 MG tablet Take 1 tablet (20 mg total) by mouth once daily.    blood sugar diagnostic Strp 1 each by Misc.(Non-Drug; Combo Route) route once daily.    blood sugar diagnostic Strp Pt to check up to 6 times daily    blood sugar diagnostic Strp Use to check blood glucose 1-2 times daily as directed.    blood-glucose meter kit Use as instructed    carvediloL (COREG) 3.125 MG tablet Take 1 tablet (3.125 mg total) by mouth 2 (two) times daily.    docusate sodium (COLACE) 100 MG capsule Take 1 capsule (100 mg total) by mouth 2 (two) times daily as needed for Constipation.    doxazosin (CARDURA) 4 MG tablet Take 1 tablet (4 mg total) by mouth every evening.    gabapentin (NEURONTIN) 300 MG capsule TAKE 1 CAPSULE(300 MG) BY MOUTH THREE TIMES DAILY    glipiZIDE (GLUCOTROL) 10 MG tablet TAKE 1 TABLET(10 MG) BY MOUTH TWICE DAILY BEFORE MEALS    hydroCHLOROthiazide (HYDRODIURIL) 25 MG tablet TAKE ONE-HALF TABLET BY MOUTH ONCE DAILY    irbesartan (AVAPRO) 300 MG tablet TAKE 1 TABLET(300 MG) BY MOUTH EVERY EVENING    LANTUS SOLOSTAR U-100 INSULIN glargine 100 units/mL SubQ pen SMARTSI Unit(s) SUB-Q Every Evening     "metFORMIN (GLUCOPHAGE) 1000 MG tablet Take 1 tablet (1,000 mg total) by mouth 2 (two) times daily with meals.    pen needle, diabetic (BD ULTRA-FINE KELECHI PEN NEEDLE) 32 gauge x 5/32" Ndle To use with insulin pens at meals and evening shot.    polyethylene glycol (GLYCOLAX) 17 gram PwPk Take 17 g by mouth daily as needed (Constipation).    predniSONE (DELTASONE) 5 MG tablet Take 1 tablet (5 mg total) by mouth 2 (two) times daily.    semaglutide (RYBELSUS) 14 mg tablet TAKE 1 TABLET(14 MG) BY MOUTH EVERY DAY    triamcinolone acetonide 0.1% (KENALOG) 0.1 % cream Apply topically 2 (two) times daily.    diclofenac sodium (VOLTAREN) 1 % Gel Apply 2 g topically 2 (two) times daily. for 7 days    furosemide (LASIX) 20 MG tablet Take 1 tablet (20 mg total) by mouth once daily. for 3 days    insulin aspart U-100 (NOVOLOG FLEXPEN U-100 INSULIN) 100 unit/mL (3 mL) InPn pen Inject 8 Units into the skin 3 (three) times daily with meals.    lancing device with lancets Kit 1 each by Misc.(Non-Drug; Combo Route) route 2 (two) times daily.     No current facility-administered medications on file prior to visit.       Objective:      Vitals:    03/27/24 1035   BP: 120/62   BP Location: Left arm   Patient Position: Sitting   BP Method: Medium (Manual)   Pulse: 69   SpO2: 98%   Weight: 90.5 kg (199 lb 8.3 oz)   Height: 6' 1" (1.854 m)     Physical Exam   Constitutional: He appears well-developed and well-nourished.   HENT:   Nose: No mucosal edema.   Mouth/Throat: Oropharynx is clear and moist. Mallampati Score: I.   Neck: No tracheal deviation present.   Cardiovascular: Normal rate and regular rhythm.   Pulmonary/Chest: Normal expansion, symmetric chest wall expansion, effort normal and breath sounds normal. No respiratory distress. He has no wheezes. He has no rhonchi. He has no rales.   Abdominal: He exhibits no distension.   Musculoskeletal:         General: No edema.      Cervical back: Neck supple.   Lymphadenopathy: No " supraclavicular adenopathy is present.     He has no cervical adenopathy.   Skin: Skin is warm and dry. No cyanosis or erythema. Nails show no clubbing.   Nursing note and vitals reviewed.      Personal Diagnostic Review    Laboratory:  3/4/24  Eosinophils- 0.3    1/28/24  Bicarb- 22    CTA Chest 12/15/23- Images personally reviewed. I agree w/ the radiologist who notes;  No evidence of pulmonary embolus.     Pleural plaque calcifications bilaterally may indicate prior asbestos exposure, it is unchanged.     Benign left lateral chest wall lipoma, unchanged.    TTE 10/13/23    Left Ventricle: The left ventricle is normal in size. Normal wall thickness. There is concentric remodeling. There is normal systolic function with a visually estimated ejection fraction of 60 - 65%. Grade I diastolic dysfunction.    Right Ventricle: Normal right ventricular cavity size. Wall thickness is normal. Systolic function is normal.    Tricuspid Valve: There is mild regurgitation.      Assessment:     Problem List Items Addressed This Visit          Pulmonary    Pleural plaque     Possible asbestos exposure with his previous jobs. 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     Denies respiratory sx.    No further work up warranted at this time.    Meets criteria for lung cancer screening. Next due would be 12/2024         Pleural calcification       Other    History of nicotine dependence - Primary     2ppd for 40 years. Quit 15-20 years ago. LDCT Chest indicated w/ next due 12/2024

## 2024-03-27 NOTE — ASSESSMENT & PLAN NOTE
Possible asbestos exposure with his previous jobs. 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     Denies respiratory sx.    No further work up warranted at this time.    Meets criteria for lung cancer screening. Next due would be 12/2024

## 2024-04-09 DIAGNOSIS — I10 HYPERTENSION, UNSPECIFIED TYPE: ICD-10-CM

## 2024-04-09 NOTE — TELEPHONE ENCOUNTER
Care Due:                  Date            Visit Type   Department     Provider  --------------------------------------------------------------------------------                                EP -                              PRIMARY      [facility] INTERNAL  Last Visit: 02-      CARE ([facility])   MEDICINE       Dipak Treviño                              EP -                              PRIMARY      [facility] INTERNAL  Next Visit: 08-      CARE ([facility])   MEDICINE       Dipak Treviño                                                            Last  Test          Frequency    Reason                     Performed    Due Date  --------------------------------------------------------------------------------    Lipid Panel.  12 months..  atorvastatin.............  05- 05-    Health [facility] Embedded Care Due Messages. Reference number: [id number].   4/09/2024 12:22:24 PM CDT

## 2024-04-09 NOTE — TELEPHONE ENCOUNTER
Received prescription refill request from [facility] for Amlodipine Besylate 10 mg tabs.  Patient last seen in clinic on 2/20/2024.  Order pended.

## 2024-04-10 RX ORDER — AMLODIPINE BESYLATE 10 MG/1
10 TABLET ORAL DAILY
Qty: 90 TABLET | Refills: 2 | Status: SHIPPED | OUTPATIENT
Start: 2024-04-10

## 2024-04-11 DIAGNOSIS — Q24.9 HEART ABNORMALITY: ICD-10-CM

## 2024-04-11 DIAGNOSIS — I10 HYPERTENSION, UNSPECIFIED TYPE: ICD-10-CM

## 2024-04-11 DIAGNOSIS — E78.9 ABNORMAL CHOLESTEROL TEST: ICD-10-CM

## 2024-04-11 NOTE — TELEPHONE ENCOUNTER
No care due was identified.  Health [facility] Embedded Care Due Messages. Reference number: [id number].   4/11/2024 9:37:20 AM CDT

## 2024-04-12 RX ORDER — ATORVASTATIN CALCIUM 20 MG/1
20 TABLET, FILM COATED ORAL DAILY
Qty: 90 TABLET | Refills: 0 | Status: SHIPPED | OUTPATIENT
Start: 2024-04-12

## 2024-04-12 RX ORDER — HYDROCHLOROTHIAZIDE 25 MG/1
12.5 TABLET ORAL DAILY
Qty: 45 TABLET | Refills: 3 | Status: SHIPPED | OUTPATIENT
Start: 2024-04-12

## 2024-04-12 RX ORDER — CARVEDILOL 3.12 MG/1
3.12 TABLET ORAL 2 TIMES DAILY
Qty: 180 TABLET | Refills: 3 | Status: SHIPPED | OUTPATIENT
Start: 2024-04-12

## 2024-04-12 NOTE — TELEPHONE ENCOUNTER
Refill Routing Note   Medication(s) are not appropriate for processing by [facility] for the following reason(s):        Drug-drug interaction, Drug-disease interaction       [facility] action(s):  Defer  Approve             Pharmacist review requested: Yes     Appointments  past 12m or future 3m with PCP    Date Provider   Last Visit   2/20/2024 Dipak Treviño MD   Next Visit   8/21/2024 Dipak Treviño MD   ED visits in past 90 days: 1        Note composed:12:27 PM 04/12/2024

## 2024-05-02 ENCOUNTER — TELEPHONE (OUTPATIENT)
Dept: PODIATRY | Facility: CLINIC | Age: 79
End: 2024-05-02
Payer: MEDICARE

## 2024-05-02 NOTE — TELEPHONE ENCOUNTER
Spoke with pt in regards to 5/2 no show appt. He states he went to get a pedicure and wants to r/s to a later date. Pt r/s to 7/8. Appt letter mailed. Pt verbalized understanding.   
5

## 2024-05-13 DIAGNOSIS — E13.9 DIABETES 1.5, MANAGED AS TYPE 2: ICD-10-CM

## 2024-05-13 DIAGNOSIS — G62.9 NEUROPATHY: ICD-10-CM

## 2024-05-13 RX ORDER — GLIPIZIDE 10 MG/1
10 TABLET ORAL
Qty: 180 TABLET | Refills: 1 | Status: SHIPPED | OUTPATIENT
Start: 2024-05-13

## 2024-05-13 RX ORDER — METFORMIN HYDROCHLORIDE 1000 MG/1
1000 TABLET ORAL 2 TIMES DAILY WITH MEALS
Qty: 180 TABLET | Refills: 1 | Status: SHIPPED | OUTPATIENT
Start: 2024-05-13

## 2024-05-13 NOTE — TELEPHONE ENCOUNTER
Refill Routing Note   Medication(s) are not appropriate for processing by [facility] for the following reason(s):        Outside of protocol    [facility] action(s):  Route  Approve               Appointments  past 12m or future 3m with PCP    Date Provider   Last Visit   2/20/2024 Dipak Treviño MD   Next Visit   8/21/2024 Dipak Treviño MD   ED visits in past 90 days: 0        Note composed:4:27 PM 05/13/2024

## 2024-05-13 NOTE — TELEPHONE ENCOUNTER
No care due was identified.  Health [facility] Embedded Care Due Messages. Reference number: [id number].   5/13/2024 12:51:50 PM CDT

## 2024-05-14 RX ORDER — GABAPENTIN 300 MG/1
300 CAPSULE ORAL 3 TIMES DAILY
Qty: 270 CAPSULE | Refills: 1 | Status: SHIPPED | OUTPATIENT
Start: 2024-05-14

## 2024-06-25 ENCOUNTER — PROCEDURE VISIT (OUTPATIENT)
Dept: UROLOGY | Facility: CLINIC | Age: 79
End: 2024-06-25
Payer: MEDICARE

## 2024-06-25 VITALS
DIASTOLIC BLOOD PRESSURE: 59 MMHG | HEART RATE: 60 BPM | RESPIRATION RATE: 12 BRPM | HEIGHT: 73 IN | OXYGEN SATURATION: 100 % | BODY MASS INDEX: 26.44 KG/M2 | WEIGHT: 199.5 LBS | SYSTOLIC BLOOD PRESSURE: 105 MMHG

## 2024-06-25 DIAGNOSIS — N39.3 STRESS INCONTINENCE: ICD-10-CM

## 2024-06-25 DIAGNOSIS — C61 PROSTATE CANCER: Primary | ICD-10-CM

## 2024-06-25 DIAGNOSIS — R31.9 HEMATURIA, UNSPECIFIED TYPE: ICD-10-CM

## 2024-06-25 DIAGNOSIS — R39.15 URINARY URGENCY: ICD-10-CM

## 2024-06-25 LAB
BILIRUB UR QL STRIP: NEGATIVE
CLARITY UR REFRACT.AUTO: CLEAR
COLOR UR AUTO: COLORLESS
GLUCOSE UR QL STRIP: NEGATIVE
HGB UR QL STRIP: NEGATIVE
KETONES UR QL STRIP: NEGATIVE
LEUKOCYTE ESTERASE UR QL STRIP: NEGATIVE
NITRITE UR QL STRIP: NEGATIVE
PH UR STRIP: 6 [PH] (ref 5–8)
PROT UR QL STRIP: NEGATIVE
SP GR UR STRIP: 1.01 (ref 1–1.03)
URN SPEC COLLECT METH UR: ABNORMAL

## 2024-06-25 PROCEDURE — 81003 URINALYSIS AUTO W/O SCOPE: CPT | Mod: HCNC | Performed by: UROLOGY

## 2024-06-25 RX ORDER — CIPROFLOXACIN 500 MG/1
500 TABLET ORAL
Status: SHIPPED | OUTPATIENT
Start: 2024-06-25

## 2024-06-25 RX ORDER — VIBEGRON 75 MG/1
75 TABLET, FILM COATED ORAL DAILY
Qty: 30 TABLET | Refills: 11 | Status: SHIPPED | OUTPATIENT
Start: 2024-06-25 | End: 2025-06-20

## 2024-06-25 RX ORDER — LIDOCAINE HYDROCHLORIDE 20 MG/ML
JELLY TOPICAL
Status: SHIPPED | OUTPATIENT
Start: 2024-06-25

## 2024-06-25 NOTE — PROCEDURES
Cystoscopy    Date/Time: 6/25/2024 10:29 AM    Performed by: Duarte Ravi MD  Authorized by: Cathy Voss NP    Consent Done?:  Yes (Written)  Timeout: prior to procedure the correct patient, procedure, and site was verified    Prep: patient was prepped and draped in usual sterile fashion    Local anesthesia used?: Yes    Anesthesia:  Lidocaine jelly  Indications: hematuria    Position:  Supine  Anesthesia:  Lidocaine jelly  Preparation: Patient was prepped and draped in usual sterile fashion    Scope type:  Flexible cystoscope  External exam normal: Yes    Digital exam performed: No    Urethra normal: Yes    Bladder neck normal: Yes    Bladder normal: Yes     patient tolerated the procedure well with no immediate complications  Comments:      No tumors  No stricture  Varicosities at bladder base consistent with previous XRT  Lab Results       Component                Value               Date                       PSA                      7.1 (H)             04/12/2022                 PSADIAG                  <0.01               02/20/2024              Prostate CA INGRID sp surgery and XRT  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

## 2024-07-02 ENCOUNTER — TELEPHONE (OUTPATIENT)
Dept: PODIATRY | Facility: CLINIC | Age: 79
End: 2024-07-02
Payer: MEDICARE

## 2024-07-02 NOTE — TELEPHONE ENCOUNTER
Spoke with pt in regards to 7/8 appt. Provider on call and pt r/s. Pt verbalized understanding. Appt reminder mailed.

## 2024-07-15 DIAGNOSIS — I10 HYPERTENSION, UNSPECIFIED TYPE: ICD-10-CM

## 2024-07-15 DIAGNOSIS — I10 ESSENTIAL HYPERTENSION: ICD-10-CM

## 2024-07-15 DIAGNOSIS — Q24.9 HEART ABNORMALITY: ICD-10-CM

## 2024-07-15 DIAGNOSIS — E78.9 ABNORMAL CHOLESTEROL TEST: ICD-10-CM

## 2024-07-15 RX ORDER — CARVEDILOL 3.12 MG/1
3.12 TABLET ORAL 2 TIMES DAILY
Qty: 180 TABLET | Refills: 2 | Status: SHIPPED | OUTPATIENT
Start: 2024-07-15

## 2024-07-15 RX ORDER — ATORVASTATIN CALCIUM 20 MG/1
20 TABLET, FILM COATED ORAL DAILY
Qty: 90 TABLET | Refills: 3 | Status: SHIPPED | OUTPATIENT
Start: 2024-07-15

## 2024-07-15 RX ORDER — AMLODIPINE BESYLATE 10 MG/1
10 TABLET ORAL DAILY
Qty: 90 TABLET | Refills: 2 | Status: SHIPPED | OUTPATIENT
Start: 2024-07-15

## 2024-07-15 RX ORDER — HYDROCHLOROTHIAZIDE 25 MG/1
12.5 TABLET ORAL DAILY
Qty: 45 TABLET | Refills: 2 | Status: SHIPPED | OUTPATIENT
Start: 2024-07-15

## 2024-07-15 RX ORDER — IRBESARTAN 300 MG/1
300 TABLET ORAL NIGHTLY
Qty: 90 TABLET | Refills: 2 | Status: SHIPPED | OUTPATIENT
Start: 2024-07-15

## 2024-07-15 NOTE — TELEPHONE ENCOUNTER
Refill Routing Note   Medication(s) are not appropriate for processing by [facility] for the following reason(s):        Required labs outdated    [facility] action(s):  Approve  Defer   Requires labs : Yes             Appointments  past 12m or future 3m with PCP    Date Provider   Last Visit   2/20/2024 Dipak Treviño MD   Next Visit   8/21/2024 Dipak Treviño MD   ED visits in past 90 days: 0        Note composed:3:24 PM 07/15/2024

## 2024-07-15 NOTE — TELEPHONE ENCOUNTER
Care Due:                  Date            Visit Type   Department     Provider  --------------------------------------------------------------------------------                                EP -                              PRIMARY      [facility] INTERNAL  Last Visit: 02-      CARE ([facility])   MEDICINE       Dipak Treviño                              EP -                              PRIMARY      [facility] INTERNAL  Next Visit: 08-      CARE ([facility])   MEDICINE       Dipak Treviño                                                            Last  Test          Frequency    Reason                     Performed    Due Date  --------------------------------------------------------------------------------    HBA1C.......  6 months...  glipiZIDE, insulin,        03- 09-                             metFORMIN, semaglutide...    Lipid Panel.  12 months..  atorvastatin.............  05- 05-    [facility] Embedded Care Due Messages. Reference number: [id number].   7/15/2024 10:40:59 AM CDT

## 2024-07-15 NOTE — TELEPHONE ENCOUNTER
----- Message from Catia Taveras MA sent at 7/15/2024 10:23 AM CDT -----  Regarding: Refill Request  Who Called:MUSA SEGURA JR. [[id number]]           New Prescription or Refill : Refill      RX Name and Strength:  amLODIPine (NORVASC) 10 MG tablet            RX Name and Strength:  carvediloL (COREG) 3.125 MG tablet            RX Name and Strength:  atorvastatin (LIPITOR) 20 MG tablet         RX Name and Strength:   irbesartan (AVAPRO) 300 MG tablet         RX Name and Strength:  hydroCHLOROthiazide (HYDRODIURIL) 25 MG tablet         30 day or 90 day RX: 90           Local or Mail Order : local            Preferred Pharmacy:[id number] [place] [address] AT [address] & [facility]       Would the patient rather a call back or a response via [facility]? call        Best Call Back Number:  [phone]

## 2024-07-18 ENCOUNTER — TELEPHONE (OUTPATIENT)
Dept: HEMATOLOGY/ONCOLOGY | Facility: CLINIC | Age: 79
End: 2024-07-18
Payer: MEDICARE

## 2024-07-18 ENCOUNTER — OFFICE VISIT (OUTPATIENT)
Dept: PODIATRY | Facility: CLINIC | Age: 79
End: 2024-07-18
Payer: MEDICARE

## 2024-07-18 VITALS
WEIGHT: 199.5 LBS | HEART RATE: 67 BPM | HEIGHT: 73 IN | BODY MASS INDEX: 26.44 KG/M2 | DIASTOLIC BLOOD PRESSURE: 71 MMHG | SYSTOLIC BLOOD PRESSURE: 133 MMHG

## 2024-07-18 DIAGNOSIS — L84 CORN OR CALLUS: ICD-10-CM

## 2024-07-18 DIAGNOSIS — B35.1 ONYCHOMYCOSIS DUE TO DERMATOPHYTE: Primary | ICD-10-CM

## 2024-07-18 DIAGNOSIS — E11.40 CONTROLLED TYPE 2 DIABETES MELLITUS WITH DIABETIC NEUROPATHY, WITHOUT LONG-TERM CURRENT USE OF INSULIN: ICD-10-CM

## 2024-07-18 PROCEDURE — 99999 PR PBB SHADOW E&M-EST. PATIENT-LVL II: CPT | Mod: PBBFAC,HCNC,, | Performed by: PODIATRIST

## 2024-07-18 PROCEDURE — 11056 PARNG/CUTG B9 HYPRKR LES 2-4: CPT | Mod: Q8,HCNC,S$GLB, | Performed by: PODIATRIST

## 2024-07-18 PROCEDURE — 99499 UNLISTED E&M SERVICE: CPT | Mod: HCNC,S$GLB,, | Performed by: PODIATRIST

## 2024-07-18 PROCEDURE — 11721 DEBRIDE NAIL 6 OR MORE: CPT | Mod: 59,Q8,HCNC,S$GLB | Performed by: PODIATRIST

## 2024-07-18 NOTE — PROGRESS NOTES
Subjective:      Patient ID: Misha Eldridge Jr. is a 79 y.o. male.    Chief Complaint: Diabetic Foot Exam (2/20/2024 - Dipak Treviño MD, PCP) and Foot Pain (B/L, feels like walking on kecia)    Misha is a 79 y.o. male who presents to the clinic for evaluation and treatment of high risk feet. Misha has a past medical history of Cataract, Colostomy in place, Dermatochalasis, Diabetes mellitus type II, Dry eye syndrome, Hemorrhoid, History of colon cancer, History of prostate cancer, History of pulmonary embolism, Hypertension, and Macular degeneration. The patient's chief complaint is long, thick toenails. This patient has documented high risk feet requiring routine maintenance secondary to peripheral neuropathy.    PCP: Dipak Treviño MD    Date Last Seen by PCP:   Chief Complaint   Patient presents with    Diabetic Foot Exam     2/20/2024 - Dipak Treviño MD, PCP    Foot Pain     B/L, feels like walking on kecia         Current shoe gear:  Affected Foot: Slip-on shoes     Unaffected Foot: Slip-on shoes    Hemoglobin A1C   Date Value Ref Range Status   03/04/2024 6.9 (H) 4.0 - 5.6 % Final     Comment:     ADA Screening Guidelines:  5.7-6.4%  Consistent with prediabetes  >or=6.5%  Consistent with diabetes    High levels of fetal hemoglobin interfere with the HbA1C  assay. Heterozygous hemoglobin variants (HbS, HgC, etc)do  not significantly interfere with this assay.   However, presence of multiple variants may affect accuracy.     08/17/2023 7.4 (H) 4.0 - 5.6 % Final     Comment:     ADA Screening Guidelines:  5.7-6.4%  Consistent with prediabetes  >or=6.5%  Consistent with diabetes    High levels of fetal hemoglobin interfere with the HbA1C  assay. Heterozygous hemoglobin variants (HbS, HgC, etc)do  not significantly interfere with this assay.   However, presence of multiple variants may affect accuracy.     05/08/2023 11.6 (H) 4.0 - 5.6 % Final     Comment:     ADA Screening  Guidelines:  5.7-6.4%  Consistent with prediabetes  >or=6.5%  Consistent with diabetes    High levels of fetal hemoglobin interfere with the HbA1C  assay. Heterozygous hemoglobin variants (HbS, HgC, etc)do  not significantly interfere with this assay.   However, presence of multiple variants may affect accuracy.         Review of Systems   Constitutional: Negative for chills, fever and malaise/fatigue.   HENT:  Negative for hearing loss.    Cardiovascular:  Positive for leg swelling. Negative for claudication.   Respiratory:  Negative for shortness of breath.    Skin:  Positive for color change, dry skin, nail changes and unusual hair distribution. Negative for flushing and rash.   Musculoskeletal:  Negative for joint pain and myalgias.   Neurological:  Positive for numbness, paresthesias and sensory change. Negative for loss of balance.   Psychiatric/Behavioral:  Negative for altered mental status.            Objective:      Physical Exam  Vitals reviewed.   Constitutional:       Appearance: He is well-developed.   Cardiovascular:      Pulses:           Dorsalis pedis pulses are 0 on the right side and 0 on the left side.        Posterior tibial pulses are 1+ on the right side and 1+ on the left side.   Musculoskeletal:      Right ankle: Decreased range of motion. Abnormal pulse.      Right Achilles Tendon: Russell's test negative.      Left ankle: Decreased range of motion. Abnormal pulse.      Left Achilles Tendon: Russell's test negative.      Right foot: Decreased range of motion.      Left foot: Decreased range of motion.   Feet:      Right foot:      Protective Sensation: 5 sites tested.  2 sites sensed.      Left foot:      Protective Sensation: 5 sites tested.  2 sites sensed.   Skin:     General: Skin is dry.      Capillary Refill: Capillary refill takes more than 3 seconds.      Comments:  No open lesions noted.   HKLs noted to left heel and medial foot   Neurological:      Mental Status: He is alert.       Comments: diminished sensation noted to b/L lower extremities   Psychiatric:         Behavior: Behavior normal. Behavior is cooperative.         Nails x10 are elongated by  5-8mm's, thickened by 2-3 mm's, dystrophic, and are yellowish in  coloration . Xerosis Bilaterally. No open lesions noted.             Assessment:       Encounter Diagnoses   Name Primary?    Onychomycosis due to dermatophyte Yes    Controlled type 2 diabetes mellitus with diabetic neuropathy, without long-term current use of insulin     Corn or callus          Plan:       Misha was seen today for diabetic foot exam and foot pain.    Diagnoses and all orders for this visit:    Onychomycosis due to dermatophyte    Controlled type 2 diabetes mellitus with diabetic neuropathy, without long-term current use of insulin    Corn or callus      I counseled the patient on his conditions, their implications and medical management.        - Shoe inspection. Diabetic Foot Education. Patient reminded of the importance of good nutrition and blood sugar control to help prevent podiatric complications of diabetes. Patient instructed on proper foot hygeine. We discussed wearing proper shoe gear, daily foot inspections, never walking without protective shoe gear, never putting sharp instruments to feet, routine podiatric nail visits every 2-3 months.    After cleansing with an alcohol prep pad, the about mentioned hyperkeratotic lesions were sharply debrided X 2 utilizing a #15 blade to a smooth base without incident. Pt tolerated the procedure well and reported comfort to the debarment sites. Pt will continue to use padding and moisture the callused areas.     - With patient's permission, nails were aggressively reduced and debrided x 10 to their soft tissue attachment mechanically and with electric , removing all offending nail and debris. Patient relates relief following the procedure. He will continue to monitor the areas daily, inspect his feet,  wear protective shoe gear when ambulatory, moisturizer to maintain skin integrity and follow in this office in approximately 2-3 months, sooner p.r.n.

## 2024-07-23 ENCOUNTER — OFFICE VISIT (OUTPATIENT)
Dept: HEMATOLOGY/ONCOLOGY | Facility: CLINIC | Age: 79
End: 2024-07-23
Payer: MEDICARE

## 2024-07-23 ENCOUNTER — LAB VISIT (OUTPATIENT)
Dept: LAB | Facility: HOSPITAL | Age: 79
End: 2024-07-23
Attending: INTERNAL MEDICINE
Payer: MEDICARE

## 2024-07-23 VITALS
SYSTOLIC BLOOD PRESSURE: 144 MMHG | HEART RATE: 56 BPM | HEIGHT: 73 IN | OXYGEN SATURATION: 97 % | RESPIRATION RATE: 15 BRPM | DIASTOLIC BLOOD PRESSURE: 79 MMHG | TEMPERATURE: 99 F | WEIGHT: 194.88 LBS | BODY MASS INDEX: 25.83 KG/M2

## 2024-07-23 DIAGNOSIS — C61 PROSTATE CANCER: ICD-10-CM

## 2024-07-23 DIAGNOSIS — Z85.46 HISTORY OF PROSTATE CANCER: Primary | ICD-10-CM

## 2024-07-23 DIAGNOSIS — N39.46 MIXED INCONTINENCE: ICD-10-CM

## 2024-07-23 DIAGNOSIS — Z90.79 HISTORY OF PROSTATECTOMY: ICD-10-CM

## 2024-07-23 DIAGNOSIS — D12.6 COLON ADENOMAS: ICD-10-CM

## 2024-07-23 LAB — COMPLEXED PSA SERPL-MCNC: <0.01 NG/ML (ref 0–4)

## 2024-07-23 PROCEDURE — 1159F MED LIST DOCD IN RCRD: CPT | Mod: HCNC,CPTII,S$GLB, | Performed by: NURSE PRACTITIONER

## 2024-07-23 PROCEDURE — 3078F DIAST BP <80 MM HG: CPT | Mod: HCNC,CPTII,S$GLB, | Performed by: NURSE PRACTITIONER

## 2024-07-23 PROCEDURE — 3077F SYST BP >= 140 MM HG: CPT | Mod: HCNC,CPTII,S$GLB, | Performed by: NURSE PRACTITIONER

## 2024-07-23 PROCEDURE — 3288F FALL RISK ASSESSMENT DOCD: CPT | Mod: HCNC,CPTII,S$GLB, | Performed by: NURSE PRACTITIONER

## 2024-07-23 PROCEDURE — 1101F PT FALLS ASSESS-DOCD LE1/YR: CPT | Mod: HCNC,CPTII,S$GLB, | Performed by: NURSE PRACTITIONER

## 2024-07-23 PROCEDURE — 36415 COLL VENOUS BLD VENIPUNCTURE: CPT | Mod: HCNC | Performed by: INTERNAL MEDICINE

## 2024-07-23 PROCEDURE — 3072F LOW RISK FOR RETINOPATHY: CPT | Mod: HCNC,CPTII,S$GLB, | Performed by: NURSE PRACTITIONER

## 2024-07-23 PROCEDURE — 84153 ASSAY OF PSA TOTAL: CPT | Mod: HCNC | Performed by: INTERNAL MEDICINE

## 2024-07-23 PROCEDURE — 99214 OFFICE O/P EST MOD 30 MIN: CPT | Mod: HCNC,S$GLB,, | Performed by: NURSE PRACTITIONER

## 2024-07-23 PROCEDURE — G2211 COMPLEX E/M VISIT ADD ON: HCPCS | Mod: HCNC,S$GLB,, | Performed by: NURSE PRACTITIONER

## 2024-07-23 PROCEDURE — 1125F AMNT PAIN NOTED PAIN PRSNT: CPT | Mod: HCNC,CPTII,S$GLB, | Performed by: NURSE PRACTITIONER

## 2024-07-23 PROCEDURE — 99999 PR PBB SHADOW E&M-EST. PATIENT-LVL III: CPT | Mod: PBBFAC,HCNC,, | Performed by: NURSE PRACTITIONER

## 2024-07-23 NOTE — PROGRESS NOTES
[facility] - NEW PATIENT VISIT     Best Contact Phone Number(s): [phone] (home)       Cancer/Stage/TNM:    Cancer Staging   Prostate cancer  Staging form: Prostate, AJCC 8th Edition  - Clinical stage from 9/9/2022: Stage IIIB (rcT4, cN0, cM0, PSA: 7.2, Grade Group: 3) - Signed by Kenneth Azevedo MD on 3/18/2023        Reason for visit:   Chief Complaint   Patient presents with    Prostate Cancer        HPI:   Misha Eldridge Jr. is a 79 y.o. male, patient of Dr. Betancourt, to clinic for 4 month follow up for a history of prostate cancer. He is off abiraterone and ADT. Still with urinary leakage, does Kegels. PSA pending.    History has been obtained by chart review and discussion with the patient.     Oncology History   Prostate cancer   8/16/2022 -  Hormone Therapy    Lupron 45mg Dr Betancourt     9/9/2022 Cancer Staged    Staging form: Prostate, AJCC 8th Edition  - Clinical stage from 9/9/2022: Stage IIIB (rcT4, cN0, cM0, PSA: 7.2, Grade Group: 3)     10/25/2022 Initial Diagnosis    Prostate cancer     11/29/2022 - 2/1/2023 Radiation Therapy    Treating physician: Kenneth Azevedo    Course: C1 Pelvis 2022    Treatment Site Ref. ID Energy Dose/Fx (Gy) #Fx Dose Correction (Gy) Total Dose (Gy) Start Date End Date Elapsed Days   IM Prostate Prostate 6X 1.8 27 / 27 0 48.6 11/29/2022 1/6/2023 38   IM Prst_Bst1 Prostate 6X 1.8 11 / 11 0 19.8 1/9/2023 1/24/2023 15   IM Prst_Bst2 Prostate 6X 1.8 6 / 6 0 10.8 1/25/2023 2/1/2023 7                Past Medical History:   Diagnosis Date    Cataract     Colostomy in place     Dermatochalasis     Diabetes mellitus type II     Dry eye syndrome     Hemorrhoid     History of colon cancer     History of prostate cancer     History of pulmonary embolism     Hypertension     Macular degeneration          Past Surgical History:   Procedure Laterality Date    CATARACT EXTRACTION Bilateral     COLONOSCOPY N/A 06/02/2022    Procedure: COLONOSCOPY;  Surgeon: Jose  ROLAN Dangelo MD;  Location: [facility] (4TH FLR);  Service: Endoscopy;  Laterality: N/A;  fully vaccinated    COLONOSCOPY N/A 12/29/2023    Procedure: COLONOSCOPY;  Surgeon: YAMILA Mckinney MD;  Location: [facility] (4TH FLR);  Service: Endoscopy;  Laterality: N/A;  9/14 ref. by Dipak Treviño MD, PEG, instr. handed to patient-st  12/20-lvm for precall-MS  12/21/23- precall complete - ERW    ESOPHAGOGASTRODUODENOSCOPY N/A 6/30/2023    Procedure: EGD (ESOPHAGOGASTRODUODENOSCOPY);  Surgeon: Terrence Beatty MD;  Location: [facility] (2ND FLR);  Service: Endoscopy;  Laterality: N/A;    EYE SURGERY Bilateral     cataract extraction    HERNIA REPAIR      PROSTATECTOMY      SUBTOTAL COLECTOMY           I have reviewed and updated the patient's past medical, surgical, family and social histories.     Review of patient's allergies indicates:   Allergen Reactions    Hay fever and allergy relief          Current Outpatient Medications   Medication Sig Dispense Refill    ACCU-CHEK SOFTCLIX LANCETS Misc TEST BLOOD SUGAR ONCE A  each 0    amLODIPine (NORVASC) 10 MG tablet Take 1 tablet (10 mg total) by mouth once daily. 90 tablet 2    atorvastatin (LIPITOR) 20 MG tablet Take 1 tablet (20 mg total) by mouth once daily. 90 tablet 3    blood sugar diagnostic Strp 1 each by Misc.(Non-Drug; Combo Route) route once daily. 100 each 3    blood sugar diagnostic Strp Pt to check up to 6 times daily 200 strip 11    blood sugar diagnostic Strp Use to check blood glucose 1-2 times daily as directed. 200 each 11    blood-glucose meter kit Use as instructed 1 each 0    carvediloL (COREG) 3.125 MG tablet Take 1 tablet (3.125 mg total) by mouth 2 (two) times daily. 180 tablet 2    docusate sodium (COLACE) 100 MG capsule Take 1 capsule (100 mg total) by mouth 2 (two) times daily as needed for Constipation. 20 capsule 0    doxazosin (CARDURA) 4 MG tablet Take 1 tablet (4 mg total) by mouth every evening. 90 tablet 0    gabapentin  "(NEURONTIN) 300 MG capsule TAKE 1 CAPSULE(300 MG) BY MOUTH THREE TIMES DAILY 270 capsule 1    glipiZIDE (GLUCOTROL) 10 MG tablet TAKE 1 TABLET(10 MG) BY MOUTH TWICE DAILY BEFORE MEALS 180 tablet 1    hydroCHLOROthiazide (HYDRODIURIL) 25 MG tablet Take 0.5 tablets (12.5 mg total) by mouth once daily. 45 tablet 2    irbesartan (AVAPRO) 300 MG tablet Take 1 tablet (300 mg total) by mouth every evening. 90 tablet 2    LANTUS SOLOSTAR U-100 INSULIN glargine 100 units/mL SubQ pen SMARTSI Unit(s) SUB-Q Every Evening      metFORMIN (GLUCOPHAGE) 1000 MG tablet TAKE 1 TABLET(1000 MG) BY MOUTH TWICE DAILY WITH MEALS 180 tablet 1    pen needle, diabetic (BD ULTRA-FINE KELECHI PEN NEEDLE) 32 gauge x 5/32" Ndle To use with insulin pens at meals and evening shot. 200 each 11    polyethylene glycol (GLYCOLAX) 17 gram PwPk Take 17 g by mouth daily as needed (Constipation). 14 packet 0    predniSONE (DELTASONE) 5 MG tablet Take 1 tablet (5 mg total) by mouth 2 (two) times daily. 60 tablet 1    semaglutide (RYBELSUS) 14 mg tablet TAKE 1 TABLET(14 MG) BY MOUTH EVERY DAY 30 tablet 0    triamcinolone acetonide 0.1% (KENALOG) 0.1 % cream Apply topically 2 (two) times daily. 45 g 0    vibegron (GEMTESA) 75 mg Tab Take 1 tablet (75 mg total) by mouth once daily. 30 tablet 11    diclofenac sodium (VOLTAREN) 1 % Gel Apply 2 g topically 2 (two) times daily. for 7 days 150 g 0    furosemide (LASIX) 20 MG tablet Take 1 tablet (20 mg total) by mouth once daily. for 3 days 3 tablet 0    insulin aspart U-100 (NOVOLOG FLEXPEN U-100 INSULIN) 100 unit/mL (3 mL) InPn pen Inject 8 Units into the skin 3 (three) times daily with meals. 15 mL 0    lancing device with lancets Kit 1 each by Misc.(Non-Drug; Combo Route) route 2 (two) times daily. 200 each 11     Current Facility-Administered Medications   Medication Dose Route Frequency Provider Last Rate Last Admin    ciprofloxacin HCl tablet 500 mg  500 mg Oral 1 time in Clinic/HOD Duarte Ravi MD     " "   LIDOcaine HCl 2% urojet   Urethral 1 time in Clinic/HOD Duarte Ravi MD            Objective:      Physical Exam:   BP (!) 144/79 (BP Location: Left arm, Patient Position: Sitting, BP Method: Medium (Automatic))   Pulse (!) 56   Temp 98.6 °F (37 °C) (Oral)   Resp 15   Ht 6' 1" (1.854 m)   Wt 88.4 kg (194 lb 14.2 oz)   SpO2 97%   BMI 25.71 kg/m²       ECOG Performance status: (2) Ambulatory and capable of self care, unable to carry out work activity, up and about > 50% or waking hours     Physical Exam     Recent Labs:   Lab Results   Component Value Date    WBC 2.99 (L) 03/04/2024    RBC 3.89 (L) 03/04/2024    HGB 11.1 (L) 03/04/2024    HCT 33.6 (L) 03/04/2024    MCV 86 03/04/2024    MCH 28.5 03/04/2024    MCHC 33.0 03/04/2024    RDW 16.6 (H) 03/04/2024     03/04/2024    MPV 9.5 03/04/2024    IMMGR 0.3 03/04/2024    GRAN 1.5 (L) 03/04/2024    GRAN 49.5 03/04/2024    IGABS 0.01 03/04/2024    LYMPH 0.8 (L) 03/04/2024    LYMPH 28.1 03/04/2024    MONO 0.3 03/04/2024    MONO 10.0 03/04/2024    EOS 0.3 03/04/2024    BASO 0.02 03/04/2024    NRBC 0 03/04/2024    EOSINOPHIL 11.4 (H) 03/04/2024    BASOPHIL 0.7 03/04/2024    DIFFMETHOD Automated 03/04/2024       Lab Results   Component Value Date     02/20/2024    K 4.1 02/20/2024     02/20/2024    CO2 24 02/20/2024     (H) 02/20/2024    BUN 17 02/20/2024    CREATININE 1.1 02/20/2024    CALCIUM 9.6 02/20/2024    PROT 6.8 02/20/2024    ALBUMIN 3.8 02/20/2024    BILITOT 0.3 02/20/2024    ALKPHOS 72 02/20/2024    AST 12 02/20/2024    ALT 9 (L) 02/20/2024    ANIONGAP 10 02/20/2024    EGFRNORACEVR >60.0 02/20/2024        Lab Results   Component Value Date    PSADIAG <0.01 07/23/2024    PSADIAG <0.01 02/20/2024    PSADIAG <0.01 12/12/2023    PSADIAG <0.01 08/10/2023    PSADIAG <0.01 06/15/2023    PSADIAG <0.01 04/18/2023    PSADIAG <0.01 02/14/2023    PSADIAG <0.01 11/21/2022    PSADIAG <0.01 10/24/2022    PSADIAG 0.33 08/16/2022    PSA 7.1 " "(H) 04/12/2022    PSA 0.72 12/05/2017    PSA 0.38 12/14/2016        Cardiovascular Screening:  Primary care physician: Dipak Treviño MD      The ASCVD Risk score (Reagan CESAR, et al., 2019) failed to calculate for the following reasons:    The valid total cholesterol range is 130 to 320 mg/dL    ASCVD Risk Level: N/A    EKG:   Results for orders placed or performed during the hospital encounter of 06/28/23   EKG 12-lead    Collection Time: 06/28/23  5:14 PM    Narrative    Test Reason : R10.9,    Vent. Rate : 080 BPM     Atrial Rate : 080 BPM     P-R Int : 188 ms          QRS Dur : 084 ms      QT Int : 400 ms       P-R-T Axes : 022 040 019 degrees     QTc Int : 461 ms    Sinus rhythm with frequent Premature ventricular complexes  Otherwise normal ECG  When compared with ECG of 30-NOV-2020 07:45,  Premature ventricular complexes are now Present  Confirmed by Renetta Miles MD ([id number]) on 6/29/2023 12:31:51 PM    Referred By: [email or web address]   SELF           Confirmed By:Renetta Miles MD       High blood pressure = Yes  Antihypertensive agents: amLODIPine - 10 MG  carvediloL - 3.125 MG  doxazosin - 4 MG  hydroCHLOROthiazide - 25 MG  irbesartan - 300 MG   Comments:     DM2: Yes  Antidiabetic agents: glipiZIDE - 10 MG  LANTUS SOLOSTAR U-100 INSULIN Inpn - 100 unit/mL (3 mL)  metFORMIN - 1000 MG  RYBELSUS Tab - 14 mg   Comments:     Hyperlipidemia: Yes  Lipid lowering agents: [unfilled]   Comments:     Antiplatelet therapy: No  Agent: This patient does not have an active medication from one of the medication groupers.   Comment:     Body mass index is 25.71 kg/m².  If greater than 30, referral to nutrition    Lab Results   Component Value Date    CHOL 103 (L) 05/08/2023    LDLCALC 22.4 (L) 05/08/2023    HDL 51 05/08/2023    TRIG 148 05/08/2023    HGBA1C 6.9 (H) 03/04/2024        Bone Health    No results found for: "YAASGKSS860B", "NIUWTGUV11US"     Results for orders placed during the hospital encounter of " 06/08/23    BONE MIN DENSITY    Impression  *Elevated BMD of the lumbar spine.    RECOMMENDATIONS:  *Daily calcium intake 9193-3209 mg, dietary sources preferred; Vitamin D 5407-1902 IU daily.  *Weight bearing exercise and fall precautions.  *Repeat BMD in 5 years.    EXPLANATION OF RESULTS:  T-score compares these results to the average bone density of a 20-29 year-old of the same gender.    Z-score compares this result to the average bone density to people of the same age, gender, and race.    The amounts indicate the number of standard deviations above or below the mean.    * Osteoporosis is generally defined as having a T-score between less than or equal to -2.5.    * Low bone mass (osteopenia) is generally defined as having a T-score between -1.0 and -2.5.    * The normal range is generally defined as having a T-score greater than or equal to -1.0.    * Calculated FRAX scores for fracture risk prediction may not be accurate in the setting of certain clinical factors such as pharmacologic therapy for osteoporosis, prior fragility fractures, high dose glucocorticoid use.      Electronically signed by: Nancy Leal MD  Date:    06/19/2023  Time:    07:50       Vitamin D: 1000 IU daily  Calcium: Recommend 4 servings of dairy daily     Osteopenia/Osteoporosis: None    Bone strengthening agent: None     Staging Imaging     No results found for this or any previous visit.       No results found for this or any previous visit.      Results for orders placed during the hospital encounter of 10/18/22    MRI Prostatectomy W W/O Contrast    Impression  Soft tissue thickening and ill-defined enhancement within the anterior rectal wall corresponding with the focus of radiotracer avidity identified on prior PET-CT.  Lesion represents patient's biopsy-proven prostate cancer recurrence.    No other suspicious lesion identified.    Nonspecific mild circumferential bladder wall thickening which may represent sequela of  chronic bladder outlet obstruction or cystitis.      Electronically signed by: Dipak Mclaughlin  Date:    10/18/2022  Time:    09:54       Results for orders placed during the hospital encounter of 01/28/24    CT Abdomen Pelvis With IV Contrast NO Oral Contrast    Impression  Severe bladder wall thickening, it is also nondistended, there is adjacent fat stranding suggestive of an underlying inflammatory or infectious process.    Bilateral low attenuating renal lesions are unchanged and suggestive of cysts no obstructive process seen.    Prior bowel surgery.    Prior prostatectomy.    Pleural plaque calcification may be the result of prior asbestos exposure.      Electronically signed by: Bisi Dawkins MD  Date:    01/28/2024  Time:    12:05       I have personally reviewed the above imaging.     Path:   Reviewed pathology as documented above.    Genomic testing:     Germline genetic testing  No results found. However, due to the size of the patient record, not all encounters were searched. Please check Results Review for a complete set of results.     Somatic tumor genotyping:      ctDNA genotyping:       Diagnoses:     1. History of prostate cancer    2. History of prostatectomy    3. Mixed incontinence    4. Colon adenomas          Assessment and Plan:      Cancer Staging   Prostate cancer  Staging form: Prostate, AJCC 8th Edition  - Clinical stage from 9/9/2022: Stage IIIB (rcT4, cN0, cM0, PSA: 7.2, Grade Group: 3) - Signed by Kenneth Azevedo MD on 3/18/2023  He had local recurrence prostate cancer bed.   Completed salvage XRT in 2/2023  Stopped Lupron and abiraterone/prednisone.early for side effects on his own- original plan was to continue regimen in adjuvant setting for total one year    Received first dose of Lupron on 8/16/2022. Last dose given 2/14/23.   PSA remains undetectable.      BMD 6/2023 - elevated BMD of lumbar spine - repeat in 5 years.   Encouraged calcium + vitamin D supplements.      3- continue follow up with urology.    4- Colonoscopy due 2026 for multiple adenomas      RTC 4 months with labs a few days before(CBC,CMP,PSA), and to see Dr. Betancourt.    Patient is in agreement with the proposed treatment plan. All questions were answered to the patient's satisfaction. Pt knows to call clinic if anything is needed before the next clinic visit.      Jessica Teran, MSN, APRN, FNP-C  Hematology and Medical Oncology  Manager- [facility]  Nurse Practitioner/Clinical Investigator, [facility]  Nurse Practitioner to Dr. Adrien Bedoya      Follow up:   Route Chart for Scheduling    Med Onc Chart Routing      Follow up with physician . RTC 4 months with labs a few days before(CBC,CMP,PSA), and to see Dr. Betancourt.   Follow up with PAT    Infusion scheduling note    Injection scheduling note    Labs    Imaging    Pharmacy appointment    Other referrals

## 2024-08-01 DIAGNOSIS — Z79.4 TYPE 2 DIABETES MELLITUS WITH COMPLICATION, WITH LONG-TERM CURRENT USE OF INSULIN: ICD-10-CM

## 2024-08-01 DIAGNOSIS — E11.8 TYPE 2 DIABETES MELLITUS WITH COMPLICATION, WITH LONG-TERM CURRENT USE OF INSULIN: ICD-10-CM

## 2024-08-01 RX ORDER — PEN NEEDLE, DIABETIC 30 GX3/16"
NEEDLE, DISPOSABLE MISCELLANEOUS
Qty: 200 EACH | Refills: 11 | Status: SHIPPED | OUTPATIENT
Start: 2024-08-01

## 2024-08-01 NOTE — TELEPHONE ENCOUNTER
No care due was identified.  [facility] Embedded Care Due Messages. Reference number: [id number].   8/01/2024 2:03:34 PM CDT

## 2024-08-01 NOTE — TELEPHONE ENCOUNTER
----- Message from Emily Edmond sent at 8/1/2024  1:56 PM CDT -----  Name of Who is calling :MUSA SEGURA JR. [[id number]]        What is the request in detail:Pt needs needles for his diabetes. Please assist         Can the clinic reply by [facility]:no             What number to call back if not in [facility]: [phone]

## 2024-08-12 ENCOUNTER — TELEPHONE (OUTPATIENT)
Dept: OPTOMETRY | Facility: CLINIC | Age: 79
End: 2024-08-12
Payer: MEDICARE

## 2024-08-12 NOTE — TELEPHONE ENCOUNTER
----- Message from Sylvie Russell sent at 8/12/2024 11:35 AM CDT -----  Regarding: Scheduling Request  Contact: Misha          Scheduling Request           Appt Type:  Routine     Date/Time Preference:     Treating Provider:Artie     Caller Name:Misha Chente Peralta       Contact Preference:[phone] (home)        Comments/notes:pt states that vision is blurry, would like to bee seen at [facility] location. Please advise.

## 2024-08-21 ENCOUNTER — OFFICE VISIT (OUTPATIENT)
Dept: OPTOMETRY | Facility: CLINIC | Age: 79
End: 2024-08-21
Payer: MEDICARE

## 2024-08-21 DIAGNOSIS — H52.13 MYOPIA WITH ASTIGMATISM AND PRESBYOPIA, BILATERAL: ICD-10-CM

## 2024-08-21 DIAGNOSIS — H52.203 MYOPIA WITH ASTIGMATISM AND PRESBYOPIA, BILATERAL: ICD-10-CM

## 2024-08-21 DIAGNOSIS — Z01.00 EYE EXAM, ROUTINE: Primary | ICD-10-CM

## 2024-08-21 DIAGNOSIS — H52.4 MYOPIA WITH ASTIGMATISM AND PRESBYOPIA, BILATERAL: ICD-10-CM

## 2024-08-21 PROCEDURE — 92014 COMPRE OPH EXAM EST PT 1/>: CPT | Mod: HCNC,S$GLB,, | Performed by: OPTOMETRIST

## 2024-08-21 PROCEDURE — 99999 PR PBB SHADOW E&M-EST. PATIENT-LVL II: CPT | Mod: PBBFAC,HCNC,, | Performed by: OPTOMETRIST

## 2024-08-21 PROCEDURE — 92015 DETERMINE REFRACTIVE STATE: CPT | Mod: HCNC,S$GLB,, | Performed by: OPTOMETRIST

## 2024-08-21 NOTE — PROGRESS NOTES
HPI    Annual Diabetic Eye Exam   Pt states vision is blurry c specs    Pt denies F/F     Pt reports Dry/ Watery/ FB sensation  Gtt: No    DM Dxed   Bs: Unknown  Hemoglobin A1C       Date                     Value               Ref Range             Status                03/04/2024               6.9 (H)             4.0 - 5.6 %           Final                  08/17/2023               7.4 (H)             4.0 - 5.6 %           Final                  05/08/2023               11.6 (H)            4.0 - 5.6 %           Final                Last edited by Hien Carter on 8/21/2024 11:08 AM.            Assessment /Plan     For exam results, see Encounter Report.    Eye exam, routine  -[facility]  -No retinopathy noted today.  Continued control with primary care physician and annual comprehensive eye exam.    Myopia with astigmatism and presbyopia, bilateral  Eyeglass Final Rx       Eyeglass Final Rx         Sphere Cylinder Axis Dist VA Add    Right -1.25 +2.00 170 20/40-- +2.50    Left -0.75 +1.00 030 20/40-- +2.50      Type: PAL    Expiration Date: 8/21/2025                      RTC 1 yr

## 2024-08-23 ENCOUNTER — TELEPHONE (OUTPATIENT)
Dept: INTERNAL MEDICINE | Facility: CLINIC | Age: 79
End: 2024-08-23
Payer: MEDICARE

## 2024-08-23 NOTE — TELEPHONE ENCOUNTER
----- Message from Kathryn Lo sent at 8/23/2024  9:18 AM CDT -----  Contact: Adilene  Type:  Patient Call          Who Called: Patient         Does the patient know what this is regarding?: Requesting a call back about having a print out of his medications that he should currently be taking ; please advise           Would the patient rather a call back or a response via [facility]?call           Best Call Back Number: [phone]             Additional Information:

## 2024-10-08 ENCOUNTER — OFFICE VISIT (OUTPATIENT)
Dept: HOME HEALTH SERVICES | Facility: CLINIC | Age: 79
End: 2024-10-08
Payer: MEDICARE

## 2024-10-08 VITALS
SYSTOLIC BLOOD PRESSURE: 192 MMHG | RESPIRATION RATE: 16 BRPM | WEIGHT: 194.88 LBS | BODY MASS INDEX: 25.83 KG/M2 | DIASTOLIC BLOOD PRESSURE: 90 MMHG | HEIGHT: 73 IN | OXYGEN SATURATION: 98 % | HEART RATE: 56 BPM | TEMPERATURE: 97 F

## 2024-10-08 DIAGNOSIS — G89.29 CHRONIC SHOULDER PAIN, UNSPECIFIED LATERALITY: ICD-10-CM

## 2024-10-08 DIAGNOSIS — Z00.00 ENCOUNTER FOR PREVENTIVE HEALTH EXAMINATION: Primary | ICD-10-CM

## 2024-10-08 DIAGNOSIS — M46.97 INFLAMMATORY SPONDYLOPATHY OF LUMBOSACRAL REGION: ICD-10-CM

## 2024-10-08 DIAGNOSIS — E11.59 HYPERTENSION ASSOCIATED WITH DIABETES: ICD-10-CM

## 2024-10-08 DIAGNOSIS — G89.29 CHRONIC BILATERAL LOW BACK PAIN WITHOUT SCIATICA: Chronic | ICD-10-CM

## 2024-10-08 DIAGNOSIS — I70.0 AORTIC ATHEROSCLEROSIS: ICD-10-CM

## 2024-10-08 DIAGNOSIS — E11.40 CONTROLLED TYPE 2 DIABETES MELLITUS WITH DIABETIC NEUROPATHY, WITHOUT LONG-TERM CURRENT USE OF INSULIN: ICD-10-CM

## 2024-10-08 DIAGNOSIS — I15.2 HYPERTENSION ASSOCIATED WITH DIABETES: ICD-10-CM

## 2024-10-08 DIAGNOSIS — Z99.89 DEPENDENCE ON OTHER ENABLING MACHINES AND DEVICES: ICD-10-CM

## 2024-10-08 DIAGNOSIS — C61 PROSTATE CANCER: ICD-10-CM

## 2024-10-08 DIAGNOSIS — R13.10 DYSPHAGIA, UNSPECIFIED TYPE: ICD-10-CM

## 2024-10-08 DIAGNOSIS — K59.09 CHRONIC CONSTIPATION: ICD-10-CM

## 2024-10-08 DIAGNOSIS — M62.89 WEAKNESS OF PELVIC FLOOR IN MALE: ICD-10-CM

## 2024-10-08 DIAGNOSIS — M54.50 CHRONIC BILATERAL LOW BACK PAIN WITHOUT SCIATICA: Chronic | ICD-10-CM

## 2024-10-08 DIAGNOSIS — R26.9 ABNORMALITY OF GAIT AND MOBILITY: ICD-10-CM

## 2024-10-08 DIAGNOSIS — M25.519 CHRONIC SHOULDER PAIN, UNSPECIFIED LATERALITY: ICD-10-CM

## 2024-10-08 PROBLEM — D69.6 THROMBOCYTOPENIA, UNSPECIFIED: Status: RESOLVED | Noted: 2023-01-25 | Resolved: 2024-10-08

## 2024-10-08 PROCEDURE — 1159F MED LIST DOCD IN RCRD: CPT | Mod: CPTII,S$GLB,,

## 2024-10-08 PROCEDURE — 1125F AMNT PAIN NOTED PAIN PRSNT: CPT | Mod: CPTII,S$GLB,,

## 2024-10-08 PROCEDURE — 1158F ADVNC CARE PLAN TLK DOCD: CPT | Mod: CPTII,S$GLB,,

## 2024-10-08 PROCEDURE — 1160F RVW MEDS BY RX/DR IN RCRD: CPT | Mod: CPTII,S$GLB,,

## 2024-10-08 PROCEDURE — 1100F PTFALLS ASSESS-DOCD GE2>/YR: CPT | Mod: CPTII,S$GLB,,

## 2024-10-08 PROCEDURE — 3077F SYST BP >= 140 MM HG: CPT | Mod: CPTII,S$GLB,,

## 2024-10-08 PROCEDURE — G9919 SCRN ND POS ND PROV OF REC: HCPCS | Mod: CPTII,S$GLB,,

## 2024-10-08 PROCEDURE — G0439 PPPS, SUBSEQ VISIT: HCPCS | Mod: S$GLB,,,

## 2024-10-08 PROCEDURE — 3288F FALL RISK ASSESSMENT DOCD: CPT | Mod: CPTII,S$GLB,,

## 2024-10-08 PROCEDURE — 1170F FXNL STATUS ASSESSED: CPT | Mod: CPTII,S$GLB,,

## 2024-10-08 PROCEDURE — 3080F DIAST BP >= 90 MM HG: CPT | Mod: CPTII,S$GLB,,

## 2024-10-08 NOTE — PROGRESS NOTES
"Misha Eldridge presented for an initial Medicare AWV today. The following components were reviewed and updated:    Medical history  Family History  Social history  Allergies and Current Medications  Health Risk Assessment  Health Maintenance  Care Team    **See Completed Assessments for Annual Wellness visit with in the encounter summary    The following assessments were completed:  Depression Screening  Cognitive function Screening: Declines  Timed Get Up Test: Deferred, impaired mobility  Whisper Test      Opioid documentation:      Patient does not have a current opioid prescription.          Vitals:    10/08/24 1012   BP: (!) 192/90   BP Location: Left arm   Patient Position: Sitting   Pulse: (!) 56   Resp: 16   Temp: 97 °F (36.1 °C)   SpO2: 98%   Weight: 88.4 kg (194 lb 14.2 oz)   Height: 6' 1" (1.854 m)     Body mass index is 25.71 kg/m².       Physical Exam  Vitals reviewed.   Constitutional:       General: He is not in acute distress.     Appearance: Normal appearance.   HENT:      Head: Normocephalic.   Cardiovascular:      Rate and Rhythm: Normal rate and regular rhythm.      Pulses: Normal pulses.      Heart sounds: Normal heart sounds.   Pulmonary:      Effort: Pulmonary effort is normal. No respiratory distress.      Breath sounds: Normal breath sounds. No wheezing.   Abdominal:      General: Bowel sounds are normal.      Tenderness: There is no abdominal tenderness.   Musculoskeletal:         General: Normal range of motion.      Cervical back: Normal range of motion.      Right lower leg: No edema.      Left lower leg: No edema.   Skin:     General: Skin is warm and dry.      Capillary Refill: Capillary refill takes less than 2 seconds.   Neurological:      General: No focal deficit present.      Mental Status: He is alert and oriented to person, place, and time.   Psychiatric:         Mood and Affect: Mood normal.         Behavior: Behavior normal.           Diagnoses and health risks identified today " and associated recommendations/orders:    1. Encounter for preventive health examination  Assessments completed.  HM recommendations reviewed.  F/u with PCP as instructed.      2. Aortic atherosclerosis  Chronic, stable on current statin regimen. Followed by PCP.   - Lipid Panel; Future    3. Inflammatory spondylopathy of lumbosacral region  Chronic, stable on current regimen. Followed by PCP.     4. Controlled type 2 diabetes mellitus with diabetic neuropathy, without long-term current use of insulin  Chronic, stable on current Lantus, Glipizide, Metformin regimen. Followed by PCP.   Lab Results   Component Value Date    HGBA1C 6.9 (H) 03/04/2024     - Hemoglobin A1C; Future    5. Hypertension associated with diabetes  Chronic, stable on current Amlodipine, Coreg regimen. Followed by PCP.     6. Prostate cancer  Chronic, stable on current regimen. Followed by Urology.     7. Weakness of pelvic floor in male  Chronic, stable on current regimen. Followed by Urology.     8. Chronic constipation  Chronic, stable on current regimen. Followed by PCP.     9. Dysphagia, unspecified type  Chronic, stable on current regimen. Followed by PCP.     10. Chronic bilateral low back pain without sciatica  Chronic, stable on current regimen. Followed by PCP.     11. Chronic shoulder pain, unspecified laterality  Chronic, stable on current regimen. Followed by PCP.     12. Abnormality of gait and mobility  Unable to safely ambulate without assistance.     13. Dependence on other enabling machines and devices  Uses a cane as needed.       Provided Misha with a 5-10 year written screening schedule and personal prevention plan. Recommendations were developed using the USPSTF age appropriate recommendations. Education, counseling, and referrals were provided as needed.  After Visit Summary printed and given to patient which includes a list of additional screenings\tests needed.    Follow up in about 1 year (around 10/8/2025) for Medicare  AWMOISÉS.      Crystal Li, BRYANT    I offered to discuss advanced care planning, including how to pick a person who would make decisions for you if you were unable to make them for yourself, called a health care power of , and what kind of decisions you might make such as use of life sustaining treatments such as ventilators and tube feeding when faced with a life limiting illness recorded on a living will that they will need to know. (How you want to be cared for as you near the end of your natural life)     X Patient is interested in learning more about how to make advanced directives.  I provided them paperwork and offered to discuss this with them.

## 2024-10-08 NOTE — PATIENT INSTRUCTIONS
Counseling and Referral of Other Preventative  (Italic type indicates deductible and co-insurance are waived)    Patient Name: Misha Eldridge  Today's Date: 10/8/2024    Health Maintenance       Date Due Completion Date    Shingles Vaccine (2 of 2) 11/25/2019 9/30/2019    RSV Vaccine (Age 60+ and Pregnant patients) (1 - 1-dose 75+ series) Never done ---    Lipid Panel 05/08/2024 5/8/2023    Influenza Vaccine (1) 09/01/2024 1/26/2023    Override on 12/14/2016: Declined    COVID-19 Vaccine (8 - 2024-25 season) 09/01/2024 6/29/2024    Hemoglobin A1c 09/04/2024 3/4/2024    Diabetes Urine Screening 03/04/2025 3/4/2024    Eye Exam 08/21/2025 8/21/2024    Override on 10/11/2016: Done    Override on 8/6/2014: Done    Colonoscopy 12/29/2026 12/29/2023    Override on 7/15/2014: Done    TETANUS VACCINE 12/05/2027 12/5/2017    DEXA Scan 06/08/2028 6/8/2023        No orders of the defined types were placed in this encounter.      The following information is provided to all patients.  This information is to help you find resources for any of the problems found today that may be affecting your health:                  Living healthy guide: www.[facility].[place].gov      Understanding Diabetes: www.diabetes.org      Eating healthy: www.cdc.gov/healthyweight      CDC home safety checklist: www.cdc.gov/steadi/patient.html      Agency on Aging: www.goea.[place].[facility]      Alcoholics anonymous (AA): www.aa.org      Physical Activity: www.nan.nih.gov/at1egoj      Tobacco use: www.quitwithusla.org

## 2024-10-10 ENCOUNTER — OFFICE VISIT (OUTPATIENT)
Dept: INTERNAL MEDICINE | Facility: CLINIC | Age: 79
End: 2024-10-10
Payer: MEDICARE

## 2024-10-10 VITALS
HEART RATE: 59 BPM | WEIGHT: 200.38 LBS | BODY MASS INDEX: 26.56 KG/M2 | DIASTOLIC BLOOD PRESSURE: 74 MMHG | SYSTOLIC BLOOD PRESSURE: 180 MMHG | HEIGHT: 73 IN | OXYGEN SATURATION: 95 %

## 2024-10-10 DIAGNOSIS — Z85.46 HISTORY OF PROSTATE CANCER: ICD-10-CM

## 2024-10-10 DIAGNOSIS — E78.9 ABNORMAL CHOLESTEROL TEST: ICD-10-CM

## 2024-10-10 DIAGNOSIS — G62.9 NEUROPATHY: ICD-10-CM

## 2024-10-10 DIAGNOSIS — I10 HYPERTENSION, UNSPECIFIED TYPE: ICD-10-CM

## 2024-10-10 DIAGNOSIS — Q24.9 HEART ABNORMALITY: ICD-10-CM

## 2024-10-10 DIAGNOSIS — E13.9 DIABETES 1.5, MANAGED AS TYPE 2: ICD-10-CM

## 2024-10-10 DIAGNOSIS — I10 ESSENTIAL HYPERTENSION: ICD-10-CM

## 2024-10-10 PROCEDURE — 99999 PR PBB SHADOW E&M-EST. PATIENT-LVL III: CPT | Mod: PBBFAC,HCNC,,

## 2024-10-10 RX ORDER — CARVEDILOL 3.12 MG/1
3.12 TABLET ORAL 2 TIMES DAILY
Qty: 180 TABLET | Refills: 2 | Status: SHIPPED | OUTPATIENT
Start: 2024-10-10

## 2024-10-10 RX ORDER — IRBESARTAN 300 MG/1
300 TABLET ORAL NIGHTLY
Qty: 90 TABLET | Refills: 2 | Status: SHIPPED | OUTPATIENT
Start: 2024-10-10

## 2024-10-10 RX ORDER — HYDROCHLOROTHIAZIDE 25 MG/1
12.5 TABLET ORAL DAILY
Qty: 45 TABLET | Refills: 2 | Status: SHIPPED | OUTPATIENT
Start: 2024-10-10

## 2024-10-10 RX ORDER — DOXAZOSIN 4 MG/1
4 TABLET ORAL NIGHTLY
Qty: 90 TABLET | Refills: 0 | Status: SHIPPED | OUTPATIENT
Start: 2024-10-10

## 2024-10-10 RX ORDER — AMLODIPINE BESYLATE 10 MG/1
10 TABLET ORAL DAILY
Qty: 90 TABLET | Refills: 2 | Status: SHIPPED | OUTPATIENT
Start: 2024-10-10

## 2024-10-10 RX ORDER — GLIPIZIDE 10 MG/1
10 TABLET ORAL
Qty: 180 TABLET | Refills: 1 | Status: SHIPPED | OUTPATIENT
Start: 2024-10-10

## 2024-10-10 NOTE — PROGRESS NOTES
CHIEF COMPLAINT     Chief Complaint   Patient presents with    Hypertension       HPI     Misha Eldridge Jr. is a 79 y.o. male who presents for xxx today.    PCP is Dipak Treviño MD, patient is known to me. Pt consents to AI recording of visit for documentation purposes.     History of Present Illness    CHIEF COMPLAINT:  Misha presents today for follow up.    HYPERTENSION:  He reports recent elevated blood pressure readings: 201, 197 yesterday, 189 this morning, and 180 recently. Three months ago, his blood pressure was 135, which decreased to 121 after resting for half an hour. He acknowledges being out of some of his blood pressure medications for approximately 1-2 months, having forgotten to refill them.    MEDICATION MANAGEMENT:  He reports taking multiple blood pressure medications, including Carvedilol (twice daily), Amlodipine (daily), Doxazosin (at night), Hydrochlorothiazide, and Irbesartan. He ran out of some blood pressure medications approximately 1-2 months ago after only receiving one of the five prescriptions he called in. He is uncertain about which specific blood pressure medications he is currently taking or missing. For diabetes management, he confirms taking Metformin but is unsure about Glipizide. He also reports taking Gabapentin and Atorvastatin. He takes five medications in the evening but is missing two or three that are taken twice daily.    MEDICATION ADHERENCE:  He reports difficulty keeping track of medications and confusion about his medication schedule. He expresses uncertainty about which medications he currently has and which ones he is missing. He has multiple bottles of some medications, such as metformin, but is unsure if he has others like glipizide. He acknowledges forgetting to follow up on medications that were not sent to the pharmacy, resulting in gaps in his medication regimen.    MEDICAL HISTORY:  He has a history of diabetes, currently managed with metformin.  He denies taking glipizide, though it may have been previously prescribed.      ROS:  General: -fever, -chills, -fatigue, -weight gain, -weight loss  Eyes: -vision changes, -redness, -discharge  ENT: -ear pain, -nasal congestion, -sore throat  Cardiovascular: -chest pain, -palpitations, -lower extremity edema  Respiratory: -cough, -shortness of breath  Gastrointestinal: -abdominal pain, -nausea, -vomiting, -diarrhea, -constipation, -blood in stool  Genitourinary: -dysuria, -hematuria, -frequency  Musculoskeletal: -joint pain, -muscle pain  Skin: -rash, -lesion  Neurological: -headache, -dizziness, -numbness, -tingling  Psychiatric: -anxiety, -depression, -sleep difficulty          Past Medical History:  Past Medical History:   Diagnosis Date    Cataract     Colostomy in place     Dermatochalasis     Diabetes mellitus type II     Dry eye syndrome     Hemorrhoid     History of colon cancer     History of prostate cancer     History of pulmonary embolism     Hypertension     Macular degeneration        Home Medications:  Prior to Admission medications    Medication Sig Start Date End Date Taking? Authorizing Provider   ACCU-CHEK SOFTCLIX LANCETS Misc TEST BLOOD SUGAR ONCE A DAY 4/13/20  Yes Zurdo Best MD   atorvastatin (LIPITOR) 20 MG tablet Take 1 tablet (20 mg total) by mouth once daily. 7/15/24  Yes Dipak Treviño MD   blood sugar diagnostic Strp 1 each by Misc.(Non-Drug; Combo Route) route once daily. 3/4/19  Yes Kezia Dennis MD   blood sugar diagnostic Strp Pt to check up to 6 times daily 6/6/19  Yes Dipak Treviño MD   blood sugar diagnostic Strp Use to check blood glucose 1-2 times daily as directed. 5/8/23  Yes Dipak Treviño MD   blood-glucose meter kit Use as instructed 5/8/23 7/12/27 Yes Dipak Treviño MD   gabapentin (NEURONTIN) 300 MG capsule TAKE 1 CAPSULE(300 MG) BY MOUTH THREE TIMES DAILY 5/14/24  Yes Dipak Treviño MD   lancing device with lancets Kit 1  "each by Misc.(Non-Drug; Combo Route) route 2 (two) times daily. 10/15/20 10/10/24 Yes Dipak Treviño MD   LANRICHMONDUS SOLOSTAR U-100 INSULIN glargine 100 units/mL SubQ pen SMARTSI Unit(s) SUB-Q Every Evening 23  Yes Provider, Historical   metFORMIN (GLUCOPHAGE) 1000 MG tablet TAKE 1 TABLET(1000 MG) BY MOUTH TWICE DAILY WITH MEALS 24  Yes Dipak Treviño MD   pen needle, diabetic (BD ULTRA-FINE KELECHI PEN NEEDLE) 32 gauge x 5/32" Ndle To use with insulin pens at meals and evening shot. 24  Yes Khoa Love MD   polyethylene glycol (GLYCOLAX) 17 gram PwPk Take 17 g by mouth daily as needed (Constipation). 10/6/23  Yes Tomás Hassan PA-C   semaglutide (RYBELSUS) 14 mg tablet TAKE 1 TABLET(14 MG) BY MOUTH EVERY DAY 23  Yes Nick Villegas MD   amLODIPine (NORVASC) 10 MG tablet Take 1 tablet (10 mg total) by mouth once daily. 7/15/24 10/10/24 Yes Dipak Treviño MD   carvediloL (COREG) 3.125 MG tablet Take 1 tablet (3.125 mg total) by mouth 2 (two) times daily. 7/15/24 10/10/24 Yes Dipak Treviño MD   doxazosin (CARDURA) 4 MG tablet Take 1 tablet (4 mg total) by mouth every evening. 1/23/24 10/10/24 Yes Dipak Treviño MD   glipiZIDE (GLUCOTROL) 10 MG tablet TAKE 1 TABLET(10 MG) BY MOUTH TWICE DAILY BEFORE MEALS 5/13/24 10/10/24 Yes Dipak Treviño MD   hydroCHLOROthiazide (HYDRODIURIL) 25 MG tablet Take 0.5 tablets (12.5 mg total) by mouth once daily. 7/15/24 10/10/24 Yes Dipak Treviño MD   irbesartan (AVAPRO) 300 MG tablet Take 1 tablet (300 mg total) by mouth every evening. 7/15/24 10/10/24 Yes Kamila, Dipak POND MD   amLODIPine (NORVASC) 10 MG tablet Take 1 tablet (10 mg total) by mouth once daily. 10/10/24   John Burton NP   carvediloL (COREG) 3.125 MG tablet Take 1 tablet (3.125 mg total) by mouth 2 (two) times daily. 10/10/24   John Burton NP   diclofenac sodium (VOLTAREN) 1 % Gel Apply 2 g topically 2 (two) times " "daily. for 7 days 12/15/23 10/8/24  Mary Pruett MD   doxazosin (CARDURA) 4 MG tablet Take 1 tablet (4 mg total) by mouth every evening. 10/10/24   John Burton, NP   glipiZIDE (GLUCOTROL) 10 MG tablet Take 1 tablet (10 mg total) by mouth 2 (two) times daily before meals. 10/10/24   John Burton, BRYANT   hydroCHLOROthiazide (HYDRODIURIL) 25 MG tablet Take 0.5 tablets (12.5 mg total) by mouth once daily. 10/10/24   John Burton NP   irbesartan (AVAPRO) 300 MG tablet Take 1 tablet (300 mg total) by mouth every evening. 10/10/24   John Burton NP   vibegron (GEMTESA) 75 mg Tab Take 1 tablet (75 mg total) by mouth once daily.  Patient not taking: Reported on 10/10/2024 6/25/24 6/20/25  Duarte aRvi MD       Review of Systems:  Review of Systems   Constitutional: Negative.    Respiratory: Negative.     Cardiovascular: Negative.        Health Maintainence:   Immunizations:  Health Maintenance         Date Due Completion Date    Shingles Vaccine (2 of 2) 11/25/2019 9/30/2019    RSV Vaccine (Age 60+ and Pregnant patients) (1 - 1-dose 75+ series) Never done ---    Lipid Panel 05/08/2024 5/8/2023    Influenza Vaccine (1) 09/01/2024 1/26/2023    Override on 12/14/2016: Declined    COVID-19 Vaccine (8 - 2024-25 season) 09/01/2024 6/29/2024    Hemoglobin A1c 09/04/2024 3/4/2024    Diabetes Urine Screening 03/04/2025 3/4/2024    Eye Exam 08/21/2025 8/21/2024    Override on 10/11/2016: Done    Override on 8/6/2014: Done    Colonoscopy 12/29/2026 12/29/2023    Override on 7/15/2014: Done    TETANUS VACCINE 12/05/2027 12/5/2017    DEXA Scan 06/08/2028 6/8/2023             PHYSICAL EXAM     BP (!) 180/74   Pulse (!) 59   Ht 6' 1" (1.854 m)   Wt 90.9 kg (200 lb 6.4 oz)   SpO2 95%   BMI 26.44 kg/m²     Physical Exam  Vitals reviewed.   Constitutional:       Appearance: He is well-developed.   HENT:      Head: Normocephalic and atraumatic.   Eyes:      Conjunctiva/sclera: " Conjunctivae normal.   Cardiovascular:      Rate and Rhythm: Normal rate.   Pulmonary:      Effort: Pulmonary effort is normal. No respiratory distress.   Skin:     General: Skin is warm and dry.      Findings: No rash.   Neurological:      Mental Status: He is alert and oriented to person, place, and time.      Coordination: Coordination normal.   Psychiatric:         Behavior: Behavior normal.         LABS     Lab Results   Component Value Date    HGBA1C 6.9 (H) 03/04/2024     CMP  Sodium   Date Value Ref Range Status   02/20/2024 141 136 - 145 mmol/L Final     Potassium   Date Value Ref Range Status   02/20/2024 4.1 3.5 - 5.1 mmol/L Final     Chloride   Date Value Ref Range Status   02/20/2024 107 95 - 110 mmol/L Final     CO2   Date Value Ref Range Status   02/20/2024 24 23 - 29 mmol/L Final     Glucose   Date Value Ref Range Status   02/20/2024 166 (H) 70 - 110 mg/dL Final     BUN   Date Value Ref Range Status   02/20/2024 17 8 - 23 mg/dL Final     Creatinine   Date Value Ref Range Status   02/20/2024 1.1 0.5 - 1.4 mg/dL Final     Calcium   Date Value Ref Range Status   02/20/2024 9.6 8.7 - 10.5 mg/dL Final     Total Protein   Date Value Ref Range Status   02/20/2024 6.8 6.0 - 8.4 g/dL Final     Albumin   Date Value Ref Range Status   02/20/2024 3.8 3.5 - 5.2 g/dL Final     Total Bilirubin   Date Value Ref Range Status   02/20/2024 0.3 0.1 - 1.0 mg/dL Final     Comment:     For infants and newborns, interpretation of results should be based  on gestational age, weight and in agreement with clinical  observations.    Premature Infant recommended reference ranges:  Up to 24 hours.............<8.0 mg/dL  Up to 48 hours............<12.0 mg/dL  3-5 days..................<15.0 mg/dL  6-29 days.................<15.0 mg/dL       Alkaline Phosphatase   Date Value Ref Range Status   02/20/2024 72 55 - 135 U/L Final     AST   Date Value Ref Range Status   02/20/2024 12 10 - 40 U/L Final     ALT   Date Value Ref Range  Status   02/20/2024 9 (L) 10 - 44 U/L Final     Anion Gap   Date Value Ref Range Status   02/20/2024 10 8 - 16 mmol/L Final     eGFR if    Date Value Ref Range Status   06/21/2022 >60.0 >60 mL/min/1.73 m^2 Final     eGFR if non    Date Value Ref Range Status   06/21/2022 53.0 (A) >60 mL/min/1.73 m^2 Final     Comment:     Calculation used to obtain the estimated glomerular filtration  rate (eGFR) is the CKD-EPI equation.        Lab Results   Component Value Date    WBC 2.99 (L) 03/04/2024    HGB 11.1 (L) 03/04/2024    HCT 33.6 (L) 03/04/2024    MCV 86 03/04/2024     03/04/2024     Lab Results   Component Value Date    CHOL 103 (L) 05/08/2023    CHOL 103 (L) 04/12/2022    CHOL 103 (L) 04/12/2022     Lab Results   Component Value Date    HDL 51 05/08/2023    HDL 42 04/12/2022    HDL 42 04/12/2022     Lab Results   Component Value Date    LDLCALC 22.4 (L) 05/08/2023    LDLCALC 24.4 (L) 04/12/2022    LDLCALC 24.4 (L) 04/12/2022     Lab Results   Component Value Date    TRIG 148 05/08/2023    TRIG 183 (H) 04/12/2022    TRIG 183 (H) 04/12/2022     Lab Results   Component Value Date    CHOLHDL 49.5 05/08/2023    CHOLHDL 40.8 04/12/2022    CHOLHDL 40.8 04/12/2022     Lab Results   Component Value Date    TSH 1.549 04/12/2022       ASSESSMENT/PLAN   Assessment & Plan    Assessed patient's blood pressure, which has been elevated (201, 197, 189, 180 recently)  Determined patient has been out of several blood pressure medications for 1-2 months  Will restart all blood pressure medications to address hypertension  Considered target blood pressure range of 120s-130s, with 140s-150s being acceptable initially    HYPERTENSION:  - Explained ideal blood pressure range (120s-130s).  - Discussed importance of consistent medication adherence for blood pressure control.  - [place] to check blood pressure at home: first thing in the morning before taking medication and about 1.5 hours after taking  morning medication.  - Restarted all blood pressure medications: Carvedilol (take 2 times daily), Amlodipine (take 1 time daily, morning or night), Doxazosin (take at night), Hydrochlorothiazide, and Irbesartan.  - Contact the office if experiencing lightheadedness or dizziness after restarting medications.    DIABETES:  - Refilled glipizide (diabetes medication).  - Continued metformin.    MEDICATIONS/SUPPLEMENTS:  - Continued gabapentin and atorvastatin.    FOLLOW UP:  - Follow up in about 1 week for blood pressure check and overall well-being assessment.  - Contact either the current provider or Dr. Klein (primary care physician) using the provided office phone number if needed.          Misha was seen today for hypertension.    Diagnoses and all orders for this visit:    Hypertension, unspecified type  -     amLODIPine (NORVASC) 10 MG tablet; Take 1 tablet (10 mg total) by mouth once daily.  -     hydroCHLOROthiazide (HYDRODIURIL) 25 MG tablet; Take 0.5 tablets (12.5 mg total) by mouth once daily.    Heart abnormality  -     carvediloL (COREG) 3.125 MG tablet; Take 1 tablet (3.125 mg total) by mouth 2 (two) times daily.    History of prostate cancer  -     doxazosin (CARDURA) 4 MG tablet; Take 1 tablet (4 mg total) by mouth every evening.    Essential hypertension  Comments:  Uncontrolled. Pt has not been taking emds regulalry. Reordered all bp meds: amlodipine, HCTZ, carvedilol, doxazosin, irbesartan  Orders:  -     irbesartan (AVAPRO) 300 MG tablet; Take 1 tablet (300 mg total) by mouth every evening.    Abnormal cholesterol test    Neuropathy    Diabetes 1.5, managed as type 2  -     glipiZIDE (GLUCOTROL) 10 MG tablet; Take 1 tablet (10 mg total) by mouth 2 (two) times daily before meals.          John Burton NP   Department of Internal Medicine - [facility]  3:35 PM

## 2024-10-21 ENCOUNTER — TELEPHONE (OUTPATIENT)
Dept: INTERNAL MEDICINE | Facility: CLINIC | Age: 79
End: 2024-10-21
Payer: MEDICARE

## 2024-10-21 VITALS — DIASTOLIC BLOOD PRESSURE: 77 MMHG | SYSTOLIC BLOOD PRESSURE: 159 MMHG

## 2024-10-21 NOTE — TELEPHONE ENCOUNTER
----- Message from John Burton NP sent at 10/21/2024  8:56 AM CDT -----  Regarding: phone call  Please call patient and get a blood pressure.

## 2024-10-21 NOTE — TELEPHONE ENCOUNTER
"Spoke with patient after his lunch. BP reading while on phone is 159/77 HR 67. Scheduled for in-person BP check [place] on 10/23/24. Asked patient to bring all medications and home blood pressure monitor to visit. Patient expressed understanding and gratitude for phone call.  Call ended.            Spoke with patient. Currently lunch. Asked me to call back in approx. 30 mins to repeat BP. Call ended.     "    ----- Message from John Burton NP sent at 10/21/2024  8:56 AM CDT -----  Regarding: phone call  Please call patient and get a blood pressure.      "  "

## 2024-10-23 ENCOUNTER — TELEPHONE (OUTPATIENT)
Dept: INTERNAL MEDICINE | Facility: CLINIC | Age: 79
End: 2024-10-23

## 2024-10-23 ENCOUNTER — CLINICAL SUPPORT (OUTPATIENT)
Dept: INTERNAL MEDICINE | Facility: CLINIC | Age: 79
End: 2024-10-23
Payer: MEDICARE

## 2024-10-23 VITALS — OXYGEN SATURATION: 99 % | DIASTOLIC BLOOD PRESSURE: 70 MMHG | HEART RATE: 69 BPM | SYSTOLIC BLOOD PRESSURE: 130 MMHG

## 2024-10-23 DIAGNOSIS — I10 PRIMARY HYPERTENSION: Primary | ICD-10-CM

## 2024-10-23 PROCEDURE — 99999 PR PBB SHADOW E&M-EST. PATIENT-LVL I: CPT | Mod: PBBFAC,HCNC,,

## 2024-10-23 NOTE — PROGRESS NOTES
Misha Eldridge Jr. 79 y.o. male is here today for Blood Pressure check.   History of HTN yes.    Review of patient's allergies indicates:   Allergen Reactions    Hay fever and allergy relief      Creatinine   Date Value Ref Range Status   02/20/2024 1.1 0.5 - 1.4 mg/dL Final     Sodium   Date Value Ref Range Status   02/20/2024 141 136 - 145 mmol/L Final     Potassium   Date Value Ref Range Status   02/20/2024 4.1 3.5 - 5.1 mmol/L Final   ]  Patient verifies taking blood pressure medications on a regular basis at the same time of the day.     Current Outpatient Medications:     ACCU-CHEK SOFTCLIX LANCETS [facility] [place], TEST BLOOD SUGAR ONCE A DAY, Disp: 100 each, Rfl: 0    amLODIPine (NORVASC) 10 MG tablet, Take 1 tablet (10 mg total) by mouth once daily., Disp: 90 tablet, Rfl: 2    atorvastatin (LIPITOR) 20 MG tablet, Take 1 tablet (20 mg total) by mouth once daily., Disp: 90 tablet, Rfl: 3    blood sugar diagnostic Strp, 1 each by Misc.(Non-Drug; Combo Route) route once daily., Disp: 100 each, Rfl: 3    blood sugar diagnostic Strp, Pt to check up to 6 times daily, Disp: 200 strip, Rfl: 11    blood sugar diagnostic Strp, Use to check blood glucose 1-2 times daily as directed., Disp: 200 each, Rfl: 11    blood-glucose meter kit, Use as instructed, Disp: 1 each, Rfl: 0    carvediloL (COREG) 3.125 MG tablet, Take 1 tablet (3.125 mg total) by mouth 2 (two) times daily., Disp: 180 tablet, Rfl: 2    diclofenac sodium (VOLTAREN) 1 % Gel, Apply 2 g topically 2 (two) times daily. for 7 days, Disp: 150 g, Rfl: 0    doxazosin (CARDURA) 4 MG tablet, Take 1 tablet (4 mg total) by mouth every evening., Disp: 90 tablet, Rfl: 0    gabapentin (NEURONTIN) 300 MG capsule, TAKE 1 CAPSULE(300 MG) BY MOUTH THREE TIMES DAILY, Disp: 270 capsule, Rfl: 1    glipiZIDE (GLUCOTROL) 10 MG tablet, Take 1 tablet (10 mg total) by mouth 2 (two) times daily before meals., Disp: 180 tablet, Rfl: 1    hydroCHLOROthiazide (HYDRODIURIL) 25 MG tablet, Take 0.5  "tablets (12.5 mg total) by mouth once daily., Disp: 45 tablet, Rfl: 2    irbesartan (AVAPRO) 300 MG tablet, Take 1 tablet (300 mg total) by mouth every evening., Disp: 90 tablet, Rfl: 2    lancing device with lancets Kit, 1 each by Misc.(Non-Drug; Combo Route) route 2 (two) times daily., Disp: 200 each, Rfl: 11    LANTUS SOLOSTAR U-100 INSULIN glargine 100 units/mL SubQ pen, SMARTSI Unit(s) SUB-Q Every Evening, Disp: , Rfl:     metFORMIN (GLUCOPHAGE) 1000 MG tablet, TAKE 1 TABLET(1000 MG) BY MOUTH TWICE DAILY WITH MEALS, Disp: 180 tablet, Rfl: 1    pen needle, diabetic (BD ULTRA-FINE KELECHI PEN NEEDLE) 32 gauge x 5/32" Ndle, To use with insulin pens at meals and evening shot., Disp: 200 each, Rfl: 11    polyethylene glycol (GLYCOLAX) 17 gram PwPk, Take 17 g by mouth daily as needed (Constipation)., Disp: 14 packet, Rfl: 0    semaglutide (RYBELSUS) 14 mg tablet, TAKE 1 TABLET(14 MG) BY MOUTH EVERY DAY, Disp: 30 tablet, Rfl: 0    vibegron (GEMTESA) 75 mg Tab, Take 1 tablet (75 mg total) by mouth once daily. (Patient not taking: Reported on 10/10/2024), Disp: 30 tablet, Rfl: 11    Current Facility-Administered Medications:     ciprofloxacin HCl tablet 500 mg, 500 mg, Oral, 1 time in Clinic/HOD, Duarte Ravi MD    LIDOcaine HCl 2% urojet, , Urethral, 1 time in Clinic/HOD, Duarte Ravi MD  Does patient have record of home blood pressure readings no. Readings have been averaging NA.   Last dose of blood pressure medication was taken at Amlodipine 10 mg and HCTZ taken 10/23/24 @ 1000 am. Irbesartan 300 mg, doxazosin 4 mg and Carvedilol taken last night 10/22/24 at 1030 pm.  Patient is asymptomatic.   Complains of NA.    /70, Pulse 69    Dr. Treviño notified.      "

## 2024-10-24 ENCOUNTER — TELEPHONE (OUTPATIENT)
Dept: PODIATRY | Facility: CLINIC | Age: 79
End: 2024-10-24
Payer: MEDICARE

## 2024-10-28 ENCOUNTER — OFFICE VISIT (OUTPATIENT)
Dept: PODIATRY | Facility: CLINIC | Age: 79
End: 2024-10-28
Payer: MEDICARE

## 2024-10-28 VITALS
HEIGHT: 73 IN | DIASTOLIC BLOOD PRESSURE: 72 MMHG | SYSTOLIC BLOOD PRESSURE: 168 MMHG | HEART RATE: 70 BPM | WEIGHT: 197.75 LBS | BODY MASS INDEX: 26.21 KG/M2

## 2024-10-28 DIAGNOSIS — B35.1 ONYCHOMYCOSIS DUE TO DERMATOPHYTE: Primary | ICD-10-CM

## 2024-10-28 DIAGNOSIS — E11.42 DIABETIC POLYNEUROPATHY ASSOCIATED WITH TYPE 2 DIABETES MELLITUS: ICD-10-CM

## 2024-10-28 DIAGNOSIS — L84 CORN OR CALLUS: ICD-10-CM

## 2024-10-28 PROCEDURE — 99999 PR PBB SHADOW E&M-EST. PATIENT-LVL III: CPT | Mod: PBBFAC,HCNC,, | Performed by: PODIATRIST

## 2024-10-31 DIAGNOSIS — Z79.4 TYPE 2 DIABETES MELLITUS WITH COMPLICATION, WITH LONG-TERM CURRENT USE OF INSULIN: Primary | ICD-10-CM

## 2024-10-31 DIAGNOSIS — E11.8 TYPE 2 DIABETES MELLITUS WITH COMPLICATION, WITH LONG-TERM CURRENT USE OF INSULIN: Primary | ICD-10-CM

## 2024-10-31 RX ORDER — INSULIN GLARGINE 100 [IU]/ML
100 INJECTION, SOLUTION SUBCUTANEOUS DAILY
Qty: 3 ML | Refills: 6 | Status: SHIPPED | OUTPATIENT
Start: 2024-10-31

## 2024-11-01 ENCOUNTER — PATIENT OUTREACH (OUTPATIENT)
Dept: ADMINISTRATIVE | Facility: HOSPITAL | Age: 79
End: 2024-11-01
Payer: MEDICARE

## 2024-11-15 DIAGNOSIS — G62.9 NEUROPATHY: ICD-10-CM

## 2024-11-15 DIAGNOSIS — I10 HYPERTENSION, UNSPECIFIED TYPE: ICD-10-CM

## 2024-11-15 DIAGNOSIS — E13.9 DIABETES 1.5, MANAGED AS TYPE 2: ICD-10-CM

## 2024-11-15 DIAGNOSIS — Q24.9 HEART ABNORMALITY: ICD-10-CM

## 2024-11-15 NOTE — TELEPHONE ENCOUNTER
Refill Routing Note   Medication(s) are not appropriate for processing by [facility] for the following reason(s):        Required vitals abnormal: Coreg, HCTZ  Required labs outdated: Metformin, Glucotrol    [facility] action(s):  Defer               Appointments  past 12m or future 3m with PCP    Date Provider   Last Visit   2/20/2024 Dipak Treviño MD   Next Visit   Visit date not found Dipak Treviño MD   ED visits in past 90 days: 0        Note composed:3:53 PM 11/15/2024

## 2024-11-15 NOTE — TELEPHONE ENCOUNTER
No care due was identified.  [facility] Embedded Care Due Messages. Reference number: [id number].   11/15/2024 11:07:52 AM CST

## 2024-11-16 RX ORDER — GLIPIZIDE 10 MG/1
10 TABLET ORAL
Qty: 180 TABLET | Refills: 0 | Status: SHIPPED | OUTPATIENT
Start: 2024-11-16

## 2024-11-16 RX ORDER — GABAPENTIN 300 MG/1
300 CAPSULE ORAL 3 TIMES DAILY
Qty: 270 CAPSULE | Refills: 0 | Status: SHIPPED | OUTPATIENT
Start: 2024-11-16

## 2024-11-16 RX ORDER — METFORMIN HYDROCHLORIDE 1000 MG/1
1000 TABLET ORAL 2 TIMES DAILY WITH MEALS
Qty: 180 TABLET | Refills: 0 | Status: SHIPPED | OUTPATIENT
Start: 2024-11-16

## 2024-11-16 RX ORDER — CARVEDILOL 3.12 MG/1
3.12 TABLET ORAL 2 TIMES DAILY
Qty: 180 TABLET | Refills: 0 | Status: SHIPPED | OUTPATIENT
Start: 2024-11-16

## 2024-11-16 RX ORDER — HYDROCHLOROTHIAZIDE 25 MG/1
12.5 TABLET ORAL DAILY
Qty: 45 TABLET | Refills: 0 | Status: SHIPPED | OUTPATIENT
Start: 2024-11-16

## 2024-11-18 NOTE — TELEPHONE ENCOUNTER
Let pt know he is due for f/u appt, tried to schedule but pt said he will call back at another time to schedule something

## 2024-12-19 ENCOUNTER — TELEPHONE (OUTPATIENT)
Dept: INTERNAL MEDICINE | Facility: CLINIC | Age: 79
End: 2024-12-19
Payer: MEDICARE

## 2024-12-19 NOTE — TELEPHONE ENCOUNTER
----- Message from [employer] sent at 12/19/2024 10:13 AM CST -----   Name of Who is Calling:     What is the request in detail:  patient calling in reference to request mail to patient home address a copy of his   medication list Please contact to further discuss and advise      Can the clinic reply by [facility]:     What Number to Call Back if not in [facility]:   [phone]

## 2024-12-19 NOTE — TELEPHONE ENCOUNTER
Spoke with patient, he's wanting a copy of his medication list. He asked that we mail the list to his home address. Address verified with patient

## 2025-01-07 NOTE — TELEPHONE ENCOUNTER
Care Due:                  Date            Visit Type   Department     Provider  --------------------------------------------------------------------------------                                EP -                              PRIMARY      [facility] INTERNAL  Last Visit: 11-      CARE ([facility])   MEDICINE       Mary Pruett                              EP -                              PRIMARY      [facility] INTERNAL  Next Visit: 01-      CARE ([facility])   MEDICINE       Dipak Treviño                                                            Last  Test          Frequency    Reason                     Performed    Due Date  --------------------------------------------------------------------------------    Lipid Panel.  12 months..  atorvastatin.............  04- 04-    [employer] Embedded Care Gaps. Reference number: [id number]. 1/10/2023   11:30:42 AM CST   alert ,  pleasant, well nourished, in no acute distress

## 2025-01-09 ENCOUNTER — PATIENT OUTREACH (OUTPATIENT)
Dept: ADMINISTRATIVE | Facility: HOSPITAL | Age: 80
End: 2025-01-09
Payer: MEDICARE

## 2025-01-09 VITALS — SYSTOLIC BLOOD PRESSURE: 136 MMHG | DIASTOLIC BLOOD PRESSURE: 80 MMHG

## 2025-01-10 ENCOUNTER — LAB VISIT (OUTPATIENT)
Dept: LAB | Facility: OTHER | Age: 80
End: 2025-01-10
Payer: MEDICARE

## 2025-01-10 DIAGNOSIS — E11.40 CONTROLLED TYPE 2 DIABETES MELLITUS WITH DIABETIC NEUROPATHY, WITHOUT LONG-TERM CURRENT USE OF INSULIN: ICD-10-CM

## 2025-01-10 DIAGNOSIS — I70.0 AORTIC ATHEROSCLEROSIS: ICD-10-CM

## 2025-01-10 LAB
CHOLEST SERPL-MCNC: 97 MG/DL (ref 120–199)
CHOLEST/HDLC SERPL: 2.2 {RATIO} (ref 2–5)
ESTIMATED AVG GLUCOSE: 174 MG/DL (ref 68–131)
HBA1C MFR BLD: 7.7 % (ref 4–5.6)
HDLC SERPL-MCNC: 44 MG/DL (ref 40–75)
HDLC SERPL: 45.4 % (ref 20–50)
LDLC SERPL CALC-MCNC: 9 MG/DL (ref 63–159)
NONHDLC SERPL-MCNC: 53 MG/DL
TRIGL SERPL-MCNC: 220 MG/DL (ref 30–150)

## 2025-01-10 PROCEDURE — 36415 COLL VENOUS BLD VENIPUNCTURE: CPT | Mod: HCNC

## 2025-01-10 PROCEDURE — 83036 HEMOGLOBIN GLYCOSYLATED A1C: CPT | Mod: HCNC

## 2025-01-10 PROCEDURE — 80061 LIPID PANEL: CPT | Mod: HCNC

## 2025-01-13 ENCOUNTER — TELEPHONE (OUTPATIENT)
Dept: INTERNAL MEDICINE | Facility: CLINIC | Age: 80
End: 2025-01-13
Payer: MEDICARE

## 2025-01-13 DIAGNOSIS — Z00.00 ENCOUNTER FOR MEDICARE ANNUAL WELLNESS EXAM: ICD-10-CM

## 2025-01-13 NOTE — TELEPHONE ENCOUNTER
----- Message from Dipak Treviño MD sent at 1/12/2025  5:21 PM CST -----  Please contact pt to schedule annual exam

## 2025-01-28 ENCOUNTER — TELEPHONE (OUTPATIENT)
Dept: PODIATRY | Facility: CLINIC | Age: 80
End: 2025-01-28
Payer: MEDICARE

## 2025-01-29 ENCOUNTER — OFFICE VISIT (OUTPATIENT)
Dept: PODIATRY | Facility: CLINIC | Age: 80
End: 2025-01-29
Payer: MEDICARE

## 2025-01-29 VITALS
HEIGHT: 73 IN | SYSTOLIC BLOOD PRESSURE: 134 MMHG | WEIGHT: 205.25 LBS | HEART RATE: 67 BPM | DIASTOLIC BLOOD PRESSURE: 79 MMHG | BODY MASS INDEX: 27.2 KG/M2

## 2025-01-29 DIAGNOSIS — L84 CORN OR CALLUS: ICD-10-CM

## 2025-01-29 DIAGNOSIS — B35.1 ONYCHOMYCOSIS DUE TO DERMATOPHYTE: Primary | ICD-10-CM

## 2025-01-29 DIAGNOSIS — E11.42 DIABETIC POLYNEUROPATHY ASSOCIATED WITH TYPE 2 DIABETES MELLITUS: ICD-10-CM

## 2025-01-29 PROCEDURE — 99999 PR PBB SHADOW E&M-EST. PATIENT-LVL IV: CPT | Mod: PBBFAC,HCNC,, | Performed by: PODIATRIST

## 2025-01-29 RX ORDER — HYDROCHLOROTHIAZIDE 50 MG/1
25 TABLET ORAL DAILY
COMMUNITY
Start: 2025-01-14

## 2025-01-29 RX ORDER — LOSARTAN POTASSIUM 100 MG/1
1 TABLET ORAL DAILY
COMMUNITY
Start: 2025-01-14

## 2025-01-29 NOTE — PROGRESS NOTES
Subjective:      Patient ID: Misha Eldridge Jr. is a 79 y.o. male.    Chief Complaint: Diabetic Foot Exam (10/10/2024 - John Burton NP)    Misha is a 79 y.o. male who presents to the clinic for evaluation and treatment of high risk feet. Misha has a past medical history of Cataract, Colostomy in place, Dermatochalasis, Diabetes mellitus type II, Dry eye syndrome, Hemorrhoid, History of colon cancer, History of prostate cancer, History of pulmonary embolism, Hypertension, and Macular degeneration. The patient's chief complaint is long, thick toenails. This patient has documented high risk feet requiring routine maintenance secondary to peripheral neuropathy.    PCP: Dipak Treviño MD    Date Last Seen by PCP:   Chief Complaint   Patient presents with    Diabetic Foot Exam     10/10/2024 - John Burton NP         Current shoe gear:  Affected Foot: Slip-on shoes     Unaffected Foot: Slip-on shoes    Hemoglobin A1C   Date Value Ref Range Status   01/10/2025 7.7 (H) 4.0 - 5.6 % Final     Comment:     ADA Screening Guidelines:  5.7-6.4%  Consistent with prediabetes  >or=6.5%  Consistent with diabetes    High levels of fetal hemoglobin interfere with the HbA1C  assay. Heterozygous hemoglobin variants (HbS, HgC, etc)do  not significantly interfere with this assay.   However, presence of multiple variants may affect accuracy.     03/04/2024 6.9 (H) 4.0 - 5.6 % Final     Comment:     ADA Screening Guidelines:  5.7-6.4%  Consistent with prediabetes  >or=6.5%  Consistent with diabetes    High levels of fetal hemoglobin interfere with the HbA1C  assay. Heterozygous hemoglobin variants (HbS, HgC, etc)do  not significantly interfere with this assay.   However, presence of multiple variants may affect accuracy.     08/17/2023 7.4 (H) 4.0 - 5.6 % Final     Comment:     ADA Screening Guidelines:  5.7-6.4%  Consistent with prediabetes  >or=6.5%  Consistent with diabetes    High levels of fetal hemoglobin  interfere with the HbA1C  assay. Heterozygous hemoglobin variants (HbS, HgC, etc)do  not significantly interfere with this assay.   However, presence of multiple variants may affect accuracy.         Review of Systems   Constitutional: Negative for chills, fever and malaise/fatigue.   HENT:  Negative for hearing loss.    Cardiovascular:  Positive for leg swelling. Negative for claudication.   Respiratory:  Negative for shortness of breath.    Skin:  Positive for color change, dry skin, nail changes and unusual hair distribution. Negative for flushing and rash.   Musculoskeletal:  Negative for joint pain and myalgias.   Neurological:  Positive for numbness, paresthesias and sensory change. Negative for loss of balance.   Psychiatric/Behavioral:  Negative for altered mental status.            Objective:      Physical Exam  Vitals reviewed.   Constitutional:       Appearance: He is well-developed.   Cardiovascular:      Pulses:           Dorsalis pedis pulses are 0 on the right side and 0 on the left side.        Posterior tibial pulses are 1+ on the right side and 1+ on the left side.   Musculoskeletal:      Right ankle: Decreased range of motion. Abnormal pulse.      Right Achilles Tendon: Russell's test negative.      Left ankle: Decreased range of motion. Abnormal pulse.      Left Achilles Tendon: Russell's test negative.      Right foot: Decreased range of motion.      Left foot: Decreased range of motion.   Feet:      Right foot:      Protective Sensation: 5 sites tested.  2 sites sensed.      Left foot:      Protective Sensation: 5 sites tested.  2 sites sensed.   Skin:     General: Skin is dry.      Capillary Refill: Capillary refill takes more than 3 seconds.      Comments:  No open lesions noted.   HKLs noted to left heel and medial foot   Neurological:      Mental Status: He is alert.      Comments: diminished sensation noted to b/L lower extremities   Psychiatric:         Behavior: Behavior normal.  Behavior is cooperative.         Nails x10 are elongated by  5-7mm's, thickened by 2-3 mm's, dystrophic, and are yellowish in  coloration . Xerosis Bilaterally. No open lesions noted.             Assessment:       Encounter Diagnoses   Name Primary?    Onychomycosis due to dermatophyte Yes    Diabetic polyneuropathy associated with type 2 diabetes mellitus     Corn or callus          Plan:       Misha was seen today for diabetic foot exam.    Diagnoses and all orders for this visit:    Onychomycosis due to dermatophyte    Diabetic polyneuropathy associated with type 2 diabetes mellitus    Corn or callus      I counseled the patient on his conditions, their implications and medical management.        - Shoe inspection. Diabetic Foot Education. Patient reminded of the importance of good nutrition and blood sugar control to help prevent podiatric complications of diabetes. Patient instructed on proper foot hygeine. We discussed wearing proper shoe gear, daily foot inspections, never walking without protective shoe gear, never putting sharp instruments to feet, routine podiatric nail visits every 2-3 months.    After cleansing with an alcohol prep pad, the about mentioned hyperkeratotic lesions were sharply debrided X 2 utilizing a #15 blade to a smooth base without incident. Pt tolerated the procedure well and reported comfort to the debarment sites. Pt will continue to use padding and moisture the callused areas.     - With patient's permission, nails were aggressively reduced and debrided x 10 to their soft tissue attachment mechanically and with electric , removing all offending nail and debris. Patient relates relief following the procedure. He will continue to monitor the areas daily, inspect his feet, wear protective shoe gear when ambulatory, moisturizer to maintain skin integrity and follow in this office in approximately 2-3 months, sooner p.r.n.

## 2025-02-13 DIAGNOSIS — G62.9 NEUROPATHY: ICD-10-CM

## 2025-02-13 NOTE — TELEPHONE ENCOUNTER
No care due was identified.  [facility] Embedded Care Due Messages. Reference number: [id number].   2/13/2025 5:04:25 PM CST

## 2025-02-14 RX ORDER — GABAPENTIN 300 MG/1
300 CAPSULE ORAL 3 TIMES DAILY
Qty: 270 CAPSULE | Refills: 2 | Status: SHIPPED | OUTPATIENT
Start: 2025-02-14

## 2025-02-14 NOTE — TELEPHONE ENCOUNTER
Refill Encounter    PCP Visits: Recent Visits  Date Type Provider Dept   10/10/24 Office Visit John Burton NP [facility]   02/20/24 Office Visit Dipak Treviño MD [facility]   Showing recent visits within past 360 days and meeting all other requirements  Future Appointments  Date Type Provider Dept   03/13/25 Appointment Dipak Treviño MD [facility]   Showing future appointments within next 720 days and meeting all other requirements      Last 3 Blood Pressure:   BP Readings from Last 3 Encounters:   01/29/25 134/79   01/09/25 136/80   10/28/24 (!) 168/72     Preferred Pharmacy:   [id number] [place] [address] [address] [address] [place] [address] [facility] [id number]  Phone: [phone] Fax: [phone]    Requested RX:  Requested Prescriptions     Pending Prescriptions Disp Refills    gabapentin (NEURONTIN) 300 MG capsule [Pharmacy Med Name: GABAPENTIN 300MG CAPSULES] 270 capsule 0     Sig: TAKE 1 CAPSULE(300 MG) BY MOUTH THREE TIMES DAILY      RX Route: Normal

## 2025-02-19 NOTE — PROGRESS NOTES
Pt requesting a call back from you directly.    Subjective:      Misha Eldridge Jr. is a 77 y.o. male who returns today regarding his     Here to discuss path results from TRUS-guide biopsy of small lesion adjacent to vesicourethral anastomosis at the anterior rectal wall.    No complications following the biopsy  No hematuria    OAB symptoms improved  Occasional stress incontinence; no leakage  Discussed Cony    The following portions of the patient's history were reviewed and updated as appropriate: allergies, current medications, past family history, past medical history, past social history, past surgical history and problem list.    Review of Systems  Pertinent items are noted in HPI.  A comprehensive multipoint review of systems was negative except as otherwise stated in the HPI.    Past Medical History:   Diagnosis Date    Cataract     Colostomy in place     Diabetes mellitus type II     Hemorrhoid     History of colon cancer     History of prostate cancer     History of pulmonary embolism     Hypertension      Past Surgical History:   Procedure Laterality Date    CATARACT EXTRACTION Bilateral     COLONOSCOPY N/A 6/2/2022    Procedure: COLONOSCOPY;  Surgeon: Jose Dangelo MD;  Location: [address];  Service: Endoscopy;  Laterality: N/A;  fully vaccinated    EYE SURGERY Bilateral     cataract extraction    HERNIA REPAIR      PROSTATECTOMY      SUBTOTAL COLECTOMY       Diabetes Medications               glipiZIDE (GLUCOTROL) 10 MG tablet TAKE 1 TABLET(10 MG) BY MOUTH TWICE DAILY BEFORE MEALS    insulin (LANTUS SOLOSTAR U-100 INSULIN) glargine 100 units/mL (3mL) SubQ pen Inject 22 Units into the skin every evening.    metFORMIN (GLUCOPHAGE) 1000 MG tablet Take 1 tablet (1,000 mg total) by mouth 2 (two) times daily with meals.          Review of patient's allergies indicates:   Allergen Reactions    Hay fever and allergy relief           Objective:   Vitals: There were no vitals taken for this visit.    Physical Exam   General: alert and  "oriented, no acute distress  Respiratory: Symmetric expansion, non-labored breathing  Cardiovascular: no peripheral edema  Abdomen: soft, non distended  Skin: normal coloration and turgor, no rashes, no suspicious skin lesions noted  Neuro: no gross deficits  Psych: normal judgment and insight, normal mood/affect, and non-anxious    Physical Exam    Lab Review   Urinalysis demonstrates no specimen    Lab Results   Component Value Date    WBC 4.47 08/16/2022    HGB 12.4 (L) 08/16/2022    HCT 36.6 (L) 08/16/2022    MCV 92 08/16/2022     (L) 08/16/2022     Lab Results   Component Value Date    CREATININE 1.3 08/16/2022    BUN 21 08/16/2022     Lab Results   Component Value Date    PSA 7.1 (H) 04/12/2022    PSA 0.72 12/05/2017    PSA 0.38 12/14/2016    PSADIAG 0.33 08/16/2022    PSADIAG 7.2 (H) 06/21/2022     Final Pathologic Diagnosis "Prostate nodule fossa", needle core biopsy:   - Prostatic adenocarcnioma, 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   Dr. DIMAS Forman has reviewed this case and agrees with the above   interpretation.    Comment: Interp By ROLAN Patel MD, Signed on 09/19/2022 at 14:52       Imaging  TRUS   1cm hypoechoic lesion just to the right of midline on the anterior rectal wall posterior to the trigone.  Best visualized on transverse images.     2 cores taken from this area        Assessment and Plan:   Prostate cancer local recurrence sp robotic prostatectomy at [facility]    See Dr Azevedo, rad onc    Urinary urgency with occasional urgency incontince  UA  Kegels  PT consults  Avoid pm fluids  Avoid caffeine and alcohol  Timed voiding            "

## 2025-04-29 ENCOUNTER — TELEPHONE (OUTPATIENT)
Dept: PODIATRY | Facility: CLINIC | Age: 80
End: 2025-04-29
Payer: MEDICAID

## 2025-04-29 NOTE — TELEPHONE ENCOUNTER
Spoke with pt in regards to 4/29 appt being cancelled due to provider being out of clinic. Pt r/s and verbalized understanding.

## 2025-05-14 DIAGNOSIS — E13.9 DIABETES 1.5, MANAGED AS TYPE 2: ICD-10-CM

## 2025-05-14 RX ORDER — METFORMIN HYDROCHLORIDE 1000 MG/1
1000 TABLET ORAL 2 TIMES DAILY WITH MEALS
Qty: 180 TABLET | Refills: 2 | Status: SHIPPED | OUTPATIENT
Start: 2025-05-14

## 2025-05-14 NOTE — TELEPHONE ENCOUNTER
Care Due:                  Date            Visit Type   Department     Provider  --------------------------------------------------------------------------------                                EP -                              PRIMARY      [facility] INTERNAL  Last Visit: 02-      CARE ([facility])   MEDICINE       Dipak Treviño  Next Visit: None Scheduled  None         None Found                                                            Last  Test          Frequency    Reason                     Performed    Due Date  --------------------------------------------------------------------------------    Office Visit  12 months..  LANTUS, atorvastatin,      02- 02-                             carvediloL, diclofenac,                             glipiZIDE, metFORMIN,                             semaglutide..............    CBC.........  12 months..  diclofenac...............  03- 02-    CMP.........  12 months..  LANTUS, atorvastatin,      02- 02-                             diclofenac, glipiZIDE,                             metFORMIN................    HBA1C.......  6 months...  LANTUS, glipiZIDE,         01-   07-                             metFORMIN, semaglutide...    [employer] Embedded Care Due Messages. Reference number: [id number].   5/14/2025 5:33:28 AM CDT

## 2025-05-14 NOTE — TELEPHONE ENCOUNTER
Refill Routing Note   Medication(s) are not appropriate for processing by [facility] for the following reason(s):        Required labs outdated    [facility] action(s):  Defer   Requires appointment : Yes     Requires labs : Yes             Appointments  past 12m or future 3m with PCP    Date Provider   Last Visit   2/20/2024 Dipak Treviño MD   Next Visit   Visit date not found Dipak Treviño MD   ED visits in past 90 days: 0        Note composed:11:39 AM 05/14/2025

## 2025-05-19 ENCOUNTER — OFFICE VISIT (OUTPATIENT)
Dept: PODIATRY | Facility: CLINIC | Age: 80
End: 2025-05-19
Payer: MEDICARE

## 2025-05-19 VITALS
BODY MASS INDEX: 26.56 KG/M2 | DIASTOLIC BLOOD PRESSURE: 74 MMHG | SYSTOLIC BLOOD PRESSURE: 135 MMHG | HEART RATE: 72 BPM | HEIGHT: 73 IN | WEIGHT: 200.38 LBS

## 2025-05-19 DIAGNOSIS — B35.1 ONYCHOMYCOSIS DUE TO DERMATOPHYTE: ICD-10-CM

## 2025-05-19 DIAGNOSIS — L84 CORN OR CALLUS: ICD-10-CM

## 2025-05-19 DIAGNOSIS — E11.42 DIABETIC POLYNEUROPATHY ASSOCIATED WITH TYPE 2 DIABETES MELLITUS: Primary | ICD-10-CM

## 2025-05-19 PROCEDURE — 99999 PR PBB SHADOW E&M-EST. PATIENT-LVL IV: CPT | Mod: PBBFAC,,, | Performed by: PODIATRIST

## 2025-05-19 PROCEDURE — 11721 DEBRIDE NAIL 6 OR MORE: CPT | Mod: Q9,PBBFAC | Performed by: PODIATRIST

## 2025-05-19 PROCEDURE — 11056 PARNG/CUTG B9 HYPRKR LES 2-4: CPT | Mod: PBBFAC | Performed by: PODIATRIST

## 2025-05-19 PROCEDURE — 99214 OFFICE O/P EST MOD 30 MIN: CPT | Mod: PBBFAC | Performed by: PODIATRIST

## 2025-05-19 RX ORDER — BACLOFEN 10 MG/1
5 TABLET ORAL
COMMUNITY
Start: 2025-05-12

## 2025-05-19 RX ORDER — INSULIN GLARGINE-YFGN 100 [IU]/ML
INJECTION, SOLUTION SUBCUTANEOUS
COMMUNITY
Start: 2025-03-11

## 2025-05-19 NOTE — PROGRESS NOTES
Subjective:      Patient ID: Misha Eldridge Jr. is a 80 y.o. male.    Chief Complaint: Diabetes Mellitus ([place] - 4/28/2025) and Routine Foot Care    Misha is a 80 y.o. male who presents to the clinic for evaluation and treatment of high risk feet. Misha has a past medical history of Cataract, Colostomy in place, Dermatochalasis, Diabetes mellitus type II, Dry eye syndrome, Hemorrhoid, History of colon cancer, History of prostate cancer, History of pulmonary embolism, Hypertension, and Macular degeneration. The patient's chief complaint is long, thick toenails. This patient has documented high risk feet requiring routine maintenance secondary to peripheral neuropathy.    PCP: Dipak Treviño MD    Date Last Seen by PCP:   Chief Complaint   Patient presents with    Diabetes Mellitus     [place] - 4/28/2025    Routine Foot Care         Current shoe gear:  Affected Foot: Slip-on shoes     Unaffected Foot: Slip-on shoes    Hemoglobin A1C   Date Value Ref Range Status   01/10/2025 7.7 (H) 4.0 - 5.6 % Final     Comment:     ADA Screening Guidelines:  5.7-6.4%  Consistent with prediabetes  >or=6.5%  Consistent with diabetes    High levels of fetal hemoglobin interfere with the HbA1C  assay. Heterozygous hemoglobin variants (HbS, HgC, etc)do  not significantly interfere with this assay.   However, presence of multiple variants may affect accuracy.     03/04/2024 6.9 (H) 4.0 - 5.6 % Final     Comment:     ADA Screening Guidelines:  5.7-6.4%  Consistent with prediabetes  >or=6.5%  Consistent with diabetes    High levels of fetal hemoglobin interfere with the HbA1C  assay. Heterozygous hemoglobin variants (HbS, HgC, etc)do  not significantly interfere with this assay.   However, presence of multiple variants may affect accuracy.     08/17/2023 7.4 (H) 4.0 - 5.6 % Final     Comment:     ADA Screening Guidelines:  5.7-6.4%  Consistent with prediabetes  >or=6.5%  Consistent with diabetes    High levels of fetal hemoglobin interfere  with the HbA1C  assay. Heterozygous hemoglobin variants (HbS, HgC, etc)do  not significantly interfere with this assay.   However, presence of multiple variants may affect accuracy.         Review of Systems   Constitutional: Negative for chills, fever and malaise/fatigue.   HENT:  Negative for hearing loss.    Cardiovascular:  Positive for leg swelling. Negative for claudication.   Respiratory:  Negative for shortness of breath.    Skin:  Positive for color change, dry skin, nail changes and unusual hair distribution. Negative for flushing and rash.   Musculoskeletal:  Negative for joint pain and myalgias.   Neurological:  Positive for numbness, paresthesias and sensory change. Negative for loss of balance.   Psychiatric/Behavioral:  Negative for altered mental status.            Objective:      Physical Exam  Vitals reviewed.   Constitutional:       Appearance: He is well-developed.   Cardiovascular:      Pulses:           Dorsalis pedis pulses are 0 on the right side and 0 on the left side.        Posterior tibial pulses are 1+ on the right side and 1+ on the left side.   Musculoskeletal:      Right ankle: Decreased range of motion. Abnormal pulse.      Right Achilles Tendon: Russell's test negative.      Left ankle: Decreased range of motion. Abnormal pulse.      Left Achilles Tendon: Russell's test negative.      Right foot: Decreased range of motion.      Left foot: Decreased range of motion.   Feet:      Right foot:      Protective Sensation: 5 sites tested.  2 sites sensed.      Left foot:      Protective Sensation: 5 sites tested.  2 sites sensed.   Skin:     General: Skin is dry.      Capillary Refill: Capillary refill takes more than 3 seconds.      Comments:  No open lesions noted.   HKLs noted to left heel and medial foot   Neurological:      Mental Status: He is alert.      Comments: diminished sensation noted to b/L lower extremities   Psychiatric:         Behavior: Behavior normal. Behavior is  cooperative.         Nails x10 are elongated by  4-6mm's, thickened by 2-3 mm's, dystrophic, and are yellowish in  coloration . Xerosis Bilaterally. No open lesions noted.             Assessment:       Encounter Diagnoses   Name Primary?    Diabetic polyneuropathy associated with type 2 diabetes mellitus Yes    Onychomycosis due to dermatophyte     Corn or callus          Plan:       Misha was seen today for diabetes mellitus and routine foot care.    Diagnoses and all orders for this visit:    Diabetic polyneuropathy associated with type 2 diabetes mellitus    Onychomycosis due to dermatophyte    Corn or callus      I counseled the patient on his conditions, their implications and medical management.        - Shoe inspection. Diabetic Foot Education. Patient reminded of the importance of good nutrition and blood sugar control to help prevent podiatric complications of diabetes. Patient instructed on proper foot hygeine. We discussed wearing proper shoe gear, daily foot inspections, never walking without protective shoe gear, never putting sharp instruments to feet, routine podiatric nail visits every 2-3 months.    After cleansing with an alcohol prep pad, the about mentioned hyperkeratotic lesions were sharply debrided X 2 utilizing a #15 blade to a smooth base without incident. Pt tolerated the procedure well and reported comfort to the debarment sites. Pt will continue to use padding and moisture the callused areas.     - With patient's permission, nails were aggressively reduced and debrided x 10 to their soft tissue attachment mechanically and with electric , removing all offending nail and debris. Patient relates relief following the procedure. He will continue to monitor the areas daily, inspect his feet, wear protective shoe gear when ambulatory, moisturizer to maintain skin integrity and follow in this office in approximately 2-3 months, sooner p.r.n.

## 2025-08-07 ENCOUNTER — TELEPHONE (OUTPATIENT)
Dept: PODIATRY | Facility: CLINIC | Age: 80
End: 2025-08-07
Payer: MEDICARE

## 2025-08-07 NOTE — TELEPHONE ENCOUNTER
Spoke with pt in regards to r/s 8/19 appt due to provider being out of clinic. Appt r/s and pt verbalized understanding. Appt reminder mailed per request.

## 2025-08-12 DIAGNOSIS — I10 HYPERTENSION, UNSPECIFIED TYPE: ICD-10-CM

## 2025-08-12 DIAGNOSIS — E78.9 ABNORMAL CHOLESTEROL TEST: ICD-10-CM

## 2025-08-13 RX ORDER — ATORVASTATIN CALCIUM 20 MG/1
20 TABLET, FILM COATED ORAL DAILY
Qty: 90 TABLET | Refills: 0 | Status: SHIPPED | OUTPATIENT
Start: 2025-08-13

## 2025-08-13 RX ORDER — AMLODIPINE BESYLATE 10 MG/1
10 TABLET ORAL
Qty: 90 TABLET | Refills: 0 | Status: SHIPPED | OUTPATIENT
Start: 2025-08-13